# Patient Record
Sex: MALE | Race: WHITE | NOT HISPANIC OR LATINO | Employment: OTHER | URBAN - METROPOLITAN AREA
[De-identification: names, ages, dates, MRNs, and addresses within clinical notes are randomized per-mention and may not be internally consistent; named-entity substitution may affect disease eponyms.]

---

## 2017-01-06 ENCOUNTER — HOSPITAL ENCOUNTER (OUTPATIENT)
Dept: CT IMAGING | Facility: HOSPITAL | Age: 62
Discharge: HOME | End: 2017-01-06
Attending: INTERNAL MEDICINE
Payer: COMMERCIAL

## 2017-01-06 DIAGNOSIS — I48.91 ATRIAL FIBRILLATION, UNSPECIFIED TYPE (HCC): ICD-10-CM

## 2017-01-06 PROCEDURE — 75572 CT HRT W/3D IMAGE: CPT

## 2017-01-06 RX ADMIN — IODIXANOL 100 ML: 320 INJECTION, SOLUTION INTRAVASCULAR at 08:21

## 2017-01-29 ENCOUNTER — HOSPITAL ENCOUNTER (OUTPATIENT)
Dept: SLEEP CENTER | Facility: CLINIC | Age: 62
Discharge: HOME | End: 2017-01-29
Payer: COMMERCIAL

## 2017-01-29 DIAGNOSIS — R53.83 OTHER MALAISE AND FATIGUE: ICD-10-CM

## 2017-01-29 DIAGNOSIS — R53.81 OTHER MALAISE AND FATIGUE: ICD-10-CM

## 2017-01-29 DIAGNOSIS — R40.0 SLEEPINESS: ICD-10-CM

## 2017-01-29 DIAGNOSIS — G47.10 PERSISTENT DISORDER OF INITIATING OR MAINTAINING WAKEFULNESS: ICD-10-CM

## 2017-01-29 DIAGNOSIS — G47.33 OSA (OBSTRUCTIVE SLEEP APNEA): ICD-10-CM

## 2017-01-29 PROCEDURE — 95810 POLYSOM 6/> YRS 4/> PARAM: CPT

## 2017-02-01 ENCOUNTER — TRANSCRIBE ORDERS (OUTPATIENT)
Dept: SLEEP CENTER | Facility: CLINIC | Age: 62
End: 2017-02-01

## 2017-02-01 DIAGNOSIS — G47.33 OSA (OBSTRUCTIVE SLEEP APNEA): Primary | ICD-10-CM

## 2017-02-02 ENCOUNTER — TRANSCRIBE ORDERS (OUTPATIENT)
Dept: ADMINISTRATIVE | Facility: HOSPITAL | Age: 62
End: 2017-02-02

## 2017-02-02 ENCOUNTER — APPOINTMENT (OUTPATIENT)
Dept: LAB | Facility: HOSPITAL | Age: 62
End: 2017-02-02
Attending: INTERNAL MEDICINE
Payer: COMMERCIAL

## 2017-02-02 ENCOUNTER — TELEPHONE (OUTPATIENT)
Dept: INPATIENT UNIT | Facility: HOSPITAL | Age: 62
End: 2017-02-02

## 2017-02-02 DIAGNOSIS — I48.91 ATRIAL FIBRILLATION (HCC): ICD-10-CM

## 2017-02-02 LAB
ALBUMIN SERPL BCP-MCNC: 4.2 G/DL (ref 3.5–5)
ALP SERPL-CCNC: 69 U/L (ref 46–116)
ALT SERPL W P-5'-P-CCNC: 40 U/L (ref 12–78)
ANION GAP SERPL CALCULATED.3IONS-SCNC: 8 MMOL/L (ref 4–13)
AST SERPL W P-5'-P-CCNC: 29 U/L (ref 5–45)
BILIRUB SERPL-MCNC: 0.6 MG/DL (ref 0.2–1)
BUN SERPL-MCNC: 28 MG/DL (ref 5–25)
CALCIUM SERPL-MCNC: 9.1 MG/DL (ref 8.3–10.1)
CHLORIDE SERPL-SCNC: 104 MMOL/L (ref 100–108)
CO2 SERPL-SCNC: 28 MMOL/L (ref 21–32)
CREAT SERPL-MCNC: 1.03 MG/DL (ref 0.6–1.3)
ERYTHROCYTE [DISTWIDTH] IN BLOOD BY AUTOMATED COUNT: 13 % (ref 11.6–15.1)
GFR SERPL CREATININE-BSD FRML MDRD: >60 ML/MIN/1.73SQ M
GLUCOSE SERPL-MCNC: 117 MG/DL (ref 65–140)
HCT VFR BLD AUTO: 41.6 % (ref 36.5–49.3)
HGB BLD-MCNC: 14.6 G/DL (ref 12–17)
INR PPP: 1.11 (ref 0.86–1.16)
MCH RBC QN AUTO: 31.3 PG (ref 26.8–34.3)
MCHC RBC AUTO-ENTMCNC: 35.1 G/DL (ref 31.4–37.4)
MCV RBC AUTO: 89 FL (ref 82–98)
PLATELET # BLD AUTO: 150 THOUSANDS/UL (ref 149–390)
PMV BLD AUTO: 9.8 FL (ref 8.9–12.7)
POTASSIUM SERPL-SCNC: 3.8 MMOL/L (ref 3.5–5.3)
PROT SERPL-MCNC: 7.3 G/DL (ref 6.4–8.2)
PROTHROMBIN TIME: 14.2 SECONDS (ref 12–14.3)
RBC # BLD AUTO: 4.66 MILLION/UL (ref 3.88–5.62)
SODIUM SERPL-SCNC: 140 MMOL/L (ref 136–145)
WBC # BLD AUTO: 6.24 THOUSAND/UL (ref 4.31–10.16)

## 2017-02-02 PROCEDURE — 80053 COMPREHEN METABOLIC PANEL: CPT

## 2017-02-02 PROCEDURE — 85027 COMPLETE CBC AUTOMATED: CPT

## 2017-02-02 PROCEDURE — 36415 COLL VENOUS BLD VENIPUNCTURE: CPT

## 2017-02-02 PROCEDURE — 85610 PROTHROMBIN TIME: CPT

## 2017-02-02 RX ORDER — PANTOPRAZOLE SODIUM 40 MG/1
40 INJECTION, POWDER, FOR SOLUTION INTRAVENOUS ONCE
Status: CANCELLED | OUTPATIENT
Start: 2017-02-02 | End: 2017-02-02

## 2017-02-02 RX ORDER — SODIUM CHLORIDE 9 MG/ML
50 INJECTION, SOLUTION INTRAVENOUS CONTINUOUS
Status: CANCELLED | OUTPATIENT
Start: 2017-02-02

## 2017-02-03 ENCOUNTER — APPOINTMENT (OUTPATIENT)
Dept: RADIOLOGY | Facility: HOSPITAL | Age: 62
End: 2017-02-03
Payer: COMMERCIAL

## 2017-02-03 ENCOUNTER — HOSPITAL ENCOUNTER (OUTPATIENT)
Dept: NON INVASIVE DIAGNOSTICS | Facility: HOSPITAL | Age: 62
Discharge: HOME | End: 2017-02-03
Attending: INTERNAL MEDICINE
Payer: COMMERCIAL

## 2017-02-03 ENCOUNTER — ANESTHESIA (OUTPATIENT)
Dept: NON INVASIVE DIAGNOSTICS | Facility: HOSPITAL | Age: 62
End: 2017-02-03
Payer: COMMERCIAL

## 2017-02-03 ENCOUNTER — ANESTHESIA EVENT (OUTPATIENT)
Dept: NON INVASIVE DIAGNOSTICS | Facility: HOSPITAL | Age: 62
End: 2017-02-03
Payer: COMMERCIAL

## 2017-02-03 ENCOUNTER — GENERIC CONVERSION - ENCOUNTER (OUTPATIENT)
Dept: OTHER | Facility: OTHER | Age: 62
End: 2017-02-03

## 2017-02-03 ENCOUNTER — HOSPITAL ENCOUNTER (OUTPATIENT)
Dept: NON INVASIVE DIAGNOSTICS | Facility: HOSPITAL | Age: 62
Discharge: HOME | End: 2017-02-04
Attending: INTERNAL MEDICINE | Admitting: INTERNAL MEDICINE
Payer: COMMERCIAL

## 2017-02-03 DIAGNOSIS — I48.91 ATRIAL FIBRILLATION (HCC): ICD-10-CM

## 2017-02-03 DIAGNOSIS — M79.89 LIMB SWELLING: Primary | ICD-10-CM

## 2017-02-03 LAB
ANION GAP SERPL CALCULATED.3IONS-SCNC: 9 MMOL/L (ref 4–13)
ATRIAL RATE: 108 BPM
BASOPHILS # BLD AUTO: 0.03 THOUSANDS/ΜL (ref 0–0.1)
BASOPHILS NFR BLD AUTO: 1 % (ref 0–1)
BUN SERPL-MCNC: 20 MG/DL (ref 5–25)
CALCIUM SERPL-MCNC: 8.5 MG/DL (ref 8.3–10.1)
CHLORIDE SERPL-SCNC: 109 MMOL/L (ref 100–108)
CO2 SERPL-SCNC: 24 MMOL/L (ref 21–32)
CREAT SERPL-MCNC: 0.83 MG/DL (ref 0.6–1.3)
EOSINOPHIL # BLD AUTO: 0.43 THOUSAND/ΜL (ref 0–0.61)
EOSINOPHIL NFR BLD AUTO: 8 % (ref 0–6)
ERYTHROCYTE [DISTWIDTH] IN BLOOD BY AUTOMATED COUNT: 13.2 % (ref 11.6–15.1)
GFR SERPL CREATININE-BSD FRML MDRD: >60 ML/MIN/1.73SQ M
GLUCOSE SERPL-MCNC: 99 MG/DL (ref 65–140)
HCT VFR BLD AUTO: 45.9 % (ref 36.5–49.3)
HGB BLD-MCNC: 16.3 G/DL (ref 12–17)
KCT BLD-ACNC: 122 SEC (ref 89–137)
KCT BLD-ACNC: 229 SEC (ref 89–137)
KCT BLD-ACNC: 240 SEC (ref 89–137)
KCT BLD-ACNC: 257 SEC (ref 89–137)
KCT BLD-ACNC: 269 SEC (ref 89–137)
KCT BLD-ACNC: 275 SEC (ref 89–137)
KCT BLD-ACNC: 286 SEC (ref 89–137)
KCT BLD-ACNC: 292 SEC (ref 89–137)
KCT BLD-ACNC: 298 SEC (ref 89–137)
KCT BLD-ACNC: 304 SEC (ref 89–137)
KCT BLD-ACNC: 322 SEC (ref 89–137)
KCT BLD-ACNC: 328 SEC (ref 89–137)
KCT BLD-ACNC: 334 SEC (ref 89–137)
KCT BLD-ACNC: 352 SEC (ref 89–137)
KCT BLD-ACNC: 358 SEC (ref 89–137)
KCT BLD-ACNC: 371 SEC (ref 89–137)
LYMPHOCYTES # BLD AUTO: 1.84 THOUSANDS/ΜL (ref 0.6–4.47)
LYMPHOCYTES NFR BLD AUTO: 33 % (ref 14–44)
MAGNESIUM SERPL-MCNC: 1.9 MG/DL (ref 1.6–2.6)
MCH RBC QN AUTO: 31.8 PG (ref 26.8–34.3)
MCHC RBC AUTO-ENTMCNC: 35.5 G/DL (ref 31.4–37.4)
MCV RBC AUTO: 90 FL (ref 82–98)
MONOCYTES # BLD AUTO: 0.47 THOUSAND/ΜL (ref 0.17–1.22)
MONOCYTES NFR BLD AUTO: 9 % (ref 4–12)
NEUTROPHILS # BLD AUTO: 2.76 THOUSANDS/ΜL (ref 1.85–7.62)
NEUTS SEG NFR BLD AUTO: 49 % (ref 43–75)
NRBC BLD AUTO-RTO: 0 /100 WBCS
PLATELET # BLD AUTO: 162 THOUSANDS/UL (ref 149–390)
PMV BLD AUTO: 10.4 FL (ref 8.9–12.7)
POTASSIUM SERPL-SCNC: 4 MMOL/L (ref 3.5–5.3)
QRS AXIS: -23 DEGREES
QRSD INTERVAL: 94 MS
QT INTERVAL: 358 MS
QTC INTERVAL: 430 MS
RBC # BLD AUTO: 5.12 MILLION/UL (ref 3.88–5.62)
SODIUM SERPL-SCNC: 142 MMOL/L (ref 136–145)
SPECIMEN SOURCE: ABNORMAL
SPECIMEN SOURCE: NORMAL
T WAVE AXIS: 51 DEGREES
VENTRICULAR RATE: 87 BPM
WBC # BLD AUTO: 5.54 THOUSAND/UL (ref 4.31–10.16)

## 2017-02-03 PROCEDURE — C1730 CATH, EP, 19 OR FEW ELECT: HCPCS | Performed by: INTERNAL MEDICINE

## 2017-02-03 PROCEDURE — C1894 INTRO/SHEATH, NON-LASER: HCPCS | Performed by: INTERNAL MEDICINE

## 2017-02-03 PROCEDURE — 93655 ICAR CATH ABLTJ DSCRT ARRHYT: CPT | Performed by: INTERNAL MEDICINE

## 2017-02-03 PROCEDURE — C1893 INTRO/SHEATH, FIXED,NON-PEEL: HCPCS | Performed by: INTERNAL MEDICINE

## 2017-02-03 PROCEDURE — 83735 ASSAY OF MAGNESIUM: CPT | Performed by: PHYSICIAN ASSISTANT

## 2017-02-03 PROCEDURE — 92960 CARDIOVERSION ELECTRIC EXT: CPT | Performed by: INTERNAL MEDICINE

## 2017-02-03 PROCEDURE — 85025 COMPLETE CBC W/AUTO DIFF WBC: CPT | Performed by: PHYSICIAN ASSISTANT

## 2017-02-03 PROCEDURE — 93662 INTRACARDIAC ECG (ICE): CPT | Performed by: INTERNAL MEDICINE

## 2017-02-03 PROCEDURE — C1759 CATH, INTRA ECHOCARDIOGRAPHY: HCPCS | Performed by: INTERNAL MEDICINE

## 2017-02-03 PROCEDURE — C1769 GUIDE WIRE: HCPCS | Performed by: INTERNAL MEDICINE

## 2017-02-03 PROCEDURE — 93656 COMPRE EP EVAL ABLTJ ATR FIB: CPT | Performed by: INTERNAL MEDICINE

## 2017-02-03 PROCEDURE — 93971 EXTREMITY STUDY: CPT

## 2017-02-03 PROCEDURE — 93312 ECHO TRANSESOPHAGEAL: CPT

## 2017-02-03 PROCEDURE — C2630 CATH, EP, COOL-TIP: HCPCS | Performed by: INTERNAL MEDICINE

## 2017-02-03 PROCEDURE — C1733 CATH, EP, OTHR THAN COOL-TIP: HCPCS | Performed by: INTERNAL MEDICINE

## 2017-02-03 PROCEDURE — 93613 INTRACARDIAC EPHYS 3D MAPG: CPT | Performed by: INTERNAL MEDICINE

## 2017-02-03 PROCEDURE — C1726 CATH, BAL DIL, NON-VASCULAR: HCPCS | Performed by: INTERNAL MEDICINE

## 2017-02-03 PROCEDURE — 80048 BASIC METABOLIC PNL TOTAL CA: CPT | Performed by: PHYSICIAN ASSISTANT

## 2017-02-03 PROCEDURE — 85347 COAGULATION TIME ACTIVATED: CPT

## 2017-02-03 PROCEDURE — 93623 PRGRMD STIMJ&PACG IV RX NFS: CPT | Performed by: INTERNAL MEDICINE

## 2017-02-03 PROCEDURE — 93005 ELECTROCARDIOGRAM TRACING: CPT | Performed by: PHYSICIAN ASSISTANT

## 2017-02-03 RX ORDER — HEPARIN SODIUM 1000 [USP'U]/ML
INJECTION, SOLUTION INTRAVENOUS; SUBCUTANEOUS CODE/TRAUMA/SEDATION MEDICATION
Status: COMPLETED | OUTPATIENT
Start: 2017-02-03 | End: 2017-02-03

## 2017-02-03 RX ORDER — PANTOPRAZOLE SODIUM 40 MG/1
40 INJECTION, POWDER, FOR SOLUTION INTRAVENOUS ONCE
Status: DISCONTINUED | OUTPATIENT
Start: 2017-02-03 | End: 2017-02-04 | Stop reason: HOSPADM

## 2017-02-03 RX ORDER — PROPOFOL 10 MG/ML
INJECTION, EMULSION INTRAVENOUS AS NEEDED
Status: DISCONTINUED | OUTPATIENT
Start: 2017-02-03 | End: 2017-02-03 | Stop reason: SURG

## 2017-02-03 RX ORDER — MEPERIDINE HYDROCHLORIDE 25 MG/ML
12.5 INJECTION INTRAMUSCULAR; INTRAVENOUS; SUBCUTANEOUS AS NEEDED
Status: CANCELLED | OUTPATIENT
Start: 2017-02-03

## 2017-02-03 RX ORDER — LIDOCAINE HYDROCHLORIDE 10 MG/ML
INJECTION, SOLUTION INFILTRATION; PERINEURAL AS NEEDED
Status: DISCONTINUED | OUTPATIENT
Start: 2017-02-03 | End: 2017-02-03 | Stop reason: SURG

## 2017-02-03 RX ORDER — HEPARIN SODIUM 10000 [USP'U]/100ML
INJECTION, SOLUTION INTRAVENOUS
Status: COMPLETED | OUTPATIENT
Start: 2017-02-03 | End: 2017-02-03

## 2017-02-03 RX ORDER — ACETAMINOPHEN 325 MG/1
650 TABLET ORAL EVERY 4 HOURS PRN
Status: DISCONTINUED | OUTPATIENT
Start: 2017-02-03 | End: 2017-02-04 | Stop reason: HOSPADM

## 2017-02-03 RX ORDER — SODIUM CHLORIDE 9 MG/ML
INJECTION, SOLUTION INTRAVENOUS CONTINUOUS PRN
Status: DISCONTINUED | OUTPATIENT
Start: 2017-02-03 | End: 2017-02-03 | Stop reason: SURG

## 2017-02-03 RX ORDER — ALBUTEROL SULFATE 2.5 MG/3ML
2.5 SOLUTION RESPIRATORY (INHALATION) ONCE AS NEEDED
Status: CANCELLED | OUTPATIENT
Start: 2017-02-03

## 2017-02-03 RX ORDER — MEPERIDINE HYDROCHLORIDE 25 MG/ML
12.5 INJECTION INTRAMUSCULAR; INTRAVENOUS; SUBCUTANEOUS AS NEEDED
Status: DISCONTINUED | OUTPATIENT
Start: 2017-02-03 | End: 2017-02-03 | Stop reason: HOSPADM

## 2017-02-03 RX ORDER — ADENOSINE 3 MG/ML
INJECTION INTRAVENOUS CODE/TRAUMA/SEDATION MEDICATION
Status: COMPLETED | OUTPATIENT
Start: 2017-02-03 | End: 2017-02-03

## 2017-02-03 RX ORDER — SODIUM CHLORIDE, SODIUM LACTATE, POTASSIUM CHLORIDE, CALCIUM CHLORIDE 600; 310; 30; 20 MG/100ML; MG/100ML; MG/100ML; MG/100ML
125 INJECTION, SOLUTION INTRAVENOUS CONTINUOUS
Status: DISCONTINUED | OUTPATIENT
Start: 2017-02-03 | End: 2017-02-04 | Stop reason: HOSPADM

## 2017-02-03 RX ORDER — PROTAMINE SULFATE 10 MG/ML
INJECTION, SOLUTION INTRAVENOUS AS NEEDED
Status: DISCONTINUED | OUTPATIENT
Start: 2017-02-03 | End: 2017-02-03 | Stop reason: SURG

## 2017-02-03 RX ORDER — SODIUM CHLORIDE, SODIUM LACTATE, POTASSIUM CHLORIDE, CALCIUM CHLORIDE 600; 310; 30; 20 MG/100ML; MG/100ML; MG/100ML; MG/100ML
125 INJECTION, SOLUTION INTRAVENOUS CONTINUOUS
Status: CANCELLED | OUTPATIENT
Start: 2017-02-03

## 2017-02-03 RX ORDER — OXYCODONE HYDROCHLORIDE AND ACETAMINOPHEN 5; 325 MG/1; MG/1
1 TABLET ORAL EVERY 4 HOURS PRN
Status: DISCONTINUED | OUTPATIENT
Start: 2017-02-03 | End: 2017-02-04 | Stop reason: HOSPADM

## 2017-02-03 RX ORDER — DOFETILIDE 0.25 MG/1
250 CAPSULE ORAL EVERY 12 HOURS SCHEDULED
Status: DISCONTINUED | OUTPATIENT
Start: 2017-02-03 | End: 2017-02-04 | Stop reason: HOSPADM

## 2017-02-03 RX ORDER — EPHEDRINE SULFATE 50 MG/ML
INJECTION, SOLUTION INTRAVENOUS AS NEEDED
Status: DISCONTINUED | OUTPATIENT
Start: 2017-02-03 | End: 2017-02-03 | Stop reason: SURG

## 2017-02-03 RX ORDER — PANTOPRAZOLE SODIUM 40 MG/1
40 TABLET, DELAYED RELEASE ORAL
Status: DISCONTINUED | OUTPATIENT
Start: 2017-02-04 | End: 2017-02-04 | Stop reason: HOSPADM

## 2017-02-03 RX ORDER — LIDOCAINE HYDROCHLORIDE 10 MG/ML
INJECTION, SOLUTION INFILTRATION; PERINEURAL CODE/TRAUMA/SEDATION MEDICATION
Status: COMPLETED | OUTPATIENT
Start: 2017-02-03 | End: 2017-02-03

## 2017-02-03 RX ORDER — FENTANYL CITRATE 50 UG/ML
INJECTION, SOLUTION INTRAMUSCULAR; INTRAVENOUS AS NEEDED
Status: DISCONTINUED | OUTPATIENT
Start: 2017-02-03 | End: 2017-02-03 | Stop reason: SURG

## 2017-02-03 RX ORDER — ALBUTEROL SULFATE 2.5 MG/3ML
2.5 SOLUTION RESPIRATORY (INHALATION) ONCE AS NEEDED
Status: DISCONTINUED | OUTPATIENT
Start: 2017-02-03 | End: 2017-02-03 | Stop reason: HOSPADM

## 2017-02-03 RX ORDER — PROPOFOL 10 MG/ML
INJECTION, EMULSION INTRAVENOUS CONTINUOUS PRN
Status: DISCONTINUED | OUTPATIENT
Start: 2017-02-03 | End: 2017-02-03 | Stop reason: SURG

## 2017-02-03 RX ORDER — SODIUM CHLORIDE 9 MG/ML
50 INJECTION, SOLUTION INTRAVENOUS CONTINUOUS
Status: DISCONTINUED | OUTPATIENT
Start: 2017-02-03 | End: 2017-02-04 | Stop reason: HOSPADM

## 2017-02-03 RX ORDER — MIDAZOLAM HYDROCHLORIDE 1 MG/ML
INJECTION INTRAMUSCULAR; INTRAVENOUS AS NEEDED
Status: DISCONTINUED | OUTPATIENT
Start: 2017-02-03 | End: 2017-02-03 | Stop reason: SURG

## 2017-02-03 RX ORDER — SUCCINYLCHOLINE CHLORIDE 20 MG/ML
INJECTION INTRAMUSCULAR; INTRAVENOUS AS NEEDED
Status: DISCONTINUED | OUTPATIENT
Start: 2017-02-03 | End: 2017-02-03 | Stop reason: SURG

## 2017-02-03 RX ADMIN — ADENOSINE 12 MG: 3 INJECTION, SOLUTION INTRAVENOUS at 11:25

## 2017-02-03 RX ADMIN — ADENOSINE 18 MG: 3 INJECTION, SOLUTION INTRAVENOUS at 13:33

## 2017-02-03 RX ADMIN — FENTANYL CITRATE 50 MCG: 50 INJECTION, SOLUTION INTRAMUSCULAR; INTRAVENOUS at 09:02

## 2017-02-03 RX ADMIN — ADENOSINE 12 MG: 3 INJECTION, SOLUTION INTRAVENOUS at 11:42

## 2017-02-03 RX ADMIN — FENTANYL CITRATE 100 MCG: 50 INJECTION, SOLUTION INTRAMUSCULAR; INTRAVENOUS at 07:47

## 2017-02-03 RX ADMIN — MIDAZOLAM HYDROCHLORIDE 2 MG: 1 INJECTION, SOLUTION INTRAMUSCULAR; INTRAVENOUS at 07:00

## 2017-02-03 RX ADMIN — HEPARIN SODIUM 2000 UNITS: 1000 INJECTION INTRAVENOUS; SUBCUTANEOUS at 11:03

## 2017-02-03 RX ADMIN — PROTAMINE SULFATE 40 MG: 10 INJECTION, SOLUTION INTRAVENOUS at 13:52

## 2017-02-03 RX ADMIN — PROTAMINE SULFATE 40 MG: 10 INJECTION, SOLUTION INTRAVENOUS at 14:25

## 2017-02-03 RX ADMIN — ADENOSINE 12 MG: 3 INJECTION, SOLUTION INTRAVENOUS at 11:40

## 2017-02-03 RX ADMIN — HEPARIN SODIUM 2000 UNITS: 1000 INJECTION INTRAVENOUS; SUBCUTANEOUS at 09:47

## 2017-02-03 RX ADMIN — PROPOFOL 75 MCG/KG/MIN: 10 INJECTION, EMULSION INTRAVENOUS at 07:35

## 2017-02-03 RX ADMIN — PROTAMINE SULFATE 30 MG: 10 INJECTION, SOLUTION INTRAVENOUS at 14:06

## 2017-02-03 RX ADMIN — ADENOSINE 12 MG: 3 INJECTION, SOLUTION INTRAVENOUS at 11:31

## 2017-02-03 RX ADMIN — DOFETILIDE 250 MCG: 0.25 CAPSULE ORAL at 21:22

## 2017-02-03 RX ADMIN — ADENOSINE 12 MG: 3 INJECTION, SOLUTION INTRAVENOUS at 11:45

## 2017-02-03 RX ADMIN — SODIUM CHLORIDE: 0.9 INJECTION, SOLUTION INTRAVENOUS at 07:45

## 2017-02-03 RX ADMIN — ADENOSINE 12 MG: 3 INJECTION, SOLUTION INTRAVENOUS at 12:19

## 2017-02-03 RX ADMIN — ADENOSINE 12 MG: 3 INJECTION, SOLUTION INTRAVENOUS at 11:50

## 2017-02-03 RX ADMIN — IOHEXOL 60 ML: 350 INJECTION, SOLUTION INTRAVENOUS at 14:17

## 2017-02-03 RX ADMIN — PROPOFOL 150 MG: 10 INJECTION, EMULSION INTRAVENOUS at 07:24

## 2017-02-03 RX ADMIN — FENTANYL CITRATE 50 MCG: 50 INJECTION, SOLUTION INTRAMUSCULAR; INTRAVENOUS at 11:04

## 2017-02-03 RX ADMIN — SUCCINYLCHOLINE CHLORIDE 120 MG: 20 INJECTION, SOLUTION INTRAMUSCULAR; INTRAVENOUS at 07:24

## 2017-02-03 RX ADMIN — LIDOCAINE HYDROCHLORIDE 30 MG: 10 INJECTION, SOLUTION INFILTRATION; PERINEURAL at 07:24

## 2017-02-03 RX ADMIN — HEPARIN SODIUM 1400 UNITS/HR: 10000 INJECTION, SOLUTION INTRAVENOUS at 08:50

## 2017-02-03 RX ADMIN — LIDOCAINE HYDROCHLORIDE 20 ML: 10 INJECTION, SOLUTION INFILTRATION; PERINEURAL at 08:17

## 2017-02-03 RX ADMIN — HEPARIN SODIUM 3000 UNITS: 1000 INJECTION INTRAVENOUS; SUBCUTANEOUS at 09:08

## 2017-02-03 RX ADMIN — SODIUM CHLORIDE 50 ML/HR: 0.9 INJECTION, SOLUTION INTRAVENOUS at 06:27

## 2017-02-03 RX ADMIN — EPHEDRINE SULFATE 5 MG: 50 INJECTION, SOLUTION INTRAMUSCULAR; INTRAVENOUS; SUBCUTANEOUS at 09:45

## 2017-02-03 RX ADMIN — ADENOSINE 12 MG: 3 INJECTION, SOLUTION INTRAVENOUS at 09:41

## 2017-02-03 RX ADMIN — ADENOSINE 12 MG: 3 INJECTION, SOLUTION INTRAVENOUS at 13:30

## 2017-02-03 RX ADMIN — FENTANYL CITRATE 50 MCG: 50 INJECTION, SOLUTION INTRAMUSCULAR; INTRAVENOUS at 09:59

## 2017-02-03 RX ADMIN — FENTANYL CITRATE 50 MCG: 50 INJECTION, SOLUTION INTRAMUSCULAR; INTRAVENOUS at 11:09

## 2017-02-03 RX ADMIN — PROTAMINE SULFATE 50 MG: 10 INJECTION, SOLUTION INTRAVENOUS at 14:44

## 2017-02-03 RX ADMIN — ADENOSINE 12 MG: 3 INJECTION, SOLUTION INTRAVENOUS at 11:33

## 2017-02-03 RX ADMIN — SODIUM CHLORIDE: 0.9 INJECTION, SOLUTION INTRAVENOUS at 09:00

## 2017-02-03 RX ADMIN — VANCOMYCIN HYDROCHLORIDE 1 G: 1 INJECTION, POWDER, LYOPHILIZED, FOR SOLUTION INTRAVENOUS at 07:10

## 2017-02-03 RX ADMIN — HEPARIN SODIUM 15000 UNITS: 1000 INJECTION INTRAVENOUS; SUBCUTANEOUS at 08:19

## 2017-02-03 RX ADMIN — HEPARIN SODIUM 1000 UNITS/HR: 10000 INJECTION, SOLUTION INTRAVENOUS at 08:19

## 2017-02-03 RX ADMIN — ADENOSINE 12 MG: 3 INJECTION, SOLUTION INTRAVENOUS at 11:22

## 2017-02-03 RX ADMIN — SODIUM CHLORIDE 50 ML/HR: 0.9 INJECTION, SOLUTION INTRAVENOUS at 16:39

## 2017-02-03 RX ADMIN — HEPARIN SODIUM 2000 UNITS: 1000 INJECTION INTRAVENOUS; SUBCUTANEOUS at 09:28

## 2017-02-04 VITALS
DIASTOLIC BLOOD PRESSURE: 61 MMHG | OXYGEN SATURATION: 96 % | TEMPERATURE: 98.1 F | BODY MASS INDEX: 29.01 KG/M2 | HEIGHT: 71 IN | WEIGHT: 207.23 LBS | HEART RATE: 80 BPM | SYSTOLIC BLOOD PRESSURE: 113 MMHG | RESPIRATION RATE: 20 BRPM

## 2017-02-04 LAB
ANION GAP SERPL CALCULATED.3IONS-SCNC: 9 MMOL/L (ref 4–13)
BUN SERPL-MCNC: 20 MG/DL (ref 5–25)
CALCIUM SERPL-MCNC: 7.6 MG/DL (ref 8.3–10.1)
CHLORIDE SERPL-SCNC: 109 MMOL/L (ref 100–108)
CO2 SERPL-SCNC: 25 MMOL/L (ref 21–32)
CREAT SERPL-MCNC: 0.89 MG/DL (ref 0.6–1.3)
ERYTHROCYTE [DISTWIDTH] IN BLOOD BY AUTOMATED COUNT: 13.7 % (ref 11.6–15.1)
GFR SERPL CREATININE-BSD FRML MDRD: >60 ML/MIN/1.73SQ M
GLUCOSE SERPL-MCNC: 143 MG/DL (ref 65–140)
HCT VFR BLD AUTO: 38.4 % (ref 36.5–49.3)
HGB BLD-MCNC: 13.1 G/DL (ref 12–17)
MCH RBC QN AUTO: 31.5 PG (ref 26.8–34.3)
MCHC RBC AUTO-ENTMCNC: 34.1 G/DL (ref 31.4–37.4)
MCV RBC AUTO: 92 FL (ref 82–98)
PLATELET # BLD AUTO: 139 THOUSANDS/UL (ref 149–390)
PMV BLD AUTO: 10.7 FL (ref 8.9–12.7)
POTASSIUM SERPL-SCNC: 3.7 MMOL/L (ref 3.5–5.3)
RBC # BLD AUTO: 4.16 MILLION/UL (ref 3.88–5.62)
SODIUM SERPL-SCNC: 143 MMOL/L (ref 136–145)
WBC # BLD AUTO: 7.15 THOUSAND/UL (ref 4.31–10.16)

## 2017-02-04 PROCEDURE — 80048 BASIC METABOLIC PNL TOTAL CA: CPT | Performed by: PHYSICIAN ASSISTANT

## 2017-02-04 PROCEDURE — 85027 COMPLETE CBC AUTOMATED: CPT | Performed by: PHYSICIAN ASSISTANT

## 2017-02-04 RX ORDER — PANTOPRAZOLE SODIUM 40 MG/1
40 TABLET, DELAYED RELEASE ORAL
Qty: 30 TABLET | Refills: 0 | Status: SHIPPED | OUTPATIENT
Start: 2017-02-04 | End: 2018-07-11

## 2017-02-04 RX ORDER — ACETAMINOPHEN 325 MG/1
650 TABLET ORAL EVERY 4 HOURS PRN
Qty: 30 TABLET | Refills: 0
Start: 2017-02-04 | End: 2017-03-06

## 2017-02-04 RX ADMIN — DOFETILIDE 250 MCG: 0.25 CAPSULE ORAL at 09:06

## 2017-02-04 RX ADMIN — PANTOPRAZOLE SODIUM 40 MG: 40 TABLET, DELAYED RELEASE ORAL at 06:19

## 2017-02-04 RX ADMIN — APIXABAN 5 MG: 5 TABLET, FILM COATED ORAL at 06:19

## 2017-03-01 ENCOUNTER — HOSPITAL ENCOUNTER (OUTPATIENT)
Dept: SLEEP CENTER | Facility: CLINIC | Age: 62
Discharge: HOME | End: 2017-03-01
Payer: COMMERCIAL

## 2017-03-01 ENCOUNTER — TRANSCRIBE ORDERS (OUTPATIENT)
Dept: SLEEP CENTER | Facility: CLINIC | Age: 62
End: 2017-03-01

## 2017-03-01 DIAGNOSIS — G47.33 OSA (OBSTRUCTIVE SLEEP APNEA): Primary | ICD-10-CM

## 2017-03-01 DIAGNOSIS — G47.33 OSA (OBSTRUCTIVE SLEEP APNEA): ICD-10-CM

## 2017-03-15 ENCOUNTER — ALLSCRIPTS OFFICE VISIT (OUTPATIENT)
Dept: OTHER | Facility: OTHER | Age: 62
End: 2017-03-15

## 2017-07-26 ENCOUNTER — TRANSCRIBE ORDERS (OUTPATIENT)
Dept: SLEEP CENTER | Facility: CLINIC | Age: 62
End: 2017-07-26

## 2017-08-09 ENCOUNTER — ALLSCRIPTS OFFICE VISIT (OUTPATIENT)
Dept: OTHER | Facility: OTHER | Age: 62
End: 2017-08-09

## 2017-08-21 ENCOUNTER — HOSPITAL ENCOUNTER (OUTPATIENT)
Dept: SLEEP CENTER | Facility: CLINIC | Age: 62
Discharge: HOME | End: 2017-08-21
Payer: COMMERCIAL

## 2017-09-22 ENCOUNTER — APPOINTMENT (OUTPATIENT)
Dept: LAB | Facility: HOSPITAL | Age: 62
End: 2017-09-22
Payer: COMMERCIAL

## 2017-09-22 ENCOUNTER — TRANSCRIBE ORDERS (OUTPATIENT)
Dept: ADMINISTRATIVE | Facility: HOSPITAL | Age: 62
End: 2017-09-22

## 2017-09-22 DIAGNOSIS — Z12.5 SPECIAL SCREENING FOR MALIGNANT NEOPLASM OF PROSTATE: Primary | ICD-10-CM

## 2017-09-22 DIAGNOSIS — Z12.5 SPECIAL SCREENING FOR MALIGNANT NEOPLASM OF PROSTATE: ICD-10-CM

## 2017-09-22 LAB — PSA SERPL-MCNC: 1.6 NG/ML (ref 0–4)

## 2017-09-22 PROCEDURE — G0103 PSA SCREENING: HCPCS

## 2017-09-22 PROCEDURE — 36415 COLL VENOUS BLD VENIPUNCTURE: CPT

## 2017-10-13 ENCOUNTER — TRANSCRIBE ORDERS (OUTPATIENT)
Dept: LAB | Facility: HOSPITAL | Age: 62
End: 2017-10-13

## 2017-10-13 ENCOUNTER — APPOINTMENT (OUTPATIENT)
Dept: LAB | Facility: HOSPITAL | Age: 62
End: 2017-10-13
Attending: INTERNAL MEDICINE
Payer: COMMERCIAL

## 2017-10-13 DIAGNOSIS — L40.50 PSORIATIC ARTHROPATHY (HCC): ICD-10-CM

## 2017-10-13 DIAGNOSIS — Z79.899 NEED FOR PROPHYLACTIC CHEMOTHERAPY: ICD-10-CM

## 2017-10-13 DIAGNOSIS — L40.50 PSORIATIC ARTHROPATHY (HCC): Primary | ICD-10-CM

## 2017-10-13 LAB
ALBUMIN SERPL BCP-MCNC: 3.9 G/DL (ref 3.5–5)
ALP SERPL-CCNC: 78 U/L (ref 46–116)
ALT SERPL W P-5'-P-CCNC: 92 U/L (ref 12–78)
AST SERPL W P-5'-P-CCNC: 50 U/L (ref 5–45)
BILIRUB DIRECT SERPL-MCNC: 0.14 MG/DL (ref 0–0.2)
BILIRUB SERPL-MCNC: 0.7 MG/DL (ref 0.2–1)
PROT SERPL-MCNC: 7.4 G/DL (ref 6.4–8.2)

## 2017-10-13 PROCEDURE — 80076 HEPATIC FUNCTION PANEL: CPT

## 2017-10-13 PROCEDURE — 36415 COLL VENOUS BLD VENIPUNCTURE: CPT

## 2017-11-10 ENCOUNTER — TRANSCRIBE ORDERS (OUTPATIENT)
Dept: ADMINISTRATIVE | Facility: HOSPITAL | Age: 62
End: 2017-11-10

## 2017-11-10 ENCOUNTER — LAB (OUTPATIENT)
Dept: LAB | Facility: HOSPITAL | Age: 62
End: 2017-11-10
Payer: COMMERCIAL

## 2017-11-10 DIAGNOSIS — Z79.899 NEED FOR PROPHYLACTIC CHEMOTHERAPY: Primary | ICD-10-CM

## 2017-11-10 DIAGNOSIS — Z79.899 NEED FOR PROPHYLACTIC CHEMOTHERAPY: ICD-10-CM

## 2017-11-10 LAB
ALBUMIN SERPL BCP-MCNC: 4.2 G/DL (ref 3.5–5)
ALP SERPL-CCNC: 68 U/L (ref 46–116)
ALT SERPL W P-5'-P-CCNC: 75 U/L (ref 12–78)
AST SERPL W P-5'-P-CCNC: 39 U/L (ref 5–45)
BILIRUB DIRECT SERPL-MCNC: 0.11 MG/DL (ref 0–0.2)
BILIRUB SERPL-MCNC: 0.7 MG/DL (ref 0.2–1)
PROT SERPL-MCNC: 7.2 G/DL (ref 6.4–8.2)

## 2017-11-10 PROCEDURE — 80076 HEPATIC FUNCTION PANEL: CPT

## 2017-11-10 PROCEDURE — 36415 COLL VENOUS BLD VENIPUNCTURE: CPT

## 2018-01-13 VITALS
SYSTOLIC BLOOD PRESSURE: 136 MMHG | BODY MASS INDEX: 27.31 KG/M2 | DIASTOLIC BLOOD PRESSURE: 82 MMHG | HEART RATE: 61 BPM | HEIGHT: 71 IN | WEIGHT: 195.06 LBS

## 2018-01-15 VITALS
DIASTOLIC BLOOD PRESSURE: 88 MMHG | HEART RATE: 63 BPM | BODY MASS INDEX: 27.74 KG/M2 | WEIGHT: 198.13 LBS | HEIGHT: 71 IN | SYSTOLIC BLOOD PRESSURE: 146 MMHG

## 2018-01-15 NOTE — PROCEDURES
Procedures by Deb Rodriges MD at 2/3/2017  2:22  PM      Author:  Deb Rodriges MD Service:  Electrophysiology Author Type:  Physician     Filed:  2/3/2017  5:00 PM Date of Service:  2/3/2017  2:22 PM Status:  Signed     :  Deb Rodriges MD (Physician)            Atrial Fibrillation and atrial flutter          History and physical were reviewed  Patient was examined and history was reviewed  No change in patient's condition Since history and physical has been completed           The pre- operative diagnosis:  Atrial fibrillation  Atrial flutter          Postoperative diagnosis:  Atrial fibrillation  Atrial flutter            Procedure:  1  Cryoablation of atrial fibrillation  2 comprehensive electrophysiologic evaluation with left atrial pacing and recording  3  3-D electro-anatomic mapping using NavX system  4  Intracardiac echocardiography  5  Second ablation- radiofrequency ablation of atrial flutter  6  Pharmacologic testing and adenosine  7   Electrical cardioversion, DCCV, 200 J single shock             Surgeon: Deb Rodriges      Assistants -None      Specimens - none      Estimated blood loss- 50 ml      Findings-none      Complications- none      Anesthesia- general anesthesia, local lidocaine by myself      CAROL - Dr Mary Chong - No AMEYA clot                Details of the procedure   The patient had been seen before as an outpatient  He has felt sotalol or dofetilide and an ablation had been planned  He was seen in the morning, examined, and evaluated  Examination is same as last outpatient visit on 30th of December    Consent was signed    Patient was taken to the electrophysiologic laboratory  Proper time out was done  General anesthesia was done  Transesophageal echocardiogram was done and atrial appendage clot was ruled out      Sterile dressing and draping was done  Access obtained under ultrasound guidance as described below  Sheaths placed as described below  Catheters placed as described below    The patient was in a pulmonary vein tachycardia going on at cycle length of 170 ms   There was also bursts of atrial flutter  The patient was cardioverted into normal sinus rhythm and we proceeded with ablation          Sheaths used  All access was obtained under ultrasound guidance    A  Right femoral vein-   8F upgraded to 11F - SRO and SLO used through it, finally upgraded to 15 F for cryocath,   9 F for ICE    5F- Right ventricular     B  Left femoral vein- 7F for high right atrial , 7F CS, 5F for HRA            Catheters used  1  HRA catheters - Octapolar    2  His catheter - CRD2    3  RV catheter - Mick  4  Coronary sinus catheter - [employer] arm[employer] FJ curve  5  Ablation catheter :  Cryocath for Cryo ablation  Irrigated thermocool DF curve /EPT blazer 8mm - for Atrial flutter ablation, testing of Atrial flutter line  6  Lasso - Achieve with cryocath,   7  ICE - Standard Acuson          Imaging systems used  1  Fluoroscopy    Right anterior oblique views were used whenever we needed to determine between anterior and posterior  Left anterior oblique views were used whenever we needed to determine between right and left  2  Electrotomy mapping - 3D electro-anatomic mapping was done by the Doctor Janey 91  This was also used whenever catheter was moved  The His bundle was marked  3  ICE -for transeptal and thereafter to look at veins and appendage   Intermittently to look at pericardial space              Anticoagulation:  Heparin was used for anticoagulation to keep ACT in the therapeutic range 350-400ms  Correction at end of ablation :protamine 40/  30 /  40 /50               Step 1 : Cryoablation  Details of the ablation    The patient has already been cardioverted to sinus rhythm  with proceeded with ablation for atrial fibrillation          Transeptal   A BRK1 needle with an SLO sheath was used through 6 F access in right groin  Septal puncture was obtained under both fluoroscopy and intracardiac ice guidance  Position was confirmed and then cryosheath was taken to the left side              Cryoablation -sequence  Once we were on the left side, the sequence of ablation were as follows  Left superior pulmonary vein  Left inferior pulmonary vein  Right superior pulmonary vein  Right inferior pulmonary vein              Cryoablation - process   For each of the veins the following sequence was followed  -We went into the vein with Achieve catheter and an anatomy of the same was made on Navx  -Thereafter the CryoCath catheter was positioned, such that when the cryo-balloon was inflated it occluded ostium of the vein  -The occlusion was confirmed by- ventricularization of the venous waveform, intracardiac echo showing absence of venous leak, and at times using contrast venography to confirm the completeness of occlusion  - Thereafter we would come on cryoablation - following the freeze thaw freeze protocol  - Each time we made sure that therapeutic temperature was reached in an appropriate time ( usually -45 to -50 degrees in 60 sec)  - Electrograms were followed so that the pulmonary vein potentials were terminated  - The esophageal temperature was followed closely and never allowed to fall below 30°C  - While ablating on the right side, the phrenic nerve was stimulated continuously by a catheter placed in the subclavian vein, and adequate capture of the diaphragm was confirmed throughout the process, to prevent phrenic nerve injury              Cryoablation- details for each vein              Cryoablation - confirmation of block - adenosine use with pacing  Entrance Block  -Achieve catheter was placed at the ostium  - left-sided pulmonary veins pacing done from distal coronary sinus  -right-sided pulmonary veins being done from right atrium  -Each time 12 mg of adenosine was given followed by flush  There was adequate adenosine effect as confirmed by AV block and hypotension  -There was no recurrence of pulmonary vein potential, suggesting complete block across the ostium      Exit block  The achieve catheter was placed at the ostium    Sequential pacing was done across the bipolar   At no point was able to capture the atrium and pacing through the achieve            Thereafter sheath and catheters were pulled out from the left atrium and heparin infusion was stopped               Step 2: Atrial flutter  After the catheters had been placed patient had bouts of pulmonary vein tachycardia and also atrial flutter  We went ahead and performed an Tricuspid Isthmus Line      Ablation done using thermocool irrigated catheter , DF curve  Power variation between 30 and 35 W  Temperature between 30 and 35°  Impedance followed carefully not allowing it to jump more than 20 ohms     An ablation line was done all the way from tricuspid annulus to the inferior vena cava  The trans-isthmic time remained at 130 ms  We were unable to get rid of sharp atrial signals from the cavoatrial junction      Changed over to an EP T blazer catheter 8mm tip  Temperature kept at 55°  Power varied anywhere from 25 W to 55  Carefully ablated all sharp atrial signals      Checking of the flutter line  Using the ablation catheter the line was checked  Block was confirmed by:  - Trans-isthmic conduction time 134 ms  - Bidirectional block present  - When stimulating lateral to the line the sequene of atrial activation is as follows - HRA- His - CS    - Checking with adenosine   Pacing from proximal coronary sinus, measuring time to ablation  Adenosine 12 and 18 mg given and av block obtained  No change in trans-isthmic time  This was tested on 3 different occasions      Checking for block across the line at multiple times:  - after 1 st ablation of flutter  - after re-inforcing ablation with EPT catheter  - at end of study after electrophysiologic study        This was a very complex flutter ablation because of a prominent eustachian ridge  Multiple times the line was checked for complete block          Step 3:  comprehensive electrophysiologic study          There is no VA conduction  there is AH jump but no echo beat  Induction of flutter and fibrillation attempted with burst pacing at 400 ms, 350 ms, 300 ms, 250 ms and 200 ms  We were unable to induce any tachycardia           Completion of procedure  The patient's ACT was 286  he was given 150 mg of protamine  ACT was  when all sheaths were withdrawn-there is adequate hemostasis  sutures were placed in each  groin - figure of 8 around each access site  Patient was woken up from general anesthesia  he is neurologically intact            3D EAM of ablation:  Atrial flutter ablation:        Atrial fibrillation ablation        Summary of procedure :      1  Successful cryoablation for paroxysmal atrial fibrillation  Block across the pulmonary vein ostium confirmed -both entrance and exit block     2  Caval tricuspid isthmus line done and block across the line demonstrated     3  Comprehensive electrophysiologic study done        Recommendation:  - close follow up of both groins and need to remove sutures  - continue dofetilide for 3 months  - continue anticoagulation  - Follow up with sleep study               Received for:Mynor BALDERRAMA    Feb  3 2017  5:00PM [facility] Standard Time

## 2018-07-11 ENCOUNTER — OFFICE VISIT (OUTPATIENT)
Dept: FAMILY MEDICINE CLINIC | Facility: CLINIC | Age: 63
End: 2018-07-11
Payer: COMMERCIAL

## 2018-07-11 ENCOUNTER — LAB (OUTPATIENT)
Dept: LAB | Facility: MEDICAL CENTER | Age: 63
End: 2018-07-11
Payer: COMMERCIAL

## 2018-07-11 ENCOUNTER — APPOINTMENT (OUTPATIENT)
Dept: RADIOLOGY | Facility: CLINIC | Age: 63
End: 2018-07-11
Payer: COMMERCIAL

## 2018-07-11 VITALS
WEIGHT: 193 LBS | HEIGHT: 71 IN | RESPIRATION RATE: 18 BRPM | BODY MASS INDEX: 27.02 KG/M2 | SYSTOLIC BLOOD PRESSURE: 150 MMHG | OXYGEN SATURATION: 97 % | TEMPERATURE: 97.4 F | DIASTOLIC BLOOD PRESSURE: 88 MMHG | HEART RATE: 59 BPM

## 2018-07-11 DIAGNOSIS — R63.4 WEIGHT LOSS: ICD-10-CM

## 2018-07-11 DIAGNOSIS — R06.02 SHORTNESS OF BREATH: ICD-10-CM

## 2018-07-11 DIAGNOSIS — I27.20 PULMONARY HYPERTENSION (HCC): ICD-10-CM

## 2018-07-11 DIAGNOSIS — R63.0 LOSS OF APPETITE: ICD-10-CM

## 2018-07-11 DIAGNOSIS — R53.83 FATIGUE, UNSPECIFIED TYPE: Primary | ICD-10-CM

## 2018-07-11 DIAGNOSIS — R53.83 FATIGUE, UNSPECIFIED TYPE: ICD-10-CM

## 2018-07-11 LAB
ALBUMIN SERPL BCP-MCNC: 3.9 G/DL (ref 3.5–5)
ALP SERPL-CCNC: 77 U/L (ref 46–116)
ALT SERPL W P-5'-P-CCNC: 77 U/L (ref 12–78)
ANION GAP SERPL CALCULATED.3IONS-SCNC: 4 MMOL/L (ref 4–13)
AST SERPL W P-5'-P-CCNC: 45 U/L (ref 5–45)
BASOPHILS # BLD AUTO: 0.02 THOUSANDS/ΜL (ref 0–0.1)
BASOPHILS NFR BLD AUTO: 0 % (ref 0–1)
BILIRUB SERPL-MCNC: 0.54 MG/DL (ref 0.2–1)
BUN SERPL-MCNC: 23 MG/DL (ref 5–25)
CALCIUM SERPL-MCNC: 8.8 MG/DL (ref 8.3–10.1)
CHLORIDE SERPL-SCNC: 104 MMOL/L (ref 100–108)
CO2 SERPL-SCNC: 31 MMOL/L (ref 21–32)
CREAT SERPL-MCNC: 0.92 MG/DL (ref 0.6–1.3)
EOSINOPHIL # BLD AUTO: 0.16 THOUSAND/ΜL (ref 0–0.61)
EOSINOPHIL NFR BLD AUTO: 3 % (ref 0–6)
ERYTHROCYTE [DISTWIDTH] IN BLOOD BY AUTOMATED COUNT: 12.6 % (ref 11.6–15.1)
GFR SERPL CREATININE-BSD FRML MDRD: 90 ML/MIN/1.73SQ M
GLUCOSE SERPL-MCNC: 75 MG/DL (ref 65–140)
HCT VFR BLD AUTO: 43.1 % (ref 36.5–49.3)
HGB BLD-MCNC: 14.6 G/DL (ref 12–17)
IMM GRANULOCYTES # BLD AUTO: 0.01 THOUSAND/UL (ref 0–0.2)
IMM GRANULOCYTES NFR BLD AUTO: 0 % (ref 0–2)
LYMPHOCYTES # BLD AUTO: 1.82 THOUSANDS/ΜL (ref 0.6–4.47)
LYMPHOCYTES NFR BLD AUTO: 32 % (ref 14–44)
MCH RBC QN AUTO: 31.6 PG (ref 26.8–34.3)
MCHC RBC AUTO-ENTMCNC: 33.9 G/DL (ref 31.4–37.4)
MCV RBC AUTO: 93 FL (ref 82–98)
MONOCYTES # BLD AUTO: 0.58 THOUSAND/ΜL (ref 0.17–1.22)
MONOCYTES NFR BLD AUTO: 10 % (ref 4–12)
NEUTROPHILS # BLD AUTO: 3.08 THOUSANDS/ΜL (ref 1.85–7.62)
NEUTS SEG NFR BLD AUTO: 55 % (ref 43–75)
NRBC BLD AUTO-RTO: 0 /100 WBCS
NT-PROBNP SERPL-MCNC: 59 PG/ML
PLATELET # BLD AUTO: 149 THOUSANDS/UL (ref 149–390)
PMV BLD AUTO: 10.6 FL (ref 8.9–12.7)
POTASSIUM SERPL-SCNC: 4 MMOL/L (ref 3.5–5.3)
PROT SERPL-MCNC: 8 G/DL (ref 6.4–8.2)
RBC # BLD AUTO: 4.62 MILLION/UL (ref 3.88–5.62)
SODIUM SERPL-SCNC: 139 MMOL/L (ref 136–145)
WBC # BLD AUTO: 5.67 THOUSAND/UL (ref 4.31–10.16)

## 2018-07-11 PROCEDURE — 3008F BODY MASS INDEX DOCD: CPT | Performed by: NURSE PRACTITIONER

## 2018-07-11 PROCEDURE — 71046 X-RAY EXAM CHEST 2 VIEWS: CPT

## 2018-07-11 PROCEDURE — 99214 OFFICE O/P EST MOD 30 MIN: CPT | Performed by: NURSE PRACTITIONER

## 2018-07-11 PROCEDURE — 36415 COLL VENOUS BLD VENIPUNCTURE: CPT

## 2018-07-11 PROCEDURE — 86617 LYME DISEASE ANTIBODY: CPT

## 2018-07-11 PROCEDURE — 83880 ASSAY OF NATRIURETIC PEPTIDE: CPT

## 2018-07-11 PROCEDURE — 86618 LYME DISEASE ANTIBODY: CPT

## 2018-07-11 PROCEDURE — 80053 COMPREHEN METABOLIC PANEL: CPT

## 2018-07-11 PROCEDURE — 85025 COMPLETE CBC W/AUTO DIFF WBC: CPT

## 2018-07-11 RX ORDER — ASPIRIN 81 MG/1
1 TABLET, CHEWABLE ORAL DAILY
COMMUNITY
Start: 2016-03-28 | End: 2022-05-18 | Stop reason: ALTCHOICE

## 2018-07-11 NOTE — PROGRESS NOTES
Assessment/Plan:    No problem-specific Assessment & Plan notes found for this encounter  Diagnoses and all orders for this visit:    Fatigue, unspecified type  -     CBC and differential; Future  -     Comprehensive metabolic panel; Future  -     Lyme Antibody Profile with reflex to WB; Future  -     NT-BNP PRO; Future  -     XR chest pa & lateral; Future    Shortness of breath  -     CBC and differential; Future  -     Comprehensive metabolic panel; Future  -     Lyme Antibody Profile with reflex to WB; Future  -     NT-BNP PRO; Future  -     XR chest pa & lateral; Future    Weight loss  -     CBC and differential; Future  -     Comprehensive metabolic panel; Future  -     Lyme Antibody Profile with reflex to WB; Future  -     NT-BNP PRO; Future  -     XR chest pa & lateral; Future    Loss of appetite  -     CBC and differential; Future  -     Comprehensive metabolic panel; Future  -     Lyme Antibody Profile with reflex to WB; Future  -     NT-BNP PRO; Future  -     XR chest pa & lateral; Future    Pulmonary hypertension (HCC)  -     CBC and differential; Future  -     Comprehensive metabolic panel; Future  -     Lyme Antibody Profile with reflex to WB; Future  -     NT-BNP PRO; Future  -     XR chest pa & lateral; Future    Other orders  -     aspirin 81 mg chewable tablet; Chew 1 tablet daily          Subjective:      Patient ID: Marichuy Amor is a 63 y o  male  Pt is here with symptoms which started last week  He went "caving" for a week and was in the dampness  His wife and granddaughter fell well  He feels like he has headache, fatigue and cannot sleep  He reports that he did have a fever at home, but did not check with a thermometer  NO nasal congestion or cough  Some raspy voice in the am  He tried Advil, Tylenol, and Benadryl  Recently did have a tick crawling on him, not imbedded          The following portions of the patient's history were reviewed and updated as appropriate:   He  has a past medical history of A-fib ([facility] ); Arthritis; BPH (benign prostatic hyperplasia); Fatty liver; High cholesterol; Hyperlipidemia; Irregular heart beat; Non-ischemic cardiomyopathy ([facility] ); Psoriasis; Psoriatic arthritis ([place] [address] ); and Pulmonary HTN ([facility] )  He   Patient Active Problem List    Diagnosis Date Noted    Fatigue 07/11/2018    Shortness of breath 07/11/2018    Atrial fibrillation ([place] [address] ) 04/06/2016     He  has a past surgical history that includes Transurethral resection of prostate  His family history is not on file  He  reports that he has quit smoking  He has never used smokeless tobacco  He reports that he drinks alcohol  He reports that he does not use drugs  Current Outpatient Prescriptions   Medication Sig Dispense Refill    aspirin 81 mg chewable tablet Chew 1 tablet daily      adalimumab (HUMIRA) 40 mg/0 8 mL PSKT Inject 40 mg under the skin once   tadalafil (CIALIS) 5 MG tablet Take 5 mg by mouth daily as needed for erectile dysfunction  No current facility-administered medications for this visit  Current Outpatient Prescriptions on File Prior to Visit   Medication Sig    adalimumab (HUMIRA) 40 mg/0 8 mL PSKT Inject 40 mg under the skin once   tadalafil (CIALIS) 5 MG tablet Take 5 mg by mouth daily as needed for erectile dysfunction   [DISCONTINUED] apixaban (ELIQUIS) 5 mg Take 5 mg by mouth 2 (two) times a day   [DISCONTINUED] dofetilide (TIKOSYN) 250 mcg capsule Take 1 capsule by mouth every 12 (twelve) hours for 30 days    [DISCONTINUED] pantoprazole (PROTONIX) 40 mg tablet Take 1 tablet by mouth daily in the early morning for 30 days     No current facility-administered medications on file prior to visit  He is allergic to penicillins       Review of Systems   Constitutional: Positive for fatigue and fever  HENT: Positive for voice change  Negative for congestion, postnasal drip, rhinorrhea, sinus pain and sinus pressure      Eyes: Negative for discharge and redness  Respiratory: Positive for cough  Negative for shortness of breath and wheezing  Cardiovascular: Negative for chest pain  Gastrointestinal: Negative for abdominal pain  Musculoskeletal: Positive for myalgias  Skin: Negative for color change and rash  Allergic/Immunologic: Negative for immunocompromised state  Hematological: Negative for adenopathy  All other systems reviewed and are negative  Objective:      /88 (BP Location: Left arm, Patient Position: Sitting)   Pulse 59   Temp (!) 97 4 °F (36 3 °C)   Resp 18   Ht 5' 11" (1 803 m)   Wt 87 5 kg (193 lb)   SpO2 97%   BMI 26 92 kg/m²          Physical Exam   Constitutional: He is oriented to person, place, and time  He appears well-developed and well-nourished  No distress  HENT:   Head: Normocephalic and atraumatic  Right Ear: External ear normal    Left Ear: External ear normal    Nose: Nose normal    Mouth/Throat: Oropharynx is clear and moist  No oropharyngeal exudate  Eyes: Conjunctivae and EOM are normal  Pupils are equal, round, and reactive to light  Right eye exhibits no discharge  Left eye exhibits no discharge  Neck: Normal range of motion  Neck supple  Cardiovascular: Normal rate, regular rhythm and normal heart sounds  Exam reveals no gallop and no friction rub  No murmur heard  Pulmonary/Chest: Effort normal and breath sounds normal  No respiratory distress  He has no wheezes  Abdominal: Soft  Musculoskeletal: Normal range of motion  He exhibits no edema  Lymphadenopathy:     He has no cervical adenopathy  Neurological: He is alert and oriented to person, place, and time  Skin: Skin is warm and dry  No rash noted  He is not diaphoretic  No erythema  Psychiatric: He has a normal mood and affect  His behavior is normal  Judgment and thought content normal    Nursing note and vitals reviewed

## 2018-07-11 NOTE — PATIENT INSTRUCTIONS
Multiple symptoms with normal PE- check labs  Viral illness vs cardiac etiology  Drink plenty of fluids  Treat fever and achiness with Ibuprofen or Advil  Check chest xray     We will call with results

## 2018-07-13 LAB
B BURGDOR IGG SER IA-ACNC: 0.21
B BURGDOR IGM SER IA-ACNC: 0.82

## 2018-07-15 LAB
B BURGDOR IGG PATRN SER IB-IMP: NEGATIVE
B BURGDOR IGM PATRN SER IB-IMP: NEGATIVE
B BURGDOR18KD IGG SER QL IB: ABNORMAL
B BURGDOR23KD IGG SER QL IB: ABNORMAL
B BURGDOR23KD IGM SER QL IB: PRESENT
B BURGDOR28KD IGG SER QL IB: ABNORMAL
B BURGDOR30KD IGG SER QL IB: ABNORMAL
B BURGDOR39KD IGG SER QL IB: ABNORMAL
B BURGDOR39KD IGM SER QL IB: ABNORMAL
B BURGDOR41KD IGG SER QL IB: ABNORMAL
B BURGDOR41KD IGM SER QL IB: ABNORMAL
B BURGDOR45KD IGG SER QL IB: ABNORMAL
B BURGDOR58KD IGG SER QL IB: ABNORMAL
B BURGDOR66KD IGG SER QL IB: ABNORMAL
B BURGDOR93KD IGG SER QL IB: ABNORMAL

## 2018-07-18 ENCOUNTER — TELEPHONE (OUTPATIENT)
Dept: FAMILY MEDICINE CLINIC | Facility: CLINIC | Age: 63
End: 2018-07-18

## 2018-07-18 NOTE — TELEPHONE ENCOUNTER
Patient recently in  For HA, katrina had blood work, chest xray and all was good, but patient still has HA every day and not feeling well  What to do?  Another appt here or somewhere else

## 2018-07-18 NOTE — TELEPHONE ENCOUNTER
Start allergy medication such as Zyrtec or Claritin or Allegra  Do this for 7-10 days and call with update

## 2018-08-07 ENCOUNTER — OFFICE VISIT (OUTPATIENT)
Dept: CARDIOLOGY CLINIC | Facility: CLINIC | Age: 63
End: 2018-08-07
Payer: COMMERCIAL

## 2018-08-07 VITALS
WEIGHT: 194.2 LBS | DIASTOLIC BLOOD PRESSURE: 88 MMHG | SYSTOLIC BLOOD PRESSURE: 132 MMHG | BODY MASS INDEX: 27.19 KG/M2 | RESPIRATION RATE: 12 BRPM | HEART RATE: 59 BPM | HEIGHT: 71 IN

## 2018-08-07 DIAGNOSIS — I48.91 ATRIAL FIBRILLATION, UNSPECIFIED TYPE (HCC): Primary | ICD-10-CM

## 2018-08-07 PROCEDURE — 93000 ELECTROCARDIOGRAM COMPLETE: CPT | Performed by: INTERNAL MEDICINE

## 2018-08-07 PROCEDURE — 99213 OFFICE O/P EST LOW 20 MIN: CPT | Performed by: INTERNAL MEDICINE

## 2018-08-07 NOTE — LETTER
August 7, 2018     Kain Dalton, [address]  [address] [facility]  [address]    Patient: Abena Redmond   YOB: 1955   Date of Visit: 8/7/2018       Dear Dr Yael Smith:    Thank you for referring Alber Francisco to me for evaluation  Below are my notes for this consultation  If you have questions, please do not hesitate to call me  I look forward to following your patient along with you  Sincerely,        Akira Zeng MD        CC: MD Akira Shoemaker MD  8/7/2018 10:39 AM  Sign at close encounter                                             Cardiology Follow Up    Abena Redmond  1955  [id number]  [address]  [address]    1  Atrial fibrillation, unspecified type ([address] )  POCT ECG       Interval History:     The patient has done well in the last 20 months   This is a 63-year-old gentleman who has undergone combined atrial flutter and atrial fibrillation ablation    He does not complain of any angina like chest pain or chest pressure  There is no further episodes of lightheadedness, presyncope or syncope  No orthopnea, paroxysmal nocturnal dyspnea, leg swelling  Increased exertional tolerance with exercise      Prior to his ablation he did have significant symptoms:  Patient states that he has been getting episodes of lightheadedness and palpitations  Episodes occur every other day  states that these are usually related to alcohol intake  He use to drink up to 2 six-packs on weekends and 2 -4 beers daily on week days  Has recently cut down to 2 beers every other weekday and 4 beers on Friday and Saturday  Reduced further and still having episodes while on dofetilide   Sotalol had also failed to control his symptoms  Denies tobacco or drug use         Had Sleep study done, had mild sleep apnea and was advised to lose weight and come back for a sleep test in 6 months time      Works as a chimney   States he gets symptomatic from afib with lightheadedness and dizziness  Episodes occur every other day stacy when he has a drink       Patient Active Problem List   Diagnosis    Atrial fibrillation (HCC)    Fatigue    Shortness of breath     Past Medical History:   Diagnosis Date    A-fib ([place] [address] )     Arthritis     BPH (benign prostatic hyperplasia)     Fatty liver     High cholesterol     Hyperlipidemia     Irregular heart beat     Non-ischemic cardiomyopathy (HCC)     Psoriasis     Psoriatic arthritis (HCC)     Pulmonary HTN ([place] [address] )      Social History     Social History    Marital status: /Civil Union     Spouse name: N/A    Number of children: N/A    Years of education: N/A     Occupational History    Not on file  Social History Main Topics    Smoking status: Former Smoker    Smokeless tobacco: Never Used    Alcohol use Yes      Comment: socially-  "couple beers a day"  (<6)    Drug use: No    Sexual activity: Not on file     Other Topics Concern    Not on file     Social History Narrative    No narrative on file      No family history on file  Past Surgical History:   Procedure Laterality Date    TRANSURETHRAL RESECTION OF PROSTATE         Current Outpatient Prescriptions:     adalimumab (HUMIRA) 40 mg/0 8 mL PSKT, Inject 40 mg under the skin once , Disp: , Rfl:     aspirin 81 mg chewable tablet, Chew 1 tablet daily, Disp: , Rfl:     tadalafil (CIALIS) 5 MG tablet, Take 5 mg by mouth daily as needed for erectile dysfunction  , Disp: , Rfl:   Allergies   Allergen Reactions    Penicillins Rash       Labs:  Lab Results   Component Value Date     07/11/2018     03/15/2016    K 4 0 07/11/2018    K 4 2 03/15/2016     07/11/2018     03/15/2016    CO2 31 07/11/2018    CO2 26 03/15/2016    BUN 23 07/11/2018    BUN 18 03/15/2016    CREATININE 0 92 07/11/2018    CREATININE 0 99 03/15/2016    GLUCOSE 75 07/11/2018    GLUCOSE 106 (H) 11/15/2016    CALCIUM 8 8 07/11/2018    CALCIUM 9 5 03/15/2016     No results found for: CKTOTAL, CKMB, CKMBINDEX, TROPONINI  Lab Results   Component Value Date    WBC 5 67 07/11/2018    HGB 14 6 07/11/2018    HCT 43 1 07/11/2018    MCV 93 07/11/2018     07/11/2018     Lab Results   Component Value Date    CHOL 190 11/15/2016    TRIG 113 11/15/2016    HDL 54 11/15/2016     Imaging:     Xr Chest Pa & Lateral  Result Date: 7/12/2018  Narrative: CHEST INDICATION:   R53 83: Other fatigue R06 02: Shortness of breath R63 4: Abnormal weight loss R63 0: Anorexia I27 20: Pulmonary hypertension, unspecified  Impression: No acute cardiopulmonary disease  Workstation performed: [id number]       Review of Systems:  Review of Systems   All other systems reviewed and are negative  as described in my history of present illness        Physical Exam:  Physical Exam   Constitutional: He is oriented to person, place, and time  He appears well-developed and well-nourished  No distress  Not in any distress at the current time   HENT:   Head: Normocephalic and atraumatic  Right Ear: External ear normal    Left Ear: External ear normal    Nose: Nose normal    Mouth/Throat: Uvula is midline and mucous membranes are normal    Posterior pharynx is crowded   Eyes: Conjunctivae, EOM and lids are normal  Pupils are equal, round, and reactive to light  No scleral icterus  No pallor  No cyanosis  No icterus   Neck: Trachea normal and normal range of motion  Neck supple  No JVD present  Carotid bruit is not present  No thyromegaly present  No jugular lymphadenopathy   Cardiovascular: Normal rate, regular rhythm, S1 normal, S2 normal, normal heart sounds, intact distal pulses and normal pulses  PMI is not displaced  Exam reveals no gallop, no S3, no S4 and no friction rub  No murmur heard  Pulmonary/Chest: Effort normal and breath sounds normal  No accessory muscle usage  No respiratory distress  He has no decreased breath sounds  He has no wheezes  He has no rhonchi  He has no rales  He exhibits no tenderness  Abdominal: Soft  Normal appearance and bowel sounds are normal  He exhibits no distension and no mass  There is no splenomegaly or hepatomegaly  There is no tenderness  Musculoskeletal: Normal range of motion  He exhibits no edema, tenderness or deformity  Lymphadenopathy:     He has no cervical adenopathy  Neurological: He is alert and oriented to person, place, and time  Facial symmetry is retained  Extraocular movements are retained  Head neck tongue and palate movement are retained and symmetric   Skin: Skin is intact  No abrasion, no lesion and no rash noted  No erythema  Nails show no clubbing  Psychiatric: He has a normal mood and affect  His speech is normal and behavior is normal  Thought content normal        Discussion/Summary:    1  PAF and atrial flutter    the patient is asymptomatic  High level of physical activity     The patient has undergone prior cryo-ablation for atrial fibrillation and RFA  for atrial flutter  Comprehensive electrophysiologic study did not show any other inducible tachycardias  Procedure was done after patient had failed sotalol and dofetilide    He is 20 months out of the ablation  He does not have any episodes of palpitation  He is not on any blood thinner or antiarrhythmic    Alcohol intake reduced  Sleep study - Advised to lose weight and repeat a sleep study in 6 months    Echo shows recovered LVEF       [id number] of 1  patient is advised to continue with aspirin

## 2018-08-07 NOTE — PROGRESS NOTES
Cardiology Follow Up    Gerardo Query  1955  [id number]  [address] [facility]  [address] [place] [address] [address]    1  Atrial fibrillation, unspecified type ([facility] )  POCT ECG       Interval History:     The patient has done well in the last 20 months   This is a 63-year-old gentleman who has undergone combined atrial flutter and atrial fibrillation ablation    He does not complain of any angina like chest pain or chest pressure  There is no further episodes of lightheadedness, presyncope or syncope  No orthopnea, paroxysmal nocturnal dyspnea, leg swelling  Increased exertional tolerance with exercise      Prior to his ablation he did have significant symptoms:  Patient states that he has been getting episodes of lightheadedness and palpitations  Episodes occur every other day  states that these are usually related to alcohol intake  He use to drink up to 2 six-packs on weekends and 2 -4 beers daily on week days  Has recently cut down to 2 beers every other weekday and 4 beers on Friday and Saturday  Reduced further and still having episodes while on dofetilide   Sotalol had also failed to control his symptoms  Denies tobacco or drug use  Had Sleep study done, had mild sleep apnea and was advised to lose weight and come back for a sleep test in 6 months time      Works as a chimney   States he gets symptomatic from afib with lightheadedness and dizziness   Episodes occur every other day stacy when he has a drink       Patient Active Problem List   Diagnosis    Atrial fibrillation (HCC)    Fatigue    Shortness of breath     Past Medical History:   Diagnosis Date    A-fib ([facility] )     Arthritis     BPH (benign prostatic hyperplasia)     Fatty liver     High cholesterol     Hyperlipidemia     Irregular heart beat     Non-ischemic cardiomyopathy (HCC)     Psoriasis     Psoriatic arthritis ([facility] )     Pulmonary HTN ([facility] )      Social History     Social History    Marital status: /Civil Union     Spouse name: N/A    Number of children: N/A    Years of education: N/A     Occupational History    Not on file  Social History Main Topics    Smoking status: Former Smoker    Smokeless tobacco: Never Used    Alcohol use Yes      Comment: socially-  "couple beers a day"  (<6)    Drug use: No    Sexual activity: Not on file     Other Topics Concern    Not on file     Social History Narrative    No narrative on file      No family history on file  Past Surgical History:   Procedure Laterality Date    TRANSURETHRAL RESECTION OF PROSTATE         Current Outpatient Prescriptions:     adalimumab (HUMIRA) 40 mg/0 8 mL PSKT, Inject 40 mg under the skin once , Disp: , Rfl:     aspirin 81 mg chewable tablet, Chew 1 tablet daily, Disp: , Rfl:     tadalafil (CIALIS) 5 MG tablet, Take 5 mg by mouth daily as needed for erectile dysfunction  , Disp: , Rfl:   Allergies   Allergen Reactions    Penicillins Rash       Labs:  Lab Results   Component Value Date     07/11/2018     03/15/2016    K 4 0 07/11/2018    K 4 2 03/15/2016     07/11/2018     03/15/2016    CO2 31 07/11/2018    CO2 26 03/15/2016    BUN 23 07/11/2018    BUN 18 03/15/2016    CREATININE 0 92 07/11/2018    CREATININE 0 99 03/15/2016    GLUCOSE 75 07/11/2018    GLUCOSE 106 (H) 11/15/2016    CALCIUM 8 8 07/11/2018    CALCIUM 9 5 03/15/2016     No results found for: CKTOTAL, CKMB, CKMBINDEX, TROPONINI  Lab Results   Component Value Date    WBC 5 67 07/11/2018    HGB 14 6 07/11/2018    HCT 43 1 07/11/2018    MCV 93 07/11/2018     07/11/2018     Lab Results   Component Value Date    CHOL 190 11/15/2016    TRIG 113 11/15/2016    HDL 54 11/15/2016     Imaging:     Xr Chest Pa & Lateral  Result Date: 7/12/2018  Narrative: CHEST INDICATION:   R53 83: Other fatigue R06 02: Shortness of breath R63 4: Abnormal weight loss R63 0: Anorexia I27 20: Pulmonary hypertension, unspecified  Impression: No acute cardiopulmonary disease  Workstation performed: [id number]       Review of Systems:  Review of Systems   All other systems reviewed and are negative  as described in my history of present illness        Physical Exam:  Physical Exam   Constitutional: He is oriented to person, place, and time  He appears well-developed and well-nourished  No distress  Not in any distress at the current time   HENT:   Head: Normocephalic and atraumatic  Right Ear: External ear normal    Left Ear: External ear normal    Nose: Nose normal    Mouth/Throat: Uvula is midline and mucous membranes are normal    Posterior pharynx is crowded   Eyes: Conjunctivae, EOM and lids are normal  Pupils are equal, round, and reactive to light  No scleral icterus  No pallor  No cyanosis  No icterus   Neck: Trachea normal and normal range of motion  Neck supple  No JVD present  Carotid bruit is not present  No thyromegaly present  No jugular lymphadenopathy   Cardiovascular: Normal rate, regular rhythm, S1 normal, S2 normal, normal heart sounds, intact distal pulses and normal pulses  PMI is not displaced  Exam reveals no gallop, no S3, no S4 and no friction rub  No murmur heard  Pulmonary/Chest: Effort normal and breath sounds normal  No accessory muscle usage  No respiratory distress  He has no decreased breath sounds  He has no wheezes  He has no rhonchi  He has no rales  He exhibits no tenderness  Abdominal: Soft  Normal appearance and bowel sounds are normal  He exhibits no distension and no mass  There is no splenomegaly or hepatomegaly  There is no tenderness  Musculoskeletal: Normal range of motion  He exhibits no edema, tenderness or deformity  Lymphadenopathy:     He has no cervical adenopathy  Neurological: He is alert and oriented to person, place, and time     Facial symmetry is retained  Extraocular movements are retained  Head neck tongue and palate movement are retained and symmetric   Skin: Skin is intact  No abrasion, no lesion and no rash noted  No erythema  Nails show no clubbing  Psychiatric: He has a normal mood and affect  His speech is normal and behavior is normal  Thought content normal        Discussion/Summary:    1  PAF and atrial flutter    the patient is asymptomatic  High level of physical activity     The patient has undergone prior cryo-ablation for atrial fibrillation and RFA  for atrial flutter  Comprehensive electrophysiologic study did not show any other inducible tachycardias  Procedure was done after patient had failed sotalol and dofetilide    He is 20 months out of the ablation  He does not have any episodes of palpitation  He is not on any blood thinner or antiarrhythmic    Alcohol intake reduced  Sleep study - Advised to lose weight and repeat a sleep study in 6 months    Echo shows recovered LVEF       [id number] of 1  patient is advised to continue with aspirin

## 2018-08-21 ENCOUNTER — TELEPHONE (OUTPATIENT)
Dept: FAMILY MEDICINE CLINIC | Facility: CLINIC | Age: 63
End: 2018-08-21

## 2018-08-21 NOTE — TELEPHONE ENCOUNTER
Pt left a voicemail he had labs and xrays done - was told to take allergy meds  Well hes been doing that and hes no better at all  He said something more needs to be done since labs/xray were ok  He still has headaches and sore throat   Please erx  Different meds - call luann with instructions

## 2018-08-22 NOTE — TELEPHONE ENCOUNTER
Pt was seen 6 weeks ago with and had normal PE  Labs and CXR were normal  He saw Cardiology two weeks ago and I have reviewed their note and they report a normal physical exam as well  If his symptoms resolved and have now returned, then he will need to be seen  If his symptoms have never resolved, then I can send him to neurology for evaluation of his headaches  I believe his symptoms started after he was on vacation and going into caves  Any nasal symptoms?  If so, start flonase nasal spray

## 2018-08-24 ENCOUNTER — TELEPHONE (OUTPATIENT)
Dept: FAMILY MEDICINE CLINIC | Facility: CLINIC | Age: 63
End: 2018-08-24

## 2018-08-24 ENCOUNTER — TRANSCRIBE ORDERS (OUTPATIENT)
Dept: FAMILY MEDICINE CLINIC | Facility: CLINIC | Age: 63
End: 2018-08-24

## 2018-08-24 DIAGNOSIS — G44.59 OTHER COMPLICATED HEADACHE SYNDROME: Primary | ICD-10-CM

## 2018-08-24 DIAGNOSIS — R05.9 COUGH: ICD-10-CM

## 2018-08-24 DIAGNOSIS — J31.0 RHINITIS, UNSPECIFIED TYPE: Primary | ICD-10-CM

## 2018-08-24 DIAGNOSIS — R49.0 HOARSENESS: Primary | ICD-10-CM

## 2018-08-24 DIAGNOSIS — G89.29 CHRONIC NONINTRACTABLE HEADACHE, UNSPECIFIED HEADACHE TYPE: ICD-10-CM

## 2018-08-24 DIAGNOSIS — R51.9 CHRONIC NONINTRACTABLE HEADACHE, UNSPECIFIED HEADACHE TYPE: ICD-10-CM

## 2018-08-24 NOTE — TELEPHONE ENCOUNTER
Pt called and said that he made an appt with the neurologist but the appt is not till October  He asked if he should see another neurologist because he is having a lot of headaches  They also told him he should see an ENT  Let pt know what he should do

## 2018-08-24 NOTE — TELEPHONE ENCOUNTER
Referred patient to neurology because he is having headaches still  But he stated he also loses his voice every now and then, he gets jaw pains and his ears bother him  Is there anyone else he should see?

## 2018-08-25 NOTE — TELEPHONE ENCOUNTER
I do not think any other neurologist will get him in any sooner  I will print referral to ENT as well

## 2018-08-29 ENCOUNTER — TRANSCRIBE ORDERS (OUTPATIENT)
Dept: ADMINISTRATIVE | Facility: HOSPITAL | Age: 63
End: 2018-08-29

## 2018-08-29 DIAGNOSIS — R51.9 FACIAL PAIN: Primary | ICD-10-CM

## 2018-08-30 ENCOUNTER — TELEPHONE (OUTPATIENT)
Dept: NEUROLOGY | Facility: CLINIC | Age: 63
End: 2018-08-30

## 2018-09-04 ENCOUNTER — HOSPITAL ENCOUNTER (OUTPATIENT)
Dept: CT IMAGING | Facility: HOSPITAL | Age: 63
Discharge: HOME | End: 2018-09-04
Payer: COMMERCIAL

## 2018-09-04 DIAGNOSIS — R51.9 FACIAL PAIN: ICD-10-CM

## 2018-09-04 PROCEDURE — 70486 CT MAXILLOFACIAL W/O DYE: CPT

## 2018-09-17 ENCOUNTER — TELEPHONE (OUTPATIENT)
Dept: FAMILY MEDICINE CLINIC | Facility: CLINIC | Age: 63
End: 2018-09-17

## 2018-09-17 DIAGNOSIS — G44.59 OTHER COMPLICATED HEADACHE SYNDROME: ICD-10-CM

## 2018-09-17 DIAGNOSIS — M25.50 ARTHRALGIA, UNSPECIFIED JOINT: Primary | ICD-10-CM

## 2018-09-17 NOTE — TELEPHONE ENCOUNTER
Patient called and stated that he is still feeling the same - seen ENT ( everything was fine ) was wondering if you could ordered another Lyme workup?  Joint pain, sore throat, headaches - offered an appointment but patient would like a message sent

## 2018-09-18 ENCOUNTER — APPOINTMENT (OUTPATIENT)
Dept: LAB | Facility: HOSPITAL | Age: 63
End: 2018-09-18
Payer: COMMERCIAL

## 2018-09-18 DIAGNOSIS — G44.59 OTHER COMPLICATED HEADACHE SYNDROME: ICD-10-CM

## 2018-09-18 DIAGNOSIS — M25.50 ARTHRALGIA, UNSPECIFIED JOINT: ICD-10-CM

## 2018-09-18 PROCEDURE — 86618 LYME DISEASE ANTIBODY: CPT

## 2018-09-18 PROCEDURE — 36415 COLL VENOUS BLD VENIPUNCTURE: CPT

## 2018-09-18 PROCEDURE — 86617 LYME DISEASE ANTIBODY: CPT

## 2018-09-18 NOTE — TELEPHONE ENCOUNTER
Pt notified  He said he went to his Rheumatologist about 2 weeks ago and he said it was infection  He will get Lymes today and will go from there

## 2018-09-19 LAB
B BURGDOR IGG SER IA-ACNC: 4.89
B BURGDOR IGM SER IA-ACNC: 4.97

## 2018-09-20 DIAGNOSIS — A69.20 LYME DISEASE: Primary | ICD-10-CM

## 2018-09-20 LAB
B BURGDOR IGG PATRN SER IB-IMP: POSITIVE
B BURGDOR IGM PATRN SER IB-IMP: POSITIVE
B BURGDOR18KD IGG SER QL IB: PRESENT
B BURGDOR23KD IGG SER QL IB: ABNORMAL
B BURGDOR23KD IGM SER QL IB: PRESENT
B BURGDOR28KD IGG SER QL IB: PRESENT
B BURGDOR30KD IGG SER QL IB: ABNORMAL
B BURGDOR39KD IGG SER QL IB: PRESENT
B BURGDOR39KD IGM SER QL IB: ABNORMAL
B BURGDOR41KD IGG SER QL IB: PRESENT
B BURGDOR41KD IGM SER QL IB: PRESENT
B BURGDOR45KD IGG SER QL IB: ABNORMAL
B BURGDOR58KD IGG SER QL IB: PRESENT
B BURGDOR66KD IGG SER QL IB: ABNORMAL
B BURGDOR93KD IGG SER QL IB: ABNORMAL

## 2018-09-20 RX ORDER — DOXYCYCLINE 100 MG/1
100 CAPSULE ORAL 2 TIMES DAILY
Qty: 60 CAPSULE | Refills: 0 | Status: SHIPPED | OUTPATIENT
Start: 2018-09-20 | End: 2018-10-22 | Stop reason: SDUPTHER

## 2018-09-21 ENCOUNTER — TELEPHONE (OUTPATIENT)
Dept: FAMILY MEDICINE CLINIC | Facility: CLINIC | Age: 63
End: 2018-09-21

## 2018-09-21 NOTE — TELEPHONE ENCOUNTER
Patient called and stated that he is scheduled for a MRI of the Brain due to headaches and since he was just DX with Lyme's Disease, he was wondering if he should still get this study done?

## 2018-10-17 DIAGNOSIS — A69.20 LYME DISEASE: ICD-10-CM

## 2018-10-17 RX ORDER — DOXYCYCLINE 100 MG/1
100 CAPSULE ORAL 2 TIMES DAILY
Qty: 60 CAPSULE | Refills: 0 | OUTPATIENT
Start: 2018-10-17 | End: 2018-11-16

## 2018-10-22 ENCOUNTER — TELEPHONE (OUTPATIENT)
Dept: FAMILY MEDICINE CLINIC | Facility: CLINIC | Age: 63
End: 2018-10-22

## 2018-10-22 DIAGNOSIS — A69.20 LYME DISEASE: ICD-10-CM

## 2018-10-23 RX ORDER — DOXYCYCLINE 100 MG/1
100 CAPSULE ORAL 2 TIMES DAILY
Qty: 60 CAPSULE | Refills: 0 | Status: SHIPPED | OUTPATIENT
Start: 2018-10-23 | End: 2018-11-22

## 2018-10-23 NOTE — TELEPHONE ENCOUNTER
NO  Nothing to do  If he still has symptoms, may refer to infectious diseases  Please cancel the refill order sent to the pharmacy today   I think he has had the 30 days completed

## 2018-12-07 ENCOUNTER — TRANSCRIBE ORDERS (OUTPATIENT)
Dept: ADMINISTRATIVE | Facility: HOSPITAL | Age: 63
End: 2018-12-07

## 2018-12-07 ENCOUNTER — APPOINTMENT (OUTPATIENT)
Dept: LAB | Facility: HOSPITAL | Age: 63
End: 2018-12-07
Payer: COMMERCIAL

## 2018-12-07 DIAGNOSIS — Z12.5 SPECIAL SCREENING FOR MALIGNANT NEOPLASM OF PROSTATE: ICD-10-CM

## 2018-12-07 DIAGNOSIS — R31.21 ASYMPTOMATIC MICROSCOPIC HEMATURIA: ICD-10-CM

## 2018-12-07 DIAGNOSIS — Z12.5 SPECIAL SCREENING FOR MALIGNANT NEOPLASM OF PROSTATE: Primary | ICD-10-CM

## 2018-12-07 LAB — PSA SERPL-MCNC: 2 NG/ML (ref 0–4)

## 2018-12-07 PROCEDURE — G0103 PSA SCREENING: HCPCS

## 2018-12-07 PROCEDURE — 36415 COLL VENOUS BLD VENIPUNCTURE: CPT

## 2018-12-31 ENCOUNTER — APPOINTMENT (OUTPATIENT)
Dept: LAB | Facility: HOSPITAL | Age: 63
End: 2018-12-31
Payer: COMMERCIAL

## 2018-12-31 LAB
BILIRUB UR QL STRIP: NEGATIVE
CLARITY UR: CLEAR
COLOR UR: YELLOW
GLUCOSE UR STRIP-MCNC: NEGATIVE MG/DL
HGB UR QL STRIP.AUTO: NEGATIVE
KETONES UR STRIP-MCNC: NEGATIVE MG/DL
LEUKOCYTE ESTERASE UR QL STRIP: NEGATIVE
NITRITE UR QL STRIP: NEGATIVE
PH UR STRIP.AUTO: 6 [PH] (ref 4.5–8)
PROT UR STRIP-MCNC: NEGATIVE MG/DL
SP GR UR STRIP.AUTO: 1.01 (ref 1–1.03)
UROBILINOGEN UR QL STRIP.AUTO: 0.2 E.U./DL

## 2018-12-31 PROCEDURE — 81003 URINALYSIS AUTO W/O SCOPE: CPT | Performed by: UROLOGY

## 2019-04-10 ENCOUNTER — TELEPHONE (OUTPATIENT)
Dept: FAMILY MEDICINE CLINIC | Facility: CLINIC | Age: 64
End: 2019-04-10

## 2019-04-10 ENCOUNTER — APPOINTMENT (OUTPATIENT)
Dept: LAB | Facility: CLINIC | Age: 64
End: 2019-04-10
Payer: COMMERCIAL

## 2019-04-10 DIAGNOSIS — R53.83 FATIGUE, UNSPECIFIED TYPE: ICD-10-CM

## 2019-04-10 DIAGNOSIS — M79.10 MYALGIA: Primary | ICD-10-CM

## 2019-04-10 DIAGNOSIS — M79.10 MYALGIA: ICD-10-CM

## 2019-04-10 PROCEDURE — 36415 COLL VENOUS BLD VENIPUNCTURE: CPT

## 2019-04-10 PROCEDURE — 86617 LYME DISEASE ANTIBODY: CPT

## 2019-04-10 PROCEDURE — 86618 LYME DISEASE ANTIBODY: CPT

## 2019-04-11 DIAGNOSIS — A69.20 LYME DISEASE, UNSPECIFIED: Primary | ICD-10-CM

## 2019-04-11 LAB
B BURGDOR IGG SER IA-ACNC: 0.71
B BURGDOR IGM SER IA-ACNC: 3.34

## 2019-04-11 RX ORDER — DOXYCYCLINE HYCLATE 100 MG
100 TABLET ORAL 2 TIMES DAILY
Qty: 28 TABLET | Refills: 0 | Status: SHIPPED | OUTPATIENT
Start: 2019-04-11 | End: 2019-04-25

## 2019-04-13 LAB
B BURGDOR IGG PATRN SER IB-IMP: NEGATIVE
B BURGDOR IGM PATRN SER IB-IMP: NEGATIVE
B BURGDOR18KD IGG SER QL IB: PRESENT
B BURGDOR23KD IGG SER QL IB: ABNORMAL
B BURGDOR23KD IGM SER QL IB: ABNORMAL
B BURGDOR28KD IGG SER QL IB: ABNORMAL
B BURGDOR30KD IGG SER QL IB: ABNORMAL
B BURGDOR39KD IGG SER QL IB: ABNORMAL
B BURGDOR39KD IGM SER QL IB: ABNORMAL
B BURGDOR41KD IGG SER QL IB: ABNORMAL
B BURGDOR41KD IGM SER QL IB: ABNORMAL
B BURGDOR45KD IGG SER QL IB: ABNORMAL
B BURGDOR58KD IGG SER QL IB: ABNORMAL
B BURGDOR66KD IGG SER QL IB: ABNORMAL
B BURGDOR93KD IGG SER QL IB: ABNORMAL

## 2019-12-23 ENCOUNTER — TRANSCRIBE ORDERS (OUTPATIENT)
Dept: ADMINISTRATIVE | Facility: HOSPITAL | Age: 64
End: 2019-12-23

## 2019-12-23 ENCOUNTER — APPOINTMENT (OUTPATIENT)
Dept: LAB | Facility: HOSPITAL | Age: 64
End: 2019-12-23
Payer: COMMERCIAL

## 2019-12-23 DIAGNOSIS — Z12.5 SPECIAL SCREENING FOR MALIGNANT NEOPLASM OF PROSTATE: Primary | ICD-10-CM

## 2019-12-23 DIAGNOSIS — Z12.5 SPECIAL SCREENING FOR MALIGNANT NEOPLASM OF PROSTATE: ICD-10-CM

## 2019-12-23 LAB — PSA SERPL-MCNC: 1.8 NG/ML (ref 0–4)

## 2019-12-23 PROCEDURE — 36415 COLL VENOUS BLD VENIPUNCTURE: CPT

## 2019-12-23 PROCEDURE — G0103 PSA SCREENING: HCPCS

## 2020-01-02 ENCOUNTER — TELEPHONE (OUTPATIENT)
Dept: FAMILY MEDICINE CLINIC | Facility: CLINIC | Age: 65
End: 2020-01-02

## 2020-01-02 DIAGNOSIS — R53.83 FATIGUE, UNSPECIFIED TYPE: ICD-10-CM

## 2020-01-02 DIAGNOSIS — A69.20 LYME DISEASE: ICD-10-CM

## 2020-01-02 DIAGNOSIS — M79.10 MYALGIA: Primary | ICD-10-CM

## 2020-01-02 DIAGNOSIS — I27.20 PULMONARY HYPERTENSION (HCC): ICD-10-CM

## 2020-01-02 NOTE — TELEPHONE ENCOUNTER
Patient called office c/o having a possible flare up of his Lyme's Disease - scheduled him for Monday with you but do you want any labs prior to his visit? Was treated in April 2019 w/Doxy due to + labs

## 2020-01-03 ENCOUNTER — APPOINTMENT (OUTPATIENT)
Dept: LAB | Facility: HOSPITAL | Age: 65
End: 2020-01-03
Payer: COMMERCIAL

## 2020-01-03 DIAGNOSIS — M79.10 MYALGIA: ICD-10-CM

## 2020-01-03 DIAGNOSIS — I27.20 PULMONARY HYPERTENSION (HCC): ICD-10-CM

## 2020-01-03 DIAGNOSIS — A69.20 LYME DISEASE: ICD-10-CM

## 2020-01-03 DIAGNOSIS — R53.83 FATIGUE, UNSPECIFIED TYPE: ICD-10-CM

## 2020-01-03 LAB
ALBUMIN SERPL BCP-MCNC: 3.8 G/DL (ref 3.5–5)
ALP SERPL-CCNC: 81 U/L (ref 46–116)
ALT SERPL W P-5'-P-CCNC: 81 U/L (ref 12–78)
ANION GAP SERPL CALCULATED.3IONS-SCNC: 12 MMOL/L (ref 4–13)
AST SERPL W P-5'-P-CCNC: 43 U/L (ref 5–45)
BILIRUB SERPL-MCNC: 0.6 MG/DL (ref 0.2–1)
BUN SERPL-MCNC: 19 MG/DL (ref 5–25)
CALCIUM SERPL-MCNC: 8.8 MG/DL (ref 8.3–10.1)
CHLORIDE SERPL-SCNC: 103 MMOL/L (ref 100–108)
CO2 SERPL-SCNC: 27 MMOL/L (ref 21–32)
CREAT SERPL-MCNC: 0.86 MG/DL (ref 0.6–1.3)
GFR SERPL CREATININE-BSD FRML MDRD: 93 ML/MIN/1.73SQ M
GLUCOSE P FAST SERPL-MCNC: 110 MG/DL (ref 65–99)
POTASSIUM SERPL-SCNC: 3.5 MMOL/L (ref 3.5–5.3)
PROT SERPL-MCNC: 7.8 G/DL (ref 6.4–8.2)
SODIUM SERPL-SCNC: 142 MMOL/L (ref 136–145)

## 2020-01-03 PROCEDURE — 86617 LYME DISEASE ANTIBODY: CPT

## 2020-01-03 PROCEDURE — 36415 COLL VENOUS BLD VENIPUNCTURE: CPT

## 2020-01-03 PROCEDURE — 80053 COMPREHEN METABOLIC PANEL: CPT

## 2020-01-03 PROCEDURE — 86618 LYME DISEASE ANTIBODY: CPT

## 2020-01-06 ENCOUNTER — OFFICE VISIT (OUTPATIENT)
Dept: FAMILY MEDICINE CLINIC | Facility: CLINIC | Age: 65
End: 2020-01-06
Payer: COMMERCIAL

## 2020-01-06 VITALS
RESPIRATION RATE: 18 BRPM | BODY MASS INDEX: 29.68 KG/M2 | TEMPERATURE: 97.8 F | WEIGHT: 212 LBS | HEART RATE: 55 BPM | HEIGHT: 71 IN | OXYGEN SATURATION: 97 % | DIASTOLIC BLOOD PRESSURE: 90 MMHG | SYSTOLIC BLOOD PRESSURE: 132 MMHG

## 2020-01-06 DIAGNOSIS — G44.52 NDPH (NEW DAILY PERSISTENT HEADACHE): ICD-10-CM

## 2020-01-06 DIAGNOSIS — Z86.19 HISTORY OF LYME DISEASE: ICD-10-CM

## 2020-01-06 DIAGNOSIS — L40.50 PSORIATIC ARTHRITIS (HCC): ICD-10-CM

## 2020-01-06 DIAGNOSIS — Z11.4 SCREENING FOR HIV (HUMAN IMMUNODEFICIENCY VIRUS): ICD-10-CM

## 2020-01-06 DIAGNOSIS — Z23 NEED FOR PNEUMOCOCCAL VACCINATION: ICD-10-CM

## 2020-01-06 DIAGNOSIS — M79.10 MYALGIA: ICD-10-CM

## 2020-01-06 DIAGNOSIS — I48.19 OTHER PERSISTENT ATRIAL FIBRILLATION (HCC): Primary | ICD-10-CM

## 2020-01-06 DIAGNOSIS — Z12.11 SCREEN FOR COLON CANCER: ICD-10-CM

## 2020-01-06 PROCEDURE — 90670 PCV13 VACCINE IM: CPT

## 2020-01-06 PROCEDURE — 99214 OFFICE O/P EST MOD 30 MIN: CPT | Performed by: NURSE PRACTITIONER

## 2020-01-06 PROCEDURE — 90471 IMMUNIZATION ADMIN: CPT

## 2020-01-06 PROCEDURE — 1036F TOBACCO NON-USER: CPT | Performed by: NURSE PRACTITIONER

## 2020-01-06 PROCEDURE — 3008F BODY MASS INDEX DOCD: CPT | Performed by: NURSE PRACTITIONER

## 2020-01-06 RX ORDER — NAPROXEN 500 MG/1
TABLET ORAL
COMMUNITY
Start: 2019-12-23

## 2020-01-06 NOTE — PATIENT INSTRUCTIONS
New daily persistent headache- no sign of nasal infection on exam  Check labs  H/O lyme- current myalgia  Check labs as ordered including lyme and [facility]  Overweight- advised exercise and decrease portions and carbohydrates for weight loss  Obtain labs and our office will call with results  Results for Louis Moise (MRN [id number]) as of 1/6/2020 16:46   Ref  Range 1/3/2020 07:39   Sodium Latest Ref Range: 136 - 145 mmol/L 142   Potassium Latest Ref Range: 3 5 - 5 3 mmol/L 3 5   Chloride Latest Ref Range: 100 - 108 mmol/L 103   CO2 Latest Ref Range: 21 - 32 mmol/L 27   Anion Gap Latest Ref Range: 4 - 13 mmol/L 12   BUN Latest Ref Range: 5 - 25 mg/dL 19   Creatinine Latest Ref Range: 0 60 - 1 30 mg/dL 0 86   GLUCOSE FASTING Latest Ref Range: 65 - 99 mg/dL 110 (H)   Calcium Latest Ref Range: 8 3 - 10 1 mg/dL 8 8   AST Latest Ref Range: 5 - 45 U/L 43   ALT Latest Ref Range: 12 - 78 U/L 81 (H)   Alkaline Phosphatase Latest Ref Range: 46 - 116 U/L 81   Total Protein Latest Ref Range: 6 4 - 8 2 g/dL 7 8   Albumin Latest Ref Range: 3 5 - 5 0 g/dL 3 8   TOTAL BILIRUBIN Latest Ref Range: 0 20 - 1 00 mg/dL 0 60   eGFR Latest Units: ml/min/1 73sq m 93       Weight Management   AMBULATORY CARE:   Why it is important to manage your weight:  Being overweight increases your risk of health conditions such as heart disease, high blood pressure, type 2 diabetes, and certain types of cancer  It can also increase your risk for osteoarthritis, sleep apnea, and other respiratory problems  Aim for a slow, steady weight loss  Even a small amount of weight loss can lower your risk of health problems  How to lose weight safely:  A safe and healthy way to lose weight is to eat fewer calories and get regular exercise  You can lose up about 1 pound a week by decreasing the number of calories you eat by 500 calories each day  You can decrease calories by eating smaller portion sizes or by cutting out high-calorie foods  Read labels to find out how many calories are in the foods you eat  You can also burn calories with exercise such as walking, swimming, or biking  You will be more likely to keep weight off if you make these changes part of your lifestyle  Healthy meal plan for weight management:  A healthy meal plan includes a variety of foods, contains fewer calories, and helps you stay healthy  A healthy meal plan includes the following:  · Eat whole-grain foods more often  A healthy meal plan should contain fiber  Fiber is the part of grains, fruits, and vegetables that is not broken down by your body  Whole-grain foods are healthy and provide extra fiber in your diet  Some examples of whole-grain foods are whole-wheat breads and pastas, oatmeal, brown rice, and bulgur  · Eat a variety of vegetables every day  Include dark, leafy greens such as spinach, kale, jl greens, and mustard greens  Eat yellow and orange vegetables such as carrots, sweet potatoes, and winter squash  · Eat a variety of fruits every day  Choose fresh or canned fruit (canned in its own juice or light syrup) instead of juice  Fruit juice has very little or no fiber  · Eat low-fat dairy foods  Drink fat-free (skim) milk or 1% milk  Eat fat-free yogurt and low-fat cottage cheese  Try low-fat cheeses such as mozzarella and other reduced-fat cheeses  · Choose meat and other protein foods that are low in fat  Choose beans or other legumes such as split peas or lentils  Choose fish, skinless poultry (chicken or turkey), or lean cuts of red meat (beef or pork)  Before you cook meat or poultry, cut off any visible fat  · Use less fat and oil  Try baking foods instead of frying them  Add less fat, such as margarine, sour cream, regular salad dressing and mayonnaise to foods  Eat fewer high-fat foods  Some examples of high-fat foods include french fries, doughnuts, ice cream, and cakes  · Eat fewer sweets    Limit foods and drinks that are high in sugar  This includes candy, cookies, regular soda, and sweetened drinks  Ways to decrease calories:   · Eat smaller portions  ¨ Use a small plate with smaller servings  ¨ Do not eat second helpings  ¨ When you eat at a restaurant, ask for a box and place half of your meal in the box before you eat  ¨ Share an entrée with someone else  · Replace high-calorie snacks with healthy, low-calorie snacks  ¨ Choose fresh fruit, vegetables, fat-free rice cakes, or air-popped popcorn instead of potato chips, nuts, or chocolate  ¨ Choose water or calorie-free drinks instead of soda or sweetened drinks  · Eat regular meals  Skipping meals can lead to overeating later in the day  Eat a healthy snack in place of a meal if you do not have time to eat a regular meal      · Do not shop for groceries when you are hungry  You may be more likely to make unhealthy food choices  Take a grocery list of healthy foods and shop after you have eaten  Exercise:  Exercise at least 30 minutes per day on most days of the week  Some examples of exercise include walking, biking, dancing, and swimming  You can also fit in more physical activity by taking the stairs instead of the elevator or parking farther away from stores  Ask your healthcare provider about the best exercise plan for you  Other things to consider as you try to lose weight:   · Be aware of situations that may give you the urge to overeat, such as eating while watching television  Find ways to avoid these situations  For example, read a book, go for a walk, or do crafts  · Meet with a weight loss support group or friends who are also trying to lose weight  This may help you stay motivated to continue working on your weight loss goals  © 2017 [address] [employer] Information is for End User's use only and may not be sold, redistributed or otherwise used for commercial purposes   All illustrations and images included in [employer] are the copyrighted property of [employer]  or [employer]  The above information is an  only  It is not intended as medical advice for individual conditions or treatments  Talk to your doctor, nurse or pharmacist before following any medical regimen to see if it is safe and effective for you  Low Fat Diet   AMBULATORY CARE:   A low-fat diet  is an eating plan that is low in total fat, unhealthy fat, and cholesterol  You may need to follow a low-fat diet if you have trouble digesting or absorbing fat  You may also need to follow this diet if you have high cholesterol  You can also lower your cholesterol by increasing the amount of fiber in your diet  Soluble fiber is a type of fiber that helps to decrease cholesterol levels  Different types of fat in food:   · Limit unhealthy fats  A diet that is high in cholesterol, saturated fat, and trans fat may cause unhealthy cholesterol levels  Unhealthy cholesterol levels increase your risk of heart disease  ¨ Cholesterol:  Limit intake of cholesterol to less than 200 mg per day  Cholesterol is found in meat, eggs, and dairy  ¨ Saturated fat:  Limit saturated fat to less than 7% of your total daily calories  Ask your dietitian how many calories you need each day  Saturated fat is found in butter, cheese, ice cream, whole milk, and palm oil  Saturated fat is also found in meat, such as beef, pork, chicken skin, and processed meats  Processed meats include sausage, hot dogs, and bologna  ¨ Trans fat:  Avoid trans fat as much as possible  Trans fat is used in fried and baked foods  Foods that say trans fat free on the label may still have up to 0 5 grams of trans fat per serving  · Include healthy fats  Replace foods that are high in saturated and trans fat with foods high in healthy fats  This may help to decrease high cholesterol levels  ¨ Monounsaturated fats:   These are found in avocados, nuts, and vegetable oils, such as olive, canola, and sunflower oil  ¨ Polyunsaturated fats: These can be found in vegetable oils, such as soybean or corn oil  Omega-3 fats can help to decrease the risk of heart disease  Omega-3 fats are found in fish, such as salmon, herring, trout, and tuna  Omega-3 fats can also be found in plant foods, such as walnuts, flaxseed, soybeans, and canola oil    Foods to limit or avoid:   · Grains:      ¨ Snacks that are made with partially hydrogenated oils, such as chips, regular crackers, and butter-flavored popcorn    ¨ High-fat baked goods, such as biscuits, croissants, doughnuts, pies, cookies, and pastries    · Dairy:      ¨ Whole milk, 2% milk, and yogurt and ice cream made with whole milk    ¨ Half and half creamer, heavy cream, and whipping cream    ¨ Cheese, cream cheese, and sour cream    · Meats and proteins:      ¨ High-fat cuts of meat (T-bone steak, regular hamburger, and ribs)    ¨ Fried meat, poultry (turkey and chicken), and fish    ¨ Poultry (chicken and turkey) with skin    ¨ Cold cuts (salami or bologna), hot dogs, mukherjee, and sausage    ¨ Whole eggs and egg yolks    · Vegetables and fruits with added fat:      ¨ Fried vegetables or vegetables in butter or high-fat sauces, such as cream or cheese sauces    ¨ Fried fruit or fruit served with butter or cream    · Fats:      ¨ Butter, stick margarine, and shortening    ¨ Coconut, palm oil, and palm kernel oil  Foods to include:   · Grains:      ¨ Whole-grain breads, cereals, pasta, and brown rice    ¨ Low-fat crackers and pretzels    · Vegetables and fruits:      ¨ Fresh, frozen, or canned vegetables (no salt or low-sodium)    ¨ Fresh, frozen, dried, or canned fruit (canned in light syrup or fruit juice)    ¨ Avocado    · Low-fat dairy products:      ¨ Nonfat (skim) or 1% milk    ¨ Nonfat or low-fat cheese, yogurt, and cottage cheese    · Meats and proteins:      ¨ Chicken or turkey with no skin    ¨ Baked or broiled fish    ¨ Lean beef and pork (loin, round, extra lean hamburger)    ¨ Beans and peas, unsalted nuts, soy products    ¨ Egg whites and substitutes    ¨ Seeds and nuts    · Fats:      ¨ Unsaturated oil, such as canola, olive, peanut, soybean, or sunflower oil    ¨ Soft or liquid margarine and vegetable oil spread    ¨ Low-fat salad dressing  Other ways to decrease fat:   · Read food labels before you buy foods  Choose foods that have less than 30% of calories from fat  Choose low-fat or fat-free dairy products  Remember that fat free does not mean calorie free  These foods still contain calories, and too many calories can lead to weight gain  · Trim fat from meat and avoid fried food  Trim all visible fat from meat before you cook it  Remove the skin from poultry  Do not marion meat, fish, or poultry  Bake, roast, boil, or broil these foods instead  Avoid fried foods  Eat a baked potato instead of Western Megan fries  Steam vegetables instead of sautéing them in butter  · Add less fat to foods  Use imitation mukherjee bits on salads and baked potatoes instead of regular mukherjee bits  Use fat-free or low-fat salad dressings instead of regular dressings  Use low-fat or nonfat butter-flavored topping instead of regular butter or margarine on popcorn and other foods  Ways to decrease fat in recipes:  Replace high-fat ingredients with low-fat or nonfat ones  This may cause baked goods to be drier than usual  You may need to use nonfat cooking spray on pans to prevent food from sticking  You also may need to change the amount of other ingredients, such as water, in the recipe  Try the following:  · Use low-fat or light margarine instead of regular margarine or shortening  · Use lean ground turkey breast or chicken, or lean ground beef (less than 5% fat) instead of hamburger  · Add 1 teaspoon of canola oil to 8 ounces of skim milk instead of using cream or half and half       · Use grated zucchini, carrots, or apples in breads instead of coconut  · Use blenderized, low-fat cottage cheese, plain tofu, or low-fat ricotta cheese instead of cream cheese  · Use 1 egg white and 1 teaspoon of canola oil, or use ¼ cup (2 ounces) of fat-free egg substitute instead of a whole egg  · Replace half of the oil that is called for in a recipe with applesauce when you bake  Use 3 tablespoons of cocoa powder and 1 tablespoon of canola oil instead of a square of baking chocolate  How to increase fiber:  Eat enough high-fiber foods to get 20 to 30 grams of fiber every day  Slowly increase your fiber intake to avoid stomach cramps, gas, and other problems  · Eat 3 ounces of whole-grain foods each day  An ounce is about 1 slice of bread  Eat whole-grain breads, such as whole-wheat bread  Whole wheat, whole-wheat flour, or other whole grains should be listed as the first ingredient on the food label  Replace white flour with whole-grain flour or use half of each in recipes  Whole-grain flour is heavier than white flour, so you may have to add more yeast or baking powder  · Eat a high-fiber cereal for breakfast   Oatmeal is a good source of soluble fiber  Look for cereals that have bran or fiber in the name  Choose whole-grain products, such as brown rice, barley, and whole-wheat pasta  · Eat more beans, peas, and lentils  For example, add beans to soups or salads  Eat at least 5 cups of fruits and vegetables each day  Eat fruits and vegetables with the peel because the peel is high in fiber  © 2017 [address] [employer]  Information is for End User's use only and may not be sold, redistributed or otherwise used for commercial purposes  All illustrations and images included in CareNotes® are the copyrighted property of [employer]  or [employer]ino[employer] Lopez  The above information is an  only  It is not intended as medical advice for individual conditions or treatments   Talk to your doctor, nurse or pharmacist before following any medical regimen to see if it is safe and effective for you  Heart Healthy Diet   AMBULATORY CARE:   A heart healthy diet  is an eating plan low in total fat, unhealthy fats, and sodium (salt)  A heart healthy diet helps decrease your risk for heart disease and stroke  Limit the amount of fat you eat to 25% to 35% of your total daily calories  Limit sodium to less than 2,300 mg each day  Healthy fats:  Healthy fats can help improve cholesterol levels  The risk for heart disease is decreased when cholesterol levels are normal  Choose healthy fats, such as the following:  · Unsaturated fat  is found in foods such as soybean, canola, olive, corn, and safflower oils  It is also found in soft tub margarine that is made with liquid vegetable oil  · Omega-3 fat  is found in certain fish, such as salmon, tuna, and trout, and in walnuts and flaxseed  Unhealthy fats:  Unhealthy fats can cause unhealthy cholesterol levels in your blood and increase your risk of heart disease  Limit unhealthy fats, such as the following:  · Cholesterol  is found in animal foods, such as eggs and lobster, and in dairy products made from whole milk  Limit cholesterol to less than 300 milligrams (mg) each day  You may need to limit cholesterol to 200 mg each day if you have heart disease  · Saturated fat  is found in meats, such as mukherjee and hamburger  It is also found in chicken or turkey skin, whole milk, and butter  Limit saturated fat to less than 7% of your total daily calories  Limit saturated fat to less than 6% if you have heart disease or are at increased risk for it  · Trans fat  is found in packaged foods, such as potato chips and cookies  It is also in hard margarine, some fried foods, and shortening  Avoid trans fats as much as possible    Heart healthy foods and drinks to include:  Ask your dietitian or healthcare provider how many servings to have from each of the following food groups:  · Grains: ¨ Whole-wheat breads, cereals, and pastas, and brown rice    ¨ Low-fat, low-sodium crackers and chips    · Vegetables:      ¨ Broccoli, green beans, green peas, and spinach    ¨ Collards, kale, and lima beans    ¨ Carrots, sweet potatoes, tomatoes, and peppers    ¨ Canned vegetables with no salt added    · Fruits:      ¨ Bananas, peaches, pears, and pineapple    ¨ Grapes, raisins, and dates    ¨ Oranges, tangerines, grapefruit, orange juice, and grapefruit juice    ¨ Apricots, mangoes, melons, and papaya    ¨ Raspberries and strawberries    ¨ Canned fruit with no added sugar    · Low-fat dairy products:      ¨ Nonfat (skim) milk, 1% milk, and low-fat almond, cashew, or soy milks fortified with calcium    ¨ Low-fat cheese, regular or frozen yogurt, and cottage cheese    · Meats and proteins , such as lean cuts of beef and pork (loin, leg, round), skinless chicken and turkey, legumes, soy products, egg whites, and nuts  Foods and drinks to limit or avoid:  Ask your dietitian or healthcare provider about these and other foods that are high in unhealthy fat, sodium, and sugar:  · Snack or packaged foods , such as frozen dinners, cookies, macaroni and cheese, and cereals with more than 300 mg of sodium per serving    · Canned or dry mixes  for cakes, soups, sauces, or gravies    · Vegetables with added sodium , such as instant potatoes, vegetables with added sauces, or regular canned vegetables    · Other foods high in sodium , such as ketchup, barbecue sauce, salad dressing, pickles, olives, soy sauce, and miso    · High-fat dairy foods  such as whole or 2% milk, cream cheese, or sour cream, and cheeses     · High-fat protein foods  such as high-fat cuts of beef (T-bone steaks, ribs), chicken or turkey with skin, and organ meats, such as liver    · Cured or smoked meats , such as hot dogs, mukherjee, and sausage    · Unhealthy fats and oils , such as butter, stick margarine, shortening, and cooking oils such as coconut or palm oil    · Food and drinks high in sugar , such as soft drinks (soda), sports drinks, sweetened tea, candy, cake, cookies, pies, and doughnuts  Other diet guidelines to follow:   · Eat more foods containing omega-3 fats  Eat fish high in omega-3 fats at least 2 times a week  · Limit alcohol  Too much alcohol can damage your heart and raise your blood pressure  Women should limit alcohol to 1 drink a day  Men should limit alcohol to 2 drinks a day  A drink of alcohol is 12 ounces of beer, 5 ounces of wine, or 1½ ounces of liquor  · Choose low-sodium foods  High-sodium foods can lead to high blood pressure  Add little or no salt to food you prepare  Use herbs and spices in place of salt  · Eat more fiber  to help lower cholesterol levels  Eat at least 5 servings of fruits and vegetables each day  Eat 3 ounces of whole-grain foods each day  Legumes (beans) are also a good source of fiber  Lifestyle guidelines:   · Do not smoke  Nicotine and other chemicals in cigarettes and cigars can cause lung and heart damage  Ask your healthcare provider for information if you currently smoke and need help to quit  E-cigarettes or smokeless tobacco still contain nicotine  Talk to your healthcare provider before you use these products  · Exercise regularly  to help you maintain a healthy weight and improve your blood pressure and cholesterol levels  Ask your healthcare provider about the best exercise plan for you  Do not start an exercise program without asking your healthcare provider  Follow up with your healthcare provider as directed:  Write down your questions so you remember to ask them during your visits  © 2017 [address] [employer]  Information is for End User's use only and may not be sold, redistributed or otherwise used for commercial purposes  All illustrations and images included in CareNotes® are the copyrighted property of [employer]  or [employer] Lopez    The above information is an  only  It is not intended as medical advice for individual conditions or treatments  Talk to your doctor, nurse or pharmacist before following any medical regimen to see if it is safe and effective for you

## 2020-01-06 NOTE — PROGRESS NOTES
Assessment/Plan:       Diagnoses and all orders for this visit:    Other persistent atrial fibrillation  -     TSH, 3rd generation; Future  -     Sedimentation rate, automated; Future    Myalgia  -     Lyme Antibody Profile with reflex to WB; Future  -     Vitamin D 25 hydroxy; Future  -     TSH, 3rd generation; Future  -     Sedimentation rate, automated; Future  -     EBV acute panel; Future    History of Lyme disease  -     Lyme Antibody Profile with reflex to WB; Future    Psoriatic arthritis ([facility] [address] )    Screen for colon cancer  -     Ambulatory referral to Gastroenterology; Future    Screening for HIV (human immunodeficiency virus)  -     Human Immunodeficiency Virus 1/2 Antigen / Antibody ( Fourth Generation) with Reflex Testing; Future    BMI 29 0-29 9,adult  -     Lipid panel; Future    Need for pneumococcal vaccination  -     PNEUMOCOCCAL CONJUGATE VACCINE 13-VALENT GREATER THAN 6 MONTHS    NDPH (new daily persistent headache)  -     Lyme Antibody Profile with reflex to WB; Future  -     Vitamin D 25 hydroxy; Future  -     EBV acute panel; Future    Other orders  -     naproxen (NAPROSYN) 500 mg tablet        No problem-specific Assessment & Plan notes found for this encounter  Subjective:      Patient ID: Hope Sandy is a 60 y o  male  Patient is reporting fatigued and headache for a few weeks  He does not have nasal congestion or cough  He does have intermittent runny nose  In the past, he was positive for Lyme in 2018  He has a daily headache  He has tried OTC cough and cold medicince  He takes Naproxen 5 days a week for arthritis pains       Results for Jose Zamudio (MRN [id number]) as of 2020 16:32    1/3/2020 07:39  Sodium: 142  Potassium: 3 5  Chloride: 103  CO2: 27  Anion Gap: 12  BUN: 19  Creatinine: 0 86  GLUCOSE FASTIN (H)  Calcium: 8 8  AST: 43  ALT: 81 (H)  Alkaline Phosphatase: 81  Total Protein: 7 8  Albumin: 3 8  TOTAL BILIRUBIN: 0 60  eGFR: 93        The following portions of the patient's history were reviewed and updated as appropriate:   He has a past medical history of A-fib ([facility] ), Arthritis, BPH (benign prostatic hyperplasia), Fatty liver, Fracture, foot, High cholesterol, Hyperlipidemia, Irregular heart beat, Non-ischemic cardiomyopathy ([facility] ), Osteoarthritis, Psoriasis, Psoriatic arthritis ([facility] ), and Pulmonary HTN ([facility] )  ,  does not have any pertinent problems on file  ,   has a past surgical history that includes Transurethral resection of prostate  ,  family history includes Diabetes in his family and mother; Hypertension in his father  ,   reports that he has quit smoking  He has never used smokeless tobacco  He reports that he drinks alcohol  He reports that he does not use drugs  ,  is allergic to penicillins     Current Outpatient Medications   Medication Sig Dispense Refill    adalimumab (HUMIRA) 40 mg/0 8 mL PSKT Inject 40 mg under the skin once   aspirin 81 mg chewable tablet Chew 1 tablet daily      naproxen (NAPROSYN) 500 mg tablet       tadalafil (CIALIS) 5 MG tablet Take 5 mg by mouth daily as needed for erectile dysfunction  No current facility-administered medications for this visit  Review of Systems   Constitutional: Positive for fatigue  Negative for fever  HENT: Negative for congestion  Eyes: Negative for visual disturbance  Respiratory: Negative for cough and shortness of breath  Cardiovascular: Negative for chest pain, palpitations and leg swelling  Gastrointestinal: Negative for abdominal distention and abdominal pain  Endocrine: Negative for cold intolerance, polydipsia and polyuria  Genitourinary: Negative for difficulty urinating  Musculoskeletal: Positive for arthralgias and myalgias  Negative for back pain and joint swelling  Knee pain and some mild swelling   Skin: Negative for color change and rash  Allergic/Immunologic: Negative for immunocompromised state     Neurological: Positive for headaches  Negative for dizziness  Hematological: Negative for adenopathy  Psychiatric/Behavioral: Negative for behavioral problems and sleep disturbance  All other systems reviewed and are negative  Objective:  Vitals:    01/06/20 1625   BP: 132/90   BP Location: Left arm   Patient Position: Sitting   Pulse: 55   Resp: 18   Temp: 97 8 °F (36 6 °C)   SpO2: 97%   Weight: 96 2 kg (212 lb)   Height: 5' 11" (1 803 m)     Body mass index is 29 57 kg/m²  Physical Exam   Constitutional: He is oriented to person, place, and time  He appears well-developed and well-nourished  No distress  HENT:   Head: Normocephalic and atraumatic  Right Ear: External ear normal    Left Ear: External ear normal    Nose: Nose normal    Mouth/Throat: Oropharynx is clear and moist  No oropharyngeal exudate  Eyes: Pupils are equal, round, and reactive to light  Conjunctivae and EOM are normal  Right eye exhibits no discharge  Left eye exhibits no discharge  No scleral icterus  Neck: Normal range of motion  Neck supple  No JVD present  No thyromegaly present  Cardiovascular: Normal rate, regular rhythm, normal heart sounds and intact distal pulses  Exam reveals no gallop and no friction rub  No murmur heard  Pulmonary/Chest: Effort normal and breath sounds normal  No respiratory distress  He exhibits no tenderness  Abdominal: Soft  Bowel sounds are normal  He exhibits no distension  There is no tenderness  Musculoskeletal: Normal range of motion  He exhibits no edema or tenderness  Lymphadenopathy:     He has no cervical adenopathy  Neurological: He is alert and oriented to person, place, and time  He has normal reflexes  No cranial nerve deficit  Coordination normal    Skin: Skin is warm and dry  Capillary refill takes less than 2 seconds  He is not diaphoretic  Psychiatric: He has a normal mood and affect  His behavior is normal  Judgment and thought content normal    Nursing note and vitals reviewed  BMI Counseling: Body mass index is 29 57 kg/m²  The BMI is above normal  Nutrition recommendations include reducing portion sizes, decreasing overall calorie intake, 3-5 servings of fruits/vegetables daily, reducing fast food intake, consuming healthier snacks, decreasing soda and/or juice intake, moderation in carbohydrate intake, increasing intake of lean protein, reducing intake of saturated fat and trans fat and reducing intake of cholesterol  Exercise recommendations include exercising 3-5 times per week and strength training exercises

## 2020-01-07 ENCOUNTER — APPOINTMENT (OUTPATIENT)
Dept: LAB | Facility: CLINIC | Age: 65
End: 2020-01-07
Payer: COMMERCIAL

## 2020-01-07 DIAGNOSIS — G44.52 NDPH (NEW DAILY PERSISTENT HEADACHE): ICD-10-CM

## 2020-01-07 DIAGNOSIS — I48.19 OTHER PERSISTENT ATRIAL FIBRILLATION (HCC): ICD-10-CM

## 2020-01-07 DIAGNOSIS — M79.10 MYALGIA: ICD-10-CM

## 2020-01-07 DIAGNOSIS — E55.9 VITAMIN D DEFICIENCY: ICD-10-CM

## 2020-01-07 DIAGNOSIS — Z86.19 HISTORY OF LYME DISEASE: ICD-10-CM

## 2020-01-07 DIAGNOSIS — Z11.4 SCREENING FOR HIV (HUMAN IMMUNODEFICIENCY VIRUS): ICD-10-CM

## 2020-01-07 DIAGNOSIS — R79.89 ELEVATED TSH: Primary | ICD-10-CM

## 2020-01-07 LAB
25(OH)D3 SERPL-MCNC: 16.4 NG/ML (ref 30–100)
B BURGDOR IGG PATRN SER IB-IMP: NEGATIVE
B BURGDOR IGG+IGM SER-ACNC: 1.59 ISR (ref 0–0.9)
B BURGDOR IGM PATRN SER IB-IMP: NEGATIVE
B BURGDOR18KD IGG SER QL IB: ABNORMAL
B BURGDOR23KD IGG SER QL IB: ABNORMAL
B BURGDOR23KD IGM SER QL IB: PRESENT
B BURGDOR28KD IGG SER QL IB: ABNORMAL
B BURGDOR30KD IGG SER QL IB: ABNORMAL
B BURGDOR39KD IGG SER QL IB: ABNORMAL
B BURGDOR39KD IGM SER QL IB: ABNORMAL
B BURGDOR41KD IGG SER QL IB: ABNORMAL
B BURGDOR41KD IGM SER QL IB: ABNORMAL
B BURGDOR45KD IGG SER QL IB: ABNORMAL
B BURGDOR58KD IGG SER QL IB: ABNORMAL
B BURGDOR66KD IGG SER QL IB: ABNORMAL
B BURGDOR93KD IGG SER QL IB: ABNORMAL
CHOLEST SERPL-MCNC: 231 MG/DL (ref 50–200)
ERYTHROCYTE [SEDIMENTATION RATE] IN BLOOD: 13 MM/HOUR (ref 0–10)
HDLC SERPL-MCNC: 44 MG/DL
LDLC SERPL CALC-MCNC: 137 MG/DL (ref 0–100)
NONHDLC SERPL-MCNC: 187 MG/DL
TRIGL SERPL-MCNC: 250 MG/DL
TSH SERPL DL<=0.05 MIU/L-ACNC: 3.74 UIU/ML (ref 0.36–3.74)

## 2020-01-07 PROCEDURE — 87389 HIV-1 AG W/HIV-1&-2 AB AG IA: CPT

## 2020-01-07 PROCEDURE — 86665 EPSTEIN-BARR CAPSID VCA: CPT

## 2020-01-07 PROCEDURE — 86617 LYME DISEASE ANTIBODY: CPT

## 2020-01-07 PROCEDURE — 36415 COLL VENOUS BLD VENIPUNCTURE: CPT

## 2020-01-07 PROCEDURE — 86664 EPSTEIN-BARR NUCLEAR ANTIGEN: CPT

## 2020-01-07 PROCEDURE — 85652 RBC SED RATE AUTOMATED: CPT

## 2020-01-07 PROCEDURE — 86663 EPSTEIN-BARR ANTIBODY: CPT

## 2020-01-07 PROCEDURE — 82306 VITAMIN D 25 HYDROXY: CPT

## 2020-01-07 PROCEDURE — 86618 LYME DISEASE ANTIBODY: CPT

## 2020-01-07 PROCEDURE — 80061 LIPID PANEL: CPT

## 2020-01-07 PROCEDURE — 84443 ASSAY THYROID STIM HORMONE: CPT

## 2020-01-07 RX ORDER — ERGOCALCIFEROL 1.25 MG/1
50000 CAPSULE ORAL 2 TIMES WEEKLY
Qty: 34 CAPSULE | Refills: 0 | Status: SHIPPED | OUTPATIENT
Start: 2020-01-09 | End: 2022-05-18 | Stop reason: ALTCHOICE

## 2020-01-08 LAB
EBV EA IGG SER-ACNC: 13.1 U/ML (ref 0–8.9)
EBV NA IGG SER IA-ACNC: 591 U/ML (ref 0–17.9)
EBV PATRN SPEC IB-IMP: ABNORMAL
EBV VCA IGG SER IA-ACNC: >600 U/ML (ref 0–17.9)
EBV VCA IGM SER IA-ACNC: <36 U/ML (ref 0–35.9)
HIV 1+2 AB+HIV1 P24 AG SERPL QL IA: NORMAL

## 2020-01-09 LAB
B BURGDOR IGG PATRN SER IB-IMP: NEGATIVE
B BURGDOR IGG+IGM SER-ACNC: 1.06 ISR (ref 0–0.9)
B BURGDOR IGM PATRN SER IB-IMP: NEGATIVE
B BURGDOR18KD IGG SER QL IB: PRESENT
B BURGDOR23KD IGG SER QL IB: ABNORMAL
B BURGDOR23KD IGM SER QL IB: PRESENT
B BURGDOR28KD IGG SER QL IB: ABNORMAL
B BURGDOR30KD IGG SER QL IB: ABNORMAL
B BURGDOR39KD IGG SER QL IB: ABNORMAL
B BURGDOR39KD IGM SER QL IB: ABNORMAL
B BURGDOR41KD IGG SER QL IB: ABNORMAL
B BURGDOR41KD IGM SER QL IB: ABNORMAL
B BURGDOR45KD IGG SER QL IB: ABNORMAL
B BURGDOR58KD IGG SER QL IB: ABNORMAL
B BURGDOR66KD IGG SER QL IB: ABNORMAL
B BURGDOR93KD IGG SER QL IB: ABNORMAL

## 2020-01-16 ENCOUNTER — LAB (OUTPATIENT)
Dept: LAB | Facility: CLINIC | Age: 65
End: 2020-01-16
Payer: COMMERCIAL

## 2020-01-16 ENCOUNTER — TELEPHONE (OUTPATIENT)
Dept: FAMILY MEDICINE CLINIC | Facility: CLINIC | Age: 65
End: 2020-01-16

## 2020-01-16 DIAGNOSIS — G44.52 NDPH (NEW DAILY PERSISTENT HEADACHE): ICD-10-CM

## 2020-01-16 DIAGNOSIS — E53.8 B12 DEFICIENCY: ICD-10-CM

## 2020-01-16 DIAGNOSIS — R53.83 FATIGUE, UNSPECIFIED TYPE: ICD-10-CM

## 2020-01-16 DIAGNOSIS — M79.10 MYALGIA: Primary | ICD-10-CM

## 2020-01-16 DIAGNOSIS — R79.89 ELEVATED TSH: ICD-10-CM

## 2020-01-16 DIAGNOSIS — M79.10 MYALGIA: ICD-10-CM

## 2020-01-16 LAB
BASOPHILS # BLD AUTO: 0.06 THOUSANDS/ΜL (ref 0–0.1)
BASOPHILS NFR BLD AUTO: 1 % (ref 0–1)
EOSINOPHIL # BLD AUTO: 0.27 THOUSAND/ΜL (ref 0–0.61)
EOSINOPHIL NFR BLD AUTO: 4 % (ref 0–6)
ERYTHROCYTE [DISTWIDTH] IN BLOOD BY AUTOMATED COUNT: 12.5 % (ref 11.6–15.1)
HCT VFR BLD AUTO: 44.5 % (ref 36.5–49.3)
HGB BLD-MCNC: 15.5 G/DL (ref 12–17)
IMM GRANULOCYTES # BLD AUTO: 0.02 THOUSAND/UL (ref 0–0.2)
IMM GRANULOCYTES NFR BLD AUTO: 0 % (ref 0–2)
LYMPHOCYTES # BLD AUTO: 2.57 THOUSANDS/ΜL (ref 0.6–4.47)
LYMPHOCYTES NFR BLD AUTO: 35 % (ref 14–44)
MCH RBC QN AUTO: 32 PG (ref 26.8–34.3)
MCHC RBC AUTO-ENTMCNC: 34.8 G/DL (ref 31.4–37.4)
MCV RBC AUTO: 92 FL (ref 82–98)
MONOCYTES # BLD AUTO: 0.79 THOUSAND/ΜL (ref 0.17–1.22)
MONOCYTES NFR BLD AUTO: 11 % (ref 4–12)
NEUTROPHILS # BLD AUTO: 3.64 THOUSANDS/ΜL (ref 1.85–7.62)
NEUTS SEG NFR BLD AUTO: 49 % (ref 43–75)
NRBC BLD AUTO-RTO: 0 /100 WBCS
PLATELET # BLD AUTO: 163 THOUSANDS/UL (ref 149–390)
PMV BLD AUTO: 10.1 FL (ref 8.9–12.7)
RBC # BLD AUTO: 4.84 MILLION/UL (ref 3.88–5.62)
T3FREE SERPL-MCNC: 2.64 PG/ML (ref 2.3–4.2)
T4 FREE SERPL-MCNC: 0.79 NG/DL (ref 0.76–1.46)
VIT B12 SERPL-MCNC: 465 PG/ML (ref 100–900)
WBC # BLD AUTO: 7.35 THOUSAND/UL (ref 4.31–10.16)

## 2020-01-16 PROCEDURE — 85025 COMPLETE CBC W/AUTO DIFF WBC: CPT

## 2020-01-16 PROCEDURE — 84439 ASSAY OF FREE THYROXINE: CPT

## 2020-01-16 PROCEDURE — 86800 THYROGLOBULIN ANTIBODY: CPT

## 2020-01-16 PROCEDURE — 82607 VITAMIN B-12: CPT

## 2020-01-16 PROCEDURE — 86376 MICROSOMAL ANTIBODY EACH: CPT

## 2020-01-16 PROCEDURE — 36415 COLL VENOUS BLD VENIPUNCTURE: CPT

## 2020-01-16 PROCEDURE — 84481 FREE ASSAY (FT-3): CPT

## 2020-01-16 NOTE — TELEPHONE ENCOUNTER
Patient called for an appointment - saw you a month ago - still not doing well at all  What to do ? Put him off til next week? ?

## 2020-01-18 LAB
THYROGLOB AB SERPL-ACNC: <1 IU/ML (ref 0–0.9)
THYROPEROXIDASE AB SERPL-ACNC: 24 IU/ML (ref 0–34)

## 2020-02-03 ENCOUNTER — HOSPITAL ENCOUNTER (OUTPATIENT)
Dept: CT IMAGING | Facility: CLINIC | Age: 65
Discharge: HOME | End: 2020-02-03
Payer: COMMERCIAL

## 2020-02-03 ENCOUNTER — TELEPHONE (OUTPATIENT)
Dept: OTHER | Facility: OTHER | Age: 65
End: 2020-02-03

## 2020-02-03 DIAGNOSIS — G44.52 NDPH (NEW DAILY PERSISTENT HEADACHE): ICD-10-CM

## 2020-02-03 DIAGNOSIS — M79.10 MYALGIA: ICD-10-CM

## 2020-02-03 DIAGNOSIS — R53.83 FATIGUE, UNSPECIFIED TYPE: ICD-10-CM

## 2020-02-03 PROCEDURE — 70450 CT HEAD/BRAIN W/O DYE: CPT

## 2020-02-28 ENCOUNTER — TRANSCRIBE ORDERS (OUTPATIENT)
Dept: ADMINISTRATIVE | Facility: HOSPITAL | Age: 65
End: 2020-02-28

## 2020-02-28 ENCOUNTER — APPOINTMENT (OUTPATIENT)
Dept: LAB | Facility: HOSPITAL | Age: 65
End: 2020-02-28
Attending: INTERNAL MEDICINE
Payer: COMMERCIAL

## 2020-02-28 DIAGNOSIS — L40.59 POLYARTICULAR PSORIATIC ARTHRITIS (HCC): ICD-10-CM

## 2020-02-28 DIAGNOSIS — L40.59 POLYARTICULAR PSORIATIC ARTHRITIS (HCC): Primary | ICD-10-CM

## 2020-02-28 LAB
ALBUMIN SERPL BCP-MCNC: 3.9 G/DL (ref 3.5–5)
ALP SERPL-CCNC: 70 U/L (ref 46–116)
ALT SERPL W P-5'-P-CCNC: 120 U/L (ref 12–78)
AST SERPL W P-5'-P-CCNC: 54 U/L (ref 5–45)
BASOPHILS # BLD AUTO: 0.06 THOUSANDS/ΜL (ref 0–0.1)
BASOPHILS NFR BLD AUTO: 1 % (ref 0–1)
BILIRUB DIRECT SERPL-MCNC: 0.19 MG/DL (ref 0–0.2)
BILIRUB SERPL-MCNC: 0.8 MG/DL (ref 0.2–1)
CREAT SERPL-MCNC: 0.88 MG/DL (ref 0.6–1.3)
CREAT UR-MCNC: 57.6 MG/DL
CRP SERPL QL: <3 MG/L
EOSINOPHIL # BLD AUTO: 0.49 THOUSAND/ΜL (ref 0–0.61)
EOSINOPHIL NFR BLD AUTO: 9 % (ref 0–6)
ERYTHROCYTE [DISTWIDTH] IN BLOOD BY AUTOMATED COUNT: 12.7 % (ref 11.6–15.1)
ERYTHROCYTE [SEDIMENTATION RATE] IN BLOOD: 4 MM/HOUR (ref 0–10)
GFR SERPL CREATININE-BSD FRML MDRD: 92 ML/MIN/1.73SQ M
HCT VFR BLD AUTO: 44.9 % (ref 36.5–49.3)
HGB BLD-MCNC: 15.3 G/DL (ref 12–17)
IMM GRANULOCYTES # BLD AUTO: 0.01 THOUSAND/UL (ref 0–0.2)
IMM GRANULOCYTES NFR BLD AUTO: 0 % (ref 0–2)
LYMPHOCYTES # BLD AUTO: 1.81 THOUSANDS/ΜL (ref 0.6–4.47)
LYMPHOCYTES NFR BLD AUTO: 34 % (ref 14–44)
MCH RBC QN AUTO: 32.6 PG (ref 26.8–34.3)
MCHC RBC AUTO-ENTMCNC: 34.1 G/DL (ref 31.4–37.4)
MCV RBC AUTO: 96 FL (ref 82–98)
MICROALBUMIN UR-MCNC: 16.8 MG/L (ref 0–20)
MICROALBUMIN/CREAT 24H UR: 29 MG/G CREATININE (ref 0–30)
MONOCYTES # BLD AUTO: 0.63 THOUSAND/ΜL (ref 0.17–1.22)
MONOCYTES NFR BLD AUTO: 12 % (ref 4–12)
NEUTROPHILS # BLD AUTO: 2.28 THOUSANDS/ΜL (ref 1.85–7.62)
NEUTS SEG NFR BLD AUTO: 44 % (ref 43–75)
NRBC BLD AUTO-RTO: 0 /100 WBCS
PLATELET # BLD AUTO: 155 THOUSANDS/UL (ref 149–390)
PMV BLD AUTO: 9.9 FL (ref 8.9–12.7)
PROT SERPL-MCNC: 7.7 G/DL (ref 6.4–8.2)
RBC # BLD AUTO: 4.7 MILLION/UL (ref 3.88–5.62)
WBC # BLD AUTO: 5.28 THOUSAND/UL (ref 4.31–10.16)

## 2020-02-28 PROCEDURE — 82570 ASSAY OF URINE CREATININE: CPT | Performed by: INTERNAL MEDICINE

## 2020-02-28 PROCEDURE — 86140 C-REACTIVE PROTEIN: CPT

## 2020-02-28 PROCEDURE — 85652 RBC SED RATE AUTOMATED: CPT

## 2020-02-28 PROCEDURE — 82565 ASSAY OF CREATININE: CPT

## 2020-02-28 PROCEDURE — 36415 COLL VENOUS BLD VENIPUNCTURE: CPT

## 2020-02-28 PROCEDURE — 85025 COMPLETE CBC W/AUTO DIFF WBC: CPT

## 2020-02-28 PROCEDURE — 80076 HEPATIC FUNCTION PANEL: CPT

## 2020-02-28 PROCEDURE — 82043 UR ALBUMIN QUANTITATIVE: CPT | Performed by: INTERNAL MEDICINE

## 2021-02-22 ENCOUNTER — TELEPHONE (OUTPATIENT)
Dept: CARDIOLOGY CLINIC | Facility: CLINIC | Age: 66
End: 2021-02-22

## 2021-02-22 ENCOUNTER — OFFICE VISIT (OUTPATIENT)
Dept: CARDIOLOGY CLINIC | Facility: CLINIC | Age: 66
End: 2021-02-22
Payer: COMMERCIAL

## 2021-02-22 ENCOUNTER — DOCUMENTATION (OUTPATIENT)
Dept: CARDIOLOGY CLINIC | Facility: CLINIC | Age: 66
End: 2021-02-22

## 2021-02-22 VITALS
HEIGHT: 71 IN | SYSTOLIC BLOOD PRESSURE: 154 MMHG | WEIGHT: 209 LBS | DIASTOLIC BLOOD PRESSURE: 92 MMHG | BODY MASS INDEX: 29.26 KG/M2 | HEART RATE: 47 BPM

## 2021-02-22 DIAGNOSIS — I48.3 TYPICAL ATRIAL FLUTTER (HCC): ICD-10-CM

## 2021-02-22 DIAGNOSIS — R00.2 PALPITATION: ICD-10-CM

## 2021-02-22 DIAGNOSIS — Z72.89 ALCOHOL USE: ICD-10-CM

## 2021-02-22 DIAGNOSIS — I48.91 ATRIAL FIBRILLATION, UNSPECIFIED TYPE (HCC): Primary | ICD-10-CM

## 2021-02-22 DIAGNOSIS — I10 ESSENTIAL HYPERTENSION: ICD-10-CM

## 2021-02-22 DIAGNOSIS — L40.50 PSORIATIC ARTHRITIS (HCC): ICD-10-CM

## 2021-02-22 DIAGNOSIS — Z12.11 ENCOUNTER FOR SCREENING COLONOSCOPY: ICD-10-CM

## 2021-02-22 DIAGNOSIS — R00.1 BRADYCARDIA: ICD-10-CM

## 2021-02-22 PROBLEM — I48.92 ATRIAL FLUTTER (HCC): Status: ACTIVE | Noted: 2021-02-22

## 2021-02-22 PROBLEM — F10.90 ALCOHOL USE: Status: ACTIVE | Noted: 2021-02-22

## 2021-02-22 PROBLEM — Z78.9 ALCOHOL USE: Status: ACTIVE | Noted: 2021-02-22

## 2021-02-22 PROCEDURE — 93246 EXT ECG>7D<15D RECORDING: CPT | Performed by: INTERNAL MEDICINE

## 2021-02-22 PROCEDURE — 3008F BODY MASS INDEX DOCD: CPT | Performed by: INTERNAL MEDICINE

## 2021-02-22 PROCEDURE — 1036F TOBACCO NON-USER: CPT | Performed by: INTERNAL MEDICINE

## 2021-02-22 PROCEDURE — 99215 OFFICE O/P EST HI 40 MIN: CPT | Performed by: INTERNAL MEDICINE

## 2021-02-22 PROCEDURE — 93000 ELECTROCARDIOGRAM COMPLETE: CPT | Performed by: INTERNAL MEDICINE

## 2021-02-22 RX ORDER — LISINOPRIL 20 MG/1
20 TABLET ORAL 2 TIMES DAILY
Qty: 60 TABLET | Refills: 3 | Status: SHIPPED | OUTPATIENT
Start: 2021-02-22 | End: 2021-06-25

## 2021-02-22 NOTE — LETTER
February 22, 2021     Camila Valeror, [address]  [address] [facility] [place]  [address]    Patient: Enzo Hatfield   YOB: 1955   Date of Visit: 2/22/2021       Dear Dr Sangeeta Morse:    Thank you for referring [place] Nice to me for evaluation  Below are my notes for this consultation  If you have questions, please do not hesitate to call me  I look forward to following your patient along with you  Sincerely,        Yesenia Berg MD        CC: MD Yesenia Love MD  2/22/2021 10:40 PM  Sign when Signing Visit                                             Cardiology Follow Up    Enzo Hatfield  1955  [id number]  [address] [facility]  [address] [id number]    1  Atrial fibrillation, unspecified type [facility])  POCT ECG    Diagnostic Sleep Study    Basic metabolic panel    AMB extended holter monitor   2  Encounter for screening colonoscopy  Ambulatory referral for colon cancer education    Diagnostic Sleep Study    Basic metabolic panel    AMB extended holter monitor   3  BMI 29 0-29 9,adult  Diagnostic Sleep Study    Basic metabolic panel    AMB extended holter monitor   4  Bradycardia  Diagnostic Sleep Study    Basic metabolic panel    AMB extended holter monitor   5  Essential hypertension  lisinopril (ZESTRIL) 20 mg tablet   6  Typical atrial flutter ([address] )     7  Psoriatic arthritis ([address] )     8  Palpitation     9   Alcohol use         Interval History:     The patient has a known history of atrial flutter and fibrillation   He underwent comprehensive ablation in February 2017  He had some brief episodes of palpitation in January 2021   In the 1st week of February he did have palpitations for 2 days    He has gained some weight   He has become hypertensive   He drinks 2 beers every day and occasionally 4-6 beers a day in the week end    He is still active at work   Not complaining of anginal like chest pain or chest pressure   Not complaining of worsening orthopnea or PND  No leg swelling     He did have palpitation for 2 days in February  He did drink during those days  He was also shoveling snow    Is not complaining of presyncope or syncope  His not complaining of orthostatic lightheadedness    Is not complaining of exertional intolerance     He is not smoking tobacco  He is not using energy drinks   He is taking a significant amount of alcohol at least 14 units a week    The patient does have a history of psoriatic arthritis and is on Humira    Patient was recently found to be hypertensive  He does have some bradycardia  He has not yet been started on therapy for his hypertension      /92 (BP Location: Left arm, Patient Position: Sitting, Cuff Size: Large)   Pulse (!) 47   Ht 5' 11" (1 803 m)   Wt 94 8 kg (209 lb)   BMI 29 15 kg/m²       Patient Active Problem List   Diagnosis    Atrial fibrillation (HCC)    Fatigue    Shortness of breath    Psoriatic arthritis ([facility] )    Screening for HIV (human immunodeficiency virus)    BMI 29 0-29 9,adult    Need for pneumococcal vaccination    NDPH (new daily persistent headache)    Atrial flutter (HCC)    Bradycardia    Palpitation    Alcohol use     Past Medical History:   Diagnosis Date    A-fib ([place] [address] )     Arthritis     BPH (benign prostatic hyperplasia)     Fatty liver     Fracture, foot     High cholesterol     Hyperlipidemia     Irregular heart beat     Non-ischemic cardiomyopathy ([place] [address] )     Osteoarthritis     Psoriasis     Psoriatic arthritis ([place] [address] )     Pulmonary HTN ([place] [address] )      Social History     Socioeconomic History    Marital status: /Civil Union     Spouse name: Not on file    Number of children: Not on file    Years of education: Not on file    Highest education level: Not on file   Occupational History    Not on file   Social Needs    Financial resource strain: Not on file    Food insecurity Worry: Not on file     Inability: Not on file    Transportation needs     Medical: Not on file     Non-medical: Not on file   Tobacco Use    Smoking status: Former Smoker    Smokeless tobacco: Never Used   Substance and Sexual Activity    Alcohol use: Yes     Comment: socially-  "couple beers a day"  (<6)    Drug use: No    Sexual activity: Not on file   Lifestyle    Physical activity     Days per week: Not on file     Minutes per session: Not on file    Stress: Not on file   Relationships    Social connections     Talks on phone: Not on file     Gets together: Not on file     Attends [employer] service: Not on file     Active member of club or organization: Not on file     Attends meetings of clubs or organizations: Not on file     Relationship status: Not on file    Intimate partner violence     Fear of current or ex partner: Not on file     Emotionally abused: Not on file     Physically abused: Not on file     Forced sexual activity: Not on file   Other Topics Concern    Not on file   Social History Narrative    Daily coffee consumption (__cups/day)     Daily cola consumption (__cans/day)       Family History   Problem Relation Age of Onset    Diabetes Mother     Hypertension Father     Diabetes Family      Past Surgical History:   Procedure Laterality Date    TRANSURETHRAL RESECTION OF PROSTATE      last assessed 7/21/15       Current Outpatient Medications:     adalimumab (HUMIRA) 40 mg/0 8 mL PSKT, Inject 40 mg under the skin every 14 (fourteen) days , Disp: , Rfl:     Ascorbic Acid (Vitamin C) 500 MG CHEW, Chew 500 mg daily, Disp: , Rfl:     ergocalciferol (VITAMIN D2) 50,000 units, Take 1 capsule (50,000 Units total) by mouth 2 (two) times a week (Patient taking differently: Take 10,000 Units by mouth daily Pt is taking 10,000 IU daily), Disp: 34 capsule, Rfl: 0    magnesium oxide (MAG-OX) 400 mg, Take 400 mg by mouth daily, Disp: , Rfl:     naproxen (NAPROSYN) 500 mg tablet, , Disp: , Rfl:     tadalafil (CIALIS) 5 MG tablet, Take 5 mg by mouth daily as needed for erectile dysfunction  , Disp: , Rfl:     aspirin 81 mg chewable tablet, Chew 1 tablet daily, Disp: , Rfl:     lisinopril (ZESTRIL) 20 mg tablet, Take 1 tablet (20 mg total) by mouth 2 (two) times a day, Disp: 60 tablet, Rfl: 3  Allergies   Allergen Reactions    Penicillins Rash       Labs:  Lab Results   Component Value Date     03/15/2016    K 3 5 01/03/2020    K 4 2 03/15/2016     01/03/2020     03/15/2016    CO2 27 01/03/2020    CO2 26 03/15/2016    BUN 19 01/03/2020    BUN 18 03/15/2016    CREATININE 0 88 02/28/2020    CREATININE 0 99 03/15/2016    GLUCOSE 106 (H) 11/15/2016    CALCIUM 8 8 01/03/2020    CALCIUM 9 5 03/15/2016     No results found for: CKTOTAL, CKMB, CKMBINDEX, TROPONINI  Lab Results   Component Value Date    WBC 5 28 02/28/2020    HGB 15 3 02/28/2020    HCT 44 9 02/28/2020    MCV 96 02/28/2020     02/28/2020     Lab Results   Component Value Date    CHOL 190 11/15/2016    TRIG 250 (H) 01/07/2020    TRIG 113 11/15/2016    HDL 44 01/07/2020    HDL 54 11/15/2016     Imaging: No results found  Review of Systems:  Review of Systems   All other systems reviewed and are negative  as described in my history of present illness    Physical Exam:  Physical Exam  Vitals signs reviewed  Constitutional:       General: He is not in acute distress  Appearance: Normal appearance  He is well-developed  He is not ill-appearing  Comments: Not in any distress at the current time   HENT:      Head: Normocephalic and atraumatic  Right Ear: External ear normal       Left Ear: External ear normal       Nose: Nose normal       Mouth/Throat:      Pharynx: Uvula midline  Eyes:      General: Lids are normal       Extraocular Movements: Extraocular movements intact  Conjunctiva/sclera: Conjunctivae normal       Pupils: Pupils are equal, round, and reactive to light        Comments: No pallor  No cyanosis  No icterus   Neck:      Musculoskeletal: Normal range of motion and neck supple  No neck rigidity or muscular tenderness  Thyroid: No thyromegaly  Vascular: No carotid bruit or JVD  Trachea: Trachea normal       Comments: No jugular lymphadenopathy  Cardiovascular:      Rate and Rhythm: Normal rate and regular rhythm  Chest Wall: PMI is not displaced  Pulses: Normal pulses  Heart sounds: Normal heart sounds, S1 normal and S2 normal  No murmur  No friction rub  No gallop  No S3 or S4 sounds  Pulmonary:      Effort: Pulmonary effort is normal  No accessory muscle usage or respiratory distress  Breath sounds: Normal breath sounds  No decreased breath sounds, wheezing, rhonchi or rales  Chest:      Chest wall: No tenderness  Abdominal:      General: Abdomen is flat  Bowel sounds are normal  There is no distension  Palpations: Abdomen is soft  There is no hepatomegaly, splenomegaly or mass  Tenderness: There is no abdominal tenderness  Musculoskeletal: Normal range of motion  General: No swelling, tenderness or deformity  Lymphadenopathy:      Cervical: No cervical adenopathy  Skin:     Coloration: Skin is not jaundiced or pale  Findings: No abrasion, bruising, erythema, lesion or rash  Nails: There is no clubbing  Neurological:      Mental Status: He is alert and oriented to person, place, and time  Mental status is at baseline  Comments: Facial symmetry is retained  Extraocular movements are retained  Head neck tongue and palate movement are retained and symmetric   Psychiatric:         Mood and Affect: Mood normal          Speech: Speech normal          Behavior: Behavior normal          Thought Content: Thought content normal          Discussion/Summary:    1  palpitation  2  atrial flutter   3   Atrial fibrillation    Ablated in 2017   Patient thinks he had a relapse in February, when he was shoveling snow for 2 days    He has some new risk factors   -increasing weight   -suspicion of sleep apnea  -hypertension  -Increase alcohol drinking, at least 14 units a week and at times binge drinking    At the current time my recommendation for the patient   -event monitor to make symptom rhythm correlation  -evaluate for sleep apnea  - alcohol, not to eat drink more than 3 units in a week  -aggressive management of hypertension    If we do identify atrial fibrillation, will need to review therapy-medication versus repeat ablation         4   Sick sinus syndrome    sinus bradycardia  This will limit use of medications like propafenone, sotalol, amiodarone   If it continues, and patient is symptomatic, patient will need a pacemaker      5   hypertension  Blood pressure is 154/92   Starting patient on lisinopril 20 mg 2 times daily  Follow-up for dry cough   Follow-up kidney function         6  alcohol abuse   Drinking 2 beers every day  Sometimes he can go up to 4-6 beer a day  This is significant drinking for a patient with atrial fibrillation   This can cause worsening of atrial fibrillation  Recommend to significantly decrease, not more than 3 units in a week      7   psoriatic arthritis   On Humira  Patient advised to maintain proper preventive measures to avoid COVID infection      8   sleep apnea  Increasing weight, alcohol use   Occasional history suggestive of snoring  Recommend repeat sleep study           summary of my recommendation for the patient   Start patient on lisinopril 20 bid  Check bmp    Event monitor   Sleep study  Alcohol - not to exceed 3 /wk

## 2021-02-22 NOTE — PROGRESS NOTES
Cardiology Follow Up    Pedro Shoemaker  1955  [id number]  [facility] [place]  [facility]  [address]  [phone]  [phone]    1  Atrial fibrillation, unspecified type [facility])  POCT ECG    Diagnostic Sleep Study    Basic metabolic panel    AMB extended holter monitor   2  Encounter for screening colonoscopy  Ambulatory referral for colon cancer education    Diagnostic Sleep Study    Basic metabolic panel    AMB extended holter monitor   3  BMI 29 0-29 9,adult  Diagnostic Sleep Study    Basic metabolic panel    AMB extended holter monitor   4  Bradycardia  Diagnostic Sleep Study    Basic metabolic panel    AMB extended holter monitor   5  Essential hypertension  lisinopril (ZESTRIL) 20 mg tablet   6  Typical atrial flutter ([address] )     7  Psoriatic arthritis ([address] )     8  Palpitation     9   Alcohol use         Interval History:     The patient has a known history of atrial flutter and fibrillation   He underwent comprehensive ablation in February 2017  He had some brief episodes of palpitation in January 2021   In the 1st week of February he did have palpitations for 2 days    He has gained some weight   He has become hypertensive   He drinks 2 beers every day and occasionally 4-6 beers a day in the week end    He is still active at work   Not complaining of anginal like chest pain or chest pressure   Not complaining of worsening orthopnea or PND  No leg swelling     He did have palpitation for 2 days in February  He did drink during those days  He was also shoveling snow    Is not complaining of presyncope or syncope  His not complaining of orthostatic lightheadedness    Is not complaining of exertional intolerance     He is not smoking tobacco  He is not using energy drinks   He is taking a significant amount of alcohol at least 14 units a week    The patient does have a history of psoriatic arthritis and is on Humira    Patient was recently found to be hypertensive  He does have some bradycardia  He has not yet been started on therapy for his hypertension      /92 (BP Location: Left arm, Patient Position: Sitting, Cuff Size: Large)   Pulse (!) 47   Ht 5' 11" (1 803 m)   Wt 94 8 kg (209 lb)   BMI 29 15 kg/m²       Patient Active Problem List   Diagnosis    Atrial fibrillation (HCC)    Fatigue    Shortness of breath    Psoriatic arthritis (HCC)    Screening for HIV (human immunodeficiency virus)    BMI 29 0-29 9,adult    Need for pneumococcal vaccination    NDPH (new daily persistent headache)    Atrial flutter (HCC)    Bradycardia    Palpitation    Alcohol use     Past Medical History:   Diagnosis Date    A-fib ([facility] )     Arthritis     BPH (benign prostatic hyperplasia)     Fatty liver     Fracture, foot     High cholesterol     Hyperlipidemia     Irregular heart beat     Non-ischemic cardiomyopathy (HCC)     Osteoarthritis     Psoriasis     Psoriatic arthritis (HCC)     Pulmonary HTN (HCC)      Social History     Socioeconomic History    Marital status: /Civil Union     Spouse name: Not on file    Number of children: Not on file    Years of education: Not on file    Highest education level: Not on file   Occupational History    Not on file   Social Needs    Financial resource strain: Not on file    Food insecurity     Worry: Not on file     Inability: Not on file   Metaps needs     Medical: Not on file     Non-medical: Not on file   Tobacco Use    Smoking status: Former Smoker    Smokeless tobacco: Never Used   Substance and Sexual Activity    Alcohol use: Yes     Comment: socially-  "couple beers a day"  (<6)    Drug use: No    Sexual activity: Not on file   Lifestyle    Physical activity     Days per week: Not on file     Minutes per session: Not on file    Stress: Not on file   Relationships    Social connections     Talks on phone: Not on file     Gets together: Not on file     Attends [employer] service: Not on file     Active member of club or organization: Not on file     Attends meetings of clubs or organizations: Not on file     Relationship status: Not on file    Intimate partner violence     Fear of current or ex partner: Not on file     Emotionally abused: Not on file     Physically abused: Not on file     Forced sexual activity: Not on file   Other Topics Concern    Not on file   Social History Narrative    Daily coffee consumption (__cups/day)     Daily cola consumption (__cans/day)       Family History   Problem Relation Age of Onset    Diabetes Mother     Hypertension Father     Diabetes Family      Past Surgical History:   Procedure Laterality Date    TRANSURETHRAL RESECTION OF PROSTATE      last assessed 7/21/15       Current Outpatient Medications:     adalimumab (HUMIRA) 40 mg/0 8 mL PSKT, Inject 40 mg under the skin every 14 (fourteen) days , Disp: , Rfl:     Ascorbic Acid (Vitamin C) 500 MG CHEW, Chew 500 mg daily, Disp: , Rfl:     ergocalciferol (VITAMIN D2) 50,000 units, Take 1 capsule (50,000 Units total) by mouth 2 (two) times a week (Patient taking differently: Take 10,000 Units by mouth daily Pt is taking 10,000 IU daily), Disp: 34 capsule, Rfl: 0    magnesium oxide (MAG-OX) 400 mg, Take 400 mg by mouth daily, Disp: , Rfl:     naproxen (NAPROSYN) 500 mg tablet, , Disp: , Rfl:     tadalafil (CIALIS) 5 MG tablet, Take 5 mg by mouth daily as needed for erectile dysfunction  , Disp: , Rfl:     aspirin 81 mg chewable tablet, Chew 1 tablet daily, Disp: , Rfl:     lisinopril (ZESTRIL) 20 mg tablet, Take 1 tablet (20 mg total) by mouth 2 (two) times a day, Disp: 60 tablet, Rfl: 3  Allergies   Allergen Reactions    Penicillins Rash       Labs:  Lab Results   Component Value Date     03/15/2016    K 3 5 01/03/2020    K 4 2 03/15/2016     01/03/2020     03/15/2016    CO2 27 01/03/2020    CO2 26 03/15/2016    BUN 19 01/03/2020    BUN 18 03/15/2016    CREATININE 0 88 02/28/2020    CREATININE 0 99 03/15/2016    GLUCOSE 106 (H) 11/15/2016    CALCIUM 8 8 01/03/2020    CALCIUM 9 5 03/15/2016     No results found for: CKTOTAL, CKMB, CKMBINDEX, TROPONINI  Lab Results   Component Value Date    WBC 5 28 02/28/2020    HGB 15 3 02/28/2020    HCT 44 9 02/28/2020    MCV 96 02/28/2020     02/28/2020     Lab Results   Component Value Date    CHOL 190 11/15/2016    TRIG 250 (H) 01/07/2020    TRIG 113 11/15/2016    HDL 44 01/07/2020    HDL 54 11/15/2016     Imaging: No results found  Review of Systems:  Review of Systems   All other systems reviewed and are negative  as described in my history of present illness    Physical Exam:  Physical Exam  Vitals signs reviewed  Constitutional:       General: He is not in acute distress  Appearance: Normal appearance  He is well-developed  He is not ill-appearing  Comments: Not in any distress at the current time   HENT:      Head: Normocephalic and atraumatic  Right Ear: External ear normal       Left Ear: External ear normal       Nose: Nose normal       Mouth/Throat:      Pharynx: Uvula midline  Eyes:      General: Lids are normal       Extraocular Movements: Extraocular movements intact  Conjunctiva/sclera: Conjunctivae normal       Pupils: Pupils are equal, round, and reactive to light  Comments: No pallor  No cyanosis  No icterus   Neck:      Musculoskeletal: Normal range of motion and neck supple  No neck rigidity or muscular tenderness  Thyroid: No thyromegaly  Vascular: No carotid bruit or JVD  Trachea: Trachea normal       Comments: No jugular lymphadenopathy  Cardiovascular:      Rate and Rhythm: Normal rate and regular rhythm  Chest Wall: PMI is not displaced  Pulses: Normal pulses  Heart sounds: Normal heart sounds, S1 normal and S2 normal  No murmur  No friction rub  No gallop  No S3 or S4 sounds      Pulmonary: Effort: Pulmonary effort is normal  No accessory muscle usage or respiratory distress  Breath sounds: Normal breath sounds  No decreased breath sounds, wheezing, rhonchi or rales  Chest:      Chest wall: No tenderness  Abdominal:      General: Abdomen is flat  Bowel sounds are normal  There is no distension  Palpations: Abdomen is soft  There is no hepatomegaly, splenomegaly or mass  Tenderness: There is no abdominal tenderness  Musculoskeletal: Normal range of motion  General: No swelling, tenderness or deformity  Lymphadenopathy:      Cervical: No cervical adenopathy  Skin:     Coloration: Skin is not jaundiced or pale  Findings: No abrasion, bruising, erythema, lesion or rash  Nails: There is no clubbing  Neurological:      Mental Status: He is alert and oriented to person, place, and time  Mental status is at baseline  Comments: Facial symmetry is retained  Extraocular movements are retained  Head neck tongue and palate movement are retained and symmetric   Psychiatric:         Mood and Affect: Mood normal          Speech: Speech normal          Behavior: Behavior normal          Thought Content: Thought content normal          Discussion/Summary:    1  palpitation  2  atrial flutter   3  Atrial fibrillation    Ablated in 2017   Patient thinks he had a relapse in February, when he was shoveling snow for 2 days    He has some new risk factors   -increasing weight   -suspicion of sleep apnea  -hypertension  -Increase alcohol drinking, at least 14 units a week and at times binge drinking    At the current time my recommendation for the patient   -event monitor to make symptom rhythm correlation  -evaluate for sleep apnea  - alcohol, not to eat drink more than 3 units in a week  -aggressive management of hypertension    If we do identify atrial fibrillation, will need to review therapy-medication versus repeat ablation         4   Sick sinus syndrome    sinus bradycardia  This will limit use of medications like propafenone, sotalol, amiodarone   If it continues, and patient is symptomatic, patient will need a pacemaker      5   hypertension  Blood pressure is 154/92   Starting patient on lisinopril 20 mg 2 times daily  Follow-up for dry cough   Follow-up kidney function         6  alcohol abuse   Drinking 2 beers every day  Sometimes he can go up to 4-6 beer a day  This is significant drinking for a patient with atrial fibrillation   This can cause worsening of atrial fibrillation  Recommend to significantly decrease, not more than 3 units in a week      7   psoriatic arthritis   On Humira  Patient advised to maintain proper preventive measures to avoid COVID infection      8   sleep apnea  Increasing weight, alcohol use   Occasional history suggestive of snoring  Recommend repeat sleep study           summary of my recommendation for the patient   Start patient on lisinopril 20 bid  Check bmp    Event monitor   Sleep study  Alcohol - not to exceed 3 /wk

## 2021-02-22 NOTE — PROGRESS NOTES
Pt here for OV with Dr Delfino Villarreal XT ordered for 28 days for Atrial fibrillation  Second monitor will be mailed to the pt, he will apply the patch at home  First monitor hook up charged and will charge the interp upon receipt of the results

## 2021-02-22 NOTE — TELEPHONE ENCOUNTER
[facility], this pt needs 2 back to back 14 days Zio XT ambulatory extended holter monitors  Does he need auth? Thanks!

## 2021-03-01 ENCOUNTER — TELEPHONE (OUTPATIENT)
Dept: GASTROENTEROLOGY | Facility: CLINIC | Age: 66
End: 2021-03-01

## 2021-03-03 ENCOUNTER — LAB (OUTPATIENT)
Dept: LAB | Facility: HOSPITAL | Age: 66
End: 2021-03-03
Attending: INTERNAL MEDICINE
Payer: COMMERCIAL

## 2021-03-03 DIAGNOSIS — Z12.11 ENCOUNTER FOR SCREENING COLONOSCOPY: ICD-10-CM

## 2021-03-03 DIAGNOSIS — I48.91 ATRIAL FIBRILLATION, UNSPECIFIED TYPE (HCC): ICD-10-CM

## 2021-03-03 DIAGNOSIS — R00.1 BRADYCARDIA: ICD-10-CM

## 2021-03-03 LAB
ANION GAP SERPL CALCULATED.3IONS-SCNC: 9 MMOL/L (ref 4–13)
BUN SERPL-MCNC: 22 MG/DL (ref 5–25)
CALCIUM SERPL-MCNC: 9.8 MG/DL (ref 8.3–10.1)
CHLORIDE SERPL-SCNC: 104 MMOL/L (ref 100–108)
CO2 SERPL-SCNC: 30 MMOL/L (ref 21–32)
CREAT SERPL-MCNC: 0.93 MG/DL (ref 0.6–1.3)
GFR SERPL CREATININE-BSD FRML MDRD: 87 ML/MIN/1.73SQ M
GLUCOSE P FAST SERPL-MCNC: 94 MG/DL (ref 65–99)
POTASSIUM SERPL-SCNC: 4.2 MMOL/L (ref 3.5–5.3)
SODIUM SERPL-SCNC: 143 MMOL/L (ref 136–145)

## 2021-03-03 PROCEDURE — 36415 COLL VENOUS BLD VENIPUNCTURE: CPT

## 2021-03-03 PROCEDURE — 80048 BASIC METABOLIC PNL TOTAL CA: CPT

## 2021-03-08 ENCOUNTER — TELEPHONE (OUTPATIENT)
Dept: CARDIOLOGY CLINIC | Facility: CLINIC | Age: 66
End: 2021-03-08

## 2021-03-08 NOTE — TELEPHONE ENCOUNTER
Pt called-he is on the last day of his 1st 14 day Zio patch  I registered pt to have the 2nd one sent to the pt's home

## 2021-03-10 DIAGNOSIS — Z23 ENCOUNTER FOR IMMUNIZATION: ICD-10-CM

## 2021-03-18 ENCOUNTER — CLINICAL SUPPORT (OUTPATIENT)
Dept: CARDIOLOGY CLINIC | Facility: CLINIC | Age: 66
End: 2021-03-18
Payer: COMMERCIAL

## 2021-03-18 DIAGNOSIS — R00.1 BRADYCARDIA: ICD-10-CM

## 2021-03-18 DIAGNOSIS — Z12.11 ENCOUNTER FOR SCREENING COLONOSCOPY: ICD-10-CM

## 2021-03-18 DIAGNOSIS — I48.91 ATRIAL FIBRILLATION, UNSPECIFIED TYPE (HCC): ICD-10-CM

## 2021-03-18 PROCEDURE — 93248 EXT ECG>7D<15D REV&INTERPJ: CPT | Performed by: INTERNAL MEDICINE

## 2021-03-22 NOTE — RESULT ENCOUNTER NOTE
Normal Device Function  Brief SVT,  May be PAT due to cycle length variation  Brief NSVT   episodes are brief and  Longest is less than 45 seconds

## 2021-04-08 ENCOUNTER — CLINICAL SUPPORT (OUTPATIENT)
Dept: CARDIOLOGY CLINIC | Facility: CLINIC | Age: 66
End: 2021-04-08
Payer: COMMERCIAL

## 2021-04-08 DIAGNOSIS — I48.91 ATRIAL FIBRILLATION, UNSPECIFIED TYPE (HCC): Primary | ICD-10-CM

## 2021-04-08 PROCEDURE — 93248 EXT ECG>7D<15D REV&INTERPJ: CPT | Performed by: INTERNAL MEDICINE

## 2021-04-13 ENCOUNTER — HOSPITAL ENCOUNTER (OUTPATIENT)
Dept: SLEEP CENTER | Facility: CLINIC | Age: 66
Discharge: HOME | End: 2021-04-13
Payer: COMMERCIAL

## 2021-04-13 DIAGNOSIS — Z12.11 ENCOUNTER FOR SCREENING COLONOSCOPY: ICD-10-CM

## 2021-04-13 DIAGNOSIS — I48.91 ATRIAL FIBRILLATION, UNSPECIFIED TYPE (HCC): ICD-10-CM

## 2021-04-13 DIAGNOSIS — R00.1 BRADYCARDIA: ICD-10-CM

## 2021-04-13 PROCEDURE — 95810 POLYSOM 6/> YRS 4/> PARAM: CPT | Performed by: INTERNAL MEDICINE

## 2021-04-13 PROCEDURE — 95810 POLYSOM 6/> YRS 4/> PARAM: CPT

## 2021-04-14 NOTE — PROGRESS NOTES
Sleep Study Documentation    Pre-Sleep Study       Sleep testing procedure explained to patient:YES    Patient napped prior to study:NO    Caffeine:Dayshift worker after 12PM   Caffeine use:NO    Alcohol:Dayshift workers after 5PM: Alcohol use:NO    Typical day for patient:YES       Study Documentation    Sleep Study Indications: snoring, unrefreshing sleep, excessive daytime sleepiness    Sleep Study: Diagnostic   Snore:Severe  Supplemental O2: no    Minimum SaO2 89  Baseline SaO2 94  EKG abnormalities: no     EEG abnormalities: no    Sleep Study Recorded < 2 hours: N/A    Sleep Study Recorded > 2 hours but incomplete study: N/A    Sleep Study Recorded 6 hours but no sleep obtained: NO    Patient classification: employed       Post-Sleep Study    Medication used at bedtime or during sleep study:NO    Patient reports time it took to fall asleep:less than 20 minutes    Patient reports waking up during study:3 or more times  Patient reports returning to sleep without difficulty  Patient reports sleeping 6 to 8 hours without dreaming  Patient reports sleep during study:typical    Patient rated sleepiness: Not sleepy or tired    PAP treatment:no

## 2021-04-22 ENCOUNTER — TRANSCRIBE ORDERS (OUTPATIENT)
Dept: ADMINISTRATIVE | Facility: HOSPITAL | Age: 66
End: 2021-04-22

## 2021-04-22 ENCOUNTER — HOSPITAL ENCOUNTER (OUTPATIENT)
Dept: RADIOLOGY | Facility: HOSPITAL | Age: 66
Discharge: HOME | End: 2021-04-22
Attending: INTERNAL MEDICINE
Payer: COMMERCIAL

## 2021-04-22 ENCOUNTER — APPOINTMENT (OUTPATIENT)
Dept: LAB | Facility: HOSPITAL | Age: 66
End: 2021-04-22
Attending: INTERNAL MEDICINE
Payer: COMMERCIAL

## 2021-04-22 DIAGNOSIS — L40.50 PSORIATIC ARTHROPATHY (HCC): ICD-10-CM

## 2021-04-22 DIAGNOSIS — L40.50 PSORIATIC ARTHROPATHY (HCC): Primary | ICD-10-CM

## 2021-04-22 LAB
ALBUMIN SERPL BCP-MCNC: 4.1 G/DL (ref 3.5–5)
ALP SERPL-CCNC: 72 U/L (ref 46–116)
ALT SERPL W P-5'-P-CCNC: 54 U/L (ref 12–78)
AST SERPL W P-5'-P-CCNC: 29 U/L (ref 5–45)
BASOPHILS # BLD AUTO: 0.04 THOUSANDS/ΜL (ref 0–0.1)
BASOPHILS NFR BLD AUTO: 1 % (ref 0–1)
BILIRUB DIRECT SERPL-MCNC: 0.17 MG/DL (ref 0–0.2)
BILIRUB SERPL-MCNC: 0.63 MG/DL (ref 0.2–1)
CREAT SERPL-MCNC: 0.95 MG/DL (ref 0.6–1.3)
CRP SERPL QL: <3 MG/L
EOSINOPHIL # BLD AUTO: 0.25 THOUSAND/ΜL (ref 0–0.61)
EOSINOPHIL NFR BLD AUTO: 6 % (ref 0–6)
ERYTHROCYTE [DISTWIDTH] IN BLOOD BY AUTOMATED COUNT: 13.1 % (ref 11.6–15.1)
ERYTHROCYTE [SEDIMENTATION RATE] IN BLOOD: 6 MM/HOUR (ref 0–19)
GFR SERPL CREATININE-BSD FRML MDRD: 85 ML/MIN/1.73SQ M
HCT VFR BLD AUTO: 45.2 % (ref 36.5–49.3)
HGB BLD-MCNC: 15.2 G/DL (ref 12–17)
IMM GRANULOCYTES # BLD AUTO: 0.01 THOUSAND/UL (ref 0–0.2)
IMM GRANULOCYTES NFR BLD AUTO: 0 % (ref 0–2)
LYMPHOCYTES # BLD AUTO: 1.66 THOUSANDS/ΜL (ref 0.6–4.47)
LYMPHOCYTES NFR BLD AUTO: 37 % (ref 14–44)
MCH RBC QN AUTO: 31.9 PG (ref 26.8–34.3)
MCHC RBC AUTO-ENTMCNC: 33.6 G/DL (ref 31.4–37.4)
MCV RBC AUTO: 95 FL (ref 82–98)
MONOCYTES # BLD AUTO: 0.5 THOUSAND/ΜL (ref 0.17–1.22)
MONOCYTES NFR BLD AUTO: 11 % (ref 4–12)
NEUTROPHILS # BLD AUTO: 2.06 THOUSANDS/ΜL (ref 1.85–7.62)
NEUTS SEG NFR BLD AUTO: 45 % (ref 43–75)
NRBC BLD AUTO-RTO: 0 /100 WBCS
PLATELET # BLD AUTO: 165 THOUSANDS/UL (ref 149–390)
PMV BLD AUTO: 9.7 FL (ref 8.9–12.7)
PROT SERPL-MCNC: 7.9 G/DL (ref 6.4–8.2)
RBC # BLD AUTO: 4.77 MILLION/UL (ref 3.88–5.62)
WBC # BLD AUTO: 4.52 THOUSAND/UL (ref 4.31–10.16)

## 2021-04-22 PROCEDURE — 85652 RBC SED RATE AUTOMATED: CPT

## 2021-04-22 PROCEDURE — 72110 X-RAY EXAM L-2 SPINE 4/>VWS: CPT

## 2021-04-22 PROCEDURE — 86480 TB TEST CELL IMMUN MEASURE: CPT

## 2021-04-22 PROCEDURE — 80076 HEPATIC FUNCTION PANEL: CPT

## 2021-04-22 PROCEDURE — 85025 COMPLETE CBC W/AUTO DIFF WBC: CPT

## 2021-04-22 PROCEDURE — 82565 ASSAY OF CREATININE: CPT

## 2021-04-22 PROCEDURE — 36415 COLL VENOUS BLD VENIPUNCTURE: CPT

## 2021-04-22 PROCEDURE — 86140 C-REACTIVE PROTEIN: CPT

## 2021-04-22 PROCEDURE — 71046 X-RAY EXAM CHEST 2 VIEWS: CPT

## 2021-04-26 LAB
GAMMA INTERFERON BACKGROUND BLD IA-ACNC: 0.03 IU/ML
M TB IFN-G BLD-IMP: NEGATIVE
M TB IFN-G CD4+ BCKGRND COR BLD-ACNC: 0 IU/ML
M TB IFN-G CD4+ BCKGRND COR BLD-ACNC: 0 IU/ML
MITOGEN IGNF BCKGRD COR BLD-ACNC: >10 IU/ML

## 2021-04-28 ENCOUNTER — TELEPHONE (OUTPATIENT)
Dept: SLEEP CENTER | Facility: CLINIC | Age: 66
End: 2021-04-28

## 2021-04-28 NOTE — TELEPHONE ENCOUNTER
Left message for patient to call office for sleep study results  Mild CATY        Needs to schedule consult with sleep specialist

## 2021-04-29 NOTE — TELEPHONE ENCOUNTER
Spoke with the patient advised sleep study shows mild CATY  Patient wants to have consult with Dr Shashi Harry in [place] office   Phone number provided to the patient he will call for appointment

## 2021-04-30 ENCOUNTER — TELEPHONE (OUTPATIENT)
Dept: PULMONOLOGY | Facility: CLINIC | Age: 66
End: 2021-04-30

## 2021-05-07 ENCOUNTER — EVALUATION (OUTPATIENT)
Dept: PHYSICAL THERAPY | Facility: CLINIC | Age: 66
End: 2021-05-07
Payer: COMMERCIAL

## 2021-05-07 DIAGNOSIS — G89.29 CHRONIC BILATERAL LOW BACK PAIN, UNSPECIFIED WHETHER SCIATICA PRESENT: Primary | ICD-10-CM

## 2021-05-07 DIAGNOSIS — M54.50 CHRONIC BILATERAL LOW BACK PAIN, UNSPECIFIED WHETHER SCIATICA PRESENT: Primary | ICD-10-CM

## 2021-05-07 PROCEDURE — 97110 THERAPEUTIC EXERCISES: CPT | Performed by: PHYSICAL THERAPIST

## 2021-05-07 PROCEDURE — 97161 PT EVAL LOW COMPLEX 20 MIN: CPT | Performed by: PHYSICAL THERAPIST

## 2021-05-07 NOTE — PROGRESS NOTES
PT Evaluation     Today's date: 2021  Patient name: Beto Valderrama  : 1955  MRN: [id number]  Referring provider: Ana Rinaldi MD  Dx:   Encounter Diagnosis     ICD-10-CM    1  Chronic bilateral low back pain, unspecified whether sciatica present  M54 5     G89 29                   Assessment  Assessment details: Beto Valderrama is a 63 y o  male who presents with pain, decreased trunk strength, and decreased ROM  Due to these impairments, patient has difficulty performing a/iadls, recreational activities and work-related activities  Patient demonstrates chronic low back pain without a directional preference with a stabilization preference of the lumbar spine  Patient would benefit from skilled physical therapy to address their aforementioned impairments, improve their level of function and to improve their overall quality of life  Patient states he will only be able to attend PT one time per week secondary to high insurance co-payment  Impairments: abnormal or restricted ROM, impaired physical strength and pain with function  Understanding of Dx/Px/POC: good   Prognosis: good    Goals  Short term goals - to be achieved in 4 weeks:    Decrease pain 20-50%  Increase trunk strength by 1/2 grade  Improve L/S ROM by 25%  Long term goals - to be achieved by discharge:    Lifting is improved to maximal level of function  Performance in related work activities is improved to maximal level of function  IADL performance in related activities is improved to maximal level of function  Sitting tolerance is improved to maximal level of function  Standing tolerance is improved to maximal level of function      Plan  Planned therapy interventions: manual therapy, neuromuscular re-education, patient education, postural training, strengthening, stretching, therapeutic activities and therapeutic exercise  Frequency: 1x week  Duration in visits: 6  Duration in weeks: 6  Plan of Care beginning date: 2021  Plan of Care expiration date: 2021        Subjective Evaluation    History of Present Illness  Mechanism of injury: Patient refers to PT with c/o pain in his L/S and infrequent intermittent radicular symptoms in his LE to the level of his posterior thigh  Patient states chronic pain for over twenty years which has increased in the past six months of insidious onset  Patient received X-rays of his L/S on 21 which revealed degenerative disc disease seen at L4-5, L3-4 Spondylotic changes at multiple levels  Patient states he has not experienced radicular symptoms recently  Patient states no recent changes in his bowel or bladder habits  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; 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 and intermittent lifting activities  Patient has not received recent PT for his L/S  Pain  Current pain ratin  At best pain ratin  At worst pain ratin  Quality: dull ache  Aggravating factors: sitting and standing    Patient Goals  Patient goals for therapy: decreased pain          Objective     Neurological Testing     Sensation     Lumbar   Left   Intact: light touch    Right   Intact: light touch    Active Range of Motion     Lumbar   Flexion:  Restriction level: minimal  Extension:  Restriction level: moderate  Left lateral flexion:  Restriction level: minimal  Right lateral flexion:  Restriction level: minimal    Strength/Myotome Testing     Left Hip   Planes of Motion   Flexion: 5    Right Hip   Planes of Motion   Flexion: 5    Left Knee   Flexion: 5  Extension: 5    Right Knee   Flexion: 5  Extension: 5    Left Ankle/Foot   Dorsiflexion: 5  Plantar flexion: 5    Right Ankle/Foot   Dorsiflexion: 5  Plantar flexion: 5    Additional Strength Details  Trunk muscles:  Flexors: 4/5  Extensors: 4-/5    Tests     Lumbar   Positive prone instability        General Comments:      Lumbar Comments  Repeated flexion in lying: No effect Repeated extension in lying: No effect             Precautions: Psoriatic arthritis, A-fib, HTN      Manuals 5/7                                                                Neuro Re-Ed             Supine TA activation HEP            Supine TA with alt UE/LE             Bridges HEP            TB Multifidus presses             Prone hip ext with TA activ  HEP                                      Ther Ex             Bike or TM             TB rows             TB lat pull downs             Mini squats with TA activ                                                                   Ther Activity             Lifting mechanics instruction                          Gait Training                                       Modalities                                           HEP access code: [email or web address]

## 2021-05-07 NOTE — LETTER
May 7, 2021    Cornelia Ortiz, [phone]    Patient: Aldo Tubbs   YOB: 1955   Date of Visit: 2021     Encounter Diagnosis     ICD-10-CM    1  Chronic bilateral low back pain, unspecified whether sciatica present  M54 5     G89 29        Dear Dr Cleary :    Thank you for your recent referral of Aldo Tubbs  Please review the attached evaluation summary from Abhishek's recent visit  Please verify that you agree with the plan of care by signing the attached order  If you have any questions or concerns, please do not hesitate to call  I sincerely appreciate the opportunity to share in the care of one of your patients and hope to have another opportunity to work with you in the near future  Sincerely,    Zana Rich, PT      Referring Provider:      I certify that I have read the below Plan of Care and certify the need for these services furnished under this plan of treatment while under my care  Cornelia Ortiz MD  [address] [place] [address]  Via Fax: [phone]          PT Evaluation     Today's date: 2021  Patient name: Aldo Tubbs  : 1955  MRN: [id number]  Referring provider: Maribel Kauffman MD  Dx:   Encounter Diagnosis     ICD-10-CM    1  Chronic bilateral low back pain, unspecified whether sciatica present  M54 5     G89 29                   Assessment  Assessment details: Aldo Tubbs is a 63 y o  male who presents with pain, decreased trunk strength, and decreased ROM  Due to these impairments, patient has difficulty performing a/iadls, recreational activities and work-related activities  Patient demonstrates chronic low back pain without a directional preference with a stabilization preference of the lumbar spine    Patient would benefit from skilled physical therapy to address their aforementioned impairments, improve their level of function and to improve their overall quality of life  Patient states he will only be able to attend PT one time per week secondary to high insurance co-payment  Impairments: abnormal or restricted ROM, impaired physical strength and pain with function  Understanding of Dx/Px/POC: good   Prognosis: good    Goals  Short term goals - to be achieved in 4 weeks:    Decrease pain 20-50%  Increase trunk strength by 1/2 grade  Improve L/S ROM by 25%  Long term goals - to be achieved by discharge:    Lifting is improved to maximal level of function  Performance in related work activities is improved to maximal level of function  IADL performance in related activities is improved to maximal level of function  Sitting tolerance is improved to maximal level of function  Standing tolerance is improved to maximal level of function  Plan  Planned therapy interventions: manual therapy, neuromuscular re-education, patient education, postural training, strengthening, stretching, therapeutic activities and therapeutic exercise  Frequency: 1x week  Duration in visits: 6  Duration in weeks: 6  Plan of Care beginning date: 5/7/2021  Plan of Care expiration date: 6/18/2021        Subjective Evaluation    History of Present Illness  Mechanism of injury: Patient refers to PT with c/o pain in his L/S and infrequent intermittent radicular symptoms in his LE to the level of his posterior thigh  Patient states chronic pain for over twenty years which has increased in the past six months of insidious onset  Patient received X-rays of his L/S on 4/22/21 which revealed degenerative disc disease seen at L4-5, L3-4 Spondylotic changes at multiple levels  Patient states he has not experienced radicular symptoms recently  Patient states no recent changes in his bowel or bladder habits  Patient works full time as a chimney sweep; 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 and intermittent lifting activities    Patient has not received recent PT for his L/S  Pain  Current pain ratin  At best pain ratin  At worst pain ratin  Quality: dull ache  Aggravating factors: sitting and standing    Patient Goals  Patient goals for therapy: decreased pain          Objective     Neurological Testing     Sensation     Lumbar   Left   Intact: light touch    Right   Intact: light touch    Active Range of Motion     Lumbar   Flexion:  Restriction level: minimal  Extension:  Restriction level: moderate  Left lateral flexion:  Restriction level: minimal  Right lateral flexion:  Restriction level: minimal    Strength/Myotome Testing     Left Hip   Planes of Motion   Flexion: 5    Right Hip   Planes of Motion   Flexion: 5    Left Knee   Flexion: 5  Extension: 5    Right Knee   Flexion: 5  Extension: 5    Left Ankle/Foot   Dorsiflexion: 5  Plantar flexion: 5    Right Ankle/Foot   Dorsiflexion: 5  Plantar flexion: 5    Additional Strength Details  Trunk muscles:  Flexors: 4/5  Extensors: 4-/5    Tests     Lumbar   Positive prone instability   General Comments:      Lumbar Comments  Repeated flexion in lying: No effect   Repeated extension in lying: No effect             Precautions: Psoriatic arthritis, A-fib, HTN      Manuals                                                                 Neuro Re-Ed             Supine TA activation HEP            Supine TA with alt UE/LE             Bridges HEP            TB Multifidus presses             Prone hip ext with TA activ  HEP                                      Ther Ex             Bike or TM             TB rows             TB lat pull downs             Mini squats with TA activ                                                                   Ther Activity             Lifting mechanics instruction                          Gait Training                                       Modalities                                           HEP access code: [email or web address]

## 2021-05-14 ENCOUNTER — OFFICE VISIT (OUTPATIENT)
Dept: PHYSICAL THERAPY | Facility: CLINIC | Age: 66
End: 2021-05-14
Payer: COMMERCIAL

## 2021-05-14 DIAGNOSIS — G89.29 CHRONIC BILATERAL LOW BACK PAIN, UNSPECIFIED WHETHER SCIATICA PRESENT: Primary | ICD-10-CM

## 2021-05-14 DIAGNOSIS — M54.50 CHRONIC BILATERAL LOW BACK PAIN, UNSPECIFIED WHETHER SCIATICA PRESENT: Primary | ICD-10-CM

## 2021-05-14 PROCEDURE — 97112 NEUROMUSCULAR REEDUCATION: CPT

## 2021-05-14 PROCEDURE — 97110 THERAPEUTIC EXERCISES: CPT

## 2021-05-14 NOTE — PROGRESS NOTES
Daily Note     Today's date: 2021  Patient name: Luigi Cruz  : 1955  MRN: [id number]  Referring provider: Hillary Jain MD  Dx:   Encounter Diagnosis     ICD-10-CM    1  Chronic bilateral low back pain, unspecified whether sciatica present  M54 5     G89 29        Start Time: 0815  Stop Time: 09  Total time in clinic (min): 48 minutes    Subjective: no p! Reported this morning  Pt noted he did his HEP this morning prior to arriving to PT  Pt reported  That he would like to come to therapy for a time or 2 and then perform exercises at home while going to a chiropractor  Objective: See treatment diary below      Assessment: Initiated  treatment plan today, w/ progressions  VC for while performing TA to continue to breathe and not hold his breathe  Overall patient noted minimal to no challenge with progressions  Tolerated treatment fair  Patient exhibited good technique with therapeutic exercises and would benefit from continued PT  S/p treatment session, no significant changes noted  Educated in DOMS and importance for HEP  Received a BTB for TB Hep  Plan: Continue per plan of care  Precautions: Psoriatic arthritis, A-fib, HTN      Manuals                                                                Neuro Re-Ed             Supine TA activation HEP HEP reviewed  Supine TA wit March   3x 10            Supine TA with alt UE/LE  2x 10 UE only            Bridges HEP 2x 10 3"            TB Multifidus presses  NV           Prone hip ext with TA activ  HEP reviewed 2x 10            Prone UE raises with TA activation  2x 10                         Ther Ex             Bike or TM  Rec  Bike 10 min            TB rows  2x 10 GTB  BTB this visit  TB lat pull downs  2x 10 GTB  BTB  This visit           Mini squats with TA activ                                                                   Ther Activity             Lifting mechanics instruction  NV 30# trial                         Gait Training                                       Modalities

## 2021-05-17 ENCOUNTER — TRANSCRIBE ORDERS (OUTPATIENT)
Dept: PHYSICAL THERAPY | Facility: CLINIC | Age: 66
End: 2021-05-17

## 2021-05-17 DIAGNOSIS — G89.29 CHRONIC BILATERAL LOW BACK PAIN, UNSPECIFIED WHETHER SCIATICA PRESENT: Primary | ICD-10-CM

## 2021-05-17 DIAGNOSIS — M54.50 CHRONIC BILATERAL LOW BACK PAIN, UNSPECIFIED WHETHER SCIATICA PRESENT: Primary | ICD-10-CM

## 2021-05-19 ENCOUNTER — CONSULT (OUTPATIENT)
Dept: PULMONOLOGY | Facility: CLINIC | Age: 66
End: 2021-05-19
Payer: COMMERCIAL

## 2021-05-19 VITALS
DIASTOLIC BLOOD PRESSURE: 84 MMHG | SYSTOLIC BLOOD PRESSURE: 124 MMHG | OXYGEN SATURATION: 91 % | WEIGHT: 194 LBS | TEMPERATURE: 97.2 F | HEART RATE: 61 BPM | HEIGHT: 71 IN | BODY MASS INDEX: 27.16 KG/M2

## 2021-05-19 DIAGNOSIS — I48.91 ATRIAL FIBRILLATION, UNSPECIFIED TYPE (HCC): ICD-10-CM

## 2021-05-19 DIAGNOSIS — E66.3 OVERWEIGHT (BMI 25.0-29.9): ICD-10-CM

## 2021-05-19 DIAGNOSIS — G47.33 OSA (OBSTRUCTIVE SLEEP APNEA): Primary | ICD-10-CM

## 2021-05-19 PROCEDURE — 99204 OFFICE O/P NEW MOD 45 MIN: CPT | Performed by: INTERNAL MEDICINE

## 2021-05-19 NOTE — PROGRESS NOTES
Assessment/Plan:     Diagnoses and all orders for this visit:    CATY (obstructive sleep apnea)  -     Mandible Advancing Appliance    Atrial fibrillation, unspecified type ([address] )    Overweight (BMI 25 0-29  9)       Reviewed his recent diagnostic sleep study in detail, with good sleep efficiency at 87 8 patient was seen in the REM stage during the study time, with an AHI of 10 7  Etiology pathogenesis of obstructive sleep apnea discussed in detail  Consequences of untreated sleep apnea discussed   Given his underlying history of atrial fibrillation discussed the need for treatment for the mild obstructive sleep apnea and various treatment options discussed with mandible advancing appliance and PAP titration    he does not want to go in for a PAP titration discussed with him regarding mandible advancing appliance and getting a repeat diagnostic study with the device understands and verbalizes he states he does have an orthodontist with when he wants to follow-up I have given him the prescription for the mandible advancing appliance and he will follow-up with his orthodontist if he cannot get the device there I have given him the on the name of the other orthodontist in the starts work area where he can get the mandible advancing appliance  And also discussed after the titration to the max with the device he will give our office a call so that we can order the diagnostic study with the device  To make sure that his apneas are all resolved  Recommend weight loss  Will see him back in 3 months or p r n  earlier as needed  Return in about 3 months (around 8/19/2021)  All questions are answered to the patient's satisfaction and understanding  He verbalizes understanding  He is encouraged to call with any further questions or concerns  Portions of the record may have been created with voice recognition software    Occasional wrong word or "sound a like" substitutions may have occurred due to the inherent limitations of voice recognition software  Read the chart carefully and recognize, using context, where substitutions have occurred  a    Electronically Signed by Mine Galvan MD    ______________________________________________________________________    Chief Complaint:   Chief Complaint   Patient presents with    Sleep Apnea        Patient ID: Magali Kate is a 63 y o  y o  male has a past medical history of A-fib ([facility] [address] ), Arthritis, BPH (benign prostatic hyperplasia), Fatty liver, Fracture, foot, High cholesterol, Hyperlipidemia, Irregular heart beat, Non-ischemic cardiomyopathy ([facility] [address] ), Osteoarthritis, Psoriasis, Psoriatic arthritis ([facility] [address] ), and Pulmonary HTN ([facility] [address] )  5/19/2021  Patient presents today for initial visit  Magali Kate is a very pleasant 68-year-old gentleman with history of hypertension atrial fibrillation following up with Cardiology, was referred in for a sleep study and he is here for evaluation  He states he is normally very physically active, and he does a lot of home projects, he does not have any shortness of breath, his daily sleep schedule is he goes to bed at around 9:30 p m , falls asleep right away and is out of bed at 5:30 a m  has 1-2 nocturnal awakenings for nocturia, he states he does have mild snoring as per his wife, only on a few days no witnessed apneic spells or choking or gasping for air  When he is out of the bed in the morning he states his feels well refreshed does not take a nap on a daily basis, states occasionally during the winter time when he has nothing much to do outside then he does take a nap in the afternoon  No history of any early morning headaches no symptoms related to restless legs  Occupational/Exposure history:  Currently does home projects  Pets/Enviroment: none  Travel history: none  Review of Systems   Constitutional: Negative  HENT: Negative  Eyes: Negative  Respiratory: Negative           History of mild snoring he states, no witnessed apneic spells no history of choking or gasping for air  Cardiovascular: Negative  Gastrointestinal: Negative  Endocrine: Negative  Genitourinary: Negative  Musculoskeletal: Negative  Allergic/Immunologic: Negative  Neurological: Negative  Hematological: Negative  Psychiatric/Behavioral: Negative  Social history: He reports that he has quit smoking  He has never used smokeless tobacco  He reports current alcohol use  He reports that he does not use drugs  Past surgical history:   Past Surgical History:   Procedure Laterality Date    TRANSURETHRAL RESECTION OF PROSTATE      last assessed 7/21/15     Family history:   Family History   Problem Relation Age of Onset    Diabetes Mother     Hypertension Father     Diabetes Family        Immunization History   Administered Date(s) Administered    Influenza Injectable, MDCK, Preservative Free, Quadrivalent, 0 5 mL 09/25/2019    Pneumococcal Conjugate 13-Valent 01/06/2020    SARS-CoV-2 / COVID-19 mRNA IM (Moderna) 03/31/2021, 04/28/2021    Tdap 07/21/2015     Current Outpatient Medications   Medication Sig Dispense Refill    adalimumab (HUMIRA) 40 mg/0 8 mL PSKT Inject 40 mg under the skin every 14 (fourteen) days       Ascorbic Acid (Vitamin C) 500 MG CHEW Chew 500 mg daily      aspirin 81 mg chewable tablet Chew 1 tablet daily      lisinopril (ZESTRIL) 20 mg tablet Take 1 tablet (20 mg total) by mouth 2 (two) times a day 60 tablet 3    magnesium oxide (MAG-OX) 400 mg Take 400 mg by mouth daily      naproxen (NAPROSYN) 500 mg tablet       tadalafil (CIALIS) 5 MG tablet Take 5 mg by mouth daily as needed for erectile dysfunction   ergocalciferol (VITAMIN D2) 50,000 units Take 1 capsule (50,000 Units total) by mouth 2 (two) times a week (Patient taking differently: Take 10,000 Units by mouth daily Pt is taking 10,000 IU daily) 34 capsule 0     No current facility-administered medications for this visit        Allergies: Penicillins    Objective:  Vitals:    05/19/21 1538   BP: 124/84   Pulse: 61   Temp: (!) 97 2 °F (36 2 °C)   SpO2: 91%   Weight: 88 kg (194 lb)   Height: 5' 11" (1 803 m)   Oxygen Therapy  SpO2: 91 %    Wt Readings from Last 3 Encounters:   05/19/21 88 kg (194 lb)   02/22/21 94 8 kg (209 lb)   01/06/20 96 2 kg (212 lb)     Body mass index is 27 06 kg/m²  Physical Exam  Vitals signs and nursing note reviewed  Constitutional:       Appearance: He is well-developed  HENT:      Head: Normocephalic and atraumatic  Eyes:      Conjunctiva/sclera: Conjunctivae normal       Pupils: Pupils are equal, round, and reactive to light  Neck:      Musculoskeletal: Normal range of motion and neck supple  Thyroid: No thyromegaly  Vascular: No JVD  Cardiovascular:      Rate and Rhythm: Normal rate and regular rhythm  Heart sounds: Normal heart sounds  No murmur  No friction rub  No gallop  Pulmonary:      Effort: Pulmonary effort is normal  No respiratory distress  Breath sounds: Normal breath sounds  No wheezing or rales  Chest:      Chest wall: No tenderness  Musculoskeletal: Normal range of motion  General: No tenderness or deformity  Lymphadenopathy:      Cervical: No cervical adenopathy  Skin:     General: Skin is warm and dry  Neurological:      Mental Status: He is alert and oriented to person, place, and time  Diagnostics:  I have personally reviewed pertinent reports  ESS: Total score: 7  Xr Chest Pa & Lateral    Result Date: 4/25/2021  Narrative: CHEST INDICATION:   L40 50: Arthropathic psoriasis, unspecified  Abnormal lung sounds  COMPARISON:  Chest radiograph from 7/11/2018 and chest CT from 1/6/2017  EXAM PERFORMED/VIEWS:  XR CHEST PA & LATERAL  DUAL ENERGY SUBTRACTION  FINDINGS: Cardiomediastinal silhouette normal  Lungs clear  No effusion or pneumothorax  Mild degenerative disease in the spine        Impression: No acute cardiopulmonary disease  Workstation performed: [id number]     Xr Spine Lumbar Minimum 4 Views Non Injury    Result Date: 4/28/2021  Narrative: LUMBAR SPINE INDICATION:   L40 50: Arthropathic psoriasis, unspecified  COMPARISON:  None VIEWS:  XR SPINE LUMBAR MINIMUM 4 VIEWS NON INJURY Images: 5 FINDINGS: There are 5 non rib bearing lumbar vertebral bodies  There is no evidence of acute fracture or destructive osseous lesion  Alignment is unremarkable  Traction spurring seen at L4-5, L3-4 Disc space narrowing seen at L3-4, L4-5 Facet degenerative changes seen at L4-5 and L5/S1 No acute compression collapse of the vertebra seen Claw osteophytes are noted at at L1-2 and T12/L1 The pedicles appear intact  Soft tissues are unremarkable       Impression: Degenerative disc disease seen at L4-5, L3-4 Spondylotic changes at multiple levels No acute compression collapse of the vertebra Workstation performed: [id number]

## 2021-05-21 ENCOUNTER — OFFICE VISIT (OUTPATIENT)
Dept: PHYSICAL THERAPY | Facility: CLINIC | Age: 66
End: 2021-05-21
Payer: COMMERCIAL

## 2021-05-21 DIAGNOSIS — G89.29 CHRONIC BILATERAL LOW BACK PAIN, UNSPECIFIED WHETHER SCIATICA PRESENT: Primary | ICD-10-CM

## 2021-05-21 DIAGNOSIS — M54.50 CHRONIC BILATERAL LOW BACK PAIN, UNSPECIFIED WHETHER SCIATICA PRESENT: Primary | ICD-10-CM

## 2021-05-21 PROCEDURE — 97110 THERAPEUTIC EXERCISES: CPT | Performed by: PHYSICAL THERAPIST

## 2021-05-21 PROCEDURE — 97112 NEUROMUSCULAR REEDUCATION: CPT | Performed by: PHYSICAL THERAPIST

## 2021-05-21 NOTE — PROGRESS NOTES
Daily Note     Today's date: 2021  Patient name: Beto Valderrama  : 1955  MRN: [id number]  Referring provider: Ana Rinaldi MD  Dx:   Encounter Diagnosis     ICD-10-CM    1  Chronic bilateral low back pain, unspecified whether sciatica present  M54 5     G89 29                   Subjective: ***      Objective: See treatment diary below      Assessment: Tolerated treatment well  Patient demonstrated fatigue post treatment and would benefit from continued PT  Plan: Continue per plan of care  Progress treatment as tolerated  Precautions: Psoriatic arthritis, A-fib, HTN      Manuals                                                               Neuro Re-Ed             Supine TA activation HEP HEP reviewed  Supine TA wit March   3x 10            Supine TA with alt UE/LE  2x 10 UE only            Bridges HEP 2x 10 3"            TB Multifidus presses  NV           Prone hip ext with TA activ  HEP reviewed 2x 10            Prone UE raises with TA activation  2x 10                         Ther Ex             Bike or TM  Rec  Bike 10 min            TB rows  2x 10 GTB  BTB this visit  TB lat pull downs  2x 10 GTB  BTB  This visit           Mini squats with TA activ                                                                   Ther Activity             Lifting mechanics instruction  NV  30# trial                         Gait Training                                       Modalities

## 2021-05-21 NOTE — PROGRESS NOTES
PT Discharge    Today's date: 2021  Patient name: Hanane Williamson  : 1955  MRN: [id number]  Referring provider: Janina Garcia MD  Dx:   Encounter Diagnosis     ICD-10-CM    1  Chronic bilateral low back pain, unspecified whether sciatica present  M54 5     G89 29         Start Time: 815  Stop Time: 915  Total time in clinic (min): 60 minutes    Assessment  Assessment details: Since beginning physical therapy, Nunu Pinto has attended a total number of 3 visits and has maintained excellent compliance with established POC  Patient is independent with comprehensive HEP and has demonstrated and verbalized independence and understanding of current treatment program  Patient is requesting D/C at this time secondary to high copay  Patient has been instructed to contact PT if he begins to notice a decline in function or has any questions or concerns  Patient will be discharged at this time  Patient is in agreement with plan  Understanding of Dx/Px/POC: excellent   Prognosis: good    Goals  Short term goals - to be achieved in 4 weeks: GOALS NOT MET   Decrease pain 20-50%  Increase trunk strength by 1/2 grade  Improve L/S ROM by 25%  Long term goals - to be achieved by discharge: GOALS NOT MET   Lifting is improved to maximal level of function  Performance in related work activities is improved to maximal level of function  IADL performance in related activities is improved to maximal level of function  Sitting tolerance is improved to maximal level of function  Standing tolerance is improved to maximal level of function        Plan  Planned therapy interventions: home exercise program  Duration in visits: 1  Plan of Care beginning date: 2021  Plan of Care expiration date: 2021  Treatment plan discussed with: patient        Subjective Evaluation    History of Present Illness  Mechanism of injury: Patient reports that his low back pain has been slightly more manageable, but it remains constant/nagging  Patient states that he did notice that his pain was more sore after not performing his exercises for several consecutive days  Patient has a high copay and would like to discharge to [facility] at this time  Patient will consider future chiropractor care  Pain  Current pain ratin  At best pain ratin  At worst pain ratin  Quality: dull ache  Aggravating factors: sitting and standing    Treatments  Current treatment: physical therapy  Patient Goals  Patient goals for therapy: decreased pain          Objective  No significant changes to objective status at this time, please refer to IE on 21 for measurements  Flowsheet Rows      Most Recent Value   PT/OT G-Codes   Current Score  59   Projected Score  67             Precautions: Psoriatic arthritis, A-fib, HTN      Manuals                                                               Neuro Re-Ed             Supine TA activation HEP HEP reviewed  Reviewed          Supine TA wit March   3x 10  20x          Supine TA with alt UE/LE  2x 10 UE only  2x10 UE/LE          Bridges HEP 2x 10 3"  2x10 3" RTB          TB Multifidus presses  NV           Prone hip ext with TA activ  HEP reviewed 2x 10  Reviewed          Prone UE raises with TA activation  2x 10  Reviewed          Prone alt UE/LE   10x5"          Ther Ex             Bike or TM  Rec  Bike 10 min  Upright bike 10 min          TB rows  2x 10 GTB  2x10 BTB          TB lat pull downs  2x 10 GTB  2x10 BTB          Mini squats with TA activ  Ther Activity             Lifting mechanics instruction  NV  30# trial                         Gait Training                                       Modalities                                       Access Code: [email or web address]  URL: [email or web address]  Date: 2021  Prepared by: Jeremy Aguayo    Exercises  Prone Alternating Arm and Leg Lifts - 1 x daily - 4 x weekly - 2 sets - 10 reps - 3 seconds hold  Prone Hip Extension - 1 x daily - 4 x weekly - 2 sets - 10 reps - 3 seconds hold  Bird Dog - 1 x daily - 4 x weekly - 2 sets - 10 reps - 3 seconds hold  Supine Dead Bug with Leg Extension - 1 x daily - 4 x weekly - 2 sets - 10 reps - 3 seconds hold  Supine 90/90 Alternating Toe Touch - 1 x daily - 4 x weekly - 2 sets - 10 reps - 3 seconds hold  Supine Bridge - 1 x daily - 4 x weekly - 3 sets - 10 reps - 5 seconds hold  Supine Bridge with Resistance Band - 1 x daily - 4 x weekly - 3 sets - 10 reps - 5 seconds hold  Standing Shoulder Row with Anchored Resistance - 1 x daily - 4 x weekly - 3 sets - 10 reps - 3 seconds hold  Shoulder extension with resistance - Neutral - 1 x daily - 4 x weekly - 3 sets - 10 reps - 3 seconds hold  Squatting Anti-Rotation Press - 1 x daily - 4 x weekly - 2 sets - 10 reps - 10 seconds hold

## 2021-05-28 ENCOUNTER — APPOINTMENT (OUTPATIENT)
Dept: PHYSICAL THERAPY | Facility: CLINIC | Age: 66
End: 2021-05-28
Payer: COMMERCIAL

## 2021-06-07 ENCOUNTER — TELEPHONE (OUTPATIENT)
Dept: CARDIOLOGY CLINIC | Facility: CLINIC | Age: 66
End: 2021-06-07

## 2021-06-07 NOTE — TELEPHONE ENCOUNTER
Patient of Camelia Peres had visit on 2/22/21  Patient called today stating he has been having brain fog, memory loss, and does feel since being on the lisinopril  Patient would like to get off of this medication if possible   Please advise, thank you

## 2021-06-10 ENCOUNTER — APPOINTMENT (OUTPATIENT)
Dept: LAB | Facility: CLINIC | Age: 66
End: 2021-06-10
Payer: COMMERCIAL

## 2021-06-10 ENCOUNTER — OFFICE VISIT (OUTPATIENT)
Dept: FAMILY MEDICINE CLINIC | Facility: CLINIC | Age: 66
End: 2021-06-10
Payer: COMMERCIAL

## 2021-06-10 VITALS
DIASTOLIC BLOOD PRESSURE: 84 MMHG | TEMPERATURE: 96.7 F | BODY MASS INDEX: 27.66 KG/M2 | HEIGHT: 71 IN | HEART RATE: 53 BPM | SYSTOLIC BLOOD PRESSURE: 140 MMHG | OXYGEN SATURATION: 98 % | WEIGHT: 197.6 LBS

## 2021-06-10 DIAGNOSIS — R41.3 MEMORY DIFFICULTY: Primary | ICD-10-CM

## 2021-06-10 DIAGNOSIS — F41.9 ANXIETY: ICD-10-CM

## 2021-06-10 DIAGNOSIS — R41.89 BRAIN FOG: ICD-10-CM

## 2021-06-10 DIAGNOSIS — R41.3 MEMORY DIFFICULTY: ICD-10-CM

## 2021-06-10 DIAGNOSIS — Z72.89 ALCOHOL USE: ICD-10-CM

## 2021-06-10 DIAGNOSIS — R00.1 BRADYCARDIA: ICD-10-CM

## 2021-06-10 LAB
25(OH)D3 SERPL-MCNC: 42.3 NG/ML (ref 30–100)
ALBUMIN SERPL BCP-MCNC: 4.2 G/DL (ref 3.5–5)
ALP SERPL-CCNC: 64 U/L (ref 46–116)
ALT SERPL W P-5'-P-CCNC: 55 U/L (ref 12–78)
ANION GAP SERPL CALCULATED.3IONS-SCNC: 4 MMOL/L (ref 4–13)
AST SERPL W P-5'-P-CCNC: 30 U/L (ref 5–45)
BASOPHILS # BLD AUTO: 0.04 THOUSANDS/ΜL (ref 0–0.1)
BASOPHILS NFR BLD AUTO: 1 % (ref 0–1)
BILIRUB SERPL-MCNC: 0.61 MG/DL (ref 0.2–1)
BUN SERPL-MCNC: 23 MG/DL (ref 5–25)
CALCIUM SERPL-MCNC: 9.7 MG/DL (ref 8.3–10.1)
CHLORIDE SERPL-SCNC: 106 MMOL/L (ref 100–108)
CO2 SERPL-SCNC: 29 MMOL/L (ref 21–32)
CREAT SERPL-MCNC: 0.88 MG/DL (ref 0.6–1.3)
EOSINOPHIL # BLD AUTO: 0.39 THOUSAND/ΜL (ref 0–0.61)
EOSINOPHIL NFR BLD AUTO: 7 % (ref 0–6)
ERYTHROCYTE [DISTWIDTH] IN BLOOD BY AUTOMATED COUNT: 13 % (ref 11.6–15.1)
FOLATE SERPL-MCNC: >20 NG/ML (ref 3.1–17.5)
GFR SERPL CREATININE-BSD FRML MDRD: 91 ML/MIN/1.73SQ M
GLUCOSE P FAST SERPL-MCNC: 94 MG/DL (ref 65–99)
HCT VFR BLD AUTO: 44.4 % (ref 36.5–49.3)
HGB BLD-MCNC: 15.3 G/DL (ref 12–17)
IMM GRANULOCYTES # BLD AUTO: 0.02 THOUSAND/UL (ref 0–0.2)
IMM GRANULOCYTES NFR BLD AUTO: 0 % (ref 0–2)
LYMPHOCYTES # BLD AUTO: 1.78 THOUSANDS/ΜL (ref 0.6–4.47)
LYMPHOCYTES NFR BLD AUTO: 30 % (ref 14–44)
MCH RBC QN AUTO: 33 PG (ref 26.8–34.3)
MCHC RBC AUTO-ENTMCNC: 34.5 G/DL (ref 31.4–37.4)
MCV RBC AUTO: 96 FL (ref 82–98)
MONOCYTES # BLD AUTO: 0.6 THOUSAND/ΜL (ref 0.17–1.22)
MONOCYTES NFR BLD AUTO: 10 % (ref 4–12)
NEUTROPHILS # BLD AUTO: 3.06 THOUSANDS/ΜL (ref 1.85–7.62)
NEUTS SEG NFR BLD AUTO: 52 % (ref 43–75)
NRBC BLD AUTO-RTO: 0 /100 WBCS
PLATELET # BLD AUTO: 157 THOUSANDS/UL (ref 149–390)
PMV BLD AUTO: 10.8 FL (ref 8.9–12.7)
POTASSIUM SERPL-SCNC: 4.2 MMOL/L (ref 3.5–5.3)
PROT SERPL-MCNC: 7.7 G/DL (ref 6.4–8.2)
RBC # BLD AUTO: 4.64 MILLION/UL (ref 3.88–5.62)
SODIUM SERPL-SCNC: 139 MMOL/L (ref 136–145)
TSH SERPL DL<=0.05 MIU/L-ACNC: 2.44 UIU/ML (ref 0.36–3.74)
VIT B12 SERPL-MCNC: 466 PG/ML (ref 100–900)
WBC # BLD AUTO: 5.89 THOUSAND/UL (ref 4.31–10.16)

## 2021-06-10 PROCEDURE — 82746 ASSAY OF FOLIC ACID SERUM: CPT

## 2021-06-10 PROCEDURE — 99214 OFFICE O/P EST MOD 30 MIN: CPT | Performed by: PHYSICIAN ASSISTANT

## 2021-06-10 PROCEDURE — 80053 COMPREHEN METABOLIC PANEL: CPT

## 2021-06-10 PROCEDURE — 86617 LYME DISEASE ANTIBODY: CPT

## 2021-06-10 PROCEDURE — 84425 ASSAY OF VITAMIN B-1: CPT

## 2021-06-10 PROCEDURE — 82306 VITAMIN D 25 HYDROXY: CPT

## 2021-06-10 PROCEDURE — 84443 ASSAY THYROID STIM HORMONE: CPT

## 2021-06-10 PROCEDURE — 85025 COMPLETE CBC W/AUTO DIFF WBC: CPT

## 2021-06-10 PROCEDURE — 36415 COLL VENOUS BLD VENIPUNCTURE: CPT

## 2021-06-10 PROCEDURE — 3725F SCREEN DEPRESSION PERFORMED: CPT | Performed by: PHYSICIAN ASSISTANT

## 2021-06-10 PROCEDURE — 3008F BODY MASS INDEX DOCD: CPT | Performed by: PHYSICIAN ASSISTANT

## 2021-06-10 PROCEDURE — 1036F TOBACCO NON-USER: CPT | Performed by: PHYSICIAN ASSISTANT

## 2021-06-10 PROCEDURE — 86618 LYME DISEASE ANTIBODY: CPT

## 2021-06-10 PROCEDURE — 82607 VITAMIN B-12: CPT

## 2021-06-10 NOTE — PROGRESS NOTES
Assessment/Plan:       Problem List Items Addressed This Visit        Other    Bradycardia    Alcohol use      Other Visit Diagnoses     Memory difficulty    -  Primary    Relevant Orders    CT head wo contrast    Vitamin B12    Folate    Vitamin B1, whole blood    TSH, 3rd generation with Free T4 reflex    Lyme Antibody Profile with reflex to WB    Vitamin D 25 hydroxy    Comprehensive metabolic panel    CBC and differential    Brain fog        Relevant Orders    CT head wo contrast    Vitamin B12    Folate    Vitamin B1, whole blood    TSH, 3rd generation with Free T4 reflex    Lyme Antibody Profile with reflex to WB    Vitamin D 25 hydroxy    Comprehensive metabolic panel    CBC and differential    Anxiety            will workup with above labs and CT head, pt has metallic implants in elbow, will defer MRI  Discussed brain stimulating exercises, staying active, eating healthy diet and avoidance of alcohol  Can consider tx of anxiety and depression given +PHQ and anxious tendencies if workup is benign  If worsens consider neuro referral  Pt agreeable with plan  Exam today is unremarkable, namely his neuro exam did not display focal deficits  Depression Screening Follow-up Plan: Patient's depression screening was positive with a PHQ-2 score of 4  Their PHQ-9 score was 11  Patient assessed for underlying major depression  They have no active suicidal ideations  Brief counseling provided and recommend additional follow-up/re-evaluation next office visit  Patient's depressive symptoms likely due to other medical condition  Would recommend treatment of underlying condition  Will continue to monitor at next office visit  Subjective:      Patient ID: Matt Spaulding is a 63 y o  male  Pt presents with concerns of months of "brain fog" described as "feeling stupid"  His wife has noticed the change in him as well  He shares he is forgetful, feels overwhelmed, easily irritable, feels like he spaces out at times  He shares he noticed this around the time he began lisinopril a few months ago, and he stopped this 5 days ago and does feels some improvement  He shares he feels his speech is abnormal and "his mouth is ahead of his brain"  He feels he is getting his thoughts and words jumbled  He denies overt slurred speech, facial droop, numbness/weakness, vision change  He is sleeping well at night  Pt has hx of afib s/p ablation, has been in NSR  He is not on thinners  Has no FH of dementia  Shares in the past he was told he had "white matter" changes in his brain  Pt does admit to drinking until intoxicated once weekly and also consuming alcohol on 2 other nights of the week as well  The following portions of the patient's history were reviewed and updated as appropriate:   He  has a past medical history of A-fib ([facility] ), Arthritis, BPH (benign prostatic hyperplasia), Fatty liver, Fracture, foot, High cholesterol, Hyperlipidemia, Irregular heart beat, Non-ischemic cardiomyopathy ([facility] ), Osteoarthritis, Psoriasis, Psoriatic arthritis ([facility] ), and Pulmonary HTN ([facility] )  He   Patient Active Problem List    Diagnosis Date Noted    CATY (obstructive sleep apnea)     Atrial flutter ([facility] ) 02/22/2021    Bradycardia 02/22/2021    Palpitation 02/22/2021    Alcohol use 02/22/2021    Psoriatic arthritis ([facility] ) 01/06/2020    Screening for HIV (human immunodeficiency virus) 01/06/2020    BMI 29 0-29 9,adult 01/06/2020    Need for pneumococcal vaccination 01/06/2020    NDPH (new daily persistent headache) 01/06/2020    Fatigue 07/11/2018    Shortness of breath 07/11/2018    Atrial fibrillation ([facility] ) 04/06/2016     He  has a past surgical history that includes Transurethral resection of prostate  His family history includes Diabetes in his family and mother; Hypertension in his father  He  reports that he has quit smoking  He has never used smokeless tobacco  He reports current alcohol use   He reports that he does not use drugs   Current Outpatient Medications   Medication Sig Dispense Refill    adalimumab (HUMIRA) 40 mg/0 8 mL PSKT Inject 40 mg under the skin every 14 (fourteen) days       Ascorbic Acid (Vitamin C) 500 MG CHEW Chew 500 mg daily      aspirin 81 mg chewable tablet Chew 1 tablet daily      naproxen (NAPROSYN) 500 mg tablet       ergocalciferol (VITAMIN D2) 50,000 units Take 1 capsule (50,000 Units total) by mouth 2 (two) times a week (Patient taking differently: Take 10,000 Units by mouth daily Pt is taking 10,000 IU daily) 34 capsule 0    lisinopril (ZESTRIL) 20 mg tablet Take 1 tablet (20 mg total) by mouth 2 (two) times a day 60 tablet 3    magnesium oxide (MAG-OX) 400 mg Take 400 mg by mouth daily      tadalafil (CIALIS) 5 MG tablet Take 5 mg by mouth daily as needed for erectile dysfunction  No current facility-administered medications for this visit  Current Outpatient Medications on File Prior to Visit   Medication Sig    adalimumab (HUMIRA) 40 mg/0 8 mL PSKT Inject 40 mg under the skin every 14 (fourteen) days     Ascorbic Acid (Vitamin C) 500 MG CHEW Chew 500 mg daily    aspirin 81 mg chewable tablet Chew 1 tablet daily    naproxen (NAPROSYN) 500 mg tablet     ergocalciferol (VITAMIN D2) 50,000 units Take 1 capsule (50,000 Units total) by mouth 2 (two) times a week (Patient taking differently: Take 10,000 Units by mouth daily Pt is taking 10,000 IU daily)    lisinopril (ZESTRIL) 20 mg tablet Take 1 tablet (20 mg total) by mouth 2 (two) times a day    magnesium oxide (MAG-OX) 400 mg Take 400 mg by mouth daily    tadalafil (CIALIS) 5 MG tablet Take 5 mg by mouth daily as needed for erectile dysfunction  No current facility-administered medications on file prior to visit  He is allergic to penicillins       Review of Systems   Constitutional: Negative for chills, fatigue and fever     HENT: Negative for congestion, ear pain, hearing loss, nosebleeds, postnasal drip, rhinorrhea, sinus pressure, sinus pain, sneezing and sore throat  Eyes: Negative for pain, discharge, itching and visual disturbance  Respiratory: Negative for cough, chest tightness, shortness of breath and wheezing  Cardiovascular: Negative for chest pain, palpitations and leg swelling  Gastrointestinal: Negative for abdominal pain, blood in stool, constipation, diarrhea, nausea and vomiting  Genitourinary: Negative for frequency and urgency  Musculoskeletal: Negative  Skin: Negative  Neurological: Positive for speech difficulty and headaches  Negative for dizziness, syncope, facial asymmetry, weakness, light-headedness and numbness  Psychiatric/Behavioral: Positive for confusion, decreased concentration and dysphoric mood  Negative for behavioral problems, hallucinations, self-injury, sleep disturbance and suicidal ideas  The patient is nervous/anxious  Objective:      /84   Pulse (!) 53   Temp (!) 96 7 °F (35 9 °C)   Ht 5' 11" (1 803 m)   Wt 89 6 kg (197 lb 9 6 oz)   SpO2 98%   BMI 27 56 kg/m²          Physical Exam  Vitals signs and nursing note reviewed  Constitutional:       General: He is not in acute distress  Appearance: Normal appearance  HENT:      Head: Normocephalic and atraumatic  Nose: Nose normal       Mouth/Throat:      Mouth: Mucous membranes are moist       Pharynx: Oropharynx is clear  No oropharyngeal exudate or posterior oropharyngeal erythema  Eyes:      Pupils: Pupils are equal, round, and reactive to light  Neck:      Musculoskeletal: Normal range of motion and neck supple  Cardiovascular:      Rate and Rhythm: Regular rhythm  Bradycardia present  Pulses: Normal pulses  Heart sounds: Normal heart sounds  No murmur  Pulmonary:      Effort: Pulmonary effort is normal  No respiratory distress  Breath sounds: Normal breath sounds  No wheezing, rhonchi or rales     Abdominal:      General: Bowel sounds are normal  There is no distension  Palpations: Abdomen is soft  Tenderness: There is no abdominal tenderness  There is no guarding  Hernia: No hernia is present  Musculoskeletal: Normal range of motion  Skin:     General: Skin is warm and dry  Neurological:      General: No focal deficit present  Mental Status: He is alert and oriented to person, place, and time  Cranial Nerves: No cranial nerve deficit  Sensory: No sensory deficit  Motor: No weakness  Gait: Gait normal    Psychiatric:         Attention and Perception: Attention normal          Mood and Affect: Mood and affect normal          Speech: Speech normal          Behavior: Behavior normal          Thought Content:  Thought content normal

## 2021-06-11 LAB — B BURGDOR IGG+IGM SER-ACNC: 181

## 2021-06-15 LAB — VIT B1 BLD-SCNC: 148.8 NMOL/L (ref 66.5–200)

## 2021-06-25 DIAGNOSIS — I10 ESSENTIAL HYPERTENSION: ICD-10-CM

## 2021-06-25 RX ORDER — LISINOPRIL 20 MG/1
TABLET ORAL
Qty: 60 TABLET | Refills: 3 | Status: SHIPPED | OUTPATIENT
Start: 2021-06-25 | End: 2022-05-18 | Stop reason: ALTCHOICE

## 2021-06-28 ENCOUNTER — HOSPITAL ENCOUNTER (OUTPATIENT)
Dept: CT IMAGING | Facility: HOSPITAL | Age: 66
Discharge: HOME | End: 2021-06-28
Payer: COMMERCIAL

## 2021-06-28 DIAGNOSIS — R41.3 MEMORY DIFFICULTY: ICD-10-CM

## 2021-06-28 DIAGNOSIS — R41.89 BRAIN FOG: ICD-10-CM

## 2021-06-28 PROCEDURE — 70450 CT HEAD/BRAIN W/O DYE: CPT

## 2021-10-05 ENCOUNTER — APPOINTMENT (OUTPATIENT)
Dept: LAB | Facility: HOSPITAL | Age: 66
End: 2021-10-05
Attending: INTERNAL MEDICINE
Payer: COMMERCIAL

## 2021-10-05 DIAGNOSIS — L40.50 PSORIATIC ARTHROPATHY (HCC): ICD-10-CM

## 2021-10-05 LAB
ALBUMIN SERPL BCP-MCNC: 4.1 G/DL (ref 3.5–5)
ALP SERPL-CCNC: 67 U/L (ref 46–116)
ALT SERPL W P-5'-P-CCNC: 82 U/L (ref 12–78)
AST SERPL W P-5'-P-CCNC: 47 U/L (ref 5–45)
BASOPHILS # BLD AUTO: 0.04 THOUSANDS/ΜL (ref 0–0.1)
BASOPHILS NFR BLD AUTO: 1 % (ref 0–1)
BILIRUB DIRECT SERPL-MCNC: 0.26 MG/DL (ref 0–0.2)
BILIRUB SERPL-MCNC: 1.29 MG/DL (ref 0.2–1)
CREAT SERPL-MCNC: 0.93 MG/DL (ref 0.6–1.3)
CRP SERPL QL: <3 MG/L
EOSINOPHIL # BLD AUTO: 0.25 THOUSAND/ΜL (ref 0–0.61)
EOSINOPHIL NFR BLD AUTO: 4 % (ref 0–6)
ERYTHROCYTE [DISTWIDTH] IN BLOOD BY AUTOMATED COUNT: 12.2 % (ref 11.6–15.1)
ERYTHROCYTE [SEDIMENTATION RATE] IN BLOOD: 7 MM/HOUR (ref 0–19)
GFR SERPL CREATININE-BSD FRML MDRD: 87 ML/MIN/1.73SQ M
HCT VFR BLD AUTO: 46.5 % (ref 36.5–49.3)
HGB BLD-MCNC: 16.3 G/DL (ref 12–17)
IMM GRANULOCYTES # BLD AUTO: 0.01 THOUSAND/UL (ref 0–0.2)
IMM GRANULOCYTES NFR BLD AUTO: 0 % (ref 0–2)
LYMPHOCYTES # BLD AUTO: 1.68 THOUSANDS/ΜL (ref 0.6–4.47)
LYMPHOCYTES NFR BLD AUTO: 29 % (ref 14–44)
MCH RBC QN AUTO: 32.4 PG (ref 26.8–34.3)
MCHC RBC AUTO-ENTMCNC: 35.1 G/DL (ref 31.4–37.4)
MCV RBC AUTO: 92 FL (ref 82–98)
MONOCYTES # BLD AUTO: 0.58 THOUSAND/ΜL (ref 0.17–1.22)
MONOCYTES NFR BLD AUTO: 10 % (ref 4–12)
NEUTROPHILS # BLD AUTO: 3.28 THOUSANDS/ΜL (ref 1.85–7.62)
NEUTS SEG NFR BLD AUTO: 56 % (ref 43–75)
NRBC BLD AUTO-RTO: 0 /100 WBCS
PLATELET # BLD AUTO: 168 THOUSANDS/UL (ref 149–390)
PMV BLD AUTO: 10.2 FL (ref 8.9–12.7)
PROT SERPL-MCNC: 8 G/DL (ref 6.4–8.2)
RBC # BLD AUTO: 5.03 MILLION/UL (ref 3.88–5.62)
WBC # BLD AUTO: 5.84 THOUSAND/UL (ref 4.31–10.16)

## 2021-10-05 PROCEDURE — 85652 RBC SED RATE AUTOMATED: CPT

## 2021-10-05 PROCEDURE — 86140 C-REACTIVE PROTEIN: CPT

## 2021-10-05 PROCEDURE — 85025 COMPLETE CBC W/AUTO DIFF WBC: CPT

## 2021-10-05 PROCEDURE — 80076 HEPATIC FUNCTION PANEL: CPT

## 2021-10-05 PROCEDURE — 36415 COLL VENOUS BLD VENIPUNCTURE: CPT

## 2021-10-05 PROCEDURE — 82565 ASSAY OF CREATININE: CPT

## 2021-11-26 ENCOUNTER — NURSE TRIAGE (OUTPATIENT)
Dept: OTHER | Facility: OTHER | Age: 66
End: 2021-11-26

## 2021-11-26 DIAGNOSIS — Z20.822 SUSPECTED SEVERE ACUTE RESPIRATORY SYNDROME CORONAVIRUS 2 (SARS-COV-2) INFECTION: Primary | ICD-10-CM

## 2021-11-26 PROCEDURE — U0005 INFEC AGEN DETEC AMPLI PROBE: HCPCS | Performed by: FAMILY MEDICINE

## 2021-11-26 PROCEDURE — U0003 INFECTIOUS AGENT DETECTION BY NUCLEIC ACID (DNA OR RNA); SEVERE ACUTE RESPIRATORY SYNDROME CORONAVIRUS 2 (SARS-COV-2) (CORONAVIRUS DISEASE [COVID-19]), AMPLIFIED PROBE TECHNIQUE, MAKING USE OF HIGH THROUGHPUT TECHNOLOGIES AS DESCRIBED BY CMS-2020-01-R: HCPCS | Performed by: FAMILY MEDICINE

## 2021-11-28 LAB — SARS-COV-2 RNA RESP QL NAA+PROBE: POSITIVE

## 2021-11-29 ENCOUNTER — TELEMEDICINE (OUTPATIENT)
Dept: FAMILY MEDICINE CLINIC | Facility: CLINIC | Age: 66
End: 2021-11-29
Payer: COMMERCIAL

## 2021-11-29 DIAGNOSIS — U07.1 COVID-19: Primary | ICD-10-CM

## 2021-11-29 PROCEDURE — 1036F TOBACCO NON-USER: CPT | Performed by: PHYSICIAN ASSISTANT

## 2021-11-29 PROCEDURE — 99213 OFFICE O/P EST LOW 20 MIN: CPT | Performed by: PHYSICIAN ASSISTANT

## 2021-12-01 ENCOUNTER — TELEMEDICINE (OUTPATIENT)
Dept: FAMILY MEDICINE CLINIC | Facility: CLINIC | Age: 66
End: 2021-12-01
Payer: COMMERCIAL

## 2021-12-01 DIAGNOSIS — U07.1 COVID-19: Primary | ICD-10-CM

## 2021-12-01 PROCEDURE — 99213 OFFICE O/P EST LOW 20 MIN: CPT | Performed by: PHYSICIAN ASSISTANT

## 2021-12-02 ENCOUNTER — TELEMEDICINE (OUTPATIENT)
Dept: FAMILY MEDICINE CLINIC | Facility: CLINIC | Age: 66
End: 2021-12-02
Payer: COMMERCIAL

## 2021-12-02 VITALS — WEIGHT: 197 LBS | HEIGHT: 71 IN | BODY MASS INDEX: 27.58 KG/M2

## 2021-12-02 DIAGNOSIS — Z11.9 ENCOUNTER FOR SCREENING FOR INFECTIOUS AND PARASITIC DISEASES, UNSPECIFIED: ICD-10-CM

## 2021-12-02 DIAGNOSIS — U07.1 COVID-19 VIRUS INFECTION: Primary | ICD-10-CM

## 2021-12-02 PROCEDURE — 99213 OFFICE O/P EST LOW 20 MIN: CPT | Performed by: FAMILY MEDICINE

## 2021-12-06 ENCOUNTER — TELEPHONE (OUTPATIENT)
Dept: FAMILY MEDICINE CLINIC | Facility: CLINIC | Age: 66
End: 2021-12-06

## 2021-12-07 ENCOUNTER — TELEMEDICINE (OUTPATIENT)
Dept: FAMILY MEDICINE CLINIC | Facility: CLINIC | Age: 66
End: 2021-12-07
Payer: COMMERCIAL

## 2021-12-07 VITALS — TEMPERATURE: 98 F | WEIGHT: 185 LBS | HEIGHT: 71 IN | BODY MASS INDEX: 25.9 KG/M2

## 2021-12-07 DIAGNOSIS — U07.1 COVID-19 VIRUS INFECTION: Primary | ICD-10-CM

## 2021-12-07 PROCEDURE — 3008F BODY MASS INDEX DOCD: CPT | Performed by: FAMILY MEDICINE

## 2021-12-07 PROCEDURE — 1036F TOBACCO NON-USER: CPT | Performed by: FAMILY MEDICINE

## 2021-12-07 PROCEDURE — 99213 OFFICE O/P EST LOW 20 MIN: CPT | Performed by: FAMILY MEDICINE

## 2022-01-18 ENCOUNTER — APPOINTMENT (OUTPATIENT)
Dept: LAB | Facility: CLINIC | Age: 67
End: 2022-01-18
Payer: COMMERCIAL

## 2022-01-18 DIAGNOSIS — Z12.5 SPECIAL SCREENING FOR MALIGNANT NEOPLASM OF PROSTATE: ICD-10-CM

## 2022-01-18 PROCEDURE — G0103 PSA SCREENING: HCPCS

## 2022-01-18 PROCEDURE — 36415 COLL VENOUS BLD VENIPUNCTURE: CPT

## 2022-01-19 LAB — PSA SERPL-MCNC: 3.6 NG/ML (ref 0–4)

## 2022-05-18 ENCOUNTER — OFFICE VISIT (OUTPATIENT)
Dept: GASTROENTEROLOGY | Facility: CLINIC | Age: 67
End: 2022-05-18
Payer: COMMERCIAL

## 2022-05-18 VITALS
HEIGHT: 71 IN | HEART RATE: 50 BPM | BODY MASS INDEX: 28.56 KG/M2 | SYSTOLIC BLOOD PRESSURE: 150 MMHG | OXYGEN SATURATION: 97 % | WEIGHT: 204 LBS | DIASTOLIC BLOOD PRESSURE: 80 MMHG

## 2022-05-18 DIAGNOSIS — Z86.010 HISTORY OF COLON POLYPS: Primary | ICD-10-CM

## 2022-05-18 PROCEDURE — 99203 OFFICE O/P NEW LOW 30 MIN: CPT | Performed by: PHYSICIAN ASSISTANT

## 2022-05-18 PROCEDURE — 1036F TOBACCO NON-USER: CPT | Performed by: PHYSICIAN ASSISTANT

## 2022-05-18 NOTE — PROGRESS NOTES
[facility] - Outpatient Consultation  Amauri Mayo 61 y o  male MRN: [id number]  Encounter: [id number]          ASSESSMENT AND PLAN:      1  History of colon polyps    Patient presents to schedule a colonoscopy  He reports his last colonoscopy was 7-8 years ago and polyps were removed  Will plan for colonoscopy to investigate   ______________________________________________________________________    HPI:  Patient is a pleasant 61year old male with a PMH atrial fibrillation s/p ablation and psoriatic arthritis on Humira who presents to the office to schedule a colonoscopy  Patient reports his last colonoscopy was 7-8 years ago and polyps were removed  No family history of colon cancer  No mention of any gastrointestinal symptoms  He reports he tolerated the procedure and bowel prep well last time  REVIEW OF SYSTEMS:    CONSTITUTIONAL: Denies any fever, chills, rigors, and weight loss  HEENT: No earache or tinnitus  Denies hearing loss or visual disturbances  CARDIOVASCULAR: No chest pain or palpitations  RESPIRATORY: Denies any cough, hemoptysis, shortness of breath or dyspnea on exertion  GASTROINTESTINAL: As noted in the History of Present Illness  GENITOURINARY: No problems with urination  Denies any hematuria or dysuria  NEUROLOGIC: No dizziness or vertigo, denies headaches  MUSCULOSKELETAL: Denies any muscle or joint pain  SKIN: Denies skin rashes or itching  ENDOCRINE: Denies excessive thirst  Denies intolerance to heat or cold  PSYCHOSOCIAL: Denies depression or anxiety  Denies any recent memory loss         Historical Information   Past Medical History:   Diagnosis Date    A-fib ([facility] )     Arthritis     BPH (benign prostatic hyperplasia)     Fatty liver     Fracture, foot     High cholesterol     Hyperlipidemia     Irregular heart beat     Non-ischemic cardiomyopathy (HCC)     Osteoarthritis     Psoriasis     Psoriatic arthritis ([facility] )  [facility] )      Past Surgical History:   Procedure Laterality Date    TRANSURETHRAL RESECTION OF PROSTATE      last assessed 7/21/15     Social History   Social History     Substance and Sexual Activity   Alcohol Use Yes    Comment: socially-  "couple beers a day"  (<6)     Social History     Substance and Sexual Activity   Drug Use No     Social History     Tobacco Use   Smoking Status Former Smoker    Packs/day: 0 50    Years: 10 00    Pack years: 5 00    Types: Cigarettes   Smokeless Tobacco Never Used     Family History   Problem Relation Age of Onset    Diabetes Mother     Hypertension Father     Diabetes Family        Meds/Allergies       Current Outpatient Medications:     adalimumab (HUMIRA) 40 mg/0 8 mL PSKT    naproxen (NAPROSYN) 500 mg tablet    Allergies   Allergen Reactions    Penicillins Rash           Objective     Blood pressure 150/80, pulse (!) 50, height 5' 11" (1 803 m), weight 92 5 kg (204 lb), SpO2 97 %  Body mass index is 28 45 kg/m²  PHYSICAL EXAM:      General Appearance:   Alert, cooperative, no distress   HEENT:   Normocephalic, atraumatic, anicteric    Neck:  Supple, symmetrical, trachea midline   Lungs:   Clear to auscultation bilaterally; no rales, rhonchi or wheezing; respirations unlabored    Heart[de-identified]   Regular rate and rhythm; no murmur, rub, or gallop  Abdomen:   Soft, non-tender, non-distended; normal bowel sounds; no masses, no organomegaly    Genitalia:   Deferred    Rectal:   Deferred    Extremities:  No cyanosis, clubbing or edema    Pulses:  2+ and symmetric    Skin:  No jaundice, rashes, or lesions    Lymph nodes:  No palpable cervical lymphadenopathy        Lab Results:   No visits with results within 1 Day(s) from this visit  Latest known visit with results is:   Appointment on 01/18/2022   Component Date Value    PSA 01/18/2022 3 6          Radiology Results:   No results found

## 2022-07-05 RX ORDER — LIDOCAINE HYDROCHLORIDE 10 MG/ML
0.5 INJECTION, SOLUTION EPIDURAL; INFILTRATION; INTRACAUDAL; PERINEURAL ONCE AS NEEDED
Status: CANCELLED | OUTPATIENT
Start: 2022-07-05

## 2022-07-05 RX ORDER — SODIUM CHLORIDE, SODIUM LACTATE, POTASSIUM CHLORIDE, CALCIUM CHLORIDE 600; 310; 30; 20 MG/100ML; MG/100ML; MG/100ML; MG/100ML
125 INJECTION, SOLUTION INTRAVENOUS CONTINUOUS
Status: CANCELLED | OUTPATIENT
Start: 2022-07-05

## 2022-07-06 ENCOUNTER — ANESTHESIA EVENT (OUTPATIENT)
Dept: GASTROENTEROLOGY | Facility: HOSPITAL | Age: 67
End: 2022-07-06

## 2022-07-06 ENCOUNTER — HOSPITAL ENCOUNTER (OUTPATIENT)
Dept: GASTROENTEROLOGY | Facility: HOSPITAL | Age: 67
Setting detail: OUTPATIENT SURGERY
Discharge: HOME | End: 2022-07-06
Payer: COMMERCIAL

## 2022-07-06 ENCOUNTER — ANESTHESIA (OUTPATIENT)
Dept: GASTROENTEROLOGY | Facility: HOSPITAL | Age: 67
End: 2022-07-06

## 2022-07-06 VITALS
SYSTOLIC BLOOD PRESSURE: 130 MMHG | OXYGEN SATURATION: 97 % | RESPIRATION RATE: 16 BRPM | WEIGHT: 196.65 LBS | HEART RATE: 41 BPM | HEIGHT: 71 IN | TEMPERATURE: 97.3 F | BODY MASS INDEX: 27.53 KG/M2 | DIASTOLIC BLOOD PRESSURE: 71 MMHG

## 2022-07-06 DIAGNOSIS — Z86.010 HISTORY OF COLON POLYPS: ICD-10-CM

## 2022-07-06 PROCEDURE — 45385 COLONOSCOPY W/LESION REMOVAL: CPT | Performed by: INTERNAL MEDICINE

## 2022-07-06 PROCEDURE — 45380 COLONOSCOPY AND BIOPSY: CPT | Performed by: INTERNAL MEDICINE

## 2022-07-06 PROCEDURE — 88305 TISSUE EXAM BY PATHOLOGIST: CPT | Performed by: PATHOLOGY

## 2022-07-06 RX ORDER — SODIUM CHLORIDE, SODIUM LACTATE, POTASSIUM CHLORIDE, CALCIUM CHLORIDE 600; 310; 30; 20 MG/100ML; MG/100ML; MG/100ML; MG/100ML
125 INJECTION, SOLUTION INTRAVENOUS CONTINUOUS
Status: DISCONTINUED | OUTPATIENT
Start: 2022-07-06 | End: 2022-07-10 | Stop reason: HOSPADM

## 2022-07-06 RX ORDER — LIDOCAINE HYDROCHLORIDE 10 MG/ML
0.5 INJECTION, SOLUTION EPIDURAL; INFILTRATION; INTRACAUDAL; PERINEURAL ONCE AS NEEDED
Status: DISCONTINUED | OUTPATIENT
Start: 2022-07-06 | End: 2022-07-10 | Stop reason: HOSPADM

## 2022-07-06 RX ORDER — PROPOFOL 10 MG/ML
INJECTION, EMULSION INTRAVENOUS AS NEEDED
Status: DISCONTINUED | OUTPATIENT
Start: 2022-07-06 | End: 2022-07-06

## 2022-07-06 RX ADMIN — SODIUM CHLORIDE, SODIUM LACTATE, POTASSIUM CHLORIDE, AND CALCIUM CHLORIDE: .6; .31; .03; .02 INJECTION, SOLUTION INTRAVENOUS at 09:52

## 2022-07-06 RX ADMIN — PROPOFOL 100 MG: 10 INJECTION, EMULSION INTRAVENOUS at 10:12

## 2022-07-06 RX ADMIN — PROPOFOL 20 MG: 10 INJECTION, EMULSION INTRAVENOUS at 10:15

## 2022-07-06 RX ADMIN — PROPOFOL 20 MG: 10 INJECTION, EMULSION INTRAVENOUS at 10:20

## 2022-07-06 RX ADMIN — PROPOFOL 20 MG: 10 INJECTION, EMULSION INTRAVENOUS at 10:17

## 2022-07-06 RX ADMIN — PROPOFOL 20 MG: 10 INJECTION, EMULSION INTRAVENOUS at 10:19

## 2022-07-06 NOTE — H&P
History and Physical -  [facility]  Aaron Raphael 61 y o  male MRN: [id number]                  HPI: Aaron Raphael is a 61y o  year old male who presents for colonoscopy for history of colon polyps  Last colonoscopy 8 years ago      REVIEW OF SYSTEMS: Per the HPI, and otherwise unremarkable  Historical Information   Past Medical History:   Diagnosis Date    [facility] )     Arthritis     BPH (benign prostatic hyperplasia)     Fatty liver     Fracture, foot     High cholesterol     Hyperlipidemia     Irregular heart beat     Non-ischemic cardiomyopathy (HCC)     Osteoarthritis     Psoriasis     Psoriatic arthritis (HCC)     Pulmonary HTN (HCC)      Past Surgical History:   Procedure Laterality Date    TRANSURETHRAL RESECTION OF PROSTATE      last assessed 7/21/15     Social History   Social History     Substance and Sexual Activity   Alcohol Use Yes    Comment: socially-  "couple beers a day"  (<6)     Social History     Substance and Sexual Activity   Drug Use No     Social History     Tobacco Use   Smoking Status Former Smoker    Packs/day: 0 50    Years: 10 00    Pack years: 5 00    Types: Cigarettes   Smokeless Tobacco Never Used     Family History   Problem Relation Age of Onset    Diabetes Mother     Hypertension Father     Diabetes Family        Meds/Allergies     (Not in a hospital admission)      Allergies   Allergen Reactions    Penicillins Rash       Objective     There were no vitals taken for this visit        PHYSICAL EXAM    Gen: NAD  CV: RRR  CHEST: Clear  ABD: soft, NT/ND  EXT: no edema  Neuro: AAO      ASSESSMENT/PLAN:  This is a 61y o  year old male here for history of colon polyps    PLAN:   Procedure:  Colonoscopy

## 2022-07-06 NOTE — ANESTHESIA PREPROCEDURE EVALUATION
Procedure:  COLONOSCOPY    Afib/aflutter s/p ablation  Today: Sinus Bradycardia on telemetry  HTN -  (pt not on HTN meds)    Echo 2018 - "recovered EF"    Echo 2017  EF 45%, diffuse hypokinesis    Echo 2016  EF 60%, DD2, PASP 41    Relevant Problems   CARDIO   (+) Atrial fibrillation (HCC)   (+) Atrial flutter (HCC)      MUSCULOSKELETAL   (+) Psoriatic arthritis (HCC)      NEURO/PSYCH   (+) NDPH (new daily persistent headache)      PULMONARY   (+) CATY (obstructive sleep apnea)   (+) Shortness of breath      Other   (+) Alcohol use        Physical Exam    Airway    Mallampati score: II  TM Distance: >3 FB  Neck ROM: full     Dental   No notable dental hx     Cardiovascular  Rhythm: regular, Rate: abnormal, Cardiovascular exam normal    Pulmonary  Pulmonary exam normal Breath sounds clear to auscultation,     Other Findings        Anesthesia Plan  ASA Score- 3     Anesthesia Type- IV sedation with anesthesia with ASA Monitors  Additional Monitors:   Airway Plan:     Comment: Discussed risks/benefits, including medication reactions, awareness, aspiration, and serious/life threatening complications  Plan to maintain native airway with IVGA, monitored with EtCO2  Plan Factors-Exercise tolerance (METS): >4 METS  Patient summary reviewed  Patient instructed to abstain from smoking on day of procedure  Patient did not smoke on day of surgery  Induction- intravenous  Postoperative Plan-     Informed Consent- Anesthetic plan and risks discussed with patient  I personally reviewed this patient with the CRNA  Discussed and agreed on the Anesthesia Plan with the CRNA  Lillie Meredith

## 2022-07-06 NOTE — ANESTHESIA POSTPROCEDURE EVALUATION
Post-Op Assessment Note    CV Status:  Stable    Pain management: adequate     Mental Status:  Sleepy   Hydration Status:  Euvolemic   PONV Controlled:  Controlled   Airway Patency:  Patent      Post Op Vitals Reviewed: Yes      Staff: CRNA         No complications documented      BP  129/75   Temp   98   Pulse 48   Resp   20   SpO2   100

## 2022-07-06 NOTE — INTERVAL H&P NOTE
H&P reviewed  After examining the patient I find no changes in the patients condition since the H&P had been written      Vitals:    07/06/22 0940   BP: (!) 180/96   Pulse: (!) 46   Resp: 16   Temp: 97 8 °F (36 6 °C)   SpO2: 100%

## 2022-07-28 ENCOUNTER — TELEPHONE (OUTPATIENT)
Dept: GASTROENTEROLOGY | Facility: CLINIC | Age: 67
End: 2022-07-28

## 2022-09-08 ENCOUNTER — APPOINTMENT (OUTPATIENT)
Dept: LAB | Facility: CLINIC | Age: 67
End: 2022-09-08
Payer: COMMERCIAL

## 2022-09-08 DIAGNOSIS — L40.59 POLYARTICULAR PSORIATIC ARTHRITIS (HCC): ICD-10-CM

## 2022-09-08 LAB
ALBUMIN SERPL BCP-MCNC: 3.4 G/DL (ref 3.5–5)
ALP SERPL-CCNC: 65 U/L (ref 46–116)
ALT SERPL W P-5'-P-CCNC: 64 U/L (ref 12–78)
AST SERPL W P-5'-P-CCNC: 29 U/L (ref 5–45)
BASOPHILS # BLD AUTO: 0.04 THOUSANDS/ΜL (ref 0–0.1)
BASOPHILS NFR BLD AUTO: 1 % (ref 0–1)
BILIRUB DIRECT SERPL-MCNC: 0.11 MG/DL (ref 0–0.2)
BILIRUB SERPL-MCNC: 0.62 MG/DL (ref 0.2–1)
CREAT SERPL-MCNC: 0.97 MG/DL (ref 0.6–1.3)
CRP SERPL HS-MCNC: 1.94 MG/L
EOSINOPHIL # BLD AUTO: 0.26 THOUSAND/ΜL (ref 0–0.61)
EOSINOPHIL NFR BLD AUTO: 4 % (ref 0–6)
ERYTHROCYTE [DISTWIDTH] IN BLOOD BY AUTOMATED COUNT: 12.7 % (ref 11.6–15.1)
GFR SERPL CREATININE-BSD FRML MDRD: 82 ML/MIN/1.73SQ M
HCT VFR BLD AUTO: 43.4 % (ref 36.5–49.3)
HGB BLD-MCNC: 15.1 G/DL (ref 12–17)
IMM GRANULOCYTES # BLD AUTO: 0.02 THOUSAND/UL (ref 0–0.2)
IMM GRANULOCYTES NFR BLD AUTO: 0 % (ref 0–2)
LYMPHOCYTES # BLD AUTO: 1.97 THOUSANDS/ΜL (ref 0.6–4.47)
LYMPHOCYTES NFR BLD AUTO: 33 % (ref 14–44)
MCH RBC QN AUTO: 32.1 PG (ref 26.8–34.3)
MCHC RBC AUTO-ENTMCNC: 34.8 G/DL (ref 31.4–37.4)
MCV RBC AUTO: 92 FL (ref 82–98)
MONOCYTES # BLD AUTO: 0.55 THOUSAND/ΜL (ref 0.17–1.22)
MONOCYTES NFR BLD AUTO: 9 % (ref 4–12)
NEUTROPHILS # BLD AUTO: 3.12 THOUSANDS/ΜL (ref 1.85–7.62)
NEUTS SEG NFR BLD AUTO: 53 % (ref 43–75)
NRBC BLD AUTO-RTO: 0 /100 WBCS
PLATELET # BLD AUTO: 193 THOUSANDS/UL (ref 149–390)
PMV BLD AUTO: 10.1 FL (ref 8.9–12.7)
PROT SERPL-MCNC: 7.6 G/DL (ref 6.4–8.4)
RBC # BLD AUTO: 4.7 MILLION/UL (ref 3.88–5.62)
WBC # BLD AUTO: 5.96 THOUSAND/UL (ref 4.31–10.16)

## 2022-09-08 PROCEDURE — 86480 TB TEST CELL IMMUN MEASURE: CPT

## 2022-09-08 PROCEDURE — 86141 C-REACTIVE PROTEIN HS: CPT

## 2022-09-08 PROCEDURE — 85025 COMPLETE CBC W/AUTO DIFF WBC: CPT

## 2022-09-08 PROCEDURE — 36415 COLL VENOUS BLD VENIPUNCTURE: CPT

## 2022-09-08 PROCEDURE — 80076 HEPATIC FUNCTION PANEL: CPT

## 2022-09-08 PROCEDURE — 82565 ASSAY OF CREATININE: CPT

## 2022-09-10 LAB
GAMMA INTERFERON BACKGROUND BLD IA-ACNC: 0.04 IU/ML
M TB IFN-G BLD-IMP: NEGATIVE
M TB IFN-G CD4+ BCKGRND COR BLD-ACNC: 0.01 IU/ML
M TB IFN-G CD4+ BCKGRND COR BLD-ACNC: 0.01 IU/ML
MITOGEN IGNF BCKGRD COR BLD-ACNC: >10 IU/ML

## 2022-10-12 ENCOUNTER — OFFICE VISIT (OUTPATIENT)
Dept: FAMILY MEDICINE CLINIC | Facility: CLINIC | Age: 67
End: 2022-10-12
Payer: COMMERCIAL

## 2022-10-12 VITALS
SYSTOLIC BLOOD PRESSURE: 138 MMHG | WEIGHT: 197 LBS | HEART RATE: 49 BPM | BODY MASS INDEX: 27.58 KG/M2 | DIASTOLIC BLOOD PRESSURE: 84 MMHG | TEMPERATURE: 98.2 F | HEIGHT: 71 IN

## 2022-10-12 DIAGNOSIS — Z23 ENCOUNTER FOR IMMUNIZATION: ICD-10-CM

## 2022-10-12 DIAGNOSIS — Z13.220 NEED FOR LIPID SCREENING: ICD-10-CM

## 2022-10-12 DIAGNOSIS — Z13.1 SCREENING FOR DIABETES MELLITUS: ICD-10-CM

## 2022-10-12 DIAGNOSIS — I48.91 ATRIAL FIBRILLATION, UNSPECIFIED TYPE (HCC): Primary | ICD-10-CM

## 2022-10-12 DIAGNOSIS — R06.02 SHORTNESS OF BREATH ON EXERTION: ICD-10-CM

## 2022-10-12 DIAGNOSIS — Z12.5 SCREENING FOR PROSTATE CANCER: ICD-10-CM

## 2022-10-12 PROCEDURE — 99213 OFFICE O/P EST LOW 20 MIN: CPT | Performed by: FAMILY MEDICINE

## 2022-10-12 PROCEDURE — 90682 RIV4 VACC RECOMBINANT DNA IM: CPT | Performed by: FAMILY MEDICINE

## 2022-10-12 PROCEDURE — 90471 IMMUNIZATION ADMIN: CPT | Performed by: FAMILY MEDICINE

## 2022-10-12 NOTE — PROGRESS NOTES
Name: Fidencio Otero      : 1955      MRN: [id number]  Encounter Provider: Merced Hugo MD  Encounter Date: 10/12/2022   Encounter department: [address] [place] [address]     1  Atrial fibrillation, unspecified type ([place] [address] )  F/U with Dr Shirin Olmos  2  Shortness of breath on exertion  -     Echo stress test, exercise; Future; Expected date: 10/12/2022  -     Echo complete w/ contrast if indicated; Future; Expected date: 10/12/2022    3  Screening for diabetes mellitus  -     Comprehensive metabolic panel; Future    4  Screening for prostate cancer  -     PSA, Total Screen; Future    5  Encounter for immunization  -     influenza vaccine, quadrivalent, recombinant, PF, 0 5 mL    6  Need for lipid screening  -     Lipid panel; Future    Follow up as needed       Subjective     Patient is here for a yearly exam   Has a Hx of atrial fibrillation and sick sinus syndrome  Has bradycardia today which is "normal for him"  He has been having SOB on exertion and going up hills  He deneis any chest pain or palpitations when this happens however  Review of Systems   Constitutional: Negative for activity change, appetite change, fatigue and fever  HENT: Negative for congestion and ear discharge  Respiratory: Positive for shortness of breath  Negative for cough  Cardiovascular: Negative for chest pain and palpitations  Gastrointestinal: Negative for diarrhea and nausea  Musculoskeletal: Negative for arthralgias and back pain  Skin: Negative for color change and rash  Neurological: Negative for dizziness and headaches  Psychiatric/Behavioral: Negative for agitation and behavioral problems         Past Medical History:   Diagnosis Date   • A-fib [facility])    • Arthritis    • BPH (benign prostatic hyperplasia)    • Colon polyp    • Fatty liver    • Fracture, foot    • High cholesterol    • Hyperlipidemia    • Irregular heart beat    • Non-ischemic cardiomyopathy ([place] [address] )    • Osteoarthritis    • Psoriasis    • Psoriatic arthritis ([facility] )    • Pulmonary HTN (HCC)      Past Surgical History:   Procedure Laterality Date   • COLONOSCOPY     • ELBOW SURGERY Right    • TRANSURETHRAL RESECTION OF PROSTATE      last assessed 7/21/15     Family History   Problem Relation Age of Onset   • Diabetes Mother    • Hypertension Father    • Diabetes Family      Social History     Socioeconomic History   • Marital status: /Civil Union     Spouse name: None   • Number of children: None   • Years of education: None   • Highest education level: None   Occupational History   • None   Tobacco Use   • Smoking status: Former Smoker     Packs/day: 0 50     Years: 10 00     Pack years: 5 00     Types: Cigarettes   • Smokeless tobacco: Never Used   Vaping Use   • Vaping Use: Never used   Substance and Sexual Activity   • Alcohol use: Yes     Comment: socially-  "couple beers a day"  (<6)   • Drug use: No   • Sexual activity: None   Other Topics Concern   • None   Social History Narrative    Daily coffee consumption (__cups/day)     Daily cola consumption (__cans/day)      Social Determinants of Health     Financial Resource Strain: Not on file   Food Insecurity: Not on file   Transportation Needs: Not on file   Physical Activity: Not on file   Stress: Not on file   Social Connections: Not on file   Intimate Partner Violence: Not on file   Housing Stability: Not on file     Current Outpatient Medications on File Prior to Visit   Medication Sig   • adalimumab (HUMIRA) 40 mg/0 8 mL PSKT Inject 40 mg under the skin every 14 (fourteen) days    • naproxen (NAPROSYN) 500 mg tablet      Allergies   Allergen Reactions   • Penicillins Rash     Immunization History   Administered Date(s) Administered   • COVID-19 MODERNA VACC 0 5 ML IM 03/31/2021, 04/28/2021, 12/16/2021   • Influenza Injectable, MDCK, Preservative Free, Quadrivalent, 0 5 mL 09/25/2019   • Influenza, recombinant, quadrivalent,injectable, preservative free 10/12/2022   • Pneumococcal Conjugate 13-Valent 01/06/2020   • Tdap 07/21/2015       Objective     /84   Pulse (!) 49   Temp 98 2 °F (36 8 °C)   Ht 5' 11" (1 803 m)   Wt 89 4 kg (197 lb)   BMI 27 48 kg/m²     Physical Exam  Constitutional:       General: He is not in acute distress  Appearance: He is well-developed  HENT:      Head: Normocephalic and atraumatic  Eyes:      Conjunctiva/sclera: Conjunctivae normal    Pulmonary:      Effort: Pulmonary effort is normal  No respiratory distress  Musculoskeletal:         General: Normal range of motion  Cervical back: Normal range of motion  Neurological:      Mental Status: He is alert and oriented to person, place, and time     Psychiatric:         Behavior: Behavior normal        Navjot Crisostomo MD

## 2022-10-14 ENCOUNTER — APPOINTMENT (OUTPATIENT)
Dept: LAB | Facility: CLINIC | Age: 67
End: 2022-10-14
Payer: COMMERCIAL

## 2022-10-14 DIAGNOSIS — Z12.5 SCREENING FOR PROSTATE CANCER: ICD-10-CM

## 2022-10-14 DIAGNOSIS — Z13.220 NEED FOR LIPID SCREENING: ICD-10-CM

## 2022-10-14 DIAGNOSIS — Z13.1 SCREENING FOR DIABETES MELLITUS: ICD-10-CM

## 2022-10-14 LAB
ALBUMIN SERPL BCP-MCNC: 3.6 G/DL (ref 3.5–5)
ALP SERPL-CCNC: 63 U/L (ref 46–116)
ALT SERPL W P-5'-P-CCNC: 57 U/L (ref 12–78)
ANION GAP SERPL CALCULATED.3IONS-SCNC: 5 MMOL/L (ref 4–13)
AST SERPL W P-5'-P-CCNC: 28 U/L (ref 5–45)
BILIRUB SERPL-MCNC: 0.69 MG/DL (ref 0.2–1)
BUN SERPL-MCNC: 20 MG/DL (ref 5–25)
CALCIUM SERPL-MCNC: 9.3 MG/DL (ref 8.3–10.1)
CHLORIDE SERPL-SCNC: 108 MMOL/L (ref 96–108)
CHOLEST SERPL-MCNC: 200 MG/DL
CO2 SERPL-SCNC: 27 MMOL/L (ref 21–32)
CREAT SERPL-MCNC: 0.99 MG/DL (ref 0.6–1.3)
GFR SERPL CREATININE-BSD FRML MDRD: 80 ML/MIN/1.73SQ M
GLUCOSE P FAST SERPL-MCNC: 128 MG/DL (ref 65–99)
HDLC SERPL-MCNC: 44 MG/DL
LDLC SERPL CALC-MCNC: 137 MG/DL (ref 0–100)
NONHDLC SERPL-MCNC: 156 MG/DL
POTASSIUM SERPL-SCNC: 3.8 MMOL/L (ref 3.5–5.3)
PROT SERPL-MCNC: 7.6 G/DL (ref 6.4–8.4)
SODIUM SERPL-SCNC: 140 MMOL/L (ref 135–147)
TRIGL SERPL-MCNC: 97 MG/DL

## 2022-10-14 PROCEDURE — 80053 COMPREHEN METABOLIC PANEL: CPT

## 2022-10-14 PROCEDURE — 80061 LIPID PANEL: CPT

## 2022-10-14 PROCEDURE — 36415 COLL VENOUS BLD VENIPUNCTURE: CPT

## 2022-10-18 DIAGNOSIS — R73.09 SUGAR BLOOD LEVEL INCREASED: Primary | ICD-10-CM

## 2022-10-19 NOTE — PROGRESS NOTES
Cardiology Follow Up    Mckay Bridges  1955  [id number]  [address] [facility]  [facility]  [address]  [phone]    1  Paroxysmal atrial fibrillation (HCC)  POCT ECG   2  Atrial flutter, unspecified type ([address] )  POCT ECG   3  Psoriatic arthritis ([address] )     4  Alcohol use     5  Bradycardia     6   Palpitation         Interval History:   Occasional episodes of palpitation  Sometimes good for months and sometimes has intermittent palpitation    The patient has a known history of atrial flutter and fibrillation   He underwent comprehensive ablation in February 2017  He had some brief episodes of palpitation in January 2021   In the 1st week of February he did have palpitations for 2 days    He has gained some weight   He has become hypertensive   He drinks 2 beers every day and occasionally 4-6 beers a day in the week end    He is still active at work   Not complaining of anginal like chest pain or chest pressure   Not complaining of worsening orthopnea or PND  No leg swelling     He did have palpitation for 2 days in February  He did drink during those days  He was also shoveling snow    Is not complaining of presyncope or syncope  His not complaining of orthostatic lightheadedness    Is not complaining of exertional intolerance     He is not smoking tobacco  He is not using energy drinks   He is taking a significant amount of alcohol at least 14 units a week    The patient does have a history of psoriatic arthritis and is on Humira    Patient was recently found to be hypertensive  He does have some bradycardia  He has not yet been started on therapy for his hypertension      /74 (BP Location: Left arm, Patient Position: Sitting, Cuff Size: Adult)   Pulse (!) 45   Ht 5' 11" (1 803 m)   Wt 90 7 kg (200 lb)   BMI 27 89 kg/m²       Patient Active Problem List   Diagnosis   • Atrial fibrillation ([address] ) • Fatigue   • Shortness of breath   • Psoriatic arthritis (HCC)   • Screening for HIV (human immunodeficiency virus)   • BMI 29 0-29 9,adult   • Need for pneumococcal vaccination   • NDPH (new daily persistent headache)   • Atrial flutter (HCC)   • Bradycardia   • Palpitation   • Alcohol use   • CATY (obstructive sleep apnea)   • COVID-19 virus infection   • Shortness of breath on exertion     Past Medical History:   Diagnosis Date   • A-fib (HCC)    • Arthritis    • BPH (benign prostatic hyperplasia)    • Colon polyp    • Fatty liver    • Fracture, foot    • High cholesterol    • Hyperlipidemia    • Irregular heart beat    • Non-ischemic cardiomyopathy (HCC)    • Osteoarthritis    • Psoriasis    • Psoriatic arthritis (HCC)    • Pulmonary HTN (HCC)      Social History     Socioeconomic History   • Marital status: /Civil Union     Spouse name: Not on file   • Number of children: Not on file   • Years of education: Not on file   • Highest education level: Not on file   Occupational History   • Not on file   Tobacco Use   • Smoking status: Former Smoker     Packs/day: 0 50     Years: 10 00     Pack years: 5 00     Types: Cigarettes   • Smokeless tobacco: Never Used   Vaping Use   • Vaping Use: Never used   Substance and Sexual Activity   • Alcohol use: Yes     Comment: socially-  "couple beers a day"  (<6)   • Drug use: No   • Sexual activity: Not on file   Other Topics Concern   • Not on file   Social History Narrative    Daily coffee consumption (__cups/day)     Daily cola consumption (__cans/day)      Social Determinants of Health     Financial Resource Strain: Not on file   Food Insecurity: Not on file   Transportation Needs: Not on file   Physical Activity: Not on file   Stress: Not on file   Social Connections: Not on file   Intimate Partner Violence: Not on file   Housing Stability: Not on file      Family History   Problem Relation Age of Onset   • Diabetes Mother    • Hypertension Father    • Diabetes Family      Past Surgical History:   Procedure Laterality Date   • COLONOSCOPY     • ELBOW SURGERY Right    • TRANSURETHRAL RESECTION OF PROSTATE      last assessed 7/21/15       Current Outpatient Medications:   •  adalimumab (HUMIRA) 40 mg/0 8 mL PSKT, Inject 40 mg under the skin every 14 (fourteen) days , Disp: , Rfl:   •  naproxen (NAPROSYN) 500 mg tablet, , Disp: , Rfl:   Allergies   Allergen Reactions   • Penicillins Rash       Labs:  Lab Results   Component Value Date     03/15/2016    K 3 8 10/14/2022    K 4 2 03/15/2016     10/14/2022     03/15/2016    CO2 27 10/14/2022    CO2 26 03/15/2016    BUN 20 10/14/2022    BUN 18 03/15/2016    CREATININE 0 99 10/14/2022    CREATININE 0 99 03/15/2016    GLUCOSE 106 (H) 11/15/2016    CALCIUM 9 3 10/14/2022    CALCIUM 9 5 03/15/2016     No results found for: CKTOTAL, CKMB, CKMBINDEX, TROPONINI  Lab Results   Component Value Date    WBC 5 96 09/08/2022    HGB 15 1 09/08/2022    HCT 43 4 09/08/2022    MCV 92 09/08/2022     09/08/2022     Lab Results   Component Value Date    CHOL 190 11/15/2016    TRIG 97 10/14/2022    TRIG 113 11/15/2016    HDL 44 10/14/2022    HDL 54 11/15/2016     Imaging:     Ambulatory Extended Holter Monitor  2/22/2021-3/8/2021  1 of 2      3/12/2021-3/26/2021  2 of 2            Review of Systems:  Review of Systems   All other systems reviewed and are negative  as described in my history of present illness    Physical Exam:  Physical Exam  Vitals reviewed  Constitutional:       General: He is not in acute distress  Appearance: Normal appearance  He is well-developed  He is not ill-appearing  Comments: Not in any distress at the current time   HENT:      Head: Normocephalic and atraumatic  Right Ear: External ear normal       Left Ear: External ear normal       Nose: Nose normal       Mouth/Throat:      Pharynx: Uvula midline     Eyes:      General: Lids are normal       Extraocular Movements: Extraocular movements intact  Conjunctiva/sclera: Conjunctivae normal       Pupils: Pupils are equal, round, and reactive to light  Comments: No pallor  No cyanosis  No icterus   Neck:      Thyroid: No thyromegaly  Vascular: No carotid bruit or JVD  Trachea: Trachea normal       Comments: No jugular lymphadenopathy  Cardiovascular:      Rate and Rhythm: Normal rate and regular rhythm  Chest Wall: PMI is not displaced  Pulses: Normal pulses  Heart sounds: Normal heart sounds, S1 normal and S2 normal  No murmur heard  No friction rub  No gallop  No S3 or S4 sounds  Pulmonary:      Effort: Pulmonary effort is normal  No accessory muscle usage or respiratory distress  Breath sounds: Normal breath sounds  No decreased breath sounds, wheezing, rhonchi or rales  Chest:      Chest wall: No tenderness  Abdominal:      General: Abdomen is flat  Bowel sounds are normal  There is no distension  Palpations: Abdomen is soft  There is no hepatomegaly, splenomegaly or mass  Tenderness: There is no abdominal tenderness  Musculoskeletal:         General: No swelling, tenderness or deformity  Normal range of motion  Cervical back: Normal range of motion and neck supple  No rigidity  No muscular tenderness  Lymphadenopathy:      Cervical: No cervical adenopathy  Skin:     Coloration: Skin is not jaundiced or pale  Findings: No abrasion, bruising, erythema, lesion or rash  Nails: There is no clubbing  Neurological:      Mental Status: He is alert and oriented to person, place, and time  Mental status is at baseline  Comments: Facial symmetry is retained  Extraocular movements are retained  Head neck tongue and palate movement are retained and symmetric   Psychiatric:         Mood and Affect: Mood normal          Speech: Speech normal          Behavior: Behavior normal          Thought Content: Thought content normal          Discussion/Summary:    1  palpitation  2  atrial flutter   3  Atrial fibrillation    Ablated in 2017   Patient thinks he had a relapse in February, when he was shoveling snow for 2 days    He has some new risk factors   -increasing weight   -suspicion of sleep apnea  -hypertension  -Increase alcohol drinking, at least 14 units a week and at times binge drinking    At the current time my recommendation for the patient   -event monitor to make symptom rhythm correlation  -evaluate for sleep apnea  - alcohol, not to eat drink more than 3 -5 units in a week  -aggressive management of hypertension    If we do identify atrial fibrillation, will need to review therapy-medication+ pacemaker versus repeat ablation         4   Sick sinus syndrome    sinus bradycardia  This will limit use of medications like propafenone, sotalol, amiodarone   If it continues, and patient is symptomatic, patient will need a pacemaker      5   hypertension  Blood pressure is 154/92   Starting patient on lisinopril 20 mg 2 times daily  Follow-up for dry cough   Follow-up kidney function         6  alcohol abuse   Drinking 2 beers every day  Sometimes he can go up to 4-6 beer a day  This is significant drinking for a patient with atrial fibrillation   This can cause worsening of atrial fibrillation  Recommend to significantly decrease, not more than 3 units in a week      7   psoriatic arthritis   On Humira  Patient advised to maintain proper preventive measures to avoid COVID infection      8   sleep apnea  Increasing weight, alcohol use   Occasional history suggestive of snoring  Recommend repeat sleep study           summary of my recommendation for the patient   Event monitor for 7 days  Gray for home monitoring after that    Alcohol - not to exceed 3-5 /wk    Depending upon recurrence of AFib and heart rate will decide on medication +pacemaker versus ablation

## 2022-10-24 ENCOUNTER — OFFICE VISIT (OUTPATIENT)
Dept: CARDIOLOGY CLINIC | Facility: CLINIC | Age: 67
End: 2022-10-24
Payer: COMMERCIAL

## 2022-10-24 ENCOUNTER — TELEPHONE (OUTPATIENT)
Dept: CARDIOLOGY CLINIC | Facility: CLINIC | Age: 67
End: 2022-10-24

## 2022-10-24 VITALS
SYSTOLIC BLOOD PRESSURE: 110 MMHG | HEART RATE: 45 BPM | BODY MASS INDEX: 28 KG/M2 | HEIGHT: 71 IN | DIASTOLIC BLOOD PRESSURE: 74 MMHG | WEIGHT: 200 LBS

## 2022-10-24 DIAGNOSIS — R00.2 PALPITATION: ICD-10-CM

## 2022-10-24 DIAGNOSIS — I48.92 ATRIAL FLUTTER, UNSPECIFIED TYPE (HCC): ICD-10-CM

## 2022-10-24 DIAGNOSIS — R00.1 BRADYCARDIA: ICD-10-CM

## 2022-10-24 DIAGNOSIS — I48.0 PAROXYSMAL ATRIAL FIBRILLATION (HCC): Primary | ICD-10-CM

## 2022-10-24 DIAGNOSIS — Z78.9 ALCOHOL USE: ICD-10-CM

## 2022-10-24 DIAGNOSIS — L40.50 PSORIATIC ARTHRITIS (HCC): ICD-10-CM

## 2022-10-24 PROCEDURE — 99214 OFFICE O/P EST MOD 30 MIN: CPT | Performed by: INTERNAL MEDICINE

## 2022-10-24 PROCEDURE — 93000 ELECTROCARDIOGRAM COMPLETE: CPT | Performed by: INTERNAL MEDICINE

## 2022-10-24 NOTE — PATIENT INSTRUCTIONS
7 day Zio event monitor  Phone follow up  Return to the office in 6 months, but call sooner if any problems

## 2022-10-24 NOTE — TELEPHONE ENCOUNTER
Adelaide Vences  Pt in the office  Has [facility] to apply 7 day ZIo? Thank you

## 2022-10-24 NOTE — LETTER
October 24, 2022     Fauzia Marie, [address]  [facility]  [address]    Patient: Mitch Joe   YOB: 1955   Date of Visit: 10/24/2022       Dear Dr Dain Martinez:    Thank you for referring Chris Cottrell to me for evaluation  Below are my notes for this consultation  If you have questions, please do not hesitate to call me  I look forward to following your patient along with you  Sincerely,        Jenni Nolan MD        CC: Goldie Linder MD  10/24/2022  9:00 AM  Sign when Signing Visit                                             Cardiology Follow Up    [facility]  1955  [id number]  [address]  One [facility] [address]  [phone]  [phone]    1  Paroxysmal atrial fibrillation (HCC)  POCT ECG   2  Atrial flutter, unspecified type ([address] )  POCT ECG   3  Psoriatic arthritis ([facility] )     4  Alcohol use     5  Bradycardia     6   Palpitation         Interval History:   Occasional episodes of palpitation  Sometimes good for months and sometimes has intermittent palpitation    The patient has a known history of atrial flutter and fibrillation   He underwent comprehensive ablation in February 2017  He had some brief episodes of palpitation in January 2021   In the 1st week of February he did have palpitations for 2 days    He has gained some weight   He has become hypertensive   He drinks 2 beers every day and occasionally 4-6 beers a day in the week end    He is still active at work   Not complaining of anginal like chest pain or chest pressure   Not complaining of worsening orthopnea or PND  No leg swelling     He did have palpitation for 2 days in February  He did drink during those days  He was also shoveling snow    Is not complaining of presyncope or syncope  His not complaining of orthostatic lightheadedness    Is not complaining of exertional intolerance     He is not smoking tobacco  He is not using energy drinks   He is taking a significant amount of alcohol at least 14 units a week    The patient does have a history of psoriatic arthritis and is on Humira    Patient was recently found to be hypertensive  He does have some bradycardia  He has not yet been started on therapy for his hypertension      /74 (BP Location: Left arm, Patient Position: Sitting, Cuff Size: Adult)   Pulse (!) 45   Ht 5' 11" (1 803 m)   Wt 90 7 kg (200 lb)   BMI 27 89 kg/m²       Patient Active Problem List   Diagnosis   • Atrial fibrillation (HCC)   • Fatigue   • Shortness of breath   • Psoriatic arthritis (HCC)   • Screening for HIV (human immunodeficiency virus)   • BMI 29 0-29 9,adult   • Need for pneumococcal vaccination   • NDPH (new daily persistent headache)   • Atrial flutter (HCC)   • Bradycardia   • Palpitation   • Alcohol use   • CATY (obstructive sleep apnea)   • COVID-19 virus infection   • Shortness of breath on exertion     Past Medical History:   Diagnosis Date   • A-fib (HCC)    • Arthritis    • BPH (benign prostatic hyperplasia)    • Colon polyp    • Fatty liver    • Fracture, foot    • High cholesterol    • Hyperlipidemia    • Irregular heart beat    • Non-ischemic cardiomyopathy (HCC)    • Osteoarthritis    • Psoriasis    • Psoriatic arthritis (HCC)    • Pulmonary HTN (HCC)      Social History     Socioeconomic History   • Marital status: /Civil Union     Spouse name: Not on file   • Number of children: Not on file   • Years of education: Not on file   • Highest education level: Not on file   Occupational History   • Not on file   Tobacco Use   • Smoking status: Former Smoker     Packs/day: 0 50     Years: 10 00     Pack years: 5 00     Types: Cigarettes   • Smokeless tobacco: Never Used   Vaping Use   • Vaping Use: Never used   Substance and Sexual Activity   • Alcohol use: Yes     Comment: socially-  "couple beers a day"  (<6)   • Drug use: No   • Sexual activity: Not on file   Other Topics Concern   • Not on file   Social History Narrative    Daily coffee consumption (__cups/day)     Daily cola consumption (__cans/day)      Social Determinants of Health     Financial Resource Strain: Not on file   Food Insecurity: Not on file   Transportation Needs: Not on file   Physical Activity: Not on file   Stress: Not on file   Social Connections: Not on file   Intimate Partner Violence: Not on file   Housing Stability: Not on file      Family History   Problem Relation Age of Onset   • Diabetes Mother    • Hypertension Father    • Diabetes Family      Past Surgical History:   Procedure Laterality Date   • COLONOSCOPY     • ELBOW SURGERY Right    • TRANSURETHRAL RESECTION OF PROSTATE      last assessed 7/21/15       Current Outpatient Medications:   •  adalimumab (HUMIRA) 40 mg/0 8 mL PSKT, Inject 40 mg under the skin every 14 (fourteen) days , Disp: , Rfl:   •  naproxen (NAPROSYN) 500 mg tablet, , Disp: , Rfl:   Allergies   Allergen Reactions   • Penicillins Rash       Labs:  Lab Results   Component Value Date     03/15/2016    K 3 8 10/14/2022    K 4 2 03/15/2016     10/14/2022     03/15/2016    CO2 27 10/14/2022    CO2 26 03/15/2016    BUN 20 10/14/2022    BUN 18 03/15/2016    CREATININE 0 99 10/14/2022    CREATININE 0 99 03/15/2016    GLUCOSE 106 (H) 11/15/2016    CALCIUM 9 3 10/14/2022    CALCIUM 9 5 03/15/2016     No results found for: CKTOTAL, CKMB, CKMBINDEX, TROPONINI  Lab Results   Component Value Date    WBC 5 96 09/08/2022    HGB 15 1 09/08/2022    HCT 43 4 09/08/2022    MCV 92 09/08/2022     09/08/2022     Lab Results   Component Value Date    CHOL 190 11/15/2016    TRIG 97 10/14/2022    TRIG 113 11/15/2016    HDL 44 10/14/2022    HDL 54 11/15/2016     Imaging:     Ambulatory Extended Holter Monitor  2/22/2021-3/8/2021  1 of 2      3/12/2021-3/26/2021  2 of 2            Review of Systems:  Review of Systems   All other systems reviewed and are negative     as described in my history of present illness    Physical Exam:  Physical Exam  Vitals reviewed  Constitutional:       General: He is not in acute distress  Appearance: Normal appearance  He is well-developed  He is not ill-appearing  Comments: Not in any distress at the current time   HENT:      Head: Normocephalic and atraumatic  Right Ear: External ear normal       Left Ear: External ear normal       Nose: Nose normal       Mouth/Throat:      Pharynx: Uvula midline  Eyes:      General: Lids are normal       Extraocular Movements: Extraocular movements intact  Conjunctiva/sclera: Conjunctivae normal       Pupils: Pupils are equal, round, and reactive to light  Comments: No pallor  No cyanosis  No icterus   Neck:      Thyroid: No thyromegaly  Vascular: No carotid bruit or JVD  Trachea: Trachea normal       Comments: No jugular lymphadenopathy  Cardiovascular:      Rate and Rhythm: Normal rate and regular rhythm  Chest Wall: PMI is not displaced  Pulses: Normal pulses  Heart sounds: Normal heart sounds, S1 normal and S2 normal  No murmur heard  No friction rub  No gallop  No S3 or S4 sounds  Pulmonary:      Effort: Pulmonary effort is normal  No accessory muscle usage or respiratory distress  Breath sounds: Normal breath sounds  No decreased breath sounds, wheezing, rhonchi or rales  Chest:      Chest wall: No tenderness  Abdominal:      General: Abdomen is flat  Bowel sounds are normal  There is no distension  Palpations: Abdomen is soft  There is no hepatomegaly, splenomegaly or mass  Tenderness: There is no abdominal tenderness  Musculoskeletal:         General: No swelling, tenderness or deformity  Normal range of motion  Cervical back: Normal range of motion and neck supple  No rigidity  No muscular tenderness  Lymphadenopathy:      Cervical: No cervical adenopathy  Skin:     Coloration: Skin is not jaundiced or pale        Findings: No abrasion, bruising, erythema, lesion or rash  Nails: There is no clubbing  Neurological:      Mental Status: He is alert and oriented to person, place, and time  Mental status is at baseline  Comments: Facial symmetry is retained  Extraocular movements are retained  Head neck tongue and palate movement are retained and symmetric   Psychiatric:         Mood and Affect: Mood normal          Speech: Speech normal          Behavior: Behavior normal          Thought Content: Thought content normal          Discussion/Summary:    1  palpitation  2  atrial flutter   3  Atrial fibrillation    Ablated in 2017   Patient thinks he had a relapse in February, when he was shoveling snow for 2 days    He has some new risk factors   -increasing weight   -suspicion of sleep apnea  -hypertension  -Increase alcohol drinking, at least 14 units a week and at times binge drinking    At the current time my recommendation for the patient   -event monitor to make symptom rhythm correlation  -evaluate for sleep apnea  - alcohol, not to eat drink more than 3 -5 units in a week  -aggressive management of hypertension    If we do identify atrial fibrillation, will need to review therapy-medication+ pacemaker versus repeat ablation         4   Sick sinus syndrome    sinus bradycardia  This will limit use of medications like propafenone, sotalol, amiodarone   If it continues, and patient is symptomatic, patient will need a pacemaker      5   hypertension  Blood pressure is 154/92   Starting patient on lisinopril 20 mg 2 times daily  Follow-up for dry cough   Follow-up kidney function         6  alcohol abuse   Drinking 2 beers every day  Sometimes he can go up to 4-6 beer a day  This is significant drinking for a patient with atrial fibrillation   This can cause worsening of atrial fibrillation  Recommend to significantly decrease, not more than 3 units in a week      7   psoriatic arthritis   On Humira  Patient advised to maintain proper preventive measures to avoid COVID infection      8   sleep apnea  Increasing weight, alcohol use   Occasional history suggestive of snoring  Recommend repeat sleep study           summary of my recommendation for the patient   Event monitor for 7 days  Gray for home monitoring after that    Alcohol - not to exceed 3-5 /wk    Depending upon recurrence of AFib and heart rate will decide on medication +pacemaker versus ablation

## 2022-11-04 ENCOUNTER — CLINICAL SUPPORT (OUTPATIENT)
Dept: CARDIOLOGY CLINIC | Facility: CLINIC | Age: 67
End: 2022-11-04

## 2022-11-04 DIAGNOSIS — R00.2 PALPITATION: ICD-10-CM

## 2022-11-04 DIAGNOSIS — R00.1 BRADYCARDIA: ICD-10-CM

## 2022-11-04 DIAGNOSIS — I48.0 PAROXYSMAL ATRIAL FIBRILLATION (HCC): ICD-10-CM

## 2022-11-04 DIAGNOSIS — L40.50 PSORIATIC ARTHRITIS (HCC): ICD-10-CM

## 2022-11-04 DIAGNOSIS — I48.92 ATRIAL FLUTTER, UNSPECIFIED TYPE (HCC): ICD-10-CM

## 2022-11-04 DIAGNOSIS — I48.0 PAROXYSMAL ATRIAL FIBRILLATION (HCC): Primary | ICD-10-CM

## 2022-11-04 DIAGNOSIS — Z78.9 ALCOHOL USE: ICD-10-CM

## 2022-11-04 NOTE — PROGRESS NOTES
"  SUBJECTIVE:                                                    Madie Silva is a 89 year old female who presents to clinic today for the following health issues:      Medication Followup of Gabapentin     Taking Medication as prescribed: yes    Side Effects:  No     Medication Helping Symptoms:  Yes    Overall, her discomfort in her neck and shoulder significantly better.   Zoster area pain controlled.  Sleeping much better.  No significant significant side effects.    Remains dizzy, worse when first gets up.   Will last a few minutes.  Unclear how much is vertigo, not much different since on gabapentin.         Reviewed and updated as needed this visit by clinical staff:  Medications  Allergies  Soc Hx   Additional history: as documented    Additional issues to address:  1.  Patient saw Dr. Mchugh 6/1 for L knee pain.   Xray done, and knee injected.  Minimal improvements.    - has only taken a bit of hydrocodone so far.       ROS:    Constitutional, cardiovascular, pulmonary, gi and gu systems are negative, except as otherwise noted.    OBJECTIVE:                                                      /60  Pulse 66  Temp 98.2  F (36.8  C) (Oral)  Ht 5' 5\" (1.651 m)  Wt 161 lb 3.2 oz (73.1 kg)  SpO2 97%  BMI 26.83 kg/m2  Body mass index is 26.83 kg/(m^2).   No apparent distress  Moving better, affect very bright        ASSESSMENT:                                                      1.  Post herpetic neuralgia, significant improvement with gabapentin  2.  Neck, shoulder pain improved  3.  Probable RLS, symptoms gone on gabapentin  4.  Ongoing L knee pain, defer to ortho    PLAN:                                                      1.  MEDICATIONS:  Continue current medications without change  2.  Follow-up with Dr. El for knee  3.  Recheck labs and follow-up in November    De Obregon MD  [facility]     " Called wife to let her know patient has had 25% atrial fibrillation on zio  Will start eliquis 5mg BID starting tonight as he was on this in the past  Wife understands  Will let office staff know our conversation and arrange f/u with Dr Ayesha George

## 2022-11-05 NOTE — RESULT ENCOUNTER NOTE
Normal Device Function  Approximately 6 days of recording     Predominant rhythm is sinus rhythm  25 percent atrial fibrillation    Minimum heart rate 41 per minute  Average heart rate 78 per minute  Maximum heart rate 203 per minute      SVT-31 episodes  Fastest 5 beats       Atrial fibrillation  25 percent   Average rate 117 per minute   Longest 1 day 13 hours      PACs less than 1 percent   PVC less than 1 percent        Patient called and informed to start anticoagulation

## 2022-11-07 ENCOUNTER — HOSPITAL ENCOUNTER (OUTPATIENT)
Dept: NON INVASIVE DIAGNOSTICS | Facility: CLINIC | Age: 67
Discharge: HOME | End: 2022-11-07

## 2022-11-07 VITALS
HEIGHT: 71 IN | BODY MASS INDEX: 28 KG/M2 | DIASTOLIC BLOOD PRESSURE: 88 MMHG | SYSTOLIC BLOOD PRESSURE: 152 MMHG | WEIGHT: 200 LBS | HEART RATE: 56 BPM | OXYGEN SATURATION: 99 %

## 2022-11-07 VITALS
HEART RATE: 47 BPM | SYSTOLIC BLOOD PRESSURE: 110 MMHG | BODY MASS INDEX: 28 KG/M2 | HEIGHT: 71 IN | WEIGHT: 200 LBS | DIASTOLIC BLOOD PRESSURE: 74 MMHG

## 2022-11-07 DIAGNOSIS — R06.02 SHORTNESS OF BREATH ON EXERTION: ICD-10-CM

## 2022-11-07 LAB
AORTIC ROOT: 3.7 CM
AORTIC VALVE MEAN VELOCITY: 8.1 M/S
APICAL FOUR CHAMBER EJECTION FRACTION: 55 %
AV LVOT MEAN GRADIENT: 1 MMHG
AV LVOT PEAK GRADIENT: 2 MMHG
AV MEAN GRADIENT: 3 MMHG
AV PEAK GRADIENT: 6 MMHG
AV VELOCITY RATIO: 0.57
BASELINE ST DEPRESSION: 0 MM
CHEST PAIN STATEMENT: NORMAL
CHEST PAIN STATEMENT: NORMAL
DOP CALC AO PEAK VEL: 1.2 M/S
DOP CALC AO VTI: 26.32 CM
DOP CALC LVOT PEAK VEL VTI: 15.64 CM
DOP CALC LVOT PEAK VEL: 0.68 M/S
E WAVE DECELERATION TIME: 397 MS
FRACTIONAL SHORTENING: 27 (ref 28–44)
INTERVENTRICULAR SEPTUM IN DIASTOLE (PARASTERNAL SHORT AXIS VIEW): 1.2 CM
INTERVENTRICULAR SEPTUM: 1.2 CM (ref 0.6–1.1)
LAAS-AP2: 26.8 CM2
LAAS-AP4: 22.6 CM2
LEFT ATRIUM AREA SYSTOLE SINGLE PLANE A4C: 21.9 CM2
LEFT ATRIUM SIZE: 4.5 CM
LEFT INTERNAL DIMENSION IN SYSTOLE: 3.7 CM (ref 2.1–4)
LEFT VENTRICULAR INTERNAL DIMENSION IN DIASTOLE: 5.1 CM (ref 3.5–6)
LEFT VENTRICULAR POSTERIOR WALL IN END DIASTOLE: 1.1 CM
LEFT VENTRICULAR STROKE VOLUME: 69 ML
LVSV (TEICH): 69 ML
MAX DIASTOLIC BP: 96 MMHG
MAX DIASTOLIC BP: 96 MMHG
MAX HEART RATE: 142 BPM
MAX HEART RATE: 142 BPM
MAX HR PERCENT: 91 %
MAX HR: 142 BPM
MAX PREDICTED HEART RATE: 156 BPM
MAX PREDICTED HEART RATE: 156 BPM
MAX. SYSTOLIC BP: 200 MMHG
MAX. SYSTOLIC BP: 200 MMHG
MV E'TISSUE VEL-SEP: 10 CM/S
MV PEAK A VEL: 0.22 M/S
MV PEAK E VEL: 63 CM/S
MV STENOSIS PRESSURE HALF TIME: 115 MS
MV VALVE AREA P 1/2 METHOD: 1.91
PROTOCOL NAME: NORMAL
PROTOCOL NAME: NORMAL
PULMONARY REGURGITATION LATE DIASTOLIC VELOCITY: 0.01 M/S
RATE PRESSURE PRODUCT: NORMAL
RIGHT ATRIUM AREA SYSTOLE A4C: 19.5 CM2
RIGHT VENTRICLE ID DIMENSION: 3.4 CM
SL CV LEFT ATRIUM LENGTH A2C: 5.8 CM
SL CV LV EF: 50
SL CV LV EF: 50
SL CV PED ECHO LEFT VENTRICLE DIASTOLIC VOLUME (MOD BIPLANE) 2D: 126 ML
SL CV PED ECHO LEFT VENTRICLE SYSTOLIC VOLUME (MOD BIPLANE) 2D: 57 ML
SL CV STRESS RECOVERY BP: NORMAL MMHG
SL CV STRESS RECOVERY HR: 64 BPM
SL CV STRESS RECOVERY O2 SAT: 98 %
SL CV STRESS STAGE REACHED: 3
STRESS ANGINA INDEX: 0
STRESS BASELINE BP: NORMAL MMHG
STRESS BASELINE HR: 56 BPM
STRESS DUKE TREADMILL SCORE: 9
STRESS O2 SAT REST: 99 %
STRESS PEAK HR: 142 BPM
STRESS POST ESTIMATED WORKLOAD: 10.1 METS
STRESS POST EXERCISE DUR MIN: 9 MIN
STRESS POST O2 SAT PEAK: 98 %
STRESS POST PEAK BP: 200 MMHG
STRESS ST DEPRESSION: 0 MM
TARGET HR FORMULA: NORMAL
TARGET HR FORMULA: NORMAL
TEST INDICATION: NORMAL
TEST INDICATION: NORMAL
TIME IN EXERCISE PHASE: NORMAL
TIME IN EXERCISE PHASE: NORMAL
TR MAX PG: 21 MMHG
TR PEAK VELOCITY: 2.3 M/S
TRICUSPID VALVE PEAK REGURGITATION VELOCITY: 2.3 M/S

## 2022-12-01 ENCOUNTER — TELEPHONE (OUTPATIENT)
Dept: CARDIOLOGY CLINIC | Facility: CLINIC | Age: 67
End: 2022-12-01

## 2022-12-01 NOTE — TELEPHONE ENCOUNTER
Malka Magana,    Patient's wife called stating they received statements from Medicare (he just started with [facility] on 11/1/22) that his stress echo from 11/7/22 is not covered  I really can't help them with this  Could you possibly call her Pato Anabela) or direct her to who can        Thanks

## 2022-12-19 NOTE — PROGRESS NOTES
Cardiology Follow Up    Katrin Perez  1955  [id number]  [facility]  [address] [address] [phone]    No diagnosis found  Interval History:   Occasional episodes of palpitation  Sometimes good for months and sometimes has intermittent palpitation    The patient has a known history of atrial flutter and fibrillation   He underwent comprehensive ablation in February 2017  He had some brief episodes of palpitation in January 2021   In the 1st week of February he did have palpitations for 2 days    He has gained some weight   He has become hypertensive   He drinks 2 beers every day and occasionally 4-6 beers a day in the week end    He is still active at work   Not complaining of anginal like chest pain or chest pressure   Not complaining of worsening orthopnea or PND  No leg swelling     He did have palpitation for 2 days in February  He did drink during those days  He was also shoveling snow    Is not complaining of presyncope or syncope  His not complaining of orthostatic lightheadedness    Is not complaining of exertional intolerance     He is not smoking tobacco  He is not using energy drinks   He is taking a significant amount of alcohol at least 14 units a week    The patient does have a history of psoriatic arthritis and is on Humira    Patient was recently found to be hypertensive  He does have some bradycardia  He has not yet been started on therapy for his hypertension      There were no vitals taken for this visit        Patient Active Problem List   Diagnosis   • Atrial fibrillation (HCC)   • Fatigue   • Shortness of breath   • Psoriatic arthritis ([facility] [address] )   • Screening for HIV (human immunodeficiency virus)   • BMI 29 0-29 9,adult   • Need for pneumococcal vaccination   • NDPH (new daily persistent headache)   • Atrial flutter (HCC)   • Bradycardia   • Palpitation   • Alcohol use • CATY (obstructive sleep apnea)   • COVID-19 virus infection   • Shortness of breath on exertion     Past Medical History:   Diagnosis Date   • A-fib (HCC)    • Arthritis    • BPH (benign prostatic hyperplasia)    • Colon polyp    • Fatty liver    • Fracture, foot    • High cholesterol    • Hyperlipidemia    • Irregular heart beat    • Non-ischemic cardiomyopathy (HCC)    • Osteoarthritis    • Psoriasis    • Psoriatic arthritis (HCC)    • Pulmonary HTN (HCC)      Social History     Socioeconomic History   • Marital status: /Civil Union     Spouse name: Not on file   • Number of children: Not on file   • Years of education: Not on file   • Highest education level: Not on file   Occupational History   • Not on file   Tobacco Use   • Smoking status: Former     Packs/day: 0 50     Years: 10 00     Pack years: 5 00     Types: Cigarettes   • Smokeless tobacco: Never   Vaping Use   • Vaping Use: Never used   Substance and Sexual Activity   • Alcohol use: Yes     Comment: socially-  "couple beers a day"  (<6)   • Drug use: No   • Sexual activity: Not on file   Other Topics Concern   • Not on file   Social History Narrative    Daily coffee consumption (__cups/day)     Daily cola consumption (__cans/day)      Social Determinants of Health     Financial Resource Strain: Not on file   Food Insecurity: Not on file   Transportation Needs: Not on file   Physical Activity: Not on file   Stress: Not on file   Social Connections: Not on file   Intimate Partner Violence: Not on file   Housing Stability: Not on file      Family History   Problem Relation Age of Onset   • Diabetes Mother    • Hypertension Father    • Diabetes Family      Past Surgical History:   Procedure Laterality Date   • COLONOSCOPY     • ELBOW SURGERY Right    • TRANSURETHRAL RESECTION OF PROSTATE      last assessed 7/21/15       Current Outpatient Medications:   •  adalimumab (HUMIRA) 40 mg/0 8 mL PSKT, Inject 40 mg under the skin every 14 (fourteen) days , Disp: , Rfl:   •  apixaban (Eliquis) 5 mg, Take 1 tablet (5 mg total) by mouth 2 (two) times a day, Disp: 60 tablet, Rfl: 11  Allergies   Allergen Reactions   • Penicillins Rash       Labs:  Lab Results   Component Value Date     03/15/2016    K 3 8 10/14/2022    K 4 2 03/15/2016     10/14/2022     03/15/2016    CO2 27 10/14/2022    CO2 26 03/15/2016    BUN 20 10/14/2022    BUN 18 03/15/2016    CREATININE 0 99 10/14/2022    CREATININE 0 99 03/15/2016    GLUCOSE 106 (H) 11/15/2016    CALCIUM 9 3 10/14/2022    CALCIUM 9 5 03/15/2016     No results found for: CKTOTAL, CKMB, CKMBINDEX, TROPONINI  Lab Results   Component Value Date    WBC 5 96 09/08/2022    HGB 15 1 09/08/2022    HCT 43 4 09/08/2022    MCV 92 09/08/2022     09/08/2022     Lab Results   Component Value Date    CHOL 190 11/15/2016    TRIG 97 10/14/2022    TRIG 113 11/15/2016    HDL 44 10/14/2022    HDL 54 11/15/2016     Imaging:     Ambulatory Extended Holter Monitor  2/22/2021-3/8/2021  1 of 2      3/12/2021-3/26/2021  2 of 2            Review of Systems:  Review of Systems   All other systems reviewed and are negative  as described in my history of present illness    Physical Exam:  Physical Exam  Vitals reviewed  Constitutional:       General: He is not in acute distress  Appearance: Normal appearance  He is well-developed  He is not ill-appearing  Comments: Not in any distress at the current time   HENT:      Head: Normocephalic and atraumatic  Right Ear: External ear normal       Left Ear: External ear normal       Nose: Nose normal       Mouth/Throat:      Pharynx: Uvula midline  Eyes:      General: Lids are normal       Extraocular Movements: Extraocular movements intact  Conjunctiva/sclera: Conjunctivae normal       Pupils: Pupils are equal, round, and reactive to light  Comments: No pallor  No cyanosis  No icterus   Neck:      Thyroid: No thyromegaly  Vascular: No carotid bruit or JVD  Trachea: Trachea normal       Comments: No jugular lymphadenopathy  Cardiovascular:      Rate and Rhythm: Normal rate and regular rhythm  Chest Wall: PMI is not displaced  Pulses: Normal pulses  Heart sounds: Normal heart sounds, S1 normal and S2 normal  No murmur heard  No friction rub  No gallop  No S3 or S4 sounds  Pulmonary:      Effort: Pulmonary effort is normal  No accessory muscle usage or respiratory distress  Breath sounds: Normal breath sounds  No decreased breath sounds, wheezing, rhonchi or rales  Chest:      Chest wall: No tenderness  Abdominal:      General: Abdomen is flat  Bowel sounds are normal  There is no distension  Palpations: Abdomen is soft  There is no hepatomegaly, splenomegaly or mass  Tenderness: There is no abdominal tenderness  Musculoskeletal:         General: No swelling, tenderness or deformity  Normal range of motion  Cervical back: Normal range of motion and neck supple  No rigidity  No muscular tenderness  Lymphadenopathy:      Cervical: No cervical adenopathy  Skin:     Coloration: Skin is not jaundiced or pale  Findings: No abrasion, bruising, erythema, lesion or rash  Nails: There is no clubbing  Neurological:      Mental Status: He is alert and oriented to person, place, and time  Mental status is at baseline  Comments: Facial symmetry is retained  Extraocular movements are retained  Head neck tongue and palate movement are retained and symmetric   Psychiatric:         Mood and Affect: Mood normal          Speech: Speech normal          Behavior: Behavior normal          Thought Content: Thought content normal          Discussion/Summary:    1  palpitation  2  atrial flutter   3   Atrial fibrillation    Ablated in 2017   Patient thinks he had a relapse in February, when he was shoveling snow for 2 days    He has some new risk factors   -increasing weight   -suspicion of sleep apnea  -hypertension  -Increase alcohol drinking, at least 14 units a week and at times binge drinking    At the current time my recommendation for the patient   -event monitor to make symptom rhythm correlation  -evaluate for sleep apnea  - alcohol, not to eat drink more than 3 -5 units in a week  -aggressive management of hypertension    If we do identify atrial fibrillation, will need to review therapy-medication+ pacemaker versus repeat ablation         4   Sick sinus syndrome    sinus bradycardia  This will limit use of medications like propafenone, sotalol, amiodarone   If it continues, and patient is symptomatic, patient will need a pacemaker      5   hypertension  Blood pressure is 154/92   Starting patient on lisinopril 20 mg 2 times daily  Follow-up for dry cough   Follow-up kidney function         6  alcohol abuse   Drinking 2 beers every day  Sometimes he can go up to 4-6 beer a day  This is significant drinking for a patient with atrial fibrillation   This can cause worsening of atrial fibrillation  Recommend to significantly decrease, not more than 3 units in a week      7   psoriatic arthritis   On Humira  Patient advised to maintain proper preventive measures to avoid COVID infection      8   sleep apnea  Increasing weight, alcohol use   Occasional history suggestive of snoring  Recommend repeat sleep study           summary of my recommendation for the patient   Event monitor for 7 days  Gray for home monitoring after that    Alcohol - not to exceed 3-5 /wk    Depending upon recurrence of AFib and heart rate will decide on medication +pacemaker versus ablation

## 2022-12-19 NOTE — PROGRESS NOTES
Cardiology Follow Up    Aristides Lizama  1955  [id number]  [facility]  [facility]  [address]  [phone]    1  Paroxysmal atrial fibrillation (HCC)  POCT ECG      2  CATY (obstructive sleep apnea)        3  Psoriatic arthritis (HCC)        4  Palpitation        5  Alcohol use        6  Shortness of breath on exertion        7  Fatigue, unspecified type        8   Bradycardia              Summary of my recommendation for the patient  Set up for comprehensive A  fib ablation-posterior wall, check PVI  CAROL before the procedure  Cryo and NavX  Hold Eliquis the night before and morning of the procedure    Start lisinopril 20 mg daily for hypertension  Check a BMP in a week    Check TSH, hemoglobin A1c    Complete cessation of alcohol recommended      Interval History:   Patient is having more palpitation at this time  Previously dofetilide did not work well and he reverted back to A  fib quickly  He has underlying bradycardia and does not tolerate rate control agents   He is agreeable to proceed with repeat ablation    He understands that alcohol is a significant precipitator of A  fib  He still drinks quite a bit on a weekly basis    The patient has a known history of atrial flutter and fibrillation   He underwent comprehensive ablation in February 2017  He had some brief episodes of palpitation in January 2021   In the 1st week of February he did have palpitations for 2 days    He has gained some weight   He has become hypertensive   He drinks 2 beers every day and occasionally 4-6 beers a day in the week end    He is still active at work   Not complaining of anginal like chest pain or chest pressure   Not complaining of worsening orthopnea or PND  No leg swelling     He did have palpitation for 2 days in February  He did drink during those days  He was also shoveling snow    Is not complaining of presyncope or syncope  His not complaining of orthostatic lightheadedness    Is not complaining of exertional intolerance     He is not smoking tobacco  He is not using energy drinks   He is taking a significant amount of alcohol at least 14 units a week    The patient does have a history of psoriatic arthritis and is on Humira    Patient was recently found to be hypertensive  He does have some bradycardia  He has not yet been started on therapy for his hypertension      /90 (BP Location: Right arm, Patient Position: Sitting, Cuff Size: Standard)   Pulse (!) 109   Ht 5' 11" (1 803 m)   Wt 91 9 kg (202 lb 8 oz)   BMI 28 24 kg/m²       Patient Active Problem List   Diagnosis   • Atrial fibrillation (HCC)   • Fatigue   • Psoriatic arthritis ([facility] )   • BMI 29 0-29 9,adult   • Need for pneumococcal vaccination   • NDPH (new daily persistent headache)   • Atrial flutter (HCC)   • Bradycardia   • Palpitation   • Alcohol use   • CATY (obstructive sleep apnea)   • COVID-19 virus infection   • Shortness of breath on exertion     Past Medical History:   Diagnosis Date   • A-fib (HCC)    • Arthritis    • BPH (benign prostatic hyperplasia)    • Colon polyp    • Fatty liver    • Fracture, foot    • High cholesterol    • Hyperlipidemia    • Irregular heart beat    • Non-ischemic cardiomyopathy (HCC)    • Osteoarthritis    • Psoriasis    • Psoriatic arthritis (HCC)    • Pulmonary HTN (HCC)      Social History     Socioeconomic History   • Marital status: /Civil Union     Spouse name: Not on file   • Number of children: Not on file   • Years of education: Not on file   • Highest education level: Not on file   Occupational History   • Not on file   Tobacco Use   • Smoking status: Former     Packs/day: 0 50     Years: 10 00     Pack years: 5 00     Types: Cigarettes   • Smokeless tobacco: Never   Vaping Use   • Vaping Use: Never used   Substance and Sexual Activity   • Alcohol use: Yes     Comment: 1 drink daily   • Drug use: No   • Sexual activity: Not on file   Other Topics Concern   • Not on file   Social History Narrative    Daily coffee consumption (__cups/day)     Daily cola consumption (__cans/day)      Social Determinants of Health     Financial Resource Strain: Not on file   Food Insecurity: Not on file   Transportation Needs: Not on file   Physical Activity: Not on file   Stress: Not on file   Social Connections: Not on file   Intimate Partner Violence: Not on file   Housing Stability: Not on file      Family History   Problem Relation Age of Onset   • Diabetes Mother    • Hypertension Father    • Diabetes Family      Past Surgical History:   Procedure Laterality Date   • COLONOSCOPY     • ELBOW SURGERY Right    • TRANSURETHRAL RESECTION OF PROSTATE      last assessed 7/21/15       Current Outpatient Medications:   •  adalimumab (HUMIRA) 40 mg/0 8 mL PSKT, Inject 40 mg under the skin every 14 (fourteen) days , Disp: , Rfl:   •  apixaban (Eliquis) 5 mg, Take 1 tablet (5 mg total) by mouth 2 (two) times a day, Disp: 60 tablet, Rfl: 11  Allergies   Allergen Reactions   • Penicillins Rash       Labs:  Lab Results   Component Value Date     03/15/2016    K 3 8 10/14/2022    K 4 2 03/15/2016     10/14/2022     03/15/2016    CO2 27 10/14/2022    CO2 26 03/15/2016    BUN 20 10/14/2022    BUN 18 03/15/2016    CREATININE 0 99 10/14/2022    CREATININE 0 99 03/15/2016    GLUCOSE 106 (H) 11/15/2016    CALCIUM 9 3 10/14/2022    CALCIUM 9 5 03/15/2016     No results found for: CKTOTAL, CKMB, CKMBINDEX, TROPONINI  Lab Results   Component Value Date    WBC 5 96 09/08/2022    HGB 15 1 09/08/2022    HCT 43 4 09/08/2022    MCV 92 09/08/2022     09/08/2022     Lab Results   Component Value Date    CHOL 190 11/15/2016    TRIG 97 10/14/2022    TRIG 113 11/15/2016    HDL 44 10/14/2022    HDL 54 11/15/2016     Imaging:     Stress Echocardiogram  11/7/2022        •  Left Ventricle: Left ventricular cavity size is normal  Wall thickness is normal  Wall motion is low normal   •  Left Atrium: The atrium is mildly dilated  •  Mitral Valve: There is mild regurgitation      Low normal left ventricular function  Mild left atrial enlargement  Mild mitral regurgitation  Compared to the previous echocardiogram September 21, 2016 the ejection fraction at that time was 79% with diastolic dysfunction and mild pulmonary hypertension         Findings    Left Ventricle Left ventricular cavity size is normal  Wall thickness is normal  The left ventricular ejection fraction is 50%  Wall motion is low normal    Right Ventricle Systolic function is normal    Left Atrium The atrium is mildly dilated  Aortic Valve The leaflets are mildly thickened  The leaflets exhibit normal mobility  There is no evidence of regurgitation  Mitral Valve There is mild regurgitation  Tricuspid Valve There is trace regurgitation       Left Ventricle Measurements    Function/Volumes   A4C EF 55 %         Dimensions   LVIDd 5 1 cm         LVIDS 3 7 cm         IVSd 1 2 cm         LVPWd 1 1 cm         FS 27          Diastolic Filling   MV E' Tissue Velocity Septal 10 cm/s         E wave deceleration time 397 ms         MV Peak E Tc 63 cm/s         MV Peak A Tc 0 22 m/s          Report Measurements   AV LVOT peak gradient 2 mmHg              Interventricular Septum Measurements    Shunt Ratio   LVOT peak VTI 15 64 cm         LVOT peak tc 0 68 m/s              Right Ventricle Measurements    Dimensions   RVID d 3 4 cm               Left Atrium Measurements    Dimensions   LA size 4 5 cm         LA length (A2C) 5 8 cm         AMEYA A4C 21 9 cm2               Right Atrium Measurements    Dimensions   RAA A4C 19 5 cm2               Atrial Septum Measurements    Shunt Ratio   LVOT peak VTI 15 64 cm         LVOT peak tc 0 68 m/s               Aortic Valve Measurements    Stenosis   Aortic valve peak velocity 1 2 m/s         LVOT peak tc 0 68 m/s         Ao VTI 26 32 cm         LVOT peak VTI 15 64 cm         AV mean gradient 3 mmHg         LVOT mn grad 1 mmHg         AV peak gradient 6 mmHg         AV LVOT peak gradient 2 mmHg         Dimensionless velociy index 0 57                Mitral Valve Measurements    Stenosis   MV stenosis pressure 1/2 time 115 ms         MV valve area p 1/2 method 1 91                Tricuspid Valve Measurements    RVSP Parameters   TR Peak Tc 2 3 m/s         Triscuspid Valve Regurgitation Peak Gradient 21 mmHg               Aorta Measurements    Aortic Dimensions   Ao root 3 7 cm                  •  Left Ventricle: Left ventricular cavity size is normal  The left ventricular ejection fraction is 50%  Systolic function is normal  Wall motion is normal   •  Stress ECG: No ST deviation is noted  The ECG was negative for ischemia  The stress ECG is negative for ischemia after maximal exercise, without reproduction of symptoms  •  Peak Stress Echo: Left ventricle cavity has normal reduction in size at peak stress  The peak stress echo showed normal wall motion which was hyperdynamic compared to baseline  •  Echo Post Impression: The study is normal      Normal exercise stress echocardiogram at 10 1 Mets and 91% max predicted heart rate      Stress Findings    Stress Findings A Tim protocol stress test was performed  Overall, the patient's exercise capacity was normal for their age  The patient reached stage 3 0 of the protocol after exercising for 9 min and had a maximal HR of 142 bpm (91 % of MPHR) and 10 1 METS  The patient experienced no angina during the test  The test was stopped because the patient experienced dyspnea  The patient achieved the target heart rate  The patient reported dyspnea during the stress test  Blood pressure demonstrated a normal response and heart rate demonstrated a normal response to stress  Findings    Left Ventricle Left ventricular cavity size is normal  The left ventricular ejection fraction is 50%  Systolic function is normal   Wall motion is normal    Wall Scoring Baseline     The left ventricular wall motion is normal          Peak Stress     The left ventricular wall motion is globally hyperkinetic  Aortic Valve The leaflets are mildly thickened  The leaflets exhibit normal mobility  Stress Echo Findings    Peak Stress Left ventricle cavity has normal reduction in size at peak stress  The left ventricle systolic function is hyperdynamic at peak stress  EF 70%  The peak stress echo showed normal wall motion which was hyperdynamic compared to baseline  Study Impression The study is normal      Stress Measurements    Resting ECG   Base ST Depresion (mm) 0 mm         Baseline Vitals   Baseline HR 56 bpm         Baseline /88 mmHg         O2 sat rest 99 %         Peak Stress Vitals   Stress peak  bpm         Post peak  mmHg         O2 sat peak 98 %         Max HR Percent 91 %         Max  bpm         Recovery Vitals   Recovery HR 64 bpm         Recovery /90 mmHg         O2 sat recovery 98 %          Exercise Data   Max  bpm         Exercise duration (min) 9 min         Estimated workload 10 1 METS         Stress Stage Reached 3          Angina Index 0          Rate Pressure Product 28,400          Stress ECG   ST Depression (mm) 0 mm           Ambulatory Extended Holter  10/24/2022-10/30/2022                  Ambulatory Extended Holter Monitor  2/22/2021-3/8/2021  1 of 2      3/12/2021-3/26/2021  2 of 2            Review of Systems:  Review of Systems   All other systems reviewed and are negative  as described in my history of present illness    Physical Exam:  Physical Exam  Vitals reviewed  Constitutional:       General: He is not in acute distress  Appearance: Normal appearance  He is well-developed  He is not ill-appearing  Comments: Not in any distress at the current time   HENT:      Head: Normocephalic and atraumatic        Right Ear: External ear normal       Left Ear: External ear normal       Nose: Nose normal       Mouth/Throat:      Pharynx: Uvula midline  Eyes:      General: Lids are normal       Extraocular Movements: Extraocular movements intact  Conjunctiva/sclera: Conjunctivae normal       Pupils: Pupils are equal, round, and reactive to light  Comments: No pallor  No cyanosis  No icterus   Neck:      Thyroid: No thyromegaly  Vascular: No carotid bruit or JVD  Trachea: Trachea normal       Comments: No jugular lymphadenopathy  Cardiovascular:      Rate and Rhythm: Tachycardia present  Rhythm irregular  Chest Wall: PMI is not displaced  Pulses: Normal pulses  Heart sounds: Normal heart sounds, S1 normal and S2 normal  No murmur heard  No friction rub  No gallop  No S3 or S4 sounds  Pulmonary:      Effort: Pulmonary effort is normal  No accessory muscle usage or respiratory distress  Breath sounds: Normal breath sounds  No decreased breath sounds, wheezing, rhonchi or rales  Chest:      Chest wall: No tenderness  Abdominal:      General: Abdomen is flat  Bowel sounds are normal  There is no distension  Palpations: Abdomen is soft  There is no hepatomegaly, splenomegaly or mass  Tenderness: There is no abdominal tenderness  Musculoskeletal:         General: No swelling, tenderness or deformity  Normal range of motion  Cervical back: Normal range of motion and neck supple  No rigidity  No muscular tenderness  Lymphadenopathy:      Cervical: No cervical adenopathy  Skin:     Coloration: Skin is not jaundiced or pale  Findings: No abrasion, bruising, erythema, lesion or rash  Nails: There is no clubbing  Neurological:      Mental Status: He is alert and oriented to person, place, and time  Mental status is at baseline        Comments: Facial symmetry is retained  Extraocular movements are retained  Head neck tongue and palate movement are retained and symmetric Psychiatric:         Mood and Affect: Mood normal          Speech: Speech normal          Behavior: Behavior normal          Thought Content: Thought content normal          Discussion/Summary:    1  palpitation  2  atrial flutter   3  Atrial fibrillation    Patient is back in atrial fibrillation with RVR  He does feel exertional fatigue  He continues to drink alcohol which is a known precipitator    Ablated in 2017   Patient thinks he had a relapse in February, when he was shoveling snow for 2 days    He has some new risk factors   -increasing weight   -suspicion of sleep apnea  -hypertension  -Increase alcohol drinking, at least 14 units a week and at times binge drinking    At the current time my recommendation for the patient   Proceed with comprehensive ablation  If  need to use antiarrhythmics, will need a pacemaker for a higher baseline heart rate         4   Sick sinus syndrome    sinus bradycardia  This will limit use of medications like propafenone, sotalol, amiodarone   If it continues, and patient is symptomatic, patient will need a pacemaker      5   hypertension  Blood pressure is 154/90   He needs to start lisinopril 20 mg daily  Follow-up for dry cough   Follow-up kidney function         6  alcohol abuse   Drinking 2 beers every day  Sometimes he can go up to 4-6 beer a day  This is significant drinking for a patient with atrial fibrillation   This can cause worsening of atrial fibrillation  Recommend to significantly decrease, not more than 3 units in a week      7   psoriatic arthritis   On Humira  Patient advised to maintain proper preventive measures to avoid COVID infection      8   sleep apnea  Increasing weight, alcohol use   Occasional history suggestive of snoring  Recommend repeat sleep study

## 2022-12-21 ENCOUNTER — OFFICE VISIT (OUTPATIENT)
Dept: CARDIOLOGY CLINIC | Facility: CLINIC | Age: 67
End: 2022-12-21

## 2022-12-21 VITALS
DIASTOLIC BLOOD PRESSURE: 90 MMHG | BODY MASS INDEX: 28.35 KG/M2 | SYSTOLIC BLOOD PRESSURE: 152 MMHG | HEIGHT: 71 IN | WEIGHT: 202.5 LBS | HEART RATE: 109 BPM

## 2022-12-21 DIAGNOSIS — R00.1 BRADYCARDIA: ICD-10-CM

## 2022-12-21 DIAGNOSIS — R00.2 PALPITATION: ICD-10-CM

## 2022-12-21 DIAGNOSIS — R06.02 SHORTNESS OF BREATH ON EXERTION: ICD-10-CM

## 2022-12-21 DIAGNOSIS — Z78.9 ALCOHOL USE: ICD-10-CM

## 2022-12-21 DIAGNOSIS — G47.33 OSA (OBSTRUCTIVE SLEEP APNEA): ICD-10-CM

## 2022-12-21 DIAGNOSIS — R53.83 FATIGUE, UNSPECIFIED TYPE: ICD-10-CM

## 2022-12-21 DIAGNOSIS — I48.0 PAROXYSMAL ATRIAL FIBRILLATION (HCC): Primary | ICD-10-CM

## 2022-12-21 DIAGNOSIS — L40.50 PSORIATIC ARTHRITIS (HCC): ICD-10-CM

## 2022-12-21 NOTE — LETTER
December 22, 2022     Alverto Finley, [address]  [address] [facility]  [address]    Patient: Hu Serrato   YOB: 1955   Date of Visit: 12/21/2022       Dear Dr Char Rodriguez:    Thank you for referring Sofya Herman to me for evaluation  Below are my notes for this consultation  If you have questions, please do not hesitate to call me  I look forward to following your patient along with you  Sincerely,        Xochitl Santoyo MD        CC: Gerardo Parry [place]  Josef Stokes, [place]  Xochitl Santoyo MD  12/22/2022  6:43 AM  Sign when Signing Visit                                             Cardiology Follow Up    Hu Serrato  1955  [id number]  [address]  [address] [facility] [address]  [phone]  [phone]    1  Paroxysmal atrial fibrillation (HCC)  POCT ECG      2  CATY (obstructive sleep apnea)        3  Psoriatic arthritis (HCC)        4  Palpitation        5  Alcohol use        6  Shortness of breath on exertion        7  Fatigue, unspecified type        8   Bradycardia              Summary of my recommendation for the patient  Set up for comprehensive A  fib ablation-posterior wall, check PVI  CAROL before the procedure  Cryo and NavX  Hold Eliquis the night before and morning of the procedure    Start lisinopril 20 mg daily for hypertension  Check a BMP in a week    Check TSH, hemoglobin A1c    Complete cessation of alcohol recommended      Interval History:   Patient is having more palpitation at this time  Previously dofetilide did not work well and he reverted back to A  fib quickly  He has underlying bradycardia and does not tolerate rate control agents   He is agreeable to proceed with repeat ablation    He understands that alcohol is a significant precipitator of A  fib  He still drinks quite a bit on a weekly basis    The patient has a known history of atrial flutter and fibrillation   He underwent comprehensive ablation in February 2017  He had some brief episodes of palpitation in January 2021   In the 1st week of February he did have palpitations for 2 days    He has gained some weight   He has become hypertensive   He drinks 2 beers every day and occasionally 4-6 beers a day in the week end    He is still active at work   Not complaining of anginal like chest pain or chest pressure   Not complaining of worsening orthopnea or PND  No leg swelling     He did have palpitation for 2 days in February  He did drink during those days  He was also shoveling snow    Is not complaining of presyncope or syncope  His not complaining of orthostatic lightheadedness    Is not complaining of exertional intolerance     He is not smoking tobacco  He is not using energy drinks   He is taking a significant amount of alcohol at least 14 units a week    The patient does have a history of psoriatic arthritis and is on Humira    Patient was recently found to be hypertensive  He does have some bradycardia  He has not yet been started on therapy for his hypertension      /90 (BP Location: Right arm, Patient Position: Sitting, Cuff Size: Standard)   Pulse (!) 109   Ht 5' 11" (1 803 m)   Wt 91 9 kg (202 lb 8 oz)   BMI 28 24 kg/m²       Patient Active Problem List   Diagnosis   • Atrial fibrillation ([facility] )   • Fatigue   • Psoriatic arthritis ([facility] )   • BMI 29 0-29 9,adult   • Need for pneumococcal vaccination   • NDPH (new daily persistent headache)   • Atrial flutter (HCC)   • Bradycardia   • Palpitation   • Alcohol use   • CATY (obstructive sleep apnea)   • COVID-19 virus infection   • Shortness of breath on exertion     Past Medical History:   Diagnosis Date   • A-fib ([facility] )    • Arthritis    • BPH (benign prostatic hyperplasia)    • Colon polyp    • Fatty liver    • Fracture, foot    • High cholesterol    • Hyperlipidemia    • Irregular heart beat    • Non-ischemic cardiomyopathy (HCC)    • Osteoarthritis    • Psoriasis    • Psoriatic arthritis ([facility] )    • Pulmonary HTN ([facility])      Social History     Socioeconomic History   • Marital status: /Civil Union     Spouse name: Not on file   • Number of children: Not on file   • Years of education: Not on file   • Highest education level: Not on file   Occupational History   • Not on file   Tobacco Use   • Smoking status: Former     Packs/day: 0 50     Years: 10 00     Pack years: 5 00     Types: Cigarettes   • Smokeless tobacco: Never   Vaping Use   • Vaping Use: Never used   Substance and Sexual Activity   • Alcohol use: Yes     Comment: 1 drink daily   • Drug use: No   • Sexual activity: Not on file   Other Topics Concern   • Not on file   Social History Narrative    Daily coffee consumption (__cups/day)     Daily cola consumption (__cans/day)      Social Determinants of Health     Financial Resource Strain: Not on file   Food Insecurity: Not on file   Transportation Needs: Not on file   Physical Activity: Not on file   Stress: Not on file   Social Connections: Not on file   Intimate Partner Violence: Not on file   Housing Stability: Not on file      Family History   Problem Relation Age of Onset   • Diabetes Mother    • Hypertension Father    • Diabetes Family      Past Surgical History:   Procedure Laterality Date   • COLONOSCOPY     • ELBOW SURGERY Right    • TRANSURETHRAL RESECTION OF PROSTATE      last assessed 7/21/15       Current Outpatient Medications:   •  adalimumab (HUMIRA) 40 mg/0 8 mL PSKT, Inject 40 mg under the skin every 14 (fourteen) days , Disp: , Rfl:   •  apixaban (Eliquis) 5 mg, Take 1 tablet (5 mg total) by mouth 2 (two) times a day, Disp: 60 tablet, Rfl: 11  Allergies   Allergen Reactions   • Penicillins Rash       Labs:  Lab Results   Component Value Date     03/15/2016    K 3 8 10/14/2022    K 4 2 03/15/2016     10/14/2022     03/15/2016    CO2 27 10/14/2022    CO2 26 03/15/2016    BUN 20 10/14/2022    BUN 18 03/15/2016    CREATININE 0 99 10/14/2022    CREATININE 0 99 03/15/2016    GLUCOSE 106 (H) 11/15/2016    CALCIUM 9 3 10/14/2022    CALCIUM 9 5 03/15/2016     No results found for: CKTOTAL, CKMB, CKMBINDEX, TROPONINI  Lab Results   Component Value Date    WBC 5 96 09/08/2022    HGB 15 1 09/08/2022    HCT 43 4 09/08/2022    MCV 92 09/08/2022     09/08/2022     Lab Results   Component Value Date    CHOL 190 11/15/2016    TRIG 97 10/14/2022    TRIG 113 11/15/2016    HDL 44 10/14/2022    HDL 54 11/15/2016     Imaging:     Stress Echocardiogram  11/7/2022        •  Left Ventricle: Left ventricular cavity size is normal  Wall thickness is normal  Wall motion is low normal   •  Left Atrium: The atrium is mildly dilated  •  Mitral Valve: There is mild regurgitation      Low normal left ventricular function  Mild left atrial enlargement  Mild mitral regurgitation  Compared to the previous echocardiogram September 21, 2016 the ejection fraction at that time was 74% with diastolic dysfunction and mild pulmonary hypertension         Findings    Left Ventricle Left ventricular cavity size is normal  Wall thickness is normal  The left ventricular ejection fraction is 50%  Wall motion is low normal    Right Ventricle Systolic function is normal    Left Atrium The atrium is mildly dilated  Aortic Valve The leaflets are mildly thickened  The leaflets exhibit normal mobility  There is no evidence of regurgitation  Mitral Valve There is mild regurgitation  Tricuspid Valve There is trace regurgitation       Left Ventricle Measurements    Function/Volumes   A4C EF 55 %         Dimensions   LVIDd 5 1 cm         LVIDS 3 7 cm         IVSd 1 2 cm         LVPWd 1 1 cm         FS 27          Diastolic Filling   MV E' Tissue Velocity Septal 10 cm/s         E wave deceleration time 397 ms         MV Peak E Tc 63 cm/s         MV Peak A Tc 0 22 m/s          Report Measurements   AV LVOT peak gradient 2 mmHg              Interventricular Septum Measurements    Shunt Ratio   LVOT peak VTI 15 64 cm         LVOT peak tc 0 68 m/s              Right Ventricle Measurements    Dimensions   RVID d 3 4 cm               Left Atrium Measurements    Dimensions   LA size 4 5 cm         LA length (A2C) 5 8 cm         AMEYA A4C 21 9 cm2               Right Atrium Measurements    Dimensions   RAA A4C 19 5 cm2               Atrial Septum Measurements    Shunt Ratio   LVOT peak VTI 15 64 cm         LVOT peak tc 0 68 m/s               Aortic Valve Measurements    Stenosis   Aortic valve peak velocity 1 2 m/s         LVOT peak tc 0 68 m/s         Ao VTI 26 32 cm         LVOT peak VTI 15 64 cm         AV mean gradient 3 mmHg         LVOT mn grad 1 mmHg         AV peak gradient 6 mmHg         AV LVOT peak gradient 2 mmHg         Dimensionless velociy index 0 57                Mitral Valve Measurements    Stenosis   MV stenosis pressure 1/2 time 115 ms         MV valve area p 1/2 method 1 91                Tricuspid Valve Measurements    RVSP Parameters   TR Peak Tc 2 3 m/s         Triscuspid Valve Regurgitation Peak Gradient 21 mmHg               Aorta Measurements    Aortic Dimensions   Ao root 3 7 cm                  •  Left Ventricle: Left ventricular cavity size is normal  The left ventricular ejection fraction is 50%  Systolic function is normal  Wall motion is normal   •  Stress ECG: No ST deviation is noted  The ECG was negative for ischemia  The stress ECG is negative for ischemia after maximal exercise, without reproduction of symptoms  •  Peak Stress Echo: Left ventricle cavity has normal reduction in size at peak stress  The peak stress echo showed normal wall motion which was hyperdynamic compared to baseline  •  Echo Post Impression: The study is normal      Normal exercise stress echocardiogram at 10 1 Mets and 91% max predicted heart rate      Stress Findings    Stress Findings A Tim protocol stress test was performed   Overall, the patient's exercise capacity was normal for their age  The patient reached stage 3 0 of the protocol after exercising for 9 min and had a maximal HR of 142 bpm (91 % of MPHR) and 10 1 METS  The patient experienced no angina during the test  The test was stopped because the patient experienced dyspnea  The patient achieved the target heart rate  The patient reported dyspnea during the stress test  Blood pressure demonstrated a normal response and heart rate demonstrated a normal response to stress  Findings    Left Ventricle Left ventricular cavity size is normal  The left ventricular ejection fraction is 50%  Systolic function is normal   Wall motion is normal    Wall Scoring Baseline     The left ventricular wall motion is normal          Peak Stress     The left ventricular wall motion is globally hyperkinetic  Aortic Valve The leaflets are mildly thickened  The leaflets exhibit normal mobility  Stress Echo Findings    Peak Stress Left ventricle cavity has normal reduction in size at peak stress  The left ventricle systolic function is hyperdynamic at peak stress  EF 70%  The peak stress echo showed normal wall motion which was hyperdynamic compared to baseline     Study Impression The study is normal      Stress Measurements    Resting ECG   Base ST Depresion (mm) 0 mm         Baseline Vitals   Baseline HR 56 bpm         Baseline /88 mmHg         O2 sat rest 99 %         Peak Stress Vitals   Stress peak  bpm         Post peak  mmHg         O2 sat peak 98 %         Max HR Percent 91 %         Max  bpm         Recovery Vitals   Recovery HR 64 bpm         Recovery /90 mmHg         O2 sat recovery 98 %          Exercise Data   Max  bpm         Exercise duration (min) 9 min         Estimated workload 10 1 METS         Stress Stage Reached 3          Angina Index 0          Rate Pressure Product 28,400          Stress ECG   ST Depression (mm) 0 mm           Ambulatory Extended Holter  10/24/2022-10/30/2022                  Ambulatory Extended Holter Monitor  2/22/2021-3/8/2021  1 of 2      3/12/2021-3/26/2021  2 of 2            Review of Systems:  Review of Systems   All other systems reviewed and are negative  as described in my history of present illness    Physical Exam:  Physical Exam  Vitals reviewed  Constitutional:       General: He is not in acute distress  Appearance: Normal appearance  He is well-developed  He is not ill-appearing  Comments: Not in any distress at the current time   HENT:      Head: Normocephalic and atraumatic  Right Ear: External ear normal       Left Ear: External ear normal       Nose: Nose normal       Mouth/Throat:      Pharynx: Uvula midline  Eyes:      General: Lids are normal       Extraocular Movements: Extraocular movements intact  Conjunctiva/sclera: Conjunctivae normal       Pupils: Pupils are equal, round, and reactive to light  Comments: No pallor  No cyanosis  No icterus   Neck:      Thyroid: No thyromegaly  Vascular: No carotid bruit or JVD  Trachea: Trachea normal       Comments: No jugular lymphadenopathy  Cardiovascular:      Rate and Rhythm: Tachycardia present  Rhythm irregular  Chest Wall: PMI is not displaced  Pulses: Normal pulses  Heart sounds: Normal heart sounds, S1 normal and S2 normal  No murmur heard  No friction rub  No gallop  No S3 or S4 sounds  Pulmonary:      Effort: Pulmonary effort is normal  No accessory muscle usage or respiratory distress  Breath sounds: Normal breath sounds  No decreased breath sounds, wheezing, rhonchi or rales  Chest:      Chest wall: No tenderness  Abdominal:      General: Abdomen is flat  Bowel sounds are normal  There is no distension  Palpations: Abdomen is soft  There is no hepatomegaly, splenomegaly or mass  Tenderness: There is no abdominal tenderness     Musculoskeletal:         General: No swelling, tenderness or deformity  Normal range of motion  Cervical back: Normal range of motion and neck supple  No rigidity  No muscular tenderness  Lymphadenopathy:      Cervical: No cervical adenopathy  Skin:     Coloration: Skin is not jaundiced or pale  Findings: No abrasion, bruising, erythema, lesion or rash  Nails: There is no clubbing  Neurological:      Mental Status: He is alert and oriented to person, place, and time  Mental status is at baseline  Comments: Facial symmetry is retained  Extraocular movements are retained  Head neck tongue and palate movement are retained and symmetric   Psychiatric:         Mood and Affect: Mood normal          Speech: Speech normal          Behavior: Behavior normal          Thought Content: Thought content normal          Discussion/Summary:    1  palpitation  2  atrial flutter   3  Atrial fibrillation    Patient is back in atrial fibrillation with RVR  He does feel exertional fatigue  He continues to drink alcohol which is a known precipitator    Ablated in 2017   Patient thinks he had a relapse in February, when he was shoveling snow for 2 days    He has some new risk factors   -increasing weight   -suspicion of sleep apnea  -hypertension  -Increase alcohol drinking, at least 14 units a week and at times binge drinking    At the current time my recommendation for the patient   Proceed with comprehensive ablation  If  need to use antiarrhythmics, will need a pacemaker for a higher baseline heart rate         4   Sick sinus syndrome    sinus bradycardia  This will limit use of medications like propafenone, sotalol, amiodarone   If it continues, and patient is symptomatic, patient will need a pacemaker      5   hypertension  Blood pressure is 154/90   He needs to start lisinopril 20 mg daily  Follow-up for dry cough   Follow-up kidney function         6  alcohol abuse   Drinking 2 beers every day  Sometimes he can go up to 4-6 beer a day  This is significant drinking for a patient with atrial fibrillation   This can cause worsening of atrial fibrillation  Recommend to significantly decrease, not more than 3 units in a week      7   psoriatic arthritis   On Humira  Patient advised to maintain proper preventive measures to avoid COVID infection      8   sleep apnea  Increasing weight, alcohol use   Occasional history suggestive of snoring  Recommend repeat sleep study

## 2022-12-22 ENCOUNTER — TELEPHONE (OUTPATIENT)
Dept: CARDIOLOGY CLINIC | Facility: CLINIC | Age: 67
End: 2022-12-22

## 2022-12-22 DIAGNOSIS — R00.2 PALPITATION: Primary | ICD-10-CM

## 2022-12-22 DIAGNOSIS — I48.0 PAROXYSMAL ATRIAL FIBRILLATION (HCC): Primary | ICD-10-CM

## 2022-12-22 PROBLEM — R06.02 SHORTNESS OF BREATH: Status: RESOLVED | Noted: 2018-07-11 | Resolved: 2022-12-22

## 2022-12-22 PROBLEM — Z11.4 SCREENING FOR HIV (HUMAN IMMUNODEFICIENCY VIRUS): Status: RESOLVED | Noted: 2020-01-06 | Resolved: 2022-12-22

## 2022-12-22 RX ORDER — LISINOPRIL 20 MG/1
20 TABLET ORAL DAILY
Qty: 90 TABLET | Refills: 3 | Status: SHIPPED | OUTPATIENT
Start: 2022-12-22

## 2022-12-22 NOTE — TELEPHONE ENCOUNTER
Patient scheduled for CAROL / AFIB ablation on 2/22/23 at [facility] with Dr Rhiannon Canales  Instructions sent to patient through [address]  Patient aware of all general instructions  Medication holds:   Eliquis - hold night prior and morning of procedure  Labs to be done by 2/8/23:  ALDO / [address]     Sent staff message to Kelly Brooks asking if patient needs CTPV  Awaiting response  I already entered order for CAROL  Insurance: [facility]    Please obtain auth       Trisch: please enter case request      Thank you,  Yanira Joseph

## 2022-12-27 ENCOUNTER — PREP FOR PROCEDURE (OUTPATIENT)
Dept: CARDIOLOGY CLINIC | Facility: CLINIC | Age: 67
End: 2022-12-27

## 2022-12-27 DIAGNOSIS — I48.92 ATRIAL FLUTTER, UNSPECIFIED TYPE (HCC): ICD-10-CM

## 2022-12-27 DIAGNOSIS — I48.91 ATRIAL FIBRILLATION, UNSPECIFIED TYPE (HCC): ICD-10-CM

## 2022-12-27 DIAGNOSIS — R00.2 PALPITATION: Primary | ICD-10-CM

## 2022-12-27 DIAGNOSIS — I48.91 ATRIAL FIBRILLATION, UNSPECIFIED TYPE (HCC): Primary | ICD-10-CM

## 2022-12-28 NOTE — TELEPHONE ENCOUNTER
I called [facility] Medical Records dept at  and was given the instructions on how to obtain the CD copy  I emailed them at [email or web address] on a letter head requesting a CD copy of the CT Cardiac w Pulmonary Vein Mapping done on 1/6/2017 to be mailed to our office attn to me  This request can take up to 30 days by law

## 2022-12-28 NOTE — TELEPHONE ENCOUNTER
----- Message -----  From: Keagan Aiken MD  Sent: 12/27/2022  10:06 PM EST  To: Myra Acuna  Subject: RE: CT CARDIAC PULMONARY VEIN MAPPING            We need a CD to copy that from the radiology department  That is thereafter used in the NavX system during the ablation  They cannot use the record as it exist  Please reach out to the radiology department if they can copy a CD for us       ----- Message -----  From: Samir Disla 173: 12/27/2022   8:45 AM EST  To: Keagan Aiken MD, Arelis Cruz  Subject: RE: [address] [place] [address] Dr Andresw,    The CT scan is available in the Imaging tab on patient's chart  I tried it and was able to open up the images of the scan  ThanksEamon          ----- Message -----  From: Keagan Aiken MD  Sent: 12/22/2022   7:47 PM EST  To: Myra Acuna  Subject: RE: [address] [place] [address]            He had 1 done about 5 years ago  Please make sure that CT scan is available for this procedure      ----- Message -----  From: Myra Acuna  Sent: 12/22/2022   2:18 PM EST  To: Keagan Aiken MD  Subject: CT CARDIAC PULMONARY VEIN MAPPING                Hi Val Quinteros,    For this patient having a CAROL / Afib ablation, do you want a CT Cardiac Pulmonary Vein Mapping done? Please advise       Thanks,  [employer]

## 2023-01-11 ENCOUNTER — APPOINTMENT (OUTPATIENT)
Dept: LAB | Facility: CLINIC | Age: 68
End: 2023-01-11

## 2023-01-11 DIAGNOSIS — Z12.5 ENCOUNTER FOR SCREENING FOR MALIGNANT NEOPLASM OF PROSTATE: ICD-10-CM

## 2023-01-11 DIAGNOSIS — R73.09 SUGAR BLOOD LEVEL INCREASED: ICD-10-CM

## 2023-01-11 LAB
ALBUMIN SERPL BCP-MCNC: 3.8 G/DL (ref 3.5–5)
ALP SERPL-CCNC: 69 U/L (ref 46–116)
ALT SERPL W P-5'-P-CCNC: 46 U/L (ref 12–78)
ANION GAP SERPL CALCULATED.3IONS-SCNC: 3 MMOL/L (ref 4–13)
AST SERPL W P-5'-P-CCNC: 20 U/L (ref 5–45)
BASOPHILS # BLD AUTO: 0.06 THOUSANDS/ÂΜL (ref 0–0.1)
BASOPHILS NFR BLD AUTO: 1 % (ref 0–1)
BILIRUB SERPL-MCNC: 0.69 MG/DL (ref 0.2–1)
BUN SERPL-MCNC: 20 MG/DL (ref 5–25)
CALCIUM SERPL-MCNC: 9.3 MG/DL (ref 8.3–10.1)
CHLORIDE SERPL-SCNC: 106 MMOL/L (ref 96–108)
CO2 SERPL-SCNC: 30 MMOL/L (ref 21–32)
CREAT SERPL-MCNC: 1.09 MG/DL (ref 0.6–1.3)
EOSINOPHIL # BLD AUTO: 0.35 THOUSAND/ÂΜL (ref 0–0.61)
EOSINOPHIL NFR BLD AUTO: 6 % (ref 0–6)
ERYTHROCYTE [DISTWIDTH] IN BLOOD BY AUTOMATED COUNT: 12.4 % (ref 11.6–15.1)
GFR SERPL CREATININE-BSD FRML MDRD: 70 ML/MIN/1.73SQ M
GLUCOSE P FAST SERPL-MCNC: 157 MG/DL (ref 65–99)
HCT VFR BLD AUTO: 47.8 % (ref 36.5–49.3)
HGB BLD-MCNC: 16.1 G/DL (ref 12–17)
IMM GRANULOCYTES # BLD AUTO: 0.01 THOUSAND/UL (ref 0–0.2)
IMM GRANULOCYTES NFR BLD AUTO: 0 % (ref 0–2)
LYMPHOCYTES # BLD AUTO: 1.99 THOUSANDS/ÂΜL (ref 0.6–4.47)
LYMPHOCYTES NFR BLD AUTO: 36 % (ref 14–44)
MCH RBC QN AUTO: 31.2 PG (ref 26.8–34.3)
MCHC RBC AUTO-ENTMCNC: 33.7 G/DL (ref 31.4–37.4)
MCV RBC AUTO: 93 FL (ref 82–98)
MONOCYTES # BLD AUTO: 0.5 THOUSAND/ÂΜL (ref 0.17–1.22)
MONOCYTES NFR BLD AUTO: 9 % (ref 4–12)
NEUTROPHILS # BLD AUTO: 2.57 THOUSANDS/ÂΜL (ref 1.85–7.62)
NEUTS SEG NFR BLD AUTO: 48 % (ref 43–75)
NRBC BLD AUTO-RTO: 0 /100 WBCS
PLATELET # BLD AUTO: 174 THOUSANDS/UL (ref 149–390)
PMV BLD AUTO: 10.4 FL (ref 8.9–12.7)
POTASSIUM SERPL-SCNC: 3.9 MMOL/L (ref 3.5–5.3)
PROT SERPL-MCNC: 7.8 G/DL (ref 6.4–8.4)
PSA SERPL-MCNC: 2.9 NG/ML (ref 0–4)
RBC # BLD AUTO: 5.16 MILLION/UL (ref 3.88–5.62)
SODIUM SERPL-SCNC: 139 MMOL/L (ref 135–147)
TSH SERPL DL<=0.05 MIU/L-ACNC: 2.14 UIU/ML (ref 0.45–4.5)
WBC # BLD AUTO: 5.48 THOUSAND/UL (ref 4.31–10.16)

## 2023-01-12 LAB
EST. AVERAGE GLUCOSE BLD GHB EST-MCNC: 143 MG/DL
HBA1C MFR BLD: 6.6 %

## 2023-01-23 ENCOUNTER — APPOINTMENT (OUTPATIENT)
Dept: LAB | Facility: CLINIC | Age: 68
End: 2023-01-23

## 2023-02-11 LAB
ALBUMIN SERPL-MCNC: 4.5 G/DL (ref 3.8–4.8)
ALBUMIN/GLOB SERPL: 1.7 {RATIO} (ref 1.2–2.2)
ALP SERPL-CCNC: 73 IU/L (ref 44–121)
ALT SERPL-CCNC: 27 IU/L (ref 0–44)
AST SERPL-CCNC: 22 IU/L (ref 0–40)
BASOPHILS # BLD AUTO: 0 X10E3/UL (ref 0–0.2)
BASOPHILS NFR BLD AUTO: 1 %
BILIRUB SERPL-MCNC: 0.7 MG/DL (ref 0–1.2)
BUN SERPL-MCNC: 21 MG/DL (ref 8–27)
BUN/CREAT SERPL: 21 (ref 10–24)
CALCIUM SERPL-MCNC: 9.4 MG/DL (ref 8.6–10.2)
CHLORIDE SERPL-SCNC: 102 MMOL/L (ref 96–106)
CO2 SERPL-SCNC: 25 MMOL/L (ref 20–29)
CREAT SERPL-MCNC: 0.98 MG/DL (ref 0.76–1.27)
EGFR: 86 ML/MIN/1.73
EOSINOPHIL # BLD AUTO: 0.3 X10E3/UL (ref 0–0.4)
EOSINOPHIL NFR BLD AUTO: 5 %
ERYTHROCYTE [DISTWIDTH] IN BLOOD BY AUTOMATED COUNT: 12.7 % (ref 11.6–15.4)
GLOBULIN SER-MCNC: 2.7 G/DL (ref 1.5–4.5)
GLUCOSE SERPL-MCNC: 96 MG/DL (ref 70–99)
HCT VFR BLD AUTO: 46.3 % (ref 37.5–51)
HGB BLD-MCNC: 16.5 G/DL (ref 13–17.7)
IMM GRANULOCYTES # BLD: 0 X10E3/UL (ref 0–0.1)
IMM GRANULOCYTES NFR BLD: 0 %
LYMPHOCYTES # BLD AUTO: 1.8 X10E3/UL (ref 0.7–3.1)
LYMPHOCYTES NFR BLD AUTO: 35 %
MCH RBC QN AUTO: 32.2 PG (ref 26.6–33)
MCHC RBC AUTO-ENTMCNC: 35.6 G/DL (ref 31.5–35.7)
MCV RBC AUTO: 90 FL (ref 79–97)
MONOCYTES # BLD AUTO: 0.6 X10E3/UL (ref 0.1–0.9)
MONOCYTES NFR BLD AUTO: 11 %
NEUTROPHILS # BLD AUTO: 2.5 X10E3/UL (ref 1.4–7)
NEUTROPHILS NFR BLD AUTO: 48 %
PLATELET # BLD AUTO: 161 X10E3/UL (ref 150–450)
POTASSIUM SERPL-SCNC: 4.3 MMOL/L (ref 3.5–5.2)
PROT SERPL-MCNC: 7.2 G/DL (ref 6–8.5)
RBC # BLD AUTO: 5.12 X10E6/UL (ref 4.14–5.8)
SODIUM SERPL-SCNC: 143 MMOL/L (ref 134–144)
WBC # BLD AUTO: 5.2 X10E3/UL (ref 3.4–10.8)

## 2023-02-21 ENCOUNTER — ANESTHESIA EVENT (OUTPATIENT)
Dept: NON INVASIVE DIAGNOSTICS | Facility: HOSPITAL | Age: 68
End: 2023-02-21

## 2023-02-22 ENCOUNTER — APPOINTMENT (OUTPATIENT)
Dept: NON INVASIVE DIAGNOSTICS | Facility: HOSPITAL | Age: 68
End: 2023-02-22
Attending: INTERNAL MEDICINE

## 2023-02-22 ENCOUNTER — HOSPITAL ENCOUNTER (OUTPATIENT)
Facility: HOSPITAL | Age: 68
Setting detail: OUTPATIENT SURGERY
Discharge: HOME | End: 2023-02-23
Attending: INTERNAL MEDICINE | Admitting: INTERNAL MEDICINE

## 2023-02-22 ENCOUNTER — ANESTHESIA (OUTPATIENT)
Dept: NON INVASIVE DIAGNOSTICS | Facility: HOSPITAL | Age: 68
End: 2023-02-22

## 2023-02-22 DIAGNOSIS — I48.91 ATRIAL FIBRILLATION, UNSPECIFIED TYPE (HCC): ICD-10-CM

## 2023-02-22 DIAGNOSIS — I48.0 PAROXYSMAL ATRIAL FIBRILLATION (HCC): ICD-10-CM

## 2023-02-22 LAB
ANION GAP SERPL CALCULATED.3IONS-SCNC: 2 MMOL/L (ref 4–13)
ATRIAL RATE: 70 BPM
ATRIAL RATE: 97 BPM
BUN SERPL-MCNC: 25 MG/DL (ref 5–25)
CALCIUM SERPL-MCNC: 9.5 MG/DL (ref 8.3–10.1)
CHLORIDE SERPL-SCNC: 107 MMOL/L (ref 96–108)
CO2 SERPL-SCNC: 30 MMOL/L (ref 21–32)
CREAT SERPL-MCNC: 1.08 MG/DL (ref 0.6–1.3)
ERYTHROCYTE [DISTWIDTH] IN BLOOD BY AUTOMATED COUNT: 12.7 % (ref 11.6–15.1)
GFR SERPL CREATININE-BSD FRML MDRD: 71 ML/MIN/1.73SQ M
GLUCOSE P FAST SERPL-MCNC: 128 MG/DL (ref 65–99)
GLUCOSE SERPL-MCNC: 128 MG/DL (ref 65–140)
HCT VFR BLD AUTO: 49.2 % (ref 36.5–49.3)
HGB BLD-MCNC: 16.8 G/DL (ref 12–17)
INR PPP: 1.03 (ref 0.84–1.19)
KCT BLD-ACNC: 341 SEC (ref 89–137)
KCT BLD-ACNC: 362 SEC (ref 89–137)
KCT BLD-ACNC: 362 SEC (ref 89–137)
KCT BLD-ACNC: 370 SEC (ref 89–137)
KCT BLD-ACNC: 427 SEC (ref 89–137)
MCH RBC QN AUTO: 31.8 PG (ref 26.8–34.3)
MCHC RBC AUTO-ENTMCNC: 34.1 G/DL (ref 31.4–37.4)
MCV RBC AUTO: 93 FL (ref 82–98)
P AXIS: 79 DEGREES
PLATELET # BLD AUTO: 198 THOUSANDS/UL (ref 149–390)
PMV BLD AUTO: 9.7 FL (ref 8.9–12.7)
POTASSIUM SERPL-SCNC: 3.9 MMOL/L (ref 3.5–5.3)
PR INTERVAL: 171 MS
PROTHROMBIN TIME: 13.7 SECONDS (ref 11.6–14.5)
QRS AXIS: -4 DEGREES
QRS AXIS: 265 DEGREES
QRSD INTERVAL: 84 MS
QRSD INTERVAL: 92 MS
QT INTERVAL: 360 MS
QT INTERVAL: 429 MS
QTC INTERVAL: 454 MS
QTC INTERVAL: 463 MS
RBC # BLD AUTO: 5.29 MILLION/UL (ref 3.88–5.62)
SL CV LV EF: 30
SODIUM SERPL-SCNC: 139 MMOL/L (ref 135–147)
SPECIMEN SOURCE: ABNORMAL
T WAVE AXIS: 70 DEGREES
T WAVE AXIS: 81 DEGREES
VENTRICULAR RATE: 70 BPM
VENTRICULAR RATE: 96 BPM
WBC # BLD AUTO: 6.34 THOUSAND/UL (ref 4.31–10.16)

## 2023-02-22 RX ORDER — CEFAZOLIN SODIUM 1 G/3ML
INJECTION, POWDER, FOR SOLUTION INTRAMUSCULAR; INTRAVENOUS AS NEEDED
Status: DISCONTINUED | OUTPATIENT
Start: 2023-02-22 | End: 2023-02-22

## 2023-02-22 RX ORDER — PANTOPRAZOLE SODIUM 40 MG/1
40 TABLET, DELAYED RELEASE ORAL
Status: DISCONTINUED | OUTPATIENT
Start: 2023-02-23 | End: 2023-02-23 | Stop reason: HOSPADM

## 2023-02-22 RX ORDER — DEXAMETHASONE SODIUM PHOSPHATE 4 MG/ML
4 INJECTION, SOLUTION INTRA-ARTICULAR; INTRALESIONAL; INTRAMUSCULAR; INTRAVENOUS; SOFT TISSUE ONCE AS NEEDED
Status: DISCONTINUED | OUTPATIENT
Start: 2023-02-22 | End: 2023-02-22 | Stop reason: HOSPADM

## 2023-02-22 RX ORDER — HYDROMORPHONE HCL/PF 1 MG/ML
0.5 SYRINGE (ML) INJECTION
Status: DISCONTINUED | OUTPATIENT
Start: 2023-02-22 | End: 2023-02-22 | Stop reason: HOSPADM

## 2023-02-22 RX ORDER — GLYCOPYRROLATE 0.2 MG/ML
INJECTION INTRAMUSCULAR; INTRAVENOUS AS NEEDED
Status: DISCONTINUED | OUTPATIENT
Start: 2023-02-22 | End: 2023-02-22

## 2023-02-22 RX ORDER — FENTANYL CITRATE/PF 50 MCG/ML
25 SYRINGE (ML) INJECTION
Status: DISCONTINUED | OUTPATIENT
Start: 2023-02-22 | End: 2023-02-22 | Stop reason: HOSPADM

## 2023-02-22 RX ORDER — PANTOPRAZOLE SODIUM 40 MG/10ML
40 INJECTION, POWDER, LYOPHILIZED, FOR SOLUTION INTRAVENOUS ONCE
Status: COMPLETED | OUTPATIENT
Start: 2023-02-22 | End: 2023-02-22

## 2023-02-22 RX ORDER — LIDOCAINE HYDROCHLORIDE 10 MG/ML
INJECTION, SOLUTION EPIDURAL; INFILTRATION; INTRACAUDAL; PERINEURAL AS NEEDED
Status: DISCONTINUED | OUTPATIENT
Start: 2023-02-22 | End: 2023-02-22

## 2023-02-22 RX ORDER — HEPARIN SODIUM 10000 [USP'U]/100ML
INJECTION, SOLUTION INTRAVENOUS
Status: DISCONTINUED | OUTPATIENT
Start: 2023-02-22 | End: 2023-02-22 | Stop reason: HOSPADM

## 2023-02-22 RX ORDER — NEOSTIGMINE METHYLSULFATE 1 MG/ML
INJECTION INTRAVENOUS AS NEEDED
Status: DISCONTINUED | OUTPATIENT
Start: 2023-02-22 | End: 2023-02-22

## 2023-02-22 RX ORDER — PROPOFOL 10 MG/ML
INJECTION, EMULSION INTRAVENOUS AS NEEDED
Status: DISCONTINUED | OUTPATIENT
Start: 2023-02-22 | End: 2023-02-22

## 2023-02-22 RX ORDER — FENTANYL CITRATE 50 UG/ML
INJECTION, SOLUTION INTRAMUSCULAR; INTRAVENOUS AS NEEDED
Status: DISCONTINUED | OUTPATIENT
Start: 2023-02-22 | End: 2023-02-22

## 2023-02-22 RX ORDER — ONDANSETRON 2 MG/ML
INJECTION INTRAMUSCULAR; INTRAVENOUS AS NEEDED
Status: DISCONTINUED | OUTPATIENT
Start: 2023-02-22 | End: 2023-02-22

## 2023-02-22 RX ORDER — LIDOCAINE HYDROCHLORIDE 10 MG/ML
INJECTION, SOLUTION EPIDURAL; INFILTRATION; INTRACAUDAL; PERINEURAL CODE/TRAUMA/SEDATION MEDICATION
Status: DISCONTINUED | OUTPATIENT
Start: 2023-02-22 | End: 2023-02-22 | Stop reason: HOSPADM

## 2023-02-22 RX ORDER — ONDANSETRON 2 MG/ML
4 INJECTION INTRAMUSCULAR; INTRAVENOUS ONCE
Status: DISCONTINUED | OUTPATIENT
Start: 2023-02-22 | End: 2023-02-23 | Stop reason: HOSPADM

## 2023-02-22 RX ORDER — EPHEDRINE SULFATE 50 MG/ML
INJECTION INTRAVENOUS AS NEEDED
Status: DISCONTINUED | OUTPATIENT
Start: 2023-02-22 | End: 2023-02-22

## 2023-02-22 RX ORDER — SODIUM CHLORIDE, SODIUM LACTATE, POTASSIUM CHLORIDE, CALCIUM CHLORIDE 600; 310; 30; 20 MG/100ML; MG/100ML; MG/100ML; MG/100ML
INJECTION, SOLUTION INTRAVENOUS CONTINUOUS PRN
Status: DISCONTINUED | OUTPATIENT
Start: 2023-02-22 | End: 2023-02-22

## 2023-02-22 RX ORDER — ONDANSETRON 2 MG/ML
4 INJECTION INTRAMUSCULAR; INTRAVENOUS ONCE AS NEEDED
Status: DISCONTINUED | OUTPATIENT
Start: 2023-02-22 | End: 2023-02-22 | Stop reason: HOSPADM

## 2023-02-22 RX ORDER — ROCURONIUM BROMIDE 10 MG/ML
INJECTION, SOLUTION INTRAVENOUS AS NEEDED
Status: DISCONTINUED | OUTPATIENT
Start: 2023-02-22 | End: 2023-02-22

## 2023-02-22 RX ORDER — HEPARIN SODIUM 1000 [USP'U]/ML
INJECTION, SOLUTION INTRAVENOUS; SUBCUTANEOUS CODE/TRAUMA/SEDATION MEDICATION
Status: DISCONTINUED | OUTPATIENT
Start: 2023-02-22 | End: 2023-02-22 | Stop reason: HOSPADM

## 2023-02-22 RX ORDER — ACETAMINOPHEN 325 MG/1
650 TABLET ORAL EVERY 4 HOURS PRN
Status: DISCONTINUED | OUTPATIENT
Start: 2023-02-22 | End: 2023-02-23 | Stop reason: HOSPADM

## 2023-02-22 RX ORDER — HYDROXYZINE HYDROCHLORIDE 25 MG/1
50 TABLET, FILM COATED ORAL EVERY 6 HOURS PRN
Status: DISCONTINUED | OUTPATIENT
Start: 2023-02-22 | End: 2023-02-23 | Stop reason: HOSPADM

## 2023-02-22 RX ORDER — PROTAMINE SULFATE 10 MG/ML
INJECTION, SOLUTION INTRAVENOUS AS NEEDED
Status: DISCONTINUED | OUTPATIENT
Start: 2023-02-22 | End: 2023-02-22

## 2023-02-22 RX ORDER — MIDAZOLAM HYDROCHLORIDE 2 MG/2ML
INJECTION, SOLUTION INTRAMUSCULAR; INTRAVENOUS AS NEEDED
Status: DISCONTINUED | OUTPATIENT
Start: 2023-02-22 | End: 2023-02-22

## 2023-02-22 RX ORDER — SODIUM CHLORIDE, SODIUM LACTATE, POTASSIUM CHLORIDE, CALCIUM CHLORIDE 600; 310; 30; 20 MG/100ML; MG/100ML; MG/100ML; MG/100ML
75 INJECTION, SOLUTION INTRAVENOUS CONTINUOUS
Status: DISPENSED | OUTPATIENT
Start: 2023-02-22 | End: 2023-02-22

## 2023-02-22 RX ORDER — SUCCINYLCHOLINE/SOD CL,ISO/PF 100 MG/5ML
SYRINGE (ML) INTRAVENOUS AS NEEDED
Status: DISCONTINUED | OUTPATIENT
Start: 2023-02-22 | End: 2023-02-22

## 2023-02-22 RX ORDER — LISINOPRIL 20 MG/1
20 TABLET ORAL DAILY
Status: DISCONTINUED | OUTPATIENT
Start: 2023-02-23 | End: 2023-02-23 | Stop reason: HOSPADM

## 2023-02-22 RX ADMIN — LIDOCAINE HYDROCHLORIDE 50 MG: 10 INJECTION, SOLUTION EPIDURAL; INFILTRATION; INTRACAUDAL; PERINEURAL at 08:35

## 2023-02-22 RX ADMIN — ROCURONIUM BROMIDE 5 MG: 50 INJECTION INTRAVENOUS at 08:35

## 2023-02-22 RX ADMIN — GLYCOPYRROLATE 0.2 MG: 0.2 INJECTION INTRAMUSCULAR; INTRAVENOUS at 11:53

## 2023-02-22 RX ADMIN — ONDANSETRON 4 MG: 2 INJECTION INTRAMUSCULAR; INTRAVENOUS at 11:38

## 2023-02-22 RX ADMIN — GLYCOPYRROLATE 0.2 MG: 0.2 INJECTION INTRAMUSCULAR; INTRAVENOUS at 11:04

## 2023-02-22 RX ADMIN — GLYCOPYRROLATE 0.2 MG: 0.2 INJECTION INTRAMUSCULAR; INTRAVENOUS at 11:02

## 2023-02-22 RX ADMIN — EPHEDRINE SULFATE 10 MG: 50 INJECTION INTRAVENOUS at 11:06

## 2023-02-22 RX ADMIN — ACETAMINOPHEN 650 MG: 325 TABLET ORAL at 15:28

## 2023-02-22 RX ADMIN — MIDAZOLAM 2 MG: 1 INJECTION INTRAMUSCULAR; INTRAVENOUS at 08:23

## 2023-02-22 RX ADMIN — PANTOPRAZOLE SODIUM 40 MG: 40 INJECTION, POWDER, FOR SOLUTION INTRAVENOUS at 08:52

## 2023-02-22 RX ADMIN — APIXABAN 5 MG: 5 TABLET, FILM COATED ORAL at 18:19

## 2023-02-22 RX ADMIN — PHENYLEPHRINE HYDROCHLORIDE 40 MCG/MIN: 10 INJECTION INTRAVENOUS at 09:41

## 2023-02-22 RX ADMIN — CEFAZOLIN 2000 MG: 330 INJECTION, POWDER, FOR SOLUTION INTRAMUSCULAR; INTRAVENOUS at 08:55

## 2023-02-22 RX ADMIN — PROTAMINE SULFATE 60 MG: 10 INJECTION, SOLUTION INTRAVENOUS at 11:48

## 2023-02-22 RX ADMIN — NEOSTIGMINE METHYLSULFATE 1 MG: 1 INJECTION INTRAVENOUS at 11:53

## 2023-02-22 RX ADMIN — FENTANYL CITRATE 50 MCG: 50 INJECTION, SOLUTION INTRAMUSCULAR; INTRAVENOUS at 08:35

## 2023-02-22 RX ADMIN — Medication 100 MG: at 08:35

## 2023-02-22 RX ADMIN — FENTANYL CITRATE 50 MCG: 50 INJECTION, SOLUTION INTRAMUSCULAR; INTRAVENOUS at 11:16

## 2023-02-22 RX ADMIN — PROPOFOL 180 MG: 10 INJECTION, EMULSION INTRAVENOUS at 08:35

## 2023-02-22 RX ADMIN — SODIUM CHLORIDE, SODIUM LACTATE, POTASSIUM CHLORIDE, AND CALCIUM CHLORIDE: .6; .31; .03; .02 INJECTION, SOLUTION INTRAVENOUS at 08:25

## 2023-02-22 NOTE — ANESTHESIA POSTPROCEDURE EVALUATION
Post-Op Assessment Note    CV Status:  Stable  Pain Score: 0    Pain management: adequate     Mental Status:  Alert and sleepy   Hydration Status:  Euvolemic   PONV Controlled:  Controlled   Airway Patency:  Patent      Post Op Vitals Reviewed: Yes      Staff: CRNA         No notable events documented      BP      Temp     Pulse     Resp      SpO2

## 2023-02-22 NOTE — ANESTHESIA PREPROCEDURE EVALUATION
Procedure:  Cardiac eps/afib ablation   comprehensive posterior wall (Chest)    Patient is having more palpitation at this time  Previously dofetilide did not work well and he reverted back to A  fib quickly  He has underlying bradycardia and does not tolerate rate control agents   He is agreeable to proceed with repeat ablation     He understands that alcohol is a significant precipitator of A  fib  He still drinks quite a bit on a weekly basis     The patient has a known history of atrial flutter and fibrillation   He underwent comprehensive ablation in February 2017  He had some brief episodes of palpitation in January 2021   In the 1st week of February he did have palpitations for 2 days     He has gained some weight   He has become hypertensive   He drinks 2 beers every day and occasionally 4-6 beers a day in the week end     He is still active at work   Not complaining of anginal like chest pain or chest pressure   Not complaining of worsening orthopnea or PND  No leg swelling      He did have palpitation for 2 days in February  He did drink during those days  He was also shoveling snow     Is not complaining of presyncope or syncope  His not complaining of orthostatic lightheadedness     Is not complaining of exertional intolerance      He is not smoking tobacco  He is not using energy drinks   He is taking a significant amount of alcohol at least 14 units a week      Relevant Problems   CARDIO   (+) Atrial fibrillation (HCC)   (+) Atrial flutter (HCC)   (+) Shortness of breath on exertion      MUSCULOSKELETAL   (+) Psoriatic arthritis ([facility] )      NEURO/PSYCH   (+) NDPH (new daily persistent headache)      PULMONARY   (+) CATY (obstructive sleep apnea)   (+) Shortness of breath on exertion      History Comments History Comments   Arthritis  Psoriasis    High cholesterol  Fatty liver    A-fib (HCC)  Irregular heart beat    Non-ischemic cardiomyopathy ([facility] )  Pulmonary HTN ([facility] )    Hyperlipidemia  BPH (benign prostatic hyperplasia)    Psoriatic arthritis ([facility] )  Fracture, foot    Osteoarthritis  Colon polyp      Surgical History     Current as of 02/21/23 1903  TRANSURETHRAL RESECTION OF PROSTATE COLONOSCOPY   ELBOW SURGERY      Substance History     Current as of 02/21/23 1903  Smoking Status: Former - 5 pack years   Smokeless Tobacco Status: Never   Alcohol use: Yes, unspecified volume   Drug use: No       Physical Exam    Airway      TM Distance: >3 FB  Neck ROM: full     Dental       Cardiovascular  Rhythm: irregular, Rate: abnormal,     Pulmonary  Pulmonary exam normal     Other Findings     Stress test Interpretation Summary 11/2022       •  Left Ventricle: Left ventricular cavity size is normal  The left ventricular ejection fraction is 50%  Systolic function is normal  Wall motion is normal   •  Stress ECG: No ST deviation is noted  The ECG was negative for ischemia  The stress ECG is negative for ischemia after maximal exercise, without reproduction of symptoms  •  Peak Stress Echo: Left ventricle cavity has normal reduction in size at peak stress  The peak stress echo showed normal wall motion which was hyperdynamic compared to baseline  •  Echo Post Impression: The study is normal      Normal exercise stress echocardiogram at 10 1 Mets and 91% max predicted heart rate          Anesthesia Plan  ASA Score- 3     Anesthesia Type- general with ASA Monitors  Additional Monitors: arterial line  Airway Plan: ETT  Plan Factors-Exercise tolerance (METS): <4 METS  Chart reviewed  EKG reviewed  Imaging results reviewed  Existing labs reviewed  Patient summary reviewed  Patient is not a current smoker  Induction- intravenous  Postoperative Plan- Plan for postoperative opioid use  Planned trial extubation    Informed Consent- Anesthetic plan and risks discussed with patient  I personally reviewed this patient with the CRNA   Discussed and agreed on the Anesthesia Plan with the JAMES Goldsmith ECHO Interpretation Summary 2022       •  Left Ventricle: Left ventricular cavity size is normal  Wall thickness is normal  Wall motion is low normal   •  Left Atrium: The atrium is mildly dilated  •  Mitral Valve: There is mild regurgitation      Low normal left ventricular function  Mild left atrial enlargement  Mild mitral regurgitation  Compared to the previous echocardiogram September 21, 2016 the ejection fraction at that time was 39% with diastolic dysfunction and mild pulmonary hypertension

## 2023-02-22 NOTE — H&P
H&P Exam - Cardiology   Jasvir Reyes 74 y o  male MRN: [id number]  Unit/Bed#: BE CATH LAB ROOM Encounter: [id number]    Assessment/Plan     Assessment:  1  Paroxysmal atrial fibrillation   A )  Prior cryoablation with pulmonary vein isolation and typical flutter line 2/2017   B )  Previously failed sotalol per notes, also previously maintained on dofetilide (initiated 11/2016 with breakthrough afib, discontinued 5/2017 in the post ablation setting)   C )  Currently maintained on Eliquis anticoagulation, not on AV belkis agents due to baseline sinus bradycardia   D )  Event monitor 10/2022 showed 25% afib burden  2  Low normal LV systolic function with EF 50% per echo 11/2022  3  Nonischemic stress echo 11/2022  4  Hypertension  5  Obstructive sleep apnea  6  Psoriatic arthritis      Plan:  CAROL/repeat atrial fibrillation ablation      History of Present Illness   HPI:  Jasvir Reyes is a 74y o  year old male with paroxysmal atrial fibrillation with prior ablation 2/2017, low normal LV systolic function but with nonischemic recent stress echo 11/2022, hypertension, obstructive sleep apnea, and psoriatic arthritis  He typically follows with Dr Deepa Lipscomb, and has a long history of atrial fibrillation  He was initially placed on sotalol, however continued to have breakthrough episodes and he was instead transitioned to [facility] 11/2016  Unfortunately he continued to have breakthrough episodes and subsequently underwent cryoablation with pulmonary vein isolation and typical flutter line 2/2017  He did well in the post ablation setting, and eventually his dofetilide was discontinued 5/2017  At some point his anticoagulation was also discontinued in favor of aspirin  More recently, he was seen by Dr Deepa Lipscomb 10/2022, at which time he reported occasional palpitations  An event monitor was worn, which showed 25% atrial fibrillation burden, longest episode lasting over 1 day    He was thus restarted on Eliquis anticoagulation, and ultimately repeat atrial fibrillation ablation was recommended  He presents today to undergo that procedure  He admits to occasional palpitations at times, but feels like he is more tolerant and not as symptomatic in afib anymore  Review of Systems  ROS as noted above, otherwise 12 point review of systems was performed and is negative  Historical Information   Past Medical History:   Diagnosis Date   • A-fib ([facility] )    • Arthritis    • BPH (benign prostatic hyperplasia)    • Colon polyp    • Fatty liver    • Fracture, foot    • High cholesterol    • Hyperlipidemia    • Irregular heart beat    • Non-ischemic cardiomyopathy (HCC)    • Osteoarthritis    • Psoriasis    • Psoriatic arthritis ([facility] )    • Pulmonary HTN (HCC)      Past Surgical History:   Procedure Laterality Date   • COLONOSCOPY     • ELBOW SURGERY Right    • TRANSURETHRAL RESECTION OF PROSTATE      last assessed 7/21/15     Family History:   Family History   Problem Relation Age of Onset   • Diabetes Mother    • Hypertension Father    • Diabetes Family        Social History   Social History     Substance and Sexual Activity   Alcohol Use Yes    Comment: 1 drink daily     Social History     Substance and Sexual Activity   Drug Use No     Social History     Tobacco Use   Smoking Status Former   • Packs/day: 0 50   • Years: 10 00   • Pack years: 5 00   • Types: Cigarettes   Smokeless Tobacco Never         Meds/Allergies   all medications and allergies reviewed  Home Medications:   Medications Prior to Admission   Medication   • adalimumab (HUMIRA) 40 mg/0 8 mL PSKT   • apixaban (Eliquis) 5 mg   • lisinopril (ZESTRIL) 20 mg tablet       Allergies   Allergen Reactions   • Penicillins Rash       Objective   Vitals: Blood pressure 128/88, pulse 79, temperature (!) 96 8 °F (36 °C), temperature source Temporal, resp  rate 17, height 5' 11" (1 803 m), weight 82 6 kg (182 lb), SpO2 99 %    Orthostatic Blood Pressures    Flowsheet Row Most Recent Value   Blood Pressure 128/88 filed at 2023 8306          No intake or output data in the 24 hours ending 23 0829    Invasive Devices     Peripheral Intravenous Line  Duration           Peripheral IV 23 Left Forearm <1 day    Peripheral IV 23 Right Antecubital <1 day                Physical Exam  GEN: NAD, alert and oriented x 3, well appearing  SKIN: dry without significant lesions or rashes  HEENT: NCAT, PERRL, EOMs intact  NECK: No JVD appreciated  CARDIOVASCULAR: irregularly irregular, normal S1, S2 without murmurs, rubs, or gallops appreciated  LUNGS: Clear to auscultation bilaterally without wheezes, rhonchi, or rales  ABDOMEN: Soft, nontender, nondistended  EXTREMITIES/VASCULAR: perfused without clubbing, cyanosis, or LE edema b/l  PSYCH: Normal mood and affect  NEURO: CN ll-Xll grossly intact      Lab Results: I have personally reviewed pertinent lab results  Results from last 7 days   Lab Units 23  0725   WBC Thousand/uL 6 34   HEMOGLOBIN g/dL 16 8   HEMATOCRIT % 49 2   PLATELETS Thousands/uL 198           Invalid input(s): LABGLOM  Results from last 7 days   Lab Units 23  0725   INR  1 03             Imaging: I have personally reviewed pertinent reports  Results for orders placed during the hospital encounter of 16    Echo complete with contrast if indicated    Narrative  [place] [address], [address] [facility]  [phone]    Transthoracic Echocardiogram  2D, M-mode, Doppler, and Color Doppler    Study date:  29-Mar-2016    Patient: Marcia Sullivan  MR number: [id number]  Account number: [de-identified]  : [id number]  Age: 64 years  Gender: Male  Status: Outpatient  Location: [facility]  Height: 71 in  Weight: 203 lb  BP: 124/ 70 mmHg    Indications: Shortness of breath      Diagnoses: R06 02 - Shortness of breath    Sonographer:  Retana RCS  Primary Physician:  Corine Fraser MD  Referring Physician:  Emani Cruz MD  Group:  [facility]  Interpreting Physician:  Emani Cruz MD    SUMMARY    LEFT VENTRICLE:  Size was at the upper limits of normal   Systolic function was moderately reduced  Ejection fraction was estimated to be  40 % - 45%  There were no regional wall motion abnormalities  There was moderate diffuse hypokinesis  RIGHT VENTRICLE:  The size was normal   Systolic function was normal     LEFT ATRIUM:  The atrium was dilated  RIGHT ATRIUM:  The atrium was dilated  MITRAL VALVE:  There was mild regurgitation  AORTIC VALVE:  There was mild regurgitation  TRICUSPID VALVE:  There was trace regurgitation  Pulmonary artery systolic pressure was within the normal range  HISTORY: PRIOR HISTORY: A Fib  Former smoker  Risk factors: medication-treated  hypercholesterolemia  PROCEDURE: The study was performed in the [address] [place] [address]  This  was a routine study  The transthoracic approach was used  The study included  complete 2D imaging, M-mode, complete spectral Doppler, and color Doppler  The  heart rate was 93 bpm, at the start of the study  Images were obtained from the  parasternal, apical, subcostal, and suprasternal notch acoustic windows  Image  quality was adequate  LEFT VENTRICLE: Size was at the upper limits of normal  Systolic function was  moderately reduced  Ejection fraction was estimated to be 40 % - 45% There were  no regional wall motion abnormalities  There was moderate diffuse hypokinesis  Wall thickness was normal     RIGHT VENTRICLE: The size was normal  Systolic function was normal  Wall  thickness was normal     LEFT ATRIUM: The atrium was dilated  RIGHT ATRIUM: The atrium was dilated  MITRAL VALVE: Valve structure was normal  There was normal leaflet separation  DOPPLER: The transmitral velocity was within the normal range  There was no  evidence for stenosis   There was mild regurgitation  AORTIC VALVE: The valve was trileaflet  Leaflets exhibited mildly increased  thickness, mild calcification, and normal cuspal separation  DOPPLER:  Transaortic velocity was within the normal range  There was no evidence for  stenosis  There was mild regurgitation  TRICUSPID VALVE: The valve structure was normal  There was normal leaflet  separation  DOPPLER: The transtricuspid velocity was within the normal range  There was no evidence for stenosis  There was trace regurgitation  Pulmonary  artery systolic pressure was within the normal range  PULMONIC VALVE: Not well visualized  DOPPLER: The transpulmonic velocity was  within the normal range  There was no significant regurgitation  PERICARDIUM: There was no pericardial effusion  The pericardium was normal in  appearance  AORTA: The root exhibited normal size  SYSTEMIC VEINS: IVC: The inferior vena cava was normal in size  The  respirophasic change in diameter was less than 50%  SYSTEM MEASUREMENT TABLES    Apical four chamber  4 chamber Left Atrium Volume Index; Planimetry; End Systole; Apical four  chamber;: 20 54 cm2 Left Ventricular End Diastolic Volume; Method of Disks,  Single Plane; Apical four chamber;: 114 2 ml Left Ventricular End Systolic  Volume; Method of Disks, Single Plane; Apical four chamber;: 67 3 ml Right  Atrium Systolic Area; Planimetry; End Systole; Apical four chamber;: 15 84 cm2  Right Ventricular Internal Diastolic Dimension; End Diastole; Apical four  chamber;: 30 3 mm  TAPSE: 18 7 mm    Unspecified Scan Mode  Aortic Root Diameter; End Systole;: 32 6 mm  Left atrial diameter; End Diastole;: 47 2 mm  Interventricular Septum Diastolic Thickness; Teichholz; End Diastole;: 10 2 mm  Interventricular Septum Systolic Thickness; Teichholz; End Systole;: 12 7 mm  Left Ventricle Internal End Diastolic Dimension; Teichholz;: 51 6 mm  Left Ventricle Internal Systolic Dimension;  Teichholz; End Systole;: 37 2 mm  Left Ventricle Posterior Wall Diastolic Thickness; Teichholz; End Diastole;:  11 5 mm Left Ventricle Posterior Wall Systolic Thickness; Teichholz; End  Systole;: 15 5 mm  Left Ventricular Ejection Fraction;  Teichholz;: 53 7 %  Mitral Valve Area; Area by Pressure Half-Time; Systole;: 4 31 cm2  Maximum Tricuspid valve regurgitation pressure gradient; Regurgitant Flow;  Systole;: 24 3 mm[Hg]    [facility] Accredited Echocardiography Laboratory    Prepared and electronically signed by    Donna Escamilla MD  Signed 29-Mar-2016 19:46:15      EKG:         Code Status: Level 1 - Full Code

## 2023-02-23 VITALS
HEART RATE: 58 BPM | DIASTOLIC BLOOD PRESSURE: 81 MMHG | WEIGHT: 182 LBS | HEIGHT: 71 IN | SYSTOLIC BLOOD PRESSURE: 128 MMHG | TEMPERATURE: 97.8 F | RESPIRATION RATE: 18 BRPM | BODY MASS INDEX: 25.48 KG/M2 | OXYGEN SATURATION: 97 %

## 2023-02-23 LAB
ANION GAP SERPL CALCULATED.3IONS-SCNC: 4 MMOL/L (ref 4–13)
BUN SERPL-MCNC: 20 MG/DL (ref 5–25)
CALCIUM SERPL-MCNC: 8.8 MG/DL (ref 8.3–10.1)
CHLORIDE SERPL-SCNC: 108 MMOL/L (ref 96–108)
CO2 SERPL-SCNC: 26 MMOL/L (ref 21–32)
CREAT SERPL-MCNC: 0.9 MG/DL (ref 0.6–1.3)
ERYTHROCYTE [DISTWIDTH] IN BLOOD BY AUTOMATED COUNT: 12.8 % (ref 11.6–15.1)
GFR SERPL CREATININE-BSD FRML MDRD: 89 ML/MIN/1.73SQ M
GLUCOSE SERPL-MCNC: 120 MG/DL (ref 65–140)
HCT VFR BLD AUTO: 39.9 % (ref 36.5–49.3)
HGB BLD-MCNC: 13.6 G/DL (ref 12–17)
MAGNESIUM SERPL-MCNC: 1.9 MG/DL (ref 1.6–2.6)
MCH RBC QN AUTO: 32 PG (ref 26.8–34.3)
MCHC RBC AUTO-ENTMCNC: 34.1 G/DL (ref 31.4–37.4)
MCV RBC AUTO: 94 FL (ref 82–98)
PLATELET # BLD AUTO: 146 THOUSANDS/UL (ref 149–390)
PMV BLD AUTO: 10 FL (ref 8.9–12.7)
POTASSIUM SERPL-SCNC: 3.4 MMOL/L (ref 3.5–5.3)
RBC # BLD AUTO: 4.25 MILLION/UL (ref 3.88–5.62)
SODIUM SERPL-SCNC: 138 MMOL/L (ref 135–147)
WBC # BLD AUTO: 8.63 THOUSAND/UL (ref 4.31–10.16)

## 2023-02-23 RX ORDER — POTASSIUM CHLORIDE 20 MEQ/1
40 TABLET, EXTENDED RELEASE ORAL ONCE
Status: COMPLETED | OUTPATIENT
Start: 2023-02-23 | End: 2023-02-23

## 2023-02-23 RX ORDER — PANTOPRAZOLE SODIUM 40 MG/1
40 TABLET, DELAYED RELEASE ORAL
Qty: 30 TABLET | Refills: 0 | Status: SHIPPED | OUTPATIENT
Start: 2023-02-24 | End: 2023-03-26

## 2023-02-23 RX ADMIN — PANTOPRAZOLE SODIUM 40 MG: 40 TABLET, DELAYED RELEASE ORAL at 10:18

## 2023-02-23 RX ADMIN — LISINOPRIL 20 MG: 20 TABLET ORAL at 10:17

## 2023-02-23 RX ADMIN — APIXABAN 5 MG: 5 TABLET, FILM COATED ORAL at 10:17

## 2023-02-23 RX ADMIN — POTASSIUM CHLORIDE 40 MEQ: 1500 TABLET, EXTENDED RELEASE ORAL at 11:17

## 2023-02-23 RX ADMIN — POTASSIUM CHLORIDE 40 MEQ: 1500 TABLET, EXTENDED RELEASE ORAL at 10:17

## 2023-02-23 NOTE — DISCHARGE SUMMARY
Discharge Summary - Diana Yang 74 y o  male MRN: [id number]    Unit/Bed#: -01 Encounter: [id number]      Admission Date: 2/22/2023   Discharge Date: 2/23/2023    Discharge Diagnosis:   1  Paroxysmal atrial fibrillation, status post repeat cryoablation with reisolation of LIPV and posterior wall isolation 2/22/2023              A )  Prior cryoablation with pulmonary vein isolation and typical flutter line 2/2017              B )  Previously failed sotalol per notes, also previously maintained on dofetilide (initiated 11/2016 with breakthrough afib, discontinued 5/2017 in the post ablation setting)              C )  Currently maintained on Eliquis anticoagulation, not on AV belkis agents due to baseline sinus bradycardia              D )  Event monitor 10/2022 showed 25% afib burden  2  Low normal LV systolic function with EF 50% per echo 11/2022  3  Nonischemic stress echo 11/2022  4  Hypertension  5  Obstructive sleep apnea  6  Psoriatic arthritis      Procedures Performed:   Orders Placed This Encounter   Procedures   • Cardiac ep lab eps/ablations   1  Cryoablation of atrial fibrillation     Re - isolation of LIPV     Complete Posterior wall isolation     2  Limited  electrophysiologic evaluation   3  3-D electro-anatomic mapping using NavX system  4  Intracardiac echocardiography  5  Transeptal  6  Access obtained under ultrasound guidance using Seldinger technique  7  DCCV - 1 shock - 300J, synchronised, biphasic       Consultants: none      HPI: Please refer to the initial history and physical as well as procedure notes for full details  Briefly, Diana Yang is a 74y o  year old male with paroxysmal atrial fibrillation with prior ablation 2/2017, low normal LV systolic function but with nonischemic recent stress echo 11/2022, hypertension, obstructive sleep apnea, and psoriatic arthritis  He typically follows with Dr Brad Thomas, and has a long history of atrial fibrillation    He was initially placed on sotalol, however continued to have breakthrough episodes and he was instead transitioned to [facility] 11/2016  Unfortunately he continued to have breakthrough episodes and subsequently underwent cryoablation with pulmonary vein isolation and typical flutter line 2/2017  He did well in the post ablation setting, and eventually his dofetilide was discontinued 5/2017  At some point his anticoagulation was also discontinued in favor of aspirin  More recently, he was seen by Dr Deepa Lipscomb 10/2022, at which time he reported occasional palpitations  An event monitor was worn, which showed 25% atrial fibrillation burden, longest episode lasting over 1 day  He was thus restarted on Eliquis anticoagulation, and ultimately repeat atrial fibrillation ablation was recommended  He presented this hospital admission to undergo this procedure  Hospital Course: Jasvir Reyes presented at his baseline state of health  After the procedure was explained in detail and consent was obtained, he underwent the above procedures without complications  Please see operative notes by Dr Deepa Lipscomb for full details  He tolerated the procedure well  He was then monitored overnight for further observation  There were no acute issues or events overnight  The following morning he denied all cardiac complaints, including chest pain/pressure, dyspnea, palpitations, dizziness, lightheadedness, or syncope  His vital signs were reviewed and labs are stable  Telemetry showed normal sinus rhythm without significant bradycardia, no significant arrhythmias noted  His groins were soft without significant hematoma or recurrent bleeding, and groin sutures were removed without incident  Review of Systems   Constitutional: Negative for fever and malaise/fatigue     Cardiovascular: Negative for chest pain, dyspnea on exertion, irregular heartbeat, leg swelling, near-syncope, orthopnea, palpitations, paroxysmal nocturnal dyspnea and syncope  All other systems reviewed and are negative  Physical exam:  GEN: NAD, alert and oriented x 3, well appearing  SKIN: dry without significant lesions or rashes  HEENT: NCAT, PERRL, EOMs intact  NECK: No JVD appreciated  CARDIOVASCULAR: RRR, normal S1, S2 without murmurs, rubs, or gallops appreciated  LUNGS: Clear to auscultation bilaterally without wheezes, rhonchi, or rales  ABDOMEN: Soft, nontender, nondistended  EXTREMITIES/VASCULAR: perfused without clubbing, cyanosis, or LE edema b/l  PSYCH: Normal mood and affect  NEURO: CN ll-Xll grossly intact    He was given routine post ablation discharge instructions and restrictions, and these were explained in detail  He was given a follow up appointment with Dr Arturo Pickard, and he was instructed to follow up with his primary cardiologist as previously instructed  In terms of his medications, he will continue Eliquis and lisinopril as previously instructed  He was asked to take pantoprazole 40 mg daily for 30 days, at which time it can be discontinued    He is stable for discharge at this time with all questions answered  He was discussed in detail with Dr Arturo Pickard who is in agreement with this discharge summary  Discharge Medications:  See after visit summary for reconciled discharge medications provided to patient and family      Medications Prior to Admission   Medication   • adalimumab (HUMIRA) 40 mg/0 8 mL PSKT   • apixaban (Eliquis) 5 mg   • lisinopril (ZESTRIL) 20 mg tablet         [facility]inet Labs/diagnostics:  CBC with diff:   Results from last 7 days   Lab Units 02/23/23  0358 02/22/23  0725   WBC Thousand/uL 8 63 6 34   HEMOGLOBIN g/dL 13 6 16 8   HEMATOCRIT % 39 9 49 2   MCV fL 94 93   PLATELETS Thousands/uL 146* 198   MCH pg 32 0 31 8   MCHC g/dL 34 1 34 1   RDW % 12 8 12 7   MPV fL 10 0 9 7       BMP:  Results from last 7 days   Lab Units 02/23/23  0358 02/22/23  0725   POTASSIUM mmol/L 3 4* 3 9   CHLORIDE mmol/L 108 107   CO2 mmol/L 26 30   BUN mg/dL 20 25   CREATININE mg/dL 0 90 1 08   CALCIUM mg/dL 8 8 9 5       Magnesium:   Results from last 7 days   Lab Units 02/23/23  0358   MAGNESIUM mg/dL 1 9       Coags:   Results from last 7 days   Lab Units 02/22/23  0725   INR  9 22         Complications: none    Condition at Discharge: good     Discharge instructions/Information to patient and family:   See after visit summary for information provided to patient and family  Provisions for Follow-Up Care:  See after visit summary for information related to follow-up care and any pertinent home health orders  Disposition: Home    Planned Readmission: No    Discharge Statement   I spent 45 minutes minutes discharging the patient  This time was spent on the day of discharge  I had direct contact with the patient on the day of discharge  Additional documentation is required if more than 30 minutes were spent on discharge   Evaluating the incision, discussing discharge instructions and restrictions, arranging follow up appointments, discussing medications

## 2023-02-23 NOTE — DISCHARGE INSTR - AVS FIRST PAGE
PLEASE NOTE THE FOLLOWING MEDICATION RECOMMENDATIONS:  - take pantoprazole 40 mg daily for 30 days, at which time it can be discontinued  - continue Eliquis and lisinopril as previously instructed        RESTRICTIONS:  No heavy lifting or strenuous activity for one week  No soaking in a bath tub/hot tub/swimming pool for one week or until groin heals  You may shower - please let soap and water run over the groins, no scrubbing, and pat the area dry  Please place band-aid on groins daily for up to five days, but you may remove sooner if no issues are noted  If you notice ongoing bleeding, swelling, or firm lumps in groin near ablation incision, please contact Dr Autumn Sanchez office - [phone]

## 2023-04-21 NOTE — PROGRESS NOTES
Cardiology Follow Up    David Cancel  1955  [id number]  [facility] [place]  [facility] [place] [place]  [address]  [phone]  [phone]    1  Paroxysmal atrial fibrillation (HCC)  POCT ECG      2  CATY (obstructive sleep apnea)        3  Psoriatic arthritis (HCC)        4  Palpitation        5  Bradycardia        6   Alcohol use              Summary of my recommendation for the patient  Patient is post comprehensive A-fib ablation-posterior wall isolation along with PVI  He remains in sinus rhythm    Complete cessation of alcohol recommended            Interval History:   Patient is post comprehensive A-fib ablation-posterior wall isolation along with PVI  He remains in sinus rhythm    Prior history  Patient is having more palpitation at this time  Previously dofetilide did not work well and he reverted back to A  fib quickly  He has underlying bradycardia and does not tolerate rate control agents   He is agreeable to proceed with repeat ablation    He understands that alcohol is a significant precipitator of A  fib  He still drinks quite a bit on a weekly basis    The patient has a known history of atrial flutter and fibrillation   He underwent comprehensive ablation in February 2017  He had some brief episodes of palpitation in January 2021   In the 1st week of February he did have palpitations for 2 days    He has gained some weight   He has become hypertensive   He drinks 2 beers every day and occasionally 4-6 beers a day in the week end    He is still active at work   Not complaining of anginal like chest pain or chest pressure   Not complaining of worsening orthopnea or PND  No leg swelling     He did have palpitation for 2 days in February  He did drink during those days  He was also shoveling snow    Is not complaining of presyncope or syncope  His not complaining of orthostatic lightheadedness    Is not complaining of "exertional intolerance     He is not smoking tobacco  He is not using energy drinks   He is taking a significant amount of alcohol at least 14 units a week    The patient does have a history of psoriatic arthritis and is on Humira    Patient was recently found to be hypertensive  He does have some bradycardia  He has not yet been started on therapy for his hypertension      Ht 5' 11\" (1 803 m)   Wt 86 6 kg (191 lb)   BMI 26 64 kg/m²       Patient Active Problem List   Diagnosis   • Atrial fibrillation (HCC)   • Fatigue   • [facility] ([facility] )   • BMI 29 0-29 9,adult   • Need for pneumococcal vaccination   • NDPH (new daily persistent headache)   • Atrial flutter (HCC)   • Bradycardia   • Palpitation   • Alcohol use   • CATY (obstructive sleep apnea)   • COVID-19 virus infection   • Shortness of breath on exertion     Past Medical History:   Diagnosis Date   • A-fib (HCC)    • Arthritis    • BPH (benign prostatic hyperplasia)    • Colon polyp    • Fatty liver    • Fracture, foot    • High cholesterol    • Hyperlipidemia    • Irregular heart beat    • Non-ischemic cardiomyopathy (HCC)    • Osteoarthritis    • Psoriasis    • Psoriatic arthritis (HCC)    • Pulmonary HTN (HCC)      Social History     Socioeconomic History   • Marital status: /Civil Union     Spouse name: Not on file   • Number of children: Not on file   • Years of education: Not on file   • Highest education level: Not on file   Occupational History   • Not on file   Tobacco Use   • Smoking status: Former     Packs/day: 0 50     Years: 10 00     Pack years: 5 00     Types: Cigarettes   • Smokeless tobacco: Never   Vaping Use   • Vaping Use: Never used   Substance and Sexual Activity   • Alcohol use: Yes     Comment: 1 drink daily   • Drug use: No   • Sexual activity: Not on file   Other Topics Concern   • Not on file   Social History Narrative    Daily coffee consumption (__cups/day)     Daily cola consumption (__cans/day)      Social " Determinants of Health     Financial Resource Strain: Not on file   Food Insecurity: Not on file   Transportation Needs: Not on file   Physical Activity: Not on file   Stress: Not on file   Social Connections: Not on file   Intimate Partner Violence: Not on file   Housing Stability: Not on file      Family History   Problem Relation Age of Onset   • Diabetes Mother    • Hypertension Father    • Diabetes Family      Past Surgical History:   Procedure Laterality Date   • CARDIAC ELECTROPHYSIOLOGY PROCEDURE N/A 2/22/2023    Procedure: Cardiac eps/afib ablation   comprehensive posterior wall;  Surgeon: Linus Tang MD;  Location: [facility];   Service: Cardiology   • COLONOSCOPY     • ELBOW SURGERY Right    • TRANSURETHRAL RESECTION OF PROSTATE      last assessed 7/21/15       Current Outpatient Medications:   •  adalimumab (HUMIRA) 40 mg/0 8 mL PSKT, Inject 40 mg under the skin every 14 (fourteen) days , Disp: , Rfl:   •  apixaban (Eliquis) 5 mg, Take 1 tablet (5 mg total) by mouth 2 (two) times a day, Disp: 60 tablet, Rfl: 11  •  lisinopril (ZESTRIL) 20 mg tablet, Take 1 tablet (20 mg total) by mouth daily, Disp: 90 tablet, Rfl: 3  •  sildenafil (VIAGRA) 100 mg tablet, TAKE ONE (1) TABLET BY MOUTH DAILY AS NEEDED, Disp: , Rfl:   •  pantoprazole (PROTONIX) 40 mg tablet, Take 1 tablet (40 mg total) by mouth daily in the early morning Do not start before February 24, 2023 , Disp: 30 tablet, Rfl: 0  Allergies   Allergen Reactions   • Penicillins Rash       Labs:  Lab Results   Component Value Date     03/15/2016    K 3 4 (L) 02/23/2023    K 4 3 02/10/2023     02/23/2023     02/10/2023    CO2 26 02/23/2023    CO2 25 02/10/2023    BUN 20 02/23/2023    BUN 21 02/10/2023    CREATININE 0 90 02/23/2023    CREATININE 0 99 03/15/2016    GLUCOSE 106 (H) 11/15/2016    CALCIUM 8 8 02/23/2023    CALCIUM 9 5 03/15/2016     No results found for: CKTOTAL, CKMB, CKMBINDEX, TROPONINI  Lab Results   Component Value Date    WBC 8 63 02/23/2023    HGB 13 6 02/23/2023    HCT 39 9 02/23/2023    MCV 94 02/23/2023     (L) 02/23/2023     Lab Results   Component Value Date    CHOL 190 11/15/2016    TRIG 97 10/14/2022    TRIG 113 11/15/2016    HDL 44 10/14/2022    HDL 54 11/15/2016     MAGNESIUM  2/23/2023    Component Ref Range & Units 2/23/23  3:58 AM 2/3/17  6:27 AM 11/4/16 12:14 PM 4/6/16  8:51 AM   Magnesium 1 6 - 2 6 mg/dL 1 9  1 9 CM  1 9  1 7      CMP  2/10/2023    Component Ref Range & Units 2/10/23 10:42 AM 1/11/23 10:16 AM 10/14/22  8:05 AM 9/8/22 11:05 AM 9/8/22 11:05 AM 10/5/21  9:51 AM 10/5/21  9:51 AM   Glucose, Random 70 - 99 mg/dL 96          BUN 8 - 27 mg/dL 21  20 R  20 R        Creatinine 0 76 - 1 27 mg/dL 0 98  1 09 R, CM  0 99 R, CM  0 97 R, CM    0 93 R, CM    eGFR >59 mL/min/1 73 86  70 R  80 R  82 R    87 R    SL AMB BUN/CREATININE RATIO 10 - 24 21          Sodium 134 - 144 mmol/L 143  139 R  140 R        Potassium 3 5 - 5 2 mmol/L 4 3  3 9 R  3 8 R        Chloride 96 - 106 mmol/L 102  106 R  108 R        CO2 20 - 29 mmol/L 25  30 R  27 R        CALCIUM 8 6 - 10 2 mg/dL 9 4          Protein, Total 6 0 - 8 5 g/dL 7 2  7 8 R  7 6 R   7 6 R  8 0 R     Albumin 3 8 - 4 8 g/dL 4 5  3 8 R  3 6 R   3 4 Low  R  4 1 R     Globulin, Total 1 5 - 4 5 g/dL 2 7          Albumin/Globulin Ratio 1 2 - 2 2 1 7          TOTAL BILIRUBIN 0 0 - 1 2 mg/dL 0 7  0 69 R, CM  0 69 R, CM   0 62 R, CM  1 29 High  R, CM     Alk Phos Isoenzymes 44 - 121 IU/L 73          AST 0 - 40 IU/L 22  20 R, CM  28 R, CM   29 R, CM  47 High  R, CM     ALT 0 - 44 IU/L 27  46 R, CM  57 R, CM   64 R, CM  82 High  R, CM       HEMOGLOBIN A1C  1/11/2023    Component Ref Range & Units 1/11/23 10:16 AM    Hemoglobin A1C Normal 3 8-5 6%; PreDiabetic 5 7-6 4%;  Diabetic >=6 5%; Glycemic control for adults with diabetes <7 0% % 6 6 High     EAG mg/dl 143            Imaging:     Stress Echocardiogram  11/7/2022        •  Left Ventricle: Left ventricular cavity size is normal  Wall thickness is normal  Wall motion is low normal   •  Left Atrium: The atrium is mildly dilated  •  Mitral Valve: There is mild regurgitation      Low normal left ventricular function  Mild left atrial enlargement  Mild mitral regurgitation  Compared to the previous echocardiogram September 21, 2016 the ejection fraction at that time was 46% with diastolic dysfunction and mild pulmonary hypertension         Findings    Left Ventricle Left ventricular cavity size is normal  Wall thickness is normal  The left ventricular ejection fraction is 50%  Wall motion is low normal    Right Ventricle Systolic function is normal    Left Atrium The atrium is mildly dilated  Aortic Valve The leaflets are mildly thickened  The leaflets exhibit normal mobility  There is no evidence of regurgitation  itral Valve There is mild regurgitation  Tricuspid Valve There is trace regurgitation       Left Ventricle Measurements    Function/Volumes   A4C EF 55 %         Dimensions   LVIDd 5 1 cm         LVIDS 3 7 cm         IVSd 1 2 cm         LVPWd 1 1 cm         FS 27          Diastolic Filling   MV E' Tissue Velocity Septal 10 cm/s         E wave deceleration time 397 ms         MV Peak E Tc 63 cm/s         MV Peak A Tc 0 22 m/s          Report Measurements   AV LVOT peak gradient 2 mmHg              Interventricular Septum Measurements    Shunt Ratio   LVOT peak VTI 15 64 cm         LVOT peak tc 0 68 m/s              Right Ventricle Measurements    Dimensions   RVID d 3 4 cm               Left Atrium Measurements    Dimensions   LA size 4 5 cm         LA length (A2C) 5 8 cm         AMEYA A4C 21 9 cm2               Right Atrium Measurements    Dimensions   RAA A4C 19 5 cm2               Atrial Septum Measurements    Shunt Ratio   LVOT peak VTI 15 64 cm         LVOT peak tc 0 68 m/s               Aortic Valve Measurements    Stenosis   Aortic valve peak velocity 1 2 m/s         LVOT peak tc 0 68 m/s Ao VTI 26 32 cm         LVOT peak VTI 15 64 cm         AV mean gradient 3 mmHg         LVOT mn grad 1 mmHg         AV peak gradient 6 mmHg         AV LVOT peak gradient 2 mmHg         Dimensionless velociy index 0 57                Mitral Valve Measurements    Stenosis   MV stenosis pressure 1/2 time 115 ms         MV valve area p 1/2 method 1 91                Tricuspid Valve Measurements    RVSP Parameters   TR Peak Tc 2 3 m/s         Triscuspid Valve Regurgitation Peak Gradient 21 mmHg               Aorta Measurements    Aortic Dimensions   Ao root 3 7 cm                  •  Left Ventricle: Left ventricular cavity size is normal  The left ventricular ejection fraction is 50%  Systolic function is normal  Wall motion is normal   •  Stress ECG: No ST deviation is noted  The ECG was negative for ischemia  The stress ECG is negative for ischemia after maximal exercise, without reproduction of symptoms  •  Peak Stress Echo: Left ventricle cavity has normal reduction in size at peak stress  The peak stress echo showed normal wall motion which was hyperdynamic compared to baseline  •  Echo Post Impression: The study is normal      Normal exercise stress echocardiogram at 10 1 Mets and 91% max predicted heart rate      Stress Findings    Stress Findings A Tim protocol stress test was performed  Overall, the patient's exercise capacity was normal for their age  The patient reached stage 3 0 of the protocol after exercising for 9 min and had a maximal HR of 142 bpm (91 % of MPHR) and 10 1 METS  The patient experienced no angina during the test  The test was stopped because the patient experienced dyspnea  The patient achieved the target heart rate  The patient reported dyspnea during the stress test  Blood pressure demonstrated a normal response and heart rate demonstrated a normal response to stress       Findings    Left Ventricle Left ventricular cavity size is normal  The left ventricular ejection fraction is 00%  Systolic function is normal   Wall motion is normal    Wall Scoring Baseline     The left ventricular wall motion is normal          Peak Stress     The left ventricular wall motion is globally hyperkinetic  Aortic Valve The leaflets are mildly thickened  The leaflets exhibit normal mobility  Stress Echo Findings    Peak Stress Left ventricle cavity has normal reduction in size at peak stress  The left ventricle systolic function is hyperdynamic at peak stress  EF 70%  The peak stress echo showed normal wall motion which was hyperdynamic compared to baseline  Study Impression The study is normal      Stress Measurements    Resting ECG   Base ST Depresion (mm) 0 mm         Baseline Vitals   Baseline HR 56 bpm         Baseline /88 mmHg         O2 sat rest 99 %         Peak Stress Vitals   Stress peak  bpm         Post peak  mmHg         O2 sat peak 98 %         Max HR Percent 91 %         Max  bpm         Recovery Vitals   Recovery HR 64 bpm         Recovery /90 mmHg         O2 sat recovery 98 %          Exercise Data   Max  bpm         Exercise duration (min) 9 min         Estimated workload 10 1 METS         Stress Stage Reached 3          Angina Index 0          Rate Pressure Product 28,400          Stress ECG   ST Depression (mm) 0 mm                   Ambulatory Extended Holter  10/24/2022-10/30/2022                        Ambulatory Extended Holter Monitor  2/22/2021-3/8/2021  1 of 2                    3/12/2021-3/26/2021  2 of 2                    Review of Systems:  Review of Systems   All other systems reviewed and are negative  as described in my history of present illness    Physical Exam:  Physical Exam  Vitals reviewed  Constitutional:       General: He is not in acute distress  Appearance: Normal appearance  He is well-developed  He is not ill-appearing        Comments: Not in any distress at the current time   HENT:      Head: Normocephalic and atraumatic  Right Ear: External ear normal       Left Ear: External ear normal       Nose: Nose normal       Mouth/Throat:      Pharynx: Uvula midline  Eyes:      General: Lids are normal       Extraocular Movements: Extraocular movements intact  Conjunctiva/sclera: Conjunctivae normal       Pupils: Pupils are equal, round, and reactive to light  Comments: No pallor  No cyanosis  No icterus   Neck:      Thyroid: No thyromegaly  Vascular: No carotid bruit or JVD  Trachea: Trachea normal       Comments: No jugular lymphadenopathy  Cardiovascular:      Rate and Rhythm: Regular rhythm  Bradycardia present  Chest Wall: PMI is not displaced  Pulses: Normal pulses  Heart sounds: Normal heart sounds, S1 normal and S2 normal  No murmur heard  No friction rub  No gallop  No S3 or S4 sounds  Pulmonary:      Effort: Pulmonary effort is normal  No accessory muscle usage or respiratory distress  Breath sounds: Normal breath sounds  No decreased breath sounds, wheezing, rhonchi or rales  Chest:      Chest wall: No tenderness  Abdominal:      General: Abdomen is flat  Bowel sounds are normal  There is no distension  Palpations: Abdomen is soft  There is no hepatomegaly, splenomegaly or mass  Tenderness: There is no abdominal tenderness  Musculoskeletal:         General: No swelling, tenderness or deformity  Normal range of motion  Cervical back: Normal range of motion and neck supple  No rigidity  No muscular tenderness  Lymphadenopathy:      Cervical: No cervical adenopathy  Skin:     Coloration: Skin is not jaundiced or pale  Findings: No abrasion, bruising, erythema, lesion or rash  Nails: There is no clubbing  Neurological:      Mental Status: He is alert and oriented to person, place, and time  Mental status is at baseline        Comments: Facial symmetry is retained  Extraocular movements are retained  Head neck tongue and palate movement are retained and symmetric   Psychiatric:         Mood and Affect: Mood normal          Speech: Speech normal          Behavior: Behavior normal          Thought Content: Thought content normal          Discussion/Summary:    1  palpitation  2  atrial flutter   3  Atrial fibrillation    Patient is post comprehensive ablation-PVI and posterior wall isolation -2023 - He is back in sinus rhythm    Prior to this - Patient is back in atrial fibrillation with RVR - He does feel exertional fatigue  He continues to drink alcohol which is a known precipitator    Ablated in 2017   He has some new risk factors   -increasing weight   -suspicion of sleep apnea  -hypertension  -Increase alcohol drinking, at least 14 units a week and at times binge drinking    If  need to use antiarrhythmics, will need a pacemaker for a higher baseline heart rate         4   Sick sinus syndrome    sinus bradycardia  This will limit use of medications like propafenone, sotalol, amiodarone   If it continues, and patient is symptomatic, patient will need a pacemaker      5   hypertension  Blood pressure is 122/72  He needs to start lisinopril 20 mg daily  Follow-up for dry cough   Follow-up kidney function         6  alcohol abuse -has reduced alcohol intake to 1-2 drinks every 2 to 3 weeks  Previously he was drinking  2 beers every day +Sometimes he can go up to 4-6 beer a day  This is significant drinking for a patient with atrial fibrillation   This can cause worsening of atrial fibrillation  Recommend to significantly decrease, not more than 3 units in a week      7   psoriatic arthritis   On Humira  Patient advised to maintain proper preventive measures to avoid COVID infection      8   sleep apnea  Increasing weight, alcohol use   Occasional history suggestive of snoring  Recommend repeat sleep study

## 2023-04-25 ENCOUNTER — OFFICE VISIT (OUTPATIENT)
Dept: CARDIOLOGY CLINIC | Facility: CLINIC | Age: 68
End: 2023-04-25

## 2023-04-25 VITALS
BODY MASS INDEX: 26.74 KG/M2 | HEART RATE: 49 BPM | WEIGHT: 191 LBS | SYSTOLIC BLOOD PRESSURE: 122 MMHG | HEIGHT: 71 IN | DIASTOLIC BLOOD PRESSURE: 72 MMHG | OXYGEN SATURATION: 99 %

## 2023-04-25 DIAGNOSIS — R00.1 BRADYCARDIA: ICD-10-CM

## 2023-04-25 DIAGNOSIS — L40.50 PSORIATIC ARTHRITIS (HCC): ICD-10-CM

## 2023-04-25 DIAGNOSIS — I48.0 PAROXYSMAL ATRIAL FIBRILLATION (HCC): Primary | ICD-10-CM

## 2023-04-25 DIAGNOSIS — Z78.9 ALCOHOL USE: ICD-10-CM

## 2023-04-25 DIAGNOSIS — R00.2 PALPITATION: ICD-10-CM

## 2023-04-25 DIAGNOSIS — G47.33 OSA (OBSTRUCTIVE SLEEP APNEA): ICD-10-CM

## 2023-04-25 RX ORDER — SILDENAFIL 100 MG/1
TABLET, FILM COATED ORAL
COMMUNITY
Start: 2023-03-30

## 2023-04-25 NOTE — LETTER
April 25, 2023     [facility], [address]  [address] [facility]  [address]    Patient: Celso Longoria   YOB: 1955   Date of Visit: 4/25/2023       Dear Dr Danilo Banuelos:    Thank you for referring Jonas Greene to me for evaluation  Below are my notes for this consultation  If you have questions, please do not hesitate to call me  I look forward to following your patient along with you  Sincerely,        Jeff Mcmullen MD        CC: Junito Johnson MD  4/25/2023  3:53 PM  Sign when Signing Visit                                             Cardiology Follow Up    Celso Longoria  1955  [id number]  [facility] [address] [place] [address]    1  Paroxysmal atrial fibrillation (HCC)  POCT ECG      2  CATY (obstructive sleep apnea)        3  Psoriatic arthritis (HCC)        4  Palpitation        5  Bradycardia        6   Alcohol use              Summary of my recommendation for the patient  Patient is post comprehensive A-fib ablation-posterior wall isolation along with PVI  He remains in sinus rhythm    Complete cessation of alcohol recommended            Interval History:   Patient is post comprehensive A-fib ablation-posterior wall isolation along with PVI  He remains in sinus rhythm    Prior history  Patient is having more palpitation at this time  Previously dofetilide did not work well and he reverted back to A  fib quickly  He has underlying bradycardia and does not tolerate rate control agents   He is agreeable to proceed with repeat ablation    He understands that alcohol is a significant precipitator of A  fib  He still drinks quite a bit on a weekly basis    The patient has a known history of atrial flutter and fibrillation   He underwent comprehensive ablation in February 2017  He had some brief episodes of palpitation in January 2021   In the 1st week of February he did have "palpitations for 2 days    He has gained some weight   He has become hypertensive   He drinks 2 beers every day and occasionally 4-6 beers a day in the week end    He is still active at work   Not complaining of anginal like chest pain or chest pressure   Not complaining of worsening orthopnea or PND  No leg swelling     He did have palpitation for 2 days in February  He did drink during those days  He was also shoveling snow    Is not complaining of presyncope or syncope  His not complaining of orthostatic lightheadedness    Is not complaining of exertional intolerance     He is not smoking tobacco  He is not using energy drinks   He is taking a significant amount of alcohol at least 14 units a week    The patient does have a history of psoriatic arthritis and is on Humira    Patient was recently found to be hypertensive  He does have some bradycardia  He has not yet been started on therapy for his hypertension      Ht 5' 11\" (1 803 m)   Wt 86 6 kg (191 lb)   BMI 26 64 kg/m²       Patient Active Problem List   Diagnosis   • Atrial fibrillation ([place] [address] )   • Fatigue   • Psoriatic arthritis ([place] [address] )   • BMI 29 0-29 9,adult   • Need for pneumococcal vaccination   • NDPH (new daily persistent headache)   • Atrial flutter (HCC)   • Bradycardia   • Palpitation   • Alcohol use   • CATY (obstructive sleep apnea)   • COVID-19 virus infection   • Shortness of breath on exertion     Past Medical History:   Diagnosis Date   • A-fib ([place] [address] )    • Arthritis    • BPH (benign prostatic hyperplasia)    • Colon polyp    • Fatty liver    • Fracture, foot    • High cholesterol    • Hyperlipidemia    • Irregular heart beat    • Non-ischemic cardiomyopathy (HCC)    • Osteoarthritis    • Psoriasis    • Psoriatic arthritis (HCC)    • Pulmonary HTN ([place] [address] )      Social History     Socioeconomic History   • Marital status: /Civil Union     Spouse name: Not on file   • Number of children: Not on file   • Years of education: Not on file   • " Highest education level: Not on file   Occupational History   • Not on file   Tobacco Use   • Smoking status: Former     Packs/day: 0 50     Years: 10 00     Pack years: 5 00     Types: Cigarettes   • Smokeless tobacco: Never   Vaping Use   • Vaping Use: Never used   Substance and Sexual Activity   • Alcohol use: Yes     Comment: 1 drink daily   • Drug use: No   • Sexual activity: Not on file   Other Topics Concern   • Not on file   Social History Narrative    Daily coffee consumption (__cups/day)     Daily cola consumption (__cans/day)      Social Determinants of Health     Financial Resource Strain: Not on file   Food Insecurity: Not on file   Transportation Needs: Not on file   Physical Activity: Not on file   Stress: Not on file   Social Connections: Not on file   Intimate Partner Violence: Not on file   Housing Stability: Not on file      Family History   Problem Relation Age of Onset   • Diabetes Mother    • Hypertension Father    • Diabetes Family      Past Surgical History:   Procedure Laterality Date   • CARDIAC ELECTROPHYSIOLOGY PROCEDURE N/A 2/22/2023    Procedure: Cardiac eps/afib ablation   comprehensive posterior wall;  Surgeon: Louise Burrows MD;  Location: [facility];   Service: Cardiology   • COLONOSCOPY     • ELBOW SURGERY Right    • TRANSURETHRAL RESECTION OF PROSTATE      last assessed 7/21/15       Current Outpatient Medications:   •  adalimumab (HUMIRA) 40 mg/0 8 mL PSKT, Inject 40 mg under the skin every 14 (fourteen) days , Disp: , Rfl:   •  apixaban (Eliquis) 5 mg, Take 1 tablet (5 mg total) by mouth 2 (two) times a day, Disp: 60 tablet, Rfl: 11  •  lisinopril (ZESTRIL) 20 mg tablet, Take 1 tablet (20 mg total) by mouth daily, Disp: 90 tablet, Rfl: 3  •  sildenafil (VIAGRA) 100 mg tablet, TAKE ONE (1) TABLET BY MOUTH DAILY AS NEEDED, Disp: , Rfl:   •  pantoprazole (PROTONIX) 40 mg tablet, Take 1 tablet (40 mg total) by mouth daily in the early morning Do not start before February 24, 2023 , Disp: 30 tablet, Rfl: 0  Allergies   Allergen Reactions   • Penicillins Rash       Labs:  Lab Results   Component Value Date     03/15/2016    K 3 4 (L) 02/23/2023    K 4 3 02/10/2023     02/23/2023     02/10/2023    CO2 26 02/23/2023    CO2 25 02/10/2023    BUN 20 02/23/2023    BUN 21 02/10/2023    CREATININE 0 90 02/23/2023    CREATININE 0 99 03/15/2016    GLUCOSE 106 (H) 11/15/2016    CALCIUM 8 8 02/23/2023    CALCIUM 9 5 03/15/2016     No results found for: CKTOTAL, CKMB, CKMBINDEX, TROPONINI  Lab Results   Component Value Date    WBC 8 63 02/23/2023    HGB 13 6 02/23/2023    HCT 39 9 02/23/2023    MCV 94 02/23/2023     (L) 02/23/2023     Lab Results   Component Value Date    CHOL 190 11/15/2016    TRIG 97 10/14/2022    TRIG 113 11/15/2016    HDL 44 10/14/2022    HDL 54 11/15/2016     MAGNESIUM  2/23/2023    Component Ref Range & Units 2/23/23  3:58 AM 2/3/17  6:27 AM 11/4/16 12:14 PM 4/6/16  8:51 AM   Magnesium 1 6 - 2 6 mg/dL 1 9  1 9 CM  1 9  1 7      CMP  2/10/2023    Component Ref Range & Units 2/10/23 10:42 AM 1/11/23 10:16 AM 10/14/22  8:05 AM 9/8/22 11:05 AM 9/8/22 11:05 AM 10/5/21  9:51 AM 10/5/21  9:51 AM   Glucose, Random 70 - 99 mg/dL 96          BUN 8 - 27 mg/dL 21  20 R  20 R        Creatinine 0 76 - 1 27 mg/dL 0 98  1 09 R, CM  0 99 R, CM  0 97 R, CM    0 93 R, CM    eGFR >59 mL/min/1 73 86  70 R  80 R  82 R    87 R    SL AMB BUN/CREATININE RATIO 10 - 24 21          Sodium 134 - 144 mmol/L 143  139 R  140 R        Potassium 3 5 - 5 2 mmol/L 4 3  3 9 R  3 8 R        Chloride 96 - 106 mmol/L 102  106 R  108 R        CO2 20 - 29 mmol/L 25  30 R  27 R        CALCIUM 8 6 - 10 2 mg/dL 9 4          Protein, Total 6 0 - 8 5 g/dL 7 2  7 8 R  7 6 R   7 6 R  8 0 R     Albumin 3 8 - 4 8 g/dL 4 5  3 8 R  3 6 R   3 4 Low  R  4 1 R     Globulin, Total 1 5 - 4 5 g/dL 2 7          Albumin/Globulin Ratio 1 2 - 2 2 1 7          TOTAL BILIRUBIN 0 0 - 1 2 mg/dL 0 7  0 69 R, CM  0 69 R, CM   0 62 R, CM  1 29 High  R, CM     Alk Phos Isoenzymes 44 - 121 IU/L 73          AST 0 - 40 IU/L 22  20 R, CM  28 R, CM   29 R, CM  47 High  R, CM     ALT 0 - 44 IU/L 27  46 R, CM  57 R, CM   64 R, CM  82 High  R, CM       HEMOGLOBIN A1C  1/11/2023    Component Ref Range & Units 1/11/23 10:16 AM    Hemoglobin A1C Normal 3 8-5 6%; PreDiabetic 5 7-6 4%; Diabetic >=6 5%; Glycemic control for adults with diabetes <7 0% % 6 6 High     EAG mg/dl 143            Imaging:     Stress Echocardiogram  11/7/2022        •  Left Ventricle: Left ventricular cavity size is normal  Wall thickness is normal  Wall motion is low normal   •  Left Atrium: The atrium is mildly dilated  •  Mitral Valve: There is mild regurgitation      Low normal left ventricular function  Mild left atrial enlargement  Mild mitral regurgitation  Compared to the previous echocardiogram September 21, 2016 the ejection fraction at that time was 78% with diastolic dysfunction and mild pulmonary hypertension         Findings    Left Ventricle Left ventricular cavity size is normal  Wall thickness is normal  The left ventricular ejection fraction is 50%  Wall motion is low normal    Right Ventricle Systolic function is normal    Left Atrium The atrium is mildly dilated  Aortic Valve The leaflets are mildly thickened  The leaflets exhibit normal mobility  There is no evidence of regurgitation  itral Valve There is mild regurgitation  Tricuspid Valve There is trace regurgitation       Left Ventricle Measurements    Function/Volumes   A4C EF 55 %         Dimensions   LVIDd 5 1 cm         LVIDS 3 7 cm         IVSd 1 2 cm         LVPWd 1 1 cm         FS 27          Diastolic Filling   MV E' Tissue Velocity Septal 10 cm/s         E wave deceleration time 397 ms         MV Peak E Tc 63 cm/s         MV Peak A Tc 0 22 m/s          Report Measurements   AV LVOT peak gradient 2 mmHg              Interventricular Septum Measurements    Shunt Ratio   LVOT peak VTI 15 64 cm         LVOT peak tc 0 68 m/s              Right Ventricle Measurements    Dimensions   RVID d 3 4 cm               Left Atrium Measurements    Dimensions   LA size 4 5 cm         LA length (A2C) 5 8 cm         AMEYA A4C 21 9 cm2               Right Atrium Measurements    Dimensions   RAA A4C 19 5 cm2               Atrial Septum Measurements    Shunt Ratio   LVOT peak VTI 15 64 cm         LVOT peak tc 0 68 m/s               Aortic Valve Measurements    Stenosis   Aortic valve peak velocity 1 2 m/s         LVOT peak tc 0 68 m/s         Ao VTI 26 32 cm         LVOT peak VTI 15 64 cm         AV mean gradient 3 mmHg         LVOT mn grad 1 mmHg         AV peak gradient 6 mmHg         AV LVOT peak gradient 2 mmHg         Dimensionless velociy index 0 57                Mitral Valve Measurements    Stenosis   MV stenosis pressure 1/2 time 115 ms         MV valve area p 1/2 method 1 91                Tricuspid Valve Measurements    RVSP Parameters   TR Peak Tc 2 3 m/s         Triscuspid Valve Regurgitation Peak Gradient 21 mmHg               Aorta Measurements    Aortic Dimensions   Ao root 3 7 cm                  •  Left Ventricle: Left ventricular cavity size is normal  The left ventricular ejection fraction is 50%  Systolic function is normal  Wall motion is normal   •  Stress ECG: No ST deviation is noted  The ECG was negative for ischemia  The stress ECG is negative for ischemia after maximal exercise, without reproduction of symptoms  •  Peak Stress Echo: Left ventricle cavity has normal reduction in size at peak stress  The peak stress echo showed normal wall motion which was hyperdynamic compared to baseline  •  Echo Post Impression: The study is normal      Normal exercise stress echocardiogram at 10 1 Mets and 91% max predicted heart rate      Stress Findings    Stress Findings A Tim protocol stress test was performed  Overall, the patient's exercise capacity was normal for their age   The patient reached stage 3 0 of the protocol after exercising for 9 min and had a maximal HR of 142 bpm (91 % of MPHR) and 10 1 METS  The patient experienced no angina during the test  The test was stopped because the patient experienced dyspnea  The patient achieved the target heart rate  The patient reported dyspnea during the stress test  Blood pressure demonstrated a normal response and heart rate demonstrated a normal response to stress  Findings    Left Ventricle Left ventricular cavity size is normal  The left ventricular ejection fraction is 50%  Systolic function is normal   Wall motion is normal    Wall Scoring Baseline     The left ventricular wall motion is normal          Peak Stress     The left ventricular wall motion is globally hyperkinetic  Aortic Valve The leaflets are mildly thickened  The leaflets exhibit normal mobility  Stress Echo Findings    Peak Stress Left ventricle cavity has normal reduction in size at peak stress  The left ventricle systolic function is hyperdynamic at peak stress  EF 70%  The peak stress echo showed normal wall motion which was hyperdynamic compared to baseline     Study Impression The study is normal      Stress Measurements    Resting ECG   Base ST Depresion (mm) 0 mm         Baseline Vitals   Baseline HR 56 bpm         Baseline /88 mmHg         O2 sat rest 99 %         Peak Stress Vitals   Stress peak  bpm         Post peak  mmHg         O2 sat peak 98 %         Max HR Percent 91 %         Max  bpm         Recovery Vitals   Recovery HR 64 bpm         Recovery /90 mmHg         O2 sat recovery 98 %          Exercise Data   Max  bpm         Exercise duration (min) 9 min         Estimated workload 10 1 METS         Stress Stage Reached 3          Angina Index 0          Rate Pressure Product 28,400          Stress ECG   ST Depression (mm) 0 mm                   Ambulatory Extended Holter  10/24/2022-10/30/2022                        Ambulatory Extended Holter Monitor  2/22/2021-3/8/2021  1 of 2                    3/12/2021-3/26/2021  2 of 2                    Review of Systems:  Review of Systems   All other systems reviewed and are negative  as described in my history of present illness    Physical Exam:  Physical Exam  Vitals reviewed  Constitutional:       General: He is not in acute distress  Appearance: Normal appearance  He is well-developed  He is not ill-appearing  Comments: Not in any distress at the current time   HENT:      Head: Normocephalic and atraumatic  Right Ear: External ear normal       Left Ear: External ear normal       Nose: Nose normal       Mouth/Throat:      Pharynx: Uvula midline  Eyes:      General: Lids are normal       Extraocular Movements: Extraocular movements intact  Conjunctiva/sclera: Conjunctivae normal       Pupils: Pupils are equal, round, and reactive to light  Comments: No pallor  No cyanosis  No icterus   Neck:      Thyroid: No thyromegaly  Vascular: No carotid bruit or JVD  Trachea: Trachea normal       Comments: No jugular lymphadenopathy  Cardiovascular:      Rate and Rhythm: Regular rhythm  Bradycardia present  Chest Wall: PMI is not displaced  Pulses: Normal pulses  Heart sounds: Normal heart sounds, S1 normal and S2 normal  No murmur heard  No friction rub  No gallop  No S3 or S4 sounds  Pulmonary:      Effort: Pulmonary effort is normal  No accessory muscle usage or respiratory distress  Breath sounds: Normal breath sounds  No decreased breath sounds, wheezing, rhonchi or rales  Chest:      Chest wall: No tenderness  Abdominal:      General: Abdomen is flat  Bowel sounds are normal  There is no distension  Palpations: Abdomen is soft  There is no hepatomegaly, splenomegaly or mass  Tenderness: There is no abdominal tenderness     Musculoskeletal:         General: No swelling, tenderness or deformity  Normal range of motion  Cervical back: Normal range of motion and neck supple  No rigidity  No muscular tenderness  Lymphadenopathy:      Cervical: No cervical adenopathy  Skin:     Coloration: Skin is not jaundiced or pale  Findings: No abrasion, bruising, erythema, lesion or rash  Nails: There is no clubbing  Neurological:      Mental Status: He is alert and oriented to person, place, and time  Mental status is at baseline  Comments: Facial symmetry is retained  Extraocular movements are retained  Head neck tongue and palate movement are retained and symmetric   Psychiatric:         Mood and Affect: Mood normal          Speech: Speech normal          Behavior: Behavior normal          Thought Content: Thought content normal          Discussion/Summary:    1  palpitation  2  atrial flutter   3  Atrial fibrillation    Patient is post comprehensive ablation-PVI and posterior wall isolation -2023 - He is back in sinus rhythm    Prior to this - Patient is back in atrial fibrillation with RVR - He does feel exertional fatigue  He continues to drink alcohol which is a known precipitator    Ablated in 2017   He has some new risk factors   -increasing weight   -suspicion of sleep apnea  -hypertension  -Increase alcohol drinking, at least 14 units a week and at times binge drinking    If  need to use antiarrhythmics, will need a pacemaker for a higher baseline heart rate         4   Sick sinus syndrome    sinus bradycardia  This will limit use of medications like propafenone, sotalol, amiodarone   If it continues, and patient is symptomatic, patient will need a pacemaker      5   hypertension  Blood pressure is 122/72  He needs to start lisinopril 20 mg daily  Follow-up for dry cough   Follow-up kidney function         6  alcohol abuse -has reduced alcohol intake to 1-2 drinks every 2 to 3 weeks  Previously he was drinking  2 beers every day +Sometimes he can go up to 4-6 beer a day  This is significant drinking for a patient with atrial fibrillation   This can cause worsening of atrial fibrillation  Recommend to significantly decrease, not more than 3 units in a week      7   psoriatic arthritis   On Humira  Patient advised to maintain proper preventive measures to avoid COVID infection      8   sleep apnea  Increasing weight, alcohol use   Occasional history suggestive of snoring  Recommend repeat sleep study

## 2023-06-07 ENCOUNTER — HOSPITAL ENCOUNTER (EMERGENCY)
Facility: HOSPITAL | Age: 68
Discharge: HOME | End: 2023-06-07
Attending: EMERGENCY MEDICINE
Payer: COMMERCIAL

## 2023-06-07 ENCOUNTER — APPOINTMENT (EMERGENCY)
Dept: RADIOLOGY | Facility: HOSPITAL | Age: 68
End: 2023-06-07
Payer: COMMERCIAL

## 2023-06-07 VITALS
SYSTOLIC BLOOD PRESSURE: 164 MMHG | RESPIRATION RATE: 16 BRPM | TEMPERATURE: 98.6 F | OXYGEN SATURATION: 98 % | HEART RATE: 57 BPM | DIASTOLIC BLOOD PRESSURE: 78 MMHG

## 2023-06-07 DIAGNOSIS — S91.012A LACERATION OF LEFT ANKLE, INITIAL ENCOUNTER: ICD-10-CM

## 2023-06-07 DIAGNOSIS — M25.572 ACUTE LEFT ANKLE PAIN: Primary | ICD-10-CM

## 2023-06-07 PROCEDURE — 90715 TDAP VACCINE 7 YRS/> IM: CPT | Performed by: PHARMACIST

## 2023-06-07 PROCEDURE — 73610 X-RAY EXAM OF ANKLE: CPT

## 2023-06-07 RX ORDER — KETOROLAC TROMETHAMINE 30 MG/ML
15 INJECTION, SOLUTION INTRAMUSCULAR; INTRAVENOUS ONCE
Status: COMPLETED | OUTPATIENT
Start: 2023-06-07 | End: 2023-06-07

## 2023-06-07 RX ORDER — LIDOCAINE HYDROCHLORIDE AND EPINEPHRINE 10; 10 MG/ML; UG/ML
10 INJECTION, SOLUTION INFILTRATION; PERINEURAL ONCE
Status: COMPLETED | OUTPATIENT
Start: 2023-06-07 | End: 2023-06-07

## 2023-06-07 RX ORDER — GINSENG 100 MG
1 CAPSULE ORAL ONCE
Status: COMPLETED | OUTPATIENT
Start: 2023-06-07 | End: 2023-06-07

## 2023-06-07 RX ADMIN — TETANUS TOXOID, REDUCED DIPHTHERIA TOXOID AND ACELLULAR PERTUSSIS VACCINE, ADSORBED 0.5 ML: 5; 2.5; 8; 8; 2.5 SUSPENSION INTRAMUSCULAR at 02:38

## 2023-06-07 RX ADMIN — BACITRACIN ZINC 1 SMALL APPLICATION: 500 OINTMENT TOPICAL at 04:03

## 2023-06-07 RX ADMIN — KETOROLAC TROMETHAMINE 15 MG: 30 INJECTION, SOLUTION INTRAMUSCULAR; INTRAVENOUS at 02:37

## 2023-06-07 RX ADMIN — LIDOCAINE HYDROCHLORIDE,EPINEPHRINE BITARTRATE 10 ML: 10; .01 INJECTION, SOLUTION INFILTRATION; PERINEURAL at 03:53

## 2023-06-07 NOTE — ED PROVIDER NOTES
History  Chief Complaint   Patient presents with   • Ankle Pain     Pt dropped rock on left ankle yesterday causing a laceration to inner ankle  On thinners  C/o left ankle pain      Patient is a 29-year-old male who presents with left ankle pain and laceration  Patient states around 4 to 5 PM yesterday he dropped a rock/[place] that was sharp onto his left ankle  Laceration bled a lot but he had no issues walking  He placed a bandage over the area but does not clean the area  This evening he started to have pain in his ankle and foot  He has no history of injury to his ankle  He is able to walk on it but is having a throbbing pain  Prior to Admission Medications   Prescriptions Last Dose Informant Patient Reported? Taking? adalimumab (HUMIRA) 40 mg/0 8 mL PSKT  Self Yes No   Sig: Inject 40 mg under the skin every 14 (fourteen) days    apixaban (Eliquis) 5 mg  Self No No   Sig: Take 1 tablet (5 mg total) by mouth 2 (two) times a day   lisinopril (ZESTRIL) 20 mg tablet   No No   Sig: Take 1 tablet (20 mg total) by mouth daily   pantoprazole (PROTONIX) 40 mg tablet   No No   Sig: Take 1 tablet (40 mg total) by mouth daily in the early morning Do not start before February 24, 2023     sildenafil (VIAGRA) 100 mg tablet   Yes No   Sig: TAKE ONE (1) TABLET BY MOUTH DAILY AS NEEDED      Facility-Administered Medications: None       Past Medical History:   Diagnosis Date   • A-fib (HCC)    • Arthritis    • BPH (benign prostatic hyperplasia)    • Colon polyp    • Fatty liver    • Fracture, foot    • High cholesterol    • Hyperlipidemia    • Irregular heart beat    • Non-ischemic cardiomyopathy (HCC)    • Osteoarthritis    • Psoriasis    • Psoriatic arthritis ([facility] [address] )    • Pulmonary HTN ([facility] [address] )        Past Surgical History:   Procedure Laterality Date   • CARDIAC ELECTROPHYSIOLOGY PROCEDURE N/A 2/22/2023    Procedure: Cardiac eps/afib ablation   comprehensive posterior wall;  Surgeon: Francisca Martinez MD;  Location: [facility]; Service: Cardiology   • COLONOSCOPY     • ELBOW SURGERY Right    • TRANSURETHRAL RESECTION OF PROSTATE      last assessed 7/21/15       Family History   Problem Relation Age of Onset   • Diabetes Mother    • Hypertension Father    • Diabetes Family      I have reviewed and agree with the history as documented  E-Cigarette/Vaping   • E-Cigarette Use Never User      E-Cigarette/Vaping Substances   • Nicotine No    • THC No    • CBD No    • Flavoring No    • Other No    • Unknown No      Social History     Tobacco Use   • Smoking status: Former     Packs/day: 0 50     Years: 10 00     Total pack years: 5 00     Types: Cigarettes   • Smokeless tobacco: Never   Vaping Use   • Vaping Use: Never used   Substance Use Topics   • Alcohol use: Yes     Comment: 1 drink daily   • Drug use: No       Review of Systems   Musculoskeletal: Positive for arthralgias and joint swelling  Left ankle and foot pain   Skin: Positive for wound  Left ankle laceration       Physical Exam  Physical Exam  Constitutional:       Appearance: Normal appearance  Musculoskeletal:      Right ankle: Normal       Left ankle: Swelling and laceration present  Normal range of motion  Right foot: Normal       Left foot: Normal  Normal range of motion  No swelling or deformity  Comments: ~4cm laceration over left medial malleolus    Skin:     Capillary Refill: Capillary refill takes less than 2 seconds  Neurological:      Mental Status: He is alert           Vital Signs  ED Triage Vitals [06/07/23 0214]   Temperature Pulse Respirations Blood Pressure SpO2   98 6 °F (37 °C) 57 16 164/78 98 %      Temp Source Heart Rate Source Patient Position - Orthostatic VS BP Location FiO2 (%)   Tympanic Monitor Sitting Right arm --      Pain Score       8           Vitals:    06/07/23 0214   BP: 164/78   Pulse: 57   Patient Position - Orthostatic VS: Sitting         Visual Acuity      ED Medications  Medications "  tetanus-diphtheria-acellular pertussis (BOOSTRIX) IM injection 0 5 mL (0 5 mL Intramuscular Given 6/7/23 6398)   ketorolac (TORADOL) injection 15 mg (15 mg Intramuscular Given 6/1957)   lidocaine-epinephrine (XYLOCAINE/EPINEPHRINE) 1 %-1:100,000 injection 10 mL (10 mL Infiltration Given 6/7/23 2391)   bacitracin topical ointment 1 small application (1 small application Topical Given 6/7/23 0875)       Diagnostic Studies  Results Reviewed     None                 XR ankle 3+ views LEFT    (Results Pending)              Procedures  Laceration repair    Date/Time: 6/7/2023 3:10 AM    Performed by: Catracho Brown PA-C  Authorized by: Catracho Brown PA-C  Consent: Verbal consent obtained  Risks and benefits: risks, benefits and alternatives were discussed  Consent given by: patient  Patient understanding: patient states understanding of the procedure being performed  Site marked: the operative site was marked  Radiology Images displayed and confirmed  If images not available, report reviewed: imaging studies available  Patient identity confirmed: verbally with patient  Time out: Immediately prior to procedure a \"time out\" was called to verify the correct patient, procedure, equipment, support staff and site/side marked as required  Body area: lower extremity  Location details: left ankle  Laceration length: 4 cm  Contamination: The wound is contaminated  Foreign bodies: unknown  Tendon involvement: none  Nerve involvement: none  Vascular damage: no  Anesthesia: local infiltration    Anesthesia:  Local Anesthetic: lidocaine 1% with epinephrine  Anesthetic total: 5 mL    Sedation:  Patient sedated: no      Wound Dehiscence:  Superficial Wound Dehiscence: simple closure      Procedure Details:  Preparation: Patient was prepped and draped in the usual sterile fashion    Irrigation solution: saline  Amount of cleaning: standard  Debridement: none  Degree of undermining: none  Skin closure: Ethilon  Number of " sutures: 6  Technique: simple  Approximation: close  Approximation difficulty: simple  Dressing: 4x4 sterile gauze and antibiotic ointment  Patient tolerance: patient tolerated the procedure well with no immediate complications  Comments: Ankle aircast  Cleaning details: other organic matter             ED Course                                             Medical Decision Making  Patient is a 82-year-old male who presents with laceration on left medial ankle  X-ray negative for fracture, foreign body over medial malleolus  Foreign body was removed during cleaning  Placed 6 sutures  Placed in ankle Aircast   Patient to follow-up with podiatry and have sutures removed in about 10 days  Amount and/or Complexity of Data Reviewed  Radiology: ordered  Risk  OTC drugs  Prescription drug management  Disposition  Final diagnoses:   Acute left ankle pain   Laceration of left ankle, initial encounter     Time reflects when diagnosis was documented in both MDM as applicable and the Disposition within this note     Time User Action Codes Description Comment    6/7/2023  3:34 AM Claudia Johnson Add [M25 572] Acute left ankle pain     6/7/2023  3:34 AM Claudia Johnson Add [S91 012A] Laceration of left ankle, initial encounter       ED Disposition     ED Disposition   Discharge    Condition   Stable    Date/Time   Wed Jun 7, 2023  3:34 AM    [facility] discharge to home/self care  Follow-up Information     Follow up With Specialties Details Why Contact Info [facility]  Schedule an appointment as soon as possible for a visit   [address] [place] [address]  [phone]          Patient's Medications   Discharge Prescriptions    No medications on file       No discharge procedures on file      PDMP Review     None          ED Provider  Electronically Signed by           Shannon Billings PA-C  06/07/23 0512

## 2023-06-07 NOTE — ED ATTENDING ATTESTATION
6/7/2023  Freda SALCEDO MD, saw and evaluated the patient  I have discussed the patient with the resident/non-physician practitioner and agree with the resident's/non-physician practitioner's findings, Plan of Care, and MDM as documented in the resident's/non-physician practitioner's note, except where noted  All available labs and Radiology studies were reviewed  I was present for key portions of any procedure(s) performed by the resident/non-physician practitioner and I was immediately available to provide assistance  At this point I agree with the current assessment done in the Emergency Department  I have conducted an independent evaluation of this patient a history and physical is as follows:    51-year-old male presenting status post injury to his left ankle yesterday afternoon when a sharp rock fell and struck the medial aspect of his left ankle  Patient notes bleeding at the time as he is on anticoagulation but states this improved following pressure  Patient notes pain diffusely over the ankle into the foot but has been ambulating on it without difficulty  Patient denies any pain with range of motion  There is no pain with range of motion either active or passive on my evaluation, no pain with micromotion that would be concerning for septic joint  There is a laceration without active bleeding in a crescent just superior to the medial malleolus  This appears to be superior to the joint space and does not appear to violate the fascia  Patient has appropriate motor and sensation in all dermatomes of the lower leg and foot  There is a 2+ DP pulse with appropriate capillary refill  Compartments of the foot and lower leg are soft with no signs of compartment syndrome  We will provide local wound care, surgical closure of the laceration and obtain plain film imaging to evaluate for acute osseous injury    We will update patient's tetanus vaccine as he is unaware of his last tetanus vaccination  Discussed wound care with the patient  Discussed signs and symptoms of septic arthritis with the patient in detail, fortunately there are no signs of these at present  Discussed close follow-up and return precautions in detail with the patient          ED Course         Critical Care Time  Procedures

## 2023-12-18 ENCOUNTER — TELEMEDICINE (OUTPATIENT)
Dept: FAMILY MEDICINE CLINIC | Facility: CLINIC | Age: 68
End: 2023-12-18
Payer: COMMERCIAL

## 2023-12-18 ENCOUNTER — TELEPHONE (OUTPATIENT)
Dept: FAMILY MEDICINE CLINIC | Facility: CLINIC | Age: 68
End: 2023-12-18

## 2023-12-18 DIAGNOSIS — U07.1 COVID-19: Primary | ICD-10-CM

## 2023-12-18 DIAGNOSIS — I48.0 PAROXYSMAL ATRIAL FIBRILLATION (HCC): ICD-10-CM

## 2023-12-18 DIAGNOSIS — L40.50 PSORIATIC ARTHRITIS (HCC): ICD-10-CM

## 2023-12-18 PROCEDURE — 99214 OFFICE O/P EST MOD 30 MIN: CPT | Performed by: PHYSICIAN ASSISTANT

## 2023-12-18 RX ORDER — MOLNUPIRAVIR 200 MG/1
800 CAPSULE ORAL EVERY 12 HOURS
Qty: 40 CAPSULE | Refills: 0 | Status: SHIPPED | OUTPATIENT
Start: 2023-12-18 | End: 2023-12-23

## 2023-12-18 NOTE — PROGRESS NOTES
COVID-19 Outpatient Progress Note    Assessment/Plan:    Problem List Items Addressed This Visit          Cardiovascular and Mediastinum    Atrial fibrillation (HCC)       Musculoskeletal and Integument    Psoriatic arthritis (HCC)     Other Visit Diagnoses       COVID-19    -  Primary    Relevant Medications    molnupiravir (Lagevrio) 200 mg capsule             Disposition:     Discussed symptom directed medication options with patient.     Pt at increased risk given cardiac hx and immunosuppression. Begin molnupiravir, return/ER precautions provided. Will avoid paxlovid due to pts anticoagulation     Patient meets criteria for Molnupiravir and they have been counseled according to EUA documentation provided by the FDA. After discussion, patient agrees to treatment.    Molnupiravir is an investigational medicine used to treat mild-to-moderate COVID-19 in adults with positive results of direct SARS-CoV-2 viral testing, and who are at high risk for progressing to severe COVID-19 including hospitalization or death, and for whom other COVID-19 treatment options authorized by the FDA are not accessible or clinically appropriate.    The FDA has authorized the emergency use of molnupiravir for the treatment of mild-tomoderate COVID-19 in adults under an EUA.    Molnupiravir is not authorized:  - for use in people less than 18 years of age.  - for prevention of COVID-19.  - for people needing hospitalization for COVID-19.  - for use for longer than 5 consecutive days    What is the most important information I should know about molnupiravir?    Molnupiravir may cause serious side effects, including:    Molnupiravir may cause harm to your unborn baby. It is not known if molnupiravir will harm your baby if you take molnupiravir during pregnancy.  - Molnupiravir is not recommended for use in pregnancy.  - Molnupiravir has not been studied in pregnancy. Molnupiravir was studied in pregnant animals only. When molnupiravir was  given to pregnant animals, molnupiravir caused harm to their unborn babies.  - You and your healthcare provider may decide that you should take molnupiravir duringpregnancy if there are no other COVID-19 treatment options authorized by the FDA that are accessible or clinically appropriate for you.  - If you and your healthcare provider decide that you should take molnupiravir during pregnancy, you and your healthcare provider should discuss the known and potential benefits and the potential risks of taking molnupiravir during pregnancy.    For individuals who are able to become pregnant:  - You should use a reliable method of birth control (contraception) consistently and correctly during treatment with molnupiravir and for 4 days after the last dose of molnupiravir. Talk to your healthcare provider about reliable birth control methods.  - Before starting treatment with molnupiravir your healthcare provider may do a pregnancy test to see if you are pregnant before starting treatment with molnupiravir.  - Tell your healthcare provider right away if you become pregnant or think you may be pregnant during treatment with molnupiravir.    Pregnancy Surveillance Program:  - There is a pregnancy surveillance program for individuals who take molnupiravir during pregnancy. The purpose of this program is to collect information about the health of you and your baby. Talk to your healthcare provider about how to take part in this program.  - If you take molnupiravir during pregnancy and you agree to participate in the pregnancy surveillance program and allow your healthcare provider to share your information with [email or web address], then your healthcare provider will report your use of molnupiravir during pregnancy to [employer]. by calling [phone] or [email or web address].    For individuals who are sexually active with partners who are able to become pregnant:  - It is not known if molnupiravir can  affect sperm. While the risk is regarded as low, animal studies to fully assess the potential for molnupiravir to affect the babies of males treated with molnupiravir have not been completed. A reliable method of birth control (contraception) should be used consistently and correctly during treatment with molnupiravir and for at least 3 months after the last dose. The risk to sperm beyond 3 months is not known. Studies to understand the risk to sperm beyond 3 months are ongoing. Talk to your healthcare provider about reliable birth control methods. Talk to your healthcare provider if you have questions or concerns about how molnupiravir may affect sperm.    How do I take molnupiravir?    - Take molnupiravir exactly as your healthcare provider tells you to take it.  - Take 4 capsules of molnupiravir every 12 hours (for example, at 8 am and at 8 pm)  - Take molnupiravir for 5 days. It is important that you complete the full 5 days of treatment with molnupiravir. Do not stop taking molnupiravir before you complete the full 5 days of treatment, even if you feel better.  - Take molnupiravir with or without food.  - You should stay in isolation for as long as your healthcare provider tells you to. Talk to your healthcare provider if you are not sure about how to properly isolate while you have COVID-19.  - Swallow molnupiravir capsules whole. Do not open, break, or crush the capsules. If you cannot swallow capsules whole, tell your healthcare provider.    What to do if you miss a dose:  - If it has been less than 10 hours since the missed dose, take it as soon as you remember  - If it has been more than 10 hours since the missed dose, skip the missed dose and take your dose at the next scheduled time.  - Do not double the dose of molnupiravir to make up for a missed dose.    What are the important possible side effects of molnupiravir?    Possible side effects of molnupiravir are:  - See, “What is the most important  information I should know about molnupiravir?”  - Diarrhea  - Nausea  - Dizziness    These are not all the possible side effects of molnupiravir. Not many people have taken molnupiravir. Serious and unexpected side effects may happen. This medicine is still being studied, so it is possible that all of the risks are not known at this time.    What other treatment choices are there?    Like molnupiravir, FDA may allow for the emergency use of other medicines to treat people with COVID-19. Go to https://www.fda.gov/emergency-preparedness-and-response/mcm-legalregulatory-and-policy-framework/emergency-use-authorization for more information.  It is your choice to be treated or not to be treated with molnupiravir. Should you decide not to take it, it will not change your standard medical care.    What if I am breastfeeding?  Breastfeeding is not recommended during treatment with molnupiravir and for 4 days after the last dose of molnupiravir. If you are breastfeeding or plan to breastfeed, talk to your healthcare provider about your options and specific situation before taking molnupiravir.    How do I report side effects with molnupiravir?    Contact your healthcare provider if you have any side effects that bother you or do not go away. Report side effects to FDA MedWatch at www.fda.gov/medwatch or call 8-062-XIE-0495 (1-421.969.4021).    How should I store molnupiravir?  - Store molnupiravir capsules at room temperature between 68°F to 77°F (20°C to 25°C).  - Keep molnupiravir and all medicines out of the reach of children and pets.    Full fact sheet for patients, parents, and caregivers can be found at: https://www.fda.gov/media/135611/download    I have spent a total time of 15 minutes on the day of the encounter for this patient including       Encounter provider: Kamar Canales PA-C     Provider located at: [facility]  [address]  [facility] [address]     Recent  Visits  No visits were found meeting these conditions.  Showing recent visits within past 7 days and meeting all other requirements  Today's Visits  Date Type Provider Dept   12/18/23 Telemedicine [facility]   12/18/23 Telephone Megan Fernandez [facility]   Showing today's visits and meeting all other requirements  Future Appointments  No visits were found meeting these conditions.  Showing future appointments within next 150 days and meeting all other requirements     This virtual check-in was done via [employer] and patient was informed that this is a secure, HIPAA-compliant platform. He agrees to proceed.    Patient agrees to participate in a virtual check in via telephone or video visit instead of presenting to the office to address urgent/immediate medical needs. Patient is aware this is a billable service. He acknowledged consent and understanding of privacy and security of the video platform. The patient has agreed to participate and understands they can discontinue the visit at any time.    After connecting through [employer], the patient was identified by name and date of birth. Ahbishek Gimenez was informed that this was a telemedicine visit and that the exam was being conducted confidentially over secure lines. My office door was closed. No one else was in the room. Abhishek Gimenez acknowledged consent and understanding of privacy and security of the telemedicine visit. I informed the patient that I have reviewed his record in Epic and presented the opportunity for him to ask any questions regarding the visit today. The patient agreed to participate.     Verification of patient location:  Patient is located in the following state in which I hold an active license: [place]    Subjective:   Abhishek Gimenez is a 68 y.o. male who is concerned about COVID-19. Patient's symptoms include chills, fatigue, nasal congestion, myalgias and headache. Patient denies fever, malaise,  rhinorrhea, sore throat, anosmia, loss of taste, cough, shortness of breath, chest tightness, abdominal pain, nausea, vomiting and diarrhea.     - Date of symptom onset: 12/16/2023  - Date of exposure: 12/17/2023      COVID-19 vaccination status: Fully vaccinated with booster    Hx of afib, AC on eliquis. Psoriatic arthritis on humira. No SOB/chest pain, fevers    Lab Results   Component Value Date    SARSCOV2 Positive (A) 11/26/2021       Review of Systems   Constitutional:  Positive for chills and fatigue. Negative for fever.   HENT:  Positive for congestion. Negative for rhinorrhea and sore throat.    Respiratory:  Negative for cough, chest tightness and shortness of breath.    Gastrointestinal:  Negative for abdominal pain, diarrhea, nausea and vomiting.   Musculoskeletal:  Positive for myalgias.   Neurological:  Positive for headaches.     Current Outpatient Medications on File Prior to Visit   Medication Sig    adalimumab (HUMIRA) 40 mg/0.8 mL PSKT Inject 40 mg under the skin every 14 (fourteen) days     apixaban (Eliquis) 5 mg Take 1 tablet (5 mg total) by mouth 2 (two) times a day    lisinopril (ZESTRIL) 20 mg tablet Take 1 tablet (20 mg total) by mouth daily    pantoprazole (PROTONIX) 40 mg tablet Take 1 tablet (40 mg total) by mouth daily in the early morning Do not start before February 24, 2023.    sildenafil (VIAGRA) 100 mg tablet TAKE ONE (1) TABLET BY MOUTH DAILY AS NEEDED       Objective:    There were no vitals taken for this visit.       Physical Exam  Vitals and nursing note reviewed.   Constitutional:       Appearance: Normal appearance.   HENT:      Head: Normocephalic and atraumatic.      Nose: Nose normal.   Pulmonary:      Effort: Pulmonary effort is normal. No respiratory distress.   Musculoskeletal:      Cervical back: Normal range of motion.   Neurological:      Mental Status: He is alert and oriented to person, place, and time.       Kamar Canales PA-C

## 2023-12-18 NOTE — TELEPHONE ENCOUNTER
Patient called, started feeling sick on Saturday, she tested positive for covid. He was asking if there is anything you can give him

## 2024-01-16 ENCOUNTER — APPOINTMENT (OUTPATIENT)
Dept: LAB | Facility: CLINIC | Age: 69
End: 2024-01-16
Payer: COMMERCIAL

## 2024-01-16 DIAGNOSIS — Z12.5 SPECIAL SCREENING FOR MALIGNANT NEOPLASM OF PROSTATE: ICD-10-CM

## 2024-01-16 LAB — PSA SERPL-MCNC: 3.83 NG/ML (ref 0–4)

## 2024-01-16 PROCEDURE — G0103 PSA SCREENING: HCPCS

## 2024-01-16 PROCEDURE — 36415 COLL VENOUS BLD VENIPUNCTURE: CPT

## 2024-02-20 ENCOUNTER — APPOINTMENT (OUTPATIENT)
Dept: LAB | Facility: CLINIC | Age: 69
End: 2024-02-20
Payer: COMMERCIAL

## 2024-02-20 ENCOUNTER — OFFICE VISIT (OUTPATIENT)
Dept: FAMILY MEDICINE CLINIC | Facility: CLINIC | Age: 69
End: 2024-02-20
Payer: COMMERCIAL

## 2024-02-20 ENCOUNTER — HOSPITAL ENCOUNTER (OUTPATIENT)
Facility: HOSPITAL | Age: 69
Setting detail: OBSERVATION
Discharge: HOME | End: 2024-02-21
Attending: STUDENT IN AN ORGANIZED HEALTH CARE EDUCATION/TRAINING PROGRAM | Admitting: FAMILY MEDICINE
Payer: COMMERCIAL

## 2024-02-20 ENCOUNTER — TELEPHONE (OUTPATIENT)
Dept: FAMILY MEDICINE CLINIC | Facility: CLINIC | Age: 69
End: 2024-02-20

## 2024-02-20 ENCOUNTER — LAB (OUTPATIENT)
Dept: LAB | Facility: CLINIC | Age: 69
End: 2024-02-20
Payer: COMMERCIAL

## 2024-02-20 VITALS
OXYGEN SATURATION: 96 % | SYSTOLIC BLOOD PRESSURE: 142 MMHG | HEIGHT: 71 IN | WEIGHT: 188 LBS | HEART RATE: 71 BPM | TEMPERATURE: 95.4 F | BODY MASS INDEX: 26.32 KG/M2 | DIASTOLIC BLOOD PRESSURE: 90 MMHG

## 2024-02-20 DIAGNOSIS — R73.9 HYPERGLYCEMIA: Primary | ICD-10-CM

## 2024-02-20 DIAGNOSIS — E11.65 TYPE 2 DIABETES MELLITUS WITH HYPERGLYCEMIA, WITHOUT LONG-TERM CURRENT USE OF INSULIN (HCC): Primary | ICD-10-CM

## 2024-02-20 DIAGNOSIS — E11.9 DIABETES (HCC): ICD-10-CM

## 2024-02-20 DIAGNOSIS — Z13.1 SCREENING FOR DIABETES MELLITUS (DM): ICD-10-CM

## 2024-02-20 DIAGNOSIS — E11.65 TYPE 2 DIABETES MELLITUS WITH HYPERGLYCEMIA, WITHOUT LONG-TERM CURRENT USE OF INSULIN (HCC): ICD-10-CM

## 2024-02-20 DIAGNOSIS — Z00.00 MEDICARE ANNUAL WELLNESS VISIT, SUBSEQUENT: Primary | ICD-10-CM

## 2024-02-20 DIAGNOSIS — R63.1 POLYDIPSIA: ICD-10-CM

## 2024-02-20 PROBLEM — Z78.9 ALCOHOL USE: Status: RESOLVED | Noted: 2021-02-22 | Resolved: 2024-02-20

## 2024-02-20 PROBLEM — F10.90 ALCOHOL USE: Status: RESOLVED | Noted: 2021-02-22 | Resolved: 2024-02-20

## 2024-02-20 LAB
ALBUMIN SERPL BCP-MCNC: 4.4 G/DL (ref 3.5–5)
ALP SERPL-CCNC: 92 U/L (ref 34–104)
ALT SERPL W P-5'-P-CCNC: 48 U/L (ref 7–52)
ANION GAP SERPL CALCULATED.3IONS-SCNC: 9 MMOL/L
AST SERPL W P-5'-P-CCNC: 28 U/L (ref 13–39)
BACTERIA UR QL AUTO: NORMAL /HPF
BASOPHILS # BLD AUTO: 0.04 THOUSANDS/ÂΜL (ref 0–0.1)
BASOPHILS NFR BLD AUTO: 1 % (ref 0–1)
BILIRUB SERPL-MCNC: 0.68 MG/DL (ref 0.2–1)
BILIRUB UR QL STRIP: NEGATIVE
BUN SERPL-MCNC: 22 MG/DL (ref 5–25)
CALCIUM SERPL-MCNC: 9.7 MG/DL (ref 8.4–10.2)
CHLORIDE SERPL-SCNC: 92 MMOL/L (ref 96–108)
CHOLEST SERPL-MCNC: 212 MG/DL
CLARITY UR: CLEAR
CO2 SERPL-SCNC: 28 MMOL/L (ref 21–32)
COLOR UR: COLORLESS
CREAT SERPL-MCNC: 0.99 MG/DL (ref 0.6–1.3)
CREAT UR-MCNC: 17.8 MG/DL
EOSINOPHIL # BLD AUTO: 0.24 THOUSAND/ÂΜL (ref 0–0.61)
EOSINOPHIL NFR BLD AUTO: 4 % (ref 0–6)
ERYTHROCYTE [DISTWIDTH] IN BLOOD BY AUTOMATED COUNT: 12.2 % (ref 11.6–15.1)
GFR SERPL CREATININE-BSD FRML MDRD: 79 ML/MIN/1.73SQ M
GLUCOSE SERPL-MCNC: 459 MG/DL (ref 65–140)
GLUCOSE SERPL-MCNC: 749 MG/DL (ref 65–140)
GLUCOSE UR STRIP-MCNC: ABNORMAL MG/DL
HCT VFR BLD AUTO: 44.2 % (ref 36.5–49.3)
HDLC SERPL-MCNC: 40 MG/DL
HGB BLD-MCNC: 16.1 G/DL (ref 12–17)
HGB UR QL STRIP.AUTO: NEGATIVE
IMM GRANULOCYTES # BLD AUTO: 0.04 THOUSAND/UL (ref 0–0.2)
IMM GRANULOCYTES NFR BLD AUTO: 1 % (ref 0–2)
KETONES UR STRIP-MCNC: ABNORMAL MG/DL
LDLC SERPL CALC-MCNC: 98 MG/DL (ref 0–100)
LEFT EYE DIABETIC RETINOPATHY: NORMAL
LEFT EYE IMAGE QUALITY: NORMAL
LEFT EYE MACULAR EDEMA: NORMAL
LEFT EYE OTHER RETINOPATHY: NORMAL
LEUKOCYTE ESTERASE UR QL STRIP: ABNORMAL
LIPASE SERPL-CCNC: 28 U/L (ref 11–82)
LYMPHOCYTES # BLD AUTO: 1.67 THOUSANDS/ÂΜL (ref 0.6–4.47)
LYMPHOCYTES NFR BLD AUTO: 29 % (ref 14–44)
MCH RBC QN AUTO: 31.8 PG (ref 26.8–34.3)
MCHC RBC AUTO-ENTMCNC: 36.4 G/DL (ref 31.4–37.4)
MCV RBC AUTO: 87 FL (ref 82–98)
MICROALBUMIN UR-MCNC: 10 MG/L
MICROALBUMIN/CREAT 24H UR: 56 MG/G CREATININE (ref 0–30)
MONOCYTES # BLD AUTO: 0.5 THOUSAND/ÂΜL (ref 0.17–1.22)
MONOCYTES NFR BLD AUTO: 9 % (ref 4–12)
NEUTROPHILS # BLD AUTO: 3.37 THOUSANDS/ÂΜL (ref 1.85–7.62)
NEUTS SEG NFR BLD AUTO: 56 % (ref 43–75)
NITRITE UR QL STRIP: NEGATIVE
NON-SQ EPI CELLS URNS QL MICRO: NORMAL /HPF
NRBC BLD AUTO-RTO: 0 /100 WBCS
PH UR STRIP.AUTO: 6 [PH]
PLATELET # BLD AUTO: 181 THOUSANDS/UL (ref 149–390)
PMV BLD AUTO: 11.1 FL (ref 8.9–12.7)
POTASSIUM SERPL-SCNC: 4.3 MMOL/L (ref 3.5–5.3)
PROT SERPL-MCNC: 7.9 G/DL (ref 6.4–8.4)
PROT UR STRIP-MCNC: NEGATIVE MG/DL
RBC # BLD AUTO: 5.06 MILLION/UL (ref 3.88–5.62)
RBC #/AREA URNS AUTO: NORMAL /HPF
RIGHT EYE DIABETIC RETINOPATHY: NORMAL
RIGHT EYE IMAGE QUALITY: NORMAL
RIGHT EYE MACULAR EDEMA: NORMAL
RIGHT EYE OTHER RETINOPATHY: NORMAL
SEVERITY (EYE EXAM): NORMAL
SL AMB POCT HEMOGLOBIN AIC: 10.8 (ref ?–6.5)
SODIUM SERPL-SCNC: 129 MMOL/L (ref 135–147)
SP GR UR STRIP.AUTO: 1.03 (ref 1–1.03)
TRIGL SERPL-MCNC: 369 MG/DL
UROBILINOGEN UR STRIP-ACNC: <2 MG/DL
WBC # BLD AUTO: 5.86 THOUSAND/UL (ref 4.31–10.16)
WBC #/AREA URNS AUTO: NORMAL /HPF

## 2024-02-20 PROCEDURE — 99285 EMERGENCY DEPT VISIT HI MDM: CPT | Performed by: STUDENT IN AN ORGANIZED HEALTH CARE EDUCATION/TRAINING PROGRAM

## 2024-02-20 PROCEDURE — 83036 HEMOGLOBIN GLYCOSYLATED A1C: CPT | Performed by: NURSE PRACTITIONER

## 2024-02-20 PROCEDURE — 82570 ASSAY OF URINE CREATININE: CPT

## 2024-02-20 PROCEDURE — 99214 OFFICE O/P EST MOD 30 MIN: CPT | Performed by: NURSE PRACTITIONER

## 2024-02-20 PROCEDURE — 85025 COMPLETE CBC W/AUTO DIFF WBC: CPT | Performed by: STUDENT IN AN ORGANIZED HEALTH CARE EDUCATION/TRAINING PROGRAM

## 2024-02-20 PROCEDURE — 96361 HYDRATE IV INFUSION ADD-ON: CPT

## 2024-02-20 PROCEDURE — 92250 FUNDUS PHOTOGRAPHY W/I&R: CPT | Performed by: NURSE PRACTITIONER

## 2024-02-20 PROCEDURE — 80048 BASIC METABOLIC PNL TOTAL CA: CPT | Performed by: STUDENT IN AN ORGANIZED HEALTH CARE EDUCATION/TRAINING PROGRAM

## 2024-02-20 PROCEDURE — 36415 COLL VENOUS BLD VENIPUNCTURE: CPT

## 2024-02-20 PROCEDURE — 82010 KETONE BODYS QUAN: CPT | Performed by: STUDENT IN AN ORGANIZED HEALTH CARE EDUCATION/TRAINING PROGRAM

## 2024-02-20 PROCEDURE — 83690 ASSAY OF LIPASE: CPT

## 2024-02-20 PROCEDURE — 99285 EMERGENCY DEPT VISIT HI MDM: CPT

## 2024-02-20 PROCEDURE — 82948 REAGENT STRIP/BLOOD GLUCOSE: CPT

## 2024-02-20 PROCEDURE — 82043 UR ALBUMIN QUANTITATIVE: CPT

## 2024-02-20 PROCEDURE — 85025 COMPLETE CBC W/AUTO DIFF WBC: CPT

## 2024-02-20 PROCEDURE — 82805 BLOOD GASES W/O2 SATURATION: CPT | Performed by: STUDENT IN AN ORGANIZED HEALTH CARE EDUCATION/TRAINING PROGRAM

## 2024-02-20 PROCEDURE — G0439 PPPS, SUBSEQ VISIT: HCPCS | Performed by: NURSE PRACTITIONER

## 2024-02-20 PROCEDURE — 81001 URINALYSIS AUTO W/SCOPE: CPT

## 2024-02-20 PROCEDURE — 80053 COMPREHEN METABOLIC PANEL: CPT

## 2024-02-20 PROCEDURE — 80061 LIPID PANEL: CPT

## 2024-02-20 RX ORDER — BLOOD-GLUCOSE METER
KIT MISCELLANEOUS
Qty: 1 KIT | Refills: 0 | Status: SHIPPED | OUTPATIENT
Start: 2024-02-20

## 2024-02-20 RX ORDER — PEN NEEDLE, DIABETIC 32GX 5/32"
NEEDLE, DISPOSABLE MISCELLANEOUS
Qty: 100 EACH | Refills: 3 | Status: SHIPPED | OUTPATIENT
Start: 2024-02-20

## 2024-02-20 RX ORDER — LANCETS 33 GAUGE
EACH MISCELLANEOUS
Qty: 100 EACH | Refills: 3 | Status: SHIPPED | OUTPATIENT
Start: 2024-02-20

## 2024-02-20 RX ORDER — METFORMIN HYDROCHLORIDE 500 MG/1
1000 TABLET, EXTENDED RELEASE ORAL
Qty: 180 TABLET | Refills: 1 | Status: SHIPPED | OUTPATIENT
Start: 2024-02-20 | End: 2024-02-21

## 2024-02-20 RX ORDER — ROSUVASTATIN CALCIUM 5 MG/1
5 TABLET, COATED ORAL EVERY EVENING
Qty: 90 TABLET | Refills: 1 | Status: SHIPPED | OUTPATIENT
Start: 2024-02-20 | End: 2024-08-18

## 2024-02-20 RX ORDER — BLOOD SUGAR DIAGNOSTIC
STRIP MISCELLANEOUS
Qty: 100 EACH | Refills: 3 | Status: SHIPPED | OUTPATIENT
Start: 2024-02-20

## 2024-02-20 RX ADMIN — SODIUM CHLORIDE 1000 ML: 0.9 INJECTION, SOLUTION INTRAVENOUS at 23:58

## 2024-02-20 NOTE — ASSESSMENT & PLAN NOTE
Lab Results   Component Value Date    HGBA1C 10.8 (A) 02/20/2024   DW patient will start Insulin 10 units and Metformin 1000 mg daily and will follow up in two weeks

## 2024-02-20 NOTE — PATIENT INSTRUCTIONS
Medicare Preventive Visit Patient Instructions  Thank you for completing your Welcome to Medicare Visit or Medicare Annual Wellness Visit today. Your next wellness visit will be due in one year (2/20/2025).  The screening/preventive services that you may require over the next 5-10 years are detailed below. Some tests may not apply to you based off risk factors and/or age. Screening tests ordered at today's visit but not completed yet may show as past due. Also, please note that scanned in results may not display below.  Preventive Screenings:  Service Recommendations Previous Testing/Comments   Colorectal Cancer Screening  Colonoscopy    Fecal Occult Blood Test (FOBT)/Fecal Immunochemical Test (FIT)  Fecal DNA/Cologuard Test  Flexible Sigmoidoscopy Age: 45-75 years old   Colonoscopy: every 10 years (May be performed more frequently if at higher risk)  OR  FOBT/FIT: every 1 year  OR  Cologuard: every 3 years  OR  Sigmoidoscopy: every 5 years  Screening may be recommended earlier than age 45 if at higher risk for colorectal cancer. Also, an individualized decision between you and your healthcare provider will decide whether screening between the ages of 76-85 would be appropriate. Colonoscopy: 07/06/2022  FOBT/FIT: 07/06/2022  Cologuard: 07/06/2022  Sigmoidoscopy: 07/06/2022    Screening Current     Prostate Cancer Screening Individualized decision between patient and health care provider in men between ages of 55-69   Medicare will cover every 12 months beginning on the day after your 50th birthday PSA: 3.83 ng/mL     Screening Current     Hepatitis C Screening Once for adults born between 1945 and 1965  More frequently in patients at high risk for Hepatitis C Hep C Antibody: 03/22/2016    Screening Current   Diabetes Screening 1-2 times per year if you're at risk for diabetes or have pre-diabetes Fasting glucose: 128 mg/dL (2/22/2023)  A1C: 10.8 (2/20/2024)  Screening Current   Cholesterol Screening Once every 5  years if you don't have a lipid disorder. May order more often based on risk factors. Lipid panel: 10/14/2022  Screening Current      Other Preventive Screenings Covered by Medicare:  Abdominal Aortic Aneurysm (AAA) Screening: covered once if your at risk. You're considered to be at risk if you have a family history of AAA or a male between the age of 65-75 who smoking at least 100 cigarettes in your lifetime.  Lung Cancer Screening: covers low dose CT scan once per year if you meet all of the following conditions: (1) Age 55-77; (2) No signs or symptoms of lung cancer; (3) Current smoker or have quit smoking within the last 15 years; (4) You have a tobacco smoking history of at least 20 pack years (packs per day x number of years you smoked); (5) You get a written order from a healthcare provider.  Glaucoma Screening: covered annually if you're considered high risk: (1) You have diabetes OR (2) Family history of glaucoma OR (3)  aged 50 and older OR (4)  American aged 65 and older  Osteoporosis Screening: covered every 2 years if you meet one of the following conditions: (1) Have a vertebral abnormality; (2) On glucocorticoid therapy for more than 3 months; (3) Have primary hyperparathyroidism; (4) On osteoporosis medications and need to assess response to drug therapy.  HIV Screening: covered annually if you're between the age of 15-65. Also covered annually if you are younger than 15 and older than 65 with risk factors for HIV infection. For pregnant patients, it is covered up to 3 times per pregnancy.    Immunizations:  Immunization Recommendations   Influenza Vaccine Annual influenza vaccination during flu season is recommended for all persons aged >= 6 months who do not have contraindications   Pneumococcal Vaccine   * Pneumococcal conjugate vaccine = PCV13 (Prevnar 13), PCV15 (Vaxneuvance), PCV20 (Prevnar 20)  * Pneumococcal polysaccharide vaccine = PPSV23 (Pneumovax) Adults 19-65 yo  with certain risk factors or if 65+ yo  If never received any pneumonia vaccine: recommend Prevnar 20 (PCV20)  Give PCV20 if previously received 1 dose of PCV13 or PPSV23   Hepatitis B Vaccine 3 dose series if at intermediate or high risk (ex: diabetes, end stage renal disease, liver disease)   Respiratory syncytial virus (RSV) Vaccine - COVERED BY MEDICARE PART D  * RSVPreF3 (Arexvy) CDC recommends that adults 60 years of age and older may receive a single dose of RSV vaccine using shared clinical decision-making (SCDM)   Tetanus (Td) Vaccine - COST NOT COVERED BY MEDICARE PART B Following completion of primary series, a booster dose should be given every 10 years to maintain immunity against tetanus. Td may also be given as tetanus wound prophylaxis.   Tdap Vaccine - COST NOT COVERED BY MEDICARE PART B Recommended at least once for all adults. For pregnant patients, recommended with each pregnancy.   Shingles Vaccine (Shingrix) - COST NOT COVERED BY MEDICARE PART B  2 shot series recommended in those 19 years and older who have or will have weakened immune systems or those 50 years and older     Health Maintenance Due:      Topic Date Due   • Colorectal Cancer Screening  07/05/2027   • Hepatitis C Screening  Completed     Immunizations Due:      Topic Date Due   • Pneumococcal Vaccine: 65+ Years (2 of 2 - PPSV23 or PCV20) 03/02/2020   • COVID-19 Vaccine (5 - 2023-24 season) 01/08/2024     Advance Directives   What are advance directives?  Advance directives are legal documents that state your wishes and plans for medical care. These plans are made ahead of time in case you lose your ability to make decisions for yourself. Advance directives can apply to any medical decision, such as the treatments you want, and if you want to donate organs.   What are the types of advance directives?  There are many types of advance directives, and each state has rules about how to use them. You may choose a combination of any of  the following:  Living will:  This is a written record of the treatment you want. You can also choose which treatments you do not want, which to limit, and which to stop at a certain time. This includes surgery, medicine, IV fluid, and tube feedings.   Durable power of  for healthcare (DPAHC):  This is a written record that states who you want to make healthcare choices for you when you are unable to make them for yourself. This person, called a proxy, is usually a family member or a friend. You may choose more than 1 proxy.  Do not resuscitate (DNR) order:  A DNR order is used in case your heart stops beating or you stop breathing. It is a request not to have certain forms of treatment, such as CPR. A DNR order may be included in other types of advance directives.  Medical directive:  This covers the care that you want if you are in a coma, near death, or unable to make decisions for yourself. You can list the treatments you want for each condition. Treatment may include pain medicine, surgery, blood transfusions, dialysis, IV or tube feedings, and a ventilator (breathing machine).  Values history:  This document has questions about your views, beliefs, and how you feel and think about life. This information can help others choose the care that you would choose.  Why are advance directives important?  An advance directive helps you control your care. Although spoken wishes may be used, it is better to have your wishes written down. Spoken wishes can be misunderstood, or not followed. Treatments may be given even if you do not want them. An advance directive may make it easier for your family to make difficult choices about your care.   Weight Management   Why it is important to manage your weight:  Being overweight increases your risk of health conditions such as heart disease, high blood pressure, type 2 diabetes, and certain types of cancer. It can also increase your risk for osteoarthritis, sleep apnea,  and other respiratory problems. Aim for a slow, steady weight loss. Even a small amount of weight loss can lower your risk of health problems.  How to lose weight safely:  A safe and healthy way to lose weight is to eat fewer calories and get regular exercise. You can lose up about 1 pound a week by decreasing the number of calories you eat by 500 calories each day.   Healthy meal plan for weight management:  A healthy meal plan includes a variety of foods, contains fewer calories, and helps you stay healthy. A healthy meal plan includes the following:  Eat whole-grain foods more often.  A healthy meal plan should contain fiber. Fiber is the part of grains, fruits, and vegetables that is not broken down by your body. Whole-grain foods are healthy and provide extra fiber in your diet. Some examples of whole-grain foods are whole-wheat breads and pastas, oatmeal, brown rice, and bulgur.  Eat a variety of vegetables every day.  Include dark, leafy greens such as spinach, kale, jl greens, and mustard greens. Eat yellow and orange vegetables such as carrots, sweet potatoes, and winter squash.   Eat a variety of fruits every day.  Choose fresh or canned fruit (canned in its own juice or light syrup) instead of juice. Fruit juice has very little or no fiber.  Eat low-fat dairy foods.  Drink fat-free (skim) milk or 1% milk. Eat fat-free yogurt and low-fat cottage cheese. Try low-fat cheeses such as mozzarella and other reduced-fat cheeses.  Choose meat and other protein foods that are low in fat.  Choose beans or other legumes such as split peas or lentils. Choose fish, skinless poultry (chicken or turkey), or lean cuts of red meat (beef or pork). Before you cook meat or poultry, cut off any visible fat.   Use less fat and oil.  Try baking foods instead of frying them. Add less fat, such as margarine, sour cream, regular salad dressing and mayonnaise to foods. Eat fewer high-fat foods. Some examples of high-fat foods  include french fries, doughnuts, ice cream, and cakes.  Eat fewer sweets.  Limit foods and drinks that are high in sugar. This includes candy, cookies, regular soda, and sweetened drinks.  Exercise:  Exercise at least 30 minutes per day on most days of the week. Some examples of exercise include walking, biking, dancing, and swimming. You can also fit in more physical activity by taking the stairs instead of the elevator or parking farther away from stores. Ask your healthcare provider about the best exercise plan for you.      © Copyright [employer] 2018 Information is for End User's use only and may not be sold, redistributed or otherwise used for commercial purposes. All illustrations and images included in CareNotes® are the copyrighted property of [employer] or [employer]    Type 2 Diabetes Management for Adults   AMBULATORY CARE:   Type 2 diabetes  is a disease that affects how your body uses glucose (sugar). Either your body cannot make enough insulin, or it cannot use the insulin correctly. It is important to keep diabetes controlled to prevent damage to your heart, blood vessels, and other organs. Management will help you feel well and enjoy your daily activities. Your diabetes care team providers can help you make a plan to fit diabetes care into your schedule. Your plan can change over time to fit your needs and your family's needs.       Have someone call your local emergency number (911 in the [place]) if:   You cannot be woken.    You have signs of diabetic ketoacidosis:     confusion, fatigue    vomiting    rapid heartbeat    fruity smelling breath    extreme thirst    dry mouth and skin    You have any of the following signs of a heart attack:      Squeezing, pressure, or pain in your chest    You may  also have any of the following:     Discomfort or pain in your back, neck, jaw, stomach, or arm    Shortness of breath    Nausea or vomiting    Lightheadedness or a sudden cold sweat    You  have any of the following signs of a stroke:      Numbness or drooping on one side of your face     Weakness in an arm or leg    Confusion or difficulty speaking    Dizziness, a severe headache, or vision loss    Call your doctor or diabetes care team provider if:   You have a sore or wound that will not heal.    You have a change in the amount you urinate.    Your blood sugar levels are higher than your target goals.    You often have lower blood sugar levels than your target goals.    Your skin is red, dry, warm, or swollen.    You have trouble coping with diabetes, or you feel anxious or depressed.    You have trouble following any part of your care plan, such as your meal plan.    You have questions or concerns about your condition or care.    What you need to know about high blood sugar levels:  High blood sugar levels may not cause any symptoms. You may feel more thirsty or urinate more often than usual. Over time, high blood sugar levels can damage your nerves, blood vessels, tissues, and organs. The following can increase your blood sugar levels:  Large meals or large amounts of carbohydrates at one time    Less physical activity    Stress    Illness    A lower dose of diabetes medicine or insulin, or a late dose    What you need to know about low blood sugar levels:  Symptoms include feeling shaky, dizzy, irritable, or confused. You can prevent symptoms by keeping your blood sugar levels from going too low.  Treat a low blood sugar level right away:      Drink 4 ounces of juice or have 1 tube of glucose gel.    Check your blood sugar level again 10 to 15 minutes later.    When the level goes back to normal, eat a meal or snack to prevent another decrease.       Keep glucose gel, raisins, or hard candy with you at all times to treat a low blood sugar level.     Your blood sugar level can get too low if you take diabetes medicine or insulin and do not eat enough food.     If you use insulin, check your blood  sugar level before you exercise.      If your blood sugar level is below 100 mg/dL, eat 4 crackers or 2 ounces of raisins, or drink 4 ounces of juice.    Check your level every 30 minutes if you exercise longer than 1 hour.    You may need a snack during or after exercise.    What you can do to manage your blood sugar levels:   Check your blood sugar levels as directed and as needed.  Several items are available to use to check your levels. You may need to check by testing a drop of blood in a glucose monitor. You may instead be given a continuous glucose monitoring (CGM) device. The device is worn at all times. The CGM checks your blood sugar level every 5 minutes. It sends results to an electronic device such as a smart phone. A CGM can be used with or without an insulin pump. You and your diabetes care team providers will decide on the best method for you. The goal for blood sugar levels before meals  is between 80 and 130 mg/dL and 2 hours after eating  is lower than 180 mg/dL.            Make healthy food choices.  Work with a dietitian to create a meal plan that works for you and your schedule. A dietitian can help you learn how to eat the right amount of carbohydrates (sugar and starchy foods) during your meals and snacks. Examples of carbohydrates are breads, cereals, rice, pasta, fruit, low-fat dairy, and sweets. Carbohydrates can raise your blood sugar level if you eat too many at one time.         Eat high-fiber foods as directed.  Fiber helps improve blood sugar levels. Fiber also lowers your risk for heart disease and other problems diabetes can cause. Examples of high-fiber foods include vegetables, whole-grain bread, and beans such as simons beans. Your dietitian can tell you how much fiber to have each day.         Get regular physical activity.  Physical activity can help you get to your target blood sugar level goal and manage your weight. Get at least 150 minutes of moderate to vigorous aerobic  physical activity each week. Resistance training, such as lifting weights, should be done 3 times each week. Do not miss more than 2 days of physical activity in a row. Do not sit longer than 30 minutes at a time. Your healthcare provider can help you create an activity plan. The plan can include the best activities for you and can help you build your strength and endurance.            Maintain a healthy weight.  Ask your team what a healthy weight is for you. A healthy weight can help you control diabetes and prevent heart disease. Ask your team to help you create a weight-loss plan, if needed. Even a loss of 3% to 7% of your excess body weight can help make a difference in managing diabetes. Your team will help you set a weight-loss goal, such as 10 to 15 pounds, or 5% of your extra weight. Together you and your team can set manageable weight-loss goals.    Take your diabetes medicine or insulin as directed.  You may need diabetes medicine, insulin, or both to help control your blood sugar levels. Your healthcare provider will teach you how and when to take your diabetes medicine or insulin. You will also be taught about side effects oral diabetes medicine can cause. Insulin may be injected or given through a pump or pen. You and your providers will decide on the best method for you:    An insulin pump  is an implanted device that gives your insulin 24 hours a day. An insulin pump prevents the need for multiple insulin injections in a day.         An insulin pen  is a device prefilled with the right amount of insulin.         You and your family members will be taught how to draw up and give insulin  if this is the best method for you. Your providers will also teach you how to dispose of needles and syringes.    You will learn how much insulin you need  and when to give it. You will be taught when not to give insulin. You will also be taught what to do if your blood sugar level drops too low. This may happen if  you take insulin and do not eat the right amount of carbohydrates.    More ways to manage type 2 diabetes:   Wear medical alert identification.  Wear medical alert jewelry or carry a card that says you have diabetes. Ask your provider where to get these items.         Do not smoke.  Nicotine and other chemicals in cigarettes and cigars can cause lung and blood vessel damage. It also makes it more difficult to manage your diabetes. Ask your provider for information if you currently smoke and need help to quit. Do not use e-cigarettes or smokeless tobacco in place of cigarettes or to help you quit. They still contain nicotine.    Check your feet each day for cuts, scratches, calluses, or other wounds.  Look for redness and swelling, and feel for warmth. Wear shoes that fit well. Check your shoes for rocks or other objects that can hurt your feet. Do not walk barefoot or wear shoes without socks. Wear cotton socks to help keep your feet dry.         Ask about vaccines you may need.  You have a higher risk for serious illness if you get the flu, pneumonia, COVID-19, or hepatitis. Ask your provider if you should get vaccines to prevent these or other diseases, and when to get the vaccines.    Talk to your provider if you become stressed about diabetes care.  Sometimes being able to fit diabetes care into your life can cause increased stress. The stress can cause you not to take care of yourself properly. Your provider can help by offering tips about self-care. A mental health provider can listen and offer help with self-care issues. Other types of counseling can help you make nutrition or physical activity changes.    Have your A1c checked as directed.  Your provider may check your A1c every 3 months, or 2 times each year if your diabetes is controlled. An A1c test shows the average amount of sugar in your blood over the past 2 to 3 months. Your provider will tell you what your A1c level should be.    Have screening  tests as directed.  Your provider may recommend screening for complications of diabetes and other conditions that may develop. Some screenings may begin right away and some may happen within the first 5 years of diagnosis:    Examples of diabetes complications  include kidney problems, high cholesterol, high blood pressure, blood vessel problems, eye problems, and sleep apnea.    You may be screened for a low vitamin B level  if you take oral diabetes medicine for a long time.    You may be screened for polycystic ovarian syndrome (PCOS)  if you are of childbearing age.    Follow up with your doctor or diabetes care team providers as directed:  You may need to have blood tests done before your follow-up visit. The test results will show if changes need to be made in your treatment or self-care. Talk to your provider if you cannot afford your medicine. Write down your questions so you remember to ask them during your visits.  © Copyright [employer] 2023 Information is for End User's use only and may not be sold, redistributed or otherwise used for commercial purposes.  The above information is an  only. It is not intended as medical advice for individual conditions or treatments. Talk to your doctor, nurse or pharmacist before following any medical regimen to see if it is safe and effective for you.    Basic Carbohydrate Counting   AMBULATORY CARE:   Carbohydrate counting  is a way to plan your meals by counting the amount of carbohydrate in foods. Carbohydrates are the sugars, starches, and fiber found in fruit, grains, vegetables, and milk products. Carbohydrates increase your blood sugar levels. Carbohydrate counting can help you eat the right amount of carbohydrate to keep your blood sugar levels under control.   What you need to know about planning meals using carbohydrate counting:  A dietitian or healthcare provider will help you develop a healthy meal plan that works best for you. You will be  taught how much carbohydrate to eat or drink for each meal and snack. Your meal plan will be based on your age, weight, usual food intake, and physical activity level. If you have diabetes, it will also include your blood sugar levels and diabetes medicine. Once you know how much carbohydrate you should eat, you can decide what type of food you want to eat.    You will need to know what foods contain carbohydrate and how much they contain. Keep track of the amount of carbohydrate in meals and snacks in order to follow your meal plan. Do not avoid carbohydrates or skip meals. Your blood sugar may fall too low if you do not eat enough carbohydrate or you skip meals.    Foods that contain carbohydrate:   Breads:  Each serving of food listed below contains about 15 g of carbohydrate .    1 slice of bread (1 ounce) or 1 flour or corn tortilla (6 inch)    ½ of a hamburger bun or ¼ of a large bagel (about 1 ounce)    1 pancake (about 4 inches across and ¼ inch thick)    Cereals and grains:  Serving sizes of ready-to-eat cereals vary. Look at the serving size and the total carbohydrate amount listed on the food label. Each serving of food listed below contains about 15 g of carbohydrate .    ¾ cup of dry, unsweetened, ready-to-eat cereal or ¼ cup of low-fat granola     ½ cup of oatmeal or other cooked cereal     ? cup of cooked rice or pasta    Starchy vegetables and beans:  Each serving of food listed below contains about 15 g of carbohydrate .    ½ cup of corn, green peas, sweet potatoes, or mashed potatoes    ¼ of a large baked potato    ½ cup of beans, lentils, and peas (garbanzo, simons, kidney, white, split, black-eyed)    Crackers and snacks:  Each serving of food listed below contains about 15 g of carbohydrate .    3 smitha cracker squares or 8 animal crackers     6 saltine-type crackers    3 cups of popcorn or ¾ ounce of pretzels, potato chips, or tortilla chips    Fruit:  Each serving of food listed below contains  about 15 g of carbohydrate .    1 small (4 ounce) piece of fresh fruit or ¾ to 1 cup of fresh fruit    ½ cup of canned or frozen fruit, packed in natural juice    ½ cup (4 ounces) of unsweetened fruit juice    2 tablespoons of dried fruit    Desserts or sugary foods:  Each serving of food listed below contains about 15 g of carbohydrate .    2-inch square unfrosted cake or brownie     2 small cookies    ½ cup of ice cream, frozen yogurt, or nondairy frozen yogurt    ¼ cup of sherbet or sorbet    1 tablespoon of regular syrup, jam, or jelly    2 tablespoons of light syrup    Milk and yogurt:  Foods from the milk group contain about 12 g of carbohydrate per serving.    1 cup of fat-free or low-fat milk    1 cup of soy milk    ? cup of fat-free, yogurt sweetened with artificial sweetener    Non-starchy vegetables:  Each serving contains about 5 g of carbohydrate . Three servings of non-starch vegetables count as 1 carbohydrate serving.     ½ cup of cooked vegetables or 1 cup of raw vegetables. This includes beets, broccoli, cabbage, cauliflower, cucumber, mushrooms, tomatoes, and zucchini    ½ cup of vegetable juice    How to use carbohydrate counting to plan meals:   Count carbohydrate amounts using serving sizes:      Pasta dinner example:  You plan to have pasta, tossed salad, and an 8-ounce glass of milk. Your healthcare provider tells you that you may have 4 carbohydrate servings for dinner. One carbohydrate serving of pasta is ? cup. One cup of pasta will equal 3 carbohydrate servings. An 8-ounce glass of milk will count as 1 carbohydrate serving. These amounts of food would equal 4 carbohydrate servings. One cup of tossed salad does not count toward your carbohydrate servings.       Count carbohydrate amounts using food labels:  Find the total amount of carbohydrate in a packaged food by reading the food label. Food labels tell you the serving size of the food and the total carbohydrate amount in each serving.  Find the serving size on the food label and then decide how many servings you will eat. Multiply the number of servings you plan to eat by the carbohydrate amount per serving.     Granola bar snack example:  Your meal plan allows you to have 2 carbohydrate servings (30 grams) of carbohydrate for a snack. You plan to eat 1 package of granola bars, which contains 2 bars. According to the food label, the serving size of food in this package is 1 bar. Each serving (1 bar) contains 25 grams of carbohydrate. The total amount of carbohydrate in this package of granola bars would be 50 g. Based on your meal plan, you should eat only 1 bar.      Follow up with your doctor as directed:  Write down your questions so you remember to ask them during your visits.  © Copyright [employer] 2023 Information is for End User's use only and may not be sold, redistributed or otherwise used for commercial purposes.  The above information is an  only. It is not intended as medical advice for individual conditions or treatments. Talk to your doctor, nurse or pharmacist before following any medical regimen to see if it is safe and effective for you.    Foot Care for People with Diabetes   AMBULATORY CARE:   What you need to know about foot care:  Long-term high blood sugar levels can damage the blood vessels and nerves in your legs and feet. This damage makes it hard to feel pressure, pain, temperature, and touch. You may not be able to feel a cut or sore, or shoes that are too tight. Foot care is needed to prevent serious problems, such as an infection or amputation. Diabetes may cause your toes to become crooked or curved under. These changes may affect the way you walk and can lead to increased pressure on your foot. The pressure can decrease blood flow to your feet. Lack of blood flow increases your risk for a foot ulcer.  Call your care team provider if:   Your feet become numb, weak, or hard to move.    You have pus  draining from a sore on your foot.    You have a wound on your foot that gets bigger, deeper, or does not heal.    You see blisters, cuts, scratches, calluses, or sores on your foot.    You have a fever, and your feet become red, warm, and swollen.    Your toenails become thick, curled, or yellow.    You find it hard to check your feet because your vision is poor.    You have questions or concerns about your condition or care.    How to care for your feet:   Check your feet each day.  Look at your whole foot, including the bottom, and between and under your toes. Check for wounds, corns, and calluses. Use a mirror to see the bottom of your feet. The skin on your feet may be shiny, tight, or darker than normal. Your feet may also be cold and pale. Feel your feet by running your hands along the tops, bottoms, sides, and between your toes. Redness, swelling, and warmth are signs of blood flow problems that can lead to a foot ulcer. Do not try to remove corns or calluses yourself. Do not ignore small problems, such as dry skin or small wounds. These can become life-threatening over time without proper care.         Wash your feet each day with soap and warm water.  Do not use hot water, because this can injure your foot. Dry your feet gently with a towel after you wash them. Dry between and under your toes.    Apply lotion or a moisturizer on your dry feet.  Ask your care team provider what lotions are best to use. Do not put lotion or moisturizer between your toes. Moisture between your toes could lead to skin breakdown.    Cut your toenails correctly.  File or cut your toenails straight across. Use a soft brush to clean around your toenails. If your toenails are very thick, you may need to have a care team provider or specialist cut them.     Protect your feet.  Do not walk barefoot or wear your shoes without socks. Check your shoes for rocks or other objects that can hurt your feet. Wear cotton socks to help keep  your feet dry. Wear socks without toe seams, or wear them with the seams inside out. Change your socks each day. Do not wear socks that are dirty or damp.    Wear shoes that fit well.  Wear shoes that do not rub against any area of your feet. Your shoes should be ½ to ¾ inch (1 to 2 centimeters) longer than your feet. Your shoes should also have extra space around the widest part of your feet. Walking or athletic shoes with laces or straps that adjust are best. Ask your care team provider for help to choose shoes that fit you best. Ask your provider if you need to wear an insert, orthotic, or bandage on your feet.         Go to your follow-up visits.  Your care team provider will do a foot exam at least 1 time each year. You may need a foot exam more often if you have nerve damage, foot deformities, or ulcers. Your provider will check for nerve damage and how well you can feel your feet. Your provider will check your shoes to see if they fit well.    Do not smoke.  Smoking can damage your blood vessels and put you at increased risk for foot ulcers. Ask your care team provider for information if you currently smoke and need help to quit. E-cigarettes or smokeless tobacco still contain nicotine. Talk to your care team provider before you use these products.    Follow up with your diabetes care team provider or foot specialist as directed:  You will need to have your feet checked at least 1 time each year. You may need a foot exam more often if you have nerve damage, foot deformities, or ulcers. Write down your questions so you remember to ask them during your visits.  © Copyright [employer] 2023 Information is for End User's use only and may not be sold, redistributed or otherwise used for commercial purposes.  The above information is an  only. It is not intended as medical advice for individual conditions or treatments. Talk to your doctor, nurse or pharmacist before following any medical regimen to see  if it is safe and effective for you.

## 2024-02-20 NOTE — PROGRESS NOTES
Assessment and Plan:     Problem List Items Addressed This Visit        Endocrine    Type 2 diabetes mellitus with hyperglycemia, without long-term current use of insulin ([facility])       Lab Results   Component Value Date    HGBA1C 10.8 (A) 02/20/2024   DW patient will start Insulin 10 units and Metformin 1000 mg daily and will follow up in two weeks          Relevant Medications    metFORMIN (GLUCOPHAGE-XR) 500 mg 24 hr tablet    insulin glargine (LANTUS SOLOSTAR) 100 units/mL injection pen    Insulin Pen Needle (BD Pen Needle Mallory U/F) 32G X 4 MM MISC    rosuvastatin (CRESTOR) 5 mg tablet    Blood Glucose Monitoring Suppl (OneTouch Verio Reflect) w/Device KIT    glucose blood (OneTouch Verio) test strip    OneTouch Delica Lancets 33G MISC    Other Relevant Orders    POCT hemoglobin A1c (Completed)    Comprehensive metabolic panel    CBC and differential    Albumin / creatinine urine ratio    Lipid Panel with Direct LDL reflex    Lipase    UA (URINE) with reflex to Scope       Other    Medicare annual wellness visit, subsequent - Primary    Polydipsia    Relevant Orders    POCT hemoglobin A1c (Completed)    Screening for diabetes mellitus (DM)    Relevant Orders    POCT hemoglobin A1c (Completed)         Depression Screening and Follow-up Plan: Patient was screened for depression during today's encounter. They screened negative with a PHQ-2 score of 0.    Tobacco Cessation Counseling: Tobacco cessation counseling was provided. The patient is sincerely urged to quit consumption of tobacco. He is not ready to quit tobacco.       Preventive health issues were discussed with patient, and age appropriate screening tests were ordered as noted in patient's After Visit Summary.  Personalized health advice and appropriate referrals for health education or preventive services given if needed, as noted in patient's After Visit Summary.     History of Present Illness:     Patient presents for a Medicare Wellness Visit    Patient  here and rpeorts that about two weeks ago started with feeling tired and parched and drinking a few gallons of water a day and anything he can get his hands on he is drinking and drinking and is urinating more than usual. About two to three weeks ago had been taking some advil and naproxen and tylenol and stopped taking about three weeks ago and would just take a high doses of NSAIDS r/t working in the basement and has a history of arthritis and they made a difference and started with an abdominal pain in his stomach a dull pain pain in the left lower quadrant and has not happened before and normal bowel movements and has lost about 5 pounds in the past 5 days. Patient had a colonoscopy about 4 years ago and had some polyps. No new medications no history of diabetes     Fatigue  Associated symptoms include abdominal pain, arthralgias, fatigue and headaches. Pertinent negatives include no chest pain, chills, congestion, coughing, diaphoresis, fever, nausea, numbness, sore throat, vomiting or weakness.   Headache  Abdominal Pain  Associated symptoms include arthralgias and headaches. Pertinent negatives include no constipation, diarrhea, fever, nausea or vomiting.      Patient Care Team:  Winston Smith MD as PCP - General (Family Medicine)  Winston Smith MD as PCP - PCP-[facility] ([facility])  Mynor Andrews MD     Review of Systems:     Review of Systems   Constitutional:  Positive for activity change, appetite change and fatigue. Negative for chills, diaphoresis, fever and unexpected weight change.   HENT:  Positive for voice change. Negative for congestion, ear pain, hearing loss, postnasal drip, sinus pressure, sinus pain, sneezing and sore throat.    Eyes:  Negative for pain, redness and visual disturbance.   Respiratory:  Negative for cough and shortness of breath.    Cardiovascular:  Negative for chest pain, palpitations and leg swelling.   Gastrointestinal:  Positive for abdominal pain. Negative for abdominal  distention, anal bleeding, blood in stool, constipation, diarrhea, nausea, rectal pain and vomiting.   Endocrine: Positive for polydipsia and polyuria. Negative for cold intolerance, heat intolerance and polyphagia.   Genitourinary: Negative.    Musculoskeletal:  Positive for arthralgias.   Skin: Negative.         On humera for his arthritis    Allergic/Immunologic: Negative.    Neurological:  Positive for headaches. Negative for dizziness, tremors, seizures, syncope, facial asymmetry, speech difficulty, weakness, light-headedness and numbness.   Hematological: Negative.    Psychiatric/Behavioral:  Positive for sleep disturbance. Negative for behavioral problems and dysphoric mood.         Problem List:     Patient Active Problem List   Diagnosis   • Atrial fibrillation (HCC)   • Fatigue   • Psoriatic arthritis (HCC)   • BMI 29.0-29.9,adult   • Need for pneumococcal vaccination   • NDPH (new daily persistent headache)   • Atrial flutter (HCC)   • Bradycardia   • Palpitation   • CATY (obstructive sleep apnea)   • COVID-19 virus infection   • Shortness of breath on exertion   • Medicare annual wellness visit, subsequent   • Polydipsia   • Screening for diabetes mellitus (DM)   • Type 2 diabetes mellitus with hyperglycemia, without long-term current use of insulin (HCC)      Past Medical and Surgical History:     Past Medical History:   Diagnosis Date   • A-fib (HCC)    • Arthritis    • BPH (benign prostatic hyperplasia)    • Colon polyp    • Fatty liver    • Fracture, foot    • High cholesterol    • Hyperlipidemia    • Irregular heart beat    • Non-ischemic cardiomyopathy (HCC)    • Osteoarthritis    • Psoriasis    • Psoriatic arthritis (HCC)    • Pulmonary HTN (HCC)      Past Surgical History:   Procedure Laterality Date   • CARDIAC ELECTROPHYSIOLOGY PROCEDURE N/A 2/22/2023    Procedure: Cardiac eps/afib ablation   comprehensive posterior wall;  Surgeon: Mynor Andrews MD;  Location: [facility];  Service:  Cardiology   • COLONOSCOPY     • ELBOW SURGERY Right    • TRANSURETHRAL RESECTION OF PROSTATE      last assessed 7/21/15      Family History:     Family History   Problem Relation Age of Onset   • Diabetes Mother    • Hypertension Father    • Diabetes Family       Social History:     Social History     Socioeconomic History   • Marital status: /Civil Union     Spouse name: None   • Number of children: None   • Years of education: None   • Highest education level: None   Occupational History   • None   Tobacco Use   • Smoking status: Former     Current packs/day: 0.50     Average packs/day: 0.5 packs/day for 10.0 years (5.0 ttl pk-yrs)     Types: Cigarettes   • Smokeless tobacco: Never   Vaping Use   • Vaping status: Never Used   Substance and Sexual Activity   • Alcohol use: Yes     Comment: 1 drink daily   • Drug use: No   • Sexual activity: None   Other Topics Concern   • None   Social History Narrative    Daily coffee consumption (__cups/day)     Daily cola consumption (__cans/day)      Social Determinants of Health     Financial Resource Strain: Low Risk  (2/20/2024)    Overall Financial Resource Strain (CARDIA)    • Difficulty of Paying Living Expenses: Not very hard   Food Insecurity: Not on file   Transportation Needs: No Transportation Needs (2/20/2024)    PRAPARE - Transportation    • Lack of Transportation (Medical): No    • Lack of Transportation (Non-Medical): No   Physical Activity: Not on file   Stress: Not on file   Social Connections: Not on file   Intimate Partner Violence: Not on file   Housing Stability: Not on file      Medications and Allergies:     Current Outpatient Medications   Medication Sig Dispense Refill   • Blood Glucose Monitoring Suppl (OneTouch Verio Reflect) w/Device KIT Check blood sugars once daily. Please substitute with appropriate alternative as covered by patient's insurance. Dx: E11.65 1 kit 0   • glucose blood (OneTouch Verio) test strip Check blood sugars once daily.  Please substitute with appropriate alternative as covered by patient's insurance. Dx: E11.65 100 each 3   • insulin glargine (LANTUS SOLOSTAR) 100 units/mL injection pen Inject 10 Units under the skin daily 15 mL 1   • Insulin Pen Needle (BD Pen Needle Mallory U/F) 32G X 4 MM MISC Use daily as directed with insulin pen 100 each 3   • metFORMIN (GLUCOPHAGE-XR) 500 mg 24 hr tablet Take 2 tablets (1,000 mg total) by mouth daily with breakfast 180 tablet 1   • OneTouch Delica Lancets 33G MISC Check blood sugars once daily. Please substitute with appropriate alternative as covered by patient's insurance. Dx: E11.65 100 each 3   • rosuvastatin (CRESTOR) 5 mg tablet Take 1 tablet (5 mg total) by mouth every evening 90 tablet 1   • adalimumab (HUMIRA) 40 mg/0.8 mL PSKT Inject 40 mg under the skin every 14 (fourteen) days      • sildenafil (VIAGRA) 100 mg tablet TAKE ONE (1) TABLET BY MOUTH DAILY AS NEEDED       No current facility-administered medications for this visit.     Allergies   Allergen Reactions   • Penicillins Rash      Immunizations:     Immunization History   Administered Date(s) Administered   • COVID-19 MODERNA VACC 0.5 ML IM 03/31/2021, 04/28/2021, 12/16/2021, 12/01/2022   • COVID-19 Moderna mRNA Vaccine 12 Yr+ 50 mcg/0.5 mL (Spikevax) 11/13/2023   • Influenza Injectable, MDCK, Preservative Free, Quadrivalent, 0.5 mL 09/25/2019   • Influenza, recombinant, quadrivalent,injectable, preservative free 10/12/2022   • Pneumococcal Conjugate 13-Valent 01/06/2020   • Tdap 07/21/2015, 06/07/2023      Health Maintenance:         Topic Date Due   • Colorectal Cancer Screening  07/05/2027   • Hepatitis C Screening  Completed         Topic Date Due   • Pneumococcal Vaccine: 65+ Years (2 of 2 - PPSV23 or PCV20) 03/02/2020   • COVID-19 Vaccine (5 - 2023-24 season) 01/08/2024      Medicare Screening Tests and Risk Assessments:     Abhishek is here for his Initial Wellness visit. Last Medicare Wellness visit information reviewed,  patient interviewed and updates made to the record today.      Health Risk Assessment:   Patient rates overall health as good. Patient feels that their physical health rating is slightly worse. Patient is satisfied with their life. Eyesight was rated as slightly worse. Hearing was rated as slightly worse. Patient feels that their emotional and mental health rating is slightly better. Patients states they are never, rarely angry. Patient states they are often unusually tired/fatigued. Pain experienced in the last 7 days has been some. Patient's pain rating has been 6/10. Patient states that he has experienced no weight loss or gain in last 6 months.     Depression Screening:   PHQ-2 Score: 0      Fall Risk Screening:   In the past year, patient has experienced: no history of falling in past year      Home Safety:  Patient does not have trouble with stairs inside or outside of their home. Patient has working smoke alarms and has working carbon monoxide detector.     Nutrition:   Current diet is Regular.     Medications:   Patient is not currently taking any over-the-counter supplements. Patient is able to manage medications.     Activities of Daily Living (ADLs)/Instrumental Activities of Daily Living (IADLs):   Walk and transfer into and out of bed and chair?: Yes  Dress and groom yourself?: Yes    Bathe or shower yourself?: Yes    Feed yourself? Yes  Do your laundry/housekeeping?: Yes  Manage your money, pay your bills and track your expenses?: Yes  Make your own meals?: Yes    Do your own shopping?: Yes    Previous Hospitalizations:   Any hospitalizations or ED visits within the last 12 months?: Yes    How many hospitalizations have you had in the last year?: 1-2    Advance Care Planning:   Living will: Yes    Advanced directive: Yes    Advanced directive counseling given: Yes    ACP document given: Yes    End of Life Decisions reviewed with patient: Yes    Provider agrees with end of life decisions: Yes   "    Cognitive Screening:   Provider or family/friend/caregiver concerned regarding cognition?: No    PREVENTIVE SCREENINGS      Cardiovascular Screening:    General: Screening Current and Risks and Benefits Discussed      Diabetes Screening:     General: Screening Current and Risks and Benefits Discussed      Colorectal Cancer Screening:     General: Screening Current      Prostate Cancer Screening:    General: Screening Current and Risks and Benefits Discussed      Osteoporosis Screening:    General: Risks and Benefits Discussed and Screening Not Indicated      Abdominal Aortic Aneurysm (AAA) Screening:    Risk factors include: age between 65-76 yo and tobacco use        Lung Cancer Screening:     General: Screening Not Indicated      Hepatitis C Screening:    General: Screening Current    Screening, Brief Intervention, and Referral to Treatment (SBIRT)    Screening  Typical number of drinks in a day: 0  Typical number of drinks in a week: 0  Interpretation: Low risk drinking behavior.    No results found.     Physical Exam:     /90 (BP Location: Left arm, Patient Position: Sitting, Cuff Size: Large)   Pulse 71   Temp (!) 95.4 °F (35.2 °C)   Ht 5' 11\" (1.803 m)   Wt 85.3 kg (188 lb)   SpO2 96%   BMI 26.22 kg/m²     Physical Exam  Vitals and nursing note reviewed.   Constitutional:       General: He is not in acute distress.     Appearance: He is well-developed.   HENT:      Head: Normocephalic and atraumatic.   Eyes:      Extraocular Movements: Extraocular movements intact.      Conjunctiva/sclera: Conjunctivae normal.      Pupils: Pupils are equal, round, and reactive to light.   Cardiovascular:      Rate and Rhythm: Normal rate and regular rhythm.      Pulses: no weak pulses.           Dorsalis pedis pulses are 2+ on the right side and 2+ on the left side.        Posterior tibial pulses are 2+ on the right side and 2+ on the left side.      Heart sounds: No murmur heard.  Pulmonary:      Effort: " Pulmonary effort is normal. No respiratory distress.      Breath sounds: Normal breath sounds.   Abdominal:      Palpations: Abdomen is soft.      Tenderness: There is no abdominal tenderness.   Musculoskeletal:         General: No swelling.      Cervical back: Neck supple.   Feet:      Right foot:      Skin integrity: No ulcer, skin breakdown, erythema, warmth, callus or dry skin.      Left foot:      Skin integrity: No ulcer, skin breakdown, erythema, warmth, callus or dry skin.   Skin:     General: Skin is warm and dry.      Capillary Refill: Capillary refill takes less than 2 seconds.   Neurological:      Mental Status: He is alert.   Psychiatric:         Mood and Affect: Mood normal.     Patient's shoes and socks removed.    Right Foot/Ankle   Right Foot Inspection  Skin Exam: skin normal and skin intact. No dry skin, no warmth, no callus, no erythema, no maceration, no abnormal color, no pre-ulcer, no ulcer and no callus.     Toe Exam: ROM and strength within normal limits.     Sensory   Vibration: intact  Proprioception: intact  Monofilament testing: intact    Vascular  Capillary refills: < 3 seconds  The right DP pulse is 2+. The right PT pulse is 2+.     Left Foot/Ankle  Left Foot Inspection  Skin Exam: skin normal and skin intact. No dry skin, no warmth, no erythema, no maceration, normal color, no pre-ulcer, no ulcer and no callus.     Toe Exam: ROM and strength within normal limits.     Sensory   Vibration: intact  Proprioception: intact  Monofilament testing: intact    Vascular  Capillary refills: < 3 seconds  The left DP pulse is 2+. The left PT pulse is 2+.     Assign Risk Category  No deformity present  No loss of protective sensation  No weak pulses  Risk: 0        CECI Joel

## 2024-02-21 VITALS
DIASTOLIC BLOOD PRESSURE: 83 MMHG | TEMPERATURE: 97.5 F | OXYGEN SATURATION: 97 % | HEART RATE: 60 BPM | RESPIRATION RATE: 17 BRPM | SYSTOLIC BLOOD PRESSURE: 149 MMHG

## 2024-02-21 PROBLEM — Z13.1 SCREENING FOR DIABETES MELLITUS (DM): Status: RESOLVED | Noted: 2024-02-20 | Resolved: 2024-02-21

## 2024-02-21 PROBLEM — Z00.00 MEDICARE ANNUAL WELLNESS VISIT, SUBSEQUENT: Status: RESOLVED | Noted: 2024-02-20 | Resolved: 2024-02-21

## 2024-02-21 LAB
ANION GAP SERPL CALCULATED.3IONS-SCNC: 10 MMOL/L
BASE EX.OXY STD BLDV CALC-SCNC: 73 % (ref 60–80)
BASE EXCESS BLDV CALC-SCNC: -0.3 MMOL/L
BASOPHILS # BLD AUTO: 0.04 THOUSANDS/ÂΜL (ref 0–0.1)
BASOPHILS NFR BLD AUTO: 1 % (ref 0–1)
BETA-HYDROXYBUTYRATE: 0.6 MMOL/L
BUN SERPL-MCNC: 21 MG/DL (ref 5–25)
CALCIUM SERPL-MCNC: 9.5 MG/DL (ref 8.4–10.2)
CHLORIDE SERPL-SCNC: 94 MMOL/L (ref 96–108)
CO2 SERPL-SCNC: 26 MMOL/L (ref 21–32)
CREAT SERPL-MCNC: 0.98 MG/DL (ref 0.6–1.3)
EOSINOPHIL # BLD AUTO: 0.34 THOUSAND/ÂΜL (ref 0–0.61)
EOSINOPHIL NFR BLD AUTO: 4 % (ref 0–6)
ERYTHROCYTE [DISTWIDTH] IN BLOOD BY AUTOMATED COUNT: 12 % (ref 11.6–15.1)
GFR SERPL CREATININE-BSD FRML MDRD: 80 ML/MIN/1.73SQ M
GLUCOSE SERPL-MCNC: 196 MG/DL (ref 65–140)
GLUCOSE SERPL-MCNC: 281 MG/DL (ref 65–140)
GLUCOSE SERPL-MCNC: 297 MG/DL (ref 65–140)
GLUCOSE SERPL-MCNC: 379 MG/DL (ref 65–140)
HCO3 BLDV-SCNC: 27 MMOL/L (ref 24–30)
HCT VFR BLD AUTO: 43.6 % (ref 36.5–49.3)
HGB BLD-MCNC: 16 G/DL (ref 12–17)
IMM GRANULOCYTES # BLD AUTO: 0.02 THOUSAND/UL (ref 0–0.2)
IMM GRANULOCYTES NFR BLD AUTO: 0 % (ref 0–2)
LYMPHOCYTES # BLD AUTO: 3.37 THOUSANDS/ÂΜL (ref 0.6–4.47)
LYMPHOCYTES NFR BLD AUTO: 39 % (ref 14–44)
MCH RBC QN AUTO: 32.2 PG (ref 26.8–34.3)
MCHC RBC AUTO-ENTMCNC: 36.7 G/DL (ref 31.4–37.4)
MCV RBC AUTO: 88 FL (ref 82–98)
MONOCYTES # BLD AUTO: 0.72 THOUSAND/ÂΜL (ref 0.17–1.22)
MONOCYTES NFR BLD AUTO: 8 % (ref 4–12)
NEUTROPHILS # BLD AUTO: 4.08 THOUSANDS/ÂΜL (ref 1.85–7.62)
NEUTS SEG NFR BLD AUTO: 48 % (ref 43–75)
NRBC BLD AUTO-RTO: 0 /100 WBCS
O2 CT BLDV-SCNC: 17.3 ML/DL
PCO2 BLDV: 53.4 MM HG (ref 42–50)
PH BLDV: 7.32 [PH] (ref 7.3–7.4)
PLATELET # BLD AUTO: 180 THOUSANDS/UL (ref 149–390)
PMV BLD AUTO: 10 FL (ref 8.9–12.7)
PO2 BLDV: 42.4 MM HG (ref 35–45)
POTASSIUM SERPL-SCNC: 3.7 MMOL/L (ref 3.5–5.3)
RBC # BLD AUTO: 4.97 MILLION/UL (ref 3.88–5.62)
SODIUM SERPL-SCNC: 130 MMOL/L (ref 135–147)
WBC # BLD AUTO: 8.57 THOUSAND/UL (ref 4.31–10.16)

## 2024-02-21 PROCEDURE — 82948 REAGENT STRIP/BLOOD GLUCOSE: CPT

## 2024-02-21 PROCEDURE — 99222 1ST HOSP IP/OBS MODERATE 55: CPT | Performed by: INTERNAL MEDICINE

## 2024-02-21 PROCEDURE — NC001 PR NO CHARGE: Performed by: STUDENT IN AN ORGANIZED HEALTH CARE EDUCATION/TRAINING PROGRAM

## 2024-02-21 PROCEDURE — 99236 HOSP IP/OBS SAME DATE HI 85: CPT | Performed by: FAMILY MEDICINE

## 2024-02-21 PROCEDURE — 96365 THER/PROPH/DIAG IV INF INIT: CPT

## 2024-02-21 RX ORDER — METFORMIN HYDROCHLORIDE 500 MG/1
TABLET, EXTENDED RELEASE ORAL
Qty: 180 TABLET | Refills: 0 | Status: SHIPPED | OUTPATIENT
Start: 2024-02-21 | End: 2024-03-29

## 2024-02-21 RX ORDER — ATORVASTATIN CALCIUM 10 MG/1
10 TABLET, FILM COATED ORAL
Status: DISCONTINUED | OUTPATIENT
Start: 2024-02-21 | End: 2024-02-21 | Stop reason: HOSPADM

## 2024-02-21 RX ORDER — INSULIN ASPART 100 [IU]/ML
6 INJECTION, SOLUTION INTRAVENOUS; SUBCUTANEOUS
Qty: 5.4 ML | Refills: 0 | Status: SHIPPED | OUTPATIENT
Start: 2024-02-21 | End: 2024-02-23

## 2024-02-21 RX ORDER — ENOXAPARIN SODIUM 100 MG/ML
40 INJECTION SUBCUTANEOUS DAILY
Status: DISCONTINUED | OUTPATIENT
Start: 2024-02-21 | End: 2024-02-21 | Stop reason: HOSPADM

## 2024-02-21 RX ORDER — METFORMIN HYDROCHLORIDE 500 MG/1
1000 TABLET, EXTENDED RELEASE ORAL
Status: DISCONTINUED | OUTPATIENT
Start: 2024-02-21 | End: 2024-02-21 | Stop reason: HOSPADM

## 2024-02-21 RX ORDER — GLUCOSAMINE HCL/CHONDROITIN SU 500-400 MG
CAPSULE ORAL
Qty: 100 EACH | Refills: 0 | Status: SHIPPED | OUTPATIENT
Start: 2024-02-21

## 2024-02-21 RX ORDER — PEN NEEDLE, DIABETIC 32GX 5/32"
NEEDLE, DISPOSABLE MISCELLANEOUS
Qty: 100 EACH | Refills: 0 | Status: SHIPPED | OUTPATIENT
Start: 2024-02-21

## 2024-02-21 RX ORDER — INSULIN LISPRO 100 [IU]/ML
1-6 INJECTION, SOLUTION INTRAVENOUS; SUBCUTANEOUS
Status: DISCONTINUED | OUTPATIENT
Start: 2024-02-21 | End: 2024-02-21 | Stop reason: HOSPADM

## 2024-02-21 RX ORDER — INSULIN GLARGINE 100 [IU]/ML
14 INJECTION, SOLUTION SUBCUTANEOUS
Status: DISCONTINUED | OUTPATIENT
Start: 2024-02-21 | End: 2024-02-21 | Stop reason: HOSPADM

## 2024-02-21 RX ADMIN — INSULIN LISPRO 2 UNITS: 100 INJECTION, SOLUTION INTRAVENOUS; SUBCUTANEOUS at 07:20

## 2024-02-21 RX ADMIN — METFORMIN HYDROCHLORIDE 1000 MG: 500 TABLET, EXTENDED RELEASE ORAL at 07:21

## 2024-02-21 RX ADMIN — INSULIN LISPRO 4 UNITS: 100 INJECTION, SOLUTION INTRAVENOUS; SUBCUTANEOUS at 11:40

## 2024-02-21 RX ADMIN — SODIUM CHLORIDE, SODIUM LACTATE, POTASSIUM CHLORIDE, AND CALCIUM CHLORIDE 1000 ML: .6; .31; .03; .02 INJECTION, SOLUTION INTRAVENOUS at 00:57

## 2024-02-21 RX ADMIN — ENOXAPARIN SODIUM 40 MG: 40 INJECTION SUBCUTANEOUS at 08:55

## 2024-02-21 NOTE — TELEPHONE ENCOUNTER
Ordering provider received critical results also, was able to reach patient and was notified to present to ED.

## 2024-02-21 NOTE — TELEMEDICINE
REQUIRED DOCUMENTATION:     1. This service was provided via Telemedicine.  2. Provider located at [facility] for diabetes and endocrinology [place]  3. TeleMed provider: Harriet Ren MD.  4. Identify all parties in room with patient during tele consult:  Wife  5.Patient was then informed that this was a Telemedicine visit and that the exam was being conducted confidentially over secure lines. My office door was closed. No one else was in the room.  Patient acknowledged consent and understanding of privacy and security of the Telemedicine visit, and gave us permission to have the assistant stay in the room in order to assist with the history and to conduct the exam.  I informed the patient that I have reviewed their record in Epic and presented the opportunity for them to ask any questions regarding the visit today.  The patient agreed to participate.          Consultation - Abhishek Gimenez 68 y.o. male MRN: [id number]    Unit/Bed#: ED 20 Encounter: [id number]      Assessment/Plan   68-year-old male with type 2 diabetes (hemoglobin A1c 6.6 in January 2023) who presented to the hospital with severe symptomatic hyperglycemia.  Labs discussed with the patient as well as his wife-given the degree of hyperglycemia insulin therapy is the optimal choice-suggest starting Lantus 24 units at bedtime and Humalog 6 units before meals.    Continue metformin however would start with 500 mg twice daily with meals for 1 week and if tolerated increase the dose to 1000 mg twice daily with meals  He will need insulin teaching as well as glucometer teaching  Diabetes education/nutrition evaluation  Outpatient follow-up with PCP is set up for next week  Suggest follow-up with endocrinology 2 to 3 weeks after discharge    Symptoms as well as management of hypoglycemia discussed with the patient     For discharge, if Lantus solostar is not the preferred basal insulin for the patient's insurance company, please substitute  with Tresiba flexpen, Basaglar Kwikpen , Levemir flexpen, Toujeo solostar pen  or equivalent insulin vials at the same dose instead.      For discharge, if Humalog  Kwik pen is not the preferred mealtime insulin for the patient's insurance, please substitute with NovoLog flexpen, Fiasp  flextouch, Admelog solostar  or Apidra solostar pen or equivalent insulin vials at the same dose instead.         Please prescribe insulin pen needles, glucometer, test strips, lancets and insulin syringes (only when using insulin vials) on discharge.          CC: Diabetes Consult    History of Present Illness     HPI: Abhishek Gimenez is a 68 y.o. year old male who presented to the hospital with complaints of polyuria, polydipsia, fatigue and was found to have severe hyperglycemia on admission.  He received IV fluids overnight and glucose has improved.  He saw his PCP yesterday and was started on metformin and basal insulin    Has been having symptoms off fatigue , dry moth ,polyuria , nocturia ,weight loss for the past few weeks        Inpatient consult to Endocrinology  Consult performed by: Harriet Ren MD  Consult ordered by: Jovon Negro DO          Review of Systems    Historical Information   Past Medical History:   Diagnosis Date    A-fib (HCC)     Arthritis     BPH (benign prostatic hyperplasia)     Colon polyp     Diabetes mellitus (HCC)     Fatty liver     Fracture, foot     High cholesterol     Hyperlipidemia     Irregular heart beat     Non-ischemic cardiomyopathy (HCC)     Osteoarthritis     Psoriasis     Psoriatic arthritis (HCC)     Pulmonary HTN (HCC)      Past Surgical History:   Procedure Laterality Date    CARDIAC ELECTROPHYSIOLOGY PROCEDURE N/A 02/22/2023    Procedure: Cardiac eps/afib ablation   comprehensive posterior wall;  Surgeon: Mynor Andrews MD;  [facility];  Service: Cardiology    COLONOSCOPY      ELBOW SURGERY Right     TRANSURETHRAL RESECTION OF PROSTATE      last assessed 7/21/15      Social History   Social History     Substance and Sexual Activity   Alcohol Use Yes    Comment: 1 drink daily     Social History     Substance and Sexual Activity   Drug Use No     Social History     Tobacco Use   Smoking Status Former    Current packs/day: 0.50    Average packs/day: 0.5 packs/day for 10.0 years (5.0 ttl pk-yrs)    Types: Cigarettes   Smokeless Tobacco Never     Family History:   Family History   Problem Relation Age of Onset    Diabetes Mother     Hypertension Father     Diabetes Family        Meds/Allergies   Current Facility-Administered Medications   Medication Dose Route Frequency Provider Last Rate Last Admin    atorvastatin (LIPITOR) tablet 10 mg  10 mg Oral Daily With Dinner Jovon Rueter, DO        enoxaparin (LOVENOX) subcutaneous injection 40 mg  40 mg Subcutaneous Daily Jovon Rueter, DO   40 mg at 02/21/24 0855    insulin glargine (LANTUS) subcutaneous injection 14 Units 0.14 mL  14 Units Subcutaneous HS Jovon Rueter, DO        insulin lispro (HumaLOG) 100 units/mL subcutaneous injection 1-6 Units  1-6 Units Subcutaneous TID AC Jovon Rueter, DO   2 Units at 02/21/24 0720    metFORMIN (GLUCOPHAGE-XR) 24 hr tablet 1,000 mg  1,000 mg Oral Daily With Breakfast Jovon Rueter, DO   1,000 mg at 02/21/24 0721     Current Outpatient Medications   Medication Sig Dispense Refill    adalimumab (HUMIRA) 40 mg/0.8 mL PSKT Inject 40 mg under the skin every 14 (fourteen) days       Blood Glucose Monitoring Suppl (OneTouch Verio Reflect) w/Device KIT Check blood sugars once daily. Please substitute with appropriate alternative as covered by patient's insurance. Dx: E11.65 1 kit 0    glucose blood (OneTouch Verio) test strip Check blood sugars once daily. Please substitute with appropriate alternative as covered by patient's insurance. Dx: E11.65 100 each 3    insulin glargine (LANTUS SOLOSTAR) 100 units/mL injection pen Inject 10 Units under the skin daily 15 mL 1    Insulin Pen Needle (BD Pen Needle  Mallory U/F) 32G X 4 MM MISC Use daily as directed with insulin pen 100 each 3    metFORMIN (GLUCOPHAGE-XR) 500 mg 24 hr tablet Take 2 tablets (1,000 mg total) by mouth daily with breakfast 180 tablet 1    OneTouch Delica Lancets 33G MISC Check blood sugars once daily. Please substitute with appropriate alternative as covered by patient's insurance. Dx: E11.65 100 each 3    rosuvastatin (CRESTOR) 5 mg tablet Take 1 tablet (5 mg total) by mouth every evening 90 tablet 1    sildenafil (VIAGRA) 100 mg tablet TAKE ONE (1) TABLET BY MOUTH DAILY AS NEEDED       Allergies   Allergen Reactions    Penicillins Rash       Objective   Vitals: Blood pressure 144/76, pulse 61, temperature 97.5 °F (36.4 °C), temperature source Oral, resp. rate 16, SpO2 97%.    Intake/Output Summary (Last 24 hours) at 2/21/2024 1026  Last data filed at 2/21/2024 0223  Gross per 24 hour   Intake 2000 ml   Output --   Net 2000 ml     Invasive Devices       Peripheral Intravenous Line  Duration             Peripheral IV 02/20/24 Right Antecubital <1 day                    Physical Exam  Vitals reviewed.   Constitutional:       General: He is not in acute distress.     Appearance: Normal appearance. He is not ill-appearing, toxic-appearing or diaphoretic.   HENT:      Head: Normocephalic and atraumatic.   Eyes:      General: No scleral icterus.  Pulmonary:      Effort: Pulmonary effort is normal. No respiratory distress.   Neurological:      General: No focal deficit present.      Mental Status: He is alert and oriented to person, place, and time.   Psychiatric:         Mood and Affect: Mood normal.         Behavior: Behavior normal.         Thought Content: Thought content normal.         Judgment: Judgment normal.         The history was obtained from the review of the chart, patient and family.    Lab Results:   Results from last 7 days   Lab Units 02/20/24  0751   HEMOGLOBIN A1C  10.8*     Lab Results   Component Value Date    WBC 8.57 02/20/2024     "HGB 16.0 02/20/2024    HCT 43.6 02/20/2024    MCV 88 02/20/2024     02/20/2024     Lab Results   Component Value Date/Time    BUN 21 02/20/2024 11:55 PM    BUN 21 02/10/2023 10:42 AM     03/15/2016 07:26 AM    K 3.7 02/20/2024 11:55 PM    K 4.3 02/10/2023 10:42 AM    CL 94 (L) 02/20/2024 11:55 PM     02/10/2023 10:42 AM    CO2 26 02/20/2024 11:55 PM    CO2 25 02/10/2023 10:42 AM    CREATININE 0.98 02/20/2024 11:55 PM    CREATININE 0.83 11/09/2023 08:56 AM    AST 28 02/20/2024 08:32 AM    AST 22 02/10/2023 10:42 AM    ALT 48 02/20/2024 08:32 AM    ALT 27 02/10/2023 10:42 AM    TP 7.9 02/20/2024 08:32 AM    TP 7.2 02/10/2023 10:42 AM    ALB 4.4 02/20/2024 08:32 AM    ALB 4.2 03/15/2016 07:26 AM    GLOB 2.7 02/10/2023 10:42 AM     Recent Labs     02/20/24  0832   HDL 40   TRIG 369*          Component  Ref Range & Units 2/20/24 0751 1/11/23 1016   Hemoglobin A1C  6.5 10.8 Abnormal  6.6 High  R        No results found for: \"MICROALBUR\", \"SPPW38QBV\"  POC Glucose (mg/dl)   Date Value   02/21/2024 196 (H)   02/21/2024 281 (H)   02/20/2024 459 (H)       Imaging Studies: I have personally reviewed pertinent reports.      Portions of the record may have been created with voice recognition software.   "

## 2024-02-21 NOTE — TELEPHONE ENCOUNTER
[place] [facility] received at 9:17pm for critical lab, glucose 749. Message acknowledged. Pt contact information obtain, called [phone], no answer, detailed message left.

## 2024-02-21 NOTE — ASSESSMENT & PLAN NOTE
Lab Results   Component Value Date    HGBA1C 10.8 (A) 02/20/2024       Recent Labs     02/20/24  2215 02/21/24  0221   POCGLU 459* 281*       Blood Sugar Average: Last 72 hrs:  (P) 370    -Patient presents this evening with complaints of generalized weakness and lightheadedness  - Noted very recent diagnosis of diabetes mellitus type 2 with hemoglobin A1c greater than 10  - Patient followed with diabetes navigator yesterday (2/20) at which time insulin and metformin were initiated  - Patient reports he took Lantus 1 time however secondary to above-mentioned complaints, presented  - Upon presentation, evidence of hyperglycemia without high anion gap  - In ED, administered IV fluids with resolution of severe hyperglycemia    Plan:  - Patient newly diagnosed insulin-dependent diabetic with uncontrolled hyperglycemia  - Although better controlled with IV fluids, may need titration of insulin regimen  - Patient at risk for DKA  - Observe  - Endocrinology consult  - Continue maintenance fluids  - Anticipate discharge within 24 hours pending diabetic discharge plan

## 2024-02-21 NOTE — DISCHARGE INSTR - AVS FIRST PAGE
Dear Abhishek Gimenez,     It was our pleasure to care for you here at [facility].  It is our hope that we were always able to exceed the expected standards for your care during your stay.  You were hospitalized due to New onset diabetes.  You were cared for on the 1st floor by Kayden Cardenas MD under the service of Jovon Negro DO with the [facility] who covers for your primary care physician (PCP), Winston Smith MD, while you were hospitalized.  If you have any questions or concerns related to this hospitalization, you may contact us at .  For follow up as well as any medication refills, we recommend that you follow up with your primary care physician.  A registered nurse will reach out to you by phone within a few days after your discharge to answer any additional questions that you may have after going home.  However, at this time we provide for you here, the most important instructions / recommendations at discharge:     Notable Medication Adjustments -   Increased lantus from 10 units to 24 units at bedtime (long acting insulin)  Added aspart 6 units with meals (short acting insulin)  Changed metformin to 500 mg twice daily, if you tolerate that for a week, then will increase to 1000 mg twice daily  Testing Required after Discharge -   Follow up with your primary care provider  Endocrine appointment   Important follow up information -   Make a log of your glucose levels before and after insulin during meal times, and also in the morning when you wake up. The morning glucose levels should be checked while fasting.   Other Instructions -   na  Please review this entire after visit summary as additional general instructions including medication list, appointments, activity, diet, any pertinent wound care, and other additional recommendations from your care team that may be provided for you.      Sincerely,     Kayden Cardenas MD

## 2024-02-21 NOTE — H&P
[facility]  H&P  Name: Abhishek Gimenez 68 y.o. male I MRN: [id number]  Unit/Bed#: ED 20 I Date of Admission: 2/20/2024   Date of Service: 2/21/2024 I Hospital Day: 0      Assessment/Plan   Poorly controlled type 2 diabetes mellitus (HCC)  Assessment & Plan  Lab Results   Component Value Date    HGBA1C 10.8 (A) 02/20/2024       Recent Labs     02/20/24  2215 02/21/24  0221   POCGLU 459* 281*       Blood Sugar Average: Last 72 hrs:  (P) 370    -Patient presents this evening with complaints of generalized weakness and lightheadedness  - Noted very recent diagnosis of diabetes mellitus type 2 with hemoglobin A1c greater than 10  - Patient followed with diabetes navigator yesterday (2/20) at which time insulin and metformin were initiated  - Patient reports he took Lantus 1 time however secondary to above-mentioned complaints, presented  - Upon presentation, evidence of hyperglycemia without high anion gap  - In ED, administered IV fluids with resolution of severe hyperglycemia    Plan:  - Patient newly diagnosed insulin-dependent diabetic with uncontrolled hyperglycemia  - Although better controlled with IV fluids, may need titration of insulin regimen  - Patient at risk for DKA  - Observe  - Endocrinology consult  - Continue maintenance fluids  - Anticipate discharge within 24 hours pending diabetic discharge plan    CATY (obstructive sleep apnea)  Assessment & Plan  CPAP at nighttime as needed           VTE Prophylaxis: Enoxaparin (Lovenox)  / sequential compression device   Code Status: Full      Anticipated Length of Stay:  Patient will be admitted on an Observation basis with an anticipated length of stay of  < 2 midnights.     Total Time for Visit, including Counseling / Coordination of Care: 45 minutes.  Greater than 50% of this total time spent on direct patient counseling and coordination of care.    Chief Complaint:   Hyperglycemia    History of Present Illness:    Abhishek Gimenez is  a 68 y.o. male who presents with lightheadedness and dizziness as well as high glucose reading.    The patient was recently diagnosed with diabetes mellitus type 2 with hemoglobin A1c greater than 10.  Yesterday, patient establish care with diabetes navigator/endocrinology at which time metformin and long-acting insulin was prescribed.  Patient reports using Lantus 10 units nightly x 1.  However, awoke with lightheadedness, dizziness, feeling unwell.  For that reason, presented.    Upon presentation, hyperglycemia with glucose greater than 600.  However, no evidence of anion gap metabolic acidosis.  Patient was initiated on fluid resuscitation with resolution/improvement of hyperglycemia.    Initial plan was to discharge the patient to home.  However, patient expressed poor understanding of insulin administration.  Furthermore, given risk of uncontrolled hyperglycemia and DKA in the outpatient setting, will observe for endocrinology consultation and to ensure normoglycemia.    For that reason, patient brought in for observation to the hospitalist service for further evaluation and management of hyperglycemia.    Review of Systems:    Review of Systems   Constitutional:  Positive for appetite change, fatigue and unexpected weight change.   HENT: Negative.     Eyes: Negative.    Respiratory: Negative.     Cardiovascular: Negative.    Gastrointestinal:  Positive for nausea.   Endocrine: Negative.    Genitourinary:  Positive for dysuria.   Musculoskeletal: Negative.    Allergic/Immunologic: Negative.    Neurological:  Positive for dizziness and light-headedness.   Hematological: Negative.    Psychiatric/Behavioral: Negative.         Past Medical and Surgical History:     Past Medical History:   Diagnosis Date    A-fib (HCC)     Arthritis     BPH (benign prostatic hyperplasia)     Colon polyp     Diabetes mellitus (HCC)     Fatty liver     Fracture, foot     High cholesterol     Hyperlipidemia     Irregular heart beat      Non-ischemic cardiomyopathy (HCC)     Osteoarthritis     Psoriasis     Psoriatic arthritis (HCC)     Pulmonary HTN (HCC)        Past Surgical History:   Procedure Laterality Date    CARDIAC ELECTROPHYSIOLOGY PROCEDURE N/A 02/22/2023    Procedure: Cardiac eps/afib ablation   comprehensive posterior wall;  Surgeon: Mynor Andrews MD;  [facility]: [facility];  Service: Cardiology    COLONOSCOPY      ELBOW SURGERY Right     TRANSURETHRAL RESECTION OF PROSTATE      last assessed 7/21/15       Meds/Allergies:    Prior to Admission medications    Medication Sig Start Date End Date Taking? Authorizing Provider   adalimumab (HUMIRA) 40 mg/0.8 mL PSKT Inject 40 mg under the skin every 14 (fourteen) days     Historical Provider, MD   Blood Glucose Monitoring Suppl (OneTouch Verio Reflect) w/Device KIT Check blood sugars once daily. Please substitute with appropriate alternative as covered by patient's insurance. Dx: E11.65 2/20/24   CECI Joel   glucose blood (OneTouch Verio) test strip Check blood sugars once daily. Please substitute with appropriate alternative as covered by patient's insurance. Dx: E11.65 2/20/24   CECI Joel   insulin glargine (LANTUS SOLOSTAR) 100 units/mL injection pen Inject 10 Units under the skin daily 2/20/24   CECI Joel   Insulin Pen Needle (BD Pen Needle Mallory U/F) 32G X 4 MM MISC Use daily as directed with insulin pen 2/20/24   CECI Joel   metFORMIN (GLUCOPHAGE-XR) 500 mg 24 hr tablet Take 2 tablets (1,000 mg total) by mouth daily with breakfast 2/20/24   CECI Joel   OneTouch Delica Lancets 33G MISC Check blood sugars once daily. Please substitute with appropriate alternative as covered by patient's insurance. Dx: E11.65 2/20/24   CECI Joel   rosuvastatin (CRESTOR) 5 mg tablet Take 1 tablet (5 mg total) by mouth every evening 2/20/24 8/18/24  CECI Joel   sildenafil (VIAGRA)  100 mg tablet TAKE ONE (1) TABLET BY MOUTH DAILY AS NEEDED 3/30/23   Historical Provider, MD SALCEDO have reviewed home medications with patient personally.    Allergies:   Allergies   Allergen Reactions    Penicillins Rash       Social History:       Substance Use History:   Social History     Substance and Sexual Activity   Alcohol Use Yes    Comment: 1 drink daily     Social History     Tobacco Use   Smoking Status Former    Current packs/day: 0.50    Average packs/day: 0.5 packs/day for 10.0 years (5.0 ttl pk-yrs)    Types: Cigarettes   Smokeless Tobacco Never     Social History     Substance and Sexual Activity   Drug Use No       Family History:    non-contributory    Physical Exam:     Vitals:   Blood Pressure: 117/69 (02/21/24 0700)  Pulse: (!) 47 (02/21/24 0700)  Temperature: 97.5 °F (36.4 °C) (02/21/24 0700)  Temp Source: Oral (02/21/24 0700)  Respirations: 17 (02/21/24 0700)  SpO2: 95 % (02/21/24 0700)    Physical Exam  Constitutional:       Appearance: Normal appearance. He is not ill-appearing or diaphoretic.   HENT:      Head: Normocephalic and atraumatic.      Right Ear: External ear normal.      Left Ear: External ear normal.      Nose: Nose normal.      Mouth/Throat:      Mouth: Mucous membranes are dry.   Eyes:      Conjunctiva/sclera: Conjunctivae normal.   Cardiovascular:      Rate and Rhythm: Normal rate and regular rhythm.      Pulses: Normal pulses.   Pulmonary:      Effort: Pulmonary effort is normal.   Abdominal:      General: Abdomen is flat.   Musculoskeletal:         General: Normal range of motion.      Cervical back: Normal range of motion.   Skin:     General: Skin is warm.      Capillary Refill: Capillary refill takes less than 2 seconds.   Neurological:      General: No focal deficit present.      Mental Status: He is alert. Mental status is at baseline.   Psychiatric:         Mood and Affect: Mood normal.         Thought Content: Thought content normal.         Judgment: Judgment  normal.         (  Be Sure to Include Physical Exam: Delete this entire line when you have entered your exam)    Additional Data:     Lab Results: I have personally reviewed pertinent reports.      Results from last 7 days   Lab Units 02/20/24  2355   WBC Thousand/uL 8.57   HEMOGLOBIN g/dL 16.0   HEMATOCRIT % 43.6   PLATELETS Thousands/uL 180   NEUTROS PCT % 48   LYMPHS PCT % 39   MONOS PCT % 8   EOS PCT % 4     Results from last 7 days   Lab Units 02/20/24  2355 02/20/24  0832   POTASSIUM mmol/L 3.7 4.3   CHLORIDE mmol/L 94* 92*   CO2 mmol/L 26 28   BUN mg/dL 21 22   CREATININE mg/dL 0.98 0.99   CALCIUM mg/dL 9.5 9.7   ALK PHOS U/L  --  92   ALT U/L  --  48   AST U/L  --  28           Imaging: I have personally reviewed pertinent reports.      No results found.    EKG, Pathology, and Other Studies Reviewed on Admission:   EKG:     Epic / Care Everywhere Records Reviewed: Yes     ** Please Note: This note has been constructed using a voice recognition system. **

## 2024-02-22 ENCOUNTER — TRANSITIONAL CARE MANAGEMENT (OUTPATIENT)
Dept: FAMILY MEDICINE CLINIC | Facility: CLINIC | Age: 69
End: 2024-02-22

## 2024-02-22 ENCOUNTER — TELEPHONE (OUTPATIENT)
Dept: FAMILY MEDICINE CLINIC | Facility: CLINIC | Age: 69
End: 2024-02-22

## 2024-02-22 NOTE — ASSESSMENT & PLAN NOTE
Lab Results   Component Value Date    HGBA1C 10.8 (A) 02/20/2024       Recent Labs     02/20/24  2215 02/21/24  0221 02/21/24  0710 02/21/24  1103   POCGLU 459* 281* 196* 297*         Blood Sugar Average: Last 72 hrs:  (P) 308.25    Previously pre diabetic with A1c 6.6%  Was complaining to his PCP about polyuria and polydipsia   Recheck of A1c and random glucose revealed new uncontrolled diabetes with severe hyperglycemia  Was referred to ED and Endocrine was consulted  Started on insulin regimen- prandial and basal insulin doses adjusted from initial primary care orders  Now stable for discharge

## 2024-02-22 NOTE — TELEPHONE ENCOUNTER
Patient asked for a quick call about taking a shot with lunch. They were just going to have lunch and his sugar is 116. Needs advise

## 2024-02-22 NOTE — DISCHARGE SUMMARY
[facility]  Discharge- Abhishek Gimenez 1955, 68 y.o. male MRN: [id number]  Unit/Bed#: ED 20 Encounter: [id number]  Primary Care Provider: Winston Smith MD   Date and time admitted to hospital: 2/20/2024 11:35 PM    * Poorly controlled type 2 diabetes mellitus (HCC)  Assessment & Plan  Lab Results   Component Value Date    HGBA1C 10.8 (A) 02/20/2024       Recent Labs     02/20/24  2215 02/21/24  0221 02/21/24  0710 02/21/24  1103   POCGLU 459* 281* 196* 297*         Blood Sugar Average: Last 72 hrs:  (P) 308.25    Previously pre diabetic with A1c 6.6%  Was complaining to his PCP about polyuria and polydipsia   Recheck of A1c and random glucose revealed new uncontrolled diabetes with severe hyperglycemia  Was referred to ED and Endocrine was consulted  Started on insulin regimen- prandial and basal insulin doses adjusted from initial primary care orders  Now stable for discharge        Medical Problems       Resolved Problems  Date Reviewed: 12/18/2023   None       Discharging Physician / Practitioner: Kayden Cardenas MD  PCP: Winston Smith MD  Admission Date:   Admission Orders (From admission, onward)       Ordered        02/21/24 0302  Place in Observation  Once                          Discharge Date: 2/21/24    Consultations During Hospital Stay:  IP CONSULT TO ENDOCRINOLOGY      Procedures Performed:   imaging    Significant Findings / Test Results:   Principal Problem:    Poorly controlled type 2 diabetes mellitus (HCC)  Active Problems:    CATY (obstructive sleep apnea)  Resolved Problems:    * No resolved hospital problems. *        Incidental Findings:   See above    Test Results Pending at Discharge (will require follow up):   na     Outpatient Tests Requested:  Follow up with endocrine and PCP     Complications:  hyperglycemia    Reason for Admission: hyperglycemia     Hospital Course:   Abhishek Gimenez is a 68 y.o. male patient who originally presented to the hospital on  2/20/2024 due to new onset diabetes with severe hyperglycemia. Endocrine was consulted in ED and was recommended to start lantus 24 units qhs and lispro 6 units with meals. Also recommended metformin 500 mg BID for 1 week followed by 1 gram BID and to follow up with endocrine 2-3 weeks after discharge. His sugars were more controlled later in the day and he felt comfortable managing his insulin with nursing education, so he was discharged home.       Condition at Discharge: good    Discharge Day Visit / Exam:   * Please refer to separate progress note for these details *    Discussion with Family: Patient declined call to .     Discharge instructions/Information to patient and family:   See after visit summary for information provided to patient and family.      Provisions for Follow-Up Care:  See after visit summary for information related to follow-up care and any pertinent home health orders.      Mobility at time of Discharge:   Basic Mobility Inpatient Raw Score: 24  JH-HLM Goal: 8: Walk 250 feet or more  JH-HLM Achieved: 8: Walk 250 feet ot more  HLM Goal achieved. Continue to encourage appropriate mobility.     Disposition:   Home    Planned Readmission: none     Discharge Statement:  I spent 30+ minutes discharging the patient. This time was spent on the day of discharge. I had direct contact with the patient on the day of discharge. Greater than 50% of the total time was spent examining patient, answering all patient questions, arranging and discussing plan of care with patient as well as directly providing post-discharge instructions.  Additional time then spent on discharge activities.    Discharge Medications:  See after visit summary for reconciled discharge medications provided to patient and/or family.      **Please Note: This note may have been constructed using a voice recognition system**

## 2024-02-23 ENCOUNTER — OFFICE VISIT (OUTPATIENT)
Dept: FAMILY MEDICINE CLINIC | Facility: CLINIC | Age: 69
End: 2024-02-23
Payer: COMMERCIAL

## 2024-02-23 VITALS
HEIGHT: 71 IN | WEIGHT: 193 LBS | SYSTOLIC BLOOD PRESSURE: 152 MMHG | DIASTOLIC BLOOD PRESSURE: 86 MMHG | OXYGEN SATURATION: 97 % | BODY MASS INDEX: 27.02 KG/M2 | HEART RATE: 52 BPM | TEMPERATURE: 96.1 F

## 2024-02-23 DIAGNOSIS — L40.50 PSORIATIC ARTHRITIS (HCC): ICD-10-CM

## 2024-02-23 DIAGNOSIS — R03.0 ELEVATED BLOOD PRESSURE READING WITHOUT DIAGNOSIS OF HYPERTENSION: ICD-10-CM

## 2024-02-23 DIAGNOSIS — R97.20 ELEVATED PSA: ICD-10-CM

## 2024-02-23 DIAGNOSIS — Z12.5 ENCOUNTER FOR SCREENING FOR MALIGNANT NEOPLASM OF PROSTATE: ICD-10-CM

## 2024-02-23 DIAGNOSIS — E11.65 TYPE 2 DIABETES MELLITUS WITH HYPERGLYCEMIA, WITHOUT LONG-TERM CURRENT USE OF INSULIN (HCC): Primary | ICD-10-CM

## 2024-02-23 DIAGNOSIS — I48.0 PAROXYSMAL ATRIAL FIBRILLATION (HCC): ICD-10-CM

## 2024-02-23 PROBLEM — R53.83 FATIGUE: Status: RESOLVED | Noted: 2018-07-11 | Resolved: 2024-02-23

## 2024-02-23 PROBLEM — R00.2 PALPITATION: Status: RESOLVED | Noted: 2021-02-22 | Resolved: 2024-02-23

## 2024-02-23 PROBLEM — R00.1 BRADYCARDIA: Status: RESOLVED | Noted: 2021-02-22 | Resolved: 2024-02-23

## 2024-02-23 PROBLEM — R06.02 SHORTNESS OF BREATH ON EXERTION: Status: RESOLVED | Noted: 2022-10-12 | Resolved: 2024-02-23

## 2024-02-23 PROBLEM — Z23 NEED FOR PNEUMOCOCCAL VACCINATION: Status: RESOLVED | Noted: 2020-01-06 | Resolved: 2024-02-23

## 2024-02-23 PROBLEM — U07.1 COVID-19 VIRUS INFECTION: Status: RESOLVED | Noted: 2021-12-02 | Resolved: 2024-02-23

## 2024-02-23 PROCEDURE — 99214 OFFICE O/P EST MOD 30 MIN: CPT | Performed by: FAMILY MEDICINE

## 2024-02-23 RX ORDER — FLASH GLUCOSE SCANNING READER
1 EACH MISCELLANEOUS
Qty: 1 EACH | Refills: 1 | Status: SHIPPED | OUTPATIENT
Start: 2024-02-23 | End: 2024-02-28

## 2024-02-23 RX ORDER — INSULIN LISPRO 100 [IU]/ML
INJECTION, SOLUTION INTRAVENOUS; SUBCUTANEOUS
COMMUNITY
Start: 2024-02-21

## 2024-02-23 RX ORDER — FLASH GLUCOSE SENSOR
1 KIT MISCELLANEOUS
Qty: 1 EACH | Refills: 1 | Status: SHIPPED | OUTPATIENT
Start: 2024-02-23

## 2024-02-23 NOTE — PROGRESS NOTES
Name: Abhishek Gimenez      : 1955      MRN: [id number]  Encounter Provider: Winston Smith MD  Encounter Date: 2024   Encounter department: [facility]    Assessment & Plan     1. Type 2 diabetes mellitus with hyperglycemia, without long-term current use of insulin (HCC)  HgbA1C- 10.8  After discussing with him and wife recommend to increase lantus 2 units every 2-3 days until morning glucose is close to 120 mg/dl  -     Ambulatory Referral to Diabetic Education; Future  -     Continuous Blood Gluc  (FreeStyle Livia 14 Day [place]) LEANA; Use 1 Device 3 (three) times a day before meals  -     Continuous Blood Gluc Sensor (FreeStyle Livia 14 Day Sensor) MISC; Use 1 Device 3 (three) times a day before meals  -     insulin glargine (LANTUS SOLOSTAR) 100 units/mL injection pen; Inject 26 Units under the skin daily at bedtime    2. Psoriatic arthritis (HCC)  Not taking any medications    3. Paroxysmal atrial fibrillation (HCC)  Denies any symptoms related to this    4. Elevated PSA  -     PSA, Total Screen; Future; Expected date: 2024    5. Encounter for screening for malignant neoplasm of prostate  -     PSA, Total Screen; Future; Expected date: 2024    6. Elevated blood pressure reading without diagnosis of hypertension  Measure BP, consider ACE or ARB given diabetes    F/U in 3 months            Subjective     Patient is here to follow up for Type 2 DM. He went to the ER recently due to elevated glucose hyperglycemia, over 400 mg/dl. He was started on insulin currently taking 24 units daily along with Humalog 6 units daily and metformin. His glucose numbers in the morning have been in the 200 mg/dl and above range. His wife has been trying to cook diabetes friendly meals.  Has a Hx of Psoriatic arthritis not taking any medications for this  Borderline PSA just below normal 3.98 does have increased urination symptoms.  Has a Hx of Atrial fibrillation denies any chest  pain or palpitations.  Also has elevated BP today denies any symptoms.      Review of Systems   Constitutional:  Negative for activity change, appetite change, fatigue and fever.   HENT:  Negative for congestion and ear discharge.    Respiratory:  Negative for cough and shortness of breath.    Cardiovascular:  Negative for chest pain and palpitations.   Gastrointestinal:  Negative for diarrhea and nausea.   Musculoskeletal:  Negative for arthralgias and back pain.   Skin:  Negative for color change and rash.   Neurological:  Negative for dizziness and headaches.   Psychiatric/Behavioral:  Negative for agitation and behavioral problems.        Past Medical History:   Diagnosis Date    A-fib (HCC)     Arthritis     BPH (benign prostatic hyperplasia)     Colon polyp     Diabetes mellitus (HCC)     Fatty liver     Fracture, foot     High cholesterol     Hyperlipidemia     Irregular heart beat     Non-ischemic cardiomyopathy (HCC)     Osteoarthritis     Psoriasis     Psoriatic arthritis (HCC)     Pulmonary HTN (HCC)      Past Surgical History:   Procedure Laterality Date    CARDIAC ELECTROPHYSIOLOGY PROCEDURE N/A 02/22/2023    Procedure: Cardiac eps/afib ablation   comprehensive posterior wall;  Surgeon: Mynor Andrews MD;  Location: [facility];  Service: Cardiology    COLONOSCOPY      ELBOW SURGERY Right     TRANSURETHRAL RESECTION OF PROSTATE      last assessed 7/21/15     Family History   Problem Relation Age of Onset    Diabetes Mother     Hypertension Father     Diabetes Family      Social History     Socioeconomic History    Marital status: /Civil Union     Spouse name: None    Number of children: None    Years of education: None    Highest education level: None   Occupational History    None   Tobacco Use    Smoking status: Former     Current packs/day: 0.50     Average packs/day: 0.5 packs/day for 10.0 years (5.0 ttl pk-yrs)     Types: Cigarettes    Smokeless tobacco: Never   Vaping Use    Vaping  status: Never Used   Substance and Sexual Activity    Alcohol use: Yes     Comment: 1 drink daily    Drug use: No    Sexual activity: None   Other Topics Concern    None   Social History Narrative    Daily coffee consumption (__cups/day)     Daily cola consumption (__cans/day)      Social Determinants of Health     Financial Resource Strain: Low Risk  (2/20/2024)    Overall Financial Resource Strain (CARDIA)     Difficulty of Paying Living Expenses: Not very hard   Food Insecurity: Not on file   Transportation Needs: No Transportation Needs (2/20/2024)    PRAPARE - Transportation     Lack of Transportation (Medical): No     Lack of Transportation (Non-Medical): No   Physical Activity: Not on file   Stress: Not on file   Social Connections: Not on file   Intimate Partner Violence: Not on file   Housing Stability: Not on file     Current Outpatient Medications on File Prior to Visit   Medication Sig    HumaLOG KwikPen 100 units/mL injection pen INJECT 6 UNITS 3X A DAY WITH MEALS    adalimumab (HUMIRA) 40 mg/0.8 mL PSKT Inject 40 mg under the skin every 14 (fourteen) days     Alcohol Swabs 70 % PADS May substitute brand based on insurance coverage. Check glucose TID.    Blood Glucose Monitoring Suppl (OneTouch Verio Reflect) w/Device KIT Check blood sugars once daily. Please substitute with appropriate alternative as covered by patient's insurance. Dx: E11.65    glucose blood (OneTouch Verio) test strip Check blood sugars once daily. Please substitute with appropriate alternative as covered by patient's insurance. Dx: E11.65    Insulin Pen Needle (BD Pen Needle Mallory 2nd Gen) 32G X 4 MM MISC For use with insulin pen. Pharmacy may dispense brand covered by insurance.    Insulin Pen Needle (BD Pen Needle Mallory U/F) 32G X 4 MM MISC Use daily as directed with insulin pen    metFORMIN (GLUCOPHAGE-XR) 500 mg 24 hr tablet Take 1 tablet (500 mg total) by mouth daily with breakfast for 7 days, THEN 2 tablets (1,000 mg total)  "daily with breakfast.    OneTouch Delica Lancets 33G MISC Check blood sugars once daily. Please substitute with appropriate alternative as covered by patient's insurance. Dx: E11.65    rosuvastatin (CRESTOR) 5 mg tablet Take 1 tablet (5 mg total) by mouth every evening    sildenafil (VIAGRA) 100 mg tablet TAKE ONE (1) TABLET BY MOUTH DAILY AS NEEDED    [DISCONTINUED] insulin aspart (NovoLOG FlexPen) 100 UNIT/ML injection pen Inject 6 Units under the skin 3 (three) times a day with meals    [DISCONTINUED] insulin glargine (LANTUS SOLOSTAR) 100 units/mL injection pen Inject 24 Units under the skin daily at bedtime     Allergies   Allergen Reactions    Penicillins Rash     Immunization History   Administered Date(s) Administered    COVID-19 MODERNA VACC 0.5 ML IM 03/31/2021, 04/28/2021, 12/16/2021, 12/01/2022    COVID-19 Moderna mRNA Vaccine 12 Yr+ 50 mcg/0.5 mL (Spikevax) 11/13/2023    Influenza Injectable, MDCK, Preservative Free, Quadrivalent, 0.5 mL 09/25/2019    Influenza, recombinant, quadrivalent,injectable, preservative free 10/12/2022    Pneumococcal Conjugate 13-Valent 01/06/2020    Tdap 07/21/2015, 06/07/2023       Objective     /86   Pulse (!) 52   Temp (!) 96.1 °F (35.6 °C)   Ht 5' 11\" (1.803 m)   Wt 87.5 kg (193 lb)   SpO2 97%   BMI 26.92 kg/m²     Physical Exam  Constitutional:       General: He is not in acute distress.     Appearance: He is well-developed. He is not diaphoretic.   Eyes:      General: No scleral icterus.     Pupils: Pupils are equal, round, and reactive to light.   Cardiovascular:      Rate and Rhythm: Normal rate and regular rhythm.      Heart sounds: Normal heart sounds. No murmur heard.  Pulmonary:      Effort: Pulmonary effort is normal. No respiratory distress.      Breath sounds: Normal breath sounds. No wheezing.   Abdominal:      General: Bowel sounds are normal. There is no distension.      Palpations: Abdomen is soft.      Tenderness: There is no abdominal " tenderness.   Skin:     General: Skin is warm and dry.      Findings: No rash.   Neurological:      Mental Status: He is alert and oriented to person, place, and time.       Winston Smith MD

## 2024-02-26 DIAGNOSIS — E11.65 TYPE 2 DIABETES MELLITUS WITH HYPERGLYCEMIA, WITHOUT LONG-TERM CURRENT USE OF INSULIN (HCC): ICD-10-CM

## 2024-02-27 ENCOUNTER — TELEPHONE (OUTPATIENT)
Dept: ENDOCRINOLOGY | Facility: CLINIC | Age: 69
End: 2024-02-27

## 2024-02-27 NOTE — TELEPHONE ENCOUNTER
Received referral for Abhishek Gimenez.     Left voicemail for patient to contact office to schedule Consult/New Patient Appointment.

## 2024-03-05 NOTE — ED PROVIDER NOTES
History  Chief Complaint   Patient presents with    Hyperglycemia - Symptomatic     Pt reports seen at PCP today, newly diagnosed with DM. Started on Metformin and Lantus 10 units (taken around 8:15pm). + dry mouth, polydipsia, polyuria. POC glucose in triage 459.      HPI    Patient is a 68-year-old male present emerged department for elevated glucose.  Patient was just diagnosed with diabetes today and started on a insulin and metformin regimen.  Patient has increased thirst, dry mouth and increased urination.  Patient did take his metformin and Lantus prior to arrival.  Denies any recent fevers chills nausea or vomiting.  Review of systems otherwise negative.  History includes A-fib, diabetes, hyperlipidemia and cardiomyopathy.    Prior to Admission Medications   Prescriptions Last Dose Informant Patient Reported? Taking?   Blood Glucose Monitoring Suppl (OneTouch Verio Reflect) w/Device KIT   No No   Sig: Check blood sugars once daily. Please substitute with appropriate alternative as covered by patient's insurance. Dx: E11.65   Insulin Pen Needle (BD Pen Needle Mallory U/F) 32G X 4 MM MISC   No No   Sig: Use daily as directed with insulin pen   OneTouch Delica Lancets 33G MISC   No No   Sig: Check blood sugars once daily. Please substitute with appropriate alternative as covered by patient's insurance. Dx: E11.65   adalimumab (HUMIRA) 40 mg/0.8 mL PSKT  Self Yes No   Sig: Inject 40 mg under the skin every 14 (fourteen) days    glucose blood (OneTouch Verio) test strip   No No   Sig: Check blood sugars once daily. Please substitute with appropriate alternative as covered by patient's insurance. Dx: E11.65   insulin glargine (LANTUS SOLOSTAR) 100 units/mL injection pen   No No   Sig: Inject 10 Units under the skin daily   metFORMIN (GLUCOPHAGE-XR) 500 mg 24 hr tablet   No No   Sig: Take 2 tablets (1,000 mg total) by mouth daily with breakfast   metFORMIN (GLUCOPHAGE-XR) 500 mg 24 hr tablet   No Yes   Sig: Take 1  tablet (500 mg total) by mouth daily with breakfast for 7 days, THEN 2 tablets (1,000 mg total) daily with breakfast.   rosuvastatin (CRESTOR) 5 mg tablet   No No   Sig: Take 1 tablet (5 mg total) by mouth every evening   sildenafil (VIAGRA) 100 mg tablet   Yes No   Sig: TAKE ONE (1) TABLET BY MOUTH DAILY AS NEEDED      Facility-Administered Medications: None       Past Medical History:   Diagnosis Date    A-fib (HCC)     Arthritis     BPH (benign prostatic hyperplasia)     Colon polyp     Diabetes mellitus (HCC)     Fatty liver     Fracture, foot     High cholesterol     Hyperlipidemia     Irregular heart beat     Non-ischemic cardiomyopathy (HCC)     Osteoarthritis     Psoriasis     Psoriatic arthritis (HCC)     Pulmonary HTN (HCC)        Past Surgical History:   Procedure Laterality Date    CARDIAC ELECTROPHYSIOLOGY PROCEDURE N/A 02/22/2023    Procedure: Cardiac eps/afib ablation   comprehensive posterior wall;  Surgeon: Mynor Andrews MD;  Location: [facility];  Service: Cardiology    COLONOSCOPY      ELBOW SURGERY Right     TRANSURETHRAL RESECTION OF PROSTATE      last assessed 7/21/15       Family History   Problem Relation Age of Onset    Diabetes Mother     Hypertension Father     Diabetes Family      I have reviewed and agree with the history as documented.    E-Cigarette/Vaping    E-Cigarette Use Never User      E-Cigarette/Vaping Substances    Nicotine No     THC No     CBD No     Flavoring No     Other No     Unknown No      Social History     Tobacco Use    Smoking status: Former     Current packs/day: 0.50     Average packs/day: 0.5 packs/day for 10.0 years (5.0 ttl pk-yrs)     Types: Cigarettes    Smokeless tobacco: Never   Vaping Use    Vaping status: Never Used   Substance Use Topics    Alcohol use: Yes     Comment: 1 drink daily    Drug use: No       Review of Systems   Constitutional:  Negative for chills and fever.   HENT:  Negative for ear pain and sore throat.    Eyes:  Negative for pain  and visual disturbance.   Respiratory:  Negative for cough and shortness of breath.    Cardiovascular:  Negative for chest pain and palpitations.   Gastrointestinal:  Negative for abdominal pain and vomiting.   Endocrine: Positive for polydipsia and polyuria.   Genitourinary:  Negative for dysuria and hematuria.   Musculoskeletal:  Negative for arthralgias and back pain.   Skin:  Negative for color change and rash.   Neurological:  Negative for seizures and syncope.   All other systems reviewed and are negative.      Physical Exam  Physical Exam  Vitals and nursing note reviewed.   Constitutional:       General: He is not in acute distress.     Appearance: He is well-developed.   HENT:      Head: Normocephalic and atraumatic.   Eyes:      Conjunctiva/sclera: Conjunctivae normal.   Cardiovascular:      Rate and Rhythm: Normal rate and regular rhythm.      Heart sounds: No murmur heard.  Pulmonary:      Effort: Pulmonary effort is normal. No respiratory distress.      Breath sounds: Normal breath sounds.   Abdominal:      Palpations: Abdomen is soft.      Tenderness: There is no abdominal tenderness.   Musculoskeletal:         General: No swelling.      Cervical back: Neck supple.   Skin:     General: Skin is warm and dry.      Capillary Refill: Capillary refill takes less than 2 seconds.   Neurological:      Mental Status: He is alert.   Psychiatric:         Mood and Affect: Mood normal.         Vital Signs  ED Triage Vitals [02/20/24 2219]   Temperature Pulse Respirations Blood Pressure SpO2   (!) 97 °F (36.1 °C) 56 18 164/79 97 %      Temp Source Heart Rate Source Patient Position - Orthostatic VS BP Location FiO2 (%)   Temporal Monitor Sitting Left arm --      Pain Score       No Pain           Vitals:    02/21/24 1100 02/21/24 1200 02/21/24 1230 02/21/24 1300   BP: 136/84 131/80 142/88 149/83   Pulse: (!) 51 (!) 52 (!) 54 60   Patient Position - Orthostatic VS:             Visual Acuity      ED  Medications  Medications   sodium chloride 0.9 % bolus 1,000 mL (0 mL Intravenous Stopped 2/21/24 0223)   lactated ringers bolus 1,000 mL (0 mL Intravenous Stopped 2/21/24 0223)       Diagnostic Studies  Results Reviewed       Procedure Component Value Units Date/Time    Fingerstick Glucose (POCT) [[id number]]  (Abnormal) Collected: 02/21/24 1103    Lab Status: Final result Updated: 02/21/24 1104     POC Glucose 297 mg/dl     Fingerstick Glucose (POCT) [[id number]]  (Abnormal) Collected: 02/21/24 0710    Lab Status: Final result Updated: 02/21/24 0711     POC Glucose 196 mg/dl     Fingerstick Glucose (POCT) [[id number]]  (Abnormal) Collected: 02/21/24 0221    Lab Status: Final result Updated: 02/21/24 0222     POC Glucose 281 mg/dl     Basic metabolic panel [[id number]]  (Abnormal) Collected: 02/20/24 2355    Lab Status: Final result Specimen: Blood from Arm, Right Updated: 02/21/24 0023     Sodium 130 mmol/L      Potassium 3.7 mmol/L      Chloride 94 mmol/L      CO2 26 mmol/L      ANION GAP 10 mmol/L      BUN 21 mg/dL      Creatinine 0.98 mg/dL      Glucose 379 mg/dL      Calcium 9.5 mg/dL      eGFR 80 ml/min/1.73sq m     Narrative:      National Kidney Disease Foundation guidelines for Chronic Kidney Disease (CKD):     Stage 1 with normal or high GFR (GFR > 90 mL/min/1.73 square meters)    Stage 2 Mild CKD (GFR = 60-89 mL/min/1.73 square meters)    Stage 3A Moderate CKD (GFR = 45-59 mL/min/1.73 square meters)    Stage 3B Moderate CKD (GFR = 30-44 mL/min/1.73 square meters)    Stage 4 Severe CKD (GFR = 15-29 mL/min/1.73 square meters)    Stage 5 End Stage CKD (GFR <15 mL/min/1.73 square meters)  Note: GFR calculation is accurate only with a steady state creatinine    Blood gas, venous [[id number]]  (Abnormal) Collected: 02/20/24 2355    Lab Status: Final result Specimen: Blood from Arm, Right Updated: 02/21/24 0019     pH, Erasmo 7.321     pCO2, Erasmo 53.4 mm Hg      pO2, Erasmo 42.4 mm Hg      HCO3, Erasmo 27.0 mmol/L      Base  Excess, Erasmo -0.3 mmol/L      O2 Content, Erasmo 17.3 ml/dL      O2 HGB, VENOUS 73.0 %     Beta Hydroxybutyrate [[id number]]  (Abnormal) Collected: 02/20/24 2355    Lab Status: Final result Specimen: Blood from Arm, Right Updated: 02/21/24 0011     BETA-HYDROXYBUTYRATE 0.6 mmol/L     CBC and differential [[id number]] Collected: 02/20/24 2355    Lab Status: Final result Specimen: Blood from Arm, Right Updated: 02/21/24 0008     WBC 8.57 Thousand/uL      RBC 4.97 Million/uL      Hemoglobin 16.0 g/dL      Hematocrit 43.6 %      MCV 88 fL      MCH 32.2 pg      MCHC 36.7 g/dL      RDW 12.0 %      MPV 10.0 fL      Platelets 180 Thousands/uL      nRBC 0 /100 WBCs      Neutrophils Relative 48 %      Immat GRANS % 0 %      Lymphocytes Relative 39 %      Monocytes Relative 8 %      Eosinophils Relative 4 %      Basophils Relative 1 %      Neutrophils Absolute 4.08 Thousands/µL      Immature Grans Absolute 0.02 Thousand/uL      Lymphocytes Absolute 3.37 Thousands/µL      Monocytes Absolute 0.72 Thousand/µL      Eosinophils Absolute 0.34 Thousand/µL      Basophils Absolute 0.04 Thousands/µL     Fingerstick Glucose (POCT) [[id number]]  (Abnormal) Collected: 02/20/24 2215    Lab Status: Final result Updated: 02/20/24 2220     POC Glucose 459 mg/dl                    No orders to display              Procedures  Procedures         ED Course                               SBIRT 20yo+      Flowsheet Row Most Recent Value   Initial Alcohol Screen: US AUDIT-C     1. How often do you have a drink containing alcohol? 0 Filed at: 02/21/2024 0103   2. How many drinks containing alcohol do you have on a typical day you are drinking?  0 Filed at: 02/21/2024 0103   3b. FEMALE Any Age, or MALE 65+: How often do you have 4 or more drinks on one occassion? 0 Filed at: 02/21/2024 0103   Audit-C Score 0 Filed at: 02/21/2024 0103   SALAZAR: How many times in the past year have you...    Used an illegal drug or used a prescription medication for non-medical  reasons? Never Filed at: 02/21/2024 0103                      Medical Decision Making  Differential HHS, diabetes, DKA.    Patient is a well-appearing 68-year-old male present emergency department no acute respiratory distress and vital signs unremarkable.  Lab work overall shows elevated glucose.  Patient was given 2 L of fluid.  Patient is not in DKA.  Most recent A1c significantly elevated.    Had a conversation with the hospitalist to discuss appropriate diabetes regimen.  Hospitalist suggested admission for further workup and endocrine evaluation.    Discussed this with patient at bedside.  Given the option to be evaluated in the inpatient setting.  Patient preferred admission.  Hospitalist admitting.    Amount and/or Complexity of Data Reviewed  Labs: ordered.    Risk  Decision regarding hospitalization.             Disposition  Final diagnoses:   Hyperglycemia   Diabetes (HCC)     Time reflects when diagnosis was documented in both MDM as applicable and the Disposition within this note       Time User Action Codes Description Comment    2/21/2024  2:50 AM Debra Aguirre Add [R73.9] Hyperglycemia     2/21/2024  2:50 AM Debra Aguirre Add [E11.9] Diabetes (HCC)     2/21/2024  1:14 PM Kayden Huynh Add [E11.65] Type 2 diabetes mellitus with hyperglycemia, without long-term current use of insulin (HCC)           ED Disposition       ED Disposition   Admit    Condition   Stable    Date/Time   Wed Feb 21, 2024 0250    Comment   Case was discussed with hospitalist and the patient's admission status was agreed to be Admission Status: observation status to the service of Dr. Negro .               Follow-up Information    None         Discharge Medication List as of 2/21/2024  1:34 PM        START taking these medications    Details   Alcohol Swabs 70 % PADS May substitute brand based on insurance coverage. Check glucose TID., Normal      !! Insulin Pen Needle (BD Pen Needle Mallory 2nd Gen) 32G X 4 MM MISC For use with  insulin pen. Pharmacy may dispense brand covered by insurance., Normal      insulin aspart (NovoLOG FlexPen) 100 UNIT/ML injection pen Inject 6 Units under the skin 3 (three) times a day with meals, Starting Wed 2/21/2024, Until Fri 3/22/2024, Normal       !! - Potential duplicate medications found. Please discuss with provider.        CONTINUE these medications which have CHANGED    Details   metFORMIN (GLUCOPHAGE-XR) 500 mg 24 hr tablet Multiple Dosages:Starting Wed 2/21/2024, Until Tue 2/27/2024, THEN Starting Wed 2/28/2024, Until Thu 3/28/2024Take 1 tablet (500 mg total) by mouth daily with breakfast for 7 days, THEN 2 tablets (1,000 mg total) daily with breakfast., Normal      insulin glargine (LANTUS SOLOSTAR) 100 units/mL injection pen Inject 24 Units under the skin daily at bedtime, Starting Wed 2/21/2024, Normal           CONTINUE these medications which have NOT CHANGED    Details   adalimumab (HUMIRA) 40 mg/0.8 mL PSKT Inject 40 mg under the skin every 14 (fourteen) days , Historical Med      Blood Glucose Monitoring Suppl (OneTouch Verio Reflect) w/Device KIT Check blood sugars once daily. Please substitute with appropriate alternative as covered by patient's insurance. Dx: E11.65, Normal      glucose blood (OneTouch Verio) test strip Check blood sugars once daily. Please substitute with appropriate alternative as covered by patient's insurance. Dx: E11.65, Normal      !! Insulin Pen Needle (BD Pen Needle Mallory U/F) 32G X 4 MM MISC Use daily as directed with insulin pen, Normal      OneTouch Delica Lancets 33G MISC Check blood sugars once daily. Please substitute with appropriate alternative as covered by patient's insurance. Dx: E11.65, Normal      rosuvastatin (CRESTOR) 5 mg tablet Take 1 tablet (5 mg total) by mouth every evening, Starting Tue 2/20/2024, Until Sun 8/18/2024, Normal      sildenafil (VIAGRA) 100 mg tablet TAKE ONE (1) TABLET BY MOUTH DAILY AS NEEDED, Historical Med       !! - Potential  duplicate medications found. Please discuss with provider.              PDMP Review       None            ED Provider  Electronically Signed by             Debra Aguirre DO  03/04/24 2311

## 2024-03-14 DIAGNOSIS — E11.65 TYPE 2 DIABETES MELLITUS WITH HYPERGLYCEMIA, WITHOUT LONG-TERM CURRENT USE OF INSULIN (HCC): ICD-10-CM

## 2024-03-14 RX ORDER — FLASH GLUCOSE SENSOR
1 KIT MISCELLANEOUS
Qty: 2 EACH | Refills: 0 | Status: SHIPPED | OUTPATIENT
Start: 2024-03-14

## 2024-04-09 ENCOUNTER — OFFICE VISIT (OUTPATIENT)
Dept: DIABETES SERVICES | Facility: CLINIC | Age: 69
End: 2024-04-09
Payer: COMMERCIAL

## 2024-04-09 VITALS — BODY MASS INDEX: 26.64 KG/M2 | WEIGHT: 191 LBS

## 2024-04-09 DIAGNOSIS — E11.65 TYPE 2 DIABETES MELLITUS WITH HYPERGLYCEMIA, WITHOUT LONG-TERM CURRENT USE OF INSULIN (HCC): Primary | ICD-10-CM

## 2024-04-09 PROCEDURE — 97802 MEDICAL NUTRITION INDIV IN: CPT

## 2024-04-09 NOTE — PROGRESS NOTES
"Medical Nutrition Therapy    Assessment    Visit Type: Initial visit  Chief complaint/Medical Diagnosis/reason for visit: E11.65 (ICD-10-CM) - Type 2 diabetes mellitus with hyperglycemia, without long-term current use of insulin (HCC)    HPI Abhishek Gimenez was seen today accompanied by his wife regarding recent diagnosis of type 2 diabetes. Patient is currently taking 18 units of lantus and 1,000 mg of metformin-XR with breakfast. Last HbA1c was 10.8% in February 2024. Uses [employer] 14 day CGM. Conducted dietary recall. See below for details. Abhishek's wife bought a diabetes cookbook and has been preparing diabetes-friendly meals. Abhishek has been following a more regular meal pattern and has not skipped meals since his diabetes diagnosis. He is trying to limit his carbohydrate portions. Explained basic pathophysiology of diabetes and impact of diet on blood glucose levels. Together we discussed what foods contain carbohydrates, reading a food label, timing of meals and snacks, serving sizes, the role of fiber, the importance of consistent carbohydrate intake in the appropriate amounts. Used the portion booklet to teach Abhishek and his wife more about food groups and basic carbohydrate counting. Encouraged 3 regular meals ~4-5 hours apart with 1-2 snacks per day as needed. Discussed keeping carbohydrate intake consistent. Goal is 30-45 grams of carbohydrate per meal and 0-15 grams of carbohydrate per snack. Abhishek and his wife demonstrated good understanding and will call with any questions or if they would like to schedule a follow-up visit.    Ht Readings from Last 1 Encounters:   02/23/24 5' 11\" (1.803 m)     Wt Readings from Last 3 Encounters:   04/09/24 86.6 kg (191 lb)   02/23/24 87.5 kg (193 lb)   02/20/24 85.3 kg (188 lb)     Weight Change: No - weight is stable per patient    Barriers to Learning: no barriers    Do you follow any special diet presently?: Yes - following a lower-carbohydrate, heart-healthy diet, " cut out juices  Who shops: his spouse mainly  Who cooks: his spouse mainly    Food Log: Completed via the method of usual daily food recall    Breakfast: bowl of cereal - cheerios or shen grahams with lactose-free milk or unsweetened vanilla almond milk, 1 cup of decaf with unsweetened [place]ian vanilla creamer or oatmeal or egg muffins or zucchini oatmeal muffins  Morning Snack: blueberries/strawberries or a cheese stick or nuts or cut up veggies such as carrots or cucumbers  Lunch: 2 peanut butter and sugar-free jelly sandwich on low-carb bread (4 slices of bread), water, once in a while crystal lite lemonade or iced tea  Afternoon Snack: blueberries/strawberries or a cheese stick or nuts or cut up veggies such as carrots or cucumbers or cheese nips or salt-free potato chips  Dinner: usually meat and vegetables (not a log of starches) - beef or a burger 2 nights per week, chicken, fish and vegetables - aspargus, broccoli, onions, spinach, cabbage, cauliflower  Evening Snack: cheese nips, blueberries/strawberries, cut up veggies, guacamole dip  Beverages: water, crystal lite lemonade or iced tea, 1 cup of decaf coffee per day with unsweetened [place]ian vanilla creamer, light beer, no juices  Exercise: states he keeps busy and has been doing a lot of yard work lately    Estimated calorie needs: 1,700 kcals/day   Carbohydrate: 30-45 g/meal, 0-15 g/snack       Fat: 5 servings/day      Protein: 8 ounces/day    Nutrition Diagnosis:  Inconsistent carbohydrate intake related to food and nutrition related knowledge deficit concerning appropriate amount and timing of carbohydrate intake as evidenced by estimated carbohydrate intake that is different from recommended types or ingested on an irregular basis.    Intervention: increased fiber intake, label reading, behavior modification strategies, carbohydrate counting, meal timing, meal planning, monitoring portion control, and exercise guidelines     Treatment Goals: Patient  "understands education and recommendations, Patient will consume 3 meals a day, Patient will monitor portion control, Patient will consume snacks, Patient will count carbohydrates, Patient will exercise, and Patient will monitor blood glucose    Monitoring and evaluation:    Term code indicator  FH 4.4 Mealtime Behavior Criteria: Eat 3 regular meals ~4-5 hours apart with 1-2 snacks per day as needed.  Term code indicator  FH 1.6.3 Carbohydrate Intake Criteria: Keep carbohydrate intake consistent. Aim for 30-45 grams of carbohydrate per meal and 0-15 grams of carbohydrate per snack.    Materials Provided: Portion booklet and \"Know Your Numbers\" handout    Patient’s Response to Instruction:  Comprehension: good  Motivation: good  Expected Compliance: good    Start- Stop: 9:25-10:25  Total Minutes: 60 Minutes  Group or Individual Instruction: MNT-I  Other: Winston Smith MD    Thank you for coming to the [facility] for education today. Please feel free to call with any questions or concerns.    Laura Monique, MS, RD, LDN  [id number]  [facility] [address]  [place] [id number]    "

## 2024-04-09 NOTE — PATIENT INSTRUCTIONS
Eat 3 regular meals ~4-5 hours apart with 1-2 snacks per day as needed.  Keep carbohydrate intake consistent. Aim for 30-45 grams of carbohydrate per meal and 0-15 grams of carbohydrate per snack.

## 2024-04-12 DIAGNOSIS — E11.65 TYPE 2 DIABETES MELLITUS WITH HYPERGLYCEMIA, WITHOUT LONG-TERM CURRENT USE OF INSULIN (HCC): ICD-10-CM

## 2024-04-12 RX ORDER — FLASH GLUCOSE SENSOR
1 KIT MISCELLANEOUS
Qty: 2 EACH | Refills: 0 | Status: SHIPPED | OUTPATIENT
Start: 2024-04-12

## 2024-05-02 DIAGNOSIS — E11.65 TYPE 2 DIABETES MELLITUS WITH HYPERGLYCEMIA, WITHOUT LONG-TERM CURRENT USE OF INSULIN (HCC): ICD-10-CM

## 2024-05-02 RX ORDER — FLASH GLUCOSE SENSOR
1 KIT MISCELLANEOUS
Qty: 2 EACH | Refills: 5 | Status: SHIPPED | OUTPATIENT
Start: 2024-05-02

## 2024-05-02 NOTE — TELEPHONE ENCOUNTER
Reason for call:   [x] Refill   [] Prior Auth  [] Other:     Office:   [x] PCP/Provider -   [] Specialty/Provider -     Medication: Continuous Blood Gluc Sensor (FreeStyle Livia 14 Day Sensor) MISC     Dose/Frequency:  Use 1 Device 3 (three) times a day before meals     Quantity:  2 each     Pharmacy: [facility]/pharm[facility] #[id number] - [place] - [address], [address] [phone]     Does the patient have enough for 3 days?   [] Yes   [x] No - Send as HP to POD

## 2024-05-07 ENCOUNTER — APPOINTMENT (OUTPATIENT)
Dept: LAB | Facility: CLINIC | Age: 69
End: 2024-05-07
Payer: COMMERCIAL

## 2024-05-07 DIAGNOSIS — L40.59 POLYARTICULAR PSORIATIC ARTHRITIS (HCC): ICD-10-CM

## 2024-05-07 LAB
ALBUMIN SERPL BCP-MCNC: 4.1 G/DL (ref 3.5–5)
ALP SERPL-CCNC: 61 U/L (ref 34–104)
ALT SERPL W P-5'-P-CCNC: 31 U/L (ref 7–52)
AST SERPL W P-5'-P-CCNC: 24 U/L (ref 13–39)
BASOPHILS # BLD AUTO: 0.04 THOUSANDS/ÂΜL (ref 0–0.1)
BASOPHILS NFR BLD AUTO: 1 % (ref 0–1)
BILIRUB DIRECT SERPL-MCNC: 0.15 MG/DL (ref 0–0.2)
BILIRUB SERPL-MCNC: 0.6 MG/DL (ref 0.2–1)
CREAT SERPL-MCNC: 0.84 MG/DL (ref 0.6–1.3)
EOSINOPHIL # BLD AUTO: 0.29 THOUSAND/ÂΜL (ref 0–0.61)
EOSINOPHIL NFR BLD AUTO: 5 % (ref 0–6)
ERYTHROCYTE [DISTWIDTH] IN BLOOD BY AUTOMATED COUNT: 12.7 % (ref 11.6–15.1)
ERYTHROCYTE [SEDIMENTATION RATE] IN BLOOD: 3 MM/HOUR (ref 0–19)
GFR SERPL CREATININE-BSD FRML MDRD: 91 ML/MIN/1.73SQ M
HCT VFR BLD AUTO: 46.4 % (ref 36.5–49.3)
HGB BLD-MCNC: 15.6 G/DL (ref 12–17)
IMM GRANULOCYTES # BLD AUTO: 0.01 THOUSAND/UL (ref 0–0.2)
IMM GRANULOCYTES NFR BLD AUTO: 0 % (ref 0–2)
LYMPHOCYTES # BLD AUTO: 1.75 THOUSANDS/ÂΜL (ref 0.6–4.47)
LYMPHOCYTES NFR BLD AUTO: 32 % (ref 14–44)
MCH RBC QN AUTO: 32.5 PG (ref 26.8–34.3)
MCHC RBC AUTO-ENTMCNC: 33.6 G/DL (ref 31.4–37.4)
MCV RBC AUTO: 97 FL (ref 82–98)
MONOCYTES # BLD AUTO: 0.47 THOUSAND/ÂΜL (ref 0.17–1.22)
MONOCYTES NFR BLD AUTO: 9 % (ref 4–12)
NEUTROPHILS # BLD AUTO: 2.95 THOUSANDS/ÂΜL (ref 1.85–7.62)
NEUTS SEG NFR BLD AUTO: 53 % (ref 43–75)
NRBC BLD AUTO-RTO: 0 /100 WBCS
PLATELET # BLD AUTO: 141 THOUSANDS/UL (ref 149–390)
PMV BLD AUTO: 10.8 FL (ref 8.9–12.7)
PROT SERPL-MCNC: 7.2 G/DL (ref 6.4–8.4)
RBC # BLD AUTO: 4.8 MILLION/UL (ref 3.88–5.62)
WBC # BLD AUTO: 5.51 THOUSAND/UL (ref 4.31–10.16)

## 2024-05-07 PROCEDURE — 80076 HEPATIC FUNCTION PANEL: CPT

## 2024-05-07 PROCEDURE — 85025 COMPLETE CBC W/AUTO DIFF WBC: CPT

## 2024-05-07 PROCEDURE — 85652 RBC SED RATE AUTOMATED: CPT

## 2024-05-07 PROCEDURE — 82565 ASSAY OF CREATININE: CPT

## 2024-05-07 PROCEDURE — 36415 COLL VENOUS BLD VENIPUNCTURE: CPT

## 2024-05-24 ENCOUNTER — OFFICE VISIT (OUTPATIENT)
Dept: FAMILY MEDICINE CLINIC | Facility: CLINIC | Age: 69
End: 2024-05-24
Payer: COMMERCIAL

## 2024-05-24 VITALS
OXYGEN SATURATION: 97 % | SYSTOLIC BLOOD PRESSURE: 140 MMHG | BODY MASS INDEX: 25.9 KG/M2 | DIASTOLIC BLOOD PRESSURE: 78 MMHG | HEIGHT: 71 IN | WEIGHT: 185 LBS | TEMPERATURE: 97.9 F | HEART RATE: 48 BPM

## 2024-05-24 DIAGNOSIS — R03.0 ELEVATED BLOOD PRESSURE READING WITHOUT DIAGNOSIS OF HYPERTENSION: ICD-10-CM

## 2024-05-24 DIAGNOSIS — R10.13 EPIGASTRIC PAIN: ICD-10-CM

## 2024-05-24 DIAGNOSIS — R22.2 LUMP OF SKIN OF BACK: ICD-10-CM

## 2024-05-24 DIAGNOSIS — E11.9 TYPE 2 DIABETES MELLITUS WITHOUT COMPLICATION, WITHOUT LONG-TERM CURRENT USE OF INSULIN (HCC): Primary | ICD-10-CM

## 2024-05-24 DIAGNOSIS — D69.6 PLATELETS DECREASED (HCC): ICD-10-CM

## 2024-05-24 PROBLEM — E11.65 POORLY CONTROLLED TYPE 2 DIABETES MELLITUS (HCC): Status: RESOLVED | Noted: 2024-02-20 | Resolved: 2024-05-24

## 2024-05-24 PROBLEM — R63.1 POLYDIPSIA: Status: RESOLVED | Noted: 2024-02-20 | Resolved: 2024-05-24

## 2024-05-24 LAB — SL AMB POCT HEMOGLOBIN AIC: 5.9 (ref ?–6.5)

## 2024-05-24 PROCEDURE — 99214 OFFICE O/P EST MOD 30 MIN: CPT | Performed by: FAMILY MEDICINE

## 2024-05-24 PROCEDURE — 83036 HEMOGLOBIN GLYCOSYLATED A1C: CPT | Performed by: FAMILY MEDICINE

## 2024-05-24 NOTE — PROGRESS NOTES
Ambulatory Visit  Name: Abhishek Gimenez      : 1955      MRN: [id number]  Encounter Provider: Winston Smith MD  Encounter Date: 2024   Encounter department: [facility]    Assessment & Plan   1. Type 2 diabetes mellitus without complication, without long-term current use of insulin (HCC)  -     POCT hemoglobin A1c- 5.9  Recommend to decrease lantus 2 units every 2-3 days  Will start ozempic once per week  -     insulin glargine (LANTUS SOLOSTAR) 100 units/mL injection pen; Inject 12 Units under the skin daily at bedtime  -     semaglutide, 0.25 or 0.5 mg/dose, (Ozempic, 0.25 or 0.5 MG/DOSE,) 2 mg/3 mL injection pen; 0.25 mg under the skin every 7 days for 4 doses (28 days), THEN 0.5 mg under the skin every 7 days    2. Platelets decreased (HCC)  Repeat cbc  Denies any bleeding episodes    3. Epigastric pain  -     US abdomen complete; Future; Expected date: 2024  -     Comprehensive metabolic panel; Future  -     Lipase; Future  -     H. pylori antigen, stool; Future    4. Lump of skin of back  -     US superficial lump (non extremity); Future; Expected date: 2024    5. Elevated blood pressure reading without diagnosis of hypertension  Advise to measure BP daily if elevated above 140/90 mmhg recommend to start medications    F/U in 3 months       History of Present Illness     Patient is here to follow up for Type 2 DM. Currently on insulin 12 units daily and also on metformin. Has been following a low carb diet.  Also has epigastric pain no relationship to food or exertion.  Has lumps on his skin, back.  Also has borderline elevated BP denies any symptoms related to this.      Review of Systems   Constitutional:  Negative for activity change, appetite change, fatigue and fever.   HENT:  Negative for congestion and ear discharge.    Respiratory:  Negative for cough and shortness of breath.    Cardiovascular:  Negative for chest pain and palpitations.    Gastrointestinal:  Negative for diarrhea and nausea.   Musculoskeletal:  Negative for arthralgias and back pain.   Skin:  Negative for color change and rash.   Neurological:  Negative for dizziness and headaches.   Hematological:  Positive for adenopathy.   Psychiatric/Behavioral:  Negative for agitation and behavioral problems.      Past Medical History:   Diagnosis Date   • A-fib (HCC)    • Arthritis    • BPH (benign prostatic hyperplasia)    • Colon polyp    • Diabetes mellitus (HCC)    • Fatty liver    • Fracture, foot    • High cholesterol    • Hyperlipidemia    • Irregular heart beat    • Non-ischemic cardiomyopathy (HCC)    • Osteoarthritis    • Psoriasis    • Psoriatic arthritis (HCC)    • Pulmonary HTN (HCC)      Past Surgical History:   Procedure Laterality Date   • CARDIAC ELECTROPHYSIOLOGY PROCEDURE N/A 02/22/2023    Procedure: Cardiac eps/afib ablation   comprehensive posterior wall;  Surgeon: yMnor Andrews MD;  [facility]: [facility];  Service: Cardiology   • COLONOSCOPY     • ELBOW SURGERY Right    • TRANSURETHRAL RESECTION OF PROSTATE      last assessed 7/21/15     Family History   Problem Relation Age of Onset   • Diabetes Mother    • Hypertension Father    • Diabetes Family      Social History     Tobacco Use   • Smoking status: Former     Current packs/day: 0.50     Average packs/day: 0.5 packs/day for 10.0 years (5.0 ttl pk-yrs)     Types: Cigarettes   • Smokeless tobacco: Never   Vaping Use   • Vaping status: Never Used   Substance and Sexual Activity   • Alcohol use: Yes     Comment: 1 drink daily   • Drug use: No   • Sexual activity: Not on file     Current Outpatient Medications on File Prior to Visit   Medication Sig   • [DISCONTINUED] insulin glargine (LANTUS SOLOSTAR) 100 units/mL injection pen Inject 26 Units under the skin daily at bedtime (Patient taking differently: Inject 12 Units under the skin daily at bedtime)   • adalimumab (HUMIRA) 40 mg/0.8 mL PSKT Inject 40 mg under the  skin every 14 (fourteen) days    • Alcohol Swabs 70 % PADS May substitute brand based on insurance coverage. Check glucose TID.   • Blood Glucose Monitoring Suppl (OneTouch Verio Reflect) w/Device KIT Check blood sugars once daily. Please substitute with appropriate alternative as covered by patient's insurance. Dx: E11.65   • Continuous Blood Gluc  (FreeStyle Livia 2 [place]) LEANA 14 DAY READER NOT MADE   • Continuous Glucose Sensor (FreeStyle Livia 14 Day Sensor) MISC Use 1 Device 3 (three) times a day before meals   • glucose blood (OneTouch Verio) test strip Check blood sugars once daily. Please substitute with appropriate alternative as covered by patient's insurance. Dx: E11.65   • Insulin Pen Needle (BD Pen Needle Mallory 2nd Gen) 32G X 4 MM MISC For use with insulin pen. Pharmacy may dispense brand covered by insurance.   • Insulin Pen Needle (BD Pen Needle Mallory U/F) 32G X 4 MM MISC Use daily as directed with insulin pen   • metFORMIN (GLUCOPHAGE-XR) 500 mg 24 hr tablet Take 1 tablet (500 mg total) by mouth daily with breakfast for 7 days, THEN 2 tablets (1,000 mg total) daily with breakfast.   • OneTouch Delica Lancets 33G MISC Check blood sugars once daily. Please substitute with appropriate alternative as covered by patient's insurance. Dx: E11.65   • rosuvastatin (CRESTOR) 5 mg tablet Take 1 tablet (5 mg total) by mouth every evening   • sildenafil (VIAGRA) 100 mg tablet TAKE ONE (1) TABLET BY MOUTH DAILY AS NEEDED   • [DISCONTINUED] HumaLOG KwikPen 100 units/mL injection pen INJECT 6 UNITS 3X A DAY WITH MEALS     Allergies   Allergen Reactions   • Penicillins Rash     Immunization History   Administered Date(s) Administered   • COVID-19 MODERNA VACC 0.5 ML IM 03/31/2021, 04/28/2021, 12/16/2021, 12/01/2022   • COVID-19 Moderna mRNA Vaccine 12 Yr+ 50 mcg/0.5 mL (Spikevax) 11/13/2023   • Influenza Injectable, MDCK, Preservative Free, Quadrivalent, 0.5 mL 09/25/2019   • Influenza, recombinant,  "quadrivalent,injectable, preservative free 10/12/2022   • Pneumococcal Conjugate 13-Valent 01/06/2020   • Tdap 07/21/2015, 06/07/2023     Objective     /78   Pulse (!) 48   Temp 97.9 °F (36.6 °C)   Ht 5' 11\" (1.803 m)   Wt 83.9 kg (185 lb)   SpO2 97%   BMI 25.80 kg/m²     Physical Exam  Constitutional:       General: He is not in acute distress.     Appearance: He is well-developed. He is not diaphoretic.   Eyes:      General: No scleral icterus.     Pupils: Pupils are equal, round, and reactive to light.   Cardiovascular:      Rate and Rhythm: Normal rate and regular rhythm.      Heart sounds: Normal heart sounds. No murmur heard.  Pulmonary:      Effort: Pulmonary effort is normal. No respiratory distress.      Breath sounds: Normal breath sounds. No wheezing.   Abdominal:      General: Bowel sounds are normal. There is no distension.      Palpations: Abdomen is soft.      Tenderness: There is no abdominal tenderness.   Musculoskeletal:      Comments: Small lumps noted on back, mobile. Non tender   Skin:     General: Skin is warm and dry.      Findings: No rash.   Neurological:      Mental Status: He is alert and oriented to person, place, and time.       Administrative Statements         "

## 2024-05-28 ENCOUNTER — TELEPHONE (OUTPATIENT)
Age: 69
End: 2024-05-28

## 2024-05-28 NOTE — TELEPHONE ENCOUNTER
PA for Ozempic, 0.25 or 0.5 MG/DOSE, 2 mg/3 mL     Submitted via    []Subtext-KEY   [x][employer]-Case ID # [id number]   []Faxed to plan   []Other website   []Phone call Case ID #     Office notes sent, clinical questions answered. Awaiting determination    Turnaround time for your insurance to make a decision on your Prior Authorization can take 7-21 business days.            Statement Selected

## 2024-05-28 NOTE — TELEPHONE ENCOUNTER
PA for Ozempic, 0.25 or 0.5 MG/DOSE, 2 mg/3 mL  Approved   Date(s) approved through 12/31/24  Case #    Patient advised by [x] [employer] Message                      [x] Phone call       Pharmacy advised by [x]Fax                                     []Phone call    Approval letter scanned into [employer] Yes

## 2024-05-30 ENCOUNTER — APPOINTMENT (OUTPATIENT)
Dept: LAB | Facility: HOSPITAL | Age: 69
End: 2024-05-30
Payer: COMMERCIAL

## 2024-05-30 ENCOUNTER — TELEPHONE (OUTPATIENT)
Age: 69
End: 2024-05-30

## 2024-05-30 DIAGNOSIS — R10.13 EPIGASTRIC PAIN: ICD-10-CM

## 2024-05-30 LAB
ALBUMIN SERPL BCP-MCNC: 4.1 G/DL (ref 3.5–5)
ALP SERPL-CCNC: 63 U/L (ref 34–104)
ALT SERPL W P-5'-P-CCNC: 27 U/L (ref 7–52)
ANION GAP SERPL CALCULATED.3IONS-SCNC: 6 MMOL/L (ref 4–13)
AST SERPL W P-5'-P-CCNC: 23 U/L (ref 13–39)
BILIRUB SERPL-MCNC: 0.67 MG/DL (ref 0.2–1)
BUN SERPL-MCNC: 22 MG/DL (ref 5–25)
CALCIUM SERPL-MCNC: 9.4 MG/DL (ref 8.4–10.2)
CHLORIDE SERPL-SCNC: 103 MMOL/L (ref 96–108)
CO2 SERPL-SCNC: 31 MMOL/L (ref 21–32)
CREAT SERPL-MCNC: 0.81 MG/DL (ref 0.6–1.3)
GFR SERPL CREATININE-BSD FRML MDRD: 92 ML/MIN/1.73SQ M
GLUCOSE P FAST SERPL-MCNC: 106 MG/DL (ref 65–99)
LIPASE SERPL-CCNC: 36 U/L (ref 11–82)
POTASSIUM SERPL-SCNC: 3.9 MMOL/L (ref 3.5–5.3)
PROT SERPL-MCNC: 7.1 G/DL (ref 6.4–8.4)
SODIUM SERPL-SCNC: 140 MMOL/L (ref 135–147)

## 2024-05-30 PROCEDURE — 36415 COLL VENOUS BLD VENIPUNCTURE: CPT

## 2024-05-30 PROCEDURE — 83690 ASSAY OF LIPASE: CPT

## 2024-05-30 PROCEDURE — 87338 HPYLORI STOOL AG IA: CPT

## 2024-05-30 PROCEDURE — 80053 COMPREHEN METABOLIC PANEL: CPT

## 2024-05-30 NOTE — TELEPHONE ENCOUNTER
Pt asking if he should be taking both the lantus and the Ozempic or just the Ozempic.      Please advise

## 2024-05-31 LAB — H PYLORI AG STL QL IA: NEGATIVE

## 2024-06-03 ENCOUNTER — TELEPHONE (OUTPATIENT)
Dept: FAMILY MEDICINE CLINIC | Facility: CLINIC | Age: 69
End: 2024-06-03

## 2024-06-03 NOTE — TELEPHONE ENCOUNTER
----- Message from Winston Smith MD sent at 6/2/2024  1:53 PM EDT -----  Stool study for H Pylori which is bacteria that causes stomach ulcers was negative advise pt.    Liver enzyme and pancreatic enzymes both normal advise pt.

## 2024-06-10 ENCOUNTER — HOSPITAL ENCOUNTER (OUTPATIENT)
Dept: ULTRASOUND IMAGING | Facility: HOSPITAL | Age: 69
Discharge: HOME | End: 2024-06-10
Attending: FAMILY MEDICINE
Payer: COMMERCIAL

## 2024-06-10 DIAGNOSIS — R22.2 LUMP OF SKIN OF BACK: ICD-10-CM

## 2024-06-10 PROCEDURE — 76705 ECHO EXAM OF ABDOMEN: CPT

## 2024-06-20 DIAGNOSIS — D17.9 LIPOMA, UNSPECIFIED SITE: Primary | ICD-10-CM

## 2024-06-24 ENCOUNTER — VBI (OUTPATIENT)
Dept: ADMINISTRATIVE | Facility: OTHER | Age: 69
End: 2024-06-24

## 2024-06-24 ENCOUNTER — OFFICE VISIT (OUTPATIENT)
Dept: SURGERY | Facility: CLINIC | Age: 69
End: 2024-06-24
Payer: COMMERCIAL

## 2024-06-24 VITALS
TEMPERATURE: 97.5 F | DIASTOLIC BLOOD PRESSURE: 72 MMHG | OXYGEN SATURATION: 98 % | HEART RATE: 94 BPM | RESPIRATION RATE: 18 BRPM | SYSTOLIC BLOOD PRESSURE: 118 MMHG | HEIGHT: 71 IN | WEIGHT: 180.4 LBS | BODY MASS INDEX: 25.26 KG/M2

## 2024-06-24 DIAGNOSIS — D17.9 LIPOMA, UNSPECIFIED SITE: ICD-10-CM

## 2024-06-24 PROCEDURE — 99203 OFFICE O/P NEW LOW 30 MIN: CPT | Performed by: SURGERY

## 2024-06-24 NOTE — PROGRESS NOTES
Consult- General Surgery   Abhishek Gimenez 68 y.o. male MRN: [id number]  Unit/Bed#:  Encounter: [id number]    Assessment & Plan     Assessment:  Multiple subcutaneous lipomas  History of A-fib  History of arthritis  History of BPH  History of diabetes  History of hypercholesterolemia  History of psoriasis  History of pulmonary hypertension  Plan:  Patient had several around 1 cm subcutaneous lipomas, he is completely asymptomatic from them.  Ultrasound from the lumps on the back confirmed lipomas, no further workup intervention is needed at this time.  Patient was advised to contact our office should he become symptomatic or the lumps become larger.  He is in complete agreement.    History of Present Illness     HPI:  Abhishek Gimenez is a 68 y.o. male who presents to my office for evaluation of subcutaneous lumps.  The patient is known to have multiple subcutaneous lump for many years, he was recently diagnosed with diabetes and he lost over 20 pounds, he noted that the lumps have decreased in size since then.  He stated that the lumps do not cause any discomfort or pain.  He went to see his primary care physician and ultrasound of the lumps from the back revealed findings consistent with lipomas.  The patient was referred to us for surgical evaluation.    Review of Systems  The rest of the review of system total of 10 were negative except for the HPI.    Historical Information   Past Medical History:   Diagnosis Date    A-fib (HCC)     Arthritis     BPH (benign prostatic hyperplasia)     Colon polyp     Diabetes mellitus (HCC)     Fatty liver     Fracture, foot     High cholesterol     Hyperlipidemia     Irregular heart beat     Non-ischemic cardiomyopathy (HCC)     Osteoarthritis     Psoriasis     Psoriatic arthritis (HCC)     Pulmonary HTN (HCC)      Past Surgical History:   Procedure Laterality Date    CARDIAC ELECTROPHYSIOLOGY PROCEDURE N/A 02/22/2023    Procedure: Cardiac eps/afib ablation   comprehensive  posterior wall;  Surgeon: Mynor Andrews MD;  Location: [facility];  Service: Cardiology    COLONOSCOPY      ELBOW SURGERY Right     KIDNEY STONE SURGERY      TRANSURETHRAL RESECTION OF PROSTATE      last assessed 7/21/15     Social History   Social History     Substance and Sexual Activity   Alcohol Use Yes    Comment: 1 drink daily     Social History     Substance and Sexual Activity   Drug Use No     Social History     Tobacco Use   Smoking Status Former    Current packs/day: 0.50    Average packs/day: 0.5 packs/day for 10.0 years (5.0 ttl pk-yrs)    Types: Cigarettes    Passive exposure: Past   Smokeless Tobacco Never     Family History: non-contributory    Meds/Allergies   all medications and allergies reviewed     Current Outpatient Medications:     adalimumab (HUMIRA) 40 mg/0.8 mL PSKT, Inject 40 mg under the skin every 14 (fourteen) days , Disp: , Rfl:     Alcohol Swabs 70 % PADS, May substitute brand based on insurance coverage. Check glucose TID., Disp: 100 each, Rfl: 0    Blood Glucose Monitoring Suppl (OneTouch Verio Reflect) w/Device KIT, Check blood sugars once daily. Please substitute with appropriate alternative as covered by patient's insurance. Dx: E11.65, Disp: 1 kit, Rfl: 0    Continuous Blood Gluc  (FreeStyle Livia 2 [place]) LEANA, 14 DAY READER NOT MADE, Disp: 1 each, Rfl: 1    Continuous Glucose Sensor (FreeStyle Livia 14 Day Sensor) MISC, Use 1 Device 3 (three) times a day before meals, Disp: 2 each, Rfl: 5    glucose blood (OneTouch Verio) test strip, Check blood sugars once daily. Please substitute with appropriate alternative as covered by patient's insurance. Dx: E11.65, Disp: 100 each, Rfl: 3    Insulin Pen Needle (BD Pen Needle Mallory 2nd Gen) 32G X 4 MM MISC, For use with insulin pen. Pharmacy may dispense brand covered by insurance., Disp: 100 each, Rfl: 0    Insulin Pen Needle (BD Pen Needle Mallory U/F) 32G X 4 MM MISC, Use daily as directed with insulin pen, Disp: 100  "each, Rfl: 3    metFORMIN (GLUCOPHAGE-XR) 500 mg 24 hr tablet, Take 1 tablet (500 mg total) by mouth daily with breakfast for 7 days, THEN 2 tablets (1,000 mg total) daily with breakfast., Disp: 180 tablet, Rfl: 0    OneTouch Delica Lancets 33G MISC, Check blood sugars once daily. Please substitute with appropriate alternative as covered by patient's insurance. Dx: E11.65, Disp: 100 each, Rfl: 3    rosuvastatin (CRESTOR) 5 mg tablet, Take 1 tablet (5 mg total) by mouth every evening, Disp: 90 tablet, Rfl: 1    semaglutide, 0.25 or 0.5 mg/dose, (Ozempic, 0.25 or 0.5 MG/DOSE,) 2 mg/3 mL injection pen, 0.25 mg under the skin every 7 days for 4 doses (28 days), THEN 0.5 mg under the skin every 7 days, Disp: 9 mL, Rfl: 0    sildenafil (VIAGRA) 100 mg tablet, TAKE ONE (1) TABLET BY MOUTH DAILY AS NEEDED, Disp: , Rfl:   Allergies   Allergen Reactions    Penicillins Rash       Objective     Current Vitals:   Blood Pressure: 118/72 (06/24/24 0902)  Pulse: 94 (06/24/24 0902)  Temperature: 97.5 °F (36.4 °C) (06/24/24 0902)  Respirations: 18 (06/24/24 0902)  Height: 5' 11\" (180.3 cm) (06/24/24 0902)  Weight - Scale: 81.8 kg (180 lb 6.4 oz) (06/24/24 0902)  SpO2: 98 % (06/24/24 0902)    Physical Exam  Vitals and nursing note reviewed.   Constitutional:       General: He is not in acute distress.  Cardiovascular:      Rate and Rhythm: Normal rate and regular rhythm.   Pulmonary:      Effort: No respiratory distress.      Breath sounds: Normal breath sounds.   Abdominal:      Palpations: Abdomen is soft. There is no mass.      Tenderness: There is no abdominal tenderness.   Skin:     General: Skin is warm.      Coloration: Skin is not jaundiced.      Findings: No erythema or rash.      Comments: Patient had multiple subcutaneous soft tissue masses measuring approximately 1 cm each, he had at least 3 on the back, right arm, right thigh, they all freely mobile, nontender to palpation, no skin color changes, no evidence of infection " or drainage.   Neurological:      Mental Status: He is alert and oriented to person, place, and time.      Cranial Nerves: No cranial nerve deficit.   Psychiatric:         Mood and Affect: Mood normal.         Behavior: Behavior normal.       Imaging: I have personally reviewed pertinent reports.   and I have personally reviewed pertinent films in PACS  Procedure: US superficial lump (non extremity)    Result Date: 6/18/2024  Narrative: CHEST WALL ULTRASOUND INDICATION: R22.2: Localized swelling, mass and lump, trunk. COMPARISON: None TECHNIQUE: Real-time ultrasound of the left upper posterior chest wall was performed with a linear transducer with both volumetric sweeps and still imaging techniques. FINDINGS: Numerous subcutaneous well-circumscribed lesions with echogenicity similar to the surrounding fat are likely lipomas. These are located in the left upper and right upper posterior chest wall. The patient was unsure of the exact location of the palpable abnormality.     Impression: Multiple suspected left upper and right upper posterior chest wall lipomas. Workstation performed: [id number]

## 2024-06-24 NOTE — TELEPHONE ENCOUNTER
06/24/24 1:09 PM     Chart reviewed for CRC: Colonoscopy ; nothing is submitted to the patient's insurance at this time.     Tessy Martin MA   [facility]

## 2024-06-27 ENCOUNTER — TELEPHONE (OUTPATIENT)
Age: 69
End: 2024-06-27

## 2024-06-27 NOTE — TELEPHONE ENCOUNTER
"Patient called, states he has been taking the 0.25mg dosing of ozempic for the last 4 weeks. States his \"numbers are good.\" Wondering if he should increase his dose.     RN advised patient of the instructions written on the prescription:    0.25 mg under the skin every 7 days for 4 doses (28 days), THEN 0.5 mg under the skin every 7 days     Patient verbalized understanding, just wanted to check with provider and make sure. No further questions/concerns at this time.    "

## 2024-07-07 ENCOUNTER — TELEPHONE (OUTPATIENT)
Dept: FAMILY MEDICINE CLINIC | Facility: CLINIC | Age: 69
End: 2024-07-07

## 2024-07-07 ENCOUNTER — HOSPITAL ENCOUNTER (OUTPATIENT)
Dept: ULTRASOUND IMAGING | Facility: HOSPITAL | Age: 69
Discharge: HOME | End: 2024-07-07
Attending: FAMILY MEDICINE
Payer: COMMERCIAL

## 2024-07-07 DIAGNOSIS — R10.13 EPIGASTRIC PAIN: ICD-10-CM

## 2024-07-07 PROCEDURE — 76700 US EXAM ABDOM COMPLETE: CPT

## 2024-07-07 NOTE — TELEPHONE ENCOUNTER
Received message regarding immediate findings on patient's abdominal US as the provider on call; ordered by PCP 5/24/24 for epigastric pain.     US showed a pancreatic head lesion that is concerning for malignancy with several heterogenous liver masses concerning for potential metastasis; they are recommending an MRI of the abdomen for further evaluation.   There is no emergent action needed at this time; therefore will defer to PCP for next steps once back in office tomorrow.    Also shows gallbladder sludge however no findings to suggest cholecystitis at this time.     Did try to call patient twice to discuss results; got no answer however left voicemail advising that if he had any worsening abdominal pain (low suspicion as this was ordered over a month ago), that I would recommend the ER for further evaluation, however if unchanged that the office will call him tomorrow regarding results and next steps. If patient has any emergent questions/concerns, advised to call on-call contact.     Will send to PCP (ordering provider) for further management.

## 2024-07-08 DIAGNOSIS — K76.9 LIVER LESION: ICD-10-CM

## 2024-07-08 DIAGNOSIS — K86.9 PANCREATIC LESION: Primary | ICD-10-CM

## 2024-07-09 DIAGNOSIS — D37.8 NEOPLASM OF UNCERTAIN BEHAVIOR OF OTHER SPECIFIED DIGESTIVE ORGANS: ICD-10-CM

## 2024-07-09 DIAGNOSIS — K86.89 PANCREATIC MASS: Primary | ICD-10-CM

## 2024-07-09 DIAGNOSIS — K86.9 PANCREATIC LESION: Primary | ICD-10-CM

## 2024-07-10 ENCOUNTER — HOSPITAL ENCOUNTER (OUTPATIENT)
Dept: MRI IMAGING | Facility: HOSPITAL | Age: 69
Discharge: HOME | End: 2024-07-10
Payer: COMMERCIAL

## 2024-07-10 DIAGNOSIS — K86.9 PANCREATIC LESION: ICD-10-CM

## 2024-07-10 PROCEDURE — A9585 GADOBUTROL INJECTION: HCPCS | Performed by: FAMILY MEDICINE

## 2024-07-10 PROCEDURE — 74183 MRI ABD W/O CNTR FLWD CNTR: CPT

## 2024-07-10 RX ORDER — GADOBUTROL 604.72 MG/ML
8 INJECTION INTRAVENOUS
Status: COMPLETED | OUTPATIENT
Start: 2024-07-10 | End: 2024-07-10

## 2024-07-10 RX ADMIN — GADOBUTROL 8 ML: 604.72 INJECTION INTRAVENOUS at 12:08

## 2024-07-11 ENCOUNTER — APPOINTMENT (OUTPATIENT)
Dept: LAB | Facility: CLINIC | Age: 69
End: 2024-07-11
Payer: COMMERCIAL

## 2024-07-11 DIAGNOSIS — D37.8 NEOPLASM OF UNCERTAIN BEHAVIOR OF OTHER SPECIFIED DIGESTIVE ORGANS: ICD-10-CM

## 2024-07-11 DIAGNOSIS — K76.9 LIVER LESION: ICD-10-CM

## 2024-07-11 DIAGNOSIS — K86.9 PANCREATIC LESION: ICD-10-CM

## 2024-07-11 DIAGNOSIS — R10.13 EPIGASTRIC PAIN: Primary | ICD-10-CM

## 2024-07-11 DIAGNOSIS — K86.89 PANCREATIC MASS: ICD-10-CM

## 2024-07-11 LAB
ALBUMIN SERPL BCG-MCNC: 4.3 G/DL (ref 3.5–5)
ALP SERPL-CCNC: 81 U/L (ref 34–104)
ALT SERPL W P-5'-P-CCNC: 28 U/L (ref 7–52)
ANION GAP SERPL CALCULATED.3IONS-SCNC: 7 MMOL/L (ref 4–13)
AST SERPL W P-5'-P-CCNC: 20 U/L (ref 13–39)
BASOPHILS # BLD AUTO: 0.04 THOUSANDS/ÂΜL (ref 0–0.1)
BASOPHILS NFR BLD AUTO: 1 % (ref 0–1)
BILIRUB SERPL-MCNC: 0.31 MG/DL (ref 0.2–1)
BUN SERPL-MCNC: 16 MG/DL (ref 5–25)
CALCIUM SERPL-MCNC: 9.7 MG/DL (ref 8.4–10.2)
CHLORIDE SERPL-SCNC: 103 MMOL/L (ref 96–108)
CO2 SERPL-SCNC: 28 MMOL/L (ref 21–32)
CREAT SERPL-MCNC: 0.86 MG/DL (ref 0.6–1.3)
EOSINOPHIL # BLD AUTO: 0.2 THOUSAND/ÂΜL (ref 0–0.61)
EOSINOPHIL NFR BLD AUTO: 3 % (ref 0–6)
ERYTHROCYTE [DISTWIDTH] IN BLOOD BY AUTOMATED COUNT: 12.6 % (ref 11.6–15.1)
GFR SERPL CREATININE-BSD FRML MDRD: 90 ML/MIN/1.73SQ M
GLUCOSE SERPL-MCNC: 104 MG/DL (ref 65–140)
HCT VFR BLD AUTO: 44.6 % (ref 36.5–49.3)
HGB BLD-MCNC: 15.5 G/DL (ref 12–17)
IMM GRANULOCYTES # BLD AUTO: 0.02 THOUSAND/UL (ref 0–0.2)
IMM GRANULOCYTES NFR BLD AUTO: 0 % (ref 0–2)
LIPASE SERPL-CCNC: 46 U/L (ref 11–82)
LYMPHOCYTES # BLD AUTO: 1.74 THOUSANDS/ÂΜL (ref 0.6–4.47)
LYMPHOCYTES NFR BLD AUTO: 28 % (ref 14–44)
MCH RBC QN AUTO: 31.6 PG (ref 26.8–34.3)
MCHC RBC AUTO-ENTMCNC: 34.8 G/DL (ref 31.4–37.4)
MCV RBC AUTO: 91 FL (ref 82–98)
MONOCYTES # BLD AUTO: 0.6 THOUSAND/ÂΜL (ref 0.17–1.22)
MONOCYTES NFR BLD AUTO: 10 % (ref 4–12)
NEUTROPHILS # BLD AUTO: 3.69 THOUSANDS/ÂΜL (ref 1.85–7.62)
NEUTS SEG NFR BLD AUTO: 58 % (ref 43–75)
NRBC BLD AUTO-RTO: 0 /100 WBCS
PLATELET # BLD AUTO: 153 THOUSANDS/UL (ref 149–390)
PMV BLD AUTO: 10.8 FL (ref 8.9–12.7)
POTASSIUM SERPL-SCNC: 3.8 MMOL/L (ref 3.5–5.3)
PROT SERPL-MCNC: 7.4 G/DL (ref 6.4–8.4)
RBC # BLD AUTO: 4.91 MILLION/UL (ref 3.88–5.62)
SODIUM SERPL-SCNC: 138 MMOL/L (ref 135–147)
WBC # BLD AUTO: 6.29 THOUSAND/UL (ref 4.31–10.16)

## 2024-07-11 PROCEDURE — 86301 IMMUNOASSAY TUMOR CA 19-9: CPT

## 2024-07-11 PROCEDURE — 80053 COMPREHEN METABOLIC PANEL: CPT

## 2024-07-11 PROCEDURE — 85025 COMPLETE CBC W/AUTO DIFF WBC: CPT

## 2024-07-11 PROCEDURE — 83690 ASSAY OF LIPASE: CPT

## 2024-07-11 PROCEDURE — 36415 COLL VENOUS BLD VENIPUNCTURE: CPT

## 2024-07-11 RX ORDER — PANTOPRAZOLE SODIUM 40 MG/1
40 TABLET, DELAYED RELEASE ORAL
Qty: 30 TABLET | Refills: 5 | Status: SHIPPED | OUTPATIENT
Start: 2024-07-11 | End: 2025-01-07

## 2024-07-12 ENCOUNTER — TELEPHONE (OUTPATIENT)
Age: 69
End: 2024-07-12

## 2024-07-13 LAB — CANCER AG19-9 SERPL-ACNC: 382 U/ML (ref 0–35)

## 2024-07-15 ENCOUNTER — OFFICE VISIT (OUTPATIENT)
Dept: GASTROENTEROLOGY | Facility: CLINIC | Age: 69
End: 2024-07-15
Payer: COMMERCIAL

## 2024-07-15 VITALS
WEIGHT: 177.2 LBS | SYSTOLIC BLOOD PRESSURE: 138 MMHG | BODY MASS INDEX: 24.81 KG/M2 | TEMPERATURE: 97.9 F | HEIGHT: 71 IN | DIASTOLIC BLOOD PRESSURE: 78 MMHG | HEART RATE: 51 BPM | OXYGEN SATURATION: 98 %

## 2024-07-15 DIAGNOSIS — K86.1 CHRONIC PANCREATITIS, UNSPECIFIED PANCREATITIS TYPE (HCC): Primary | ICD-10-CM

## 2024-07-15 DIAGNOSIS — R10.13 EPIGASTRIC PAIN: ICD-10-CM

## 2024-07-15 PROCEDURE — 99204 OFFICE O/P NEW MOD 45 MIN: CPT | Performed by: INTERNAL MEDICINE

## 2024-07-15 NOTE — PROGRESS NOTES
[facility] - Outpatient Note  Abhishek Gimenez 68 y.o. male MRN: [id number]  Encounter: [id number]      ASSESSMENT AND PLAN:    Abhishek Gimenez is a 68 y.o. old pleasant male with PMH of recently diagnosed DM (on ozempic), alcohol use who presents for followup    Concern for pancreatic cancer   Acute pancreatitis, possible chronic pancreatitis  Abdominal pain, weight loss, decreased appetite  Alcohol Use -MRI shows multiple findings including area of hypoenhancement in the pancreatic body which may be from chronic pancreatitis and fibrosis however cannot exclude neoplasm and also shows acute pancreatitis, chronic splenic vein thrombosis. Overall I am concerned for pancreatic cancer given new onset diabetes, abdominal pain, weight loss and elevated CA 19-9 of 382 with recent imaging. He has been on Ozempic for the past month however he states his symptoms started in February.  His MRI shows changes concerning for chronic pancreatitis so this may be causing some of his symptoms as well.  Plan for urgent EGD/EUS in 4 weeks to evaluate for pancreatic cancer, chronic pancreatitis, abdominal pain and to evaluate for gastric varices given chronic SVT.  I let him know not to get this done before 4 weeks as he did have acute pancreatitis on his recent MRI and I would like this to heal to help with diagnostic yield of EUS. Will need to hold ozempic for 7 days.  Alcohol cessation strictly.  He is agreeable to starting this.  Check fecal calprotectin and IgG4  Continue Protonix 40 mg daily which she recently started 3 days ago.          1. Epigastric pain    - Ambulatory referral to Gastroenterology    ______________________________________________________________________    SUBJECTIVE:      3-4 drinks every other day    Epigasric/LUQ pain since February, slighletly worsened dull pain, all day every other day, better with tyelnol, worsened intermittenly with food, 25 lbs weight loss (somewhat  intentional but this exceeds how much weight loss he was asked), decreased appeitie.  He started Ozempic over the past month related symptoms preceded this.  He is currently drinking 3-4 beers every other day.  He does not use tobacco and he last used tobacco 40 years ago but was not a regular smoker.  No fevers chills.\      Last colonoscopy in 2022 showed multiple subcentimeter colon polyps with repeat recommended in 5 years.  No previous EGD.    I reviewed prior external notes    I reviewed previous lab results and images      REVIEW OF SYSTEMS:     REVIEW OF ALL OTHER SYSTEMS IS OTHERWISE NEGATIVE.      Historical Information   Past Medical History:   Diagnosis Date    A-fib (HCC)     Arthritis     BPH (benign prostatic hyperplasia)     Colon polyp     Diabetes mellitus (HCC)     Fatty liver     Fracture, foot     High cholesterol     Hyperlipidemia     Irregular heart beat     Non-ischemic cardiomyopathy (HCC)     Osteoarthritis     Psoriasis     Psoriatic arthritis (HCC)     Pulmonary HTN (HCC)      Past Surgical History:   Procedure Laterality Date    CARDIAC ELECTROPHYSIOLOGY PROCEDURE N/A 02/22/2023    Procedure: Cardiac eps/afib ablation   comprehensive posterior wall;  Surgeon: Mynor Andrews MD;  Location: [facility];  Service: Cardiology    COLONOSCOPY      ELBOW SURGERY Right     KIDNEY STONE SURGERY      TRANSURETHRAL RESECTION OF PROSTATE      last assessed 7/21/15     Social History   Social History     Substance and Sexual Activity   Alcohol Use Not Currently    Comment: 1 drink daily ( pt stopped drinking in July)     Social History     Substance and Sexual Activity   Drug Use No     Social History     Tobacco Use   Smoking Status Former    Current packs/day: 0.50    Average packs/day: 0.5 packs/day for 10.0 years (5.0 ttl pk-yrs)    Types: Cigarettes    Passive exposure: Past   Smokeless Tobacco Never     Family History   Problem Relation Age of Onset    Diabetes Mother     Hypertension  "Father     Diabetes Family        Meds/Allergies       Current Outpatient Medications:     adalimumab (HUMIRA) 40 mg/0.8 mL PSKT    Alcohol Swabs 70 % PADS    Blood Glucose Monitoring Suppl (OneTouch Verio Reflect) w/Device KIT    Continuous Blood Gluc  (FreeStyle Livia 2 [place]) LEANA    Continuous Glucose Sensor (FreeStyle Livia 14 Day Sensor) MISC    glucose blood (OneTouch Verio) test strip    Insulin Pen Needle (BD Pen Needle Mallory 2nd Gen) 32G X 4 MM MISC    Insulin Pen Needle (BD Pen Needle Mallory U/F) 32G X 4 MM MISC    metFORMIN (GLUCOPHAGE-XR) 500 mg 24 hr tablet    OneTouch Delica Lancets 33G MISC    pantoprazole (PROTONIX) 40 mg tablet    rosuvastatin (CRESTOR) 5 mg tablet    semaglutide, 0.25 or 0.5 mg/dose, (Ozempic, 0.25 or 0.5 MG/DOSE,) 2 mg/3 mL injection pen    sildenafil (VIAGRA) 100 mg tablet    Allergies   Allergen Reactions    Penicillins Rash           Objective     Blood pressure 138/78, pulse (!) 51, temperature 97.9 °F (36.6 °C), temperature source Temporal, height 5' 11\" (1.803 m), weight 80.4 kg (177 lb 3.2 oz), SpO2 98%. Body mass index is 24.71 kg/m².      PHYSICAL EXAM:      General Appearance:   Alert, cooperative, no distress   HEENT:   Normocephalic, atraumatic, anicteric.     Neck:  Supple, symmetrical, trachea midline   Lungs:   Clear to auscultation bilaterally; no rales, rhonchi or wheezing; respirations unlabored    Heart::   Regular rate and rhythm; no murmur, rub, or gallop.   Abdomen:   Soft, non-tender, non-distended; normal bowel sounds; no masses, no organomegaly    Genitalia:   Deferred    Rectal:   Deferred    Extremities:  No cyanosis, clubbing or edema    Pulses:  2+ and symmetric    Skin:  No jaundice, rashes, or lesions    Lymph nodes:  No palpable cervical lymphadenopathy        Lab Results:   No visits with results within 1 Day(s) from this visit.   Latest known visit with results is:   Appointment on 07/11/2024   Component Date Value    WBC 07/11/2024 6.29  "    RBC 07/11/2024 4.91     Hemoglobin 07/11/2024 15.5     Hematocrit 07/11/2024 44.6     MCV 07/11/2024 91     MCH 07/11/2024 31.6     MCHC 07/11/2024 34.8     RDW 07/11/2024 12.6     MPV 07/11/2024 10.8     Platelets 07/11/2024 153     nRBC 07/11/2024 0     Segmented % 07/11/2024 58     Immature Grans % 07/11/2024 0     Lymphocytes % 07/11/2024 28     Monocytes % 07/11/2024 10     Eosinophils Relative 07/11/2024 3     Basophils Relative 07/11/2024 1     Absolute Neutrophils 07/11/2024 3.69     Absolute Immature Grans 07/11/2024 0.02     Absolute Lymphocytes 07/11/2024 1.74     Absolute Monocytes 07/11/2024 0.60     Eosinophils Absolute 07/11/2024 0.20     Basophils Absolute 07/11/2024 0.04     Sodium 07/11/2024 138     Potassium 07/11/2024 3.8     Chloride 07/11/2024 103     CO2 07/11/2024 28     ANION GAP 07/11/2024 7     BUN 07/11/2024 16     Creatinine 07/11/2024 0.86     Glucose 07/11/2024 104     Calcium 07/11/2024 9.7     AST 07/11/2024 20     ALT 07/11/2024 28     Alkaline Phosphatase 07/11/2024 81     Total Protein 07/11/2024 7.4     Albumin 07/11/2024 4.3     Total Bilirubin 07/11/2024 0.31     eGFR 07/11/2024 90     Lipase 07/11/2024 46     CA 19-9 07/11/2024 382 (H)          Radiology Results:   MRI abdomen w wo contrast and mrcp    Result Date: 7/10/2024  Narrative: MRI OF THE ABDOMEN WITH AND WITHOUT CONTRAST WITH MRCP INDICATION: 68 years / Male. K86.9: Disease of pancreas, unspecified. Hypoechoic pancreatic head lesion on ultrasound concerning for malignancy. Presented with epigastric pain. COMPARISON: 7/7/2024 abdominal ultrasound TECHNIQUE: Multiplanar/multisequence MRI of the abdomen with 3D MRCP was performed before and after administration of contrast. IV Contrast: 8 mL of Gadobutrol injection (SINGLE-DOSE) Image quality:  Motion degrades images. FINDINGS: LOWER CHEST:   Within normal limits. LIVER: Normal signal intensity and morphology. 3.4 cm benign hemangioma adjacent to the IVC, segment  2/8. 1.0 and 0.4 cm right lobe nodules, segment 7/8, also most compatible with hemangiomas. Probable subcentimeter high medial dome hemangioma (7/8). Few, scattered, subcentimeter simple cysts. Non-mass arterial phase enhancement at the junction of the medial and lateral left lobe segments in the periphery of the right lobe typical of transient hepatic attenuation difference. May be due to vascular shunting. (11/41 and 64). Tiny focus of arterial phase enhancement at the right hepatic dome (11/20) not seen on any other image. 1.0 cm mildly T2 hyperintense right segment 5/6 nodule with complete arterial peripheral enhancement (7/22 and 11/59) without fill-in on delayed phases. Patent hepatic and portal veins. BILE DUCTS:  Normal caliber and morphology. GALLBLADDER:  Within normal limits. Patent cystic duct. SPLEEN:  Within normal limits. PANCREAS: Decreased pancreatic body and tail attenuation on T1-weighted images without IV contrast. Distal body and tail atrophy. Ill-defined 3.8 x 1.1 cm length by depth area of early hypoenhancement in the body (11/187). No pancreatic head mass identified. Evidence of surrounding edema. Several tiny cysts contiguous with the main duct in the body and tail. Measure up to 0.3 cm. No suspicious features. Only the proximal pancreatic duct is well seen, normal caliber and morphology. In the area of T1 hypointensity the duct is poorly visualized. No dilation. ADRENAL GLANDS:  Within normal limits. KIDNEYS/PROXIMAL URETERS:  Within normal limits. BOWEL:   Within normal limits. PERITONEUM/RETROPERITONEUM:  No ascites or fluid collections. LYMPH NODES: Trace perisplenic free fluid. No fluid collections. 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 Patent superior mesenteric vessels. Normal aortic caliber and enhancement. Developmental retroaortic left renal vein. BONES:  No suspicious osseous lesion. ABDOMINAL WALL:  Within normal limits.      Impression: Abnormal appearance of the pancreas described in detail above. Suspected changes of chronic pancreatitis. Area of hypoenhancement in the pancreatic body may be chronic pancreatitis and fibrosis. Cannot exclude neoplasm. No pancreatic head mass seen. Peripancreatic edema suggest superimposed acute interstitial edematous pancreatitis. Correlate clinically with serum amylase/lipase. 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 Several benign liver lesions described above. 2 additional lesions are indeterminant: Focus of enhancement at the right dome and peripherally enhancing right lobe nodule described above. No evidence of underlying liver disease to suggest hepatocellular carcinoma. Recommend correlation for any outside prior studies and short interval follow-up. Workstation performed: [id number]     US abdomen complete    Result Date: 7/7/2024  Narrative: ABDOMEN ULTRASOUND, COMPLETE INDICATION: R10.13: Epigastric pain. COMPARISON: Right upper quadrant ultrasound 3/30/2016. TECHNIQUE: Real-time ultrasound of the abdomen was performed with a curvilinear transducer with both volumetric sweeps and still imaging techniques. FINDINGS: PANCREAS: There is a 3.6 cm pancreatic head hypoechoic lesion AORTA AND IVC: Visualized portions are normal for patient age. LIVER: Size: Within normal range. The liver measures 15.3 cm in the midclavicular line. Contour: Surface contour is smooth. Parenchyma: Echogenicity and echotexture are within normal limits. Multiple heterogeneous hypoechoic and hyperechoic solid liver lesions measuring up to 3 cm in the right hepatic lobe. Few scattered cysts. Limited imaging of the main portal vein shows it to be patent and hepatopetal. BILIARY: The gallbladder is normal in caliber. Mild wall thickening without pericholecystic fluid. There is probable layering sludge without evidence for shadowing calculi. No sonographic Martinez sign. No intrahepatic biliary dilatation. CBD  measures 3.0 mm. No choledocholithiasis. KIDNEY: Right kidney measures 11.7 x 4.9 x 5.3 cm. Volume 156.9 mL Nonobstructing 6 mm lower pole calculus. Left kidney measures 11.3 x 5.5 x 5.4 cm. Volume 175.0 mL Scattered simple cysts measuring up to 1.4 cm. SPLEEN: Measures 12.8 x 11.3 x 5.6 cm. Volume 425.2 mL Top normal in size. Otherwise, within normal limits. ASCITES: None. Top normal gemma hepatic lymph node measuring up to 1 cm in short axis.     Impression: Hypoechoic pancreatic head lesion measuring approximately 3.6 cm, concerning for malignancy. Several heterogeneous solid liver lesions, concerning for metastases. MRI abdomen with and without contrast is recommended for further evaluation. Probable gallbladder sludge with mild gallbladder wall thickening, but no pericholecystic fluid or sonographic Martinez sign to suggest acute cholecystitis. The study was marked in EPIC for immediate notification. Workstation performed: [id number]     Answers submitted by the patient for this visit:  Abdominal Pain Questionnaire (Submitted on 7/11/2024)  Chief Complaint: Abdominal pain  Chronicity: chronic  Onset: more than 1 month ago  Onset quality: gradual  Frequency: constantly  Episode duration: 5 Months  Progression since onset: waxing and waning  Pain location: LUQ  Pain - numeric: 3/10  Pain quality: dull  Radiates to: does not radiate  anorexia: Yes  arthralgias: No  belching: No  constipation: No  diarrhea: No  dysuria: No  fever: No  flatus: No  frequency: No  headaches: Yes  hematochezia: No  hematuria: No  melena: No  myalgias: No  nausea: No  weight loss: Yes  vomiting: No  Aggravated by: nothing  Relieved by: nothing  Diagnostic workup: ultrasound     N/A

## 2024-07-17 ENCOUNTER — PREP FOR PROCEDURE (OUTPATIENT)
Dept: GASTROENTEROLOGY | Facility: CLINIC | Age: 69
End: 2024-07-17

## 2024-07-17 DIAGNOSIS — K86.1 CHRONIC PANCREATITIS, UNSPECIFIED PANCREATITIS TYPE (HCC): ICD-10-CM

## 2024-07-17 DIAGNOSIS — R10.13 EPIGASTRIC PAIN: Primary | ICD-10-CM

## 2024-07-18 ENCOUNTER — TELEPHONE (OUTPATIENT)
Dept: GASTROENTEROLOGY | Facility: CLINIC | Age: 69
End: 2024-07-18

## 2024-07-18 NOTE — TELEPHONE ENCOUNTER
Procedure:  EUS  Scheduled date of procedure (as of today): 8/13/24  Physician performing procedure: Dr. He  Location of procedure: [facility]   Instructions reviewed with patient by:  Serina ledbetter   Clearances: Diabetic, Ozempic - aware to hold 7 days prior

## 2024-07-18 NOTE — TELEPHONE ENCOUNTER
----- Message from Serina BREEN sent at 7/16/2024  2:16 PM EDT -----    ----- Message -----  From: Emerita Ma PA-C  Sent: 7/16/2024   1:43 PM EDT  To: [facility]    Ready to schedule! TY  ----- Message -----  From: Lambert He MD  Sent: 7/15/2024   3:09 PM EDT  To: Emerita Ma PA-C; #    Agree with 2-4 weeks.   Thanks   Lambert  ----- Message -----  From: Emerita Ma PA-C  Sent: 7/15/2024   1:59 PM EDT  To: Lambert He MD; [facility]    Referring physician : Maranda    Diagnosis : Pancreatic mass/pancreatitis    Discussed with patient : YES/NO: yes    Symptoms : LUQ pain    Labs : Ca 19-9- 382    Imaging : 7/10/24 MRI MRI shows multiple findings including area of hypoenhancement in the pancreatic body which may be from chronic pancreatitis and fibrosis however cannot exclude neoplasm and also shows acute pancreatitis, chronic splenic vein thrombosis.    Prior endoscopy :     Plan :   1. EUS in 4 weeks.     Order placed : YES/NO: yes  ----- Message -----  From: Harish Low MD  Sent: 7/15/2024   1:20 PM EDT  To: [facility]    Hi can we schedule patient for EUS urgently within 4 to 6 weeks to evaluate for possible pancreatic cancer and chronic pancreatitis.  He had recent pancreatitis so I do not want this done sooner than 2 weeks at the earliest but hopefully 4 weeks should be good.  Thank you.

## 2024-07-23 ENCOUNTER — APPOINTMENT (OUTPATIENT)
Dept: LAB | Facility: HOSPITAL | Age: 69
End: 2024-07-23
Payer: COMMERCIAL

## 2024-07-23 DIAGNOSIS — K86.1 CHRONIC PANCREATITIS, UNSPECIFIED PANCREATITIS TYPE (HCC): ICD-10-CM

## 2024-07-23 PROCEDURE — 82787 IGG 1 2 3 OR 4 EACH: CPT

## 2024-07-23 PROCEDURE — 82784 ASSAY IGA/IGD/IGG/IGM EACH: CPT

## 2024-07-23 PROCEDURE — 36415 COLL VENOUS BLD VENIPUNCTURE: CPT

## 2024-07-24 ENCOUNTER — APPOINTMENT (OUTPATIENT)
Dept: LAB | Facility: HOSPITAL | Age: 69
End: 2024-07-24
Payer: COMMERCIAL

## 2024-07-24 DIAGNOSIS — K86.1 CHRONIC PANCREATITIS, UNSPECIFIED PANCREATITIS TYPE (HCC): ICD-10-CM

## 2024-07-24 DIAGNOSIS — E11.65 TYPE 2 DIABETES MELLITUS WITH HYPERGLYCEMIA, WITHOUT LONG-TERM CURRENT USE OF INSULIN (HCC): ICD-10-CM

## 2024-07-24 PROCEDURE — 82653 EL-1 FECAL QUANTITATIVE: CPT

## 2024-07-24 RX ORDER — FLASH GLUCOSE SENSOR
1 KIT MISCELLANEOUS
Qty: 2 EACH | Refills: 5 | Status: CANCELLED | OUTPATIENT
Start: 2024-07-24

## 2024-07-24 NOTE — TELEPHONE ENCOUNTER
Patient called states he had an MRI done 2 weeks ago, He uses the  Freestyle Livia 14 day sensor  He has to take off the sensor for the MRI. He put a new one on, and now its time to replace sensor. He has no more sensor to replace.  Insurance  Medicare will not cover the cost to replace sensor refill. Insurance told patient it is too soon to refill.    Insurance will need to do an override for insurance to approve refill     His next refill for sensor is 8/26 -     Continuous Glucose Sensor (FreeStyle Livia 14 Day Sensor) MISC Use 1 Device 3 (three) times a day before meals     Are their any samples?      Patient needs sensor for glucose reading, patient is very upset and would like help to get Livia sensors    Patient will call insurance again to see if they will do an override to get sensor    Patient requesting a call back    Please Advise. Thank you.

## 2024-07-25 LAB — ELASTASE PANC STL-MCNT: 260 UG/G

## 2024-07-26 LAB
IGG SERPL-MCNC: 1110 MG/DL (ref 603–1613)
IGG1 SER-MCNC: 540 MG/DL (ref 248–810)
IGG2 SER-MCNC: 526 MG/DL (ref 130–555)
IGG3 SER-MCNC: 21 MG/DL (ref 15–102)
IGG4 SER-MCNC: 37 MG/DL (ref 2–96)

## 2024-07-31 ENCOUNTER — TELEPHONE (OUTPATIENT)
Age: 69
End: 2024-07-31

## 2024-08-01 ENCOUNTER — TELEPHONE (OUTPATIENT)
Age: 69
End: 2024-08-01

## 2024-08-01 NOTE — TELEPHONE ENCOUNTER
He has jury duty the 13th and Marilyn doesn't come back until the 12th - patient stated that they need it before then.   
He will need an appointment to discuss this then, as I cannot legally write that the patient is under my care for the jury duty letter as I have never seen him before and therefore he is not under my medical care. 
Patient called, he is asking if a letter could be written for him excusing him from Jury Duty. He has a upcoming procedure scheduled for the same day (8/13). He is asking if the letter could be added to him MyChart once its finished.       Please advise, thank you.   
Scheduled for an appt  
Consent: The patient's consent was obtained including but not limited to risks of crusting, scabbing, blistering, scarring, darker or lighter pigmentary change, recurrence, incomplete removal and infection.
Include Z78.9 (Other Specified Conditions Influencing Health Status) As An Associated Diagnosis?: No
Post-Care Instructions: I reviewed with the patient in detail post-care instructions. Patient is to wear sunprotection, and avoid picking at any of the treated lesions. Pt may apply Vaseline to crusted or scabbing areas.
Show Spray Paint Technique Variable?: Yes
Medical Necessity Clause: This procedure was medically necessary because the lesions that were treated were:
Spray Paint Text: The liquid nitrogen was applied to the skin utilizing a spray paint frosting technique.
Medical Necessity Information: It is in your best interest to select a reason for this procedure from the list below. All of these items fulfill various CMS LCD requirements except the new and changing color options.
Detail Level: Detailed

## 2024-08-01 NOTE — TELEPHONE ENCOUNTER
Patients GI provider:  Dr. Low    Number to return call: [phone]    Reason for call: Pt called needs a jury duty letter for 8/13/2024. Pt has an EUS scheduled same day. Pt asked letter be put in [employer].    Scheduled procedure/appointment date if applicable: 8/13/2024

## 2024-08-02 NOTE — TELEPHONE ENCOUNTER
Routing to Dr. Corrales for letter approval.    Dr. Low, is this okay to do?  Please advise  Thank You!

## 2024-08-09 DIAGNOSIS — E11.65 TYPE 2 DIABETES MELLITUS WITH HYPERGLYCEMIA, WITHOUT LONG-TERM CURRENT USE OF INSULIN (HCC): ICD-10-CM

## 2024-08-09 RX ORDER — METFORMIN HYDROCHLORIDE 500 MG/1
TABLET, EXTENDED RELEASE ORAL
Qty: 180 TABLET | Refills: 1 | Status: SHIPPED | OUTPATIENT
Start: 2024-08-09

## 2024-08-10 DIAGNOSIS — E11.65 TYPE 2 DIABETES MELLITUS WITH HYPERGLYCEMIA, WITHOUT LONG-TERM CURRENT USE OF INSULIN (HCC): ICD-10-CM

## 2024-08-11 RX ORDER — ROSUVASTATIN CALCIUM 5 MG/1
5 TABLET, COATED ORAL EVERY EVENING
Qty: 90 TABLET | Refills: 1 | Status: SHIPPED | OUTPATIENT
Start: 2024-08-11

## 2024-08-12 ENCOUNTER — VBI (OUTPATIENT)
Dept: ADMINISTRATIVE | Facility: OTHER | Age: 69
End: 2024-08-12

## 2024-08-12 NOTE — TELEPHONE ENCOUNTER
08/12/24 12:23 PM     Chart reviewed for Hemoglobin A1c ; nothing is submitted to the patient's insurance at this time.     Tessy Martin MA   [facility]

## 2024-08-13 ENCOUNTER — ANESTHESIA (OUTPATIENT)
Dept: GASTROENTEROLOGY | Facility: HOSPITAL | Age: 69
End: 2024-08-13

## 2024-08-13 ENCOUNTER — ANESTHESIA EVENT (OUTPATIENT)
Dept: GASTROENTEROLOGY | Facility: HOSPITAL | Age: 69
End: 2024-08-13

## 2024-08-13 ENCOUNTER — HOSPITAL ENCOUNTER (OUTPATIENT)
Dept: GASTROENTEROLOGY | Facility: HOSPITAL | Age: 69
Setting detail: OUTPATIENT SURGERY
Discharge: HOME | End: 2024-08-13
Attending: INTERNAL MEDICINE
Payer: COMMERCIAL

## 2024-08-13 VITALS
DIASTOLIC BLOOD PRESSURE: 67 MMHG | WEIGHT: 174 LBS | BODY MASS INDEX: 24.27 KG/M2 | HEART RATE: 53 BPM | SYSTOLIC BLOOD PRESSURE: 128 MMHG | OXYGEN SATURATION: 95 % | TEMPERATURE: 96.1 F | RESPIRATION RATE: 20 BRPM

## 2024-08-13 DIAGNOSIS — K86.1 CHRONIC PANCREATITIS, UNSPECIFIED PANCREATITIS TYPE (HCC): ICD-10-CM

## 2024-08-13 DIAGNOSIS — R10.13 EPIGASTRIC PAIN: ICD-10-CM

## 2024-08-13 DIAGNOSIS — K86.89 PANCREATIC MASS: Primary | ICD-10-CM

## 2024-08-13 PROCEDURE — 88305 TISSUE EXAM BY PATHOLOGIST: CPT | Performed by: PATHOLOGY

## 2024-08-13 PROCEDURE — 88342 IMHCHEM/IMCYTCHM 1ST ANTB: CPT | Performed by: PATHOLOGY

## 2024-08-13 PROCEDURE — 43238 EGD US FINE NEEDLE BX/ASPIR: CPT | Performed by: INTERNAL MEDICINE

## 2024-08-13 PROCEDURE — C1889 IMPLANT/INSERT DEVICE, NOC: HCPCS

## 2024-08-13 PROCEDURE — 88160 CYTOPATH SMEAR OTHER SOURCE: CPT | Performed by: PATHOLOGY

## 2024-08-13 PROCEDURE — 88341 IMHCHEM/IMCYTCHM EA ADD ANTB: CPT | Performed by: PATHOLOGY

## 2024-08-13 RX ORDER — LIDOCAINE HYDROCHLORIDE 20 MG/ML
INJECTION, SOLUTION EPIDURAL; INFILTRATION; INTRACAUDAL; PERINEURAL AS NEEDED
Status: DISCONTINUED | OUTPATIENT
Start: 2024-08-13 | End: 2024-08-13

## 2024-08-13 RX ORDER — PROPOFOL 10 MG/ML
INJECTION, EMULSION INTRAVENOUS CONTINUOUS PRN
Status: DISCONTINUED | OUTPATIENT
Start: 2024-08-13 | End: 2024-08-13

## 2024-08-13 RX ORDER — FENTANYL CITRATE 50 UG/ML
INJECTION, SOLUTION INTRAMUSCULAR; INTRAVENOUS AS NEEDED
Status: DISCONTINUED | OUTPATIENT
Start: 2024-08-13 | End: 2024-08-13

## 2024-08-13 RX ORDER — SODIUM CHLORIDE 9 MG/ML
INJECTION, SOLUTION INTRAVENOUS CONTINUOUS PRN
Status: DISCONTINUED | OUTPATIENT
Start: 2024-08-13 | End: 2024-08-13

## 2024-08-13 RX ADMIN — LIDOCAINE HYDROCHLORIDE 100 MG: 20 INJECTION, SOLUTION EPIDURAL; INFILTRATION; INTRACAUDAL; PERINEURAL at 12:02

## 2024-08-13 RX ADMIN — PROPOFOL 100 MCG/KG/MIN: 10 INJECTION, EMULSION INTRAVENOUS at 12:03

## 2024-08-13 RX ADMIN — FENTANYL CITRATE 50 MCG: 50 INJECTION INTRAMUSCULAR; INTRAVENOUS at 12:17

## 2024-08-13 RX ADMIN — SODIUM CHLORIDE: 0.9 INJECTION, SOLUTION INTRAVENOUS at 11:40

## 2024-08-13 RX ADMIN — PROPOFOL 80 MG: 10 INJECTION, EMULSION INTRAVENOUS at 12:02

## 2024-08-13 NOTE — ANESTHESIA PREPROCEDURE EVALUATION
Procedure:  ENDOSCOPIC ULTRASOUND (UPPER)    Has not taken his sc ozempic since 4 weeks     Relevant Problems   CARDIO   (+) Atrial fibrillation (HCC)   (+) Atrial flutter (HCC)      ENDO   (+) Type 2 diabetes mellitus with hyperglycemia, without long-term current use of insulin (HCC)      MUSCULOSKELETAL   (+) Psoriatic arthritis (HCC)      NEURO/PSYCH   (+) NDPH (new daily persistent headache)      PULMONARY   (+) CATY (obstructive sleep apnea)      History Comments History Comments   Arthritis  Psoriasis    High cholesterol  Fatty liver    A-fib (HCC)  Irregular heart beat    Non-ischemic cardiomyopathy (HCC)  Pulmonary HTN (HCC)    Hyperlipidemia  BPH (benign prostatic hyperplasia)    Psoriatic arthritis (HCC)  Fracture, foot    Osteoarthritis  Colon polyp      Surgical History     Current as of 02/21/23 1903  TRANSURETHRAL RESECTION OF PROSTATE COLONOSCOPY   ELBOW SURGERY      Substance History     Current as of 02/21/23 1903  Smoking Status: Former - 5 pack years   Smokeless Tobacco Status: Never   Alcohol use: Yes, unspecified volume   Drug use: No       Physical Exam    Airway    Mallampati score: II  TM Distance: >3 FB  Neck ROM: full     Dental   No notable dental hx     Cardiovascular  Cardiovascular exam normal    Pulmonary  Pulmonary exam normal     Other Findings   Stress test Interpretation Summary 11/2022       •  Left Ventricle: Left ventricular cavity size is normal. The left ventricular ejection fraction is 50%. Systolic function is normal. Wall motion is normal.  •  Stress ECG: No ST deviation is noted. The ECG was negative for ischemia. The stress ECG is negative for ischemia after maximal exercise, without reproduction of symptoms.  •  Peak Stress Echo: Left ventricle cavity has normal reduction in size at peak stress. The peak stress echo showed normal wall motion which was hyperdynamic compared to baseline.  •  Echo Post Impression: The study is normal.     Normal exercise stress  echocardiogram at 10.1 Mets and 91% max predicted heart rate.         Anesthesia Plan  ASA Score- 3     Anesthesia Type- IV sedation with anesthesia with ASA Monitors.         Additional Monitors:     Airway Plan:            Plan Factors-Exercise tolerance (METS): >4 METS.    Chart reviewed. EKG reviewed.  Existing labs reviewed. Patient summary reviewed.    Patient is not a current smoker.              Induction- intravenous.    Postoperative Plan-     Perioperative Resuscitation Plan - Level 1 - Full Code.       Informed Consent- Anesthetic plan and risks discussed with patient and spouse.  I personally reviewed this patient with the CRNA. Discussed and agreed on the Anesthesia Plan with the CRNA..   ECHO Interpretation Summary 2022       •  Left Ventricle: Left ventricular cavity size is normal. Wall thickness is normal. Wall motion is low normal.  •  Left Atrium: The atrium is mildly dilated.  •  Mitral Valve: There is mild regurgitation.     Low normal left ventricular function.  Mild left atrial enlargement.  Mild mitral regurgitation.  Compared to the previous echocardiogram September 21, 2016 the ejection fraction at that time was 60% with diastolic dysfunction and mild pulmonary hypertension.

## 2024-08-13 NOTE — ANESTHESIA POSTPROCEDURE EVALUATION
Post-Op Assessment Note    CV Status:  Stable  Pain Score: 0    Pain management: adequate       Mental Status:  Alert and awake   Hydration Status:  Euvolemic   PONV Controlled:  Controlled   Airway Patency:  Patent     Post Op Vitals Reviewed: Yes    No anethesia notable event occurred.    Staff: CRNA               BP   134/61   Temp   97.9   Pulse  61   Resp   20   SpO2   95

## 2024-08-14 ENCOUNTER — DOCUMENTATION (OUTPATIENT)
Dept: HEMATOLOGY ONCOLOGY | Facility: CLINIC | Age: 69
End: 2024-08-14

## 2024-08-14 ENCOUNTER — PATIENT OUTREACH (OUTPATIENT)
Dept: HEMATOLOGY ONCOLOGY | Facility: CLINIC | Age: 69
End: 2024-08-14

## 2024-08-14 DIAGNOSIS — K86.89 PANCREATIC MASS: Primary | ICD-10-CM

## 2024-08-14 NOTE — PROGRESS NOTES
Phone outreach to the patient, I spoke with Abhishek and I informed him that I am calling from [facility] in response to a referral to surgical oncology placed by Dr. He and that a  will be reaching out within 24/48 hours to arrange an appointment within 7 days. He confirmed understanding and thanked me.

## 2024-08-14 NOTE — PROGRESS NOTES
Chart rvw'd on 8/14/24    Referring provider-  Dr. He  Surgical Oncology referral to Dr. Faulkner    EUS date-  8/13/24      Impression-  EGD:   Normal exam.   EUS:   30 x 18 mm heterogenous, hypoechoic, irregular mass in the body of pancreas. Performed FNB.  The mass was abutting the splenic vein and artery.  Mild lobularity seen in the head of pancreas without any calcifications or significant honeycombing.  Pancreatic duct is normal in caliber.         Pathology-  Results Pending       Labs-  CMP and CBC up to date    Component  Ref Range & Units 7/11/24  2:09 PM   CA 19-9  0 - 35 U/mL 382 High        Imaging-  7/10/24 - MRI OF THE ABDOMEN WITH AND WITHOUT CONTRAST WITH MRCP     IMPRESSION:     Abnormal appearance of the pancreas described in detail above. Suspected changes of chronic pancreatitis. Area of hypoenhancement in the pancreatic body may be chronic pancreatitis and fibrosis. Cannot exclude neoplasm. No pancreatic head mass seen.     Peripancreatic edema suggest superimposed acute interstitial edematous pancreatitis. Correlate clinically with serum amylase/lipase.     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     Several benign liver lesions described above. 2 additional lesions are indeterminant: Focus of enhancement at the right dome and peripherally enhancing right lobe nodule described above. No evidence of underlying liver disease to suggest hepatocellular   carcinoma. Recommend correlation for any outside prior studies and short interval follow-up.    Comments -   CT Chest will be ordered.     Referral received/ Chart reviewed for work up completed     Imaging completed:  MRI abd completed at [facility]    Pathology completed:  Pancreas at [facility]    All records needed are in patients chart. No records retrieval needed at this time.

## 2024-08-16 ENCOUNTER — TELEPHONE (OUTPATIENT)
Dept: GASTROENTEROLOGY | Facility: CLINIC | Age: 69
End: 2024-08-16

## 2024-08-16 PROCEDURE — 88160 CYTOPATH SMEAR OTHER SOURCE: CPT | Performed by: PATHOLOGY

## 2024-08-16 PROCEDURE — 88342 IMHCHEM/IMCYTCHM 1ST ANTB: CPT | Performed by: PATHOLOGY

## 2024-08-16 PROCEDURE — 88305 TISSUE EXAM BY PATHOLOGIST: CPT | Performed by: PATHOLOGY

## 2024-08-16 PROCEDURE — 88341 IMHCHEM/IMCYTCHM EA ADD ANTB: CPT | Performed by: PATHOLOGY

## 2024-08-16 NOTE — TELEPHONE ENCOUNTER
I called and spoke to the patient as well as his wife Regarding the results of the biopsy from EUS performed on 9/13/2024.  Discussed that the biopsies do confirm malignancy with pancreatic adenocarcinoma.  Discussed that neck steps are going to be to follow-up with our surgical oncologist, has an appointment on 8/26/2024.  Reports that he is going on a family trip from next Monday till Friday and unless otherwise recommended would like to continue on the same.  I discussed that it is reasonable for him to continue follow-up on 8/26/2024, has CT chest coming up for staging.

## 2024-08-19 ENCOUNTER — HOSPITAL ENCOUNTER (OUTPATIENT)
Dept: CT IMAGING | Facility: HOSPITAL | Age: 69
Discharge: HOME | End: 2024-08-19
Attending: INTERNAL MEDICINE
Payer: COMMERCIAL

## 2024-08-19 DIAGNOSIS — E11.9 TYPE 2 DIABETES MELLITUS WITHOUT COMPLICATION, WITHOUT LONG-TERM CURRENT USE OF INSULIN (HCC): ICD-10-CM

## 2024-08-19 DIAGNOSIS — K86.89 PANCREATIC MASS: ICD-10-CM

## 2024-08-19 PROCEDURE — 71250 CT THORAX DX C-: CPT

## 2024-08-19 NOTE — LETTER
[facility]  [address] [place] [address]      August 27, 2024    MRN: [id number]     Phone: [phone]     Dear  Pernell,    Juice recently had a(n) Cat Scan performed on 8/19/2024 at  [facility] that was requested by Lambert He MD. The study was reviewed by a radiologist, which is a physician who specializes in medical imaging. The radiologist issued a report describing his or her findings. In that report there was a finding that the radiologist felt warranted further discussion with your health care provider and that discussion would be beneficial to you.      The results were sent to Lambert He MD on 08/22/2024 12:43 PM. We recommend that you contact Lambert He MD at [phone] or set up an appointment to discuss the results of the imaging test. If you have already heard from Lambert He MD regarding the results of your study, you can disregard this letter.     This letter is not meant to alarm you, but intended to encourage you to follow-up on your results with the provider that sent you for the imaging study. In addition, we have enclosed answers to frequently asked questions by other patients who have also received a letter to review results with their health care provider (see page two).      Thank you for choosing [facility] for your medical imaging needs.                                                                                                                                                        FREQUENTLY ASKED QUESTIONS    Why am I receiving this letter?  [place] State Law requires us to notify patients who have findings on imaging exams that may require more testing or follow-up with a health professional within the next 3 months.        How serious is the finding on the imaging test?  This letter is sent to all patients who may need follow-up or more testing within the next 3 months.  Receiving this  letter does not necessarily mean you have a life-threatening imaging finding or disease.  Recommendations in the radiologist’s imaging report are general in nature and it is up to your healthcare provider to say whether those recommendations make sense for your situation.  You are strongly encouraged to talk to your health care provider about the results and ask whether additional steps need to be taken.    Where can I get a copy of the final report for my recent radiology exam?  To get a full copy of the report you can access your records online at [email or web address] or please contact [facility] Medical Records Department at [phone] Monday through Friday between 8 am and 6 pm.         What do I need to do now?           Please contact your health care provider who requested the imaging study to discuss what further actions (if any) are needed.  You may have already heard from (your ordering provider) in regard to this test in which case you can disregard this letter.        NOTICE IN ACCORDANCE WITH THE [place] STATE “PATIENT TEST RESULT INFORMATION ACT OF 2018”    You are receiving this notice as a result of a determination by your diagnostic imaging service that further discussions of your test results are warranted and would be beneficial to you.    The complete results of your test or tests have been or will be sent to the health care practitioner that ordered the test or tests. It is recommended that you contact your health care practitioner to discuss your results as soon as possible.

## 2024-08-20 PROBLEM — C25.9 ADENOCARCINOMA OF PANCREAS (HCC): Status: ACTIVE | Noted: 2024-08-20

## 2024-08-23 ENCOUNTER — PATIENT OUTREACH (OUTPATIENT)
Dept: HEMATOLOGY ONCOLOGY | Facility: CLINIC | Age: 69
End: 2024-08-23

## 2024-08-23 DIAGNOSIS — C25.9 ADENOCARCINOMA OF PANCREAS (HCC): Primary | ICD-10-CM

## 2024-08-23 NOTE — PROGRESS NOTES
Phone outreach to the patient for initial contact, tracking, and assessment. I left message with my contact information on the patient's vm introducing myself and asking to please return my call to discuss next steps.

## 2024-08-23 NOTE — PROGRESS NOTES
"Phone outreach to the patient, I spoke with both Abhishek and Sherri, I introduced myself and explained my service to them.  I went over his upcoming appointment with Dr. Faulkner for 8/26/24 and they are aware.  The patient reports understanding his diagnosis and was \"calm\" when speaking with him.  Pt reports persistent slight upper left abdominal ache.  Pt also reports losing about 30 pounds since beginning of the year.  He denies N/V or diarrhea at present.  He reports having a good appetite and is eating 3 full meals daily.  I suggested that he get ensure and start drinking 1-2 daily for nutrition support.      Pt reports living at home with his spouse and is a good support for him.  He denies any barriers getting to or from any of his appointments and states he will drive himself and Sherri will accompany him.  He denies any physical barriers at present.  I placed referrals for Palliative Care and Nutrition.  I explained what each service can offer and the patient agreed.  At this point he declined any other resource options.  We completed a General Assessment and MST together, I provided my contact information and instructed him to please call if he has any questions or concerns.  I thanked him for his time.    "

## 2024-08-24 ENCOUNTER — APPOINTMENT (OUTPATIENT)
Dept: LAB | Facility: CLINIC | Age: 69
End: 2024-08-24
Payer: COMMERCIAL

## 2024-08-24 DIAGNOSIS — R97.20 ELEVATED PSA: ICD-10-CM

## 2024-08-24 DIAGNOSIS — Z12.5 ENCOUNTER FOR SCREENING FOR MALIGNANT NEOPLASM OF PROSTATE: ICD-10-CM

## 2024-08-24 LAB — PSA SERPL-MCNC: 4.7 NG/ML (ref 0–4)

## 2024-08-24 PROCEDURE — G0103 PSA SCREENING: HCPCS

## 2024-08-24 PROCEDURE — 36415 COLL VENOUS BLD VENIPUNCTURE: CPT

## 2024-08-26 ENCOUNTER — TELEPHONE (OUTPATIENT)
Dept: NUTRITION | Facility: CLINIC | Age: 69
End: 2024-08-26

## 2024-08-26 ENCOUNTER — OFFICE VISIT (OUTPATIENT)
Dept: HEMATOLOGY ONCOLOGY | Facility: CLINIC | Age: 69
End: 2024-08-26
Payer: COMMERCIAL

## 2024-08-26 ENCOUNTER — SOCIAL WORK (OUTPATIENT)
Dept: PALLIATIVE MEDICINE | Facility: CLINIC | Age: 69
End: 2024-08-26

## 2024-08-26 ENCOUNTER — OFFICE VISIT (OUTPATIENT)
Dept: PALLIATIVE MEDICINE | Facility: CLINIC | Age: 69
End: 2024-08-26

## 2024-08-26 ENCOUNTER — OFFICE VISIT (OUTPATIENT)
Dept: SURGICAL ONCOLOGY | Facility: CLINIC | Age: 69
End: 2024-08-26
Payer: COMMERCIAL

## 2024-08-26 VITALS
SYSTOLIC BLOOD PRESSURE: 126 MMHG | DIASTOLIC BLOOD PRESSURE: 86 MMHG | HEIGHT: 71 IN | WEIGHT: 177 LBS | BODY MASS INDEX: 24.78 KG/M2 | HEART RATE: 59 BPM | TEMPERATURE: 98 F | OXYGEN SATURATION: 97 %

## 2024-08-26 VITALS
BODY MASS INDEX: 24.5 KG/M2 | HEIGHT: 71 IN | OXYGEN SATURATION: 99 % | RESPIRATION RATE: 18 BRPM | HEART RATE: 63 BPM | SYSTOLIC BLOOD PRESSURE: 136 MMHG | TEMPERATURE: 97.7 F | WEIGHT: 175 LBS | DIASTOLIC BLOOD PRESSURE: 80 MMHG

## 2024-08-26 VITALS
TEMPERATURE: 98.7 F | HEIGHT: 71 IN | OXYGEN SATURATION: 98 % | SYSTOLIC BLOOD PRESSURE: 150 MMHG | HEART RATE: 50 BPM | RESPIRATION RATE: 18 BRPM | DIASTOLIC BLOOD PRESSURE: 90 MMHG | BODY MASS INDEX: 24.5 KG/M2 | WEIGHT: 175 LBS

## 2024-08-26 DIAGNOSIS — Z51.5 PALLIATIVE CARE BY SPECIALIST: ICD-10-CM

## 2024-08-26 DIAGNOSIS — I27.20 PULMONARY HTN (HCC): ICD-10-CM

## 2024-08-26 DIAGNOSIS — K86.89 PANCREATIC MASS: ICD-10-CM

## 2024-08-26 DIAGNOSIS — Z71.89 COUNSELING AND COORDINATION OF CARE: Primary | ICD-10-CM

## 2024-08-26 DIAGNOSIS — C25.9 ADENOCARCINOMA OF PANCREAS (HCC): Primary | ICD-10-CM

## 2024-08-26 DIAGNOSIS — G89.3 CANCER RELATED PAIN: ICD-10-CM

## 2024-08-26 DIAGNOSIS — C25.9 ADENOCARCINOMA OF PANCREAS (HCC): ICD-10-CM

## 2024-08-26 DIAGNOSIS — E11.65 TYPE 2 DIABETES MELLITUS WITH HYPERGLYCEMIA, WITHOUT LONG-TERM CURRENT USE OF INSULIN (HCC): Primary | ICD-10-CM

## 2024-08-26 PROCEDURE — NC001 PR NO CHARGE

## 2024-08-26 PROCEDURE — 99214 OFFICE O/P EST MOD 30 MIN: CPT | Performed by: INTERNAL MEDICINE

## 2024-08-26 PROCEDURE — 99205 OFFICE O/P NEW HI 60 MIN: CPT | Performed by: STUDENT IN AN ORGANIZED HEALTH CARE EDUCATION/TRAINING PROGRAM

## 2024-08-26 PROCEDURE — G2211 COMPLEX E/M VISIT ADD ON: HCPCS | Performed by: INTERNAL MEDICINE

## 2024-08-26 RX ORDER — OXYCODONE HYDROCHLORIDE 5 MG/1
5 TABLET ORAL EVERY 6 HOURS PRN
Qty: 60 TABLET | Refills: 0 | Status: SHIPPED | OUTPATIENT
Start: 2024-08-26

## 2024-08-26 RX ORDER — CEFAZOLIN SODIUM 2 G/50ML
2000 SOLUTION INTRAVENOUS ONCE
OUTPATIENT
Start: 2024-08-26 | End: 2024-08-26

## 2024-08-26 NOTE — H&P (VIEW-ONLY)
SURGICAL ONCOLOGY CONSULT    Abhishek Gimenez  1955  [id number]  [facility] [address] [id number] [phone]      ASSESSMENT AND PLAN    1. Adenocarcinoma of pancreas (HCC)  Assessment & Plan:  Today I discussed with the patient and his wife the diagnosis of pancreatic adenocarcinoma.  I discussed the specific position of his tumor and the need for distal pancreatectomy splenectomy.  I discussed that because of the long gravity of his symptoms, and the nature of a tail mass, I am recommending diagnostic laparoscopy to rule out occult peritoneal disease.  Likewise, given the relative aggressiveness of body and tail tumors as well as the longevity of his symptoms, I am recommending sandwich therapy with chemotherapy followed by surgery followed by the completion of chemotherapy.  The patient will require a repeat CT with pancreas protocol given that it has been almost 2 months since his previous scan.  We discussed the possibility of a PET scan, however, I think that the recent MRI is recent enough to demonstrate the best possible imaging of his liver masses which again suggest benign etiology.  Interval imaging and starting with chemotherapy will also allow us to better elucidate the nature of the liver masses.  We will discuss his case at our multidisciplinary tumor board.  I will see the patient in 2 to 3 months time to gauge his progress.  All questions answered today.  Orders:  -     Case request operating room: LAPAROSCOPY DIAGNOSTIC, INSERTION VENOUS PORT (PORT-A-CATH); Standing  -     EKG 12 lead; Future  -     Type and screen; Future  -     Case request operating room: LAPAROSCOPY DIAGNOSTIC, INSERTION VENOUS PORT (PORT-A-CATH)  -     CT abdomen pelvis w contrast; Future; Expected date: 08/26/2024  2. Pancreatic mass  -     Ambulatory Referral to Surgical Oncology        NEW VISIT DATA    Oncology History    Adenocarcinoma of pancreas (HCC)   8/13/2024 Biopsy    A.-B.  Pancreas, Body, FNA (Cell block and smear preparations):   Malignant  Adenocarcinoma.     8/20/2024 Initial Diagnosis    Adenocarcinoma of pancreas (HCC)     8/26/2024 -  Cancer Staged    Staging form: Pancreas, AJCC 8th Edition  - Clinical: Stage IB (cT2, cN0, cM0) - Signed by Roz Faulkner MD on 8/26/2024  Total positive nodes: 0           History of Present Illness:   New consult for dx of pancreas adenocarcinoma. Started feeling generally unwell after xmas. Then diagnosed with new-onset DM in January due to fluctuating and very high BG. He continued to have vague left upper abdominal pain and significant 30 lb weight loss starting in Jan; revisited PCP in May and he had an US done in July which showed panc body lesion as well as liver lesions. An MRI was obtained, suggesting that most if not all of the liver lesions were benign; there was no evidence of cirrhosis on this scan. He saw GI in July - they performed an EUS in August which demonstrated panc adeno. CT chest to complete staging with no concerning lesions. He does have a hx of alcohol abuse with 3-4 drinks / day. He was on ozempic for DM but has come off and his appetite has improved and he's gained a few lbs. Appetite has improved. Is very fit and active with an ECOG of 0. Had afib with ablation x 2 which was successful and he is currently not on any meds or anticoagulants.     Ca 19-9 about 400    Review of Systems  Complete ROS Surg Onc:   Constitutional: The patient ENDORSES new or recent history of general fatigue, recent weight loss, ok appetite.   Eyes: No complaints of visual problems, no scleral icterus.   ENT: no complaints of ear pain, no hoarseness, no difficulty swallowing,  no tinnitus and no new masses in head, oral cavity, or neck.   Cardiovascular: No complaints of chest pain, no palpitations, no ankle edema.   Respiratory: No complaints of shortness of breath, no cough.    Gastrointestinal: No complaints of jaundice, no bloody stools, no pale stools.   Genitourinary: No complaints of dysuria, no hematuria, no nocturia, no frequent urination, no urethral discharge.   Musculoskeletal: No complaints of weakness, paralysis, joint stiffness or arthralgias.  Integumentary: No complaints of rash, no new lesions.   Neurological: No complaints of convulsions, no seizures, no dizziness.   Hematologic/Lymphatic: No complaints of easy bruising.   Endocrine:  No hot or cold intolerance.  No polydipsia, polyphagia, or polyuria.  Allergy/immunology:  No environmental allergies.  No food allergies.  Not immunocompromised.  Skin:  No pallor or rash.  No wound.      Patient Active Problem List   Diagnosis    Atrial fibrillation (HCC)    Psoriatic arthritis (HCC)    BMI 29.0-29.9,adult    NDPH (new daily persistent headache)    Atrial flutter (HCC)    CATY (obstructive sleep apnea)    Type 2 diabetes mellitus with hyperglycemia, without long-term current use of insulin (HCC)    Elevated PSA    Elevated blood pressure reading without diagnosis of hypertension    Platelets decreased (HCC)    Adenocarcinoma of pancreas (HCC)    Pulmonary HTN (HCC)     Past Medical History:   Diagnosis Date    A-fib (HCC)     Arthritis     BPH (benign prostatic hyperplasia)     Colon polyp     Diabetes mellitus (HCC)     Fatty liver     Fracture, foot     High cholesterol     Hyperlipidemia     Irregular heart beat     Non-ischemic cardiomyopathy (HCC)     Osteoarthritis     Psoriasis     Psoriatic arthritis (HCC)     Pulmonary HTN (HCC)      Past Surgical History:   Procedure Laterality Date    CARDIAC ELECTROPHYSIOLOGY PROCEDURE N/A 02/22/2023    Procedure: Cardiac eps/afib ablation   comprehensive posterior wall;  Surgeon: Mynor Andrews MD;  Location: [facility];  Service: Cardiology    COLONOSCOPY      ELBOW SURGERY Right     KIDNEY STONE SURGERY      TRANSURETHRAL RESECTION OF PROSTATE      last assessed  7/21/15     Family History   Problem Relation Age of Onset    Diabetes Mother     Hypertension Father     Diabetes Family      Social History     Socioeconomic History    Marital status: /Civil Union     Spouse name: Not on file    Number of children: Not on file    Years of education: Not on file    Highest education level: Not on file   Occupational History    Not on file   Tobacco Use    Smoking status: Former     Current packs/day: 0.50     Average packs/day: 0.5 packs/day for 10.0 years (5.0 ttl pk-yrs)     Types: Cigarettes     Passive exposure: Past    Smokeless tobacco: Never   Vaping Use    Vaping status: Never Used   Substance and Sexual Activity    Alcohol use: Not Currently     Comment: 1 drink daily ( pt stopped drinking in July)    Drug use: No    Sexual activity: Not on file   Other Topics Concern    Not on file   Social History Narrative    Daily coffee consumption (__cups/day)     Daily cola consumption (__cans/day)      Social Determinants of Health     Financial Resource Strain: Low Risk  (2/20/2024)    Overall Financial Resource Strain (CARDIA)     Difficulty of Paying Living Expenses: Not very hard   Food Insecurity: Not on file   Transportation Needs: No Transportation Needs (2/20/2024)    PRAPARE - Transportation     Lack of Transportation (Medical): No     Lack of Transportation (Non-Medical): No   Physical Activity: Not on file   Stress: Not on file   Social Connections: Unknown (6/18/2024)    Received from [employer]    Social [employer]     How often do you feel lonely or isolated from those around you? (Adult - for ages 18 years and over): Not on file   Intimate Partner Violence: Not on file   Housing Stability: Not on file       Current Outpatient Medications:     adalimumab (HUMIRA) 40 mg/0.8 mL PSKT, Inject 40 mg under the skin every 14 (fourteen) days , Disp: , Rfl:     Alcohol Swabs 70 % PADS, May substitute brand based on insurance coverage. Check glucose TID., Disp: 100  each, Rfl: 0    Blood Glucose Monitoring Suppl (OneTouch Verio Reflect) w/Device KIT, Check blood sugars once daily. Please substitute with appropriate alternative as covered by patient's insurance. Dx: E11.65, Disp: 1 kit, Rfl: 0    Continuous Blood Gluc  (FreeStyle Livia 2 [place]) LEANA, 14 DAY READER NOT MADE, Disp: 1 each, Rfl: 1    Continuous Glucose Sensor (FreeStyle Livia 14 Day Sensor) MISC, Use 1 Device 3 (three) times a day before meals, Disp: 2 each, Rfl: 5    glucose blood (OneTouch Verio) test strip, Check blood sugars once daily. Please substitute with appropriate alternative as covered by patient's insurance. Dx: E11.65, Disp: 100 each, Rfl: 3    metFORMIN (GLUCOPHAGE-XR) 500 mg 24 hr tablet, TAKE 2 TABLETS BY MOUTH EVERY DAY WITH BREAKFAST, Disp: 180 tablet, Rfl: 1    OneTouch Delica Lancets 33G MISC, Check blood sugars once daily. Please substitute with appropriate alternative as covered by patient's insurance. Dx: E11.65, Disp: 100 each, Rfl: 3    oxyCODONE (Roxicodone) 5 immediate release tablet, Take 1 tablet (5 mg total) by mouth every 6 (six) hours as needed for moderate pain Max Daily Amount: 20 mg, Disp: 60 tablet, Rfl: 0    pantoprazole (PROTONIX) 40 mg tablet, Take 1 tablet (40 mg total) by mouth daily before breakfast, Disp: 30 tablet, Rfl: 5    rosuvastatin (CRESTOR) 5 mg tablet, TAKE 1 TABLET BY MOUTH EVERY DAY IN THE EVENING, Disp: 90 tablet, Rfl: 1    sildenafil (VIAGRA) 100 mg tablet, TAKE ONE (1) TABLET BY MOUTH DAILY AS NEEDED, Disp: , Rfl:     Insulin Pen Needle (BD Pen Needle Mallory 2nd Gen) 32G X 4 MM MISC, For use with insulin pen. Pharmacy may dispense brand covered by insurance., Disp: 100 each, Rfl: 0    Insulin Pen Needle (BD Pen Needle Mallory U/F) 32G X 4 MM MISC, Use daily as directed with insulin pen, Disp: 100 each, Rfl: 3    semaglutide, 0.25 or 0.5 mg/dose, (Ozempic, 0.25 or 0.5 MG/DOSE,) 2 mg/3 mL injection pen, Inject 0.75 mL (0.5 mg total) under the skin every 7  days (Patient not taking: Reported on 8/26/2024), Disp: 3 mL, Rfl: 3  Allergies   Allergen Reactions    Penicillins Rash         Vitals:    08/26/24 1310   BP: 126/86   Pulse: 59   Temp: 98 °F (36.7 °C)   SpO2: 97%       Physical Exam   Constitutional: General appearance: The Patient is well-developed and well-nourished who appears the stated age in no acute distress. Patient is pleasant and talkative.     HEENT:  Normocephalic.  Sclerae are anicteric. Mucous membranes are moist. Neck is supple without adenopathy. No JVD.     Chest: The lungs are clear to auscultation.     Cardiac: Heart is regular rate.     Abdomen: Abdomen is soft, non-tender, non-distended and without masses.     Extremities: There is no clubbing or cyanosis. There is no edema.  Symmetric.  Neuro: Grossly nonfocal. Gait is normal.     Lymphatic: No evidence of cervical adenopathy bilaterally.   No evidence of axillary adenopathy bilaterally. No evidence of inguinal adenopathy bilaterally.     Skin: Warm, anicteric.    Psych:  Patient is pleasant and talkative.      Imaging  CT chest wo contrast    Result Date: 8/22/2024  Narrative: CT CHEST WITHOUT IV CONTRAST INDICATION: K86.89: Other specified diseases of pancreas. Pancreatic mass. Metastatic disease evaluation COMPARISON: CT scan of the chest dated 1/6/2017 and abdominal MRI dated 7/10/2024. TECHNIQUE: CT examination of the chest was performed without intravenous contrast. Multiplanar 2D reformatted images were created from the source data. This examination, like all CT scans performed in the [facility], was performed utilizing techniques to minimize radiation dose exposure, including the use of iterative reconstruction and automated exposure control. Radiation dose length product (DLP) for this visit: 316 mGy-cm FINDINGS: LUNGS: 3 mm fissural nodule along the left major fissure on image 124 of series 3. 3 mm fissural nodule along the right major fissure on image 65 of series  2. 2 mm pulmonary nodule along the left major fissure on image 114 of series 3.41. There is no tracheal or endobronchial lesion. PLEURA: Unremarkable. HEART/GREAT VESSELS: Heart is unremarkable for patient's age. No thoracic aortic aneurysm. Small amount of aortic valvular cusp calcification. Small amount of coronary calcification in the left main and left anterior descending coronary. MEDIASTINUM AND DOE: There are few small pretracheal lymph nodes that measure up to 7 mm. These lymph nodes are similar to prior CT and probably within normal limits. There are small prevascular lymph nodes as well that measure up to 9 mm that are unchanged compared to the prior study and probably within normal limits. There is an aortopulmonary window lymph node that measures 0.9 x 1.3 cm that is also unchanged compared to prior study. No axillary or hilar lymphadenopathy. CHEST WALL AND LOWER NECK: Unremarkable. VISUALIZED STRUCTURES IN THE UPPER ABDOMEN: Mild enlargement of the spleen that measures 14 cm in anterior to posterior dimension. Small low-attenuation lesion in segment 4A of the left hepatic lobe that measures 7 mm image 95 of series 2. Low-attenuation lesion in segment 5 of the right hepatic lobe that measures 1.3 x 1.3 cm image 124 of series 2. There is a lesion involving segment one of the liver that measures 2.3 x 3.7 cm image 109 of series 2. There is a mass involving the body/tail  of the pancreas that measures 1.9 x 3.7 cm image 121 of series 2. 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 OSSEOUS STRUCTURES: Moderate endplate degenerative change in the thoracic spine. Marked degenerative change in the bilateral shoulders. No osteolytic or osteoblastic lesion.     Impression: No findings of metastatic disease in the chest. Small bilateral fissural nodules measuring 2 to 3 mm in size with a benign appearance. No imaging follow-up of these nodules is necessary. Mass  along the body/tail of the pancreas is again visualized in the upper abdomen suspicious for a pancreatic cancer. Finding is unchanged compared to abdominal MRI from 7/10/2024. 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 Scattered small hepatic lesions that may indicate hepatic metastatic disease. Correlation with PET/CT or biopsy should be considered. MIld splenomegaly The study was marked in EPIC for immediate notification. Workstation performed: [id number]     Endoscopic ultrasonography, GI (Upper)    Result Date: 8/13/2024  Narrative: Table formatting from the original result was not included. [facility] [address] [facility] [address] [phone] DATE OF SERVICE: 8/13/24 PHYSICIAN(S): Attending: Lambert He MD Fellow: Conor Hoffman MD INDICATION: Epigastric pain, Chronic pancreatitis, unspecified pancreatitis type (HCC) POST-OP DIAGNOSIS: See the impression below. PREPROCEDURE: Informed consent was obtained for the procedure, including sedation.  Risks of perforation, hemorrhage, adverse drug reaction and aspiration were discussed. The patient was placed in the left lateral decubitus position. Patient was explained about the risks and benefits of the procedure. Risks including but not limited to bleeding, infection, and perforation were explained in detail. Also explained about less than 100% sensitivity with the exam and other alternatives. PROCEDURE: EUS UPPER DETAILS OF PROCEDURE: Patient was taken to the procedure room where a time out was performed to confirm correct patient and correct procedure. The patient underwent monitored anesthesia care, which was administered by an anesthesia professional. The patient's blood pressure, heart rate, oxygen, respirations, level of consciousness, ECG and ETCO2 were monitored throughout the procedure. The endoscope and linear scope were introduced through the mouth and advanced to the  second part of the duodenum. The patient experienced no blood loss. The procedure was not difficult. The patient tolerated the procedure well. There were no apparent adverse events. ANESTHESIA INFORMATION: ASA: III Anesthesia Type: IV Sedation with Anesthesia MEDICATIONS: No administrations occurring from 1158 to 1240 on 08/13/24 FINDINGS: The esophagus appeared normal. The stomach appeared normal. The duodenum appeared normal. The celiac artery and aorta were visualized, including the abdominal aorta and appeared normal. The left kidney, spleen and left adrenal appeared normal. The parenchyma of the liver appeared normal. The parenchyma was visualized in the left lobe of the liver. Heterogeneous, hypoechoic and irregular mass measuring 30 mm x 18 mm with poorly defined margins was visualized in the body of the pancreas. Apparent abutment of the splenic artery and splenic vein; 4 successful fine needle biopsy passes were taken with a 25 gauge Field Squared needle using a transgastric approach guided by Doppler. An adequate sample was obtained. Onsite cytologist was present The parenchyma was visualized in the head of the pancreas and neck of the pancreas. The parenchyma had localized lobularity with honeycombing. The parenchyma had localized hyperechoic foci without shadowing. The pancreatic duct appeared normal and measured 1.7 mm at the body. Pancreatic tail was atrophic. The bile duct appeared normal. The bile duct measured 2.6 mm at the distal end. The gallbladder was visualized and appeared normal. The gallbladder wall thickness was normal. SPECIMENS: ID Type Source Tests Collected by Time Destination 1 : pancreatic body Tissue Pancreas TISSUE EXAM Lambert He MD 8/13/2024 12:09 PM       Impression: EGD: Normal exam. EUS: 30 x 18 mm heterogenous, hypoechoic, irregular mass in the body of pancreas. Performed FNB.  The mass was abutting the splenic vein and artery.  Mild lobularity seen in the head of  pancreas without any calcifications or significant honeycombing.  Pancreatic duct is normal in caliber.  RECOMMENDATION: Await pathology results Follow up with GI Clinic Recommend surgical oncology and oncology evaluation.   Lambert He MD         I personally reviewed and interpreted the available history, laboratory and imaging data, including: MR, CT x 2, labs, US, EUS, bx, med onc c/s, pall med consult. Discussed with Dr Aaron

## 2024-08-26 NOTE — TELEPHONE ENCOUNTER
Received notification by RN Navigator Trevon Armenta on 8/23/24 that pt has triggered for oncology nutrition care (reason for referral: Pancreatic CA dx and Malnutrition Screening Tool (MST) Triggers: scored a 3 indicating 24-33# (11-15 kg) recent wt loss and is not eating poorly due to a decreased appetite. (Date of MST: 8/23/24)).    Contacted Abhishek today to establish care.  No answer.  Left voice message with the reason for today's call and this RD’s contact information asking that Abhishek call back as able/desired.

## 2024-08-26 NOTE — PROGRESS NOTES
SURGICAL ONCOLOGY CONSULT    Abhishek Gimenez  1955  [id number]  [facility] [address] [id number] [phone]      ASSESSMENT AND PLAN    1. Adenocarcinoma of pancreas (HCC)  Assessment & Plan:  Today I discussed with the patient and his wife the diagnosis of pancreatic adenocarcinoma.  I discussed the specific position of his tumor and the need for distal pancreatectomy splenectomy.  I discussed that because of the long gravity of his symptoms, and the nature of a tail mass, I am recommending diagnostic laparoscopy to rule out occult peritoneal disease.  Likewise, given the relative aggressiveness of body and tail tumors as well as the longevity of his symptoms, I am recommending sandwich therapy with chemotherapy followed by surgery followed by the completion of chemotherapy.  The patient will require a repeat CT with pancreas protocol given that it has been almost 2 months since his previous scan.  We discussed the possibility of a PET scan, however, I think that the recent MRI is recent enough to demonstrate the best possible imaging of his liver masses which again suggest benign etiology.  Interval imaging and starting with chemotherapy will also allow us to better elucidate the nature of the liver masses.  We will discuss his case at our multidisciplinary tumor board.  I will see the patient in 2 to 3 months time to gauge his progress.  All questions answered today.  Orders:  -     Case request operating room: LAPAROSCOPY DIAGNOSTIC, INSERTION VENOUS PORT (PORT-A-CATH); Standing  -     EKG 12 lead; Future  -     Type and screen; Future  -     Case request operating room: LAPAROSCOPY DIAGNOSTIC, INSERTION VENOUS PORT (PORT-A-CATH)  -     CT abdomen pelvis w contrast; Future; Expected date: 08/26/2024  2. Pancreatic mass  -     Ambulatory Referral to Surgical Oncology        NEW VISIT DATA    Oncology History    Adenocarcinoma of pancreas (HCC)   8/13/2024 Biopsy    A.-B.  Pancreas, Body, FNA (Cell block and smear preparations):   Malignant  Adenocarcinoma.     8/20/2024 Initial Diagnosis    Adenocarcinoma of pancreas (HCC)     8/26/2024 -  Cancer Staged    Staging form: Pancreas, AJCC 8th Edition  - Clinical: Stage IB (cT2, cN0, cM0) - Signed by Roz Faulkner MD on 8/26/2024  Total positive nodes: 0           History of Present Illness:   New consult for dx of pancreas adenocarcinoma. Started feeling generally unwell after xmas. Then diagnosed with new-onset DM in January due to fluctuating and very high BG. He continued to have vague left upper abdominal pain and significant 30 lb weight loss starting in Jan; revisited PCP in May and he had an US done in July which showed panc body lesion as well as liver lesions. An MRI was obtained, suggesting that most if not all of the liver lesions were benign; there was no evidence of cirrhosis on this scan. He saw GI in July - they performed an EUS in August which demonstrated panc adeno. CT chest to complete staging with no concerning lesions. He does have a hx of alcohol abuse with 3-4 drinks / day. He was on ozempic for DM but has come off and his appetite has improved and he's gained a few lbs. Appetite has improved. Is very fit and active with an ECOG of 0. Had afib with ablation x 2 which was successful and he is currently not on any meds or anticoagulants.     Ca 19-9 about 400    Review of Systems  Complete ROS Surg Onc:   Constitutional: The patient ENDORSES new or recent history of general fatigue, recent weight loss, ok appetite.   Eyes: No complaints of visual problems, no scleral icterus.   ENT: no complaints of ear pain, no hoarseness, no difficulty swallowing,  no tinnitus and no new masses in head, oral cavity, or neck.   Cardiovascular: No complaints of chest pain, no palpitations, no ankle edema.   Respiratory: No complaints of shortness of breath, no cough.    Gastrointestinal: No complaints of jaundice, no bloody stools, no pale stools.   Genitourinary: No complaints of dysuria, no hematuria, no nocturia, no frequent urination, no urethral discharge.   Musculoskeletal: No complaints of weakness, paralysis, joint stiffness or arthralgias.  Integumentary: No complaints of rash, no new lesions.   Neurological: No complaints of convulsions, no seizures, no dizziness.   Hematologic/Lymphatic: No complaints of easy bruising.   Endocrine:  No hot or cold intolerance.  No polydipsia, polyphagia, or polyuria.  Allergy/immunology:  No environmental allergies.  No food allergies.  Not immunocompromised.  Skin:  No pallor or rash.  No wound.      Patient Active Problem List   Diagnosis    Atrial fibrillation (HCC)    Psoriatic arthritis (HCC)    BMI 29.0-29.9,adult    NDPH (new daily persistent headache)    Atrial flutter (HCC)    CATY (obstructive sleep apnea)    Type 2 diabetes mellitus with hyperglycemia, without long-term current use of insulin (HCC)    Elevated PSA    Elevated blood pressure reading without diagnosis of hypertension    Platelets decreased (HCC)    Adenocarcinoma of pancreas (HCC)    Pulmonary HTN (HCC)     Past Medical History:   Diagnosis Date    A-fib (HCC)     Arthritis     BPH (benign prostatic hyperplasia)     Colon polyp     Diabetes mellitus (HCC)     Fatty liver     Fracture, foot     High cholesterol     Hyperlipidemia     Irregular heart beat     Non-ischemic cardiomyopathy (HCC)     Osteoarthritis     Psoriasis     Psoriatic arthritis (HCC)     Pulmonary HTN (HCC)      Past Surgical History:   Procedure Laterality Date    CARDIAC ELECTROPHYSIOLOGY PROCEDURE N/A 02/22/2023    Procedure: Cardiac eps/afib ablation   comprehensive posterior wall;  Surgeon: Mynor Andrews MD;  Location: [facility];  Service: Cardiology    COLONOSCOPY      ELBOW SURGERY Right     KIDNEY STONE SURGERY      TRANSURETHRAL RESECTION OF PROSTATE      last assessed  7/21/15     Family History   Problem Relation Age of Onset    Diabetes Mother     Hypertension Father     Diabetes Family      Social History     Socioeconomic History    Marital status: /Civil Union     Spouse name: Not on file    Number of children: Not on file    Years of education: Not on file    Highest education level: Not on file   Occupational History    Not on file   Tobacco Use    Smoking status: Former     Current packs/day: 0.50     Average packs/day: 0.5 packs/day for 10.0 years (5.0 ttl pk-yrs)     Types: Cigarettes     Passive exposure: Past    Smokeless tobacco: Never   Vaping Use    Vaping status: Never Used   Substance and Sexual Activity    Alcohol use: Not Currently     Comment: 1 drink daily ( pt stopped drinking in July)    Drug use: No    Sexual activity: Not on file   Other Topics Concern    Not on file   Social History Narrative    Daily coffee consumption (__cups/day)     Daily cola consumption (__cans/day)      Social Determinants of Health     Financial Resource Strain: Low Risk  (2/20/2024)    Overall Financial Resource Strain (CARDIA)     Difficulty of Paying Living Expenses: Not very hard   Food Insecurity: Not on file   Transportation Needs: No Transportation Needs (2/20/2024)    PRAPARE - Transportation     Lack of Transportation (Medical): No     Lack of Transportation (Non-Medical): No   Physical Activity: Not on file   Stress: Not on file   Social Connections: Unknown (6/18/2024)    Received from [employer]    Social [employer]     How often do you feel lonely or isolated from those around you? (Adult - for ages 18 years and over): Not on file   Intimate Partner Violence: Not on file   Housing Stability: Not on file       Current Outpatient Medications:     adalimumab (HUMIRA) 40 mg/0.8 mL PSKT, Inject 40 mg under the skin every 14 (fourteen) days , Disp: , Rfl:     Alcohol Swabs 70 % PADS, May substitute brand based on insurance coverage. Check glucose TID., Disp: 100  each, Rfl: 0    Blood Glucose Monitoring Suppl (OneTouch Verio Reflect) w/Device KIT, Check blood sugars once daily. Please substitute with appropriate alternative as covered by patient's insurance. Dx: E11.65, Disp: 1 kit, Rfl: 0    Continuous Blood Gluc  (FreeStyle Livia 2 [place]) LEANA, 14 DAY READER NOT MADE, Disp: 1 each, Rfl: 1    Continuous Glucose Sensor (FreeStyle Livia 14 Day Sensor) MISC, Use 1 Device 3 (three) times a day before meals, Disp: 2 each, Rfl: 5    glucose blood (OneTouch Verio) test strip, Check blood sugars once daily. Please substitute with appropriate alternative as covered by patient's insurance. Dx: E11.65, Disp: 100 each, Rfl: 3    metFORMIN (GLUCOPHAGE-XR) 500 mg 24 hr tablet, TAKE 2 TABLETS BY MOUTH EVERY DAY WITH BREAKFAST, Disp: 180 tablet, Rfl: 1    OneTouch Delica Lancets 33G MISC, Check blood sugars once daily. Please substitute with appropriate alternative as covered by patient's insurance. Dx: E11.65, Disp: 100 each, Rfl: 3    oxyCODONE (Roxicodone) 5 immediate release tablet, Take 1 tablet (5 mg total) by mouth every 6 (six) hours as needed for moderate pain Max Daily Amount: 20 mg, Disp: 60 tablet, Rfl: 0    pantoprazole (PROTONIX) 40 mg tablet, Take 1 tablet (40 mg total) by mouth daily before breakfast, Disp: 30 tablet, Rfl: 5    rosuvastatin (CRESTOR) 5 mg tablet, TAKE 1 TABLET BY MOUTH EVERY DAY IN THE EVENING, Disp: 90 tablet, Rfl: 1    sildenafil (VIAGRA) 100 mg tablet, TAKE ONE (1) TABLET BY MOUTH DAILY AS NEEDED, Disp: , Rfl:     Insulin Pen Needle (BD Pen Needle Mallory 2nd Gen) 32G X 4 MM MISC, For use with insulin pen. Pharmacy may dispense brand covered by insurance., Disp: 100 each, Rfl: 0    Insulin Pen Needle (BD Pen Needle Mallory U/F) 32G X 4 MM MISC, Use daily as directed with insulin pen, Disp: 100 each, Rfl: 3    semaglutide, 0.25 or 0.5 mg/dose, (Ozempic, 0.25 or 0.5 MG/DOSE,) 2 mg/3 mL injection pen, Inject 0.75 mL (0.5 mg total) under the skin every 7  days (Patient not taking: Reported on 8/26/2024), Disp: 3 mL, Rfl: 3  Allergies   Allergen Reactions    Penicillins Rash         Vitals:    08/26/24 1310   BP: 126/86   Pulse: 59   Temp: 98 °F (36.7 °C)   SpO2: 97%       Physical Exam   Constitutional: General appearance: The Patient is well-developed and well-nourished who appears the stated age in no acute distress. Patient is pleasant and talkative.     HEENT:  Normocephalic.  Sclerae are anicteric. Mucous membranes are moist. Neck is supple without adenopathy. No JVD.     Chest: The lungs are clear to auscultation.     Cardiac: Heart is regular rate.     Abdomen: Abdomen is soft, non-tender, non-distended and without masses.     Extremities: There is no clubbing or cyanosis. There is no edema.  Symmetric.  Neuro: Grossly nonfocal. Gait is normal.     Lymphatic: No evidence of cervical adenopathy bilaterally.   No evidence of axillary adenopathy bilaterally. No evidence of inguinal adenopathy bilaterally.     Skin: Warm, anicteric.    Psych:  Patient is pleasant and talkative.      Imaging  CT chest wo contrast    Result Date: 8/22/2024  Narrative: CT CHEST WITHOUT IV CONTRAST INDICATION: K86.89: Other specified diseases of pancreas. Pancreatic mass. Metastatic disease evaluation COMPARISON: CT scan of the chest dated 1/6/2017 and abdominal MRI dated 7/10/2024. TECHNIQUE: CT examination of the chest was performed without intravenous contrast. Multiplanar 2D reformatted images were created from the source data. This examination, like all CT scans performed in the [facility], was performed utilizing techniques to minimize radiation dose exposure, including the use of iterative reconstruction and automated exposure control. Radiation dose length product (DLP) for this visit: 316 mGy-cm FINDINGS: LUNGS: 3 mm fissural nodule along the left major fissure on image 124 of series 3. 3 mm fissural nodule along the right major fissure on image 65 of series  2. 2 mm pulmonary nodule along the left major fissure on image 114 of series 3.41. There is no tracheal or endobronchial lesion. PLEURA: Unremarkable. HEART/GREAT VESSELS: Heart is unremarkable for patient's age. No thoracic aortic aneurysm. Small amount of aortic valvular cusp calcification. Small amount of coronary calcification in the left main and left anterior descending coronary. MEDIASTINUM AND DOE: There are few small pretracheal lymph nodes that measure up to 7 mm. These lymph nodes are similar to prior CT and probably within normal limits. There are small prevascular lymph nodes as well that measure up to 9 mm that are unchanged compared to the prior study and probably within normal limits. There is an aortopulmonary window lymph node that measures 0.9 x 1.3 cm that is also unchanged compared to prior study. No axillary or hilar lymphadenopathy. CHEST WALL AND LOWER NECK: Unremarkable. VISUALIZED STRUCTURES IN THE UPPER ABDOMEN: Mild enlargement of the spleen that measures 14 cm in anterior to posterior dimension. Small low-attenuation lesion in segment 4A of the left hepatic lobe that measures 7 mm image 95 of series 2. Low-attenuation lesion in segment 5 of the right hepatic lobe that measures 1.3 x 1.3 cm image 124 of series 2. There is a lesion involving segment one of the liver that measures 2.3 x 3.7 cm image 109 of series 2. There is a mass involving the body/tail  of the pancreas that measures 1.9 x 3.7 cm image 121 of series 2. 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 OSSEOUS STRUCTURES: Moderate endplate degenerative change in the thoracic spine. Marked degenerative change in the bilateral shoulders. No osteolytic or osteoblastic lesion.     Impression: No findings of metastatic disease in the chest. Small bilateral fissural nodules measuring 2 to 3 mm in size with a benign appearance. No imaging follow-up of these nodules is necessary. Mass  along the body/tail of the pancreas is again visualized in the upper abdomen suspicious for a pancreatic cancer. Finding is unchanged compared to abdominal MRI from 7/10/2024. 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 Scattered small hepatic lesions that may indicate hepatic metastatic disease. Correlation with PET/CT or biopsy should be considered. MIld splenomegaly The study was marked in EPIC for immediate notification. Workstation performed: [id number]     Endoscopic ultrasonography, GI (Upper)    Result Date: 8/13/2024  Narrative: Table formatting from the original result was not included. [facility] [address] [facility] [address] [phone] DATE OF SERVICE: 8/13/24 PHYSICIAN(S): Attending: Lambert He MD Fellow: Conor Hoffman MD INDICATION: Epigastric pain, Chronic pancreatitis, unspecified pancreatitis type (HCC) POST-OP DIAGNOSIS: See the impression below. PREPROCEDURE: Informed consent was obtained for the procedure, including sedation.  Risks of perforation, hemorrhage, adverse drug reaction and aspiration were discussed. The patient was placed in the left lateral decubitus position. Patient was explained about the risks and benefits of the procedure. Risks including but not limited to bleeding, infection, and perforation were explained in detail. Also explained about less than 100% sensitivity with the exam and other alternatives. PROCEDURE: EUS UPPER DETAILS OF PROCEDURE: Patient was taken to the procedure room where a time out was performed to confirm correct patient and correct procedure. The patient underwent monitored anesthesia care, which was administered by an anesthesia professional. The patient's blood pressure, heart rate, oxygen, respirations, level of consciousness, ECG and ETCO2 were monitored throughout the procedure. The endoscope and linear scope were introduced through the mouth and advanced to the  second part of the duodenum. The patient experienced no blood loss. The procedure was not difficult. The patient tolerated the procedure well. There were no apparent adverse events. ANESTHESIA INFORMATION: ASA: III Anesthesia Type: IV Sedation with Anesthesia MEDICATIONS: No administrations occurring from 1158 to 1240 on 08/13/24 FINDINGS: The esophagus appeared normal. The stomach appeared normal. The duodenum appeared normal. The celiac artery and aorta were visualized, including the abdominal aorta and appeared normal. The left kidney, spleen and left adrenal appeared normal. The parenchyma of the liver appeared normal. The parenchyma was visualized in the left lobe of the liver. Heterogeneous, hypoechoic and irregular mass measuring 30 mm x 18 mm with poorly defined margins was visualized in the body of the pancreas. Apparent abutment of the splenic artery and splenic vein; 4 successful fine needle biopsy passes were taken with a 25 gauge iodine needle using a transgastric approach guided by Doppler. An adequate sample was obtained. Onsite cytologist was present The parenchyma was visualized in the head of the pancreas and neck of the pancreas. The parenchyma had localized lobularity with honeycombing. The parenchyma had localized hyperechoic foci without shadowing. The pancreatic duct appeared normal and measured 1.7 mm at the body. Pancreatic tail was atrophic. The bile duct appeared normal. The bile duct measured 2.6 mm at the distal end. The gallbladder was visualized and appeared normal. The gallbladder wall thickness was normal. SPECIMENS: ID Type Source Tests Collected by Time Destination 1 : pancreatic body Tissue Pancreas TISSUE EXAM Lambert He MD 8/13/2024 12:09 PM       Impression: EGD: Normal exam. EUS: 30 x 18 mm heterogenous, hypoechoic, irregular mass in the body of pancreas. Performed FNB.  The mass was abutting the splenic vein and artery.  Mild lobularity seen in the head of  pancreas without any calcifications or significant honeycombing.  Pancreatic duct is normal in caliber.  RECOMMENDATION: Await pathology results Follow up with GI Clinic Recommend surgical oncology and oncology evaluation.   Lambert He MD         I personally reviewed and interpreted the available history, laboratory and imaging data, including: MR, CT x 2, labs, US, EUS, bx, med onc c/s, pall med consult. Discussed with Dr Aaron

## 2024-08-26 NOTE — ASSESSMENT & PLAN NOTE
Today I discussed with the patient and his wife the diagnosis of pancreatic adenocarcinoma.  I discussed the specific position of his tumor and the need for distal pancreatectomy splenectomy.  I discussed that because of the long gravity of his symptoms, and the nature of a tail mass, I am recommending diagnostic laparoscopy to rule out occult peritoneal disease.  Likewise, given the relative aggressiveness of body and tail tumors as well as the longevity of his symptoms, I am recommending sandwich therapy with chemotherapy followed by surgery followed by the completion of chemotherapy.  The patient will require a repeat CT with pancreas protocol given that it has been almost 2 months since his previous scan.  We discussed the possibility of a PET scan, however, I think that the recent MRI is recent enough to demonstrate the best possible imaging of his liver masses which again suggest benign etiology.  Interval imaging and starting with chemotherapy will also allow us to better elucidate the nature of the liver masses.  We will discuss his case at our multidisciplinary tumor board.  I will see the patient in 2 to 3 months time to gauge his progress.  All questions answered today.

## 2024-08-26 NOTE — RESULT ENCOUNTER NOTE
Inform patient via [employer].  Please review the pathology/lab result of further discussion.    Copied from [employer] message :       Georgiana Weiss,     I hope you are doing well. The CT chest had not shown any evidence of disease in the chest area. Small spots were seen in the liver which will require further evaluation. Dr. Aaron and Dr. Faulkner will probably discuss the work up of these with you.     Best regards,     Lambert He MD

## 2024-08-26 NOTE — LETTER
August 26, 2024     Oscar Aaron MD  [address] [facility] [address]    Patient: Abhishek Gimenez   YOB: 1955   Date of Visit: 8/26/2024       Dear Dr. Aaron:    Thank you for referring Abhishek Gimenez to me for evaluation. Below are my notes for this consultation.    If you have questions, please do not hesitate to call me. I look forward to following your patient along with you.         Sincerely,        Roz Faulkner MD        CC: No Recipients    Roz Faulkner MD  8/26/2024  2:57 PM  Sign when Signing Visit  SURGICAL ONCOLOGY CONSULT    Abhishek Gimenez  1955  [id number]  [facility] [address] [address]  [phone]      ASSESSMENT AND PLAN    1. Adenocarcinoma of pancreas (HCC)  Assessment & Plan:  Today I discussed with the patient and his wife the diagnosis of pancreatic adenocarcinoma.  I discussed the specific position of his tumor and the need for distal pancreatectomy splenectomy.  I discussed that because of the long gravity of his symptoms, and the nature of a tail mass, I am recommending diagnostic laparoscopy to rule out occult peritoneal disease.  Likewise, given the relative aggressiveness of body and tail tumors as well as the longevity of his symptoms, I am recommending sandwich therapy with chemotherapy followed by surgery followed by the completion of chemotherapy.  The patient will require a repeat CT with pancreas protocol given that it has been almost 2 months since his previous scan.  We discussed the possibility of a PET scan, however, I think that the recent MRI is recent enough to demonstrate the best possible imaging of his liver masses which again suggest benign etiology.  Interval imaging and starting with chemotherapy will also allow us to better elucidate the nature of the liver masses.  We will discuss his case at our multidisciplinary tumor board.  I  will see the patient in 2 to 3 months time to gauge his progress.  All questions answered today.  Orders:  -     Case request operating room: LAPAROSCOPY DIAGNOSTIC, INSERTION VENOUS PORT (PORT-A-CATH); Standing  -     EKG 12 lead; Future  -     Type and screen; Future  -     Case request operating room: LAPAROSCOPY DIAGNOSTIC, INSERTION VENOUS PORT (PORT-A-CATH)  -     CT abdomen pelvis w contrast; Future; Expected date: 08/26/2024  2. Pancreatic mass  -     Ambulatory Referral to Surgical Oncology        NEW VISIT DATA    Oncology History   Adenocarcinoma of pancreas (HCC)   8/13/2024 Biopsy    A.-B.  Pancreas, Body, FNA (Cell block and smear preparations):   Malignant  Adenocarcinoma.     8/20/2024 Initial Diagnosis    Adenocarcinoma of pancreas (HCC)     8/26/2024 -  Cancer Staged    Staging form: Pancreas, AJCC 8th Edition  - Clinical: Stage IB (cT2, cN0, cM0) - Signed by Roz Faulkner MD on 8/26/2024  Total positive nodes: 0           History of Present Illness:   New consult for dx of pancreas adenocarcinoma. Started feeling generally unwell after xmas. Then diagnosed with new-onset DM in January due to fluctuating and very high BG. He continued to have vague left upper abdominal pain and significant 30 lb weight loss starting in Jan; revisited PCP in May and he had an US done in July which showed panc body lesion as well as liver lesions. An MRI was obtained, suggesting that most if not all of the liver lesions were benign; there was no evidence of cirrhosis on this scan. He saw GI in July - they performed an EUS in August which demonstrated panc adeno. CT chest to complete staging with no concerning lesions. He does have a hx of alcohol abuse with 3-4 drinks / day. He was on ozempic for DM but has come off and his appetite has improved and he's gained a few lbs. Appetite has improved. Is very fit and active with an ECOG of 0. Had afib with ablation x 2 which was successful and he is currently not on any  meds or anticoagulants.     Ca 19-9 about 400    Review of Systems  Complete ROS Surg Onc:   Constitutional: The patient ENDORSES new or recent history of general fatigue, recent weight loss, ok appetite.   Eyes: No complaints of visual problems, no scleral icterus.   ENT: no complaints of ear pain, no hoarseness, no difficulty swallowing,  no tinnitus and no new masses in head, oral cavity, or neck.   Cardiovascular: No complaints of chest pain, no palpitations, no ankle edema.   Respiratory: No complaints of shortness of breath, no cough.   Gastrointestinal: No complaints of jaundice, no bloody stools, no pale stools.   Genitourinary: No complaints of dysuria, no hematuria, no nocturia, no frequent urination, no urethral discharge.   Musculoskeletal: No complaints of weakness, paralysis, joint stiffness or arthralgias.  Integumentary: No complaints of rash, no new lesions.   Neurological: No complaints of convulsions, no seizures, no dizziness.   Hematologic/Lymphatic: No complaints of easy bruising.   Endocrine:  No hot or cold intolerance.  No polydipsia, polyphagia, or polyuria.  Allergy/immunology:  No environmental allergies.  No food allergies.  Not immunocompromised.  Skin:  No pallor or rash.  No wound.      Patient Active Problem List   Diagnosis   • Atrial fibrillation (HCC)   • Psoriatic arthritis (HCC)   • BMI 29.0-29.9,adult   • NDPH (new daily persistent headache)   • Atrial flutter (HCC)   • CATY (obstructive sleep apnea)   • Type 2 diabetes mellitus with hyperglycemia, without long-term current use of insulin (HCC)   • Elevated PSA   • Elevated blood pressure reading without diagnosis of hypertension   • Platelets decreased (HCC)   • Adenocarcinoma of pancreas (HCC)   • Pulmonary HTN (HCC)     Past Medical History:   Diagnosis Date   • A-fib (HCC)    • Arthritis    • BPH (benign prostatic hyperplasia)    • Colon polyp    • Diabetes mellitus (HCC)    • Fatty liver    • Fracture, foot    • High  cholesterol    • Hyperlipidemia    • Irregular heart beat    • Non-ischemic cardiomyopathy (HCC)    • Osteoarthritis    • Psoriasis    • Psoriatic arthritis (HCC)    • Pulmonary HTN (HCC)      Past Surgical History:   Procedure Laterality Date   • CARDIAC ELECTROPHYSIOLOGY PROCEDURE N/A 02/22/2023    Procedure: Cardiac eps/afib ablation   comprehensive posterior wall;  Surgeon: Mynor Andrews MD;  Location: [facility];  Service: Cardiology   • COLONOSCOPY     • ELBOW SURGERY Right    • KIDNEY STONE SURGERY     • TRANSURETHRAL RESECTION OF PROSTATE      last assessed 7/21/15     Family History   Problem Relation Age of Onset   • Diabetes Mother    • Hypertension Father    • Diabetes Family      Social History     Socioeconomic History   • Marital status: /Civil Union     Spouse name: Not on file   • Number of children: Not on file   • Years of education: Not on file   • Highest education level: Not on file   Occupational History   • Not on file   Tobacco Use   • Smoking status: Former     Current packs/day: 0.50     Average packs/day: 0.5 packs/day for 10.0 years (5.0 ttl pk-yrs)     Types: Cigarettes     Passive exposure: Past   • Smokeless tobacco: Never   Vaping Use   • Vaping status: Never Used   Substance and Sexual Activity   • Alcohol use: Not Currently     Comment: 1 drink daily ( pt stopped drinking in July)   • Drug use: No   • Sexual activity: Not on file   Other Topics Concern   • Not on file   Social History Narrative    Daily coffee consumption (__cups/day)     Daily cola consumption (__cans/day)      Social Determinants of Health     Financial Resource Strain: Low Risk  (2/20/2024)    Overall Financial Resource Strain (CARDIA)    • Difficulty of Paying Living Expenses: Not very hard   Food Insecurity: Not on file   Transportation Needs: No Transportation Needs (2/20/2024)    PRAPARE - Transportation    • Lack of Transportation (Medical): No    • Lack of Transportation (Non-Medical): No    Physical Activity: Not on file   Stress: Not on file   Social Connections: Unknown (6/18/2024)    Received from [facility]    Social Connections    • How often do you feel lonely or isolated from those around you? (Adult - for ages 18 years and over): Not on file   Intimate Partner Violence: Not on file   Housing Stability: Not on file       Current Outpatient Medications:   •  adalimumab (HUMIRA) 40 mg/0.8 mL PSKT, Inject 40 mg under the skin every 14 (fourteen) days , Disp: , Rfl:   •  Alcohol Swabs 70 % PADS, May substitute brand based on insurance coverage. Check glucose TID., Disp: 100 each, Rfl: 0  •  Blood Glucose Monitoring Suppl (OneTouch Verio Reflect) w/Device KIT, Check blood sugars once daily. Please substitute with appropriate alternative as covered by patient's insurance. Dx: E11.65, Disp: 1 kit, Rfl: 0  •  Continuous Blood Gluc  (FreeStyle Livia 2 [place]) LEANA, 14 DAY READER NOT MADE, Disp: 1 each, Rfl: 1  •  Continuous Glucose Sensor (FreeStyle Livia 14 Day Sensor) MISC, Use 1 Device 3 (three) times a day before meals, Disp: 2 each, Rfl: 5  •  glucose blood (OneTouch Verio) test strip, Check blood sugars once daily. Please substitute with appropriate alternative as covered by patient's insurance. Dx: E11.65, Disp: 100 each, Rfl: 3  •  metFORMIN (GLUCOPHAGE-XR) 500 mg 24 hr tablet, TAKE 2 TABLETS BY MOUTH EVERY DAY WITH BREAKFAST, Disp: 180 tablet, Rfl: 1  •  OneTouch Delica Lancets 33G MISC, Check blood sugars once daily. Please substitute with appropriate alternative as covered by patient's insurance. Dx: E11.65, Disp: 100 each, Rfl: 3  •  oxyCODONE (Roxicodone) 5 immediate release tablet, Take 1 tablet (5 mg total) by mouth every 6 (six) hours as needed for moderate pain Max Daily Amount: 20 mg, Disp: 60 tablet, Rfl: 0  •  pantoprazole (PROTONIX) 40 mg tablet, Take 1 tablet (40 mg total) by mouth daily before breakfast, Disp: 30 tablet, Rfl: 5  •  rosuvastatin (CRESTOR) 5 mg tablet, TAKE  1 TABLET BY MOUTH EVERY DAY IN THE EVENING, Disp: 90 tablet, Rfl: 1  •  sildenafil (VIAGRA) 100 mg tablet, TAKE ONE (1) TABLET BY MOUTH DAILY AS NEEDED, Disp: , Rfl:   •  Insulin Pen Needle (BD Pen Needle Mallory 2nd Gen) 32G X 4 MM MISC, For use with insulin pen. Pharmacy may dispense brand covered by insurance., Disp: 100 each, Rfl: 0  •  Insulin Pen Needle (BD Pen Needle Mallory U/F) 32G X 4 MM MISC, Use daily as directed with insulin pen, Disp: 100 each, Rfl: 3  •  semaglutide, 0.25 or 0.5 mg/dose, (Ozempic, 0.25 or 0.5 MG/DOSE,) 2 mg/3 mL injection pen, Inject 0.75 mL (0.5 mg total) under the skin every 7 days (Patient not taking: Reported on 8/26/2024), Disp: 3 mL, Rfl: 3  Allergies   Allergen Reactions   • Penicillins Rash         Vitals:    08/26/24 1310   BP: 126/86   Pulse: 59   Temp: 98 °F (36.7 °C)   SpO2: 97%       Physical Exam   Constitutional: General appearance: The Patient is well-developed and well-nourished who appears the stated age in no acute distress. Patient is pleasant and talkative.     HEENT:  Normocephalic.  Sclerae are anicteric. Mucous membranes are moist. Neck is supple without adenopathy. No JVD.     Chest: The lungs are clear to auscultation.     Cardiac: Heart is regular rate.     Abdomen: Abdomen is soft, non-tender, non-distended and without masses.     Extremities: There is no clubbing or cyanosis. There is no edema.  Symmetric.  Neuro: Grossly nonfocal. Gait is normal.     Lymphatic: No evidence of cervical adenopathy bilaterally.   No evidence of axillary adenopathy bilaterally. No evidence of inguinal adenopathy bilaterally.     Skin: Warm, anicteric.    Psych:  Patient is pleasant and talkative.      Imaging  CT chest wo contrast    Result Date: 8/22/2024  Narrative: CT CHEST WITHOUT IV CONTRAST INDICATION: K86.89: Other specified diseases of pancreas. Pancreatic mass. Metastatic disease evaluation COMPARISON: CT scan of the chest dated 1/6/2017 and abdominal MRI dated 7/10/2024.  TECHNIQUE: CT examination of the chest was performed without intravenous contrast. Multiplanar 2D reformatted images were created from the source data. This examination, like all CT scans performed in the [facility], was performed utilizing techniques to minimize radiation dose exposure, including the use of iterative reconstruction and automated exposure control. Radiation dose length product (DLP) for this visit: 316 mGy-cm FINDINGS: LUNGS: 3 mm fissural nodule along the left major fissure on image 124 of series 3. 3 mm fissural nodule along the right major fissure on image 65 of series 2. 2 mm pulmonary nodule along the left major fissure on image 114 of series 3.41. There is no tracheal or endobronchial lesion. PLEURA: Unremarkable. HEART/GREAT VESSELS: Heart is unremarkable for patient's age. No thoracic aortic aneurysm. Small amount of aortic valvular cusp calcification. Small amount of coronary calcification in the left main and left anterior descending coronary. MEDIASTINUM AND DOE: There are few small pretracheal lymph nodes that measure up to 7 mm. These lymph nodes are similar to prior CT and probably within normal limits. There are small prevascular lymph nodes as well that measure up to 9 mm that are unchanged compared to the prior study and probably within normal limits. There is an aortopulmonary window lymph node that measures 0.9 x 1.3 cm that is also unchanged compared to prior study. No axillary or hilar lymphadenopathy. CHEST WALL AND LOWER NECK: Unremarkable. VISUALIZED STRUCTURES IN THE UPPER ABDOMEN: Mild enlargement of the spleen that measures 14 cm in anterior to posterior dimension. Small low-attenuation lesion in segment 4A of the left hepatic lobe that measures 7 mm image 95 of series 2. Low-attenuation lesion in segment 5 of the right hepatic lobe that measures 1.3 x 1.3 cm image 124 of series 2. There is a lesion involving segment one of the liver that measures 2.3 x  3.7 cm image 109 of series 2. There is a mass involving the body/tail  of the pancreas that measures 1.9 x 3.7 cm image 121 of series 2. 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 OSSEOUS STRUCTURES: Moderate endplate degenerative change in the thoracic spine. Marked degenerative change in the bilateral shoulders. No osteolytic or osteoblastic lesion.     Impression: No findings of metastatic disease in the chest. Small bilateral fissural nodules measuring 2 to 3 mm in size with a benign appearance. No imaging follow-up of these nodules is necessary. Mass along the body/tail of the pancreas is again visualized in the upper abdomen suspicious for a pancreatic cancer. Finding is unchanged compared to abdominal MRI from 7/10/2024. 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 Scattered small hepatic lesions that may indicate hepatic metastatic disease. Correlation with PET/CT or biopsy should be considered. MIld splenomegaly The study was marked in EPIC for immediate notification. Workstation performed: [id number]     Endoscopic ultrasonography, GI (Upper)    Result Date: 8/13/2024  Narrative: Table formatting from the original result was not included. [facility] [address] [facility] [address] [phone] DATE OF SERVICE: 8/13/24 PHYSICIAN(S): Attending: Lambert He MD Fellow: Conor Hoffman MD INDICATION: Epigastric pain, Chronic pancreatitis, unspecified pancreatitis type (HCC) POST-OP DIAGNOSIS: See the impression below. PREPROCEDURE: Informed consent was obtained for the procedure, including sedation.  Risks of perforation, hemorrhage, adverse drug reaction and aspiration were discussed. The patient was placed in the left lateral decubitus position. Patient was explained about the risks and benefits of the procedure. Risks including but not limited to bleeding, infection, and  perforation were explained in detail. Also explained about less than 100% sensitivity with the exam and other alternatives. PROCEDURE: EUS UPPER DETAILS OF PROCEDURE: Patient was taken to the procedure room where a time out was performed to confirm correct patient and correct procedure. The patient underwent monitored anesthesia care, which was administered by an anesthesia professional. The patient's blood pressure, heart rate, oxygen, respirations, level of consciousness, ECG and ETCO2 were monitored throughout the procedure. The endoscope and linear scope were introduced through the mouth and advanced to the second part of the duodenum. The patient experienced no blood loss. The procedure was not difficult. The patient tolerated the procedure well. There were no apparent adverse events. ANESTHESIA INFORMATION: ASA: III Anesthesia Type: IV Sedation with Anesthesia MEDICATIONS: No administrations occurring from 1158 to 1240 on 08/13/24 FINDINGS: The esophagus appeared normal. The stomach appeared normal. The duodenum appeared normal. The celiac artery and aorta were visualized, including the abdominal aorta and appeared normal. The left kidney, spleen and left adrenal appeared normal. The parenchyma of the liver appeared normal. The parenchyma was visualized in the left lobe of the liver. Heterogeneous, hypoechoic and irregular mass measuring 30 mm x 18 mm with poorly defined margins was visualized in the body of the pancreas. Apparent abutment of the splenic artery and splenic vein; 4 successful fine needle biopsy passes were taken with a 25 gauge [employer] needle using a transgastric approach guided by Doppler. An adequate sample was obtained. Onsite cytologist was present The parenchyma was visualized in the head of the pancreas and neck of the pancreas. The parenchyma had localized lobularity with honeycombing. The parenchyma had localized hyperechoic foci without shadowing. The pancreatic duct appeared  normal and measured 1.7 mm at the body. Pancreatic tail was atrophic. The bile duct appeared normal. The bile duct measured 2.6 mm at the distal end. The gallbladder was visualized and appeared normal. The gallbladder wall thickness was normal. SPECIMENS: ID Type Source Tests Collected by Time Destination 1 : pancreatic body Tissue Pancreas TISSUE EXAM Lambert He MD 8/13/2024 12:09 PM       Impression: EGD: Normal exam. EUS: 30 x 18 mm heterogenous, hypoechoic, irregular mass in the body of pancreas. Performed FNB.  The mass was abutting the splenic vein and artery.  Mild lobularity seen in the head of pancreas without any calcifications or significant honeycombing.  Pancreatic duct is normal in caliber.  RECOMMENDATION: Await pathology results Follow up with GI Clinic Recommend surgical oncology and oncology evaluation.   Lambert He MD         I personally reviewed and interpreted the available history, laboratory and imaging data, including: MR, CT x 2, labs, US, EUS, bx, med onc c/s, pall med consult. Discussed with Dr Aaron

## 2024-08-26 NOTE — PROGRESS NOTES
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 Reviewed [employer] if ever needed in the future.  Financial concerns: None  Advanced Directive: AD on file designating wife Sherri as substitute decision-maker  Other medical or social work providers involved: Oncology; SurgOnc; GI  Patient/caregiver current level of coping: Pt reports every few days he has a \"crying episode,\" but overall is very well-supported by family and friends. Reviewed roles of PSC SW and LCSW. LV CSC emailed to pt/family per request.    Understanding: Pt appears understanding of current medical status  Patient/family concerns and areas of need: Pain; Weight-loss; MMJ  Patient's interests: Motorcycle Rides; Traveling; Bicycling    I have spent 20 minutes with Patient and family today in which greater than " 50% of this time was spent in counseling/coordination of care.    *All questions may not be answered due to constraints.  Follow-up discussions may need to occur

## 2024-08-26 NOTE — PROGRESS NOTES
Ambulatory Visit  Name: Abhishek Gimenez      : 1955      MRN: [id number]  Encounter Provider: Kamar Jung PA-C  Encounter Date: 2024   Encounter department: [facility] PALLIATIVE CARE Start    Assessment & Plan   1. Adenocarcinoma of pancreas (HCC)  -     Ambulatory referral to Palliative Care  2. Palliative care by specialist  3. Cancer related pain  -     oxyCODONE (Roxicodone) 5 immediate release tablet; Take 1 tablet (5 mg total) by mouth every 6 (six) hours as needed for moderate pain Max Daily Amount: 20 mg  4. Pulmonary HTN (HCC)    Start OxyIR 5mg q6h PRN   Opioid agreement completed  Tylenol 975mg q8h ATC  Schedule MMJ visit  Palliative SW assessment  Follow up in [place] office in 4 weeks.     Decisional apparatus: Patient is competent on my exam today. If competence is lost, patient's substitute decision maker would default to spouse by [place] Act 169.   Advance Directive / Living Will / POLST: yes     PDMP Review: I have reviewed the patient's controlled substance dispensing history in the Prescription Drug Monitoring Program in compliance with the [facility] regulations before prescribing any controlled substances.    History of Present Illness     Abhishek Gimenez is a 68 y.o. male with recent diagnosis of pancreatic cancer who presents for palliative care consult for symptom management, goals of care counseling.     Patient presents today and reports doing okay. He has had some pain but has not been too bad. His pain is starting to get a little worse each day.  He has been taking tylenol OTC.Discussed opioid therapy and safe limits of acetaminophen use OTC. Reviewed risks and side effects. Also discussed MMJ, which patient is considering.  He has lost significant weight since  but recently has increased about 4 lbs. He has been abstaining from alcohol. He is disease-focused in his care with hopes to prolong survivability. He has a living will. His spouse is his HCR. He has  "follow up with oncology today to discuss initial therapies.       Review of Systems   Constitutional:  Positive for unexpected weight change. Negative for appetite change and fatigue.   HENT: Negative.     Eyes: Negative.    Respiratory:  Negative for shortness of breath.    Cardiovascular:  Negative for chest pain.   Gastrointestinal:  Positive for abdominal pain. Negative for abdominal distention, constipation, diarrhea, nausea and vomiting.   Genitourinary: Negative.    Musculoskeletal:  Negative for arthralgias, back pain, gait problem and myalgias.   Skin:  Negative for color change and wound.   Neurological:  Negative for dizziness, weakness, light-headedness and headaches.   Psychiatric/Behavioral:  Negative for confusion, dysphoric mood and sleep disturbance. The patient is not nervous/anxious.          Objective     /90   Pulse (!) 50   Temp 98.7 °F (37.1 °C)   Resp 18   Ht 5' 11\" (1.803 m)   Wt 79.4 kg (175 lb)   SpO2 98%   BMI 24.41 kg/m²     Physical Exam  Nursing note reviewed.   Constitutional:       General: He is not in acute distress.  HENT:      Head: Normocephalic and atraumatic.      Right Ear: External ear normal.      Left Ear: External ear normal.      Nose: Nose normal.      Mouth/Throat:      Pharynx: Oropharynx is clear.   Eyes:      Extraocular Movements: Extraocular movements intact.   Cardiovascular:      Rate and Rhythm: Normal rate.   Pulmonary:      Effort: Pulmonary effort is normal.   Abdominal:      General: Abdomen is flat.   Musculoskeletal:         General: No deformity.   Skin:     Findings: No rash.   Neurological:      Mental Status: He is alert and oriented to person, place, and time.   Psychiatric:         Mood and Affect: Mood normal.         Behavior: Behavior normal.         Recent labs:  Lab Results   Component Value Date/Time    SODIUM 138 07/11/2024 02:09 PM    SODIUM 143 02/10/2023 10:42 AM    K 3.8 07/11/2024 02:09 PM    K 4.3 02/10/2023 10:42 AM    BUN " 16 07/11/2024 02:09 PM    BUN 21 02/10/2023 10:42 AM    CREATININE 0.86 07/11/2024 02:09 PM    CREATININE 0.83 11/09/2023 08:56 AM    GLUC 104 07/11/2024 02:09 PM    GLUC 96 02/10/2023 10:42 AM    CALCIUM 9.7 07/11/2024 02:09 PM    CALCIUM 9.5 03/15/2016 07:26 AM    AST 20 07/11/2024 02:09 PM    AST 25 11/09/2023 08:56 AM    ALT 28 07/11/2024 02:09 PM    ALT 47 11/09/2023 08:56 AM    ALB 4.3 07/11/2024 02:09 PM    ALB 4.3 11/09/2023 08:56 AM    TP 7.4 07/11/2024 02:09 PM    TP 7.2 11/09/2023 08:56 AM    EGFR 90 07/11/2024 02:09 PM    EGFR 97 11/09/2023 08:56 AM     Lab Results   Component Value Date/Time    HGB 15.5 07/11/2024 02:09 PM    WBC 6.29 07/11/2024 02:09 PM     07/11/2024 02:09 PM    INR 1.03 02/22/2023 07:25 AM     Lab Results   Component Value Date/Time    GBC0WALKLCNS 2.140 01/11/2023 10:16 AM    RXG0JTGOGBTH 3.760 03/15/2016 07:26 AM       Recent Imaging:  Procedure: CT chest wo contrast    Result Date: 8/22/2024  Narrative: CT CHEST WITHOUT IV CONTRAST INDICATION: K86.89: Other specified diseases of pancreas. Pancreatic mass. Metastatic disease evaluation COMPARISON: CT scan of the chest dated 1/6/2017 and abdominal MRI dated 7/10/2024. TECHNIQUE: CT examination of the chest was performed without intravenous contrast. Multiplanar 2D reformatted images were created from the source data. This examination, like all CT scans performed in the [facility], was performed utilizing techniques to minimize radiation dose exposure, including the use of iterative reconstruction and automated exposure control. Radiation dose length product (DLP) for this visit: 316 mGy-cm FINDINGS: LUNGS: 3 mm fissural nodule along the left major fissure on image 124 of series 3. 3 mm fissural nodule along the right major fissure on image 65 of series 2. 2 mm pulmonary nodule along the left major fissure on image 114 of series 3.41. There is no tracheal or endobronchial lesion. PLEURA: Unremarkable.  HEART/GREAT VESSELS: Heart is unremarkable for patient's age. No thoracic aortic aneurysm. Small amount of aortic valvular cusp calcification. Small amount of coronary calcification in the left main and left anterior descending coronary. MEDIASTINUM AND DOE: There are few small pretracheal lymph nodes that measure up to 7 mm. These lymph nodes are similar to prior CT and probably within normal limits. There are small prevascular lymph nodes as well that measure up to 9 mm that are unchanged compared to the prior study and probably within normal limits. There is an aortopulmonary window lymph node that measures 0.9 x 1.3 cm that is also unchanged compared to prior study. No axillary or hilar lymphadenopathy. CHEST WALL AND LOWER NECK: Unremarkable. VISUALIZED STRUCTURES IN THE UPPER ABDOMEN: Mild enlargement of the spleen that measures 14 cm in anterior to posterior dimension. Small low-attenuation lesion in segment 4A of the left hepatic lobe that measures 7 mm image 95 of series 2. Low-attenuation lesion in segment 5 of the right hepatic lobe that measures 1.3 x 1.3 cm image 124 of series 2. There is a lesion involving segment one of the liver that measures 2.3 x 3.7 cm image 109 of series 2. There is a mass involving the body/tail  of the pancreas that measures 1.9 x 3.7 cm image 121 of series 2. 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 OSSEOUS STRUCTURES: Moderate endplate degenerative change in the thoracic spine. Marked degenerative change in the bilateral shoulders. No osteolytic or osteoblastic lesion.     Impression: No findings of metastatic disease in the chest. Small bilateral fissural nodules measuring 2 to 3 mm in size with a benign appearance. No imaging follow-up of these nodules is necessary. Mass along the body/tail of the pancreas is again visualized in the upper abdomen suspicious for a pancreatic cancer. Finding is unchanged compared to  abdominal MRI from 7/10/2024. 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 Scattered small hepatic lesions that may indicate hepatic metastatic disease. Correlation with PET/CT or biopsy should be considered. MIld splenomegaly The study was marked in EPIC for immediate notification. Workstation performed: [id number]     Procedure: Endoscopic ultrasonography, GI (Upper)    Result Date: 8/13/2024  Narrative: Table formatting from the original result was not included. [facility] [address] [place] [address] [phone] DATE OF SERVICE: 8/13/24 PHYSICIAN(S): Attending: Lambert He MD Fellow: Conor Hoffman MD INDICATION: Epigastric pain, Chronic pancreatitis, unspecified pancreatitis type (HCC) POST-OP DIAGNOSIS: See the impression below. PREPROCEDURE: Informed consent was obtained for the procedure, including sedation.  Risks of perforation, hemorrhage, adverse drug reaction and aspiration were discussed. The patient was placed in the left lateral decubitus position. Patient was explained about the risks and benefits of the procedure. Risks including but not limited to bleeding, infection, and perforation were explained in detail. Also explained about less than 100% sensitivity with the exam and other alternatives. PROCEDURE: EUS UPPER DETAILS OF PROCEDURE: Patient was taken to the procedure room where a time out was performed to confirm correct patient and correct procedure. The patient underwent monitored anesthesia care, which was administered by an anesthesia professional. The patient's blood pressure, heart rate, oxygen, respirations, level of consciousness, ECG and ETCO2 were monitored throughout the procedure. The endoscope and linear scope were introduced through the mouth and advanced to the second part of the duodenum. The patient experienced no blood loss. The procedure was not difficult. The patient tolerated the procedure  well. There were no apparent adverse events. ANESTHESIA INFORMATION: ASA: III Anesthesia Type: IV Sedation with Anesthesia MEDICATIONS: No administrations occurring from 1158 to 1240 on 08/13/24 FINDINGS: The esophagus appeared normal. The stomach appeared normal. The duodenum appeared normal. The celiac artery and aorta were visualized, including the abdominal aorta and appeared normal. The left kidney, spleen and left adrenal appeared normal. The parenchyma of the liver appeared normal. The parenchyma was visualized in the left lobe of the liver. Heterogeneous, hypoechoic and irregular mass measuring 30 mm x 18 mm with poorly defined margins was visualized in the body of the pancreas. Apparent abutment of the splenic artery and splenic vein; 4 successful fine needle biopsy passes were taken with a 25 gauge [employer] needle using a transgastric approach guided by Doppler. An adequate sample was obtained. Onsite cytologist was present The parenchyma was visualized in the head of the pancreas and neck of the pancreas. The parenchyma had localized lobularity with honeycombing. The parenchyma had localized hyperechoic foci without shadowing. The pancreatic duct appeared normal and measured 1.7 mm at the body. Pancreatic tail was atrophic. The bile duct appeared normal. The bile duct measured 2.6 mm at the distal end. The gallbladder was visualized and appeared normal. The gallbladder wall thickness was normal. SPECIMENS: ID Type Source Tests Collected by Time Destination 1 : pancreatic body Tissue Pancreas TISSUE EXAM Lambert He MD 8/13/2024 12:09 PM       Impression: EGD: Normal exam. EUS: 30 x 18 mm heterogenous, hypoechoic, irregular mass in the body of pancreas. Performed FNB.  The mass was abutting the splenic vein and artery.  Mild lobularity seen in the head of pancreas without any calcifications or significant honeycombing.  Pancreatic duct is normal in caliber.  RECOMMENDATION: Await pathology results  Follow up with GI Clinic Recommend surgical oncology and oncology evaluation.   Lambert He MD     Procedure: MRI abdomen w wo contrast and mrcp    Result Date: 7/10/2024  Narrative: MRI OF THE ABDOMEN WITH AND WITHOUT CONTRAST WITH MRCP INDICATION: 68 years / Male. K86.9: Disease of pancreas, unspecified. Hypoechoic pancreatic head lesion on ultrasound concerning for malignancy. Presented with epigastric pain. COMPARISON: 7/7/2024 abdominal ultrasound TECHNIQUE: Multiplanar/multisequence MRI of the abdomen with 3D MRCP was performed before and after administration of contrast. IV Contrast: 8 mL of Gadobutrol injection (SINGLE-DOSE) Image quality:  Motion degrades images. FINDINGS: LOWER CHEST:   Within normal limits. LIVER: Normal signal intensity and morphology. 3.4 cm benign hemangioma adjacent to the IVC, segment 2/8. 1.0 and 0.4 cm right lobe nodules, segment 7/8, also most compatible with hemangiomas. Probable subcentimeter high medial dome hemangioma (7/8). Few, scattered, subcentimeter simple cysts. Non-mass arterial phase enhancement at the junction of the medial and lateral left lobe segments in the periphery of the right lobe typical of transient hepatic attenuation difference. May be due to vascular shunting. (11/41 and 64). Tiny focus of arterial phase enhancement at the right hepatic dome (11/20) not seen on any other image. 1.0 cm mildly T2 hyperintense right segment 5/6 nodule with complete arterial peripheral enhancement (7/22 and 11/59) without fill-in on delayed phases. Patent hepatic and portal veins. BILE DUCTS:  Normal caliber and morphology. GALLBLADDER:  Within normal limits. Patent cystic duct. SPLEEN:  Within normal limits. PANCREAS: Decreased pancreatic body and tail attenuation on T1-weighted images without IV contrast. Distal body and tail atrophy. Ill-defined 3.8 x 1.1 cm length by depth area of early hypoenhancement in the body (11/187). No pancreatic head mass identified. Evidence of  surrounding edema. Several tiny cysts contiguous with the main duct in the body and tail. Measure up to 0.3 cm. No suspicious features. Only the proximal pancreatic duct is well seen, normal caliber and morphology. In the area of T1 hypointensity the duct is poorly visualized. No dilation. ADRENAL GLANDS:  Within normal limits. KIDNEYS/PROXIMAL URETERS:  Within normal limits. BOWEL:   Within normal limits. PERITONEUM/RETROPERITONEUM:  No ascites or fluid collections. LYMPH NODES: Trace perisplenic free fluid. No fluid collections. 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 Patent superior mesenteric vessels. Normal aortic caliber and enhancement. Developmental retroaortic left renal vein. BONES:  No suspicious osseous lesion. ABDOMINAL WALL:  Within normal limits.     Impression: Abnormal appearance of the pancreas described in detail above. Suspected changes of chronic pancreatitis. Area of hypoenhancement in the pancreatic body may be chronic pancreatitis and fibrosis. Cannot exclude neoplasm. No pancreatic head mass seen. Peripancreatic edema suggest superimposed acute interstitial edematous pancreatitis. Correlate clinically with serum amylase/lipase. 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 Several benign liver lesions described above. 2 additional lesions are indeterminant: Focus of enhancement at the right dome and peripherally enhancing right lobe nodule described above. No evidence of underlying liver disease to suggest hepatocellular carcinoma. Recommend correlation for any outside prior studies and short interval follow-up. Workstation performed: [id number]     Procedure: US abdomen complete    Result Date: 7/7/2024  Narrative: ABDOMEN ULTRASOUND, COMPLETE INDICATION: R10.13: Epigastric pain. COMPARISON: Right upper quadrant ultrasound 3/30/2016. TECHNIQUE: Real-time ultrasound of the abdomen was performed with a curvilinear transducer with  both volumetric sweeps and still imaging techniques. FINDINGS: PANCREAS: There is a 3.6 cm pancreatic head hypoechoic lesion AORTA AND IVC: Visualized portions are normal for patient age. LIVER: Size: Within normal range. The liver measures 15.3 cm in the midclavicular line. Contour: Surface contour is smooth. Parenchyma: Echogenicity and echotexture are within normal limits. Multiple heterogeneous hypoechoic and hyperechoic solid liver lesions measuring up to 3 cm in the right hepatic lobe. Few scattered cysts. Limited imaging of the main portal vein shows it to be patent and hepatopetal. BILIARY: The gallbladder is normal in caliber. Mild wall thickening without pericholecystic fluid. There is probable layering sludge without evidence for shadowing calculi. No sonographic Martinez sign. No intrahepatic biliary dilatation. CBD measures 3.0 mm. No choledocholithiasis. KIDNEY: Right kidney measures 11.7 x 4.9 x 5.3 cm. Volume 156.9 mL Nonobstructing 6 mm lower pole calculus. Left kidney measures 11.3 x 5.5 x 5.4 cm. Volume 175.0 mL Scattered simple cysts measuring up to 1.4 cm. SPLEEN: Measures 12.8 x 11.3 x 5.6 cm. Volume 425.2 mL Top normal in size. Otherwise, within normal limits. ASCITES: None. Top normal gemma hepatic lymph node measuring up to 1 cm in short axis.     Impression: Hypoechoic pancreatic head lesion measuring approximately 3.6 cm, concerning for malignancy. Several heterogeneous solid liver lesions, concerning for metastases. MRI abdomen with and without contrast is recommended for further evaluation. Probable gallbladder sludge with mild gallbladder wall thickening, but no pericholecystic fluid or sonographic Martinez sign to suggest acute cholecystitis. The study was marked in EPIC for immediate notification. Workstation performed: [id number]     Procedure: US superficial lump (non extremity)    Result Date: 6/18/2024  Narrative: CHEST WALL ULTRASOUND INDICATION: R22.2: Localized swelling, mass and  lump, trunk. COMPARISON: None TECHNIQUE: Real-time ultrasound of the left upper posterior chest wall was performed with a linear transducer with both volumetric sweeps and still imaging techniques. FINDINGS: Numerous subcutaneous well-circumscribed lesions with echogenicity similar to the surrounding fat are likely lipomas. These are located in the left upper and right upper posterior chest wall. The patient was unsure of the exact location of the palpable abnormality.     Impression: Multiple suspected left upper and right upper posterior chest wall lipomas. Workstation performed: [id number]        Administrative Statements   I have spent a total time of 50 minutes in caring for this patient on the day of the visit/encounter including Risks and benefits of tx options, Instructions for management, Patient and family education, Importance of tx compliance, Risk factor reductions, Counseling / Coordination of care, Documenting in the medical record, Reviewing / ordering tests, medicine, procedures  , Obtaining or reviewing history  , and Communicating with other healthcare professionals . Topics discussed with the patient / family include symptom assessment and management, medication review, medication adjustment, psychosocial support, advanced directives, goals of care, medical marijuana, opioid titration, supportive listening, and anticipatory guidance.

## 2024-08-27 ENCOUNTER — OFFICE VISIT (OUTPATIENT)
Dept: FAMILY MEDICINE CLINIC | Facility: CLINIC | Age: 69
End: 2024-08-27
Payer: COMMERCIAL

## 2024-08-27 ENCOUNTER — APPOINTMENT (OUTPATIENT)
Dept: LAB | Facility: CLINIC | Age: 69
End: 2024-08-27
Payer: COMMERCIAL

## 2024-08-27 ENCOUNTER — LAB REQUISITION (OUTPATIENT)
Dept: LAB | Facility: HOSPITAL | Age: 69
End: 2024-08-27
Payer: COMMERCIAL

## 2024-08-27 VITALS
HEART RATE: 69 BPM | BODY MASS INDEX: 24.33 KG/M2 | WEIGHT: 173.8 LBS | TEMPERATURE: 97.8 F | HEIGHT: 71 IN | OXYGEN SATURATION: 98 % | SYSTOLIC BLOOD PRESSURE: 122 MMHG | DIASTOLIC BLOOD PRESSURE: 84 MMHG

## 2024-08-27 DIAGNOSIS — C25.9 ADENOCARCINOMA OF PANCREAS (HCC): ICD-10-CM

## 2024-08-27 DIAGNOSIS — C25.9 MALIGNANT NEOPLASM OF PANCREAS, UNSPECIFIED (HCC): ICD-10-CM

## 2024-08-27 DIAGNOSIS — Z01.818 PRE-OP TESTING: Primary | ICD-10-CM

## 2024-08-27 DIAGNOSIS — E11.65 TYPE 2 DIABETES MELLITUS WITH HYPERGLYCEMIA, WITHOUT LONG-TERM CURRENT USE OF INSULIN (HCC): ICD-10-CM

## 2024-08-27 PROBLEM — R03.0 ELEVATED BLOOD PRESSURE READING WITHOUT DIAGNOSIS OF HYPERTENSION: Status: RESOLVED | Noted: 2024-02-23 | Resolved: 2024-08-27

## 2024-08-27 LAB
ABO GROUP BLD: NORMAL
BASOPHILS # BLD AUTO: 0.04 THOUSANDS/ÂΜL (ref 0–0.1)
BASOPHILS NFR BLD AUTO: 0 % (ref 0–1)
BLD GP AB SCN SERPL QL: NEGATIVE
EOSINOPHIL # BLD AUTO: 0.3 THOUSAND/ÂΜL (ref 0–0.61)
EOSINOPHIL NFR BLD AUTO: 3 % (ref 0–6)
ERYTHROCYTE [DISTWIDTH] IN BLOOD BY AUTOMATED COUNT: 13.2 % (ref 11.6–15.1)
HCT VFR BLD AUTO: 44.5 % (ref 36.5–49.3)
HGB BLD-MCNC: 15.1 G/DL (ref 12–17)
IMM GRANULOCYTES # BLD AUTO: 0.03 THOUSAND/UL (ref 0–0.2)
IMM GRANULOCYTES NFR BLD AUTO: 0 % (ref 0–2)
LYMPHOCYTES # BLD AUTO: 1.71 THOUSANDS/ÂΜL (ref 0.6–4.47)
LYMPHOCYTES NFR BLD AUTO: 18 % (ref 14–44)
MCH RBC QN AUTO: 32.3 PG (ref 26.8–34.3)
MCHC RBC AUTO-ENTMCNC: 33.9 G/DL (ref 31.4–37.4)
MCV RBC AUTO: 95 FL (ref 82–98)
MONOCYTES # BLD AUTO: 0.73 THOUSAND/ÂΜL (ref 0.17–1.22)
MONOCYTES NFR BLD AUTO: 8 % (ref 4–12)
NEUTROPHILS # BLD AUTO: 6.54 THOUSANDS/ÂΜL (ref 1.85–7.62)
NEUTS SEG NFR BLD AUTO: 71 % (ref 43–75)
NRBC BLD AUTO-RTO: 0 /100 WBCS
PLATELET # BLD AUTO: 148 THOUSANDS/UL (ref 149–390)
PMV BLD AUTO: 10.6 FL (ref 8.9–12.7)
RBC # BLD AUTO: 4.68 MILLION/UL (ref 3.88–5.62)
RH BLD: POSITIVE
SL AMB POCT HEMOGLOBIN AIC: 6.6 (ref ?–6.5)
SPECIMEN EXPIRATION DATE: NORMAL
WBC # BLD AUTO: 9.35 THOUSAND/UL (ref 4.31–10.16)

## 2024-08-27 PROCEDURE — 80053 COMPREHEN METABOLIC PANEL: CPT

## 2024-08-27 PROCEDURE — 86850 RBC ANTIBODY SCREEN: CPT | Performed by: STUDENT IN AN ORGANIZED HEALTH CARE EDUCATION/TRAINING PROGRAM

## 2024-08-27 PROCEDURE — 99213 OFFICE O/P EST LOW 20 MIN: CPT | Performed by: FAMILY MEDICINE

## 2024-08-27 PROCEDURE — 36415 COLL VENOUS BLD VENIPUNCTURE: CPT

## 2024-08-27 PROCEDURE — 83036 HEMOGLOBIN GLYCOSYLATED A1C: CPT | Performed by: FAMILY MEDICINE

## 2024-08-27 PROCEDURE — 86301 IMMUNOASSAY TUMOR CA 19-9: CPT

## 2024-08-27 PROCEDURE — 93000 ELECTROCARDIOGRAM COMPLETE: CPT | Performed by: FAMILY MEDICINE

## 2024-08-27 PROCEDURE — 86900 BLOOD TYPING SEROLOGIC ABO: CPT | Performed by: STUDENT IN AN ORGANIZED HEALTH CARE EDUCATION/TRAINING PROGRAM

## 2024-08-27 PROCEDURE — 85025 COMPLETE CBC W/AUTO DIFF WBC: CPT

## 2024-08-27 PROCEDURE — 86901 BLOOD TYPING SEROLOGIC RH(D): CPT | Performed by: STUDENT IN AN ORGANIZED HEALTH CARE EDUCATION/TRAINING PROGRAM

## 2024-08-27 NOTE — PROGRESS NOTES
Ambulatory Visit  Name: Abhishek Gimenez      : 1955      MRN: [id number]  Encounter Provider: Winston Smith MD  Encounter Date: 2024   Encounter department: [facility]    Assessment & Plan   1. Pre-op testing  -     POCT ECG- sinus bradycardia  Otherwise normal  Cleared for surgery    2. Adenocarcinoma of pancreas (HCC)    3. Type 2 diabetes mellitus with hyperglycemia, without long-term current use of insulin (HCC)  -     POCT hemoglobin A1c- 6.6  Stable controlled    Follow up after surgery       History of Present Illness     Patient is here for a pre-op exam for distal pancreatectomy splenectomy to be done on on  by Dr. Faulkner surgical oncology due to adenocarcinoma of the pancreas.  He denies any symptoms today related to this such as nausea or vomiting. Was losing weight and ozempic was stopped  Has Type 2 DM and takes medications daily.        Review of Systems   Constitutional:  Negative for activity change, appetite change, fatigue and fever.   HENT:  Negative for congestion and ear discharge.    Respiratory:  Negative for cough and shortness of breath.    Cardiovascular:  Negative for chest pain and palpitations.   Gastrointestinal:  Negative for diarrhea and nausea.   Musculoskeletal:  Negative for arthralgias and back pain.   Skin:  Negative for color change and rash.   Neurological:  Negative for dizziness and headaches.   Psychiatric/Behavioral:  Negative for agitation and behavioral problems.      Pertinent Medical History       Medical History Reviewed by provider this encounter:  Tobacco  Allergies  Meds  Problems  Med Hx  Surg Hx  Fam Hx       Past Medical History   Past Medical History:   Diagnosis Date   • A-fib (HCC)    • Arthritis    • BPH (benign prostatic hyperplasia)    • Colon polyp    • Diabetes mellitus (HCC)    • Fatty liver    • Fracture, foot    • High cholesterol    • Hyperlipidemia    • Irregular heart beat    • Non-ischemic  cardiomyopathy (HCC)    • Osteoarthritis    • Psoriasis    • Psoriatic arthritis (HCC)    • Pulmonary HTN (HCC)      Past Surgical History:   Procedure Laterality Date   • CARDIAC ELECTROPHYSIOLOGY PROCEDURE N/A 02/22/2023    Procedure: Cardiac eps/afib ablation   comprehensive posterior wall;  Surgeon: Mynor Andrews MD;  Location: [facility];  Service: Cardiology   • COLONOSCOPY     • ELBOW SURGERY Right    • KIDNEY STONE SURGERY     • TRANSURETHRAL RESECTION OF PROSTATE      last assessed 7/21/15     Family History   Problem Relation Age of Onset   • Diabetes Mother    • Hypertension Father    • Diabetes Family      Current Outpatient Medications on File Prior to Visit   Medication Sig Dispense Refill   • adalimumab (HUMIRA) 40 mg/0.8 mL PSKT Inject 40 mg under the skin every 14 (fourteen) days      • Alcohol Swabs 70 % PADS May substitute brand based on insurance coverage. Check glucose TID. 100 each 0   • Blood Glucose Monitoring Suppl (OneTouch Verio Reflect) w/Device KIT Check blood sugars once daily. Please substitute with appropriate alternative as covered by patient's insurance. Dx: E11.65 1 kit 0   • Continuous Blood Gluc  (FreeStyle Livia 2 [place]) LEANA 14 DAY READER NOT MADE 1 each 1   • Continuous Glucose Sensor (FreeStyle Livia 14 Day Sensor) MISC Use 1 Device 3 (three) times a day before meals 2 each 5   • glucose blood (OneTouch Verio) test strip Check blood sugars once daily. Please substitute with appropriate alternative as covered by patient's insurance. Dx: E11.65 100 each 3   • Insulin Pen Needle (BD Pen Needle Mallory 2nd Gen) 32G X 4 MM MISC For use with insulin pen. Pharmacy may dispense brand covered by insurance. 100 each 0   • Insulin Pen Needle (BD Pen Needle Mallory U/F) 32G X 4 MM MISC Use daily as directed with insulin pen 100 each 3   • metFORMIN (GLUCOPHAGE-XR) 500 mg 24 hr tablet TAKE 2 TABLETS BY MOUTH EVERY DAY WITH BREAKFAST 180 tablet 1   • OneTouch Delica Lancets 33G  MISC Check blood sugars once daily. Please substitute with appropriate alternative as covered by patient's insurance. Dx: E11.65 100 each 3   • oxyCODONE (Roxicodone) 5 immediate release tablet Take 1 tablet (5 mg total) by mouth every 6 (six) hours as needed for moderate pain Max Daily Amount: 20 mg 60 tablet 0   • pantoprazole (PROTONIX) 40 mg tablet Take 1 tablet (40 mg total) by mouth daily before breakfast 30 tablet 5   • rosuvastatin (CRESTOR) 5 mg tablet TAKE 1 TABLET BY MOUTH EVERY DAY IN THE EVENING 90 tablet 1   • sildenafil (VIAGRA) 100 mg tablet TAKE ONE (1) TABLET BY MOUTH DAILY AS NEEDED     • [DISCONTINUED] semaglutide, 0.25 or 0.5 mg/dose, (Ozempic, 0.25 or 0.5 MG/DOSE,) 2 mg/3 mL injection pen Inject 0.75 mL (0.5 mg total) under the skin every 7 days (Patient not taking: Reported on 8/26/2024) 3 mL 3     No current facility-administered medications on file prior to visit.     Allergies   Allergen Reactions   • Penicillins Rash      Current Outpatient Medications on File Prior to Visit   Medication Sig Dispense Refill   • adalimumab (HUMIRA) 40 mg/0.8 mL PSKT Inject 40 mg under the skin every 14 (fourteen) days      • Alcohol Swabs 70 % PADS May substitute brand based on insurance coverage. Check glucose TID. 100 each 0   • Blood Glucose Monitoring Suppl (OneTouch Verio Reflect) w/Device KIT Check blood sugars once daily. Please substitute with appropriate alternative as covered by patient's insurance. Dx: E11.65 1 kit 0   • Continuous Blood Gluc  (FreeStyle Livia 2 [place]) LEANA 14 DAY READER NOT MADE 1 each 1   • Continuous Glucose Sensor (FreeStyle Livia 14 Day Sensor) MISC Use 1 Device 3 (three) times a day before meals 2 each 5   • glucose blood (OneTouch Verio) test strip Check blood sugars once daily. Please substitute with appropriate alternative as covered by patient's insurance. Dx: E11.65 100 each 3   • Insulin Pen Needle (BD Pen Needle Mallory 2nd Gen) 32G X 4 MM MISC For use with  "insulin pen. Pharmacy may dispense brand covered by insurance. 100 each 0   • Insulin Pen Needle (BD Pen Needle Mallory U/F) 32G X 4 MM MISC Use daily as directed with insulin pen 100 each 3   • metFORMIN (GLUCOPHAGE-XR) 500 mg 24 hr tablet TAKE 2 TABLETS BY MOUTH EVERY DAY WITH BREAKFAST 180 tablet 1   • OneTouch Delica Lancets 33G MISC Check blood sugars once daily. Please substitute with appropriate alternative as covered by patient's insurance. Dx: E11.65 100 each 3   • oxyCODONE (Roxicodone) 5 immediate release tablet Take 1 tablet (5 mg total) by mouth every 6 (six) hours as needed for moderate pain Max Daily Amount: 20 mg 60 tablet 0   • pantoprazole (PROTONIX) 40 mg tablet Take 1 tablet (40 mg total) by mouth daily before breakfast 30 tablet 5   • rosuvastatin (CRESTOR) 5 mg tablet TAKE 1 TABLET BY MOUTH EVERY DAY IN THE EVENING 90 tablet 1   • sildenafil (VIAGRA) 100 mg tablet TAKE ONE (1) TABLET BY MOUTH DAILY AS NEEDED     • [DISCONTINUED] semaglutide, 0.25 or 0.5 mg/dose, (Ozempic, 0.25 or 0.5 MG/DOSE,) 2 mg/3 mL injection pen Inject 0.75 mL (0.5 mg total) under the skin every 7 days (Patient not taking: Reported on 8/26/2024) 3 mL 3     No current facility-administered medications on file prior to visit.      Social History     Tobacco Use   • Smoking status: Former     Current packs/day: 0.50     Average packs/day: 0.5 packs/day for 10.0 years (5.0 ttl pk-yrs)     Types: Cigarettes     Passive exposure: Past   • Smokeless tobacco: Never   Vaping Use   • Vaping status: Never Used   Substance and Sexual Activity   • Alcohol use: Not Currently     Comment: 1 drink daily ( pt stopped drinking in July)   • Drug use: No   • Sexual activity: Not on file     Objective     /84 (BP Location: Left arm, Patient Position: Sitting)   Pulse 69   Temp 97.8 °F (36.6 °C) (Temporal)   Ht 5' 11\" (1.803 m)   Wt 78.8 kg (173 lb 12.8 oz)   SpO2 98%   BMI 24.24 kg/m²     Physical Exam  Constitutional:       General: " He is not in acute distress.     Appearance: He is well-developed. He is not diaphoretic.   Eyes:      General: No scleral icterus.     Pupils: Pupils are equal, round, and reactive to light.   Cardiovascular:      Rate and Rhythm: Normal rate and regular rhythm.      Heart sounds: Normal heart sounds. No murmur heard.  Pulmonary:      Effort: Pulmonary effort is normal. No respiratory distress.      Breath sounds: Normal breath sounds. No wheezing.   Abdominal:      General: Bowel sounds are normal. There is no distension.      Palpations: Abdomen is soft.      Tenderness: There is no abdominal tenderness.   Skin:     General: Skin is warm and dry.      Findings: No rash.   Neurological:      Mental Status: He is alert and oriented to person, place, and time.       Administrative Statements   I have spent a total time of 20 minutes in caring for this patient on the day of the visit/encounter including Instructions for management.

## 2024-08-27 NOTE — PROGRESS NOTES
Oncology Outpatient Consult Note  Abhishek Gimenez 68 y.o. male MRN: @ Encounter: [id number]        Date:  8/27/2024        Assessment/ Plan:    1 pancreatic adenocarcinoma T2 N0 M0 considered stage Ib  2 diabetes mellitus  3 weight loss  4 paroxysmal atrial fibrillation  Plan: Patient will undergo diagnostic laparoscopy on 9/5 to check for peritoneal metastasis.  He is also scheduled for CT of the abdomen with pancreatic protocol.  We will see him in follow-up on 9/9.  We will discuss the possibility of a beginning of chemotherapy probably with use of FOLFIRINOX.  Will write results of the scans and the laparotomy.  We also asked him to get a CBC CA 19-9 and a CMP.           CC: Consultation for pancreatic adenocarcinoma of body and tail.      HPI:  Abhishek Gimenez is seen for initial consultation 8/26/2024   68-year-old gentleman who noted the onset of diabetes mellitus and February 2024 since that time he has been losing weight he is lost 30 pounds over the 6 months time.  He also developed intermittent abdominal pain epigastric left upper quadrant that continued to worsen.  He was started on Ozempic.  He drinks 3-4 beers every other day he last used tobacco 40 years ago but does not smoke now.  He has had a negative colonoscopy.  He underwent an EGD which was nondiagnostic subsequently underwent an EGD EUS which showed normal esophagus normal stomach normal duodenum.  Celiac artery and aorta were visualized and appeared normal.  The left kidney spleen left adrenal appeared normal parenchyma of the liver.  Normal this was the left lobe of the liver.  He had a heterogeneous hypoechoic irregular mass measuring 30 mm x 18 mm with poorly defined margins in the body of pancreas it abutted the splenic artery and splenic vein needle biopsies were done for total.  The head of the pancreas and neck of the pancreas appeared to have some honeycombing.  Bile duct appeared normal gallbladder appeared normal.  This was done  on 7/17/2024  Biopsy was consistent with adenocarcinoma.  CAT scan of the chest was negative.  On 7/10/2026 MRI of the abdomen showed the ill-defined 3.8 x 1.1 cm mass in the body of the pancreas the liver showed a 3.4 cm benign hemangioma 1.0 and 0.4 cm right lobe nodules all the seem to be compatible with hemangiomas.  He was also seen today by allergy.  They noted the tumor need for distal pancreatic ectomy with splenectomy..  They recommended diagnostic laparoscopy to rule out occult peritoneal metastasis.  They also recommended Chito therapy with chemotherapy follow-up surgery follow-up chemotherapy.  They recommended a CT scan with pancreatic protocol since it was 2 months since his previous scan.  The case will be discussed at the multidisciplinary tumor board  The past medical history A-fib osteoarthritis BPH colonic polyps diabetes mellitus.  He has nonischemic cardiomyopathy and osteoarthritis and psoriasis.          Cancer Staging:  Cancer Staging   Adenocarcinoma of pancreas (HCC)  Staging form: Pancreas, AJCC 8th Edition  - Clinical: Stage IB (cT2, cN0, cM0) - Signed by Roz Faulkner MD on 8/26/2024  Total positive nodes: 0      Molecular Testing:     Previous Hematologic/ Oncologic History:    Oncology History   Adenocarcinoma of pancreas (HCC)   8/13/2024 Biopsy    A.-B.  Pancreas, Body, FNA (Cell block and smear preparations):   Malignant  Adenocarcinoma.     8/20/2024 Initial Diagnosis    Adenocarcinoma of pancreas (HCC)     8/26/2024 -  Cancer Staged    Staging form: Pancreas, AJCC 8th Edition  - Clinical: Stage IB (cT2, cN0, cM0) - Signed by Roz Faulkner MD on 8/26/2024  Total positive nodes: 0               Test Results:    Imaging: .CT chest wo contrast    Result Date: 8/22/2024  Narrative: CT CHEST WITHOUT IV CONTRAST INDICATION: K86.89: Other specified diseases of pancreas. Pancreatic mass. Metastatic disease evaluation COMPARISON: CT scan of the chest dated 1/6/2017 and abdominal  MRI dated 7/10/2024. TECHNIQUE: CT examination of the chest was performed without intravenous contrast. Multiplanar 2D reformatted images were created from the source data. This examination, like all CT scans performed in the [facility], was performed utilizing techniques to minimize radiation dose exposure, including the use of iterative reconstruction and automated exposure control. Radiation dose length product (DLP) for this visit: 316 mGy-cm FINDINGS: LUNGS: 3 mm fissural nodule along the left major fissure on image 124 of series 3. 3 mm fissural nodule along the right major fissure on image 65 of series 2. 2 mm pulmonary nodule along the left major fissure on image 114 of series 3.41. There is no tracheal or endobronchial lesion. PLEURA: Unremarkable. HEART/GREAT VESSELS: Heart is unremarkable for patient's age. No thoracic aortic aneurysm. Small amount of aortic valvular cusp calcification. Small amount of coronary calcification in the left main and left anterior descending coronary. MEDIASTINUM AND DOE: There are few small pretracheal lymph nodes that measure up to 7 mm. These lymph nodes are similar to prior CT and probably within normal limits. There are small prevascular lymph nodes as well that measure up to 9 mm that are unchanged compared to the prior study and probably within normal limits. There is an aortopulmonary window lymph node that measures 0.9 x 1.3 cm that is also unchanged compared to prior study. No axillary or hilar lymphadenopathy. CHEST WALL AND LOWER NECK: Unremarkable. VISUALIZED STRUCTURES IN THE UPPER ABDOMEN: Mild enlargement of the spleen that measures 14 cm in anterior to posterior dimension. Small low-attenuation lesion in segment 4A of the left hepatic lobe that measures 7 mm image 95 of series 2. Low-attenuation lesion in segment 5 of the right hepatic lobe that measures 1.3 x 1.3 cm image 124 of series 2. There is a lesion involving segment one of the liver  that measures 2.3 x 3.7 cm image 109 of series 2. There is a mass involving the body/tail  of the pancreas that measures 1.9 x 3.7 cm image 121 of series 2. 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 OSSEOUS STRUCTURES: Moderate endplate degenerative change in the thoracic spine. Marked degenerative change in the bilateral shoulders. No osteolytic or osteoblastic lesion.     Impression: No findings of metastatic disease in the chest. Small bilateral fissural nodules measuring 2 to 3 mm in size with a benign appearance. No imaging follow-up of these nodules is necessary. Mass along the body/tail of the pancreas is again visualized in the upper abdomen suspicious for a pancreatic cancer. Finding is unchanged compared to abdominal MRI from 7/10/2024. 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 Scattered small hepatic lesions that may indicate hepatic metastatic disease. Correlation with PET/CT or biopsy should be considered. MIld splenomegaly The study was marked in EPIC for immediate notification. Workstation performed: [id number]     Endoscopic ultrasonography, GI (Upper)    Result Date: 8/13/2024  Narrative: Table formatting from the original result was not included. [facility] [address] [facility] [id number] [phone] DATE OF SERVICE: 8/13/24 PHYSICIAN(S): Attending: Lambert He MD Fellow: Conor Hoffman MD INDICATION: Epigastric pain, Chronic pancreatitis, unspecified pancreatitis type (HCC) POST-OP DIAGNOSIS: See the impression below. PREPROCEDURE: Informed consent was obtained for the procedure, including sedation.  Risks of perforation, hemorrhage, adverse drug reaction and aspiration were discussed. The patient was placed in the left lateral decubitus position. Patient was explained about the risks and benefits of the procedure. Risks including but not limited to bleeding,  infection, and perforation were explained in detail. Also explained about less than 100% sensitivity with the exam and other alternatives. PROCEDURE: EUS UPPER DETAILS OF PROCEDURE: Patient was taken to the procedure room where a time out was performed to confirm correct patient and correct procedure. The patient underwent monitored anesthesia care, which was administered by an anesthesia professional. The patient's blood pressure, heart rate, oxygen, respirations, level of consciousness, ECG and ETCO2 were monitored throughout the procedure. The endoscope and linear scope were introduced through the mouth and advanced to the second part of the duodenum. The patient experienced no blood loss. The procedure was not difficult. The patient tolerated the procedure well. There were no apparent adverse events. ANESTHESIA INFORMATION: ASA: III Anesthesia Type: IV Sedation with Anesthesia MEDICATIONS: No administrations occurring from 1158 to 1240 on 08/13/24 FINDINGS: The esophagus appeared normal. The stomach appeared normal. The duodenum appeared normal. The celiac artery and aorta were visualized, including the abdominal aorta and appeared normal. The left kidney, spleen and left adrenal appeared normal. The parenchyma of the liver appeared normal. The parenchyma was visualized in the left lobe of the liver. Heterogeneous, hypoechoic and irregular mass measuring 30 mm x 18 mm with poorly defined margins was visualized in the body of the pancreas. Apparent abutment of the splenic artery and splenic vein; 4 successful fine needle biopsy passes were taken with a 25 gauge [employer] needle using a transgastric approach guided by Doppler. An adequate sample was obtained. Onsite cytologist was present The parenchyma was visualized in the head of the pancreas and neck of the pancreas. The parenchyma had localized lobularity with honeycombing. The parenchyma had localized hyperechoic foci without shadowing. The  "pancreatic duct appeared normal and measured 1.7 mm at the body. Pancreatic tail was atrophic. The bile duct appeared normal. The bile duct measured 2.6 mm at the distal end. The gallbladder was visualized and appeared normal. The gallbladder wall thickness was normal. SPECIMENS: ID Type Source Tests Collected by Time Destination 1 : pancreatic body Tissue Pancreas TISSUE EXAM Lambert He MD 8/13/2024 12:09 PM       Impression: EGD: Normal exam. EUS: 30 x 18 mm heterogenous, hypoechoic, irregular mass in the body of pancreas. Performed FNB.  The mass was abutting the splenic vein and artery.  Mild lobularity seen in the head of pancreas without any calcifications or significant honeycombing.  Pancreatic duct is normal in caliber.  RECOMMENDATION: Await pathology results Follow up with GI Clinic Recommend surgical oncology and oncology evaluation.   Lambert He MD       Labs:   Lab Results   Component Value Date    WBC 6.29 07/11/2024    HGB 15.5 07/11/2024    HCT 44.6 07/11/2024    MCV 91 07/11/2024     07/11/2024     Lab Results   Component Value Date     03/15/2016    K 3.8 07/11/2024     07/11/2024    CO2 28 07/11/2024    BUN 16 07/11/2024    CREATININE 0.86 07/11/2024    GLUCOSE 106 (H) 11/15/2016    GLUF 106 (H) 05/30/2024    CALCIUM 9.7 07/11/2024    AST 20 07/11/2024    ALT 28 07/11/2024    ALKPHOS 81 07/11/2024    PROT 6.6 03/15/2016    BILITOT 0.5 03/15/2016    EGFR 90 07/11/2024         No results found for: \"SPEP\", \"UPEP\"    Lab Results   Component Value Date    PSA 4.701 (H) 08/24/2024    PSA 3.83 01/16/2024    PSA 2.9 01/11/2023       No results found for: \"CEA\"    No results found for: \"\"    No results found for: \"AFP\"    No results found for: \"IRON\", \"TIBC\", \"FERRITIN\"    Lab Results   Component Value Date    AKKILVDZ12 466 06/10/2021           ROS: Review of Systems   Constitutional:  Positive for appetite change, fatigue and unexpected weight change.   HENT:  Negative.   "   Eyes: Negative.    Respiratory: Negative.     Cardiovascular: Negative.    Gastrointestinal:  Positive for abdominal pain.   Endocrine: Negative.    Genitourinary: Negative.     Musculoskeletal: Negative.    Skin: Negative.    Neurological: Negative.    Hematological: Negative.             Active Problems:   Patient Active Problem List   Diagnosis    Atrial fibrillation (HCC)    Psoriatic arthritis (HCC)    BMI 29.0-29.9,adult    NDPH (new daily persistent headache)    Atrial flutter (HCC)    CATY (obstructive sleep apnea)    Type 2 diabetes mellitus with hyperglycemia, without long-term current use of insulin (HCC)    Elevated PSA    Elevated blood pressure reading without diagnosis of hypertension    Platelets decreased (HCC)    Adenocarcinoma of pancreas (HCC)    Pulmonary HTN (HCC)       Past Medical History:   Past Medical History:   Diagnosis Date    A-fib (HCC)     Arthritis     BPH (benign prostatic hyperplasia)     Colon polyp     Diabetes mellitus (HCC)     Fatty liver     Fracture, foot     High cholesterol     Hyperlipidemia     Irregular heart beat     Non-ischemic cardiomyopathy (HCC)     Osteoarthritis     Psoriasis     Psoriatic arthritis (HCC)     Pulmonary HTN (HCC)        Surgical History:   Past Surgical History:   Procedure Laterality Date    CARDIAC ELECTROPHYSIOLOGY PROCEDURE N/A 02/22/2023    Procedure: Cardiac eps/afib ablation   comprehensive posterior wall;  Surgeon: Mynor Andrews MD;  Location: [facility];  Service: Cardiology    COLONOSCOPY      ELBOW SURGERY Right     KIDNEY STONE SURGERY      TRANSURETHRAL RESECTION OF PROSTATE      last assessed 7/21/15       Family History:    Family History   Problem Relation Age of Onset    Diabetes Mother     Hypertension Father     Diabetes Family        Cancer-related family history is not on file.    Social History:   Social History     Socioeconomic History    Marital status: /Civil Union     Spouse name: Not on file    Number  of children: Not on file    Years of education: Not on file    Highest education level: Not on file   Occupational History    Not on file   Tobacco Use    Smoking status: Former     Current packs/day: 0.50     Average packs/day: 0.5 packs/day for 10.0 years (5.0 ttl pk-yrs)     Types: Cigarettes     Passive exposure: Past    Smokeless tobacco: Never   Vaping Use    Vaping status: Never Used   Substance and Sexual Activity    Alcohol use: Not Currently     Comment: 1 drink daily ( pt stopped drinking in July)    Drug use: No    Sexual activity: Not on file   Other Topics Concern    Not on file   Social History Narrative    Daily coffee consumption (__cups/day)     Daily cola consumption (__cans/day)      Social Determinants of Health     Financial Resource Strain: Low Risk  (2/20/2024)    Overall Financial Resource Strain (CARDIA)     Difficulty of Paying Living Expenses: Not very hard   Food Insecurity: Not on file   Transportation Needs: No Transportation Needs (2/20/2024)    PRAPARE - Transportation     Lack of Transportation (Medical): No     Lack of Transportation (Non-Medical): No   Physical Activity: Not on file   Stress: Not on file   Social Connections: Unknown (6/18/2024)    Received from [facility]     How often do you feel lonely or isolated from those around you? (Adult - for ages 18 years and over): Not on file   Intimate Partner Violence: Not on file   Housing Stability: Not on file       Current Medications:   Current Outpatient Medications   Medication Sig Dispense Refill    adalimumab (HUMIRA) 40 mg/0.8 mL PSKT Inject 40 mg under the skin every 14 (fourteen) days       Alcohol Swabs 70 % PADS May substitute brand based on insurance coverage. Check glucose TID. 100 each 0    Blood Glucose Monitoring Suppl (OneTouch Verio Reflect) w/Device KIT Check blood sugars once daily. Please substitute with appropriate alternative as covered by patient's insurance. Dx: E11.65 1 kit 0     Continuous Blood Gluc  (FreeStyle Livia 2 Derwood) LEANA 14 DAY READER NOT MADE 1 each 1    Continuous Glucose Sensor (FreeStyle Livia 14 Day Sensor) MISC Use 1 Device 3 (three) times a day before meals 2 each 5    glucose blood (OneTouch Verio) test strip Check blood sugars once daily. Please substitute with appropriate alternative as covered by patient's insurance. Dx: E11.65 100 each 3    metFORMIN (GLUCOPHAGE-XR) 500 mg 24 hr tablet TAKE 2 TABLETS BY MOUTH EVERY DAY WITH BREAKFAST 180 tablet 1    OneTouch Delica Lancets 33G MISC Check blood sugars once daily. Please substitute with appropriate alternative as covered by patient's insurance. Dx: E11.65 100 each 3    oxyCODONE (Roxicodone) 5 immediate release tablet Take 1 tablet (5 mg total) by mouth every 6 (six) hours as needed for moderate pain Max Daily Amount: 20 mg 60 tablet 0    pantoprazole (PROTONIX) 40 mg tablet Take 1 tablet (40 mg total) by mouth daily before breakfast 30 tablet 5    rosuvastatin (CRESTOR) 5 mg tablet TAKE 1 TABLET BY MOUTH EVERY DAY IN THE EVENING 90 tablet 1    sildenafil (VIAGRA) 100 mg tablet TAKE ONE (1) TABLET BY MOUTH DAILY AS NEEDED      Insulin Pen Needle (BD Pen Needle Mallory 2nd Gen) 32G X 4 MM MISC For use with insulin pen. Pharmacy may dispense brand covered by insurance. 100 each 0    Insulin Pen Needle (BD Pen Needle Mallory U/F) 32G X 4 MM MISC Use daily as directed with insulin pen 100 each 3     No current facility-administered medications for this visit.       Allergies:   Allergies   Allergen Reactions    Penicillins Rash         Physical Exam:    Body surface area is 1.99 meters squared.    Wt Readings from Last 3 Encounters:   08/27/24 78.8 kg (173 lb 12.8 oz)   08/26/24 80.3 kg (177 lb)   08/26/24 79.4 kg (175 lb)        Temp Readings from Last 3 Encounters:   08/27/24 97.8 °F (36.6 °C) (Temporal)   08/26/24 98 °F (36.7 °C)   08/26/24 97.7 °F (36.5 °C) (Temporal)        BP Readings from Last 3 Encounters:    08/27/24 122/84   08/26/24 126/86   08/26/24 136/80         Pulse Readings from Last 3 Encounters:   08/27/24 69   08/26/24 59   08/26/24 63     @LASTSAO2(3)@    Physical Exam  Constitutional:       Appearance: Normal appearance. He is normal weight.   HENT:      Head: Normocephalic and atraumatic.   Eyes:      Extraocular Movements: Extraocular movements intact.      Conjunctiva/sclera: Conjunctivae normal.      Pupils: Pupils are equal, round, and reactive to light.   Cardiovascular:      Rate and Rhythm: Normal rate and regular rhythm.      Heart sounds: Normal heart sounds.   Pulmonary:      Effort: Pulmonary effort is normal.      Breath sounds: Normal breath sounds.   Abdominal:      General: Abdomen is flat. Bowel sounds are normal.      Palpations: Abdomen is soft.   Musculoskeletal:         General: Normal range of motion.      Cervical back: Normal range of motion and neck supple.   Skin:     General: Skin is warm and dry.   Neurological:      General: No focal deficit present.      Mental Status: He is alert and oriented to person, place, and time. Mental status is at baseline.     He really has no major abdominal pain on palpation.      Goals and Barriers:  Current Goal: Prolong Survival from Cancer.   Barriers: None.      Patient's Capacity to Self Care:  Patient is able to self care.      Emergency Contacts:    [unfilled], ,   *No Contact Specified*, ,     Code Status: [unfilled]  Advance Directive and Living Will: Yes    Power of :    POLST:

## 2024-08-28 LAB
ALBUMIN SERPL BCG-MCNC: 4.1 G/DL (ref 3.5–5)
ALP SERPL-CCNC: 73 U/L (ref 34–104)
ALT SERPL W P-5'-P-CCNC: 20 U/L (ref 7–52)
ANION GAP SERPL CALCULATED.3IONS-SCNC: 9 MMOL/L (ref 4–13)
AST SERPL W P-5'-P-CCNC: 17 U/L (ref 13–39)
BILIRUB SERPL-MCNC: 0.62 MG/DL (ref 0.2–1)
BUN SERPL-MCNC: 22 MG/DL (ref 5–25)
CALCIUM SERPL-MCNC: 9.6 MG/DL (ref 8.4–10.2)
CANCER AG19-9 SERPL-ACNC: 462 U/ML (ref 0–35)
CHLORIDE SERPL-SCNC: 102 MMOL/L (ref 96–108)
CO2 SERPL-SCNC: 31 MMOL/L (ref 21–32)
CREAT SERPL-MCNC: 0.79 MG/DL (ref 0.6–1.3)
GFR SERPL CREATININE-BSD FRML MDRD: 93 ML/MIN/1.73SQ M
GLUCOSE P FAST SERPL-MCNC: 116 MG/DL (ref 65–99)
POTASSIUM SERPL-SCNC: 3.9 MMOL/L (ref 3.5–5.3)
PROT SERPL-MCNC: 7.3 G/DL (ref 6.4–8.4)
SODIUM SERPL-SCNC: 142 MMOL/L (ref 135–147)

## 2024-08-30 ENCOUNTER — HOSPITAL ENCOUNTER (OUTPATIENT)
Dept: CT IMAGING | Facility: CLINIC | Age: 69
Discharge: HOME | End: 2024-08-30
Payer: COMMERCIAL

## 2024-08-30 DIAGNOSIS — C25.9 ADENOCARCINOMA OF PANCREAS (HCC): ICD-10-CM

## 2024-08-30 PROCEDURE — 74177 CT ABD & PELVIS W/CONTRAST: CPT

## 2024-08-30 RX ADMIN — IOHEXOL 100 ML: 350 INJECTION, SOLUTION INTRAVENOUS at 13:34

## 2024-08-30 NOTE — TELEPHONE ENCOUNTER
Second attempt to contact Abhishek to establish oncology nutrition care. Spoke with Abhishek. Introduced self and oncology nutrition services available.. Abhishek reports that he is doing okay at this time and does not have any nutrition questions/concerns.     Encouraged Abhishek to reach out with any nutrition related questions/concerns. Pt has RD contact info.

## 2024-09-03 ENCOUNTER — SOCIAL WORK (OUTPATIENT)
Dept: PALLIATIVE MEDICINE | Facility: CLINIC | Age: 69
End: 2024-09-03

## 2024-09-03 ENCOUNTER — TELEPHONE (OUTPATIENT)
Age: 69
End: 2024-09-03

## 2024-09-03 DIAGNOSIS — Z71.89 COUNSELING AND COORDINATION OF CARE: Primary | ICD-10-CM

## 2024-09-03 PROCEDURE — NC001 PR NO CHARGE

## 2024-09-03 NOTE — PRE-PROCEDURE INSTRUCTIONS
Pre-Surgery Instructions:   Medication Instructions    metFORMIN (GLUCOPHAGE-XR) 500 mg 24 hr tablet Hold day of surgery.    oxyCODONE (Roxicodone) 5 immediate release tablet Uses PRN- OK to take day of surgery    pantoprazole (PROTONIX) 40 mg tablet Take day of surgery.    rosuvastatin (CRESTOR) 5 mg tablet Take night before surgery      Medication instructions for day surgery reviewed. Please use only a sip of water to take your instructed medications. Avoid all over the counter vitamins, supplements and NSAIDS for one week prior to surgery per anesthesia guidelines. Tylenol is ok to take as needed.     You will receive a call one business day prior to surgery with an arrival time and hospital directions. If your surgery is scheduled on a Monday, the hospital will be calling you on the Friday prior to your surgery. If you have not heard from anyone by 8pm, please call the hospital supervisor through the hospital  at [phone]. ([place] [phone] or [place] [phone]).    Do not eat or drink anything after midnight the night before your surgery, including candy, mints, lifesavers, or chewing gum. Do not drink alcohol 24hrs before your surgery. Try not to smoke at least 24hrs before your surgery.       Follow the pre surgery showering instructions as listed in the “My Surgical Experience Booklet” or otherwise provided by your surgeon's office. Do not use a blade to shave the surgical area 1 week before surgery. It is okay to use a clean electric clippers up to 24 hours before surgery. Do not apply any lotions, creams, including makeup, cologne, deodorant, or perfumes after showering on the day of your surgery. Do not use dry shampoo, hair spray, hair gel, or any type of hair products.     No contact lenses, eye make-up, or artificial eyelashes. Remove nail polish, including gel polish, and any artificial, gel, or acrylic nails if possible. Remove all jewelry including rings and body piercing  jewelry.     Wear causal clothing that is easy to take on and off. Consider your type of surgery.    Keep any valuables, jewelry, piercings at home. Please bring any specially ordered equipment (sling, braces) if indicated.    Arrange for a responsible person to drive you to and from the hospital on the day of your surgery. Please confirm the visitor policy for the day of your procedure when you receive your phone call with an arrival time.     Call the surgeon's office with any new illnesses, exposures, or additional questions prior to surgery.    Please reference your “My Surgical Experience Booklet” for additional information to prepare for your upcoming surgery.

## 2024-09-03 NOTE — PROGRESS NOTES
[facility] SW task received that pt requesting additional resources for emotional support/coping. LSW spoke with pt who states most days he is able to cope and has a very strong support system, however feels he may be interested in some additional resources as there are days he feels he cannot control his emotions (ex crying). LSW reviewed with pt role of [facility] LCSW for therapy services. Pt would be interested in appt with LCSW--preference would be for [facility]. Pt also inquired about additional supports for his wife. Reviewed role of [facility] SW for on-going support for family as well as [facility] (wife confirms she received prior email from LSW of  [facility] newsletter). Discussed with pt/wife LSW can also provide outside resources for therapy services for wife should she be interested in the future. Confirmed pt/wife have contact information for LSW for any future needs.

## 2024-09-04 ENCOUNTER — ANESTHESIA EVENT (OUTPATIENT)
Dept: PERIOP | Facility: HOSPITAL | Age: 69
End: 2024-09-04
Payer: COMMERCIAL

## 2024-09-04 ENCOUNTER — TELEMEDICINE (OUTPATIENT)
Dept: PALLIATIVE MEDICINE | Facility: CLINIC | Age: 69
End: 2024-09-04

## 2024-09-04 DIAGNOSIS — Z71.89 COUNSELING AND COORDINATION OF CARE: Primary | ICD-10-CM

## 2024-09-04 PROCEDURE — 99024 POSTOP FOLLOW-UP VISIT: CPT

## 2024-09-04 NOTE — PROGRESS NOTES
"Palliative Outpatient Assessment of Need    LCSW completed an assessment of need which was completed with (patient, family, or both) in the office or via phone/video conference    Relationship status:   Duration of relationship: 37 years  Name of significant other: Sherri  Children and Ages: 2 daughters living, one passed away from cancer  Pets: 1 dog Marylou, 1 cat  Other important family information: family is local  Living situation (where and whom):  W wife  Patient's primary caregiver:  self  Any limitations of caregiver:  Environmental concerns or barriers:   history:  Employment history/source of income: retired, owned [employer]   Disability:    Concerns regarding literacy:   Spirituality/ Holiness:  no  Patient's strengths, social supports, and resources:  Cultural information:   Mental Health current or previous: anxiety, depression, PTSD  Substance use or history: tried marijuana and was helpful, wants to consider getting MMJ card  Sleep: \"not bad\"  Exercise:  Diet/nutrition: good appetite  Durable Medical Equipment needs:  Transportation: drives self  Financial concerns:  Advanced Directive:  Other medical or social work providers involved:  Patient/caregiver current level of coping:  Understanding:  Patient/family concerns and areas of need:  Patient's interests: motorcycles, biking, traveling, camping    Abhishek spoke about doing fairly well most of the time but having moments where he breaks down crying. He was diagnosed with pacreatic cancer in July 2024. He is having surgery to biopsy his liver tomorrow and he is anxious about the wait to start treatment. He said that he lost a daughter to cancer at 25yo about 20 years ago and having cancer himself is re triggering memories from her illness and death. He said he \"just feels lost\". He said that he isn't sure about therapy but wanted to give it a try because he is open to anything that may help. He worries about his wife and asked if " "she could join sessions. LCSW encoruaged Abhishek he may invite his wife to appointments if he would like her there. He introduced LCSW to wife briefly and invited her to next meeting. He said that he enjoys camping and hopes to be able to continue this in the fall between treatments. He said that he and his wife love to travel and have been traveling since they lost their daughter. He said they travel and spend their money enjoying their time together because they feel \"life is too short\" and this new diagnosis is also a reminder of this. Next meeting 9/11 at 9am online but Abhishek knows to call if his treatment schedule conflicts with this appointment.    I have spent 60 minutes with Patient  today in which greater than 50% of this time was spent in counseling/coordination of care.    *All questions may not be answered due to constraints.  Follow-up discussions may need to occur        "

## 2024-09-05 ENCOUNTER — APPOINTMENT (OUTPATIENT)
Dept: RADIOLOGY | Facility: HOSPITAL | Age: 69
End: 2024-09-05
Payer: COMMERCIAL

## 2024-09-05 ENCOUNTER — HOSPITAL ENCOUNTER (OUTPATIENT)
Facility: HOSPITAL | Age: 69
Setting detail: OUTPATIENT SURGERY
Discharge: HOME | End: 2024-09-05
Attending: STUDENT IN AN ORGANIZED HEALTH CARE EDUCATION/TRAINING PROGRAM | Admitting: STUDENT IN AN ORGANIZED HEALTH CARE EDUCATION/TRAINING PROGRAM
Payer: COMMERCIAL

## 2024-09-05 ENCOUNTER — ANESTHESIA (OUTPATIENT)
Dept: PERIOP | Facility: HOSPITAL | Age: 69
End: 2024-09-05
Payer: COMMERCIAL

## 2024-09-05 ENCOUNTER — HOSPITAL ENCOUNTER (OUTPATIENT)
Dept: RADIOLOGY | Facility: HOSPITAL | Age: 69
Setting detail: OUTPATIENT SURGERY
Discharge: HOME | End: 2024-09-05
Payer: COMMERCIAL

## 2024-09-05 VITALS
HEIGHT: 71 IN | HEART RATE: 42 BPM | WEIGHT: 175 LBS | TEMPERATURE: 96.9 F | RESPIRATION RATE: 16 BRPM | OXYGEN SATURATION: 97 % | SYSTOLIC BLOOD PRESSURE: 150 MMHG | BODY MASS INDEX: 24.5 KG/M2 | DIASTOLIC BLOOD PRESSURE: 86 MMHG

## 2024-09-05 DIAGNOSIS — C25.9 ADENOCARCINOMA OF PANCREAS (HCC): ICD-10-CM

## 2024-09-05 LAB
ABO GROUP BLD: NORMAL
GLUCOSE SERPL-MCNC: 83 MG/DL (ref 65–140)
GLUCOSE SERPL-MCNC: 90 MG/DL (ref 65–140)
RH BLD: POSITIVE

## 2024-09-05 PROCEDURE — 88112 CYTOPATH CELL ENHANCE TECH: CPT | Performed by: PATHOLOGY

## 2024-09-05 PROCEDURE — 77001 FLUOROGUIDE FOR VEIN DEVICE: CPT | Performed by: STUDENT IN AN ORGANIZED HEALTH CARE EDUCATION/TRAINING PROGRAM

## 2024-09-05 PROCEDURE — 77001 FLUOROGUIDE FOR VEIN DEVICE: CPT

## 2024-09-05 PROCEDURE — C1788 PORT, INDWELLING, IMP: HCPCS | Performed by: STUDENT IN AN ORGANIZED HEALTH CARE EDUCATION/TRAINING PROGRAM

## 2024-09-05 PROCEDURE — 36561 INSERT TUNNELED CV CATH: CPT | Performed by: STUDENT IN AN ORGANIZED HEALTH CARE EDUCATION/TRAINING PROGRAM

## 2024-09-05 PROCEDURE — 49320 DIAG LAPARO SEPARATE PROC: CPT | Performed by: STUDENT IN AN ORGANIZED HEALTH CARE EDUCATION/TRAINING PROGRAM

## 2024-09-05 PROCEDURE — 82948 REAGENT STRIP/BLOOD GLUCOSE: CPT

## 2024-09-05 PROCEDURE — 76937 US GUIDE VASCULAR ACCESS: CPT | Performed by: STUDENT IN AN ORGANIZED HEALTH CARE EDUCATION/TRAINING PROGRAM

## 2024-09-05 PROCEDURE — 71045 X-RAY EXAM CHEST 1 VIEW: CPT

## 2024-09-05 DEVICE — IMPLANTABLE DEVICE: Type: IMPLANTABLE DEVICE | Site: CHEST | Status: FUNCTIONAL

## 2024-09-05 RX ORDER — ROCURONIUM BROMIDE 10 MG/ML
INJECTION, SOLUTION INTRAVENOUS AS NEEDED
Status: DISCONTINUED | OUTPATIENT
Start: 2024-09-05 | End: 2024-09-05

## 2024-09-05 RX ORDER — SODIUM CHLORIDE, SODIUM LACTATE, POTASSIUM CHLORIDE, CALCIUM CHLORIDE 600; 310; 30; 20 MG/100ML; MG/100ML; MG/100ML; MG/100ML
INJECTION, SOLUTION INTRAVENOUS CONTINUOUS PRN
Status: DISCONTINUED | OUTPATIENT
Start: 2024-09-05 | End: 2024-09-05

## 2024-09-05 RX ORDER — FENTANYL CITRATE/PF 50 MCG/ML
25 SYRINGE (ML) INJECTION
Status: DISCONTINUED | OUTPATIENT
Start: 2024-09-05 | End: 2024-09-05 | Stop reason: HOSPADM

## 2024-09-05 RX ORDER — HYDROMORPHONE HCL/PF 1 MG/ML
0.5 SYRINGE (ML) INJECTION
Status: DISCONTINUED | OUTPATIENT
Start: 2024-09-05 | End: 2024-09-05 | Stop reason: HOSPADM

## 2024-09-05 RX ORDER — DEXAMETHASONE SODIUM PHOSPHATE 10 MG/ML
INJECTION, SOLUTION INTRAMUSCULAR; INTRAVENOUS AS NEEDED
Status: DISCONTINUED | OUTPATIENT
Start: 2024-09-05 | End: 2024-09-05

## 2024-09-05 RX ORDER — ONDANSETRON 2 MG/ML
INJECTION INTRAMUSCULAR; INTRAVENOUS AS NEEDED
Status: DISCONTINUED | OUTPATIENT
Start: 2024-09-05 | End: 2024-09-05

## 2024-09-05 RX ORDER — GLYCOPYRROLATE 0.2 MG/ML
INJECTION INTRAMUSCULAR; INTRAVENOUS AS NEEDED
Status: DISCONTINUED | OUTPATIENT
Start: 2024-09-05 | End: 2024-09-05

## 2024-09-05 RX ORDER — ONDANSETRON 2 MG/ML
4 INJECTION INTRAMUSCULAR; INTRAVENOUS ONCE AS NEEDED
Status: DISCONTINUED | OUTPATIENT
Start: 2024-09-05 | End: 2024-09-05 | Stop reason: HOSPADM

## 2024-09-05 RX ORDER — CEFAZOLIN SODIUM 2 G/50ML
2000 SOLUTION INTRAVENOUS ONCE
Status: COMPLETED | OUTPATIENT
Start: 2024-09-05 | End: 2024-09-05

## 2024-09-05 RX ORDER — FENTANYL CITRATE 50 UG/ML
INJECTION, SOLUTION INTRAMUSCULAR; INTRAVENOUS AS NEEDED
Status: DISCONTINUED | OUTPATIENT
Start: 2024-09-05 | End: 2024-09-05

## 2024-09-05 RX ORDER — HYDRALAZINE HYDROCHLORIDE 20 MG/ML
10 INJECTION INTRAMUSCULAR; INTRAVENOUS EVERY 6 HOURS PRN
Status: DISCONTINUED | OUTPATIENT
Start: 2024-09-05 | End: 2024-09-05 | Stop reason: HOSPADM

## 2024-09-05 RX ORDER — PROPOFOL 10 MG/ML
INJECTION, EMULSION INTRAVENOUS AS NEEDED
Status: DISCONTINUED | OUTPATIENT
Start: 2024-09-05 | End: 2024-09-05

## 2024-09-05 RX ORDER — LIDOCAINE HYDROCHLORIDE 10 MG/ML
INJECTION, SOLUTION EPIDURAL; INFILTRATION; INTRACAUDAL; PERINEURAL AS NEEDED
Status: DISCONTINUED | OUTPATIENT
Start: 2024-09-05 | End: 2024-09-05

## 2024-09-05 RX ORDER — SODIUM CHLORIDE, SODIUM LACTATE, POTASSIUM CHLORIDE, CALCIUM CHLORIDE 600; 310; 30; 20 MG/100ML; MG/100ML; MG/100ML; MG/100ML
125 INJECTION, SOLUTION INTRAVENOUS CONTINUOUS
Status: DISCONTINUED | OUTPATIENT
Start: 2024-09-05 | End: 2024-09-05 | Stop reason: HOSPADM

## 2024-09-05 RX ORDER — HYDROMORPHONE HCL/PF 1 MG/ML
SYRINGE (ML) INJECTION AS NEEDED
Status: DISCONTINUED | OUTPATIENT
Start: 2024-09-05 | End: 2024-09-05

## 2024-09-05 RX ORDER — MIDAZOLAM HYDROCHLORIDE 2 MG/2ML
INJECTION, SOLUTION INTRAMUSCULAR; INTRAVENOUS AS NEEDED
Status: DISCONTINUED | OUTPATIENT
Start: 2024-09-05 | End: 2024-09-05

## 2024-09-05 RX ADMIN — SUGAMMADEX 400 MG: 100 INJECTION, SOLUTION INTRAVENOUS at 14:23

## 2024-09-05 RX ADMIN — PROPOFOL 200 MG: 10 INJECTION, EMULSION INTRAVENOUS at 12:54

## 2024-09-05 RX ADMIN — LIDOCAINE HYDROCHLORIDE 50 MG: 10 INJECTION, SOLUTION EPIDURAL; INFILTRATION; INTRACAUDAL; PERINEURAL at 12:52

## 2024-09-05 RX ADMIN — FENTANYL CITRATE 50 MCG: 50 INJECTION INTRAMUSCULAR; INTRAVENOUS at 14:10

## 2024-09-05 RX ADMIN — FENTANYL CITRATE 50 MCG: 50 INJECTION INTRAMUSCULAR; INTRAVENOUS at 13:23

## 2024-09-05 RX ADMIN — GLYCOPYRROLATE 0.2 MG: 0.2 INJECTION, SOLUTION INTRAMUSCULAR; INTRAVENOUS at 12:52

## 2024-09-05 RX ADMIN — SODIUM CHLORIDE, SODIUM LACTATE, POTASSIUM CHLORIDE, AND CALCIUM CHLORIDE: .6; .31; .03; .02 INJECTION, SOLUTION INTRAVENOUS at 12:40

## 2024-09-05 RX ADMIN — CEFAZOLIN SODIUM 2000 MG: 2 SOLUTION INTRAVENOUS at 13:00

## 2024-09-05 RX ADMIN — ROCURONIUM BROMIDE 50 MG: 10 INJECTION, SOLUTION INTRAVENOUS at 12:56

## 2024-09-05 RX ADMIN — HYDROMORPHONE HYDROCHLORIDE 0.25 MG: 1 INJECTION, SOLUTION INTRAMUSCULAR; INTRAVENOUS; SUBCUTANEOUS at 14:15

## 2024-09-05 RX ADMIN — SODIUM CHLORIDE, SODIUM LACTATE, POTASSIUM CHLORIDE, AND CALCIUM CHLORIDE 125 ML/HR: .6; .31; .03; .02 INJECTION, SOLUTION INTRAVENOUS at 11:55

## 2024-09-05 RX ADMIN — FENTANYL CITRATE 50 MCG: 50 INJECTION INTRAMUSCULAR; INTRAVENOUS at 12:52

## 2024-09-05 RX ADMIN — MIDAZOLAM 2 MG: 1 INJECTION INTRAMUSCULAR; INTRAVENOUS at 12:45

## 2024-09-05 RX ADMIN — SODIUM CHLORIDE, SODIUM LACTATE, POTASSIUM CHLORIDE, AND CALCIUM CHLORIDE: .6; .31; .03; .02 INJECTION, SOLUTION INTRAVENOUS at 14:16

## 2024-09-05 RX ADMIN — ONDANSETRON 4 MG: 2 INJECTION INTRAMUSCULAR; INTRAVENOUS at 14:16

## 2024-09-05 RX ADMIN — ROCURONIUM BROMIDE 20 MG: 10 INJECTION, SOLUTION INTRAVENOUS at 14:00

## 2024-09-05 RX ADMIN — PHENYLEPHRINE HYDROCHLORIDE 30 MCG/MIN: 10 INJECTION INTRAVENOUS at 13:35

## 2024-09-05 RX ADMIN — DEXAMETHASONE SODIUM PHOSPHATE 10 MG: 10 INJECTION, SOLUTION INTRAMUSCULAR; INTRAVENOUS at 12:56

## 2024-09-05 NOTE — ANESTHESIA PREPROCEDURE EVALUATION
Procedure:  DIAGNOSTIC LAPAROSCOPY (Abdomen)  INSERTION VENOUS PORT (PORT-A-CATH) (Chest)    Relevant Problems   CARDIO   (+) Atrial fibrillation (HCC)   (+) Atrial flutter (HCC)      ENDO   (+) Type 2 diabetes mellitus with hyperglycemia, without long-term current use of insulin (HCC)      GI/HEPATIC   (+) Adenocarcinoma of pancreas (HCC)      MUSCULOSKELETAL   (+) Psoriatic arthritis (HCC)      NEURO/PSYCH   (+) NDPH (new daily persistent headache)      PULMONARY   (+) CATY (obstructive sleep apnea)      Pulmonary HTN  A-Fib s/p ablation in Feb 2023    Pancreatic Ca related pain - takes Oxycodone at home    TTE Nov 2022:  •  Left Ventricle: Left ventricular cavity size is normal. Wall thickness is normal. Wall motion is low normal.  •  Left Atrium: The atrium is mildly dilated.  •  Mitral Valve: There is mild regurgitation.     Low normal left ventricular function.  Mild left atrial enlargement.  Mild mitral regurgitation.  Compared to the previous echocardiogram September 21, 2016 the ejection fraction at that time was 60% with diastolic dysfunction and mild pulmonary hypertension.      Physical Exam    Airway    Mallampati score: II  TM Distance: >3 FB  Neck ROM: full     Dental       Cardiovascular  Cardiovascular exam normal    Pulmonary  Pulmonary exam normal     Other Findings      Anesthesia Plan  ASA Score- 3     Anesthesia Type- general with ASA Monitors.         Additional Monitors:     Airway Plan: ETT.           Plan Factors-    Chart reviewed. EKG reviewed.  Existing labs reviewed. Patient summary reviewed.                  Induction- intravenous.    Postoperative Plan-         Informed Consent- Anesthetic plan and risks discussed with patient.  I personally reviewed this patient with the CRNA. Discussed and agreed on the Anesthesia Plan with the CRNA..

## 2024-09-05 NOTE — DISCHARGE INSTR - AVS FIRST PAGE
Surgical Oncology Discharge Instructions    Please follow-up as instructed with Dr. Faulkner on 9/25 at 9AM, office location noted below.     Activity:  - No lifting greater than 10 pounds or strenuous physical activity or exercise for 2 weeks.  - Walking and normal light activities are encouraged.  - Normal daily activities including climbing steps are okay.  - No driving until no longer using pain medications.    Return to work:    - You may return to work in 2 weeks or sooner if you are feeling well enough.    Diet:    - You may resume your normal diet.    Wound Care:  - May shower daily beginning 24hrs post-op. No tub baths or swimming until cleared by your surgeon.  - Wash incision gently with soap and water and pat dry.  - Do not apply any creams or ointments unless instructed to do so by your surgeon.  - Bruising is not unusual and will go away with a little time.   - No dressings needed. Leave any skin tapes (steri-strips) on the incision(s) in place until they fall off on their own. Any new dressings are optional.  - Port may be accessed starting on 9/6/24.     Medications:    - You may resume all of your regular medications after discharge unless otherwise instructed. Please refer to your discharge medication list for further details.  - Please take the pain medications as directed.  - You are encouraged to use non-narcotic pain medications first and whenever possible. Reserve the use of narcotic pain medication for moderate to severe pain not controlled by non-narcotic medications.  - No driving while taking narcotic pain medications.    Additional Instructions:  - If you have any questions or concerns after discharge please call the office.  - Call office or return to ER if fever greater than 101, chills, persistent nausea/vomiting, worsening/uncontrollable pain, and/or increasing redness or purulent/foul smelling drainage from incision(s).    [facility] [place], [address]  [place], [place], [place], [address]  Phone: [phone]

## 2024-09-05 NOTE — OP NOTE
OPERATIVE REPORT  PATIENT NAME: Abhishek Gimenez    :  1955  MRN: [id number]  Pt Location: [facility] OR ROOM 07    SURGERY DATE: 2024    Surgeons and Role:     * Roz Faulkner MD - Primary     * Quincy Kaplan MD - Assisting    Preop Diagnosis:  Adenocarcinoma of pancreas (HCC) [C25.9]    Post-Op Diagnosis Codes:     * Adenocarcinoma of pancreas (HCC) [C25.9]    Procedure(s):  DIAGNOSTIC LAPAROSCOPY  INSERTION VENOUS PORT (PORT-A-CATH)    Specimen(s):  ID Type Source Tests Collected by Time Destination   1 :  Washing Peritoneal Washings NON-GYNECOLOGIC CYTOLOGY Roz Faulkner MD 2024 1410        Estimated Blood Loss:   Minimal    Drains:  * No LDAs found *    Anesthesia Type:   General    Operative Indications:  Adenocarcinoma of pancreas (HCC) [C25.9]    Operative Findings:  RT IJ port placed  No evidence of peritoneal disease    Complications:   None    Procedure and Technique:  The patient was brought to the operating suite and identified. Sedation was achieved. The right arm was padded and tucked. A shoulder roll was placed. The right neck and chest were prepped and draped in the usual sterile fashion. The port device was assembled and flushed. Timeout was performed. The right internal jugular vein was visualized with the ultrasound device. Local anesthesia was injected over the planned needle injection site as well as the planned 3 cm pocket over the right chest.  The patient was placed in reverse Trendelenburg. Starting at the deltopectoral groove, the needle was advanced toward the subclavian vein x 2 without successful access. Using ultrasound guidance, the right IJ vein was access with easy return of blood. The wire was passed through the needle and into the vein, confirming placement of the wire into the vein with both ultrasound and fluoroscopy. An incision was made sharply over the pocket site and carried down with electrocautery to the pectoral fascia.  A 2 cm region of subcutaneous  fat was excised to facilitate port access.  An inferior pocket was then created just deep to the dermis to accommodate the port. The pocket was inspected for hemostastis. The subcutaneous tunnel from the port site to the injection site was then infiltrated with local anesthetic, and the pre loaded port with the catheter attached was brought from the port site to the wire insertion site with the use of this subcutaneous tunneler.  Using fluoroscopic guidance, the catheter was cut to terminate at the cavoatrial junction. The dilator and sheath were then introduced over the wire and into the vein.  The wire and dilator were removed.  The sheath was then divided and the port introduced through the sheath into the vein until the sheath was completely removed.  Fluoroscopy confirmed proper placement of the catheter into the vein. The port was then accesses with successful draw back of blood and easy forward flushing. The port was then fixed with 3-0 Prolene to the pre-pectoral fascia.  The port site was inspected for hemostasis and found to be hemostatic.  The dermis was then closed with running 3-0 Vicryl and the skin of both the pocket and the needle insertion site were closed with 4-0 Monocryl followed by skin glue.  We then turned our attention toward the diagnostic laparoscopy.  Local anesthetic was infused over Lin's point and a 5 mm stab incision was made.  Under direct visualization, a 5 mm 0 degree scope was advanced into the abdominal cavity which was insufflated to 15 mmHg.  Upon immediate inspection there was no evidence of injury to deeper structure.  All 4 quadrants of the abdomen were carefully inspected with no evidence of peritoneal disease.  A second 5 mm incision was made just superior to the umbilicus and an additional port placed.  At all 4 quadrants saline was instilled into the abdomen.  A sample was then collected for cytology.  The effluent was then completely suctioned from the abdomen.  The  air was completely evacuated and the ports removed under direct visualization.  Incisions were closed with 4-0 Monocryl followed by skin glue.  The patient tolerated the procedure well.     I was present for the entire procedure.    Patient Disposition:  PACU     This procedure was not performed to treat colon cancer through resection           SIGNATURE: Roz Faulkner MD  DATE: September 5, 2024  TIME: 4:19 PM

## 2024-09-05 NOTE — INTERVAL H&P NOTE
H&P reviewed. After examining the patient I find no changes in the patients condition since the H&P had been written.    Vitals:    09/05/24 1120   BP:    Pulse:    Resp: 18   Temp:    SpO2:

## 2024-09-05 NOTE — ANESTHESIA POSTPROCEDURE EVALUATION
Post-Op Assessment Note    CV Status:  Stable  Pain Score: 0    Pain management: adequate       Mental Status:  Alert and awake   Hydration Status:  Euvolemic   PONV Controlled:  Controlled   Airway Patency:  Patent     Post Op Vitals Reviewed: Yes    No anethesia notable event occurred.    Staff: CRNA               BP   185./ 88   Temp (!) 96.8 °F (36 °C) (09/05/24 1440)    Pulse 55 (09/05/24 1440)   Resp 18 (09/05/24 1440)    SpO2 100 % (09/05/24 1440)

## 2024-09-09 ENCOUNTER — DOCUMENTATION (OUTPATIENT)
Dept: HEMATOLOGY ONCOLOGY | Facility: CLINIC | Age: 69
End: 2024-09-09

## 2024-09-09 ENCOUNTER — TELEPHONE (OUTPATIENT)
Dept: HEMATOLOGY ONCOLOGY | Facility: CLINIC | Age: 69
End: 2024-09-09

## 2024-09-09 ENCOUNTER — OFFICE VISIT (OUTPATIENT)
Dept: HEMATOLOGY ONCOLOGY | Facility: CLINIC | Age: 69
End: 2024-09-09
Payer: COMMERCIAL

## 2024-09-09 VITALS
HEART RATE: 65 BPM | DIASTOLIC BLOOD PRESSURE: 82 MMHG | WEIGHT: 174 LBS | RESPIRATION RATE: 18 BRPM | SYSTOLIC BLOOD PRESSURE: 124 MMHG | HEIGHT: 71 IN | BODY MASS INDEX: 24.36 KG/M2 | OXYGEN SATURATION: 98 %

## 2024-09-09 DIAGNOSIS — E11.65 TYPE 2 DIABETES MELLITUS WITH HYPERGLYCEMIA, WITHOUT LONG-TERM CURRENT USE OF INSULIN (HCC): ICD-10-CM

## 2024-09-09 DIAGNOSIS — R16.0 LIVER MASSES: ICD-10-CM

## 2024-09-09 DIAGNOSIS — C25.9 ADENOCARCINOMA OF PANCREAS (HCC): Primary | ICD-10-CM

## 2024-09-09 PROCEDURE — 99214 OFFICE O/P EST MOD 30 MIN: CPT | Performed by: INTERNAL MEDICINE

## 2024-09-09 PROCEDURE — 88112 CYTOPATH CELL ENHANCE TECH: CPT | Performed by: PATHOLOGY

## 2024-09-09 PROCEDURE — G2211 COMPLEX E/M VISIT ADD ON: HCPCS | Performed by: INTERNAL MEDICINE

## 2024-09-09 RX ORDER — FLASH GLUCOSE SENSOR
1 KIT MISCELLANEOUS
Qty: 2 EACH | Refills: 5 | Status: SHIPPED | OUTPATIENT
Start: 2024-09-09

## 2024-09-09 NOTE — PROGRESS NOTES
In-basket message received from Dr. Faulkner [facility]. Chart reviewed and prep completed.

## 2024-09-09 NOTE — H&P (VIEW-ONLY)
Hematology/Oncology Outpatient Follow- up Note  Abhishek Gimenez 68 y.o. male MRN: @ Encounter: [id number]        Date:  9/9/2024        Assessment / Plan:    1 pancreatic adenocarcinoma T2 N0 M0 considered stage Ib  2 negative peritoneal and omental metastatic disease by laparoscope and washings  3 abnormal liver CT and MRI to be seen by IR through surgical oncology for biopsy  4 paroxysmal atrial fibrillation  5 diabetes mellitus  6 weight loss trying to be stabilized  Plan: Patient will undergo FOLFIRINOX.  Side effects were discussed.  Consent will be obtained.  IR biopsy of liver will be considered.  He will return in 2 weeks.      HPI: 68-year-old gentleman who comes in for follow-up.  He has a T2 N0 M0 stage Ib pancreatic cancer however he has abnormalities in his liver.  I spoke with Dr. Faulkner of surgical oncology.  She spoke with interventional radiology placed a consult in as 1 these needs to be biopsied if he has metastatic disease or not.  Otherwise we will begin him on FOLFIRINOX.  Side effects were discussed and consents will be obtained.  He will come back in 2 weeks we will try begin his therapy at that time.  Will await also an IR consultation and possible biopsy.      Interval History: Note from 8/26/2024:  Assessment/ Plan:    1 pancreatic adenocarcinoma T2 N0 M0 considered stage Ib  2 diabetes mellitus  3 weight loss  4 paroxysmal atrial fibrillation  Plan: Patient will undergo diagnostic laparoscopy on 9/5 to check for peritoneal metastasis.  He is also scheduled for CT of the abdomen with pancreatic protocol.  We will see him in follow-up on 9/9.  We will discuss the possibility of a beginning of chemotherapy probably with use of FOLFIRINOX.  Will write results of the scans and the laparotomy.  We also asked him to get a CBC CA 19-9 and a CMP.  CC: Consultation for pancreatic adenocarcinoma of body and tail.  HPI:  Abhishek Gimenez is seen for initial consultation 8/26/2024   68-year-old  gentleman who noted the onset of diabetes mellitus and February 2024 since that time he has been losing weight he is lost 30 pounds over the 6 months time.  He also developed intermittent abdominal pain epigastric left upper quadrant that continued to worsen.  He was started on Ozempic.  He drinks 3-4 beers every other day he last used tobacco 40 years ago but does not smoke now.  He has had a negative colonoscopy.  He underwent an EGD which was nondiagnostic subsequently underwent an EGD EUS which showed normal esophagus normal stomach normal duodenum.  Celiac artery and aorta were visualized and appeared normal.  The left kidney spleen left adrenal appeared normal parenchyma of the liver.  Normal this was the left lobe of the liver.  He had a heterogeneous hypoechoic irregular mass measuring 30 mm x 18 mm with poorly defined margins in the body of pancreas it abutted the splenic artery and splenic vein needle biopsies were done for total.  The head of the pancreas and neck of the pancreas appeared to have some honeycombing.  Bile duct appeared normal gallbladder appeared normal.  This was done on 7/17/2024  Biopsy was consistent with adenocarcinoma.  CAT scan of the chest was negative.  On 7/10/2026 MRI of the abdomen showed the ill-defined 3.8 x 1.1 cm mass in the body of the pancreas the liver showed a 3.4 cm benign hemangioma 1.0 and 0.4 cm right lobe nodules all the seem to be compatible with hemangiomas.  He was also seen today by allergy.  They noted the tumor need for distal pancreatic ectomy with splenectomy..  They recommended diagnostic laparoscopy to rule out occult peritoneal metastasis.  They also recommended Chito therapy with chemotherapy follow-up surgery follow-up chemotherapy.  They recommended a CT scan with pancreatic protocol since it was 2 months since his previous scan.  The case will be discussed at the multidisciplinary tumor board  The past medical history A-fib osteoarthritis BPH  colonic polyps diabetes mellitus.  He has nonischemic cardiomyopathy and osteoarthritis and psoriasis.      Cancer Staging:  Cancer Staging   Adenocarcinoma of pancreas (HCC)  Staging form: Pancreas, AJCC 8th Edition  - Clinical: Stage IB (cT2, cN0, cM0) - Signed by Roz Faulkner MD on 8/26/2024  Total positive nodes: 0      Molecular Testing:     Previous Hematologic/ Oncologic History:    Oncology History   Adenocarcinoma of pancreas (HCC)   8/13/2024 Biopsy    A.-B.  Pancreas, Body, FNA (Cell block and smear preparations):   Malignant  Adenocarcinoma.     8/20/2024 Initial Diagnosis    Adenocarcinoma of pancreas (HCC)     8/26/2024 -  Cancer Staged    Staging form: Pancreas, AJCC 8th Edition  - Clinical: Stage IB (cT2, cN0, cM0) - Signed by Roz Faulkner MD on 8/26/2024  Total positive nodes: 0       9/17/2024 -  Chemotherapy    alteplase (CATHFLO), 2 mg, Intracatheter, Every 1 Minute as needed, 0 of 12 cycles  pegfilgrastim (NEULASTA ONPRO), 6 mg, Subcutaneous, Once, 0 of 12 cycles  fosaprepitant (EMEND) IVPB, 150 mg, Intravenous, Once, 0 of 12 cycles  irinotecan (CAMPTOSAR) chemo infusion, 150 mg/m2, Intravenous, Once, 0 of 12 cycles  oxaliplatin (ELOXATIN) chemo infusion, 65 mg/m2, Intravenous, Once, 0 of 12 cycles  fluorouracil (ADRUCIL) ambulatory infusion Soln, 1,200 mg/m2/day, Intravenous, Over 46 hours, 0 of 12 cycles         Current Hematologic/ Oncologic Treatment:       Cycle 1         Test Results:    Imaging: FL guided central venous access device insertion    Result Date: 9/6/2024  Narrative: C-ARM - FL GUIDED CENTRAL VENOUS ACCESS DEVICE INSERTION INDICATION: C25.9: Malignant neoplasm of pancreas, unspecified. Procedure guidance. TECHNIQUE: Fluoroscopic guidance provided. COMPARISON: None FLUOROSCOPY TIME: 18.6 seconds 4 FLUOROSCOPIC IMAGES FINDINGS: Fluoroscopy provided for procedure guidance. Right chest port catheter tip in the cavoatrial region. Osseous and soft tissue detail limited by  technique.     Impression: Fluoroscopy provided for procedure guidance. Please refer to the separate procedure note for additional details. Workstation performed: [id number]     XR chest portable    Result Date: 9/5/2024  Narrative: XR CHEST PORTABLE INDICATION: Post internal jugular port placement. COMPARISON: Chest radiograph 4/22/2021, CT chest 8/19/2024 FINDINGS: Right chest port tip projects at the cavoatrial junction. Clear lungs. No pneumothorax or pleural effusion. Normal cardiomediastinal silhouette. Paravertebral ossifications thoracic spine. Osteoarthritis left glenohumeral joint. Normal upper abdomen.     Impression: No pneumothorax following right chest port insertion. Workstation performed: [id number]     CT abdomen pelvis w contrast    Result Date: 9/4/2024  Narrative: CT ABDOMEN AND PELVIS WITH IV CONTRAST INDICATION: C25.9: Malignant neoplasm of pancreas, unspecified. Fine-needle aspiration/biopsy dated 8/13/2024 positive for malignancy, adenocarcinoma.. COMPARISON: Correlation with MRI abdomen with and without contrast 7/10/2024; TECHNIQUE: CT examination of the abdomen and pelvis was performed. Multiplanar 2D reformatted images were created from the source data. This examination, like all CT scans performed in the [facility], was performed utilizing techniques to minimize radiation dose exposure, including the use of iterative reconstruction and automated exposure control. Radiation dose length product (DLP) for this visit: 1275 mGy-cm IV Contrast: 100 mL of iohexol (OMNIPAQUE) Enteric Contrast: Not administered. FINDINGS: ABDOMEN LOWER CHEST: No clinically significant abnormality in the visualized lower chest. LIVER/BILIARY TREE: Redemonstrated 3.5 cm hemangioma adjacent to the IVC and caudate lobe demonstrates no significant interval change. Numerous small hypo and hyperenhancing hepatic lesions are present as also noted on the comparison MRI. While some of these may reflect  combination of cysts, hemangioma/flash filling hemangiomas, and vascular shunts, there are several with a more suspicious appearance. In particular, a 1.2 cm lesion in segment 5/6 (series 301 image 49) is felt suspicious. On the comparison MRI this lesion demonstrated intermediate T2 hyperintensity with early ringlike enhancement and delayed targetoid hypoenhancement. An additional 1 cm lesion in segment 4A (301/29) and 0.9 cm lesion in the hepatic dome (301/20) are also felt to be suspicious. No significant change in size from the MRI. GALLBLADDER: No calcified gallstones. No pericholecystic inflammatory change. SPLEEN: Splenomegaly measuring 14.0 cm in maximum AP dimension. PANCREAS There is a lesion suspicious for pancreatic cancer, which will be described based on Society of Abdominal Radiologists and American Pancreatic Association recommendations: MORPHOLOGIC EVALUATION: Appearance: Hypoattenuating. Size: Approximately 6.8 x 2.8 cm (301/42), similar to comparison MRI. Location: Pancreatic body and tail. Pancreatic duct narrowing/abrupt cut-off with or without upstream dilatation: Absent. Biliary tree abrupt cut-off with or without upstream dilatation: Absent. ARTERIAL EVALUATION: Superior Mesenteric Artery Involvement: Absent. Incidental note of mild narrowing of the proximal superior mesenteric artery by atherosclerosis. Celiac Axis Involvement: Present. Degree of solid soft-tissue contact: </= 180 degrees. Difficult to evaluate due to location. There is abutment of the celiac bifurcation and encasement of the proximal common hepatic and splenic arteries, though the celiac artery itself does not appear to be encased greater than 180 degrees. Degree of increased hazy attenuation/stranding contact: </= 180 degrees. Focal vessel narrowing or contour irregularity: Absent. Common Hepatic Artery Involvement: Present. Degree of solid soft-tissue contact: > 180 degrees. Encasement of the proximal common hepatic  artery at its origin (201/42 and 43) with mild focal irregular narrowing. Degree of increased hazy attenuation/stranding contact: REPORT AS LESS THAN OR EQUAL  DEGREES OR GREATER THAN 180 DEGREES. Focal vessel narrowing or contour irregularity: Present. Mild irregular narrowing proximally (201/43) Extension to celiac axis: Absent. Extension to the bifurcation of right/left hepatic artery: Absent. Arterial Variant: Type: Replaced right hepatic artery. Identified on 201/48-50 Tumor involvement: Absent. VENOUS EVALUATION: Main Portal Vein Involvement: Present. Degree of solid soft-tissue contact: </= 180 degrees. Abutment of the proximal aspect of the main portal vein just beyond the confluence (301/46) Degree of increased hazy attenuation/stranding contact: </= 180 degrees. Focal vessel narrowing or contour irregularity (tethering or teardrop): Absent. Superior Mesenteric Vein Involvement: Encasement of the superior mesenteric vein with focal moderate narrowing (301/48). FOR EVALUATION FOR POTENTIAL EXTRAPANCREATIC TUMOR, PLEASE SEE THE REST OF THE BODY OF THIS REPORT. ADRENAL GLANDS: Unremarkable. KIDNEYS/URETERS: 3 mm nonobstructing right renal calculus. Simple left renal cyst. No hydronephrosis. STOMACH AND BOWEL: Unremarkable. APPENDIX: Normal. ABDOMINOPELVIC CAVITY: No ascites. No pneumoperitoneum. Prominent lymph nodes in the gemma hepatis not meeting size criteria for lymphadenopathy. (For example series 301, image 41). VESSELS: Atherosclerotic changes. No abdominal aortic aneurysm. Vascular involvement by tumor as detailed above. Extensive collateral vessels in the left hemiabdomen. Incidental note of retroaortic left renal vein. PELVIS REPRODUCTIVE ORGANS: Unremarkable for patient's age. URINARY BLADDER: Unremarkable. ABDOMINAL WALL/INGUINAL REGIONS: Unremarkable. BONES: No acute fracture or suspicious osseous lesion. Spinal degenerative changes.     Impression: 1. Known pancreatic malignancy with  vascular involvement as detailed above. No significant change from MRI. 2. Numerous hepatic lesions similar to the previous MRI. While some of these could reflect benign findings, several such as a 1.2 cm hypoenhancing lesion in the periphery of segment 5/6 are suspicious for metastases. This may be amenable to percutaneous biopsy. The study was marked in EPIC for significant notification. Resident: DARIAN YOUNG I, the attending radiologist, have reviewed the images and agree with the final report above. Workstation performed: [id number]     CT chest wo contrast    Result Date: 8/22/2024  Narrative: CT CHEST WITHOUT IV CONTRAST INDICATION: K86.89: Other specified diseases of pancreas. Pancreatic mass. Metastatic disease evaluation COMPARISON: CT scan of the chest dated 1/6/2017 and abdominal MRI dated 7/10/2024. TECHNIQUE: CT examination of the chest was performed without intravenous contrast. Multiplanar 2D reformatted images were created from the source data. This examination, like all CT scans performed in the [facility], was performed utilizing techniques to minimize radiation dose exposure, including the use of iterative reconstruction and automated exposure control. Radiation dose length product (DLP) for this visit: 316 mGy-cm FINDINGS: LUNGS: 3 mm fissural nodule along the left major fissure on image 124 of series 3. 3 mm fissural nodule along the right major fissure on image 65 of series 2. 2 mm pulmonary nodule along the left major fissure on image 114 of series 3.41. There is no tracheal or endobronchial lesion. PLEURA: Unremarkable. HEART/GREAT VESSELS: Heart is unremarkable for patient's age. No thoracic aortic aneurysm. Small amount of aortic valvular cusp calcification. Small amount of coronary calcification in the left main and left anterior descending coronary. MEDIASTINUM AND DOE: There are few small pretracheal lymph nodes that measure up to 7 mm. These lymph nodes are  similar to prior CT and probably within normal limits. There are small prevascular lymph nodes as well that measure up to 9 mm that are unchanged compared to the prior study and probably within normal limits. There is an aortopulmonary window lymph node that measures 0.9 x 1.3 cm that is also unchanged compared to prior study. No axillary or hilar lymphadenopathy. CHEST WALL AND LOWER NECK: Unremarkable. VISUALIZED STRUCTURES IN THE UPPER ABDOMEN: Mild enlargement of the spleen that measures 14 cm in anterior to posterior dimension. Small low-attenuation lesion in segment 4A of the left hepatic lobe that measures 7 mm image 95 of series 2. Low-attenuation lesion in segment 5 of the right hepatic lobe that measures 1.3 x 1.3 cm image 124 of series 2. There is a lesion involving segment one of the liver that measures 2.3 x 3.7 cm image 109 of series 2. There is a mass involving the body/tail  of the pancreas that measures 1.9 x 3.7 cm image 121 of series 2. 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 OSSEOUS STRUCTURES: Moderate endplate degenerative change in the thoracic spine. Marked degenerative change in the bilateral shoulders. No osteolytic or osteoblastic lesion.     Impression: No findings of metastatic disease in the chest. Small bilateral fissural nodules measuring 2 to 3 mm in size with a benign appearance. No imaging follow-up of these nodules is necessary. Mass along the body/tail of the pancreas is again visualized in the upper abdomen suspicious for a pancreatic cancer. Finding is unchanged compared to abdominal MRI from 7/10/2024. 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 Scattered small hepatic lesions that may indicate hepatic metastatic disease. Correlation with PET/CT or biopsy should be considered. MIld splenomegaly The study was marked in EPIC for immediate notification. Workstation performed:  [id number]     Endoscopic ultrasonography, GI (Upper)    Result Date: 8/13/2024  Narrative: Table formatting from the original result was not included. [facility] [address]  [place] [address] [phone] DATE OF SERVICE: 8/13/24 PHYSICIAN(S): Attending: Lambert He MD Fellow: Conor Hoffman MD INDICATION: Epigastric pain, Chronic pancreatitis, unspecified pancreatitis type (HCC) POST-OP DIAGNOSIS: See the impression below. PREPROCEDURE: Informed consent was obtained for the procedure, including sedation.  Risks of perforation, hemorrhage, adverse drug reaction and aspiration were discussed. The patient was placed in the left lateral decubitus position. Patient was explained about the risks and benefits of the procedure. Risks including but not limited to bleeding, infection, and perforation were explained in detail. Also explained about less than 100% sensitivity with the exam and other alternatives. PROCEDURE: EUS UPPER DETAILS OF PROCEDURE: Patient was taken to the procedure room where a time out was performed to confirm correct patient and correct procedure. The patient underwent monitored anesthesia care, which was administered by an anesthesia professional. The patient's blood pressure, heart rate, oxygen, respirations, level of consciousness, ECG and ETCO2 were monitored throughout the procedure. The endoscope and linear scope were introduced through the mouth and advanced to the second part of the duodenum. The patient experienced no blood loss. The procedure was not difficult. The patient tolerated the procedure well. There were no apparent adverse events. ANESTHESIA INFORMATION: ASA: III Anesthesia Type: IV Sedation with Anesthesia MEDICATIONS: No administrations occurring from 1158 to 1240 on 08/13/24 FINDINGS: The esophagus appeared normal. The stomach appeared normal. The duodenum appeared normal. The celiac artery and aorta were visualized, including the abdominal aorta  and appeared normal. The left kidney, spleen and left adrenal appeared normal. The parenchyma of the liver appeared normal. The parenchyma was visualized in the left lobe of the liver. Heterogeneous, hypoechoic and irregular mass measuring 30 mm x 18 mm with poorly defined margins was visualized in the body of the pancreas. Apparent abutment of the splenic artery and splenic vein; 4 successful fine needle biopsy passes were taken with a 25 gauge [employer] needle using a transgastric approach guided by Doppler. An adequate sample was obtained. Onsite cytologist was present The parenchyma was visualized in the head of the pancreas and neck of the pancreas. The parenchyma had localized lobularity with honeycombing. The parenchyma had localized hyperechoic foci without shadowing. The pancreatic duct appeared normal and measured 1.7 mm at the body. Pancreatic tail was atrophic. The bile duct appeared normal. The bile duct measured 2.6 mm at the distal end. The gallbladder was visualized and appeared normal. The gallbladder wall thickness was normal. SPECIMENS: ID Type Source Tests Collected by Time Destination 1 : pancreatic body Tissue Pancreas TISSUE EXAM Lambert He MD 8/13/2024 12:09 PM       Impression: EGD: Normal exam. EUS: 30 x 18 mm heterogenous, hypoechoic, irregular mass in the body of pancreas. Performed FNB.  The mass was abutting the splenic vein and artery.  Mild lobularity seen in the head of pancreas without any calcifications or significant honeycombing.  Pancreatic duct is normal in caliber.  RECOMMENDATION: Await pathology results Follow up with GI Clinic Recommend surgical oncology and oncology evaluation.   Lambert He MD             Labs:   Lab Results   Component Value Date    WBC 9.35 08/27/2024    HGB 15.1 08/27/2024    HCT 44.5 08/27/2024    MCV 95 08/27/2024     (L) 08/27/2024     Lab Results   Component Value Date     03/15/2016    K 3.9 08/27/2024     08/27/2024     "CO2 31 08/27/2024    BUN 22 08/27/2024    CREATININE 0.79 08/27/2024    GLUCOSE 106 (H) 11/15/2016    GLUF 116 (H) 08/27/2024    CALCIUM 9.6 08/27/2024    AST 17 08/27/2024    ALT 20 08/27/2024    ALKPHOS 73 08/27/2024    PROT 6.6 03/15/2016    BILITOT 0.5 03/15/2016    EGFR 93 08/27/2024         No results found for: \"SPEP\", \"UPEP\"    Lab Results   Component Value Date    PSA 4.701 (H) 08/24/2024    PSA 3.83 01/16/2024    PSA 2.9 01/11/2023       No results found for: \"CEA\"    No results found for: \"\"    No results found for: \"AFP\"    No results found for: \"IRON\", \"TIBC\", \"FERRITIN\"    Lab Results   Component Value Date    [id number] 466 06/10/2021         ROS: Review of Systems   Constitutional: Negative.    HENT: Negative.     Eyes: Negative.    Respiratory: Negative.     Cardiovascular: Negative.    Gastrointestinal: Negative.    Endocrine: Negative.    Genitourinary: Negative.    Musculoskeletal: Negative.    Skin: Negative.    Allergic/Immunologic: Negative.    Neurological: Negative.    Hematological: Negative.          Current Medications: Reviewed  Allergies: Reviewed  PMH/FH/SH:  Reviewed      Physical Exam:    Body surface area is 1.99 meters squared.    Wt Readings from Last 3 Encounters:   09/09/24 78.9 kg (174 lb)   09/05/24 79.4 kg (175 lb)   08/27/24 78.8 kg (173 lb 12.8 oz)        Temp Readings from Last 3 Encounters:   09/05/24 (!) 96.9 °F (36.1 °C) (Temporal)   08/27/24 97.8 °F (36.6 °C) (Temporal)        BP Readings from Last 3 Encounters:   09/09/24 124/82   09/05/24 150/86   08/27/24 122/84         Pulse Readings from Last 3 Encounters:   09/09/24 65   09/05/24 (!) 42   08/27/24 69     @LASTSAO2(3)@      Physical Exam  Constitutional:       Appearance: Normal appearance. He is normal weight.   HENT:      Head: Normocephalic and atraumatic.   Eyes:      Extraocular Movements: Extraocular movements intact.      Conjunctiva/sclera: Conjunctivae normal.      Pupils: Pupils are equal, round, " and reactive to light.   Cardiovascular:      Rate and Rhythm: Normal rate and regular rhythm.      Heart sounds: Normal heart sounds.   Pulmonary:      Effort: Pulmonary effort is normal.      Breath sounds: Normal breath sounds.   Abdominal:      General: Abdomen is flat. Bowel sounds are normal.   Musculoskeletal:      Cervical back: Normal range of motion and neck supple.   Skin:     General: Skin is warm and dry.   Neurological:      General: No focal deficit present.      Mental Status: He is oriented to person, place, and time. Mental status is at baseline.           Goals and Barriers:  Current Goal: Prolong Survival from Cancer.   Barriers: None.      Patient's Capacity to Self Care:  Patient is able to self care.    Code Status: [unfilled]  Advance Directive and Living Will: Yes    Power of :

## 2024-09-09 NOTE — PROGRESS NOTES
Hematology/Oncology Outpatient Follow- up Note  Abhishek Gimenez 68 y.o. male MRN: @ Encounter: [id number]        Date:  9/9/2024        Assessment / Plan:    1 pancreatic adenocarcinoma T2 N0 M0 considered stage Ib  2 negative peritoneal and omental metastatic disease by laparoscope and washings  3 abnormal liver CT and MRI to be seen by IR through surgical oncology for biopsy  4 paroxysmal atrial fibrillation  5 diabetes mellitus  6 weight loss trying to be stabilized  Plan: Patient will undergo FOLFIRINOX.  Side effects were discussed.  Consent will be obtained.  IR biopsy of liver will be considered.  He will return in 2 weeks.      HPI: 68-year-old gentleman who comes in for follow-up.  He has a T2 N0 M0 stage Ib pancreatic cancer however he has abnormalities in his liver.  I spoke with Dr. Faulkner of surgical oncology.  She spoke with interventional radiology placed a consult in as 1 these needs to be biopsied if he has metastatic disease or not.  Otherwise we will begin him on FOLFIRINOX.  Side effects were discussed and consents will be obtained.  He will come back in 2 weeks we will try begin his therapy at that time.  Will await also an IR consultation and possible biopsy.      Interval History: Note from 8/26/2024:  Assessment/ Plan:    1 pancreatic adenocarcinoma T2 N0 M0 considered stage Ib  2 diabetes mellitus  3 weight loss  4 paroxysmal atrial fibrillation  Plan: Patient will undergo diagnostic laparoscopy on 9/5 to check for peritoneal metastasis.  He is also scheduled for CT of the abdomen with pancreatic protocol.  We will see him in follow-up on 9/9.  We will discuss the possibility of a beginning of chemotherapy probably with use of FOLFIRINOX.  Will write results of the scans and the laparotomy.  We also asked him to get a CBC CA 19-9 and a CMP.  CC: Consultation for pancreatic adenocarcinoma of body and tail.  HPI:  Abhishek Gimenez is seen for initial consultation 8/26/2024   68-year-old  gentleman who noted the onset of diabetes mellitus and February 2024 since that time he has been losing weight he is lost 30 pounds over the 6 months time.  He also developed intermittent abdominal pain epigastric left upper quadrant that continued to worsen.  He was started on Ozempic.  He drinks 3-4 beers every other day he last used tobacco 40 years ago but does not smoke now.  He has had a negative colonoscopy.  He underwent an EGD which was nondiagnostic subsequently underwent an EGD EUS which showed normal esophagus normal stomach normal duodenum.  Celiac artery and aorta were visualized and appeared normal.  The left kidney spleen left adrenal appeared normal parenchyma of the liver.  Normal this was the left lobe of the liver.  He had a heterogeneous hypoechoic irregular mass measuring 30 mm x 18 mm with poorly defined margins in the body of pancreas it abutted the splenic artery and splenic vein needle biopsies were done for total.  The head of the pancreas and neck of the pancreas appeared to have some honeycombing.  Bile duct appeared normal gallbladder appeared normal.  This was done on 7/17/2024  Biopsy was consistent with adenocarcinoma.  CAT scan of the chest was negative.  On 7/10/2026 MRI of the abdomen showed the ill-defined 3.8 x 1.1 cm mass in the body of the pancreas the liver showed a 3.4 cm benign hemangioma 1.0 and 0.4 cm right lobe nodules all the seem to be compatible with hemangiomas.  He was also seen today by allergy.  They noted the tumor need for distal pancreatic ectomy with splenectomy..  They recommended diagnostic laparoscopy to rule out occult peritoneal metastasis.  They also recommended Chito therapy with chemotherapy follow-up surgery follow-up chemotherapy.  They recommended a CT scan with pancreatic protocol since it was 2 months since his previous scan.  The case will be discussed at the multidisciplinary tumor board  The past medical history A-fib osteoarthritis BPH  colonic polyps diabetes mellitus.  He has nonischemic cardiomyopathy and osteoarthritis and psoriasis.      Cancer Staging:  Cancer Staging   Adenocarcinoma of pancreas (HCC)  Staging form: Pancreas, AJCC 8th Edition  - Clinical: Stage IB (cT2, cN0, cM0) - Signed by Roz Faulkner MD on 8/26/2024  Total positive nodes: 0      Molecular Testing:     Previous Hematologic/ Oncologic History:    Oncology History   Adenocarcinoma of pancreas (HCC)   8/13/2024 Biopsy    A.-B.  Pancreas, Body, FNA (Cell block and smear preparations):   Malignant  Adenocarcinoma.     8/20/2024 Initial Diagnosis    Adenocarcinoma of pancreas (HCC)     8/26/2024 -  Cancer Staged    Staging form: Pancreas, AJCC 8th Edition  - Clinical: Stage IB (cT2, cN0, cM0) - Signed by Roz Faulkner MD on 8/26/2024  Total positive nodes: 0       9/17/2024 -  Chemotherapy    alteplase (CATHFLO), 2 mg, Intracatheter, Every 1 Minute as needed, 0 of 12 cycles  pegfilgrastim (NEULASTA ONPRO), 6 mg, Subcutaneous, Once, 0 of 12 cycles  fosaprepitant (EMEND) IVPB, 150 mg, Intravenous, Once, 0 of 12 cycles  irinotecan (CAMPTOSAR) chemo infusion, 150 mg/m2, Intravenous, Once, 0 of 12 cycles  oxaliplatin (ELOXATIN) chemo infusion, 65 mg/m2, Intravenous, Once, 0 of 12 cycles  fluorouracil (ADRUCIL) ambulatory infusion Soln, 1,200 mg/m2/day, Intravenous, Over 46 hours, 0 of 12 cycles         Current Hematologic/ Oncologic Treatment:       Cycle 1         Test Results:    Imaging: FL guided central venous access device insertion    Result Date: 9/6/2024  Narrative: C-ARM - FL GUIDED CENTRAL VENOUS ACCESS DEVICE INSERTION INDICATION: C25.9: Malignant neoplasm of pancreas, unspecified. Procedure guidance. TECHNIQUE: Fluoroscopic guidance provided. COMPARISON: None FLUOROSCOPY TIME: 18.6 seconds 4 FLUOROSCOPIC IMAGES FINDINGS: Fluoroscopy provided for procedure guidance. Right chest port catheter tip in the cavoatrial region. Osseous and soft tissue detail limited by  technique.     Impression: Fluoroscopy provided for procedure guidance. Please refer to the separate procedure note for additional details. Workstation performed: [id number]     XR chest portable    Result Date: 9/5/2024  Narrative: XR CHEST PORTABLE INDICATION: Post internal jugular port placement. COMPARISON: Chest radiograph 4/22/2021, CT chest 8/19/2024 FINDINGS: Right chest port tip projects at the cavoatrial junction. Clear lungs. No pneumothorax or pleural effusion. Normal cardiomediastinal silhouette. Paravertebral ossifications thoracic spine. Osteoarthritis left glenohumeral joint. Normal upper abdomen.     Impression: No pneumothorax following right chest port insertion. Workstation performed: [id number]     CT abdomen pelvis w contrast    Result Date: 9/4/2024  Narrative: CT ABDOMEN AND PELVIS WITH IV CONTRAST INDICATION: C25.9: Malignant neoplasm of pancreas, unspecified. Fine-needle aspiration/biopsy dated 8/13/2024 positive for malignancy, adenocarcinoma.. COMPARISON: Correlation with MRI abdomen with and without contrast 7/10/2024; TECHNIQUE: CT examination of the abdomen and pelvis was performed. Multiplanar 2D reformatted images were created from the source data. This examination, like all CT scans performed in the [facility], was performed utilizing techniques to minimize radiation dose exposure, including the use of iterative reconstruction and automated exposure control. Radiation dose length product (DLP) for this visit: 1275 mGy-cm IV Contrast: 100 mL of iohexol (OMNIPAQUE) Enteric Contrast: Not administered. FINDINGS: ABDOMEN LOWER CHEST: No clinically significant abnormality in the visualized lower chest. LIVER/BILIARY TREE: Redemonstrated 3.5 cm hemangioma adjacent to the IVC and caudate lobe demonstrates no significant interval change. Numerous small hypo and hyperenhancing hepatic lesions are present as also noted on the comparison MRI. While some of these may reflect  combination of cysts, hemangioma/flash filling hemangiomas, and vascular shunts, there are several with a more suspicious appearance. In particular, a 1.2 cm lesion in segment 5/6 (series 301 image 49) is felt suspicious. On the comparison MRI this lesion demonstrated intermediate T2 hyperintensity with early ringlike enhancement and delayed targetoid hypoenhancement. An additional 1 cm lesion in segment 4A (301/29) and 0.9 cm lesion in the hepatic dome (301/20) are also felt to be suspicious. No significant change in size from the MRI. GALLBLADDER: No calcified gallstones. No pericholecystic inflammatory change. SPLEEN: Splenomegaly measuring 14.0 cm in maximum AP dimension. PANCREAS There is a lesion suspicious for pancreatic cancer, which will be described based on Society of Abdominal Radiologists and American Pancreatic Association recommendations: MORPHOLOGIC EVALUATION: Appearance: Hypoattenuating. Size: Approximately 6.8 x 2.8 cm (301/42), similar to comparison MRI. Location: Pancreatic body and tail. Pancreatic duct narrowing/abrupt cut-off with or without upstream dilatation: Absent. Biliary tree abrupt cut-off with or without upstream dilatation: Absent. ARTERIAL EVALUATION: Superior Mesenteric Artery Involvement: Absent. Incidental note of mild narrowing of the proximal superior mesenteric artery by atherosclerosis. Celiac Axis Involvement: Present. Degree of solid soft-tissue contact: </= 180 degrees. Difficult to evaluate due to location. There is abutment of the celiac bifurcation and encasement of the proximal common hepatic and splenic arteries, though the celiac artery itself does not appear to be encased greater than 180 degrees. Degree of increased hazy attenuation/stranding contact: </= 180 degrees. Focal vessel narrowing or contour irregularity: Absent. Common Hepatic Artery Involvement: Present. Degree of solid soft-tissue contact: > 180 degrees. Encasement of the proximal common hepatic  artery at its origin (201/42 and 43) with mild focal irregular narrowing. Degree of increased hazy attenuation/stranding contact: REPORT AS LESS THAN OR EQUAL  DEGREES OR GREATER THAN 180 DEGREES. Focal vessel narrowing or contour irregularity: Present. Mild irregular narrowing proximally (201/43) Extension to celiac axis: Absent. Extension to the bifurcation of right/left hepatic artery: Absent. Arterial Variant: Type: Replaced right hepatic artery. Identified on 201/48-50 Tumor involvement: Absent. VENOUS EVALUATION: Main Portal Vein Involvement: Present. Degree of solid soft-tissue contact: </= 180 degrees. Abutment of the proximal aspect of the main portal vein just beyond the confluence (301/46) Degree of increased hazy attenuation/stranding contact: </= 180 degrees. Focal vessel narrowing or contour irregularity (tethering or teardrop): Absent. Superior Mesenteric Vein Involvement: Encasement of the superior mesenteric vein with focal moderate narrowing (301/48). FOR EVALUATION FOR POTENTIAL EXTRAPANCREATIC TUMOR, PLEASE SEE THE REST OF THE BODY OF THIS REPORT. ADRENAL GLANDS: Unremarkable. KIDNEYS/URETERS: 3 mm nonobstructing right renal calculus. Simple left renal cyst. No hydronephrosis. STOMACH AND BOWEL: Unremarkable. APPENDIX: Normal. ABDOMINOPELVIC CAVITY: No ascites. No pneumoperitoneum. Prominent lymph nodes in the gemma hepatis not meeting size criteria for lymphadenopathy. (For example series 301, image 41). VESSELS: Atherosclerotic changes. No abdominal aortic aneurysm. Vascular involvement by tumor as detailed above. Extensive collateral vessels in the left hemiabdomen. Incidental note of retroaortic left renal vein. PELVIS REPRODUCTIVE ORGANS: Unremarkable for patient's age. URINARY BLADDER: Unremarkable. ABDOMINAL WALL/INGUINAL REGIONS: Unremarkable. BONES: No acute fracture or suspicious osseous lesion. Spinal degenerative changes.     Impression: 1. Known pancreatic malignancy with  vascular involvement as detailed above. No significant change from MRI. 2. Numerous hepatic lesions similar to the previous MRI. While some of these could reflect benign findings, several such as a 1.2 cm hypoenhancing lesion in the periphery of segment 5/6 are suspicious for metastases. This may be amenable to percutaneous biopsy. The study was marked in EPIC for significant notification. Resident: DARIAN YOUNG I, the attending radiologist, have reviewed the images and agree with the final report above. Workstation performed: [id number]     CT chest wo contrast    Result Date: 8/22/2024  Narrative: CT CHEST WITHOUT IV CONTRAST INDICATION: K86.89: Other specified diseases of pancreas. Pancreatic mass. Metastatic disease evaluation COMPARISON: CT scan of the chest dated 1/6/2017 and abdominal MRI dated 7/10/2024. TECHNIQUE: CT examination of the chest was performed without intravenous contrast. Multiplanar 2D reformatted images were created from the source data. This examination, like all CT scans performed in the [facility], was performed utilizing techniques to minimize radiation dose exposure, including the use of iterative reconstruction and automated exposure control. Radiation dose length product (DLP) for this visit: 316 mGy-cm FINDINGS: LUNGS: 3 mm fissural nodule along the left major fissure on image 124 of series 3. 3 mm fissural nodule along the right major fissure on image 65 of series 2. 2 mm pulmonary nodule along the left major fissure on image 114 of series 3.41. There is no tracheal or endobronchial lesion. PLEURA: Unremarkable. HEART/GREAT VESSELS: Heart is unremarkable for patient's age. No thoracic aortic aneurysm. Small amount of aortic valvular cusp calcification. Small amount of coronary calcification in the left main and left anterior descending coronary. MEDIASTINUM AND DOE: There are few small pretracheal lymph nodes that measure up to 7 mm. These lymph nodes are  similar to prior CT and probably within normal limits. There are small prevascular lymph nodes as well that measure up to 9 mm that are unchanged compared to the prior study and probably within normal limits. There is an aortopulmonary window lymph node that measures 0.9 x 1.3 cm that is also unchanged compared to prior study. No axillary or hilar lymphadenopathy. CHEST WALL AND LOWER NECK: Unremarkable. VISUALIZED STRUCTURES IN THE UPPER ABDOMEN: Mild enlargement of the spleen that measures 14 cm in anterior to posterior dimension. Small low-attenuation lesion in segment 4A of the left hepatic lobe that measures 7 mm image 95 of series 2. Low-attenuation lesion in segment 5 of the right hepatic lobe that measures 1.3 x 1.3 cm image 124 of series 2. There is a lesion involving segment one of the liver that measures 2.3 x 3.7 cm image 109 of series 2. There is a mass involving the body/tail  of the pancreas that measures 1.9 x 3.7 cm image 121 of series 2. 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 OSSEOUS STRUCTURES: Moderate endplate degenerative change in the thoracic spine. Marked degenerative change in the bilateral shoulders. No osteolytic or osteoblastic lesion.     Impression: No findings of metastatic disease in the chest. Small bilateral fissural nodules measuring 2 to 3 mm in size with a benign appearance. No imaging follow-up of these nodules is necessary. Mass along the body/tail of the pancreas is again visualized in the upper abdomen suspicious for a pancreatic cancer. Finding is unchanged compared to abdominal MRI from 7/10/2024. 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 Scattered small hepatic lesions that may indicate hepatic metastatic disease. Correlation with PET/CT or biopsy should be considered. MIld splenomegaly The study was marked in EPIC for immediate notification. Workstation performed:  [id number]     Endoscopic ultrasonography, GI (Upper)    Result Date: 8/13/2024  Narrative: Table formatting from the original result was not included. [facility] [address] [place] [address] [phone] DATE OF SERVICE: 8/13/24 PHYSICIAN(S): Attending: Lambert He MD Fellow: Conor Hoffman MD INDICATION: Epigastric pain, Chronic pancreatitis, unspecified pancreatitis type (HCC) POST-OP DIAGNOSIS: See the impression below. PREPROCEDURE: Informed consent was obtained for the procedure, including sedation.  Risks of perforation, hemorrhage, adverse drug reaction and aspiration were discussed. The patient was placed in the left lateral decubitus position. Patient was explained about the risks and benefits of the procedure. Risks including but not limited to bleeding, infection, and perforation were explained in detail. Also explained about less than 100% sensitivity with the exam and other alternatives. PROCEDURE: EUS UPPER DETAILS OF PROCEDURE: Patient was taken to the procedure room where a time out was performed to confirm correct patient and correct procedure. The patient underwent monitored anesthesia care, which was administered by an anesthesia professional. The patient's blood pressure, heart rate, oxygen, respirations, level of consciousness, ECG and ETCO2 were monitored throughout the procedure. The endoscope and linear scope were introduced through the mouth and advanced to the second part of the duodenum. The patient experienced no blood loss. The procedure was not difficult. The patient tolerated the procedure well. There were no apparent adverse events. ANESTHESIA INFORMATION: ASA: III Anesthesia Type: IV Sedation with Anesthesia MEDICATIONS: No administrations occurring from 1158 to 1240 on 08/13/24 FINDINGS: The esophagus appeared normal. The stomach appeared normal. The duodenum appeared normal. The celiac artery and aorta were visualized, including the abdominal aorta  and appeared normal. The left kidney, spleen and left adrenal appeared normal. The parenchyma of the liver appeared normal. The parenchyma was visualized in the left lobe of the liver. Heterogeneous, hypoechoic and irregular mass measuring 30 mm x 18 mm with poorly defined margins was visualized in the body of the pancreas. Apparent abutment of the splenic artery and splenic vein; 4 successful fine needle biopsy passes were taken with a 25 gauge [employer] needle using a transgastric approach guided by Doppler. An adequate sample was obtained. Onsite cytologist was present The parenchyma was visualized in the head of the pancreas and neck of the pancreas. The parenchyma had localized lobularity with honeycombing. The parenchyma had localized hyperechoic foci without shadowing. The pancreatic duct appeared normal and measured 1.7 mm at the body. Pancreatic tail was atrophic. The bile duct appeared normal. The bile duct measured 2.6 mm at the distal end. The gallbladder was visualized and appeared normal. The gallbladder wall thickness was normal. SPECIMENS: ID Type Source Tests Collected by Time Destination 1 : pancreatic body Tissue Pancreas TISSUE EXAM Lambert He MD 8/13/2024 12:09 PM       Impression: EGD: Normal exam. EUS: 30 x 18 mm heterogenous, hypoechoic, irregular mass in the body of pancreas. Performed FNB.  The mass was abutting the splenic vein and artery.  Mild lobularity seen in the head of pancreas without any calcifications or significant honeycombing.  Pancreatic duct is normal in caliber.  RECOMMENDATION: Await pathology results Follow up with GI Clinic Recommend surgical oncology and oncology evaluation.   Lambert He MD             Labs:   Lab Results   Component Value Date    WBC 9.35 08/27/2024    HGB 15.1 08/27/2024    HCT 44.5 08/27/2024    MCV 95 08/27/2024     (L) 08/27/2024     Lab Results   Component Value Date     03/15/2016    K 3.9 08/27/2024     08/27/2024     "CO2 31 08/27/2024    BUN 22 08/27/2024    CREATININE 0.79 08/27/2024    GLUCOSE 106 (H) 11/15/2016    GLUF 116 (H) 08/27/2024    CALCIUM 9.6 08/27/2024    AST 17 08/27/2024    ALT 20 08/27/2024    ALKPHOS 73 08/27/2024    PROT 6.6 03/15/2016    BILITOT 0.5 03/15/2016    EGFR 93 08/27/2024         No results found for: \"SPEP\", \"UPEP\"    Lab Results   Component Value Date    PSA 4.701 (H) 08/24/2024    PSA 3.83 01/16/2024    PSA 2.9 01/11/2023       No results found for: \"CEA\"    No results found for: \"\"    No results found for: \"AFP\"    No results found for: \"IRON\", \"TIBC\", \"FERRITIN\"    Lab Results   Component Value Date    IJIONRZY71 466 06/10/2021         ROS: Review of Systems   Constitutional: Negative.    HENT: Negative.     Eyes: Negative.    Respiratory: Negative.     Cardiovascular: Negative.    Gastrointestinal: Negative.    Endocrine: Negative.    Genitourinary: Negative.    Musculoskeletal: Negative.    Skin: Negative.    Allergic/Immunologic: Negative.    Neurological: Negative.    Hematological: Negative.          Current Medications: Reviewed  Allergies: Reviewed  PMH/FH/SH:  Reviewed      Physical Exam:    Body surface area is 1.99 meters squared.    Wt Readings from Last 3 Encounters:   09/09/24 78.9 kg (174 lb)   09/05/24 79.4 kg (175 lb)   08/27/24 78.8 kg (173 lb 12.8 oz)        Temp Readings from Last 3 Encounters:   09/05/24 (!) 96.9 °F (36.1 °C) (Temporal)   08/27/24 97.8 °F (36.6 °C) (Temporal)        BP Readings from Last 3 Encounters:   09/09/24 124/82   09/05/24 150/86   08/27/24 122/84         Pulse Readings from Last 3 Encounters:   09/09/24 65   09/05/24 (!) 42   08/27/24 69     @LASTSAO2(3)@      Physical Exam  Constitutional:       Appearance: Normal appearance. He is normal weight.   HENT:      Head: Normocephalic and atraumatic.   Eyes:      Extraocular Movements: Extraocular movements intact.      Conjunctiva/sclera: Conjunctivae normal.      Pupils: Pupils are equal, round, " and reactive to light.   Cardiovascular:      Rate and Rhythm: Normal rate and regular rhythm.      Heart sounds: Normal heart sounds.   Pulmonary:      Effort: Pulmonary effort is normal.      Breath sounds: Normal breath sounds.   Abdominal:      General: Abdomen is flat. Bowel sounds are normal.   Musculoskeletal:      Cervical back: Normal range of motion and neck supple.   Skin:     General: Skin is warm and dry.   Neurological:      General: No focal deficit present.      Mental Status: He is oriented to person, place, and time. Mental status is at baseline.           Goals and Barriers:  Current Goal: Prolong Survival from Cancer.   Barriers: None.      Patient's Capacity to Self Care:  Patient is able to self care.    Code Status: [unfilled]  Advance Directive and Living Will: Yes    Power of :

## 2024-09-10 ENCOUNTER — TELEPHONE (OUTPATIENT)
Dept: HEMATOLOGY ONCOLOGY | Facility: CLINIC | Age: 69
End: 2024-09-10

## 2024-09-10 DIAGNOSIS — C25.9 ADENOCARCINOMA OF PANCREAS (HCC): ICD-10-CM

## 2024-09-10 DIAGNOSIS — C25.9 ADENOCARCINOMA OF PANCREAS (HCC): Primary | ICD-10-CM

## 2024-09-10 DIAGNOSIS — R16.0 LIVER MASSES: Primary | ICD-10-CM

## 2024-09-10 DIAGNOSIS — G89.3 CANCER RELATED PAIN: ICD-10-CM

## 2024-09-10 RX ORDER — ONDANSETRON 4 MG/1
4 TABLET, FILM COATED ORAL EVERY 8 HOURS PRN
Qty: 20 TABLET | Refills: 2 | Status: CANCELLED | OUTPATIENT
Start: 2024-09-10

## 2024-09-10 RX ORDER — LIDOCAINE/PRILOCAINE 2.5 %-2.5%
CREAM (GRAM) TOPICAL AS NEEDED
Qty: 30 G | Refills: 1 | Status: SHIPPED | OUTPATIENT
Start: 2024-09-10

## 2024-09-10 RX ORDER — OXYCODONE HYDROCHLORIDE 5 MG/1
5 TABLET ORAL EVERY 6 HOURS PRN
Qty: 60 TABLET | Refills: 0 | Status: SHIPPED | OUTPATIENT
Start: 2024-09-10

## 2024-09-10 RX ORDER — ONDANSETRON 4 MG/1
4 TABLET, FILM COATED ORAL EVERY 8 HOURS PRN
Qty: 20 TABLET | Refills: 2 | Status: SHIPPED | OUTPATIENT
Start: 2024-09-10

## 2024-09-10 RX ORDER — LIDOCAINE/PRILOCAINE 2.5 %-2.5%
CREAM (GRAM) TOPICAL AS NEEDED
Qty: 30 G | Refills: 1 | Status: CANCELLED | OUTPATIENT
Start: 2024-09-10

## 2024-09-10 NOTE — TELEPHONE ENCOUNTER
Refill must be reviewed and completed by the office or provider. The refill is unable to be approved or denied by the medication management team.        Patient Id Prescription # Filled Written Drug Label Qty Days Strength MME** Prescriber Pharmacy Payment REFILL #/Auth State Detail  1 [id number] 08/26/2024 08/26/2024 oxyCODONE HCL (Tablet) 60.0 15 5 MG 30.0 [facility] Commercial Insurance 0 / 0 [place]

## 2024-09-10 NOTE — TELEPHONE ENCOUNTER
Reason for call:   [x] Refill   [] Prior Auth  [] Other:     Office:   [x] PCP/Provider - [facility]  Authorized By: Kamar Jung PA-C  [] Specialty/Provider -     Medication: oxyCODONE (Roxicodone) 5 immediate release tablet     Dose/Frequency:  Take 1 tablet (5 mg total) by mouth every 6 (six) hours as needed for moderate pain     Quantity: 60 tablet     Pharmacy: [place], [place] - [address]     Does the patient have enough for 3 days?   [] Yes   [x] No - Send as HP to POD

## 2024-09-10 NOTE — PROGRESS NOTES
Patient was provided education on Irinotecan,Oxaliplatin and fluorouacil. All education sheets were printed from [employer] and most common side effects were discussed. All questions answered and encouraged to write down questions and bring to next visit or give a call to clarify.  Patient was educated on premeds that were going to be ordered for her including Zofran and Emla cream.  Instructed to apply emla cream 45-60min prior to their appointment time and cover with transparent dressing or plastic wrap.  Discussed sheet provided  regarding when to call our office including   Temp of 100.4 and instructed not to take tylenol   Medication for nausea is not helping despite taking as ordered   Diarrhea is experienced and imodium doesn't resolve the issue.    Constipation for more than three days and taking stool softener and not bowel movement.    Encouraged questions and informing office of any concerns during treatment.   Educated regarding fluid intake and reinforeced that 6-8 cups of fluid is recommended.  Educated to avoid taking aspirin, ibuprofen and naprosyn products.  Consent obtained

## 2024-09-11 ENCOUNTER — TELEPHONE (OUTPATIENT)
Dept: SURGICAL ONCOLOGY | Facility: CLINIC | Age: 69
End: 2024-09-11

## 2024-09-11 ENCOUNTER — PATIENT OUTREACH (OUTPATIENT)
Dept: HEMATOLOGY ONCOLOGY | Facility: CLINIC | Age: 69
End: 2024-09-11

## 2024-09-11 ENCOUNTER — TELEPHONE (OUTPATIENT)
Dept: NUTRITION | Facility: CLINIC | Age: 69
End: 2024-09-11

## 2024-09-11 ENCOUNTER — TELEMEDICINE (OUTPATIENT)
Dept: PALLIATIVE MEDICINE | Facility: CLINIC | Age: 69
End: 2024-09-11

## 2024-09-11 ENCOUNTER — TELEPHONE (OUTPATIENT)
Dept: INFUSION CENTER | Facility: CLINIC | Age: 69
End: 2024-09-11

## 2024-09-11 ENCOUNTER — TELEPHONE (OUTPATIENT)
Age: 69
End: 2024-09-11

## 2024-09-11 DIAGNOSIS — C25.9 ADENOCARCINOMA OF PANCREAS (HCC): Primary | ICD-10-CM

## 2024-09-11 DIAGNOSIS — Z71.89 COUNSELING AND COORDINATION OF CARE: Primary | ICD-10-CM

## 2024-09-11 PROBLEM — Z95.828 PORT-A-CATH IN PLACE: Status: ACTIVE | Noted: 2024-09-11

## 2024-09-11 PROCEDURE — NC001 PR NO CHARGE

## 2024-09-11 RX ORDER — DEXTROSE MONOHYDRATE 50 MG/ML
20 INJECTION, SOLUTION INTRAVENOUS ONCE
OUTPATIENT
Start: 2024-09-24

## 2024-09-11 RX ORDER — ATROPINE SULFATE 1 MG/ML
0.25 INJECTION, SOLUTION INTRAVENOUS ONCE AS NEEDED
OUTPATIENT
Start: 2024-09-24

## 2024-09-11 RX ORDER — SODIUM CHLORIDE 9 MG/ML
20 INJECTION, SOLUTION INTRAVENOUS ONCE AS NEEDED
OUTPATIENT
Start: 2024-09-24

## 2024-09-11 RX ORDER — ATROPINE SULFATE 1 MG/ML
0.25 INJECTION, SOLUTION INTRAVENOUS ONCE
OUTPATIENT
Start: 2024-09-24

## 2024-09-11 NOTE — TELEPHONE ENCOUNTER
FYI:  Pt called wanting to know if a biopsy of his liver was taken during his surgery diagnostic laparoscopy on 9/5. Informed pt that from his chart cytology/peritoneal washings were obtained.Those results will be discussed at upcoming appt.Pt understood.Pt states that he also wanted to know the stage of his Cancer,informed pt as per his chart its 1B Adenocarcinoma of pancreas.Pt very appreciative of the information given. All his questions were answered to his satisfaction. Pt has F/U with Dr Faulkner 9/24 and Dr Aaron 9/23.

## 2024-09-11 NOTE — TELEPHONE ENCOUNTER
Oncology Finance Advocacy Intake and Intervention  Oncology Finance Counselor/Advocate placed call to patient. This writer informed patient that this writer is here to assist patient with billing questions, financial assistance, payment/payment plans, quotes, copayment assistance, insurance optimization, and insurance navigation.    This writer conducted a thorough benefit review of copayment, deductible, and out of pocket cost. This information is documented below and has been reviewed with patient.     Copayment:$40.00  Deductible:$500.00 Remaining $411.78  Out of Pocket Cost:$6,700.00 Remaining $5,325.65  Insurance optimization (Limited benefit vs self-pay):  Patient assistance status:Patient Outreach   Free Drug Applications:N/A  Oral Chemo Application:N/A  BIN#:  PCN#:  GRP#:  Copay:$  Interventions:    Patient was on DARS report for planned 09/13/24 treatment and also received a Patient Calls message from Lashell GONZALES:    Lashell Lew MA32 minutes ago (12:08 PM)     EF  I reached out and spoke with MYNOR now that consults have been completed with the oncology teams to review for any barriers to care and offer supportive services as needed. Distress Thermometer completed at this time. Patient scored 6/10. Referral to  placed. I reviewed and updated the members assigned to the care team in [facility].   He knows the members of the care team as well as how and when to contact them with any needs.   He verbalizes managing the schedules well.   He is currently able to drive and denies any transportation needs.    He is already established with Palliative Care.   Patients states that he is struggling with eating and drinking. Malnutrition screening tool completed. He does meet parameters for auto referral to Oncology Registered Dietician.      Patient does not smoke.   Patient states he is well supported by family and friends.  He has received information for community support groups previously.   Patient feels he has  inadequate financial resources related to COPAYS AND WONDERS IF HE HAS ADEQUATE INSURANCE COVERAGE. Routing message to Oncology Social Worker and Oncology Financial Advocacy Team.  Abhishek did tell me he lost a daughter to cancer at the age of 24. His diagnosis has brought up a lot of that trauma. He feels adequately supported by  and is encouraged by the amount of support provided.        I placed a call to the patient and introduced myself as an Oncology  and my role here at [facility]. I look for assistance for the patient by diagnosis or for the drug based on his insurance. Regretfully, there is no funding available for either in his case but I have sent an e mail to our [facility] Financial Counselor Team to send the patient an application for possible hardship assistance. That usually takes 7-10 business days through the mail but if he has any questions in completing the application or would rather contact them directly for an appointment here on the [facility] he can call [phone].     Information above was review thoroughly with patient and patient was advise of possible assistance programs/interventions. If any question arise patient can contact this writer at below information. This information was given to patient at time of contact.      Abhishek Brown  Phone:[phone]  Email: [email or web address]

## 2024-09-11 NOTE — PROGRESS NOTES
Palliative Supportive Care  met with patient/family to continue to provide emotional support and guidance.      Updated biopsychosocial information relevant to support:     The patient was identified by name and date of birth. Abhishek was informed that this is a telemedicine visit and that the visit is being conducted through the Epic Embedded platform. They agree to proceed..  My office door was closed. No one else was in the room. They acknowledged consent and understanding of privacy and security of the video platform. The patient has agreed to participate and understands they can discontinue the visit at any time.    Abhishek spoke about having a biopsy done on his stomach but not his liver which confused him and he needs to call his doctor. He said that he is having crying spells about every few days and when he does he thinks about the possibility he could die or how this all affects his wife. Sherri was in the background and Raulito passed her the phone to talk. She said that she feels very scared because they went through a terrible 4 year cancer ceja with their daughter who suffered tremendously. She said she fears the same will happen now and she has been told every person is different but it is hard to believe that after what they've been through. LCSW supported Sherri and Raulito in processing the trauma of losing their daughter and how this is a huge trigger during their current cancer journey. Sherri said that she is a natural caregiver and she is focusing on what she can do for Raulito at this time. LCSW encouraged Sherri to make sure she is also doing self care to prevent caregiver burn out. She listed several things she does for herself such as walking the dog, getting occasional massages, and visiting with family. Raulito took the phone back. He said he has a concern that he will not be able to do anything than watch TV which would be really hard for him as he enjoys being outdoors or at  least doing something he finds productive such as working on the sickweather with a family member or painting. LCSW encouraged Raulito to begin a symptom journal when he starts treatment so he may be able to start identifying patterns of how he feels and can coordinate when he may have days of more energy to enjoy some of his previously enjoyable activities within reason. They are hopeful to be able to ride bikes up at [employer] this fall but are playing it by ear.       Identified areas of need include: emotional support    Resources provided:    Areas that need future follow-up include: reduce anxiety/depressed mood    I have spent 60 minutes with Patient  today in which greater than 50% of this time was spent in counseling/coordination of care     Palliative  will follow-up as requested by patient, family, and primary team.  Please contact with any specific requests

## 2024-09-11 NOTE — PROGRESS NOTES
I reached out and spoke with MYNOR now that consults have been completed with the oncology teams to review for any barriers to care and offer supportive services as needed. Distress Thermometer completed at this time. Patient scored 6/10. Referral to  placed. I reviewed and updated the members assigned to the care team in [facility].   He knows the members of the care team as well as how and when to contact them with any needs.   He verbalizes managing the schedules well.   He is currently able to drive and denies any transportation needs.    He is already established with Palliative Care.   Patients states that he is struggling with eating and drinking. Malnutrition screening tool completed. He does meet parameters for auto referral to Oncology Registered Dietician.      Patient does not smoke.   Patient states he is well supported by family and friends.  He has received information for community support groups previously.   Patient feels he has inadequate financial resources related to COPAYS AND WONDERS IF HE HAS ADEQUATE INSURANCE COVERAGE. Routing message to Oncology Social Worker and Oncology Financial Advocacy Team.  Mynor did tell me he lost a daughter to cancer at the age of 24. His diagnosis has brought up a lot of that trauma. He feels adequately supported by  and is encouraged by the amount of support provided.      Based on individual needs I will follow up in about 3 weeks.   I have provided my direct contact information and welcome them to contact me if their needs as discussed above change. They were appreciative for the call.

## 2024-09-11 NOTE — TELEPHONE ENCOUNTER
Contacted Abhishek to set up nutrition consult after receiving notification  Lashell Lew MA  on 9/11/24 that pt is appropriate for oncology nutrition care (reason for referral: Ambulatory referral for struggling with eating and drinking ). Spoke with Abhishek, introduced self and oncology nutrition services available. Initial oncology nutrition consult set up for 10/1 during Abhishek's infusion.

## 2024-09-11 NOTE — TELEPHONE ENCOUNTER
Spoke with patient regarding upcoming first appointment 9/13/24 at 11am, verified appointment date/time, discussed infusion centers policies and procedures, patient to have labs completed for first chemo during appt.  patient verbalizes understanding and has no further questions at this time.

## 2024-09-12 ENCOUNTER — TELEPHONE (OUTPATIENT)
Age: 69
End: 2024-09-12

## 2024-09-12 DIAGNOSIS — C25.9 ADENOCARCINOMA OF PANCREAS (HCC): Primary | ICD-10-CM

## 2024-09-12 NOTE — TELEPHONE ENCOUNTER
FYI:  Pt called has liver bx sched for 9/18 and is scheduled to start his chemo treatment on 9/17 which will require him to wear a pump infusion for 4 days. Wanted to know if he can still have bx as scheduled with chemo infusing at the same time.Pls advise

## 2024-09-12 NOTE — TELEPHONE ENCOUNTER
FYI:  Pt called back and was provided information that chemo will be delayed 1 week per provider/Dr Aaron/Danielle GAYLE. Pt questions labs then tomorrow should they be resched as well? Pt has sched appt with Dr Aaron on 9/23 wanting to know if that's needed since his chemo schedule is changing and wanting to make sure someone from Infusion schedule team will be calling him to resched all the chemo appts.Will send to [facility] as well.

## 2024-09-12 NOTE — TELEPHONE ENCOUNTER
"FYI:  Call back to pt ,LMOM to call back for the following information: \"Provider stated we should wait until bx is done and then start chemo so I will defer 1 week.\" Per Danielle PANTOJA RN/Poncho.   Will await call back from pt.  "

## 2024-09-13 ENCOUNTER — HOSPITAL ENCOUNTER (OUTPATIENT)
Dept: INFUSION CENTER | Facility: CLINIC | Age: 69
Discharge: HOME | End: 2024-09-13

## 2024-09-13 ENCOUNTER — TELEPHONE (OUTPATIENT)
Dept: INFUSION CENTER | Facility: CLINIC | Age: 69
End: 2024-09-13

## 2024-09-13 ENCOUNTER — TELEPHONE (OUTPATIENT)
Dept: NUTRITION | Facility: CLINIC | Age: 69
End: 2024-09-13

## 2024-09-13 ENCOUNTER — TELEPHONE (OUTPATIENT)
Dept: HEMATOLOGY ONCOLOGY | Facility: CLINIC | Age: 69
End: 2024-09-13

## 2024-09-13 NOTE — TELEPHONE ENCOUNTER
Patient called this morning to cancel his lab appt and treatment for next week.  Patient stated he is having a biopsy so he will not be coming for treatment next week.  Please let us know how we should proceed with his infusion appts.

## 2024-09-13 NOTE — TELEPHONE ENCOUNTER
Called and spoke with patient and spouse. They are made aware that infusion will be calling them today to reschedule the chemotherapy 1 week since he will be having a liver biopsy on 9/18. Pt verbalizes understanding. Made aware to keep the appt with  on 9/23 because he will be most likely starting chemo this week and we may have a preliminary report from the liver bx.

## 2024-09-13 NOTE — TELEPHONE ENCOUNTER
Received call from Abhishek regarding oncology nutrition consult scheduled for 10/1 during his infusion. His infusion schedule has changed so he will not be at [facility] on 10/1, we rescheduled our nutrition consult to 10/8 during his infusion.

## 2024-09-16 ENCOUNTER — TELEPHONE (OUTPATIENT)
Dept: SURGICAL ONCOLOGY | Facility: CLINIC | Age: 69
End: 2024-09-16

## 2024-09-16 NOTE — TELEPHONE ENCOUNTER
Spoke to patient to reschedule his appointment with Dr Faulkner due to her being in OR , rescheduled to 9/23 @8:15 am at [place] and patient ok with time and location.

## 2024-09-17 ENCOUNTER — TELEPHONE (OUTPATIENT)
Dept: SURGICAL ONCOLOGY | Facility: CLINIC | Age: 69
End: 2024-09-17

## 2024-09-17 ENCOUNTER — PATIENT OUTREACH (OUTPATIENT)
Dept: CASE MANAGEMENT | Facility: HOSPITAL | Age: 69
End: 2024-09-17

## 2024-09-17 ENCOUNTER — HOSPITAL ENCOUNTER (OUTPATIENT)
Dept: INFUSION CENTER | Facility: HOSPITAL | Age: 69
Discharge: HOME | End: 2024-09-17

## 2024-09-17 DIAGNOSIS — C25.9 ADENOCARCINOMA OF PANCREAS (HCC): Primary | ICD-10-CM

## 2024-09-17 NOTE — PROGRESS NOTES
Biopsychosocial and Barriers Assessment    Type of Cancer: pancreatic, stage I  Treatment plan: chemo  Noted barriers to care: none noted  Cultural/Shinto concerns:none noted  Hair Loss/ Wig resources needed: not discussed    DT completed:   DT score: 7/10  Issues noted: pain, changes in eating, insurance    Marital status/Lives with: , lives with his wife  Pt's support system: pt reports a strong support system  Mental Health history: none noted  Substance Abuse: none noted    Employment/income source: retired  Concerns with bills (treatment vs household): yes, concern with insurance coverage, doesn't fully understand coverage  Noted issues with home: none    Narrative note:      MSW s/w pt by phone this morning after receiving a referral based on his DT.  Pt's primary concern is his insurance coverage.  In conversation, he mentioned that he has had Medicare for the past year and a half.  When I clarified that he has a [employer] replacement plan, he says that he is unaware of this and thought that he had straight Medicare and nothing else.  His chart has a [employer] card scanned into the media file.  He tells me that so far it has been very good coverage but with needing cancer treatment, he has no idea what to expect in terms of bills.  We agreed that a referral to FA would be best so they can review his expected OOP with him.  He again stated that he didn't realize that he has a MC replacement plan and isn't sure if his wife realizes that either.    Pt also shared with me that his young daughter  in  from [place] [facility] cancer.  He agrees that his own cancer diagnosis has brought up many emotions from her time in treatment.  He spoke briefly about the many cancer centers that she went to and how she had the best available treatment, but sadly her cancer continued to spread and she passed away.  He says that he has a very strong support system with his family.  He also notes that he will be  starting counseling sessions with Layla from Palliative Care.  He admits to being confused with staff and their roles in his care and says that he's getting many phone calls and it's hard to keep track.   I offered pt my direct number but he asked instead if I would call periodically and check in with him instead.  He is grateful for the support shown to him throughout this process.    Pt will start treatment next week and I will plan to check in with him afterward to see how things are going.  I will remain available to assist or support him as needed moving forward.  Email referral to FA team will be sent as well.  No other needs noted at this time.

## 2024-09-17 NOTE — TELEPHONE ENCOUNTER
Placed a call to the patient responding to e mail received today:  Good morning,    Pt Abhishek Gimenez [birth year] would very much appreciate a benefit review and any expected OOP costs he may have.  He seems very confused about his insurance coverage and tells me that he has Medicare, but his chart shows a [employer] replacement plan.  He was unaware of this and thought he had straight Medicare.  He is a [facility] patient.  If someone can please review his coverage and just give him an idea of any expected costs, he would be very appreciative.      Thanks,    Patient now is aware of the type of insurance he has and when asked if he needs to know his deductible or OOP he said he doesn't need that information but will take advantage of whatever is offered to him. I reminded him about an application for CC should be coming in the mail and stated that he was opening it as we spoke.

## 2024-09-17 NOTE — TELEPHONE ENCOUNTER
Left a message for patient to call me back to reschedule the post op that was made for 9/23 with Dr Faulkner. Left my direct number to reach me.

## 2024-09-18 ENCOUNTER — HOSPITAL ENCOUNTER (OUTPATIENT)
Dept: CT IMAGING | Facility: HOSPITAL | Age: 69
Discharge: HOME | End: 2024-09-18
Attending: INTERNAL MEDICINE
Payer: COMMERCIAL

## 2024-09-18 VITALS
OXYGEN SATURATION: 98 % | HEIGHT: 71 IN | DIASTOLIC BLOOD PRESSURE: 85 MMHG | WEIGHT: 173.5 LBS | HEART RATE: 52 BPM | RESPIRATION RATE: 19 BRPM | SYSTOLIC BLOOD PRESSURE: 142 MMHG | TEMPERATURE: 97.4 F | BODY MASS INDEX: 24.29 KG/M2

## 2024-09-18 DIAGNOSIS — R16.0 LIVER MASSES: ICD-10-CM

## 2024-09-18 LAB
ANION GAP SERPL CALCULATED.3IONS-SCNC: 6 MMOL/L (ref 4–13)
BUN SERPL-MCNC: 25 MG/DL (ref 5–25)
CALCIUM SERPL-MCNC: 9.4 MG/DL (ref 8.4–10.2)
CHLORIDE SERPL-SCNC: 101 MMOL/L (ref 96–108)
CO2 SERPL-SCNC: 32 MMOL/L (ref 21–32)
CREAT SERPL-MCNC: 0.84 MG/DL (ref 0.6–1.3)
ERYTHROCYTE [DISTWIDTH] IN BLOOD BY AUTOMATED COUNT: 12.9 % (ref 11.6–15.1)
GFR SERPL CREATININE-BSD FRML MDRD: 91 ML/MIN/1.73SQ M
GLUCOSE P FAST SERPL-MCNC: 96 MG/DL (ref 65–99)
GLUCOSE SERPL-MCNC: 96 MG/DL (ref 65–140)
GLUCOSE SERPL-MCNC: 97 MG/DL (ref 65–140)
HCT VFR BLD AUTO: 41.6 % (ref 36.5–49.3)
HGB BLD-MCNC: 13.9 G/DL (ref 12–17)
INR PPP: 1 (ref 0.85–1.19)
MCH RBC QN AUTO: 31.2 PG (ref 26.8–34.3)
MCHC RBC AUTO-ENTMCNC: 33.4 G/DL (ref 31.4–37.4)
MCV RBC AUTO: 94 FL (ref 82–98)
PLATELET # BLD AUTO: 141 THOUSANDS/UL (ref 149–390)
PMV BLD AUTO: 9.5 FL (ref 8.9–12.7)
POTASSIUM SERPL-SCNC: 3.7 MMOL/L (ref 3.5–5.3)
PROTHROMBIN TIME: 13.9 SECONDS (ref 12.3–15)
RBC # BLD AUTO: 4.45 MILLION/UL (ref 3.88–5.62)
SODIUM SERPL-SCNC: 139 MMOL/L (ref 135–147)
WBC # BLD AUTO: 5.91 THOUSAND/UL (ref 4.31–10.16)

## 2024-09-18 PROCEDURE — 88307 TISSUE EXAM BY PATHOLOGIST: CPT | Performed by: PATHOLOGY

## 2024-09-18 PROCEDURE — 99152 MOD SED SAME PHYS/QHP 5/>YRS: CPT | Performed by: RADIOLOGY

## 2024-09-18 PROCEDURE — 47000 NEEDLE BIOPSY OF LIVER PERQ: CPT

## 2024-09-18 PROCEDURE — 47000 NEEDLE BIOPSY OF LIVER PERQ: CPT | Performed by: RADIOLOGY

## 2024-09-18 PROCEDURE — 80048 BASIC METABOLIC PNL TOTAL CA: CPT | Performed by: INTERNAL MEDICINE

## 2024-09-18 PROCEDURE — 76942 ECHO GUIDE FOR BIOPSY: CPT

## 2024-09-18 PROCEDURE — 85610 PROTHROMBIN TIME: CPT | Performed by: INTERNAL MEDICINE

## 2024-09-18 PROCEDURE — 85027 COMPLETE CBC AUTOMATED: CPT | Performed by: INTERNAL MEDICINE

## 2024-09-18 PROCEDURE — 76942 ECHO GUIDE FOR BIOPSY: CPT | Performed by: RADIOLOGY

## 2024-09-18 PROCEDURE — 82948 REAGENT STRIP/BLOOD GLUCOSE: CPT

## 2024-09-18 RX ORDER — FENTANYL CITRATE 50 UG/ML
INJECTION, SOLUTION INTRAMUSCULAR; INTRAVENOUS AS NEEDED
Status: COMPLETED | OUTPATIENT
Start: 2024-09-18 | End: 2024-09-18

## 2024-09-18 RX ORDER — ACETAMINOPHEN 325 MG/1
650 TABLET ORAL EVERY 4 HOURS PRN
Status: DISCONTINUED | OUTPATIENT
Start: 2024-09-18 | End: 2024-09-22 | Stop reason: HOSPADM

## 2024-09-18 RX ORDER — OXYCODONE HYDROCHLORIDE 5 MG/1
5 TABLET ORAL EVERY 4 HOURS PRN
Status: DISCONTINUED | OUTPATIENT
Start: 2024-09-18 | End: 2024-09-22 | Stop reason: HOSPADM

## 2024-09-18 RX ORDER — INSULIN GLARGINE 100 [IU]/ML
26 INJECTION, SOLUTION SUBCUTANEOUS
COMMUNITY

## 2024-09-18 RX ORDER — MIDAZOLAM HYDROCHLORIDE 2 MG/2ML
INJECTION, SOLUTION INTRAMUSCULAR; INTRAVENOUS AS NEEDED
Status: COMPLETED | OUTPATIENT
Start: 2024-09-18 | End: 2024-09-18

## 2024-09-18 RX ADMIN — MIDAZOLAM HYDROCHLORIDE 1 MG: 1 INJECTION, SOLUTION INTRAMUSCULAR; INTRAVENOUS at 11:17

## 2024-09-18 RX ADMIN — FENTANYL CITRATE 50 MCG: 50 INJECTION, SOLUTION INTRAMUSCULAR; INTRAVENOUS at 11:13

## 2024-09-18 RX ADMIN — MIDAZOLAM HYDROCHLORIDE 1 MG: 1 INJECTION, SOLUTION INTRAMUSCULAR; INTRAVENOUS at 11:13

## 2024-09-18 RX ADMIN — FENTANYL CITRATE 50 MCG: 50 INJECTION, SOLUTION INTRAMUSCULAR; INTRAVENOUS at 11:17

## 2024-09-18 RX ADMIN — OXYCODONE HYDROCHLORIDE 5 MG: 5 TABLET ORAL at 14:39

## 2024-09-18 NOTE — INTERVAL H&P NOTE
"H&P reviewed. There have been no interval changes since the time the H&P was written.    BP (!) 178/89   Pulse 55   Temp (!) 97.2 °F (36.2 °C) (Temporal)   Resp 13   Ht 5' 11\" (1.803 m)   Wt 78.7 kg (173 lb 8 oz)   SpO2 97%   BMI 24.20 kg/m²     Prior imaging was reviewed.     68-year-old man with a history of stage Ib pancreatic cancer with additional hepatic lesions noted on CT abd/pelvis.  He presents to interventional radiology today for biopsy of hepatic lesion.    Informed written consent was obtained.    CECI Odell   "

## 2024-09-18 NOTE — BRIEF OP NOTE (RAD/CATH)
INTERVENTIONAL RADIOLOGY PROCEDURE NOTE    Date: 9/18/2024    Procedure:   Procedure Summary       Date: 09/18/24 Room / Location: [facility] CT Scan    Anesthesia Start:  Anesthesia Stop:     Procedure: IR BIOPSY LIVER MASS Diagnosis:       Liver masses      (Enlarging liver lesions)    Scheduled Providers:  Responsible Provider:     Anesthesia Type: Not recorded ASA Status: Not recorded            Preoperative diagnosis:   1. Liver masses         Postoperative diagnosis: Same.    Surgeon: David Mckenna MD     Assistant: None. No qualified resident was available.    Blood loss: 1 mL    Specimens: 4 core needle samples placed into formalin.     Findings: Successful segment 5 liver lesion biopsy.    Complications: None immediate.    Anesthesia: conscious sedation and local

## 2024-09-19 ENCOUNTER — TELEPHONE (OUTPATIENT)
Dept: SURGICAL ONCOLOGY | Facility: CLINIC | Age: 69
End: 2024-09-19

## 2024-09-19 ENCOUNTER — PREP FOR PROCEDURE (OUTPATIENT)
Dept: INTERVENTIONAL RADIOLOGY/VASCULAR | Facility: CLINIC | Age: 69
End: 2024-09-19

## 2024-09-19 ENCOUNTER — DOCUMENTATION (OUTPATIENT)
Dept: HEMATOLOGY ONCOLOGY | Facility: CLINIC | Age: 69
End: 2024-09-19

## 2024-09-19 DIAGNOSIS — R16.0 LIVER MASSES: ICD-10-CM

## 2024-09-19 DIAGNOSIS — C25.9 ADENOCARCINOMA OF PANCREAS (HCC): Primary | ICD-10-CM

## 2024-09-19 PROCEDURE — 88307 TISSUE EXAM BY PATHOLOGIST: CPT | Performed by: PATHOLOGY

## 2024-09-19 RX ORDER — SODIUM CHLORIDE 9 MG/ML
30 INJECTION, SOLUTION INTRAVENOUS CONTINUOUS
OUTPATIENT
Start: 2024-09-19

## 2024-09-19 NOTE — PROGRESS NOTES
RECTAL/GI MULTIDISCIPLINARY CASE REVIEW    DATE: 9/19/24      PRESENTING DOCTOR:  Dr. Faulkner      DIAGNOSIS:  Pancreatic Cancer      Abhishek Gimenez was presented at the Rectal/GI Multidisciplinary Conference today.      PHYSICIAN RECOMMENDED PLAN:    - Recommended to repeat liver biopsy and IR will reevaluate for sampling as per Dr. Biggs.  Referral to genetics placed.    Team agreed to plan.    The final treatment plan will be left to the discretion of the patient and the treating physician.     DISCLAIMERS:  TO THE TREATING PHYSICIAN:  This conference is a meeting of clinicians from various specialty areas who evaluate and discuss patients for whom a multidisciplinary treatment approach is being considered. Please note that the above opinion was a consensus of the conference attendees and is intended only to assist in quality care of the discussed patient.  The responsibility for follow up on the input given during the conference, along with any final decisions regarding plan of care, is that of the patient and the patient's provider. Accordingly, appointments have only been recommended based on this information and have NOT been scheduled unless otherwise noted.      TO THE PATIENT:  This summary is a brief record of major aspects of your cancer treatment. You may choose to share a copy with any of your doctors or nurses. However, this is not a detailed or comprehensive record of your care.      NCCN guidelines were readily available for review at this discussion

## 2024-09-19 NOTE — TELEPHONE ENCOUNTER
Called and spoke to patient, cancelled appointment for 9/26/24 with Dr. Faulkner per Dr. Faulkner.

## 2024-09-19 NOTE — TELEPHONE ENCOUNTER
Called to report that results from the liver biopsy showed normal liver tissue and are very unlikely to have achieved a biopsy of the target tissue. This will need to be repeated. Reassured that this will NOT delay his start of chemo on Tuesday. I told him to expect a call from IR to schedule a repeat biopsy as discussed on our MDTB call this AM, as a diagnosis of metastatic disease in his setting will alter any surgical plans. All questions answered.

## 2024-09-20 ENCOUNTER — TELEPHONE (OUTPATIENT)
Dept: INFUSION CENTER | Facility: CLINIC | Age: 69
End: 2024-09-20

## 2024-09-20 NOTE — TELEPHONE ENCOUNTER
Called pt to review new pt information regarding appointment on 9/24 12pm. All information reviewed including no show and visitor policies. All questions answered.

## 2024-09-23 ENCOUNTER — OFFICE VISIT (OUTPATIENT)
Dept: HEMATOLOGY ONCOLOGY | Facility: CLINIC | Age: 69
End: 2024-09-23
Payer: COMMERCIAL

## 2024-09-23 ENCOUNTER — APPOINTMENT (OUTPATIENT)
Dept: LAB | Facility: HOSPITAL | Age: 69
End: 2024-09-23
Payer: COMMERCIAL

## 2024-09-23 ENCOUNTER — HOSPITAL ENCOUNTER (OUTPATIENT)
Dept: INFUSION CENTER | Facility: CLINIC | Age: 69
Discharge: HOME | End: 2024-09-23

## 2024-09-23 VITALS
HEIGHT: 71 IN | OXYGEN SATURATION: 99 % | BODY MASS INDEX: 24.22 KG/M2 | RESPIRATION RATE: 16 BRPM | WEIGHT: 173 LBS | TEMPERATURE: 97.2 F | DIASTOLIC BLOOD PRESSURE: 80 MMHG | HEART RATE: 55 BPM | SYSTOLIC BLOOD PRESSURE: 132 MMHG

## 2024-09-23 DIAGNOSIS — R16.0 LIVER MASSES: ICD-10-CM

## 2024-09-23 DIAGNOSIS — I48.0 PAROXYSMAL ATRIAL FIBRILLATION (HCC): Primary | ICD-10-CM

## 2024-09-23 DIAGNOSIS — C25.9 ADENOCARCINOMA OF PANCREAS (HCC): ICD-10-CM

## 2024-09-23 LAB
ALBUMIN SERPL BCG-MCNC: 3.9 G/DL (ref 3.5–5)
ALP SERPL-CCNC: 74 U/L (ref 34–104)
ALT SERPL W P-5'-P-CCNC: 22 U/L (ref 7–52)
ANION GAP SERPL CALCULATED.3IONS-SCNC: 3 MMOL/L (ref 4–13)
AST SERPL W P-5'-P-CCNC: 21 U/L (ref 13–39)
BASOPHILS # BLD AUTO: 0.04 THOUSANDS/ΜL (ref 0–0.1)
BASOPHILS NFR BLD AUTO: 1 % (ref 0–1)
BILIRUB SERPL-MCNC: 0.49 MG/DL (ref 0.2–1)
BUN SERPL-MCNC: 20 MG/DL (ref 5–25)
CALCIUM SERPL-MCNC: 9 MG/DL (ref 8.4–10.2)
CHLORIDE SERPL-SCNC: 104 MMOL/L (ref 96–108)
CO2 SERPL-SCNC: 34 MMOL/L (ref 21–32)
CREAT SERPL-MCNC: 0.81 MG/DL (ref 0.6–1.3)
EOSINOPHIL # BLD AUTO: 0.22 THOUSAND/ΜL (ref 0–0.61)
EOSINOPHIL NFR BLD AUTO: 4 % (ref 0–6)
ERYTHROCYTE [DISTWIDTH] IN BLOOD BY AUTOMATED COUNT: 13.1 % (ref 11.6–15.1)
GFR SERPL CREATININE-BSD FRML MDRD: 92 ML/MIN/1.73SQ M
GLUCOSE P FAST SERPL-MCNC: 110 MG/DL (ref 65–99)
HCT VFR BLD AUTO: 43.6 % (ref 36.5–49.3)
HGB BLD-MCNC: 14 G/DL (ref 12–17)
IMM GRANULOCYTES # BLD AUTO: 0.01 THOUSAND/UL (ref 0–0.2)
IMM GRANULOCYTES NFR BLD AUTO: 0 % (ref 0–2)
LYMPHOCYTES # BLD AUTO: 1.42 THOUSANDS/ΜL (ref 0.6–4.47)
LYMPHOCYTES NFR BLD AUTO: 25 % (ref 14–44)
MCH RBC QN AUTO: 30.6 PG (ref 26.8–34.3)
MCHC RBC AUTO-ENTMCNC: 32.1 G/DL (ref 31.4–37.4)
MCV RBC AUTO: 95 FL (ref 82–98)
MONOCYTES # BLD AUTO: 0.69 THOUSAND/ΜL (ref 0.17–1.22)
MONOCYTES NFR BLD AUTO: 12 % (ref 4–12)
NEUTROPHILS # BLD AUTO: 3.42 THOUSANDS/ΜL (ref 1.85–7.62)
NEUTS SEG NFR BLD AUTO: 58 % (ref 43–75)
NRBC BLD AUTO-RTO: 0 /100 WBCS
PLATELET # BLD AUTO: 132 THOUSANDS/UL (ref 149–390)
PMV BLD AUTO: 9.6 FL (ref 8.9–12.7)
POTASSIUM SERPL-SCNC: 4.1 MMOL/L (ref 3.5–5.3)
PROT SERPL-MCNC: 6.9 G/DL (ref 6.4–8.4)
RBC # BLD AUTO: 4.58 MILLION/UL (ref 3.88–5.62)
SODIUM SERPL-SCNC: 141 MMOL/L (ref 135–147)
WBC # BLD AUTO: 5.8 THOUSAND/UL (ref 4.31–10.16)

## 2024-09-23 PROCEDURE — G2211 COMPLEX E/M VISIT ADD ON: HCPCS | Performed by: INTERNAL MEDICINE

## 2024-09-23 PROCEDURE — 36415 COLL VENOUS BLD VENIPUNCTURE: CPT

## 2024-09-23 PROCEDURE — 85025 COMPLETE CBC W/AUTO DIFF WBC: CPT

## 2024-09-23 PROCEDURE — 80053 COMPREHEN METABOLIC PANEL: CPT

## 2024-09-23 PROCEDURE — 99213 OFFICE O/P EST LOW 20 MIN: CPT | Performed by: INTERNAL MEDICINE

## 2024-09-23 NOTE — PROGRESS NOTES
Hematology/Oncology Outpatient Follow- up Note  Abhishek Gimenez 68 y.o. male MRN: @ Encounter: [id number]        Date:  9/23/2024        Assessment / Plan:    1 pancreatic adenocarcinoma T2 N0 M0 considered stage Ib.  2 negative peritoneal and omental metastatic disease by laparoscope and washings  3 abnormal liver CT and MRI to be seen by IR again for possible biopsy  4 repeat biopsy to be done as the first biopsy was negative but not definitively diagnostic  5 paroxysmal atrial fibs  6 diabetes mellitus  7 weight loss which seems to be stabilizing  Plan: Patient will undergo FOLFIRINOX he is aware side effects will begin tomorrow IR biopsy repeat is to be done waiting for notification for appointment and time.  He will return here in 2 weeks we will see how he is.  At that point we will try and look depending on biopsy reports either sending the biopsy off for further testing or doing testing of liquid biopsy and genetic testing.          HPI: 68-year-old gentleman here with pancreatic adenocarcinoma.  He is awaiting a repeat biopsy of his liver.  However he is anxious to start treatment.  We will treat him with chemotherapy whether it is malignant and metastatic disease or not.  It would be either neoadjuvant therapy prior to surgery or treatment to begin from metastatic disease.  He is awaiting repeat IR evaluation and repeat biopsy.  He is actually feeling fairly well.  He has no major pain in his abdomen or back.  He is able to get around fairly well.  He stabilized his weight and his appetite is slightly better.      Interval History: Note from 9/9/2024:  Assessment / Plan:    1 pancreatic adenocarcinoma T2 N0 M0 considered stage Ib  2 negative peritoneal and omental metastatic disease by laparoscope and washings  3 abnormal liver CT and MRI to be seen by IR through surgical oncology for biopsy  4 paroxysmal atrial fibrillation  5 diabetes mellitus  6 weight loss trying to be stabilized  Plan: Patient will  undergo FOLFIRINOX.  Side effects were discussed.  Consent will be obtained.  IR biopsy of liver will be considered.  He will return in 2 weeks.  HPI: 68-year-old gentleman who comes in for follow-up.  He has a T2 N0 M0 stage Ib pancreatic cancer however he has abnormalities in his liver.  I spoke with Dr. Faulkner of surgical oncology.  She spoke with interventional radiology placed a consult in as 1 these needs to be biopsied if he has metastatic disease or not.  Otherwise we will begin him on FOLFIRINOX.  Side effects were discussed and consents will be obtained.  He will come back in 2 weeks we will try begin his therapy at that time.  Will await also an IR consultation and possible biopsy.  Interval History: Note from 8/26/2024:  Assessment/ Plan:    1 pancreatic adenocarcinoma T2 N0 M0 considered stage Ib  2 diabetes mellitus  3 weight loss  4 paroxysmal atrial fibrillation  Plan: Patient will undergo diagnostic laparoscopy on 9/5 to check for peritoneal metastasis.  He is also scheduled for CT of the abdomen with pancreatic protocol.  We will see him in follow-up on 9/9.  We will discuss the possibility of a beginning of chemotherapy probably with use of FOLFIRINOX.  Will write results of the scans and the laparotomy.  We also asked him to get a CBC CA 19-9 and a CMP.  CC: Consultation for pancreatic adenocarcinoma of body and tail.  HPI:  Abhishek Gimenez is seen for initial consultation 8/26/2024   68-year-old gentleman who noted the onset of diabetes mellitus and February 2024 since that time he has been losing weight he is lost 30 pounds over the 6 months time.  He also developed intermittent abdominal pain epigastric left upper quadrant that continued to worsen.  He was started on Ozempic.  He drinks 3-4 beers every other day he last used tobacco 40 years ago but does not smoke now.  He has had a negative colonoscopy.  He underwent an EGD which was nondiagnostic subsequently underwent an EGD EUS which showed  normal esophagus normal stomach normal duodenum.  Celiac artery and aorta were visualized and appeared normal.  The left kidney spleen left adrenal appeared normal parenchyma of the liver.  Normal this was the left lobe of the liver.  He had a heterogeneous hypoechoic irregular mass measuring 30 mm x 18 mm with poorly defined margins in the body of pancreas it abutted the splenic artery and splenic vein needle biopsies were done for total.  The head of the pancreas and neck of the pancreas appeared to have some honeycombing.  Bile duct appeared normal gallbladder appeared normal.  This was done on 7/17/2024  Biopsy was consistent with adenocarcinoma.  CAT scan of the chest was negative.  On 7/10/2026 MRI of the abdomen showed the ill-defined 3.8 x 1.1 cm mass in the body of the pancreas the liver showed a 3.4 cm benign hemangioma 1.0 and 0.4 cm right lobe nodules all the seem to be compatible with hemangiomas.  He was also seen today by allergy.  They noted the tumor need for distal pancreatic ectomy with splenectomy..  They recommended diagnostic laparoscopy to rule out occult peritoneal metastasis.  They also recommended  therapy with chemotherapy follow-up surgery follow-up chemotherapy.  They recommended a CT scan with pancreatic protocol since it was 2 months since his previous scan.  The case will be discussed at the multidisciplinary tumor board  The past medical history A-fib osteoarthritis BPH colonic polyps diabetes mellitus.  He has nonischemic cardiomyopathy and osteoarthritis and psoriasis.      Cancer Staging:  Cancer Staging   Adenocarcinoma of pancreas (HCC)  Staging form: Pancreas, AJCC 8th Edition  - Clinical: Stage IB (cT2, cN0, cM0) - Signed by Roz Faulkner MD on 8/26/2024  Total positive nodes: 0      Molecular Testing:     Previous Hematologic/ Oncologic History:    Oncology History   Adenocarcinoma of pancreas (HCC)   8/13/2024 Biopsy    A.-B.  Pancreas, Body, FNA (Cell block and smear  preparations):   Malignant  Adenocarcinoma.     8/20/2024 Initial Diagnosis    Adenocarcinoma of pancreas (HCC)     8/26/2024 -  Cancer Staged    Staging form: Pancreas, AJCC 8th Edition  - Clinical: Stage IB (cT2, cN0, cM0) - Signed by Roz Faulkner MD on 8/26/2024  Total positive nodes: 0       9/24/2024 -  Chemotherapy    alteplase (CATHFLO), 2 mg, Intracatheter, Every 1 Minute as needed, 1 of 12 cycles  pegfilgrastim (NEULASTA ONPRO), 6 mg, Subcutaneous, Once, 1 of 12 cycles  fosaprepitant (EMEND) IVPB, 150 mg, Intravenous, Once, 1 of 12 cycles  irinotecan (CAMPTOSAR) chemo infusion, 150 mg/m2 = 294 mg, Intravenous, Once, 1 of 12 cycles  oxaliplatin (ELOXATIN) chemo infusion, 65 mg/m2 = 127.4 mg, Intravenous, Once, 1 of 12 cycles  fluorouracil (ADRUCIL) ambulatory infusion Soln, 1,200 mg/m2/day = 4,705 mg, Intravenous, Over 46 hours, 1 of 12 cycles         Current Hematologic/ Oncologic Treatment:       Cycle 1         Test Results:    Imaging: IR biopsy liver mass    Result Date: 9/18/2024  Narrative: PROCEDURE: Ultrasound-guided liver lesion biopsy Procedural Personnel Attending physician(s): Dr. Mckenna Pre-procedure diagnosis: Pancreatic adenocarcinoma Post-procedure diagnosis: Same Indication: Patient with history of pancreatic adenocarcinoma noted to have liver lesions presents for liver lesion biopsy. PROCEDURE SUMMARY: - Percutaneous US-guided liver lesion biopsy PROCEDURE DETAILS: Pre-procedure Reference imaging for biopsy target: CT abdomen/pelvis 8/30/2024 Consent: Informed consent for the procedure including risks, benefits and alternatives was obtained and time-out was performed prior to the procedure. Preparation: The site was prepared and draped using maximal sterile barrier technique including cutaneous antisepsis. Anesthesia/sedation Level of anesthesia/sedation: Moderate sedation (conscious sedation) Anesthesia/sedation administered by: IR nurse under attending supervision with continuous  monitoring of the patient's level of consciousness and physiologic status Total intra-service sedation time (minutes): 15 Biopsy Local anesthesia was administered. Under US guidance, 17-gauge coaxial introducer needle was advanced to the segment 5 liver lesion. 18-gauge core biopsy needle was used to obtain 4 core needle samples which were placed in formalin. D-Stat hemostatic agent was used to perform tract embolization during needle removal. Postprocedure ultrasound showed no hematoma. Additional Details Specimens removed: 4 core needle samples placed into formalin. Estimated blood loss (mL): 1 Complications: No immediate complications.     Impression: Ultrasound-guided biopsy of segment 5 liver lesion. Plan: Follow-up on biopsy results. Workstation performed: [id number]     FL guided central venous access device insertion    Result Date: 9/6/2024  Narrative: C-ARM - FL GUIDED CENTRAL VENOUS ACCESS DEVICE INSERTION INDICATION: C25.9: Malignant neoplasm of pancreas, unspecified. Procedure guidance. TECHNIQUE: Fluoroscopic guidance provided. COMPARISON: None FLUOROSCOPY TIME: 18.6 seconds 4 FLUOROSCOPIC IMAGES FINDINGS: Fluoroscopy provided for procedure guidance. Right chest port catheter tip in the cavoatrial region. Osseous and soft tissue detail limited by technique.     Impression: Fluoroscopy provided for procedure guidance. Please refer to the separate procedure note for additional details. Workstation performed: [id number]     XR chest portable    Result Date: 9/5/2024  Narrative: XR CHEST PORTABLE INDICATION: Post internal jugular port placement. COMPARISON: Chest radiograph 4/22/2021, CT chest 8/19/2024 FINDINGS: Right chest port tip projects at the cavoatrial junction. Clear lungs. No pneumothorax or pleural effusion. Normal cardiomediastinal silhouette. Paravertebral ossifications thoracic spine. Osteoarthritis left glenohumeral joint. Normal upper abdomen.     Impression: No pneumothorax following right  chest port insertion. Workstation performed: [id number]     CT abdomen pelvis w contrast    Result Date: 9/4/2024  Narrative: CT ABDOMEN AND PELVIS WITH IV CONTRAST INDICATION: C25.9: Malignant neoplasm of pancreas, unspecified. Fine-needle aspiration/biopsy dated 8/13/2024 positive for malignancy, adenocarcinoma.. COMPARISON: Correlation with MRI abdomen with and without contrast 7/10/2024; TECHNIQUE: CT examination of the abdomen and pelvis was performed. Multiplanar 2D reformatted images were created from the source data. This examination, like all CT scans performed in the [facility], was performed utilizing techniques to minimize radiation dose exposure, including the use of iterative reconstruction and automated exposure control. Radiation dose length product (DLP) for this visit: 1275 mGy-cm IV Contrast: 100 mL of iohexol (OMNIPAQUE) Enteric Contrast: Not administered. FINDINGS: ABDOMEN LOWER CHEST: No clinically significant abnormality in the visualized lower chest. LIVER/BILIARY TREE: Redemonstrated 3.5 cm hemangioma adjacent to the IVC and caudate lobe demonstrates no significant interval change. Numerous small hypo and hyperenhancing hepatic lesions are present as also noted on the comparison MRI. While some of these may reflect combination of cysts, hemangioma/flash filling hemangiomas, and vascular shunts, there are several with a more suspicious appearance. In particular, a 1.2 cm lesion in segment 5/6 (series 301 image 49) is felt suspicious. On the comparison MRI this lesion demonstrated intermediate T2 hyperintensity with early ringlike enhancement and delayed targetoid hypoenhancement. An additional 1 cm lesion in segment 4A (301/29) and 0.9 cm lesion in the hepatic dome (301/20) are also felt to be suspicious. No significant change in size from the MRI. GALLBLADDER: No calcified gallstones. No pericholecystic inflammatory change. SPLEEN: Splenomegaly measuring 14.0 cm in maximum  AP dimension. PANCREAS There is a lesion suspicious for pancreatic cancer, which will be described based on Society of Abdominal Radiologists and American Pancreatic Association recommendations: MORPHOLOGIC EVALUATION: Appearance: Hypoattenuating. Size: Approximately 6.8 x 2.8 cm (301/42), similar to comparison MRI. Location: Pancreatic body and tail. Pancreatic duct narrowing/abrupt cut-off with or without upstream dilatation: Absent. Biliary tree abrupt cut-off with or without upstream dilatation: Absent. ARTERIAL EVALUATION: Superior Mesenteric Artery Involvement: Absent. Incidental note of mild narrowing of the proximal superior mesenteric artery by atherosclerosis. Celiac Axis Involvement: Present. Degree of solid soft-tissue contact: </= 180 degrees. Difficult to evaluate due to location. There is abutment of the celiac bifurcation and encasement of the proximal common hepatic and splenic arteries, though the celiac artery itself does not appear to be encased greater than 180 degrees. Degree of increased hazy attenuation/stranding contact: </= 180 degrees. Focal vessel narrowing or contour irregularity: Absent. Common Hepatic Artery Involvement: Present. Degree of solid soft-tissue contact: > 180 degrees. Encasement of the proximal common hepatic artery at its origin (201/42 and 43) with mild focal irregular narrowing. Degree of increased hazy attenuation/stranding contact: REPORT AS LESS THAN OR EQUAL  DEGREES OR GREATER THAN 180 DEGREES. Focal vessel narrowing or contour irregularity: Present. Mild irregular narrowing proximally (201/43) Extension to celiac axis: Absent. Extension to the bifurcation of right/left hepatic artery: Absent. Arterial Variant: Type: Replaced right hepatic artery. Identified on 201/48-50 Tumor involvement: Absent. VENOUS EVALUATION: Main Portal Vein Involvement: Present. Degree of solid soft-tissue contact: </= 180 degrees. Abutment of the proximal aspect of the main portal  vein just beyond the confluence (301/46) Degree of increased hazy attenuation/stranding contact: </= 180 degrees. Focal vessel narrowing or contour irregularity (tethering or teardrop): Absent. Superior Mesenteric Vein Involvement: Encasement of the superior mesenteric vein with focal moderate narrowing (301/48). FOR EVALUATION FOR POTENTIAL EXTRAPANCREATIC TUMOR, PLEASE SEE THE REST OF THE BODY OF THIS REPORT. ADRENAL GLANDS: Unremarkable. KIDNEYS/URETERS: 3 mm nonobstructing right renal calculus. Simple left renal cyst. No hydronephrosis. STOMACH AND BOWEL: Unremarkable. APPENDIX: Normal. ABDOMINOPELVIC CAVITY: No ascites. No pneumoperitoneum. Prominent lymph nodes in the gemma hepatis not meeting size criteria for lymphadenopathy. (For example series 301, image 41). VESSELS: Atherosclerotic changes. No abdominal aortic aneurysm. Vascular involvement by tumor as detailed above. Extensive collateral vessels in the left hemiabdomen. Incidental note of retroaortic left renal vein. PELVIS REPRODUCTIVE ORGANS: Unremarkable for patient's age. URINARY BLADDER: Unremarkable. ABDOMINAL WALL/INGUINAL REGIONS: Unremarkable. BONES: No acute fracture or suspicious osseous lesion. Spinal degenerative changes.     Impression: 1. Known pancreatic malignancy with vascular involvement as detailed above. No significant change from MRI. 2. Numerous hepatic lesions similar to the previous MRI. While some of these could reflect benign findings, several such as a 1.2 cm hypoenhancing lesion in the periphery of segment 5/6 are suspicious for metastases. This may be amenable to percutaneous biopsy. The study was marked in EPIC for significant notification. Resident: DARIAN YOUNG I, the attending radiologist, have reviewed the images and agree with the final report above. Workstation performed: [id number]             Labs:   Lab Results   Component Value Date    WBC 5.80 09/23/2024    HGB 14.0 09/23/2024    HCT 43.6 09/23/2024    MCV  "95 09/23/2024     (L) 09/23/2024     Lab Results   Component Value Date     03/15/2016    K 4.1 09/23/2024     09/23/2024    CO2 34 (H) 09/23/2024    BUN 20 09/23/2024    CREATININE 0.81 09/23/2024    GLUCOSE 106 (H) 11/15/2016    GLUF 110 (H) 09/23/2024    CALCIUM 9.0 09/23/2024    AST 21 09/23/2024    ALT 22 09/23/2024    ALKPHOS 74 09/23/2024    PROT 6.6 03/15/2016    BILITOT 0.5 03/15/2016    EGFR 92 09/23/2024         No results found for: \"SPEP\", \"UPEP\"    Lab Results   Component Value Date    PSA 4.701 (H) 08/24/2024    PSA 3.83 01/16/2024    PSA 2.9 01/11/2023       No results found for: \"CEA\"    No results found for: \"\"    No results found for: \"AFP\"    No results found for: \"IRON\", \"TIBC\", \"FERRITIN\"    Lab Results   Component Value Date    AZNLJZSD49 466 06/10/2021         ROS: Review of Systems   Constitutional: Negative.    HENT: Negative.     Eyes: Negative.    Respiratory: Negative.     Cardiovascular: Negative.    Gastrointestinal: Negative.    Endocrine: Negative.    Genitourinary: Negative.    Musculoskeletal: Negative.    Skin: Negative.    Allergic/Immunologic: Negative.    Neurological: Negative.    Hematological: Negative.          Current Medications: Reviewed  Allergies: Reviewed  PMH/FH/SH:  Reviewed      Physical Exam:    Body surface area is 1.98 meters squared.    Wt Readings from Last 3 Encounters:   09/23/24 78.5 kg (173 lb)   09/18/24 78.7 kg (173 lb 8 oz)   09/09/24 78.9 kg (174 lb)        Temp Readings from Last 3 Encounters:   09/23/24 (!) 97.2 °F (36.2 °C) (Temporal)   09/18/24 (!) 97.4 °F (36.3 °C) (Temporal)        BP Readings from Last 3 Encounters:   09/23/24 132/80   09/18/24 142/85   09/09/24 124/82         Pulse Readings from Last 3 Encounters:   09/23/24 55   09/18/24 (!) 52   09/09/24 65     @LASTSAO2(3)@      Physical Exam  Constitutional:       Appearance: Normal appearance. He is normal weight.   HENT:      Head: Normocephalic and atraumatic. "   Eyes:      Extraocular Movements: Extraocular movements intact.      Conjunctiva/sclera: Conjunctivae normal.      Pupils: Pupils are equal, round, and reactive to light.   Cardiovascular:      Rate and Rhythm: Normal rate and regular rhythm.      Heart sounds: Normal heart sounds.   Pulmonary:      Effort: Pulmonary effort is normal.      Breath sounds: Normal breath sounds.   Abdominal:      General: Abdomen is flat. Bowel sounds are normal.      Palpations: Abdomen is soft.   Musculoskeletal:         General: Normal range of motion.      Cervical back: Normal range of motion and neck supple.   Skin:     General: Skin is warm and dry.   Neurological:      General: No focal deficit present.      Mental Status: He is alert and oriented to person, place, and time. Mental status is at baseline.     No major focal neurodeficits.  Abdomen is relatively benign.  No cervical axillary or inguinal adenopathy.      Goals and Barriers:  Current Goal: Prolong Survival from Cancer.   Barriers: None.      Patient's Capacity to Self Care:  Patient is able to self care.    Code Status: [unfilled]  Advance Directive and Living Will: Yes    Power of :

## 2024-09-24 ENCOUNTER — HOSPITAL ENCOUNTER (OUTPATIENT)
Dept: INFUSION CENTER | Facility: CLINIC | Age: 69
Discharge: HOME | End: 2024-09-24
Payer: COMMERCIAL

## 2024-09-24 VITALS
WEIGHT: 180 LBS | SYSTOLIC BLOOD PRESSURE: 151 MMHG | RESPIRATION RATE: 18 BRPM | HEIGHT: 71 IN | DIASTOLIC BLOOD PRESSURE: 83 MMHG | BODY MASS INDEX: 25.2 KG/M2 | TEMPERATURE: 98.1 F | HEART RATE: 50 BPM

## 2024-09-24 DIAGNOSIS — C25.9 ADENOCARCINOMA OF PANCREAS (HCC): Primary | ICD-10-CM

## 2024-09-24 PROCEDURE — 96413 CHEMO IV INFUSION 1 HR: CPT

## 2024-09-24 PROCEDURE — 96417 CHEMO IV INFUS EACH ADDL SEQ: CPT

## 2024-09-24 PROCEDURE — G0498 CHEMO EXTEND IV INFUS W/PUMP: HCPCS

## 2024-09-24 PROCEDURE — 96367 TX/PROPH/DG ADDL SEQ IV INF: CPT

## 2024-09-24 PROCEDURE — 96415 CHEMO IV INFUSION ADDL HR: CPT

## 2024-09-24 PROCEDURE — 96375 TX/PRO/DX INJ NEW DRUG ADDON: CPT

## 2024-09-24 RX ORDER — SODIUM CHLORIDE 9 MG/ML
20 INJECTION, SOLUTION INTRAVENOUS ONCE AS NEEDED
Status: DISCONTINUED | OUTPATIENT
Start: 2024-09-24 | End: 2024-09-27 | Stop reason: HOSPADM

## 2024-09-24 RX ORDER — DEXTROSE MONOHYDRATE 50 MG/ML
20 INJECTION, SOLUTION INTRAVENOUS ONCE
Status: COMPLETED | OUTPATIENT
Start: 2024-09-24 | End: 2024-09-24

## 2024-09-24 RX ORDER — ATROPINE SULFATE 1 MG/ML
0.25 INJECTION, SOLUTION INTRAVENOUS ONCE AS NEEDED
Status: DISCONTINUED | OUTPATIENT
Start: 2024-09-24 | End: 2024-09-27 | Stop reason: HOSPADM

## 2024-09-24 RX ORDER — ATROPINE SULFATE 1 MG/ML
0.25 INJECTION, SOLUTION INTRAVENOUS ONCE
Status: COMPLETED | OUTPATIENT
Start: 2024-09-24 | End: 2024-09-24

## 2024-09-24 RX ADMIN — DEXAMETHASONE SODIUM PHOSPHATE: 10 INJECTION, SOLUTION INTRAMUSCULAR; INTRAVENOUS at 12:29

## 2024-09-24 RX ADMIN — DEXTROSE 20 ML/HR: 5 SOLUTION INTRAVENOUS at 13:40

## 2024-09-24 RX ADMIN — OXALIPLATIN 127.4 MG: 5 INJECTION, SOLUTION INTRAVENOUS at 13:47

## 2024-09-24 RX ADMIN — FOSAPREPITANT 150 MG: 150 INJECTION, POWDER, LYOPHILIZED, FOR SOLUTION INTRAVENOUS at 12:55

## 2024-09-24 RX ADMIN — SODIUM CHLORIDE 20 ML/HR: 9 INJECTION, SOLUTION INTRAVENOUS at 12:30

## 2024-09-24 RX ADMIN — ATROPINE SULFATE 0.25 MG: 1 INJECTION, SOLUTION INTRAVENOUS at 13:38

## 2024-09-24 RX ADMIN — IRINOTECAN HYDROCHLORIDE 294 MG: 20 INJECTION, SOLUTION INTRAVENOUS at 15:47

## 2024-09-24 NOTE — PROGRESS NOTES
Patient arrives to infusion center for FOLFIRINOX Chemotherapy. Patient offers no complaints today. Port accessed, patient tolerated entire infusion without complication. CADD pump connected with clamps open. Patient educated about chemotherapy, pump complications. AVS offered.     Next appointment: 9/26/24 @ 3382 for Neulasta + BALA noriega

## 2024-09-25 ENCOUNTER — TELEMEDICINE (OUTPATIENT)
Dept: PALLIATIVE MEDICINE | Facility: CLINIC | Age: 69
End: 2024-09-25

## 2024-09-25 DIAGNOSIS — Z71.89 COUNSELING AND COORDINATION OF CARE: Primary | ICD-10-CM

## 2024-09-25 PROCEDURE — 99024 POSTOP FOLLOW-UP VISIT: CPT

## 2024-09-25 NOTE — PROGRESS NOTES
Palliative Supportive Care  met with patient/family to continue to provide emotional support and guidance.      Updated biopsychosocial information relevant to support:     The patient was identified by name and date of birth. Raulito was informed that this is a telemedicine visit and that the visit is being conducted through the Epic Embedded platform. They agree to proceed..  My office door was closed. No one else was in the room. They acknowledged consent and understanding of privacy and security of the video platform. The patient has agreed to participate and understands they can discontinue the visit at any time.    Raulito spoke about feeling better now that he has started treatment. He said he feels a little tired but overall good so far. He said he feels he is accepting his situation and trying to remain positive. He said this would be his last visit because he could not afford the copay. [facility] he should not be charged for any copay for these visits and offered him the number for the financial department. He said he is already in touch with them. He said he would like to end visits for now as he feels he is doing well. LCSW encouraged Raulito he is always able to re-engage in the future. He asked what palliative care does because he was not sure if he wanted to have his next appointment with the provider due to financial costs. LCSW provided psycho-education on palliative care services. Raulito said that he will continue to see a provider as he needs his pain medications. [facility] also offered to send DBT resources that he may look over for self help with mindfulness activities and coping strategies. He said he would look them over during his long treatments.       Identified areas of need include: emotional support    Resources provided: DBT self help resources    Areas that need future follow-up include: Raulito will reach out in the future if needing more support    I have spent 20 minutes with  Patient  today in which greater than 50% of this time was spent in counseling/coordination of care     Palliative  will follow-up as requested by patient, family, and primary team.  Please contact with any specific requests

## 2024-09-26 ENCOUNTER — HOSPITAL ENCOUNTER (OUTPATIENT)
Dept: INFUSION CENTER | Facility: CLINIC | Age: 69
Discharge: HOME | End: 2024-09-26
Payer: COMMERCIAL

## 2024-09-26 DIAGNOSIS — G89.3 CANCER RELATED PAIN: ICD-10-CM

## 2024-09-26 DIAGNOSIS — C25.9 ADENOCARCINOMA OF PANCREAS (HCC): Primary | ICD-10-CM

## 2024-09-26 PROCEDURE — 96372 THER/PROPH/DIAG INJ SC/IM: CPT

## 2024-09-26 RX ORDER — OXYCODONE HYDROCHLORIDE 5 MG/1
5 TABLET ORAL EVERY 6 HOURS PRN
Qty: 90 TABLET | Refills: 0 | Status: SHIPPED | OUTPATIENT
Start: 2024-09-26

## 2024-09-26 RX ADMIN — PEGFILGRASTIM 6 MG: KIT SUBCUTANEOUS at 15:49

## 2024-09-26 NOTE — TELEPHONE ENCOUNTER
Reason for call:   [x] Refill   [] Prior Auth  [] Other:     Office: [facility]  [] PCP/Provider -   [x] Specialty/Provider - Palliative Care/ Kamar Jung     Medication: oxycodone     Dose/Frequency: 5 mg/ 1 tab every 6 hours PRN     Quantity: 60 tabs    Pharmacy: [facility] in [place]     Does the patient have enough for 3 days?   [] Yes   [x] No - Send as HP to POD

## 2024-09-26 NOTE — TELEPHONE ENCOUNTER
Chart and PDMP reviewed, refill appropriate. Sent to patient's preferred pharmacy. Upcoming appt with Dr. Gtz 10/15, supply should be sufficient to get to that visit.

## 2024-09-26 NOTE — TELEPHONE ENCOUNTER
Medication: oxycodone 5mg  PDMP    09/10/2024 09/10/2024 oxyCODONE HCL (Tablet) 60.0 15 5 MG 30.0 KRIS NOLASCO   08/26/2024 08/26/2024 oxyCODONE HCL (Tablet) 60.0 15 5 MG 30.0 KRIS NOLASCO

## 2024-09-26 NOTE — PROGRESS NOTES
Pt presents for chemo ball disconnect and neulasta onpro offering no complaints. Chemo ball infused over 46hrs and appears empty and deflated. Chemo ball disconnected without difficulty. Port flushed positive blood return noted. Port a cath deaccessed without difficulty. Neulasta Onpro applied to RLQ of abdomen. AVS and neulasta education given to pt. Next appointment on 10/7/24 at 8:30am.

## 2024-09-30 ENCOUNTER — NURSE TRIAGE (OUTPATIENT)
Age: 69
End: 2024-09-30

## 2024-09-30 ENCOUNTER — TELEPHONE (OUTPATIENT)
Age: 69
End: 2024-09-30

## 2024-09-30 NOTE — TELEPHONE ENCOUNTER
Called this patient and spouse earlier today. He states that he was not feeling well since Friday. He has nausea once and he has just been feeling very tired and sob with minimal exertion. They are educated to please go to ER if the SOB is getting worse however I reminded him that this could be all side effects of chemotherapy he received last week. They are concerned that this was too strong for him and I did make them aware I would try to move their appt prior to infusion date if possible and not post.   Instructed them to give me a call during the week to let me know how he was feeling. Family is agreeable.

## 2024-09-30 NOTE — TELEPHONE ENCOUNTER
Gerhard Weiss. He wanted Dr. Gtz to know that Dr. Aaron changed the frequency of the oxycodone from q6h prn to q4h prn. He states he is now much more comfortable. He had no other concerns at this time.

## 2024-09-30 NOTE — TELEPHONE ENCOUNTER
"Pt with c/o of having difficulty breathing with ambulation.  (Mild)  Also c/o of dizziness, bloating, headache and severe fatigue.    Mrs. Gimenez asking if there should be a dose reduction for chemotherapy and could surgery be an option instead of chemo.    Answer Assessment - Initial Assessment Questions  1. RESPIRATORY STATUS: \"Describe your breathing?\" (e.g., wheezing, shortness of breath, unable to speak, severe coughing)       Difficulty breathing with ambulation  2. ONSET: \"When did this breathing problem begin?\"       Friday September 27  3. PATTERN \"Does the difficult breathing come and go, or has it been constant since it started?\"       constant  4. SEVERITY: \"How bad is your breathing?\" (e.g., mild, moderate, severe)     - MILD: No SOB at rest, mild SOB with walking, speaks normally in sentences, can lay down, no retractions, pulse < 100.     - MODERATE: SOB at rest, SOB with minimal exertion and prefers to sit, cannot lie down flat, speaks in phrases, mild retractions, audible wheezing, pulse 100-120.     - SEVERE: Very SOB at rest, speaks in single words, struggling to breathe, sitting hunched forward, retractions, pulse > 120       Mild eith ambulation  5. RECURRENT SYMPTOM: \"Have you had difficulty breathing before?\" If Yes, ask: \"When was the last time?\" and \"What happened that time?\"       Before with a fib  6. CARDIAC HISTORY: \"Do you have any history of heart disease?\" (e.g., heart attack, angina, bypass surgery, angioplasty)       Had a fib in the past  7. LUNG HISTORY: \"Do you have any history of lung disease?\"  (e.g., pulmonary embolus, asthma, emphysema)      no  8. CAUSE: \"What do you think is causing the breathing problem?\"       unknown  9. OTHER SYMPTOMS: \"Do you have any other symptoms? (e.g., dizziness, runny nose, cough, chest pain, fever)      Bloated, losing a pound a day    11. TRAVEL: \"Have you traveled out of the country in the last month?\" (e.g., travel history, exposures)        " no    Protocols used: Breathing Difficulty-ADULT-[place]

## 2024-10-01 ENCOUNTER — TELEPHONE (OUTPATIENT)
Age: 69
End: 2024-10-01

## 2024-10-01 ENCOUNTER — PATIENT OUTREACH (OUTPATIENT)
Dept: CASE MANAGEMENT | Facility: HOSPITAL | Age: 69
End: 2024-10-01

## 2024-10-01 NOTE — PROGRESS NOTES
MSW s/w pt by phone this morning to check in since he's started tx.  He tells me that the day of was a long day but overall went well, and he says that he was feeling well the day after.  He says that then from Friday-Monday he had a hard time with feeling sick and side effects but today is feeling better.  He notes that it did get a little better each day.  He says that he has reached out to Med On[facility] to discuss and will have an appt next week, he thinks that he may have a dose reduction next time but he's not entirely sure.  We discussed that this often happens with first treatments and now we can be better prepared to help him manage the rest more successfully.  He also notes that he was hoping for more pain relief and shared that eating and drinking has been tough for him d/t stomach pain, but he's hopeful with time that this will continue to improve.  He says that emotionally he is doing reasonably well, he notes that he has times where he finds himself getting emotional but says that he does not fight it and he does give himself time to cry and vent those feelings, which we both agreed was likely more helpful in the long run.  He is very complimentary of his care in all aspects and says that he has been very appreciative of the support shown to him.  We agreed that I will continue to check in with him periodically and provide support as needed.  He denies any other needs today but thanked me for checking in.  I did encourage him to reach out in the interim as well should he need anything before we speak again.

## 2024-10-01 NOTE — TELEPHONE ENCOUNTER
"FYI:  Pt called had one cycle chemo finished last week. Has spoken with Dr Aarons office and they are aware and will attempt to modify dosing with next cycle per pt.     Pt has changed the way he takes his Oxycodone 5mg from q6 to q4 and is finding better relief with that for his L side abd pain.    States his pain is increased with eating as well as drinking.He stated that when he has a BM soft/diarrhea the pain is somewhat relieved even passing gas dec the pain.    Diet is changing due to chemo SE and he will also begin to try nutritional supplements-Boost/Ensure. Pt is DM so reccommended Glucerna if BS not controlled with Boost/Ensure.Pt has lost some weight over last several days. Has an appt with [facility] dietician soon to discuss diet management.    Support was provided to pt and his wife.Pt advised to speak to Dr Aaron at next OV regarding pain control going forward and what medications can be use ie Bentyl/Reglan to possibly achieve better pain control stacy when eating.Advised pt to try moist heat PRN to abdomen L side for comfort,pt understands and will try.    Pt and wife very appreciative of the talk and support given to them today. All questions were answered to their satisfaction.    Question for Dr Faulkner is when will the next IR liver BX be scheduled for since first one was a \"bust\" per pt. Please refer to Dr Aaron OV note dated 9/23 mentions waiting on \"repeat bx results\".Pls advise  "

## 2024-10-02 DIAGNOSIS — C25.9 ADENOCARCINOMA OF PANCREAS (HCC): Primary | ICD-10-CM

## 2024-10-02 RX ORDER — DEXTROSE MONOHYDRATE 50 MG/ML
20 INJECTION, SOLUTION INTRAVENOUS ONCE
Status: CANCELLED | OUTPATIENT
Start: 2024-10-08

## 2024-10-02 RX ORDER — ATROPINE SULFATE 1 MG/ML
0.25 INJECTION, SOLUTION INTRAVENOUS ONCE AS NEEDED
Status: CANCELLED | OUTPATIENT
Start: 2024-10-08

## 2024-10-02 RX ORDER — ATROPINE SULFATE 1 MG/ML
0.25 INJECTION, SOLUTION INTRAVENOUS ONCE
Status: CANCELLED | OUTPATIENT
Start: 2024-10-08

## 2024-10-02 RX ORDER — SODIUM CHLORIDE 9 MG/ML
20 INJECTION, SOLUTION INTRAVENOUS ONCE AS NEEDED
Status: CANCELLED | OUTPATIENT
Start: 2024-10-08

## 2024-10-04 ENCOUNTER — TELEPHONE (OUTPATIENT)
Dept: HEMATOLOGY ONCOLOGY | Facility: CLINIC | Age: 69
End: 2024-10-04

## 2024-10-04 NOTE — TELEPHONE ENCOUNTER
Called patient with change in appt schedule . Left message with all info and with hope line tel number

## 2024-10-05 DIAGNOSIS — R10.13 EPIGASTRIC PAIN: ICD-10-CM

## 2024-10-07 ENCOUNTER — HOSPITAL ENCOUNTER (OUTPATIENT)
Dept: INFUSION CENTER | Facility: CLINIC | Age: 69
Discharge: HOME | End: 2024-10-07
Payer: COMMERCIAL

## 2024-10-07 ENCOUNTER — OFFICE VISIT (OUTPATIENT)
Dept: HEMATOLOGY ONCOLOGY | Facility: CLINIC | Age: 69
End: 2024-10-07
Payer: COMMERCIAL

## 2024-10-07 VITALS
RESPIRATION RATE: 16 BRPM | SYSTOLIC BLOOD PRESSURE: 122 MMHG | HEART RATE: 64 BPM | WEIGHT: 175 LBS | DIASTOLIC BLOOD PRESSURE: 70 MMHG | OXYGEN SATURATION: 97 % | HEIGHT: 71 IN | TEMPERATURE: 98.2 F | BODY MASS INDEX: 24.5 KG/M2

## 2024-10-07 DIAGNOSIS — I48.3 TYPICAL ATRIAL FLUTTER (HCC): ICD-10-CM

## 2024-10-07 DIAGNOSIS — D70.1 CHEMOTHERAPY INDUCED NEUTROPENIA (HCC): ICD-10-CM

## 2024-10-07 DIAGNOSIS — T45.1X5A CHEMOTHERAPY INDUCED NEUTROPENIA (HCC): ICD-10-CM

## 2024-10-07 DIAGNOSIS — C25.9 ADENOCARCINOMA OF PANCREAS (HCC): Primary | ICD-10-CM

## 2024-10-07 DIAGNOSIS — G47.33 OSA (OBSTRUCTIVE SLEEP APNEA): ICD-10-CM

## 2024-10-07 DIAGNOSIS — C25.9 ADENOCARCINOMA OF PANCREAS (HCC): ICD-10-CM

## 2024-10-07 DIAGNOSIS — Z95.828 PORT-A-CATH IN PLACE: Primary | ICD-10-CM

## 2024-10-07 LAB
ALBUMIN SERPL BCG-MCNC: 3.8 G/DL (ref 3.5–5)
ALP SERPL-CCNC: 140 U/L (ref 34–104)
ALT SERPL W P-5'-P-CCNC: 27 U/L (ref 7–52)
ANION GAP SERPL CALCULATED.3IONS-SCNC: 7 MMOL/L (ref 4–13)
AST SERPL W P-5'-P-CCNC: 23 U/L (ref 13–39)
BASOPHILS # BLD AUTO: 0.07 THOUSANDS/ΜL (ref 0–0.1)
BASOPHILS NFR BLD AUTO: 1 % (ref 0–1)
BILIRUB SERPL-MCNC: 0.4 MG/DL (ref 0.2–1)
BUN SERPL-MCNC: 17 MG/DL (ref 5–25)
CALCIUM SERPL-MCNC: 8.9 MG/DL (ref 8.4–10.2)
CHLORIDE SERPL-SCNC: 102 MMOL/L (ref 96–108)
CO2 SERPL-SCNC: 29 MMOL/L (ref 21–32)
CREAT SERPL-MCNC: 0.74 MG/DL (ref 0.6–1.3)
EOSINOPHIL # BLD AUTO: 0.31 THOUSAND/ΜL (ref 0–0.61)
EOSINOPHIL NFR BLD AUTO: 3 % (ref 0–6)
ERYTHROCYTE [DISTWIDTH] IN BLOOD BY AUTOMATED COUNT: 13.2 % (ref 11.6–15.1)
GFR SERPL CREATININE-BSD FRML MDRD: 96 ML/MIN/1.73SQ M
GLUCOSE SERPL-MCNC: 177 MG/DL (ref 65–140)
HCT VFR BLD AUTO: 39.5 % (ref 36.5–49.3)
HGB BLD-MCNC: 13.1 G/DL (ref 12–17)
IMM GRANULOCYTES # BLD AUTO: 0.14 THOUSAND/UL (ref 0–0.2)
IMM GRANULOCYTES NFR BLD AUTO: 1 % (ref 0–2)
LYMPHOCYTES # BLD AUTO: 1.32 THOUSANDS/ΜL (ref 0.6–4.47)
LYMPHOCYTES NFR BLD AUTO: 11 % (ref 14–44)
MCH RBC QN AUTO: 30.5 PG (ref 26.8–34.3)
MCHC RBC AUTO-ENTMCNC: 33.2 G/DL (ref 31.4–37.4)
MCV RBC AUTO: 92 FL (ref 82–98)
MONOCYTES # BLD AUTO: 0.67 THOUSAND/ΜL (ref 0.17–1.22)
MONOCYTES NFR BLD AUTO: 5 % (ref 4–12)
NEUTROPHILS # BLD AUTO: 9.79 THOUSANDS/ΜL (ref 1.85–7.62)
NEUTS SEG NFR BLD AUTO: 79 % (ref 43–75)
NRBC BLD AUTO-RTO: 0 /100 WBCS
PLATELET # BLD AUTO: 111 THOUSANDS/UL (ref 149–390)
PMV BLD AUTO: 9.9 FL (ref 8.9–12.7)
POTASSIUM SERPL-SCNC: 3.7 MMOL/L (ref 3.5–5.3)
PROT SERPL-MCNC: 6.7 G/DL (ref 6.4–8.4)
RBC # BLD AUTO: 4.3 MILLION/UL (ref 3.88–5.62)
SODIUM SERPL-SCNC: 138 MMOL/L (ref 135–147)
WBC # BLD AUTO: 12.3 THOUSAND/UL (ref 4.31–10.16)

## 2024-10-07 PROCEDURE — G2211 COMPLEX E/M VISIT ADD ON: HCPCS | Performed by: INTERNAL MEDICINE

## 2024-10-07 PROCEDURE — 80053 COMPREHEN METABOLIC PANEL: CPT | Performed by: INTERNAL MEDICINE

## 2024-10-07 PROCEDURE — 99214 OFFICE O/P EST MOD 30 MIN: CPT | Performed by: INTERNAL MEDICINE

## 2024-10-07 PROCEDURE — 85025 COMPLETE CBC W/AUTO DIFF WBC: CPT | Performed by: INTERNAL MEDICINE

## 2024-10-07 RX ORDER — PANTOPRAZOLE SODIUM 40 MG/1
40 TABLET, DELAYED RELEASE ORAL
Qty: 30 TABLET | Refills: 5 | Status: SHIPPED | OUTPATIENT
Start: 2024-10-07 | End: 2025-04-05

## 2024-10-07 NOTE — PROGRESS NOTES
CLARIFY DX RESPONSE NOTE:    10/7/2024: Patient diagnosis should include chemotherapy induced neutropenia we will place it on all his next treatments.  Included the treatment that he initially had thank you Dr. Aaron

## 2024-10-07 NOTE — PROGRESS NOTES
Hematology/Oncology Outpatient Follow- up Note  Abhishek Gimenez 68 y.o. male MRN: @ Encounter: [id number]        Date:  10/7/2024        Assessment / Plan:    1 pancreatic adenocarcinoma T2 N0 M0 considered stage Ib  2 negative peritoneal and omental metastatic disease by laparoscope and washings  3 abnormal liver CT and MRI to be seen by IR again for repeat biopsy.  Is going for second opinion regarding that as well.  4 paroxysmal atrial fibrillation  5 type 2 diabetes mellitus  Plan: Patient will undergo a reduced dose of FOLFIRINOX beginning tomorrow for cycle 2 of treatment.  Will see how he did.  Hopefully will have less problems.  He will also testing through 60        HPI: 68-year-old gentleman completing his first cycle of chemotherapy.  He did well for several days then at the onset at the completion of his infusion after 2 days of nausea intermittently unable to eat 1 or 2 episodes of vomiting and diarrhea.  This lasted 4 days.  After 1 week even start subsided and he is now feeling pretty well he is eating and gaining back weight that he lost he is eating and feeling reasonably well.  We discussed the possibility of changing his treatment and lowering the percentage.  We would give him 75% dosage.  We did see if he would tolerate that better.  He is already on antiemetics we will continue the.  In addition he and his wife asked about testing we will look into genetic testing as well as Caris and [id number] testing of liquid biopsy and what tissue he does have from his FNAs.  Will see if we can get a better outlook regarding mutations.  Also is concerned about another biopsy apparently is getting a second opinion at [facility] in [facility] regarding that.  They can review the films and perhaps make some suggestions hopefully.  Presently has minimal GI pain he is not nauseated or vomiting at present bowel movements have stabilized.    Interval History: Note from  9/23/2024:  Assessment / Plan:    1 pancreatic adenocarcinoma T2 N0 M0 considered stage Ib.  2 negative peritoneal and omental metastatic disease by laparoscope and washings  3 abnormal liver CT and MRI to be seen by IR again for possible biopsy  4 repeat biopsy to be done as the first biopsy was negative but not definitively diagnostic  5 paroxysmal atrial fibs  6 diabetes mellitus  7 weight loss which seems to be stabilizing  Plan: Patient will undergo FOLFIRINOX he is aware side effects will begin tomorrow IR biopsy repeat is to be done waiting for notification for appointment and time.  He will return here in 2 weeks we will see how he is.  At that point we will try and look depending on biopsy reports either sending the biopsy off for further testing or doing testing of liquid biopsy and genetic testing.  HPI: 68-year-old gentleman here with pancreatic adenocarcinoma.  He is awaiting a repeat biopsy of his liver.  However he is anxious to start treatment.  We will treat him with chemotherapy whether it is malignant and metastatic disease or not.  It would be either neoadjuvant therapy prior to surgery or treatment to begin from metastatic disease.  He is awaiting repeat IR evaluation and repeat biopsy.  He is actually feeling fairly well.  He has no major pain in his abdomen or back.  He is able to get around fairly well.  He stabilized his weight and his appetite is slightly better.  Interval History: Note from 9/9/2024:  Assessment / Plan:    1 pancreatic adenocarcinoma T2 N0 M0 considered stage Ib  2 negative peritoneal and omental metastatic disease by laparoscope and washings  3 abnormal liver CT and MRI to be seen by IR through surgical oncology for biopsy  4 paroxysmal atrial fibrillation  5 diabetes mellitus  6 weight loss trying to be stabilized  Plan: Patient will undergo FOLFIRINOX.  Side effects were discussed.  Consent will be obtained.  IR biopsy of liver will be considered.  He will return in 2  weeks.  HPI: 68-year-old gentleman who comes in for follow-up.  He has a T2 N0 M0 stage Ib pancreatic cancer however he has abnormalities in his liver.  I spoke with Dr. Faulkner of surgical oncology.  She spoke with interventional radiology placed a consult in as 1 these needs to be biopsied if he has metastatic disease or not.  Otherwise we will begin him on FOLFIRINOX.  Side effects were discussed and consents will be obtained.  He will come back in 2 weeks we will try begin his therapy at that time.  Will await also an IR consultation and possible biopsy.  Interval History: Note from 8/26/2024:  Assessment/ Plan:    1 pancreatic adenocarcinoma T2 N0 M0 considered stage Ib  2 diabetes mellitus  3 weight loss  4 paroxysmal atrial fibrillation  Plan: Patient will undergo diagnostic laparoscopy on 9/5 to check for peritoneal metastasis.  He is also scheduled for CT of the abdomen with pancreatic protocol.  We will see him in follow-up on 9/9.  We will discuss the possibility of a beginning of chemotherapy probably with use of FOLFIRINOX.  Will write results of the scans and the laparotomy.  We also asked him to get a CBC CA 19-9 and a CMP.  CC: Consultation for pancreatic adenocarcinoma of body and tail.  HPI:  Abhishek Gimenez is seen for initial consultation 8/26/2024   68-year-old gentleman who noted the onset of diabetes mellitus and February 2024 since that time he has been losing weight he is lost 30 pounds over the 6 months time.  He also developed intermittent abdominal pain epigastric left upper quadrant that continued to worsen.  He was started on Ozempic.  He drinks 3-4 beers every other day he last used tobacco 40 years ago but does not smoke now.  He has had a negative colonoscopy.  He underwent an EGD which was nondiagnostic subsequently underwent an EGD EUS which showed normal esophagus normal stomach normal duodenum.  Celiac artery and aorta were visualized and appeared normal.  The left kidney spleen  left adrenal appeared normal parenchyma of the liver.  Normal this was the left lobe of the liver.  He had a heterogeneous hypoechoic irregular mass measuring 30 mm x 18 mm with poorly defined margins in the body of pancreas it abutted the splenic artery and splenic vein needle biopsies were done for total.  The head of the pancreas and neck of the pancreas appeared to have some honeycombing.  Bile duct appeared normal gallbladder appeared normal.  This was done on 7/17/2024  Biopsy was consistent with adenocarcinoma.  CAT scan of the chest was negative.  On 7/10/2026 MRI of the abdomen showed the ill-defined 3.8 x 1.1 cm mass in the body of the pancreas the liver showed a 3.4 cm benign hemangioma 1.0 and 0.4 cm right lobe nodules all the seem to be compatible with hemangiomas.  He was also seen today by allergy.  They noted the tumor need for distal pancreatic ectomy with splenectomy..  They recommended diagnostic laparoscopy to rule out occult peritoneal metastasis.  They also recommended  therapy with chemotherapy follow-up surgery follow-up chemotherapy.  They recommended a CT scan with pancreatic protocol since it was 2 months since his previous scan.  The case will be discussed at the multidisciplinary tumor board  The past medical history A-fib osteoarthritis BPH colonic polyps diabetes mellitus.  He has nonischemic cardiomyopathy and osteoarthritis and psoriasis.      Cancer Staging:  Cancer Staging   Adenocarcinoma of pancreas (HCC)  Staging form: Pancreas, AJCC 8th Edition  - Clinical: Stage IB (cT2, cN0, cM0) - Signed by Roz Faulkner MD on 8/26/2024  Total positive nodes: 0      Molecular Testing:     Previous Hematologic/ Oncologic History:    Oncology History   Adenocarcinoma of pancreas (HCC)   8/13/2024 Biopsy    A.-B.  Pancreas, Body, FNA (Cell block and smear preparations):   Malignant  Adenocarcinoma.     8/20/2024 Initial Diagnosis    Adenocarcinoma of pancreas (HCC)     8/26/2024 -  Cancer  Staged    Staging form: Pancreas, AJCC 8th Edition  - Clinical: Stage IB (cT2, cN0, cM0) - Signed by Roz Faulkner MD on 8/26/2024  Total positive nodes: 0       9/24/2024 -  Chemotherapy    alteplase (CATHFLO), 2 mg, Intracatheter, Every 1 Minute as needed, 1 of 12 cycles  pegfilgrastim (NEULASTA ONPRO), 6 mg, Subcutaneous, Once, 1 of 12 cycles  Administration: 6 mg (9/26/2024)  fosaprepitant (EMEND) IVPB, 150 mg, Intravenous, Once, 1 of 12 cycles  Administration: 150 mg (9/24/2024)  irinotecan (CAMPTOSAR) chemo infusion, 150 mg/m2 = 294 mg, Intravenous, Once, 1 of 12 cycles  Administration: 294 mg (9/24/2024)  oxaliplatin (ELOXATIN) chemo infusion, 65 mg/m2 = 127.4 mg, Intravenous, Once, 1 of 12 cycles  Administration: 127.4 mg (9/24/2024)  fluorouracil (ADRUCIL) ambulatory infusion Soln, 1,200 mg/m2/day = 4,705 mg, Intravenous, Over 46 hours, 1 of 12 cycles         Current Hematologic/ Oncologic Treatment:       Cycle 1         Test Results:    Imaging: IR biopsy liver mass    Result Date: 9/18/2024  Narrative: PROCEDURE: Ultrasound-guided liver lesion biopsy Procedural Personnel Attending physician(s): Dr. Mckenna Pre-procedure diagnosis: Pancreatic adenocarcinoma Post-procedure diagnosis: Same Indication: Patient with history of pancreatic adenocarcinoma noted to have liver lesions presents for liver lesion biopsy. PROCEDURE SUMMARY: - Percutaneous US-guided liver lesion biopsy PROCEDURE DETAILS: Pre-procedure Reference imaging for biopsy target: CT abdomen/pelvis 8/30/2024 Consent: Informed consent for the procedure including risks, benefits and alternatives was obtained and time-out was performed prior to the procedure. Preparation: The site was prepared and draped using maximal sterile barrier technique including cutaneous antisepsis. Anesthesia/sedation Level of anesthesia/sedation: Moderate sedation (conscious sedation) Anesthesia/sedation administered by: IR nurse under attending supervision with  "continuous monitoring of the patient's level of consciousness and physiologic status Total intra-service sedation time (minutes): 15 Biopsy Local anesthesia was administered. Under US guidance, 17-gauge coaxial introducer needle was advanced to the segment 5 liver lesion. 18-gauge core biopsy needle was used to obtain 4 core needle samples which were placed in formalin. D-Stat hemostatic agent was used to perform tract embolization during needle removal. Postprocedure ultrasound showed no hematoma. Additional Details Specimens removed: 4 core needle samples placed into formalin. Estimated blood loss (mL): 1 Complications: No immediate complications.     Impression: Ultrasound-guided biopsy of segment 5 liver lesion. Plan: Follow-up on biopsy results. Workstation performed: [id number]     US OUTSIDE FILMS    Result Date: 9/18/2024  Narrative: This is a non-reportable study used for image storage. It has been automatically finalized and does not contain a result.            Labs:   Lab Results   Component Value Date    WBC 12.30 (H) 10/07/2024    HGB 13.1 10/07/2024    HCT 39.5 10/07/2024    MCV 92 10/07/2024     (L) 10/07/2024     Lab Results   Component Value Date     03/15/2016    K 3.7 10/07/2024     10/07/2024    CO2 29 10/07/2024    BUN 17 10/07/2024    CREATININE 0.74 10/07/2024    GLUCOSE 106 (H) 11/15/2016    GLUF 110 (H) 09/23/2024    CALCIUM 8.9 10/07/2024    AST 23 10/07/2024    ALT 27 10/07/2024    ALKPHOS 140 (H) 10/07/2024    PROT 6.6 03/15/2016    BILITOT 0.5 03/15/2016    EGFR 96 10/07/2024         No results found for: \"SPEP\", \"UPEP\"    Lab Results   Component Value Date    PSA 4.701 (H) 08/24/2024    PSA 3.83 01/16/2024    PSA 2.9 01/11/2023       No results found for: \"CEA\"    No results found for: \"\"    No results found for: \"AFP\"    No results found for: \"IRON\", \"TIBC\", \"FERRITIN\"    Lab Results   Component Value Date    GIXMOLQN18 466 06/10/2021         ROS: Review of " Systems   Constitutional:  Positive for fatigue.   HENT: Negative.     Eyes: Negative.    Respiratory: Negative.     Cardiovascular: Negative.    Gastrointestinal: Negative.    Endocrine: Negative.    Genitourinary: Negative.    Musculoskeletal: Negative.    Skin: Negative.    Allergic/Immunologic: Negative.    Neurological: Negative.    Hematological: Negative.          Current Medications: Reviewed  Allergies: Reviewed  PMH/FH/SH:  Reviewed      Physical Exam:    Body surface area is 1.99 meters squared.    Wt Readings from Last 3 Encounters:   10/07/24 79.4 kg (175 lb)   09/24/24 81.6 kg (180 lb)   09/23/24 78.5 kg (173 lb)        Temp Readings from Last 3 Encounters:   10/07/24 98.2 °F (36.8 °C) (Temporal)   09/24/24 98.1 °F (36.7 °C) (Temporal)   09/23/24 (!) 97.2 °F (36.2 °C) (Temporal)        BP Readings from Last 3 Encounters:   10/07/24 122/70   09/24/24 151/83   09/23/24 132/80         Pulse Readings from Last 3 Encounters:   10/07/24 64   09/24/24 (!) 50   09/23/24 55     @LASTSAO2(3)@      Physical Exam  Constitutional:       Appearance: Normal appearance. He is normal weight.   HENT:      Head: Normocephalic and atraumatic.   Eyes:      Extraocular Movements: Extraocular movements intact.      Conjunctiva/sclera: Conjunctivae normal.      Pupils: Pupils are equal, round, and reactive to light.   Cardiovascular:      Rate and Rhythm: Normal rate and regular rhythm.      Heart sounds: Normal heart sounds.   Pulmonary:      Effort: Pulmonary effort is normal.      Breath sounds: Normal breath sounds.   Abdominal:      General: Abdomen is flat. Bowel sounds are normal.      Palpations: Abdomen is soft.   Musculoskeletal:         General: Normal range of motion.      Cervical back: Normal range of motion and neck supple.   Skin:     General: Skin is warm and dry.   Neurological:      General: No focal deficit present.      Mental Status: He is alert and oriented to person, place, and time. Mental status is  at baseline.           Goals and Barriers:  Current Goal: Prolong Survival from Cancer.   Barriers: None.      Patient's Capacity to Self Care:  Patient is able to self care.    Code Status: [unfilled]  Advance Directive and Living Will: Yes    Power of :

## 2024-10-07 NOTE — PROGRESS NOTES
Pt here for port flush and labs, offering no complaints. PAC accessed with positive blood return noted, labs drawn. PAC flushed and de-accessed. AVS given. Next appt 10/8 12pm. Walked out in stable condition.

## 2024-10-08 ENCOUNTER — DOCUMENTATION (OUTPATIENT)
Dept: GENETICS | Facility: CLINIC | Age: 69
End: 2024-10-08

## 2024-10-08 ENCOUNTER — TELEPHONE (OUTPATIENT)
Dept: HEMATOLOGY ONCOLOGY | Facility: CLINIC | Age: 69
End: 2024-10-08

## 2024-10-08 ENCOUNTER — HOSPITAL ENCOUNTER (OUTPATIENT)
Dept: INFUSION CENTER | Facility: CLINIC | Age: 69
Discharge: HOME | End: 2024-10-08
Payer: COMMERCIAL

## 2024-10-08 ENCOUNTER — NUTRITION (OUTPATIENT)
Dept: NUTRITION | Facility: CLINIC | Age: 69
End: 2024-10-08

## 2024-10-08 ENCOUNTER — TRANSCRIBE ORDERS (OUTPATIENT)
Dept: HEMATOLOGY ONCOLOGY | Facility: CLINIC | Age: 69
End: 2024-10-08

## 2024-10-08 VITALS
OXYGEN SATURATION: 95 % | SYSTOLIC BLOOD PRESSURE: 142 MMHG | BODY MASS INDEX: 24.11 KG/M2 | TEMPERATURE: 97.1 F | RESPIRATION RATE: 18 BRPM | DIASTOLIC BLOOD PRESSURE: 81 MMHG | WEIGHT: 172.2 LBS | HEART RATE: 59 BPM | HEIGHT: 71 IN

## 2024-10-08 DIAGNOSIS — Z71.3 NUTRITIONAL COUNSELING: Primary | ICD-10-CM

## 2024-10-08 DIAGNOSIS — C25.9 ADENOCARCINOMA OF PANCREAS (HCC): Primary | ICD-10-CM

## 2024-10-08 RX ORDER — ATROPINE SULFATE 1 MG/ML
0.25 INJECTION, SOLUTION INTRAVENOUS ONCE
Status: COMPLETED | OUTPATIENT
Start: 2024-10-08 | End: 2024-10-08

## 2024-10-08 RX ORDER — ATROPINE SULFATE 1 MG/ML
0.25 INJECTION, SOLUTION INTRAVENOUS ONCE AS NEEDED
Status: DISCONTINUED | OUTPATIENT
Start: 2024-10-08 | End: 2024-10-11 | Stop reason: HOSPADM

## 2024-10-08 RX ORDER — DEXTROSE MONOHYDRATE 50 MG/ML
20 INJECTION, SOLUTION INTRAVENOUS ONCE
Status: COMPLETED | OUTPATIENT
Start: 2024-10-08 | End: 2024-10-08

## 2024-10-08 RX ORDER — SODIUM CHLORIDE 9 MG/ML
20 INJECTION, SOLUTION INTRAVENOUS ONCE AS NEEDED
Status: DISCONTINUED | OUTPATIENT
Start: 2024-10-08 | End: 2024-10-11 | Stop reason: HOSPADM

## 2024-10-08 RX ADMIN — IRINOTECAN HYDROCHLORIDE 227 MG: 20 INJECTION, SOLUTION INTRAVENOUS at 15:45

## 2024-10-08 RX ADMIN — DEXTROSE 20 ML/HR: 5 SOLUTION INTRAVENOUS at 13:41

## 2024-10-08 RX ADMIN — FOSAPREPITANT 150 MG: 150 INJECTION, POWDER, LYOPHILIZED, FOR SOLUTION INTRAVENOUS at 13:01

## 2024-10-08 RX ADMIN — ATROPINE SULFATE 0.25 MG: 1 INJECTION, SOLUTION INTRAVENOUS at 15:41

## 2024-10-08 RX ADMIN — DEXAMETHASONE SODIUM PHOSPHATE: 10 INJECTION, SOLUTION INTRAMUSCULAR; INTRAVENOUS at 12:36

## 2024-10-08 RX ADMIN — OXALIPLATIN 98.5 MG: 5 INJECTION, SOLUTION INTRAVENOUS at 13:41

## 2024-10-08 RX ADMIN — SODIUM CHLORIDE 20 ML/HR: 0.9 INJECTION, SOLUTION INTRAVENOUS at 12:36

## 2024-10-08 NOTE — TELEPHONE ENCOUNTER
(Medication time out/change to orders)    Date patient scheduled: 10/8/24    Original medication ordered: folfirinox    New Medication ordered: n/a- dose reduction to 75% of original dose    Office RN notified patient- Provider discussed with patient     Spoke to infusion nurses and made them aware yesterday of change for today.

## 2024-10-08 NOTE — PROGRESS NOTES
Outpatient Oncology Nutrition Consultation   Type of Consult: Initial Consult  Care Location: [facility]  - [facility] w/pt & wife (Sherri)    Reason for referral: notification  Lashell Lew MA  on 9/11/24 that pt is appropriate for oncology nutrition care (reason for referral: Ambulatory referral for struggling with eating and drinking ).     Nutrition Assessment:   Oncology Diagnosis & Treatments:   Cancer of the pancreas diagnosed 8/20/24.   Began chemotherapy 9/24/24; Modified FOLFIRINOX every 14 days   Oncology History   Adenocarcinoma of pancreas (HCC)   8/13/2024 Biopsy    A.-B.  Pancreas, Body, FNA (Cell block and smear preparations):   Malignant  Adenocarcinoma.     8/20/2024 Initial Diagnosis    Adenocarcinoma of pancreas (HCC)     8/26/2024 -  Cancer Staged    Staging form: Pancreas, AJCC 8th Edition  - Clinical: Stage IB (cT2, cN0, cM0) - Signed by Roz Faulkner MD on 8/26/2024  Total positive nodes: 0       9/24/2024 -  Chemotherapy    alteplase (CATHFLO), 2 mg, Intracatheter, Every 1 Minute as needed, 2 of 12 cycles  pegfilgrastim (NEULASTA ONPRO), 6 mg, Subcutaneous, Once, 2 of 12 cycles  Administration: 6 mg (9/26/2024)  fosaprepitant (EMEND) IVPB, 150 mg, Intravenous, Once, 2 of 12 cycles  Administration: 150 mg (9/24/2024)  irinotecan (CAMPTOSAR) chemo infusion, 150 mg/m2 = 294 mg, Intravenous, Once, 2 of 12 cycles  Dose modification: 112.5 mg/m2 (75 % of original dose 150 mg/m2, Cycle 2, Reason: Dose Not Tolerated)  Administration: 294 mg (9/24/2024)  oxaliplatin (ELOXATIN) chemo infusion, 65 mg/m2 = 127.4 mg, Intravenous, Once, 2 of 12 cycles  Dose modification: 48.75 mg/m2 (75 % of original dose 65 mg/m2, Cycle 2, Reason: Dose Not Tolerated)  Administration: 127.4 mg (9/24/2024)  fluorouracil (ADRUCIL) ambulatory infusion Soln, 1,200 mg/m2/day = 4,705 mg, Intravenous, Over 46 hours, 2 of 12 cycles  Dose modification: 900 mg/m2/day (75 % of original dose 1,200 mg/m2/day, Cycle 3, Reason: Dose  Not Tolerated)       Past Medical & Surgical Hx:   Patient Active Problem List   Diagnosis    Atrial fibrillation (HCC)    Psoriatic arthritis (HCC)    NDPH (new daily persistent headache)    Atrial flutter (HCC)    CATY (obstructive sleep apnea)    Type 2 diabetes mellitus with hyperglycemia, without long-term current use of insulin (HCC)    Elevated PSA    Platelets decreased (HCC)    Adenocarcinoma of pancreas (HCC)    Pulmonary HTN (HCC)    Pre-op testing    Liver masses    Port-A-Cath in place    Chemotherapy induced neutropenia (HCC)     Past Medical History:   Diagnosis Date    A-fib (HCC)     Arthritis     BPH (benign prostatic hyperplasia)     Cancer (HCC)     Colon polyp     Diabetes mellitus (HCC)     Fatty liver     Fracture, foot     High cholesterol     Hyperlipidemia     Irregular heart beat     Non-ischemic cardiomyopathy (HCC)     Osteoarthritis     Psoriasis     Psoriatic arthritis (HCC)     Pulmonary HTN (HCC)      Past Surgical History:   Procedure Laterality Date    CARDIAC ELECTROPHYSIOLOGY PROCEDURE N/A 02/22/2023    Procedure: Cardiac eps/afib ablation   comprehensive posterior wall;  Surgeon: Mynor Andrews MD;  Location: [facility];  Service: Cardiology    COLONOSCOPY      ELBOW SURGERY Right     FL GUIDED CENTRAL VENOUS ACCESS DEVICE INSERTION  9/5/2024    IR BIOPSY LIVER MASS  9/18/2024    KIDNEY STONE SURGERY      LAPAROSCOPY N/A 9/5/2024    Procedure: DIAGNOSTIC LAPAROSCOPY;  Surgeon: Roz Faulkner MD;  Location: [facility];  Service: Surgical Oncology    TRANSURETHRAL RESECTION OF PROSTATE      last assessed 7/21/15    TUNNELED VENOUS PORT PLACEMENT N/A 9/5/2024    Procedure: INSERTION VENOUS PORT (PORT-A-CATH);  Surgeon: Roz Faulkner MD;  Location: [facility];  Service: Surgical Oncology       Review of Medications:   Vitamins, Supplements and Herbals: No, pt denies taking supplements and Discussed the potential interactions of high dose antioxidants with cancer tx and  recommended exercising caution when taking these supplements    Current Outpatient Medications:     adalimumab (HUMIRA) 40 mg/0.8 mL PSKT, Inject 40 mg under the skin every 14 (fourteen) days  (Patient not taking: Reported on 9/23/2024), Disp: , Rfl:     Alcohol Swabs 70 % PADS, May substitute brand based on insurance coverage. Check glucose TID. (Patient not taking: Reported on 9/23/2024), Disp: 100 each, Rfl: 0    Blood Glucose Monitoring Suppl (OneTouch Verio Reflect) w/Device KIT, Check blood sugars once daily. Please substitute with appropriate alternative as covered by patient's insurance. Dx: E11.65, Disp: 1 kit, Rfl: 0    Continuous Blood Gluc  (FreeStyle Livia 2 [place]) LEANA, 14 DAY READER NOT MADE, Disp: 1 each, Rfl: 1    Continuous Glucose Sensor (FreeStyle Livia 14 Day Sensor) MISC, Use 1 Device 3 (three) times a day before meals, Disp: 2 each, Rfl: 5    fluorouracil 3,635 mg in CADD/Elastomeric Infusion Device, Infuse 3,635 mg (900 mg/m2/day x 2.02 m2) into a catheter in a vein via infusion device over 46 hours for 2 days  Infusion planned for October 8, 2024., Disp: 1 each, Rfl: 3    fluorouracil 4,850 mg in CADD/Elastomeric Infusion Device, Infuse 4,850 mg (1,200 mg/m2/day x 2.02 m2) into a catheter in a vein via infusion device over 46 hours for 2 days  Infusion planned for October 8, 2024., Disp: 1 each, Rfl: 3    glucose blood (OneTouch Verio) test strip, Check blood sugars once daily. Please substitute with appropriate alternative as covered by patient's insurance. Dx: E11.65, Disp: 100 each, Rfl: 3    insulin glargine (LANTUS) 100 units/mL subcutaneous injection, Inject 26 Units under the skin daily at bedtime, Disp: , Rfl:     Insulin Pen Needle (BD Pen Needle Mallory 2nd Gen) 32G X 4 MM MISC, For use with insulin pen. Pharmacy may dispense brand covered by insurance., Disp: 100 each, Rfl: 0    Insulin Pen Needle (BD Pen Needle Mallory U/F) 32G X 4 MM MISC, Use daily as directed with insulin  pen, Disp: 100 each, Rfl: 3    lidocaine-prilocaine (EMLA) cream, Apply topically as needed for mild pain Apply to port area 45-60min prior to treatment and cover with dressing or plastic wrap, Disp: 30 g, Rfl: 1    metFORMIN (GLUCOPHAGE-XR) 500 mg 24 hr tablet, TAKE 2 TABLETS BY MOUTH EVERY DAY WITH BREAKFAST, Disp: 180 tablet, Rfl: 1    ondansetron (ZOFRAN) 4 mg tablet, Take 1 tablet (4 mg total) by mouth every 8 (eight) hours as needed for nausea or vomiting, Disp: 20 tablet, Rfl: 2    OneTouch Delica Lancets 33G MISC, Check blood sugars once daily. Please substitute with appropriate alternative as covered by patient's insurance. Dx: E11.65, Disp: 100 each, Rfl: 3    oxyCODONE (Roxicodone) 5 immediate release tablet, Take 1 tablet (5 mg total) by mouth every 6 (six) hours as needed for moderate pain Max Daily Amount: 20 mg, Disp: 90 tablet, Rfl: 0    pantoprazole (PROTONIX) 40 mg tablet, Take 1 tablet (40 mg total) by mouth daily before breakfast, Disp: 30 tablet, Rfl: 5    rosuvastatin (CRESTOR) 5 mg tablet, TAKE 1 TABLET BY MOUTH EVERY DAY IN THE EVENING, Disp: 90 tablet, Rfl: 1    sildenafil (VIAGRA) 100 mg tablet, TAKE ONE (1) TABLET BY MOUTH DAILY AS NEEDED, Disp: , Rfl:   No current facility-administered medications for this visit.    Facility-Administered Medications Ordered in Other Visits:     alteplase (CATHFLO) injection 2 mg, 2 mg, Intracatheter, Q1MIN PRN, Oscar Aaron MD    alteplase (CATHFLO) injection 2 mg, 2 mg, Intracatheter, Q1MIN PRN, Butch Peña MD    Atropine Sulfate injection 0.25 mg, 0.25 mg, Intravenous, Once, Butch Peña MD    Atropine Sulfate injection 0.25 mg, 0.25 mg, Intravenous, Once PRN, Butch Peña MD    dextrose 5 % infusion, 20 mL/hr, Intravenous, Once, Butch Peña MD    irinotecan (CAMPTOSAR) 227 mg in dextrose 5 % 500 mL chemo infusion, 112.5 mg/m2 (Treatment Plan Recorded), Intravenous, Once, Oscar Aaron MD    oxaliplatin (ELOXATIN) 98.5 mg in  "dextrose 5 % 250 mL chemo infusion, 48.75 mg/m2 (Treatment Plan Recorded), Intravenous, Once, Oscar Aaron MD, Last Rate: 134.9 mL/hr at 10/08/24 1341, 98.5 mg at 10/08/24 1341    sodium chloride 0.9 % infusion, 20 mL/hr, Intravenous, Once PRN, Butch Peña MD, Last Rate: 20 mL/hr at 10/08/24 1236, 20 mL/hr at 10/08/24 1236    Most Recent Lab Results:   Lab Results   Component Value Date    WBC 12.30 (H) 10/07/2024    NEUTROABS 9.79 (H) 10/07/2024    CHOLESTEROL 212 (H) 02/20/2024    CHOL 190 11/15/2016    TRIG 369 (H) 02/20/2024    HDL 40 02/20/2024    LDLCALC 98 02/20/2024    ALT 27 10/07/2024    AST 23 10/07/2024    ALB 3.8 10/07/2024     03/15/2016    SODIUM 138 10/07/2024    SODIUM 141 09/23/2024    K 3.7 10/07/2024    K 4.1 09/23/2024     10/07/2024    BUN 17 10/07/2024    BUN 20 09/23/2024    CREATININE 0.74 10/07/2024    CREATININE 0.81 09/23/2024    EGFR 96 10/07/2024    GLUCOSE 106 (H) 11/15/2016    POCGLU 97 09/18/2024    GLUF 110 (H) 09/23/2024    GLUF 96 09/18/2024    GLUC 177 (H) 10/07/2024    HGBA1C 6.6 (A) 08/27/2024    HGBA1C 5.9 05/24/2024    HGBA1C 10.8 (A) 02/20/2024    CALCIUM 8.9 10/07/2024    FOLATE >20.0 (H) 06/10/2021    MG 1.9 02/23/2023     Anthropometric Measurements:   Height: 71\"  Ht Readings from Last 1 Encounters:   10/08/24 5' 10.98\" (1.803 m)     Wt Readings from Last 20 Encounters:   10/08/24 78.1 kg (172 lb 3.2 oz)   10/07/24 79.4 kg (175 lb)   09/24/24 81.6 kg (180 lb)   09/23/24 78.5 kg (173 lb)   09/18/24 78.7 kg (173 lb 8 oz)   09/09/24 78.9 kg (174 lb)   09/05/24 79.4 kg (175 lb)   08/27/24 78.8 kg (173 lb 12.8 oz)   08/26/24 80.3 kg (177 lb)   08/26/24 79.4 kg (175 lb)   08/26/24 79.4 kg (175 lb)   08/13/24 78.9 kg (174 lb)   07/15/24 80.4 kg (177 lb 3.2 oz)   06/24/24 81.8 kg (180 lb 6.4 oz)   05/24/24 83.9 kg (185 lb)   04/09/24 86.6 kg (191 lb)   02/23/24 87.5 kg (193 lb)   02/20/24 85.3 kg (188 lb)   04/25/23 86.6 kg (191 lb)   02/22/23 82.6 kg (182 " lb)     Weight History:   Usual Weight: 200# at [place] time 2023; weight loss after diagnosed with DM and started on Ozempic   Varian: n/a  Home Scale: (10/8/24) 165#    Oncology Nutrition-Anthropometrics      Flowsheet Row Nutrition from 10/8/2024 in [facility] Oncology Dietitian [place]   Patient age (years): 68 years   Patient (male) height (in): 71 in   Current weight (lbs): 172.2 lbs   Current weight to be used for anthropometric calculations (kg) 78.3 kg   BMI: 24   IBW male 172 lb   IBW (kg) male 78.2 kg   IBW % (male) 100.1 %   Adjusted BW (male): 172.1 lbs   Adjusted BW in kg (male): 78.2 kg   % weight change after 1 month: -1 %   Weight change after 1 month (lbs) -1.8 lbs   % weight change after 3 months: -2.8 %   Weight change after 3 months (lbs) -5 lbs   % weight change after 6 months: -9.8 %   Weight change after 6 months (lbs) -18.8 lbs            Nutrition-Focused Physical Findings: none observed    Food/Nutrition-Related History & Client/Social History:    Current Nutrition Impact Symptoms:  [] Nausea  [x] Reduced Appetite  [] Acid Reflux    [] Vomiting  [x] Unintended Wt Loss   -about 19# in 6 months  [] Malabsorption    [] Diarrhea  [] Unintended Wt Gain  [] Dumping Syndrome    [] Constipation  [] Thick Mucous/Secretions  [] Abdominal Pain    [] Dysgeusia (Altered Taste)  [] Xerostomia (Dry Mouth)  [] Gas    [] Dysosmia (Altered Smell)  [] Thrush  [] Difficulty Chewing    [] Oral Mucositis (Sore Mouth)  [] Fatigue  [x] Hyperglycemia   -T2DM   -metformin, ozempic  Lab Results   Component Value Date    HGBA1C 6.6 (A) 08/27/2024      [] Odynophagia  [] Esophagitis  [] Other:    [] Dysphagia  [] Early Satiety  [] No Problems Eating      Food Allergies & Intolerances: yes: lactose intolerant     Current Diet: CHO-Controlled and Lactose-Free  Current Nutrition Intake: Unchanged from usual  Appetite: Fair   Nutrition Route: PO  Oral Care: brushes BID  Activity level: ADL, ambulates independently.      24 Hr Diet Recall:   Breakfast: cereal with milk  Snack: PB pretzels, oat muffin, blueberries  Lunch: PB&J  Dinner: burger; soup; fish or chicken     Beverages: water with crystal light flavoring (16oz x2-3)  Supplements:   None      Oncology Nutrition-Estimated Needs      Flowsheet Row Nutrition from 10/8/2024 in [facility] Oncology Dietitian [place]   Weight type used Actual weight   Weight in kilograms (kg) used for estimated needs 78.3 kg   Energy needs formula:  30-35 kcal/kg   Energy needs based on 30 kcal/k kcal   Energy needs based on 35 kcal/k kcal   Protein needs formula: 1.2-1.5 g/kg   Protein needs based on 1.2 g/k g   Protein needs based on 1.5 g/kg 117 g   Fluid needs formula: 30-35 mL/kg   Fluid needs based on 30 mL/kg 2346 mL   Fluid needs in ounces 79 oz   Fluid needs based on 35 mL/kg 2737 mL   Fluid needs in ounces 93 oz             Discussion & Intervention:   Abhishek was evaluated today for an initial RD consultation regarding unintended weight loss.  Abhishek is currently undergoing tx for Pancreatic Cancer.  He is here for infusion today and doing okay at this time. He explains that he has been losing weight which had initially started after he began taking Ozempic for DM control. Reviewed 24 hour recall, which revealed an suboptimal po intake, and discussed ways to increase kcal, protein, and fluid intakes, optimize nutrient intake, and adhere to a carbohydrate controlled diet.  Also reviewed the importance of wt management throughout the tx process and the role of a high kcal/ high protein diet and carbohydrate controlled diet in managing wt and overall health.  Based on today's assessment, discussion included: MNT for: DM, Pancreatic cancer, weight loss, how to modify foods for anticipated nutrition impact symptoms pt may experience during CA tx, a diabetic diet & food choices to include at all meals & snacks, spreading carbohydrate intake throughout the day &  counting carbohydrates for adequate glycemic control, adequate hydration & fluid choices, and having consistent and planned snacks between meals.   Moving forward, Abhishek was encouraged to increase kcal, protein, and fluid intakes.  Materials Provided: PCAN Diet and Nutrition booklet and Heal Well Booklet  All questions and concerns addressed during today’s visit.  Abhishek has RD contact information.    Nutrition Diagnosis:   Inadequate Energy Intake related to physiological causes, disease state and treatment related issues as evidenced by food recall, wt loss and discussion with pt and/or family.  Increased Nutrient Needs (kcal & pro) related to increased demand for nutrients and disease state as evidenced by cancer dx and pt undergoing tx for cancer.  Monitoring & Evaluation:   Goals:  weight maintenance/stabilization  adequate nutrition impact symptom management  pt to meet >/=75% estimated nutrition needs daily  adequate glycemic control  increase protein intake  increase fluid intake  increase calorie intake    Progress Towards Goals: Initiated    Nutrition Rx & Recommendations:  Diet: High Calorie, High Protein (for high calorie foods see pages 52-53, and for high protein foods see pages 49-51 in your Eating Hints book)  Small, frequent meals/snacks may be easier to tolerate than 3 large daily meals.  Aim for 5-6 small meals per day (every 2-3 hours).  Include protein at all meals/snacks.  Include a variety of foods (as tolerated/allowed by diet).  Incorporate physical activity as able/allowed.  Stay hydrated by sipping fluids of choice/tolerance throughout the day.  Liquid nutrition may be better tolerated than solids at times.  Alter food choices and eating patterns to accommodate changing needs.  Refer to Eating Hints book for other meal/snack ideas and symptom management.  Weigh yourself regularly. If you notice weight loss, make an effort to increase your daily food/calorie intake. If you continue to  notice loss after these efforts, reach out to your dietitian to establish a plan to stabilize weight.    Nutrition therapy for Carbohydrate Controlled Diet:     Foods with carbohydrates make your blood glucose level go up. Learning how to count carbohydrates can help you control your blood glucose levels. First, identify the foods you eat that contain carbohydrates.   Foods that commonly contain carbohydrates include: grains (breads, pasta, cereal, rice), fruit, starchy vegetables (potatoes, corn, peas), snack foods (chips, pretzels, crackers), dairy products, sweets/desserts.   Foods that do not usually contain carbohydrates: chicken, pork, beef, fish, seafood, eggs, tofu, cheese, butter, sour cream, avocado, nuts, seeds, olives, mayonnaise, water, black coffee, unsweetened tea, and zero-calorie drinks. Vegetables with no or low carbohydrate include green beans, cauliflower, tomatoes, and onions.     Blood Glucose management:   Try to eat about the same time everyday   Take medicine or insulin as directed by your physician  Do not skip meals   Balance each meal/snack by including a protein and a fat with a carbohydrate. Ex: peanut butter on crackers, cheese and crackers, greek yogurt and granola.  Include protein at all meals and snacks (chicken, fish, beans/legumes, nuts/nut butters, oatmeal, quinoa, greek yogurt, cottage cheese).  Limit sugar sweetened beverages and foods with added sugars.    Follow Up Plan:  11/5/24 during infusion  Recommend Referral to Other Providers: none at this time

## 2024-10-08 NOTE — PROGRESS NOTES
Pt presents for FOLFIRINOX offering no complaints. Confirmed with Danielle Stoddard RN that pt is to receive treatment as ordered, no Leucovorin or Fluorouracil push. Pt tolerated treatment well. CADD pump connected per decreased dose ordered. AVS printed. Next appointment 10/10 3:30pm.

## 2024-10-08 NOTE — PATIENT INSTRUCTIONS
Nutrition Rx & Recommendations:  Diet: High Calorie, High Protein (for high calorie foods see pages 52-53, and for high protein foods see pages 49-51 in your Eating Hints book)  Small, frequent meals/snacks may be easier to tolerate than 3 large daily meals.  Aim for 5-6 small meals per day (every 2-3 hours).  Include protein at all meals/snacks.  Include a variety of foods (as tolerated/allowed by diet).  Incorporate physical activity as able/allowed.  Stay hydrated by sipping fluids of choice/tolerance throughout the day.  Liquid nutrition may be better tolerated than solids at times.  Alter food choices and eating patterns to accommodate changing needs.  Refer to Eating Hints book for other meal/snack ideas and symptom management.  Weigh yourself regularly. If you notice weight loss, make an effort to increase your daily food/calorie intake. If you continue to notice loss after these efforts, reach out to your dietitian to establish a plan to stabilize weight.    Nutrition therapy for Carbohydrate Controlled Diet:     Foods with carbohydrates make your blood glucose level go up. Learning how to count carbohydrates can help you control your blood glucose levels. First, identify the foods you eat that contain carbohydrates.   Foods that commonly contain carbohydrates include: grains (breads, pasta, cereal, rice), fruit, starchy vegetables (potatoes, corn, peas), snack foods (chips, pretzels, crackers), dairy products, sweets/desserts.   Foods that do not usually contain carbohydrates: chicken, pork, beef, fish, seafood, eggs, tofu, cheese, butter, sour cream, avocado, nuts, seeds, olives, mayonnaise, water, black coffee, unsweetened tea, and zero-calorie drinks. Vegetables with no or low carbohydrate include green beans, cauliflower, tomatoes, and onions.     Blood Glucose management:   Try to eat about the same time everyday   Take medicine or insulin as directed by your physician  Do not skip meals   Balance each  meal/snack by including a protein and a fat with a carbohydrate. Ex: peanut butter on crackers, cheese and crackers, greek yogurt and granola.  Include protein at all meals and snacks (chicken, fish, beans/legumes, nuts/nut butters, oatmeal, quinoa, greek yogurt, cottage cheese).  Limit sugar sweetened beverages and foods with added sugars.    Follow Up Plan: 11/5/24 during infusion  Recommend Referral to Other Providers: none at this time

## 2024-10-09 ENCOUNTER — APPOINTMENT (OUTPATIENT)
Dept: LAB | Facility: HOSPITAL | Age: 69
End: 2024-10-09
Payer: COMMERCIAL

## 2024-10-09 DIAGNOSIS — C25.9 ADENOCARCINOMA OF PANCREAS (HCC): Primary | ICD-10-CM

## 2024-10-09 DIAGNOSIS — C25.9 MALIGNANT NEOPLASM OF PANCREAS, UNSPECIFIED LOCATION OF MALIGNANCY (HCC): ICD-10-CM

## 2024-10-09 PROCEDURE — 36415 COLL VENOUS BLD VENIPUNCTURE: CPT

## 2024-10-10 ENCOUNTER — HOSPITAL ENCOUNTER (OUTPATIENT)
Dept: INFUSION CENTER | Facility: CLINIC | Age: 69
Discharge: HOME | End: 2024-10-10
Payer: COMMERCIAL

## 2024-10-10 DIAGNOSIS — C25.9 ADENOCARCINOMA OF PANCREAS (HCC): Primary | ICD-10-CM

## 2024-10-10 PROCEDURE — 96372 THER/PROPH/DIAG INJ SC/IM: CPT

## 2024-10-10 RX ADMIN — PEGFILGRASTIM 6 MG: KIT SUBCUTANEOUS at 15:46

## 2024-10-10 NOTE — PROGRESS NOTES
Patient presents for CADD pump disconnection. Offers no complaints at this time. Pump deflated upon assessment. Port flushed and de-accessed with positive blood return.Neulasta Onpro attached to right abdomen. Verified with blinking green light. Pt aware of disconnect date and time.  AVS declined, aware of next appointment on 10/21/24 at 10:30 am

## 2024-10-11 ENCOUNTER — DOCUMENTATION (OUTPATIENT)
Dept: GENETICS | Facility: CLINIC | Age: 69
End: 2024-10-11

## 2024-10-11 ENCOUNTER — OFFICE VISIT (OUTPATIENT)
Dept: GENETICS | Facility: CLINIC | Age: 69
End: 2024-10-11

## 2024-10-11 ENCOUNTER — TELEPHONE (OUTPATIENT)
Dept: HEMATOLOGY ONCOLOGY | Facility: CLINIC | Age: 69
End: 2024-10-11

## 2024-10-11 ENCOUNTER — TELEPHONE (OUTPATIENT)
Age: 69
End: 2024-10-11

## 2024-10-11 DIAGNOSIS — C25.9 ADENOCARCINOMA OF PANCREAS (HCC): ICD-10-CM

## 2024-10-11 DIAGNOSIS — C25.9 ADENOCARCINOMA OF PANCREAS (HCC): Primary | ICD-10-CM

## 2024-10-11 DIAGNOSIS — E11.65 TYPE 2 DIABETES MELLITUS WITH HYPERGLYCEMIA, WITHOUT LONG-TERM CURRENT USE OF INSULIN (HCC): Primary | ICD-10-CM

## 2024-10-11 DIAGNOSIS — R16.0 LIVER MASSES: ICD-10-CM

## 2024-10-11 RX ORDER — INSULIN GLARGINE 100 [IU]/ML
20 INJECTION, SOLUTION SUBCUTANEOUS
Qty: 10 ML | Refills: 1 | Status: SHIPPED | OUTPATIENT
Start: 2024-10-11 | End: 2024-10-17 | Stop reason: SDUPTHER

## 2024-10-11 RX ORDER — LANCETS 33 GAUGE
EACH MISCELLANEOUS
COMMUNITY

## 2024-10-11 RX ORDER — BLOOD-GLUCOSE METER
EACH MISCELLANEOUS
COMMUNITY

## 2024-10-11 RX ORDER — FLASH GLUCOSE SENSOR
KIT MISCELLANEOUS
COMMUNITY
Start: 2024-10-08

## 2024-10-11 RX ORDER — INSULIN GLARGINE 100 [IU]/ML
26 INJECTION, SOLUTION SUBCUTANEOUS
Qty: 10 ML | Refills: 0 | OUTPATIENT
Start: 2024-10-11

## 2024-10-11 NOTE — TELEPHONE ENCOUNTER
Called and spoke with patient, he states he is feeling better and finally has some energy. He states he was the one who scheduled the liver bx and wanted to make sure this was ok. Discussed with provider and infusion treatment unfortunately needed to get delayed a week since bx was scheduled day after chemotherapy. Pt verbalizes understanding and communication sent to infusion to change the appt.

## 2024-10-11 NOTE — TELEPHONE ENCOUNTER
Dr Smith always prescribed it and he is down to 20 units. [facility]

## 2024-10-11 NOTE — TELEPHONE ENCOUNTER
Reason for call:   [x] Refill   [] Prior Auth  [] Other:     Office:   [x] PCP/Provider -   [] Specialty/Provider -     Medication:     insulin glargine (LANTUS) 100 units/mL subcutaneous injection       Dose/Frequency: Inject 20 Units under the skin daily at bedtime     Quantity: 10 mL    Pharmacy: [place], [place] - [id number]     Does the patient have enough for 3 days?   [x] Yes   [] No - Send as HP to POD

## 2024-10-11 NOTE — PROGRESS NOTES
Pre-Test Genetic Counseling:    Discussion:  I introduced myself to Abhishek and let him know that his oncologist Dr. Aaron referred him to the Cancer Risk and Genetics program given his pancreatic cancer diagnosis.     I reviewed the following with Abhishek:    While the majority of cancer occurs by chance, 10-20% of pancreatic cancer has an underlying genetic cause.  Genetic testing is available which can determine if there is an underlying genetic cause to Abhishek's pancreatic cancer. Understanding if there is an underlying genetic cause can:  Provide physicians and oncology team with additional information to help determine the most appropriate treatment/management decisions and eligibility for clinical trials.    Determine if there is an increased risk for any additional cancers.  Help family members understand their cancer risk.       During today's appointment, Abhishek and I discussed the possibility of genetic testing. Testing may take up to 2-3 weeks and so prompt testing is recommended should it have impact on treatment.     Abhishek's personal and family history was collected ahead of today's visit and is available below. We discussed that Abhishek meets NCCN criteria based on his diagnosis of pancreatic cancer, however obtaining family history is important in ordering appropriate testing and interpreting results.    Personal and Family History:  Oncology History   Adenocarcinoma of pancreas (HCC)   8/13/2024 Biopsy    A.-B.  Pancreas, Body, FNA (Cell block and smear preparations):   Malignant  Adenocarcinoma.     8/20/2024 Initial Diagnosis    Adenocarcinoma of pancreas (HCC)     8/26/2024 -  Cancer Staged    Staging form: Pancreas, AJCC 8th Edition  - Clinical: Stage IB (cT2, cN0, cM0) - Signed by Roz Faulkner MD on 8/26/2024  Total positive nodes: 0       9/24/2024 -  Chemotherapy    alteplase (CATHFLO), 2 mg, Intracatheter, Every 1 Minute as needed, 3 of 12 cycles  pegfilgrastim (NEULASTA ONPRO), 6 mg,  Subcutaneous, Once, 3 of 12 cycles  Administration: 6 mg (9/26/2024), 6 mg (10/10/2024)  fosaprepitant (EMEND) IVPB, 150 mg, Intravenous, Once, 3 of 12 cycles  Administration: 150 mg (9/24/2024), 150 mg (10/8/2024)  irinotecan (CAMPTOSAR) chemo infusion, 150 mg/m2 = 294 mg, Intravenous, Once, 3 of 12 cycles  Dose modification: 112.5 mg/m2 (75 % of original dose 150 mg/m2, Cycle 2, Reason: Dose Not Tolerated)  Administration: 294 mg (9/24/2024), 227 mg (10/8/2024)  oxaliplatin (ELOXATIN) chemo infusion, 65 mg/m2 = 127.4 mg, Intravenous, Once, 3 of 12 cycles  Dose modification: 48.75 mg/m2 (75 % of original dose 65 mg/m2, Cycle 2, Reason: Dose Not Tolerated)  Administration: 127.4 mg (9/24/2024), 98.5 mg (10/8/2024)  fluorouracil (ADRUCIL) ambulatory infusion Soln, 1,200 mg/m2/day = 4,705 mg, Intravenous, Over 46 hours, 3 of 12 cycles  Dose modification: 900 mg/m2/day (75 % of original dose 1,200 mg/m2/day, Cycle 3, Reason: Dose Not Tolerated)         Family history:   Do you have Ashkenazi Mormon ancestry? No  If yes, maternal, paternal, or both?    Do you have any children? Yes  How many sons? 0  How many daughters? 1, 43  Do any of your children have a history of cancer? No  If yes type/age of diagnosis:     Do you have any brothers or sisters? Yes  How many brothers? 3  How many sisters? 1  Are they from the same parents? Yes  If no how maternal/paternal half-siblings:  Do any of your brothers or sisters have a history of cancer? Yes  If yes who and the type/age of diagnosis:   Sister with history of skin cancer  Brother with history of early stage prostate cancer dx 60          Do you have nieces or nephews? Yes  Do any of them have a history of cancer? Yes  If yes type/age of diagnosis:  Nephew with history of colon cancer dx 42 (dad had history of prostate cancer)    Does your mother have a history of cancer? No  If yes, cancer type and age of diagnosis:   Is your mother still living? No  Age/Age of death:  78    Thinking about your mother's family (aunts, uncles, cousins, grandparents) is there anyone with a history of cancer? Yes  If yes, list relationship, cancer type and age of diagnosis:  Aunt with history of colon cancer  Grandmother with history of lung cancer    Does your father have a history of cancer? Yes  If yes, cancer type and age of diagnosis: history of skin cancer (hx exposure)  Is your father still living? No  Age/age of death: 60    Thinking about your father's family (aunts, uncles, cousins, grandparents) is there anyone with a history of cancer? No  If yes, list relationship, cancer type and age of diagnosis:      Types of Results  Positive Result :  May explain personal diagnosis/family history. Can provide care team information for the most appropriate treatment plan and provide insight about additional cancer risks to inform future screening/management. Positive results can initiate testing for other family members who may be at risk (children, siblings, etc)    Negative Result:  Does not give an explanation for diagnosis and/or family history. Treatment plan will be based on clinical presentation and/or additional testing. Negative result cannot be passed down to children, but they are still at elevated risk.    Uncertain Result (VUS):  Common, but treated like a negative result clinically. At the time of testing, the lab is unable to definitely classify this variant as pathogenic (harmful) or benign (harmless). 90% are downgraded over time, meaning most VUS results are later classified as benign.     Billing:  Most individuals pay <$100 for hereditary cancer genetic testing. If insurance covers the cost of the testing, individuals may still pay out of pocket secondary to co-pays, co-insurance, or deductibles. If the cost of the testing exceeds $100, the lab will reach out to the patient via phone or e-mail. The patient will then have the option to proceed with the testing, cancel the testing,  or elect the self-pay option of $250. Abhishek verbalized understanding.     Plan:  Meets NCCN V1.2025 Testing Criteria for Pancreatic Cancer Susceptibility Genes: personal history of pancreatic cancer  Meets NCCN V2.2024 Testing Criteria for the Evaluation of Kevin syndrome: 2 second-degree relatives diagnosed with CRC with one being diagnosed under 50    Patient decided to proceed with testing and provided consent.  A blood kit was mailed out on 10/11 along with information on genetic testing and the lab's billing policy.     Genetic Testing Preformed: [employer]: Cancer® +RNAinsight® (59 genes): APC, DANA, AXIN2, BAP1, BARD1, BMPR1A, BRCA1, BRCA2, BRIP1, CDH1, CDK4, CDKN1B, CDKN2A, CHEK2, CTNNA1, DICER1, EGLN1, EPCAM, FH, FLCN, GREM1, HOXB13, KIF1B, KIT, MAX, MEN1, MET, MITF, MLH1, MSH2, MSH3, MSH6, MUTYH, NF1, NTHL1, PALB2, PDGFRA PMS2, POLD1, POLE, POT1, PTEN, RAD51C, RAD51D, RB1, RET, SDHA, SDHAF2, SDHB, SDHC, SDHD, SMAD4, SMARCA4, STK11, WXZC444, TP53, TSC1, TSC2, VHL     Result Delivery:  Results will be delivered over Tango.  It may take up to 2-3 weeks to complete once test is started.    In the event that we need to contact the patient:   I confirmed the patient's mobile number on file as the best number to call with results  I confirmed with the patient that we can leave a voicemail on the provided numbers

## 2024-10-11 NOTE — TELEPHONE ENCOUNTER
Patient calling to speak with Dr. Aaron.  He is scheduled for a biopsy on 10/23/24 and has chemo 10/22/24.  He wants to know if it is okay to have the biopsy the day after his treatment

## 2024-10-14 ENCOUNTER — TELEPHONE (OUTPATIENT)
Age: 69
End: 2024-10-14

## 2024-10-14 NOTE — TELEPHONE ENCOUNTER
Call received from patient and his wife. Wanted to relay information they got from a [facility]. Stated one doctor there recommended not delaying chemo and resuming it, and to get his liver biopsy done. Another doctor stated to delay the biopsy due to his risk of infection. They would like a call back from Dr Aaron when he has the chance to discuss all of this with him and see if they should keep the biopsy scheduled and have it done, which is scheduled for 10/23. They are aware of their appointment with Dr Aaron on Monday but would like to go over this with him and what the second opinion they got was.

## 2024-10-15 RX ORDER — ATROPINE SULFATE 1 MG/ML
0.25 INJECTION, SOLUTION INTRAVENOUS ONCE AS NEEDED
Status: CANCELLED | OUTPATIENT
Start: 2024-10-22

## 2024-10-15 RX ORDER — SODIUM CHLORIDE 9 MG/ML
20 INJECTION, SOLUTION INTRAVENOUS ONCE AS NEEDED
Status: CANCELLED | OUTPATIENT
Start: 2024-10-22

## 2024-10-15 RX ORDER — DEXTROSE MONOHYDRATE 50 MG/ML
20 INJECTION, SOLUTION INTRAVENOUS ONCE
Status: CANCELLED | OUTPATIENT
Start: 2024-10-22

## 2024-10-15 RX ORDER — ATROPINE SULFATE 1 MG/ML
0.25 INJECTION, SOLUTION INTRAVENOUS ONCE
Status: CANCELLED | OUTPATIENT
Start: 2024-10-22

## 2024-10-16 ENCOUNTER — TELEMEDICINE (OUTPATIENT)
Dept: PALLIATIVE MEDICINE | Facility: CLINIC | Age: 69
End: 2024-10-16
Payer: COMMERCIAL

## 2024-10-16 ENCOUNTER — TELEPHONE (OUTPATIENT)
Dept: PALLIATIVE MEDICINE | Facility: CLINIC | Age: 69
End: 2024-10-16

## 2024-10-16 DIAGNOSIS — Z79.899 MEDICAL MARIJUANA USE: ICD-10-CM

## 2024-10-16 DIAGNOSIS — T45.1X5A CHEMOTHERAPY INDUCED NEUTROPENIA (HCC): ICD-10-CM

## 2024-10-16 DIAGNOSIS — G47.00 INSOMNIA: ICD-10-CM

## 2024-10-16 DIAGNOSIS — Z51.5 PALLIATIVE CARE BY SPECIALIST: ICD-10-CM

## 2024-10-16 DIAGNOSIS — G89.3 CANCER-RELATED PAIN: ICD-10-CM

## 2024-10-16 DIAGNOSIS — C25.9 ADENOCARCINOMA OF PANCREAS (HCC): Primary | ICD-10-CM

## 2024-10-16 DIAGNOSIS — R11.0 NAUSEA: ICD-10-CM

## 2024-10-16 DIAGNOSIS — R63.0 LOSS OF APPETITE: ICD-10-CM

## 2024-10-16 DIAGNOSIS — D70.1 CHEMOTHERAPY INDUCED NEUTROPENIA (HCC): ICD-10-CM

## 2024-10-16 PROCEDURE — 99214 OFFICE O/P EST MOD 30 MIN: CPT | Performed by: INTERNAL MEDICINE

## 2024-10-16 PROCEDURE — G2211 COMPLEX E/M VISIT ADD ON: HCPCS | Performed by: INTERNAL MEDICINE

## 2024-10-16 NOTE — PATIENT INSTRUCTIONS
It was good to speak with you today. Thank you for your time.    We discussed medical marijuana (MMJ) today and I have issued a certification for its use. You should expect an e-mail from the [place] Dept of Health verifying this. They will mail you your MMJ certification card (in three days to three weeks).  Please review and sign the informed consent document that we are mailing you; you and I reviewed it today. Please send back the signed copy (mail or fax), or bring it to your next Palliative visit.  List of dispensaries provided. Once you have your MMJ card, please call your preferred dispensary to set up an appointment with the dispensary pharmacist to review symptoms and product options.  Symptoms to discuss: cancer-related pain, insomnia, loss of appetite, nausea  I recommend you ask family/friends to register as an MMJ caregiver (otherwise no one can go into the dispensary with you). They register on the same site you did (www.medical[place].gov). They may need a background check.  Return in about 1 month in our [place] office.  Call us for refills on medications that we supply, as needed.  If something changes and you need to come in sooner, please call our office.    PRESCRIPTION REFILL REMINDER:  All medication refills should be requested prior to Noon on Friday. Any refill requests after noon on Friday would be addressed the following Monday.    MEDICATION SAFETY ISSUES:  Do not drive under the influence of narcotics (including opioids), watch for adverse effects including confusion / altered mental status / respiratory depression (slowed breathing), keep medications stored in a safe/locked environment, do not use alcohol while opioids or other narcotics are in your system. Do not travel with more than the minimum number of tablets or capsules required for the trip.

## 2024-10-16 NOTE — TELEPHONE ENCOUNTER
Called Pt to make his next f/u appt with Dr. Ward. Mailed MMJ Consent, MMJ Certification and AVS to the Pt.

## 2024-10-16 NOTE — PROGRESS NOTES
Virtual Regular Visit  Name: Abhishek Gimenez      : 1955      MRN: [id number]  Encounter Provider: Blair Gates MD  Encounter Date: 10/16/2024   Encounter department: [facility]    Verification of patient location:    Patient is located at Home in the following state in which I hold an active license [place]    Intake and Identification:    Reason(s) for virtual visit: televideo, follow-up, symptom management, psychosocial support, and medical marijuana. The patient is unable to visit the clinic due to distance.    Encounter provider Blair Gates MD, located at:  [address] [address] [phone]    Recent Visits  No visits were found meeting these conditions.  Showing recent visits within past 7 days and meeting all other requirements  Today's Visits  Date Type Provider Dept   10/16/24 Telemedicine Blair Gates MD [facility]   Showing today's visits and meeting all other requirements  Future Appointments  No visits were found meeting these conditions.  Showing future appointments within next 150 days and meeting all other requirements       Patient agrees to participate in a virtual check in via telephone or video visit instead of presenting to the office to address urgent/immediate medical needs, or to respect quarantine and public health guidelines. Patient is aware this is a billable service.     After connecting through LIN TVideo, the patient was identified by name and date of birth. Abhishek Gimenez was informed that this was a telemedicine visit and that the visit is being conducted through the Epic Embedded platform. He agrees to proceed. My office door was closed. No one else was in the room. He acknowledged consent and understanding of privacy and security of the video platform. I informed the patient that I have reviewed his record in EPIC and presented the  opportunity for him to ask any questions regarding the visit today. The patient has agreed to participate and understands they can discontinue the visit at any time.     Assessment & Plan   1. Adenocarcinoma of pancreas (HCC)  Assessment & Plan:  Pancreatic adenocarcinoma receiving mFOLFIRINOX. Continue disease-directed cares.  2. Loss of appetite  Assessment & Plan:  Appetite will wax and wane, worsened after chemotherapy but he is able to recover between sessions. MMJ may help stimulate appetite.  3. Cancer-related pain  Assessment & Plan:  Continue effective analgesic regimen. MMJ may also help w/ pain.  4. Insomnia  Assessment & Plan:  MMJ may help with this symptom.  5. Nausea  Assessment & Plan:  Continue effective antiemetic regimen. MMJ may also help with this symptom.  6. Palliative care by specialist  Assessment & Plan:  Emotional / psychosocial support provided today.  Reviewed notes ([facility] SW/therapy, Genetics, RD, Medical Oncology), labs (10/7/24 Cr 0.74, alb 3.8, Hb 13.1; 8/27/24 CA 19-9 462), imaging + procedures (9/5/24 CXR, 8/30/24 CTAP). See below for more data.  Return in about 1 month (around 11/16/2024).  Medication safety issues addressed - no driving under the influence of narcotics (including opioids), watch for adverse effects including AMS or respiratory depression (slowed breathing), keep medications stored in a safe/locked environment, do not use alcohol while opioids or other narcotics are in one's system, do not travel with more than the minimum number of tablets or capsules required for the trip.  I have personally queried the patient's controlled substance dispensing history in the Prescription Drug Monitoring Program in compliance with regulations before I have prescribed any controlled substances. The prescription history is consistent with prescribed therapy and our practice policies.    7. Medical marijuana use  Assessment & Plan:  The patient qualifies for use of MMJ in the State  "of [place] by having the following medical condition: pancreatic cancer.  From a palliative care standpoint the patient is suffering with cancer-related pain, insomnia, loss of appetite, nausea. These symptoms and side effects might be alleviated with use of MMJ products. The patient registered online (patient ID #[id number]). The patient read and I reviewed the informed consent document with the patient. I answered all questions related to it before the patient signed it. The patient's medical certification was completed on this date (certification #[id number]). The patient was given a copy of the medical certification.  The patient accepts extensive counseling today on MMJ risks and benefits, legal concerns, possible adverse effects, routes of administration, active ingredients such as THC+CBD, etc.  I issued a certification for MMJ use with palliative intent, to help alleviate cancer-related symptoms and cancer-treatment-related side effects. I do not endorse the belief that MMJ can treat cancer and strongly encouraged the patient to continue treatments and surveillance as recommend by cancer specialists.  I suggested the patient ask family or friends to register as an MMJ caregiver so they can go with the patient to the dispensary, or in the patient's stead. Information provided on the MMJ caregiver program.      Subjective   Chief Complaint   Patient presents with    Virtual Regular Visit    Cancer    Pain    Insomnia    Nausea    Loss of Appetite    Counseling        History of Present Illness     Abhishek Gimenez is a 68 y.o. male w/ pancreatic adenocarcinoma receiving mFOLFIRINOX; pulmonary HTN, diabetes, pAfib. He follows w/ Dr Aaron (Medical Oncology).    Patient is known to [facility]; seen 8/26/245 in consult by Kamar Jung PA-C.    Patient has registered online with the [facility] of Health medical marijuana program. He is hopeful that medical marijuana products will help him with \"a little stomach " "pain\" that he is experiencing, insomnia, loss of appetite, and nausea. He feels his antiemetic regimen helps but anything he can do to help improve his appetite and overall wellbeing would be appreciated. He states that he is \"down 35 pounds\" since the start of 2024.    Patient feels his mood has improved and he feels \"less upset\" and is tearful less often. He is appreciative of the support provided by the [facility] psychotherapist.    Patient feels he is tolerating his cancer therapies fairly well and he is overall doing \"pretty darn good\". He feels well supported by his family, especially his wife Sherri.      Current Outpatient Medications:     Continuous Glucose Sensor (FreeStyle Livia 14 Day Sensor) MISC, USE 1 DEVICE 3 (THREE) TIMES A DAY BEFORE MEALS, Disp: , Rfl:     adalimumab (HUMIRA) 40 mg/0.8 mL PSKT, Inject 40 mg under the skin every 14 (fourteen) days  (Patient not taking: Reported on 9/23/2024), Disp: , Rfl:     Alcohol Swabs 70 % PADS, May substitute brand based on insurance coverage. Check glucose TID. (Patient not taking: Reported on 9/23/2024), Disp: 100 each, Rfl: 0    Benzocaine-Isopropyl Alcohol 6-70 % PADS, Use, Disp: , Rfl:     Blood Glucose Monitoring Suppl ([employer] Blood Glucose Meter) w/Device KIT, Use, Disp: , Rfl:     Blood Glucose Monitoring Suppl (OneTouch Verio Reflect) w/Device KIT, Check blood sugars once daily. Please substitute with appropriate alternative as covered by patient's insurance. Dx: E11.65, Disp: 1 kit, Rfl: 0    Continuous Blood Gluc  (FreeStyle Livia 2 [place]) LEANA, 14 DAY READER NOT MADE, Disp: 1 each, Rfl: 1    Continuous Glucose Sensor (FreeStyle Livia 14 Day Sensor) MISC, Use 1 Device 3 (three) times a day before meals, Disp: 2 each, Rfl: 5    Glucose Blood (BLOOD GLUCOSE TEST STRIPS 333 VI), Use, Disp: , Rfl:     glucose blood (OneTouch Verio) test strip, Check blood sugars once daily. Please substitute with appropriate alternative as covered by patient's " insurance. Dx: E11.65, Disp: 100 each, Rfl: 3    insulin glargine (LANTUS) 100 units/mL subcutaneous injection, Inject 20 Units under the skin daily at bedtime, Disp: 10 mL, Rfl: 1    Insulin Pen Needle (BD Pen Needle Mallory 2nd Gen) 32G X 4 MM MISC, For use with insulin pen. Pharmacy may dispense brand covered by insurance., Disp: 100 each, Rfl: 0    Insulin Pen Needle (BD Pen Needle Mallory U/F) 32G X 4 MM MISC, Use daily as directed with insulin pen, Disp: 100 each, Rfl: 3    Lancets 33G MISC, Use, Disp: , Rfl:     lidocaine-prilocaine (EMLA) cream, Apply topically as needed for mild pain Apply to port area 45-60min prior to treatment and cover with dressing or plastic wrap, Disp: 30 g, Rfl: 1    metFORMIN (GLUCOPHAGE-XR) 500 mg 24 hr tablet, TAKE 2 TABLETS BY MOUTH EVERY DAY WITH BREAKFAST, Disp: 180 tablet, Rfl: 1    ondansetron (ZOFRAN) 4 mg tablet, Take 1 tablet (4 mg total) by mouth every 8 (eight) hours as needed for nausea or vomiting, Disp: 20 tablet, Rfl: 2    OneTouch Delica Lancets 33G MISC, Check blood sugars once daily. Please substitute with appropriate alternative as covered by patient's insurance. Dx: E11.65, Disp: 100 each, Rfl: 3    oxyCODONE (Roxicodone) 5 immediate release tablet, Take 1 tablet (5 mg total) by mouth every 6 (six) hours as needed for moderate pain Max Daily Amount: 20 mg, Disp: 90 tablet, Rfl: 0    pantoprazole (PROTONIX) 40 mg tablet, Take 1 tablet (40 mg total) by mouth daily before breakfast, Disp: 30 tablet, Rfl: 5    rosuvastatin (CRESTOR) 5 mg tablet, TAKE 1 TABLET BY MOUTH EVERY DAY IN THE EVENING, Disp: 90 tablet, Rfl: 1    sildenafil (VIAGRA) 100 mg tablet, TAKE ONE (1) TABLET BY MOUTH DAILY AS NEEDED, Disp: , Rfl:     Objective   Video assessment of patient:   Physical Exam  Constitutional:       General: He is awake. He is not in acute distress.     Appearance: He is well-groomed and normal weight. He is not toxic-appearing.   HENT:      Head: Normocephalic and  atraumatic.      Right Ear: External ear normal.      Left Ear: External ear normal.   Eyes:      General: No scleral icterus.        Right eye: No discharge.         Left eye: No discharge.      Extraocular Movements: Extraocular movements intact.      Conjunctiva/sclera: Conjunctivae normal.   Pulmonary:      Effort: Pulmonary effort is normal. No tachypnea, bradypnea, accessory muscle usage, prolonged expiration or respiratory distress.      Comments: Able to speak comfortably in complete sentences on room air at rest.  Abdominal:      Tenderness: There is no guarding.   Musculoskeletal:      Cervical back: Normal range of motion.   Skin:     Coloration: Skin is not pale.   Neurological:      Mental Status: He is alert and oriented to person, place, and time.      Cranial Nerves: No dysarthria or facial asymmetry.   Psychiatric:         Attention and Perception: Attention normal.         Mood and Affect: Mood normal.         Speech: Speech normal.         Behavior: Behavior normal. Behavior is cooperative.         Thought Content: Thought content normal.         Cognition and Memory: Cognition and memory normal.         Judgment: Judgment normal.         Recent labs:  Lab Results   Component Value Date/Time    SODIUM 138 10/07/2024 08:38 AM    SODIUM 143 02/10/2023 10:42 AM    K 3.7 10/07/2024 08:38 AM    K 4.3 02/10/2023 10:42 AM    BUN 17 10/07/2024 08:38 AM    BUN 21 02/10/2023 10:42 AM    CREATININE 0.74 10/07/2024 08:38 AM    CREATININE 0.83 11/09/2023 08:56 AM    GLUC 177 (H) 10/07/2024 08:38 AM    GLUC 96 02/10/2023 10:42 AM    CALCIUM 8.9 10/07/2024 08:38 AM    CALCIUM 9.5 03/15/2016 07:26 AM    AST 23 10/07/2024 08:38 AM    AST 25 11/09/2023 08:56 AM    ALT 27 10/07/2024 08:38 AM    ALT 47 11/09/2023 08:56 AM    ALB 3.8 10/07/2024 08:38 AM    ALB 4.3 11/09/2023 08:56 AM    TP 6.7 10/07/2024 08:38 AM    TP 7.2 11/09/2023 08:56 AM    EGFR 96 10/07/2024 08:38 AM    EGFR 97 11/09/2023 08:56 AM     Lab  Results   Component Value Date/Time    HGB 13.1 10/07/2024 08:38 AM    WBC 12.30 (H) 10/07/2024 08:38 AM     (L) 10/07/2024 08:38 AM    INR 1.00 09/18/2024 10:37 AM     Lab Results   Component Value Date/Time    HGO2QKFUYYXK 2.140 01/11/2023 10:16 AM    QHB0MJTUTRAK 3.760 03/15/2016 07:26 AM       Recent Imaging:  Procedure: IR biopsy liver mass    Result Date: 9/18/2024  Narrative: PROCEDURE: Ultrasound-guided liver lesion biopsy Procedural Personnel Attending physician(s): Dr. Mckenna Pre-procedure diagnosis: Pancreatic adenocarcinoma Post-procedure diagnosis: Same Indication: Patient with history of pancreatic adenocarcinoma noted to have liver lesions presents for liver lesion biopsy. PROCEDURE SUMMARY: - Percutaneous US-guided liver lesion biopsy PROCEDURE DETAILS: Pre-procedure Reference imaging for biopsy target: CT abdomen/pelvis 8/30/2024 Consent: Informed consent for the procedure including risks, benefits and alternatives was obtained and time-out was performed prior to the procedure. Preparation: The site was prepared and draped using maximal sterile barrier technique including cutaneous antisepsis. Anesthesia/sedation Level of anesthesia/sedation: Moderate sedation (conscious sedation) Anesthesia/sedation administered by: IR nurse under attending supervision with continuous monitoring of the patient's level of consciousness and physiologic status Total intra-service sedation time (minutes): 15 Biopsy Local anesthesia was administered. Under US guidance, 17-gauge coaxial introducer needle was advanced to the segment 5 liver lesion. 18-gauge core biopsy needle was used to obtain 4 core needle samples which were placed in formalin. D-Stat hemostatic agent was used to perform tract embolization during needle removal. Postprocedure ultrasound showed no hematoma. Additional Details Specimens removed: 4 core needle samples placed into formalin. Estimated blood loss (mL): 1 Complications: No immediate  complications.     Impression: Ultrasound-guided biopsy of segment 5 liver lesion. Plan: Follow-up on biopsy results. Workstation performed: [id number]     Procedure: US OUTSIDE FILMS    Result Date: 9/18/2024  Narrative: This is a non-reportable study used for image storage. It has been automatically finalized and does not contain a result.    Procedure: FL guided central venous access device insertion    Result Date: 9/6/2024  Narrative: C-ARM - FL GUIDED CENTRAL VENOUS ACCESS DEVICE INSERTION INDICATION: C25.9: Malignant neoplasm of pancreas, unspecified. Procedure guidance. TECHNIQUE: Fluoroscopic guidance provided. COMPARISON: None FLUOROSCOPY TIME: 18.6 seconds 4 FLUOROSCOPIC IMAGES FINDINGS: Fluoroscopy provided for procedure guidance. Right chest port catheter tip in the cavoatrial region. Osseous and soft tissue detail limited by technique.     Impression: Fluoroscopy provided for procedure guidance. Please refer to the separate procedure note for additional details. Workstation performed: [id number]     Procedure: XR chest portable    Result Date: 9/5/2024  Narrative: XR CHEST PORTABLE INDICATION: Post internal jugular port placement. COMPARISON: Chest radiograph 4/22/2021, CT chest 8/19/2024 FINDINGS: Right chest port tip projects at the cavoatrial junction. Clear lungs. No pneumothorax or pleural effusion. Normal cardiomediastinal silhouette. Paravertebral ossifications thoracic spine. Osteoarthritis left glenohumeral joint. Normal upper abdomen.     Impression: No pneumothorax following right chest port insertion. Workstation performed: [id number]     Procedure: Mammo outside images    Result Date: 9/5/2024  Narrative: This is a non-reportable study used for image storage. It has been automatically finalized and does not contain a result.    Procedure: IR OUTSIDE FILMS    Result Date: 9/5/2024  Narrative: This is a non-reportable study used for image storage. It has been automatically finalized and  does not contain a result.    Procedure: CT abdomen pelvis w contrast    Result Date: 9/4/2024  Narrative: CT ABDOMEN AND PELVIS WITH IV CONTRAST INDICATION: C25.9: Malignant neoplasm of pancreas, unspecified. Fine-needle aspiration/biopsy dated 8/13/2024 positive for malignancy, adenocarcinoma.. COMPARISON: Correlation with MRI abdomen with and without contrast 7/10/2024; TECHNIQUE: CT examination of the abdomen and pelvis was performed. Multiplanar 2D reformatted images were created from the source data. This examination, like all CT scans performed in the [facility], was performed utilizing techniques to minimize radiation dose exposure, including the use of iterative reconstruction and automated exposure control. Radiation dose length product (DLP) for this visit: 1275 mGy-cm IV Contrast: 100 mL of iohexol (OMNIPAQUE) Enteric Contrast: Not administered. FINDINGS: ABDOMEN LOWER CHEST: No clinically significant abnormality in the visualized lower chest. LIVER/BILIARY TREE: Redemonstrated 3.5 cm hemangioma adjacent to the IVC and caudate lobe demonstrates no significant interval change. Numerous small hypo and hyperenhancing hepatic lesions are present as also noted on the comparison MRI. While some of these may reflect combination of cysts, hemangioma/flash filling hemangiomas, and vascular shunts, there are several with a more suspicious appearance. In particular, a 1.2 cm lesion in segment 5/6 (series 301 image 49) is felt suspicious. On the comparison MRI this lesion demonstrated intermediate T2 hyperintensity with early ringlike enhancement and delayed targetoid hypoenhancement. An additional 1 cm lesion in segment 4A (301/29) and 0.9 cm lesion in the hepatic dome (301/20) are also felt to be suspicious. No significant change in size from the MRI. GALLBLADDER: No calcified gallstones. No pericholecystic inflammatory change. SPLEEN: Splenomegaly measuring 14.0 cm in maximum AP dimension.  PANCREAS There is a lesion suspicious for pancreatic cancer, which will be described based on Society of Abdominal Radiologists and American Pancreatic Association recommendations: MORPHOLOGIC EVALUATION: Appearance: Hypoattenuating. Size: Approximately 6.8 x 2.8 cm (301/42), similar to comparison MRI. Location: Pancreatic body and tail. Pancreatic duct narrowing/abrupt cut-off with or without upstream dilatation: Absent. Biliary tree abrupt cut-off with or without upstream dilatation: Absent. ARTERIAL EVALUATION: Superior Mesenteric Artery Involvement: Absent. Incidental note of mild narrowing of the proximal superior mesenteric artery by atherosclerosis. Celiac Axis Involvement: Present. Degree of solid soft-tissue contact: </= 180 degrees. Difficult to evaluate due to location. There is abutment of the celiac bifurcation and encasement of the proximal common hepatic and splenic arteries, though the celiac artery itself does not appear to be encased greater than 180 degrees. Degree of increased hazy attenuation/stranding contact: </= 180 degrees. Focal vessel narrowing or contour irregularity: Absent. Common Hepatic Artery Involvement: Present. Degree of solid soft-tissue contact: > 180 degrees. Encasement of the proximal common hepatic artery at its origin (201/42 and 43) with mild focal irregular narrowing. Degree of increased hazy attenuation/stranding contact: REPORT AS LESS THAN OR EQUAL  DEGREES OR GREATER THAN 180 DEGREES. Focal vessel narrowing or contour irregularity: Present. Mild irregular narrowing proximally (201/43) Extension to celiac axis: Absent. Extension to the bifurcation of right/left hepatic artery: Absent. Arterial Variant: Type: Replaced right hepatic artery. Identified on 201/48-50 Tumor involvement: Absent. VENOUS EVALUATION: Main Portal Vein Involvement: Present. Degree of solid soft-tissue contact: </= 180 degrees. Abutment of the proximal aspect of the main portal vein just  beyond the confluence (301/46) Degree of increased hazy attenuation/stranding contact: </= 180 degrees. Focal vessel narrowing or contour irregularity (tethering or teardrop): Absent. Superior Mesenteric Vein Involvement: Encasement of the superior mesenteric vein with focal moderate narrowing (301/48). FOR EVALUATION FOR POTENTIAL EXTRAPANCREATIC TUMOR, PLEASE SEE THE REST OF THE BODY OF THIS REPORT. ADRENAL GLANDS: Unremarkable. KIDNEYS/URETERS: 3 mm nonobstructing right renal calculus. Simple left renal cyst. No hydronephrosis. STOMACH AND BOWEL: Unremarkable. APPENDIX: Normal. ABDOMINOPELVIC CAVITY: No ascites. No pneumoperitoneum. Prominent lymph nodes in the gemma hepatis not meeting size criteria for lymphadenopathy. (For example series 301, image 41). VESSELS: Atherosclerotic changes. No abdominal aortic aneurysm. Vascular involvement by tumor as detailed above. Extensive collateral vessels in the left hemiabdomen. Incidental note of retroaortic left renal vein. PELVIS REPRODUCTIVE ORGANS: Unremarkable for patient's age. URINARY BLADDER: Unremarkable. ABDOMINAL WALL/INGUINAL REGIONS: Unremarkable. BONES: No acute fracture or suspicious osseous lesion. Spinal degenerative changes.     Impression: 1. Known pancreatic malignancy with vascular involvement as detailed above. No significant change from MRI. 2. Numerous hepatic lesions similar to the previous MRI. While some of these could reflect benign findings, several such as a 1.2 cm hypoenhancing lesion in the periphery of segment 5/6 are suspicious for metastases. This may be amenable to percutaneous biopsy. The study was marked in EPIC for significant notification. Resident: DARIAN YOUNG I, the attending radiologist, have reviewed the images and agree with the final report above. Workstation performed: [id number]     Procedure: Mammo outside images    Result Date: 8/30/2024  Narrative: This is a non-reportable study used for image storage. It has been  automatically finalized and does not contain a result.    Procedure: CT chest wo contrast    Result Date: 8/22/2024  Narrative: CT CHEST WITHOUT IV CONTRAST INDICATION: K86.89: Other specified diseases of pancreas. Pancreatic mass. Metastatic disease evaluation COMPARISON: CT scan of the chest dated 1/6/2017 and abdominal MRI dated 7/10/2024. TECHNIQUE: CT examination of the chest was performed without intravenous contrast. Multiplanar 2D reformatted images were created from the source data. This examination, like all CT scans performed in the [facility], was performed utilizing techniques to minimize radiation dose exposure, including the use of iterative reconstruction and automated exposure control. Radiation dose length product (DLP) for this visit: 316 mGy-cm FINDINGS: LUNGS: 3 mm fissural nodule along the left major fissure on image 124 of series 3. 3 mm fissural nodule along the right major fissure on image 65 of series 2. 2 mm pulmonary nodule along the left major fissure on image 114 of series 3.41. There is no tracheal or endobronchial lesion. PLEURA: Unremarkable. HEART/GREAT VESSELS: Heart is unremarkable for patient's age. No thoracic aortic aneurysm. Small amount of aortic valvular cusp calcification. Small amount of coronary calcification in the left main and left anterior descending coronary. MEDIASTINUM AND DOE: There are few small pretracheal lymph nodes that measure up to 7 mm. These lymph nodes are similar to prior CT and probably within normal limits. There are small prevascular lymph nodes as well that measure up to 9 mm that are unchanged compared to the prior study and probably within normal limits. There is an aortopulmonary window lymph node that measures 0.9 x 1.3 cm that is also unchanged compared to prior study. No axillary or hilar lymphadenopathy. CHEST WALL AND LOWER NECK: Unremarkable. VISUALIZED STRUCTURES IN THE UPPER ABDOMEN: Mild enlargement of the spleen that  measures 14 cm in anterior to posterior dimension. Small low-attenuation lesion in segment 4A of the left hepatic lobe that measures 7 mm image 95 of series 2. Low-attenuation lesion in segment 5 of the right hepatic lobe that measures 1.3 x 1.3 cm image 124 of series 2. There is a lesion involving segment one of the liver that measures 2.3 x 3.7 cm image 109 of series 2. There is a mass involving the body/tail  of the pancreas that measures 1.9 x 3.7 cm image 121 of series 2. 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 OSSEOUS STRUCTURES: Moderate endplate degenerative change in the thoracic spine. Marked degenerative change in the bilateral shoulders. No osteolytic or osteoblastic lesion.     Impression: No findings of metastatic disease in the chest. Small bilateral fissural nodules measuring 2 to 3 mm in size with a benign appearance. No imaging follow-up of these nodules is necessary. Mass along the body/tail of the pancreas is again visualized in the upper abdomen suspicious for a pancreatic cancer. Finding is unchanged compared to abdominal MRI from 7/10/2024. 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 Scattered small hepatic lesions that may indicate hepatic metastatic disease. Correlation with PET/CT or biopsy should be considered. MIld splenomegaly The study was marked in EPIC for immediate notification. Workstation performed: [id number]     Procedure: Mammo outside images    Result Date: 8/19/2024  Narrative: This is a non-reportable study used for image storage. It has been automatically finalized and does not contain a result.    Procedure: Endoscopic ultrasonography, GI (Upper)    Result Date: 8/13/2024  Narrative: Table formatting from the original result was not included. [facility] [address] [facility] [address] [phone] DATE OF SERVICE: 8/13/24 PHYSICIAN(S):  Attending: Lambert He MD Fellow: Conor Hoffman MD INDICATION: Epigastric pain, Chronic pancreatitis, unspecified pancreatitis type (HCC) POST-OP DIAGNOSIS: See the impression below. PREPROCEDURE: Informed consent was obtained for the procedure, including sedation.  Risks of perforation, hemorrhage, adverse drug reaction and aspiration were discussed. The patient was placed in the left lateral decubitus position. Patient was explained about the risks and benefits of the procedure. Risks including but not limited to bleeding, infection, and perforation were explained in detail. Also explained about less than 100% sensitivity with the exam and other alternatives. PROCEDURE: EUS UPPER DETAILS OF PROCEDURE: Patient was taken to the procedure room where a time out was performed to confirm correct patient and correct procedure. The patient underwent monitored anesthesia care, which was administered by an anesthesia professional. The patient's blood pressure, heart rate, oxygen, respirations, level of consciousness, ECG and ETCO2 were monitored throughout the procedure. The endoscope and linear scope were introduced through the mouth and advanced to the second part of the duodenum. The patient experienced no blood loss. The procedure was not difficult. The patient tolerated the procedure well. There were no apparent adverse events. ANESTHESIA INFORMATION: ASA: III Anesthesia Type: IV Sedation with Anesthesia MEDICATIONS: No administrations occurring from 1158 to 1240 on 08/13/24 FINDINGS: The esophagus appeared normal. The stomach appeared normal. The duodenum appeared normal. The celiac artery and aorta were visualized, including the abdominal aorta and appeared normal. The left kidney, spleen and left adrenal appeared normal. The parenchyma of the liver appeared normal. The parenchyma was visualized in the left lobe of the liver. Heterogeneous, hypoechoic and irregular mass measuring 30 mm x 18 mm with poorly defined  margins was visualized in the body of the pancreas. Apparent abutment of the splenic artery and splenic vein; 4 successful fine needle biopsy passes were taken with a 25 gauge [employer] needle using a transgastric approach guided by Doppler. An adequate sample was obtained. Onsite cytologist was present The parenchyma was visualized in the head of the pancreas and neck of the pancreas. The parenchyma had localized lobularity with honeycombing. The parenchyma had localized hyperechoic foci without shadowing. The pancreatic duct appeared normal and measured 1.7 mm at the body. Pancreatic tail was atrophic. The bile duct appeared normal. The bile duct measured 2.6 mm at the distal end. The gallbladder was visualized and appeared normal. The gallbladder wall thickness was normal. SPECIMENS: ID Type Source Tests Collected by Time Destination 1 : pancreatic body Tissue Pancreas TISSUE EXAM Lambert He MD 8/13/2024 12:09 PM       Impression: EGD: Normal exam. EUS: 30 x 18 mm heterogenous, hypoechoic, irregular mass in the body of pancreas. Performed FNB.  The mass was abutting the splenic vein and artery.  Mild lobularity seen in the head of pancreas without any calcifications or significant honeycombing.  Pancreatic duct is normal in caliber.  RECOMMENDATION: Await pathology results Follow up with GI Clinic Recommend surgical oncology and oncology evaluation.   Lambert He MD     Procedure: IR OUTSIDE FILMS    Result Date: 8/13/2024  Narrative: This is a non-reportable study used for image storage. It has been automatically finalized and does not contain a result.    Procedure: MRI abdomen w wo contrast and mrcp    Result Date: 7/10/2024  Narrative: MRI OF THE ABDOMEN WITH AND WITHOUT CONTRAST WITH MRCP INDICATION: 68 years / Male. K86.9: Disease of pancreas, unspecified. Hypoechoic pancreatic head lesion on ultrasound concerning for malignancy. Presented with epigastric pain. COMPARISON: 7/7/2024 abdominal  ultrasound TECHNIQUE: Multiplanar/multisequence MRI of the abdomen with 3D MRCP was performed before and after administration of contrast. IV Contrast: 8 mL of Gadobutrol injection (SINGLE-DOSE) Image quality:  Motion degrades images. FINDINGS: LOWER CHEST:   Within normal limits. LIVER: Normal signal intensity and morphology. 3.4 cm benign hemangioma adjacent to the IVC, segment 2/8. 1.0 and 0.4 cm right lobe nodules, segment 7/8, also most compatible with hemangiomas. Probable subcentimeter high medial dome hemangioma (7/8). Few, scattered, subcentimeter simple cysts. Non-mass arterial phase enhancement at the junction of the medial and lateral left lobe segments in the periphery of the right lobe typical of transient hepatic attenuation difference. May be due to vascular shunting. (11/41 and 64). Tiny focus of arterial phase enhancement at the right hepatic dome (11/20) not seen on any other image. 1.0 cm mildly T2 hyperintense right segment 5/6 nodule with complete arterial peripheral enhancement (7/22 and 11/59) without fill-in on delayed phases. Patent hepatic and portal veins. BILE DUCTS:  Normal caliber and morphology. GALLBLADDER:  Within normal limits. Patent cystic duct. SPLEEN:  Within normal limits. PANCREAS: Decreased pancreatic body and tail attenuation on T1-weighted images without IV contrast. Distal body and tail atrophy. Ill-defined 3.8 x 1.1 cm length by depth area of early hypoenhancement in the body (11/187). No pancreatic head mass identified. Evidence of surrounding edema. Several tiny cysts contiguous with the main duct in the body and tail. Measure up to 0.3 cm. No suspicious features. Only the proximal pancreatic duct is well seen, normal caliber and morphology. In the area of T1 hypointensity the duct is poorly visualized. No dilation. ADRENAL GLANDS:  Within normal limits. KIDNEYS/PROXIMAL URETERS:  Within normal limits. BOWEL:   Within normal limits. PERITONEUM/RETROPERITONEUM:  No  ascites or fluid collections. LYMPH NODES: Trace perisplenic free fluid. No fluid collections. 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 Patent superior mesenteric vessels. Normal aortic caliber and enhancement. Developmental retroaortic left renal vein. BONES:  No suspicious osseous lesion. ABDOMINAL WALL:  Within normal limits.     Impression: Abnormal appearance of the pancreas described in detail above. Suspected changes of chronic pancreatitis. Area of hypoenhancement in the pancreatic body may be chronic pancreatitis and fibrosis. Cannot exclude neoplasm. No pancreatic head mass seen. Peripancreatic edema suggest superimposed acute interstitial edematous pancreatitis. Correlate clinically with serum amylase/lipase. 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 Several benign liver lesions described above. 2 additional lesions are indeterminant: Focus of enhancement at the right dome and peripherally enhancing right lobe nodule described above. No evidence of underlying liver disease to suggest hepatocellular carcinoma. Recommend correlation for any outside prior studies and short interval follow-up. Workstation performed: [id number]     Procedure: MRI OUTSIDE FILMS    Result Date: 7/10/2024  Narrative: This is a non-reportable study used for image storage. It has been automatically finalized and does not contain a result.    Procedure: US abdomen complete    Result Date: 7/7/2024  Narrative: ABDOMEN ULTRASOUND, COMPLETE INDICATION: R10.13: Epigastric pain. COMPARISON: Right upper quadrant ultrasound 3/30/2016. TECHNIQUE: Real-time ultrasound of the abdomen was performed with a curvilinear transducer with both volumetric sweeps and still imaging techniques. FINDINGS: PANCREAS: There is a 3.6 cm pancreatic head hypoechoic lesion AORTA AND IVC: Visualized portions are normal for patient age. LIVER: Size: Within normal range. The liver measures 15.3 cm  in the midclavicular line. Contour: Surface contour is smooth. Parenchyma: Echogenicity and echotexture are within normal limits. Multiple heterogeneous hypoechoic and hyperechoic solid liver lesions measuring up to 3 cm in the right hepatic lobe. Few scattered cysts. Limited imaging of the main portal vein shows it to be patent and hepatopetal. BILIARY: The gallbladder is normal in caliber. Mild wall thickening without pericholecystic fluid. There is probable layering sludge without evidence for shadowing calculi. No sonographic Martinez sign. No intrahepatic biliary dilatation. CBD measures 3.0 mm. No choledocholithiasis. KIDNEY: Right kidney measures 11.7 x 4.9 x 5.3 cm. Volume 156.9 mL Nonobstructing 6 mm lower pole calculus. Left kidney measures 11.3 x 5.5 x 5.4 cm. Volume 175.0 mL Scattered simple cysts measuring up to 1.4 cm. SPLEEN: Measures 12.8 x 11.3 x 5.6 cm. Volume 425.2 mL Top normal in size. Otherwise, within normal limits. ASCITES: None. Top normal gemma hepatic lymph node measuring up to 1 cm in short axis.     Impression: Hypoechoic pancreatic head lesion measuring approximately 3.6 cm, concerning for malignancy. Several heterogeneous solid liver lesions, concerning for metastases. MRI abdomen with and without contrast is recommended for further evaluation. Probable gallbladder sludge with mild gallbladder wall thickening, but no pericholecystic fluid or sonographic Martinez sign to suggest acute cholecystitis. The study was marked in EPIC for immediate notification. Workstation performed: [id number]     Procedure: US OUTSIDE FILMS    Result Date: 7/7/2024  Narrative: This is a non-reportable study used for image storage. It has been automatically finalized and does not contain a result.     38 minutes total time spent on 10/16/2024 in caring for this patient including obtaining/reviewing history, symptom assessment and management, medication review / adjustment, psychosocial support, reviewing /  "ordering tests, medicines, imaging, procedures, medical marijuana, supportive listening, anticipatory guidance, patient/family education, instructions for management, risks/benefits of treatment(s), and documenting in the medical record. All patient/family questions were answered during this discussion.    Blair Gates MD  [facility]  [phone]    Portions of this document may have been created using dictation software and as such some \"sound alike\" terms may have been generated by the system. Do not hesitate to contact me with any questions or clarifications.   VIRTUAL VISIT DISCLAIMER: Abhishek MOLLY Gimenez acknowledges that he has consented to an online visit or consultation. He understands that the online visit is based solely on information provided by him, and that, in the absence of a face-to-face physical evaluation by the physician, the diagnosis he receives is both limited and provisional in terms of accuracy and completeness. This is not intended to replace a full medical face-to-face evaluation by the physician. Abhishek Gimenez understands and accepts these terms.  "

## 2024-10-16 NOTE — ASSESSMENT & PLAN NOTE
Emotional / psychosocial support provided today.  Reviewed notes ([facility] SW/therapy, Genetics, RD, Medical Oncology), labs (10/7/24 Cr 0.74, alb 3.8, Hb 13.1; 8/27/24 CA 19-9 462), imaging + procedures (9/5/24 CXR, 8/30/24 CTAP). See below for more data.  Return in about 1 month (around 11/16/2024).  Medication safety issues addressed - no driving under the influence of narcotics (including opioids), watch for adverse effects including AMS or respiratory depression (slowed breathing), keep medications stored in a safe/locked environment, do not use alcohol while opioids or other narcotics are in one's system, do not travel with more than the minimum number of tablets or capsules required for the trip.  I have personally queried the patient's controlled substance dispensing history in the Prescription Drug Monitoring Program in compliance with regulations before I have prescribed any controlled substances. The prescription history is consistent with prescribed therapy and our practice policies.

## 2024-10-16 NOTE — ASSESSMENT & PLAN NOTE
Appetite will wax and wane, worsened after chemotherapy but he is able to recover between sessions. MMJ may help stimulate appetite.

## 2024-10-16 NOTE — ASSESSMENT & PLAN NOTE
The patient qualifies for use of MMJ in the [facility] by having the following medical condition: pancreatic cancer.  From a palliative care standpoint the patient is suffering with cancer-related pain, insomnia, loss of appetite, nausea. These symptoms and side effects might be alleviated with use of MMJ products. The patient registered online (patient ID #[id number]). The patient read and I reviewed the informed consent document with the patient. I answered all questions related to it before the patient signed it. The patient's medical certification was completed on this date (certification #[id number]). The patient was given a copy of the medical certification.  The patient accepts extensive counseling today on MMJ risks and benefits, legal concerns, possible adverse effects, routes of administration, active ingredients such as THC+CBD, etc.  I issued a certification for MMJ use with palliative intent, to help alleviate cancer-related symptoms and cancer-treatment-related side effects. I do not endorse the belief that MMJ can treat cancer and strongly encouraged the patient to continue treatments and surveillance as recommend by cancer specialists.  I suggested the patient ask family or friends to register as an MMJ caregiver so they can go with the patient to the dispensary, or in the patient's stead. Information provided on the MMJ caregiver program.

## 2024-10-17 ENCOUNTER — TELEPHONE (OUTPATIENT)
Age: 69
End: 2024-10-17

## 2024-10-17 DIAGNOSIS — E11.65 TYPE 2 DIABETES MELLITUS WITH HYPERGLYCEMIA, WITHOUT LONG-TERM CURRENT USE OF INSULIN (HCC): Primary | ICD-10-CM

## 2024-10-17 DIAGNOSIS — E11.65 TYPE 2 DIABETES MELLITUS WITH HYPERGLYCEMIA, WITHOUT LONG-TERM CURRENT USE OF INSULIN (HCC): ICD-10-CM

## 2024-10-17 RX ORDER — INSULIN GLARGINE 100 [IU]/ML
20 INJECTION, SOLUTION SUBCUTANEOUS
Status: CANCELLED | OUTPATIENT
Start: 2024-10-17

## 2024-10-17 RX ORDER — INSULIN GLARGINE 100 [IU]/ML
20 INJECTION, SOLUTION SUBCUTANEOUS
Qty: 10 ML | Refills: 3 | Status: SHIPPED | OUTPATIENT
Start: 2024-10-17

## 2024-10-17 RX ORDER — INSULIN GLARGINE 100 [IU]/ML
20 INJECTION, SOLUTION SUBCUTANEOUS
Qty: 6 ML | Refills: 3 | Status: SHIPPED | OUTPATIENT
Start: 2024-10-17 | End: 2024-11-16

## 2024-10-17 NOTE — TELEPHONE ENCOUNTER
Patient called back he spoke to pharmacy it is still going to pharmacy as vials.  Called pharmacy and pharmacist said insurance prefers brand Lantus solostar pen.    Pended if ok    Thank you

## 2024-10-17 NOTE — TELEPHONE ENCOUNTER
Patient called in to inform PCP that the wrong Rx was sent in to the pharmacy, he has been using the lantus solar star pen injection. Pls advise.

## 2024-10-21 ENCOUNTER — HOSPITAL ENCOUNTER (OUTPATIENT)
Dept: INFUSION CENTER | Facility: CLINIC | Age: 69
Discharge: HOME | End: 2024-10-21
Payer: COMMERCIAL

## 2024-10-21 ENCOUNTER — OFFICE VISIT (OUTPATIENT)
Dept: HEMATOLOGY ONCOLOGY | Facility: CLINIC | Age: 69
End: 2024-10-21
Payer: COMMERCIAL

## 2024-10-21 VITALS
HEIGHT: 71 IN | OXYGEN SATURATION: 98 % | SYSTOLIC BLOOD PRESSURE: 134 MMHG | WEIGHT: 172.5 LBS | HEART RATE: 66 BPM | TEMPERATURE: 97.4 F | DIASTOLIC BLOOD PRESSURE: 78 MMHG | BODY MASS INDEX: 24.15 KG/M2 | RESPIRATION RATE: 18 BRPM

## 2024-10-21 DIAGNOSIS — C25.9 ADENOCARCINOMA OF PANCREAS (HCC): ICD-10-CM

## 2024-10-21 DIAGNOSIS — Z95.828 PORT-A-CATH IN PLACE: Primary | ICD-10-CM

## 2024-10-21 DIAGNOSIS — E11.65 TYPE 2 DIABETES MELLITUS WITH HYPERGLYCEMIA, WITHOUT LONG-TERM CURRENT USE OF INSULIN (HCC): ICD-10-CM

## 2024-10-21 DIAGNOSIS — G89.3 CANCER RELATED PAIN: ICD-10-CM

## 2024-10-21 DIAGNOSIS — C25.9 ADENOCARCINOMA OF PANCREAS (HCC): Primary | ICD-10-CM

## 2024-10-21 DIAGNOSIS — D70.1 CHEMOTHERAPY INDUCED NEUTROPENIA (HCC): ICD-10-CM

## 2024-10-21 DIAGNOSIS — T45.1X5A CHEMOTHERAPY INDUCED NEUTROPENIA (HCC): ICD-10-CM

## 2024-10-21 LAB
ALBUMIN SERPL BCG-MCNC: 4.1 G/DL (ref 3.5–5)
ALP SERPL-CCNC: 192 U/L (ref 34–104)
ALT SERPL W P-5'-P-CCNC: 34 U/L (ref 7–52)
ANION GAP SERPL CALCULATED.3IONS-SCNC: 8 MMOL/L (ref 4–13)
AST SERPL W P-5'-P-CCNC: 27 U/L (ref 13–39)
BASOPHILS # BLD AUTO: 0.11 THOUSANDS/ΜL (ref 0–0.1)
BASOPHILS NFR BLD AUTO: 1 % (ref 0–1)
BILIRUB SERPL-MCNC: 0.42 MG/DL (ref 0.2–1)
BUN SERPL-MCNC: 18 MG/DL (ref 5–25)
CALCIUM SERPL-MCNC: 9.4 MG/DL (ref 8.4–10.2)
CHLORIDE SERPL-SCNC: 103 MMOL/L (ref 96–108)
CO2 SERPL-SCNC: 29 MMOL/L (ref 21–32)
CREAT SERPL-MCNC: 0.75 MG/DL (ref 0.6–1.3)
EOSINOPHIL # BLD AUTO: 0.28 THOUSAND/ΜL (ref 0–0.61)
EOSINOPHIL NFR BLD AUTO: 2 % (ref 0–6)
ERYTHROCYTE [DISTWIDTH] IN BLOOD BY AUTOMATED COUNT: 13.9 % (ref 11.6–15.1)
GFR SERPL CREATININE-BSD FRML MDRD: 95 ML/MIN/1.73SQ M
GLUCOSE SERPL-MCNC: 88 MG/DL (ref 65–140)
HCT VFR BLD AUTO: 39.9 % (ref 36.5–49.3)
HGB BLD-MCNC: 13.1 G/DL (ref 12–17)
IMM GRANULOCYTES # BLD AUTO: 0.36 THOUSAND/UL (ref 0–0.2)
IMM GRANULOCYTES NFR BLD AUTO: 2 % (ref 0–2)
LYMPHOCYTES # BLD AUTO: 1.49 THOUSANDS/ΜL (ref 0.6–4.47)
LYMPHOCYTES NFR BLD AUTO: 8 % (ref 14–44)
MCH RBC QN AUTO: 30.6 PG (ref 26.8–34.3)
MCHC RBC AUTO-ENTMCNC: 32.8 G/DL (ref 31.4–37.4)
MCV RBC AUTO: 93 FL (ref 82–98)
MONOCYTES # BLD AUTO: 1.41 THOUSAND/ΜL (ref 0.17–1.22)
MONOCYTES NFR BLD AUTO: 8 % (ref 4–12)
NEUTROPHILS # BLD AUTO: 13.99 THOUSANDS/ΜL (ref 1.85–7.62)
NEUTS SEG NFR BLD AUTO: 79 % (ref 43–75)
NRBC BLD AUTO-RTO: 0 /100 WBCS
PLATELET # BLD AUTO: 144 THOUSANDS/UL (ref 149–390)
PMV BLD AUTO: 10.1 FL (ref 8.9–12.7)
POTASSIUM SERPL-SCNC: 4 MMOL/L (ref 3.5–5.3)
PROT SERPL-MCNC: 7.1 G/DL (ref 6.4–8.4)
RBC # BLD AUTO: 4.28 MILLION/UL (ref 3.88–5.62)
SODIUM SERPL-SCNC: 140 MMOL/L (ref 135–147)
WBC # BLD AUTO: 17.64 THOUSAND/UL (ref 4.31–10.16)

## 2024-10-21 PROCEDURE — 80053 COMPREHEN METABOLIC PANEL: CPT | Performed by: INTERNAL MEDICINE

## 2024-10-21 PROCEDURE — 99214 OFFICE O/P EST MOD 30 MIN: CPT | Performed by: INTERNAL MEDICINE

## 2024-10-21 PROCEDURE — G2211 COMPLEX E/M VISIT ADD ON: HCPCS | Performed by: INTERNAL MEDICINE

## 2024-10-21 PROCEDURE — 85025 COMPLETE CBC W/AUTO DIFF WBC: CPT | Performed by: INTERNAL MEDICINE

## 2024-10-21 RX ORDER — FLASH GLUCOSE SENSOR
1 KIT MISCELLANEOUS
Qty: 2 EACH | Refills: 5 | Status: SHIPPED | OUTPATIENT
Start: 2024-10-21

## 2024-10-21 RX ORDER — OXYCODONE HYDROCHLORIDE 5 MG/1
5 TABLET ORAL EVERY 6 HOURS PRN
Qty: 90 TABLET | Refills: 0 | Status: SHIPPED | OUTPATIENT
Start: 2024-10-21

## 2024-10-21 NOTE — TELEPHONE ENCOUNTER
Chart and PDMP reviewed, refill appropriate. Sent to patient's preferred pharmacy.    Upcoming visit with Dr. Ward 11/25

## 2024-10-21 NOTE — TELEPHONE ENCOUNTER
Medication: Oxycodone 5mg  PDMP     09/26/2024 09/26/2024 oxyCODONE HCL (Tablet) 90.0 23 5 MG 29.35 BELLA PAVON    09/10/2024 09/10/2024 oxyCODONE HCL (Tablet) 60.0 15 5 MG 30.0 KRIS NOLASCO

## 2024-10-21 NOTE — TELEPHONE ENCOUNTER
Reason for call:   [x] Refill   [] Prior Auth  [] Other:     Office:   [] PCP/Provider -   [x] Specialty/Provider - Palliative    Medication: Oxycodone     Dose/Frequency: 5 mg    Quantity: 90 tablet    [email or web address] [place] [facility] [id number]     Does the patient have enough for 3 days?   [x] Yes   [] No - Send as HP to POD

## 2024-10-21 NOTE — PROGRESS NOTES
Pt into clinic for port flush and lab draw via port. Pt offers complaints of fatigue and mild peripheral neuropathy. Educated patient on keeping warm during colder temperatures.    Tolerated procedure. Port de-accessed.     Pt aware of next appointment on 10/24/24 at 11:30am. AVS declined.

## 2024-10-21 NOTE — PROGRESS NOTES
Hematology/Oncology Outpatient Follow- up Note  Abhishek Gimenez 68 y.o. male MRN: @ Encounter: [id number]        Date:  10/21/2024        Assessment / Plan:    1 pancreatic adenocarcinoma T2 N0 M0 considered stage Ib  2 negative peritoneal and omental metastatic disease by laparoscope and washings  3 abnormal liver CT and MRI to be seen by IR again for repeat biopsy he is going for second opinion regarding that as well  4 paroxysmal atrial fibrillation  5 type 2 diabetes mellitus  Plan: He will undergo reduced dose of FOLFIRINOX however it would be increased from 75 to 80%.  He did well with the 75%.  He will come back in several weeks to see Dr. Chairez or new physician.  He needs to be scheduled for a repeat biopsy.      HPI: 68-year-old with pancreatic adenocarcinoma.  He has abnormal liver CT and MRI.  He was supposed to get a repeat biopsy it conflicted with his treatment he had been to [facility] as well and he will get it done sometime in the near future.  He will go well with his treatment today.  He is feeling fairly well no weight loss tolerated the reduction in dose to 75% very well.  Will go up to 80% today to see if he will tolerate that.  His second opinion at [facility] agreed with the treatments.  A held on his biopsy.  We will try and reschedule.  We will need to schedule it just before his next treatment if possible.  No other major suggestions.  He is doing fairly well.      Interval History: Note from 10/7/2024:  Assessment / Plan:    1 pancreatic adenocarcinoma T2 N0 M0 considered stage Ib  2 negative peritoneal and omental metastatic disease by laparoscope and washings  3 abnormal liver CT and MRI to be seen by IR again for repeat biopsy.  Is going for second opinion regarding that as well.  4 paroxysmal atrial fibrillation  5 type 2 diabetes mellitus  Plan: Patient will undergo a reduced dose of FOLFIRINOX beginning tomorrow for cycle 2 of treatment.  Will see how he  did.  Hopefully will have less problems.  He will also testing through 60  HPI: 68-year-old gentleman completing his first cycle of chemotherapy.  He did well for several days then at the onset at the completion of his infusion after 2 days of nausea intermittently unable to eat 1 or 2 episodes of vomiting and diarrhea.  This lasted 4 days.  After 1 week even start subsided and he is now feeling pretty well he is eating and gaining back weight that he lost he is eating and feeling reasonably well.  We discussed the possibility of changing his treatment and lowering the percentage.  We would give him 75% dosage.  We did see if he would tolerate that better.  He is already on antiemetics we will continue the.  In addition he and his wife asked about testing we will look into genetic testing as well as Clarissa and [id number] testing of liquid biopsy and what tissue he does have from his FNAs.  Will see if we can get a better outlook regarding mutations.  Also is concerned about another biopsy apparently is getting a second opinion at [facility] in [facility] regarding that.  They can review the films and perhaps make some suggestions hopefully.  Presently has minimal GI pain he is not nauseated or vomiting at present bowel movements have stabilized.  Interval History: Note from 9/23/2024:  Assessment / Plan:    1 pancreatic adenocarcinoma T2 N0 M0 considered stage Ib.  2 negative peritoneal and omental metastatic disease by laparoscope and washings  3 abnormal liver CT and MRI to be seen by IR again for possible biopsy  4 repeat biopsy to be done as the first biopsy was negative but not definitively diagnostic  5 paroxysmal atrial fibs  6 diabetes mellitus  7 weight loss which seems to be stabilizing  Plan: Patient will undergo FOLFIRINOX he is aware side effects will begin tomorrow IR biopsy repeat is to be done waiting for notification for appointment and time.  He will return here in 2 weeks we  will see how he is.  At that point we will try and look depending on biopsy reports either sending the biopsy off for further testing or doing testing of liquid biopsy and genetic testing.  HPI: 68-year-old gentleman here with pancreatic adenocarcinoma.  He is awaiting a repeat biopsy of his liver.  However he is anxious to start treatment.  We will treat him with chemotherapy whether it is malignant and metastatic disease or not.  It would be either neoadjuvant therapy prior to surgery or treatment to begin from metastatic disease.  He is awaiting repeat IR evaluation and repeat biopsy.  He is actually feeling fairly well.  He has no major pain in his abdomen or back.  He is able to get around fairly well.  He stabilized his weight and his appetite is slightly better.  Interval History: Note from 9/9/2024:  Assessment / Plan:    1 pancreatic adenocarcinoma T2 N0 M0 considered stage Ib  2 negative peritoneal and omental metastatic disease by laparoscope and washings  3 abnormal liver CT and MRI to be seen by IR through surgical oncology for biopsy  4 paroxysmal atrial fibrillation  5 diabetes mellitus  6 weight loss trying to be stabilized  Plan: Patient will undergo FOLFIRINOX.  Side effects were discussed.  Consent will be obtained.  IR biopsy of liver will be considered.  He will return in 2 weeks.  HPI: 68-year-old gentleman who comes in for follow-up.  He has a T2 N0 M0 stage Ib pancreatic cancer however he has abnormalities in his liver.  I spoke with Dr. Faulkner of surgical oncology.  She spoke with interventional radiology placed a consult in as 1 these needs to be biopsied if he has metastatic disease or not.  Otherwise we will begin him on FOLFIRINOX.  Side effects were discussed and consents will be obtained.  He will come back in 2 weeks we will try begin his therapy at that time.  Will await also an IR consultation and possible biopsy.  Interval History: Note from 8/26/2024:  Assessment/ Plan:    1  pancreatic adenocarcinoma T2 N0 M0 considered stage Ib  2 diabetes mellitus  3 weight loss  4 paroxysmal atrial fibrillation  Plan: Patient will undergo diagnostic laparoscopy on 9/5 to check for peritoneal metastasis.  He is also scheduled for CT of the abdomen with pancreatic protocol.  We will see him in follow-up on 9/9.  We will discuss the possibility of a beginning of chemotherapy probably with use of FOLFIRINOX.  Will write results of the scans and the laparotomy.  We also asked him to get a CBC CA 19-9 and a CMP.  CC: Consultation for pancreatic adenocarcinoma of body and tail.  HPI:  Abhishek Gimenez is seen for initial consultation 8/26/2024   68-year-old gentleman who noted the onset of diabetes mellitus and February 2024 since that time he has been losing weight he is lost 30 pounds over the 6 months time.  He also developed intermittent abdominal pain epigastric left upper quadrant that continued to worsen.  He was started on Ozempic.  He drinks 3-4 beers every other day he last used tobacco 40 years ago but does not smoke now.  He has had a negative colonoscopy.  He underwent an EGD which was nondiagnostic subsequently underwent an EGD EUS which showed normal esophagus normal stomach normal duodenum.  Celiac artery and aorta were visualized and appeared normal.  The left kidney spleen left adrenal appeared normal parenchyma of the liver.  Normal this was the left lobe of the liver.  He had a heterogeneous hypoechoic irregular mass measuring 30 mm x 18 mm with poorly defined margins in the body of pancreas it abutted the splenic artery and splenic vein needle biopsies were done for total.  The head of the pancreas and neck of the pancreas appeared to have some honeycombing.  Bile duct appeared normal gallbladder appeared normal.  This was done on 7/17/2024  Biopsy was consistent with adenocarcinoma.  CAT scan of the chest was negative.  On 7/10/2026 MRI of the abdomen showed the ill-defined 3.8 x 1.1  cm mass in the body of the pancreas the liver showed a 3.4 cm benign hemangioma 1.0 and 0.4 cm right lobe nodules all the seem to be compatible with hemangiomas.  He was also seen today by allergy.  They noted the tumor need for distal pancreatic ectomy with splenectomy..  They recommended diagnostic laparoscopy to rule out occult peritoneal metastasis.  They also recommended  therapy with chemotherapy follow-up surgery follow-up chemotherapy.  They recommended a CT scan with pancreatic protocol since it was 2 months since his previous scan.  The case will be discussed at the multidisciplinary tumor board  The past medical history A-fib osteoarthritis BPH colonic polyps diabetes mellitus.  He has nonischemic cardiomyopathy and osteoarthritis and psoriasis.      Cancer Staging:  Cancer Staging   Adenocarcinoma of pancreas (HCC)  Staging form: Pancreas, AJCC 8th Edition  - Clinical: Stage IB (cT2, cN0, cM0) - Signed by Roz Faulkner MD on 8/26/2024  Total positive nodes: 0      Molecular Testing:     Previous Hematologic/ Oncologic History:    Oncology History   Adenocarcinoma of pancreas (HCC)   8/13/2024 Biopsy    A.-B.  Pancreas, Body, FNA (Cell block and smear preparations):   Malignant  Adenocarcinoma.     8/20/2024 Initial Diagnosis    Adenocarcinoma of pancreas (HCC)     8/26/2024 -  Cancer Staged    Staging form: Pancreas, AJCC 8th Edition  - Clinical: Stage IB (cT2, cN0, cM0) - Signed by Roz Faulkner MD on 8/26/2024  Total positive nodes: 0       9/24/2024 -  Chemotherapy    alteplase (CATHFLO), 2 mg, Intracatheter, Every 1 Minute as needed, 3 of 12 cycles  pegfilgrastim (NEULASTA ONPRO), 6 mg, Subcutaneous, Once, 3 of 12 cycles  Administration: 6 mg (9/26/2024), 6 mg (10/10/2024)  fosaprepitant (EMEND) IVPB, 150 mg, Intravenous, Once, 3 of 12 cycles  Administration: 150 mg (9/24/2024), 150 mg (10/8/2024)  irinotecan (CAMPTOSAR) chemo infusion, 150 mg/m2 = 294 mg, Intravenous, Once, 3 of 12  "cycles  Dose modification: 112.5 mg/m2 (75 % of original dose 150 mg/m2, Cycle 2, Reason: Dose Not Tolerated), 120 mg/m2 (80 % of original dose 150 mg/m2, Cycle 3, Reason: Original Dose Changed)  Administration: 294 mg (9/24/2024), 227 mg (10/8/2024)  oxaliplatin (ELOXATIN) chemo infusion, 65 mg/m2 = 127.4 mg, Intravenous, Once, 3 of 12 cycles  Dose modification: 48.75 mg/m2 (75 % of original dose 65 mg/m2, Cycle 2, Reason: Dose Not Tolerated), 52 mg/m2 (80 % of original dose 65 mg/m2, Cycle 3, Reason: Original Dose Changed)  Administration: 127.4 mg (9/24/2024), 98.5 mg (10/8/2024)  fluorouracil (ADRUCIL) ambulatory infusion Soln, 1,200 mg/m2/day = 4,705 mg, Intravenous, Over 46 hours, 3 of 12 cycles  Dose modification: 900 mg/m2/day (75 % of original dose 1,200 mg/m2/day, Cycle 3, Reason: Dose Not Tolerated), 960 mg/m2/day (80 % of original dose 1,200 mg/m2/day, Cycle 4, Reason: Other (see comments), Comment: provider request)         Current Hematologic/ Oncologic Treatment:       Cycle 1         Test Results:    Imaging: No results found.          Labs:   Lab Results   Component Value Date    WBC 17.64 (H) 10/21/2024    HGB 13.1 10/21/2024    HCT 39.9 10/21/2024    MCV 93 10/21/2024     (L) 10/21/2024     Lab Results   Component Value Date     03/15/2016    K 4.0 10/21/2024     10/21/2024    CO2 29 10/21/2024    BUN 18 10/21/2024    CREATININE 0.75 10/21/2024    GLUCOSE 106 (H) 11/15/2016    GLUF 110 (H) 09/23/2024    CALCIUM 9.4 10/21/2024    AST 27 10/21/2024    ALT 34 10/21/2024    ALKPHOS 192 (H) 10/21/2024    PROT 6.6 03/15/2016    BILITOT 0.5 03/15/2016    EGFR 95 10/21/2024         No results found for: \"SPEP\", \"UPEP\"    Lab Results   Component Value Date    PSA 4.701 (H) 08/24/2024    PSA 3.83 01/16/2024    PSA 2.9 01/11/2023       No results found for: \"CEA\"    No results found for: \"\"    No results found for: \"AFP\"    No results found for: \"IRON\", \"TIBC\", \"FERRITIN\"    Lab " Results   Component Value Date    [id number] 466 06/10/2021         ROS: Review of Systems   Constitutional: Negative.    HENT: Negative.     Eyes: Negative.    Respiratory: Negative.     Cardiovascular: Negative.    Gastrointestinal: Negative.    Endocrine: Negative.    Genitourinary: Negative.    Musculoskeletal: Negative.    Skin: Negative.    Allergic/Immunologic: Negative.    Neurological: Negative.    Hematological: Negative.          Current Medications: Reviewed  Allergies: Reviewed  PMH/FH/SH:  Reviewed      Physical Exam:    Body surface area is 1.98 meters squared.    Wt Readings from Last 3 Encounters:   10/21/24 78.2 kg (172 lb 8 oz)   10/08/24 78.1 kg (172 lb 3.2 oz)   10/07/24 79.4 kg (175 lb)        Temp Readings from Last 3 Encounters:   10/21/24 (!) 97.4 °F (36.3 °C) (Temporal)   10/08/24 (!) 97.1 °F (36.2 °C) (Temporal)   10/07/24 98.2 °F (36.8 °C) (Temporal)        BP Readings from Last 3 Encounters:   10/21/24 134/78   10/08/24 142/81   10/07/24 122/70         Pulse Readings from Last 3 Encounters:   10/21/24 66   10/08/24 59   10/07/24 64     @LASTSAO2(3)@      Physical Exam  Constitutional:       Appearance: Normal appearance. He is normal weight.   HENT:      Head: Normocephalic and atraumatic.   Eyes:      Conjunctiva/sclera: Conjunctivae normal.      Pupils: Pupils are equal, round, and reactive to light.   Cardiovascular:      Rate and Rhythm: Normal rate and regular rhythm.   Pulmonary:      Effort: Pulmonary effort is normal.      Breath sounds: Normal breath sounds.   Abdominal:      General: Abdomen is flat. Bowel sounds are normal.      Palpations: Abdomen is soft.   Musculoskeletal:         General: Normal range of motion.      Cervical back: Normal range of motion and neck supple.   Skin:     General: Skin is warm and dry.   Neurological:      General: No focal deficit present.      Mental Status: He is alert and oriented to person, place, and time. Mental status is at baseline.            Goals and Barriers:  Current Goal: Prolong Survival from Cancer.   Barriers: None.      Patient's Capacity to Self Care:  Patient is able to self care.    Code Status: [unfilled]  Advance Directive and Living Will: Yes    Power of :

## 2024-10-21 NOTE — TELEPHONE ENCOUNTER
Patient called, he is requesting a refill on his     Continuous Glucose Sensor (FreeStyle Livia 14 Day Sensor) MISC     He stated he is on his last one and doesn't have any more. Pharmacy on file is best.     Please advise, thank you

## 2024-10-22 ENCOUNTER — HOSPITAL ENCOUNTER (OUTPATIENT)
Dept: INFUSION CENTER | Facility: HOSPITAL | Age: 69
Discharge: HOME | End: 2024-10-22
Payer: COMMERCIAL

## 2024-10-22 ENCOUNTER — TELEPHONE (OUTPATIENT)
Age: 69
End: 2024-10-22

## 2024-10-22 VITALS
OXYGEN SATURATION: 98 % | SYSTOLIC BLOOD PRESSURE: 132 MMHG | TEMPERATURE: 97.1 F | WEIGHT: 172 LBS | HEART RATE: 54 BPM | BODY MASS INDEX: 24.08 KG/M2 | DIASTOLIC BLOOD PRESSURE: 70 MMHG | HEIGHT: 71 IN | RESPIRATION RATE: 17 BRPM

## 2024-10-22 DIAGNOSIS — C25.9 ADENOCARCINOMA OF PANCREAS (HCC): ICD-10-CM

## 2024-10-22 DIAGNOSIS — T45.1X5A CHEMOTHERAPY INDUCED NEUTROPENIA (HCC): Primary | ICD-10-CM

## 2024-10-22 DIAGNOSIS — D70.1 CHEMOTHERAPY INDUCED NEUTROPENIA (HCC): Primary | ICD-10-CM

## 2024-10-22 LAB
CARIS BRAF V600E: NEGATIVE
CARIS HER2/NEU: NEGATIVE
CARIS MISMATCH REPAIR STATUS: NORMAL
CARIS MLH1: NORMAL
CARIS MSH2: NORMAL
CARIS MSH6: NORMAL
CARIS PD-L1 (SP142): NEGATIVE
CARIS PMS2: NORMAL

## 2024-10-22 RX ORDER — ATROPINE SULFATE 1 MG/ML
0.25 INJECTION, SOLUTION INTRAVENOUS ONCE AS NEEDED
Status: DISCONTINUED | OUTPATIENT
Start: 2024-10-22 | End: 2024-10-26 | Stop reason: HOSPADM

## 2024-10-22 RX ORDER — DEXTROSE MONOHYDRATE 50 MG/ML
20 INJECTION, SOLUTION INTRAVENOUS ONCE
Status: COMPLETED | OUTPATIENT
Start: 2024-10-22 | End: 2024-10-22

## 2024-10-22 RX ORDER — ATROPINE SULFATE 1 MG/ML
0.25 INJECTION, SOLUTION INTRAVENOUS ONCE
Status: COMPLETED | OUTPATIENT
Start: 2024-10-22 | End: 2024-10-22

## 2024-10-22 RX ORDER — SODIUM CHLORIDE 9 MG/ML
20 INJECTION, SOLUTION INTRAVENOUS ONCE AS NEEDED
Status: DISCONTINUED | OUTPATIENT
Start: 2024-10-22 | End: 2024-10-26 | Stop reason: HOSPADM

## 2024-10-22 RX ADMIN — DEXTROSE 240 MG: 5 SOLUTION INTRAVENOUS at 11:53

## 2024-10-22 RX ADMIN — ATROPINE SULFATE 0.25 MG: 1 INJECTION, SOLUTION INTRAVENOUS at 11:48

## 2024-10-22 RX ADMIN — SODIUM CHLORIDE 20 ML/HR: 0.9 INJECTION, SOLUTION INTRAVENOUS at 08:47

## 2024-10-22 RX ADMIN — FOSAPREPITANT 150 MG: 150 INJECTION, POWDER, LYOPHILIZED, FOR SOLUTION INTRAVENOUS at 09:09

## 2024-10-22 RX ADMIN — OXALIPLATIN 100 MG: 5 INJECTION, SOLUTION INTRAVENOUS at 09:48

## 2024-10-22 RX ADMIN — DEXAMETHASONE SODIUM PHOSPHATE: 10 INJECTION, SOLUTION INTRAMUSCULAR; INTRAVENOUS at 08:47

## 2024-10-22 RX ADMIN — DEXTROSE 20 ML/HR: 5 SOLUTION INTRAVENOUS at 09:45

## 2024-10-22 NOTE — PROGRESS NOTES
Dr Aaron saw patient in office yesterday and stated he was going to order 80% or original dose. Provider still wants neulasta onpro given despite WBC of 17.64 & anc of 13.99 from 10/21.

## 2024-10-22 NOTE — PROGRESS NOTES
Abhishek Gimenez  tolerated treatment well with no complications.  Elastomeric medicine ball connected, verified & unclamped by DELORIS Raymundo RN.    Abhishek Gimenez is aware of future appt on 10/24 at 11:30 at [facility].     AVS declined by Abhishek Gimenez.

## 2024-10-22 NOTE — TELEPHONE ENCOUNTER
PT called in regards to his oxycodone and his  Free Style Livia reader. States he went to pharmacy and they were not avaiable for pickup. Called [facility] and verified the prescriptions and they stated that they will be able to fill both without problem. Called patient back and informed him medications will be ready for pickup at pharmacy.

## 2024-10-24 ENCOUNTER — PATIENT OUTREACH (OUTPATIENT)
Dept: CASE MANAGEMENT | Facility: HOSPITAL | Age: 69
End: 2024-10-24

## 2024-10-24 ENCOUNTER — HOSPITAL ENCOUNTER (OUTPATIENT)
Dept: INFUSION CENTER | Facility: CLINIC | Age: 69
Discharge: HOME | End: 2024-10-24
Payer: COMMERCIAL

## 2024-10-24 DIAGNOSIS — C25.9 ADENOCARCINOMA OF PANCREAS (HCC): ICD-10-CM

## 2024-10-24 DIAGNOSIS — T45.1X5A CHEMOTHERAPY INDUCED NEUTROPENIA (HCC): Primary | ICD-10-CM

## 2024-10-24 DIAGNOSIS — D70.1 CHEMOTHERAPY INDUCED NEUTROPENIA (HCC): Primary | ICD-10-CM

## 2024-10-24 PROCEDURE — 96372 THER/PROPH/DIAG INJ SC/IM: CPT

## 2024-10-24 RX ADMIN — PEGFILGRASTIM 6 MG: KIT SUBCUTANEOUS at 11:53

## 2024-10-24 NOTE — PROGRESS NOTES
Patient reports to  infusion center for elastomeric pump disconnect. Patient offers no complaints. After 46 hours of running, pump appears empty and deflated. port flushed after disconnect, positive blood return noted, port deaccessed and band-aid placed on site. Neulasta onpro placed on left lower abdomen, blinking green with indicator full. Pt aware he can remove neulasta onpro after dose delivery is complete- tomorrow 10/25/24 at 4PM. Patient aware of next appointment on 11/4/24 at 0830 , AVS declined.

## 2024-10-24 NOTE — PROGRESS NOTES
"MSLEVAR met with pt and his wife in the infusion center earlier today prior to his disconnect appointment.  We have spoken on the phone but not yet met in person.  He tells me that the dose reduction did work well for him and they are going to slightly increase for his next treatment.  He is still having a few days of flu like side effects but says that he is sleeping well, eating well once he's feeling better, and in general doing well.  He notes that he was much more emotional after his initial diagnosis and found himself crying \"2-3 days a week\" but feels that this has slowed down moreso recently.  They both agree that this is normal for a new diagnosis and also that his diagnosis brought up emotions and memories from when their daughter was battling cancer as well.  At this point he and his wife both deny any needs but thanked me for checking in with them.  I have encouraged them to reach out as needed and will remain available to assist or support them moving forward.  "

## 2024-10-29 RX ORDER — ATROPINE SULFATE 1 MG/ML
0.25 INJECTION, SOLUTION INTRAVENOUS ONCE
Status: CANCELLED | OUTPATIENT
Start: 2024-11-05

## 2024-10-29 RX ORDER — ATROPINE SULFATE 1 MG/ML
0.25 INJECTION, SOLUTION INTRAVENOUS ONCE AS NEEDED
Status: CANCELLED | OUTPATIENT
Start: 2024-11-05

## 2024-10-29 RX ORDER — DEXTROSE MONOHYDRATE 50 MG/ML
20 INJECTION, SOLUTION INTRAVENOUS ONCE
Status: CANCELLED | OUTPATIENT
Start: 2024-11-05

## 2024-10-29 RX ORDER — SODIUM CHLORIDE 9 MG/ML
20 INJECTION, SOLUTION INTRAVENOUS ONCE AS NEEDED
Status: CANCELLED | OUTPATIENT
Start: 2024-11-05

## 2024-10-30 DIAGNOSIS — C25.9 ADENOCARCINOMA OF PANCREAS (HCC): Primary | ICD-10-CM

## 2024-11-01 DIAGNOSIS — C25.9 ADENOCARCINOMA OF PANCREAS (HCC): Primary | ICD-10-CM

## 2024-11-01 NOTE — PROGRESS NOTES
Hematology/Oncology Outpatient Follow- up Note  Abhishek Gimenez 68 y.o. male MRN: @ Encounter: [id number]        Date:  11/1/2024        Assessment / Plan:      1. Adenocarcinoma of pancreas (HCC)  -     CT abdomen pelvis w contrast; Future; Expected date: 12/12/2024  2. Paroxysmal atrial fibrillation (HCC)  3. Pulmonary HTN (HCC)  4. Type 2 diabetes mellitus with hyperglycemia, without long-term current use of insulin (HCC)    -Continue dose reduced (80% of the total dose) FOLFIRINOX.  Patient tolerating well.  He is scheduled for liver biopsy on 11/15/2024.  -Repeat CT scan after 6 cycles of chemotherapy.  -RTC 12/16/2024 with labs or sooner as needed.      HPI:    68-year-old gentleman who noted the onset of diabetes mellitus and February 2024 since that time he has been losing weight he is lost 30 pounds over the 6 months time.  He also developed intermittent abdominal pain epigastric left upper quadrant that continued to worsen.  He was started on Ozempic.  He drinks 3-4 beers every other day he last used tobacco 40 years ago but does not smoke now.  He has had a negative colonoscopy.  He underwent an EGD which was nondiagnostic subsequently underwent an EGD EUS which showed normal esophagus normal stomach normal duodenum.  Celiac artery and aorta were visualized and appeared normal.  The left kidney spleen left adrenal appeared normal parenchyma of the liver.  Normal this was the left lobe of the liver.  He had a heterogeneous hypoechoic irregular mass measuring 30 mm x 18 mm with poorly defined margins in the body of pancreas it abutted the splenic artery and splenic vein needle biopsies were done for total.  The head of the pancreas and neck of the pancreas appeared to have some honeycombing.  Bile duct appeared normal gallbladder appeared normal.  This was done on 7/17/2024  Biopsy was consistent with adenocarcinoma.  CAT scan of the chest was negative.  On 7/10/2026 MRI of the abdomen showed the  ill-defined 3.8 x 1.1 cm mass in the body of the pancreas the liver showed a 3.4 cm benign hemangioma 1.0 and 0.4 cm right lobe nodules all the seem to be compatible with hemangiomas.    Interval History:    Patient presents today for follow-up with his wife Sherri.  He feels fine and offers no complaints.  He had a second opinion with [facility] oncology and surgical oncology at [facility].  Eventually they decided to proceed with liver biopsy.  He is tolerating chemotherapy with FOLFIRINOX well.  Has occasional dizziness after the chemotherapy.  Otherwise he feels well.  Denies any nausea or vomiting.  No abdominal pain.  Weight has been stable.  No tingling or numbness.  Able to perform activities of daily living.      Cancer Staging:  Cancer Staging   Adenocarcinoma of pancreas (HCC)  Staging form: Pancreas, AJCC 8th Edition  - Clinical: Stage IB (cT2, cN0, cM0) - Signed by Roz Faulkner MD on 8/26/2024  Total positive nodes: 0      Previous Hematologic/ Oncologic History:    Oncology History   Adenocarcinoma of pancreas (HCC)   8/13/2024 Biopsy    A.-B.  Pancreas, Body, FNA (Cell block and smear preparations):   Malignant  Adenocarcinoma.     8/20/2024 Initial Diagnosis    Adenocarcinoma of pancreas (HCC)     8/26/2024 -  Cancer Staged    Staging form: Pancreas, AJCC 8th Edition  - Clinical: Stage IB (cT2, cN0, cM0) - Signed by Roz Faulkner MD on 8/26/2024  Total positive nodes: 0       9/24/2024 -  Chemotherapy    alteplase (CATHFLO), 2 mg, Intracatheter, Every 1 Minute as needed, 3 of 12 cycles  pegfilgrastim (NEULASTA ONPRO), 6 mg, Subcutaneous, Once, 3 of 12 cycles  Administration: 6 mg (9/26/2024), 6 mg (10/10/2024), 6 mg (10/24/2024)  fosaprepitant (EMEND) IVPB, 150 mg, Intravenous, Once, 3 of 12 cycles  Administration: 150 mg (9/24/2024), 150 mg (10/8/2024), 150 mg (10/22/2024)  irinotecan (CAMPTOSAR) chemo infusion, 150 mg/m2 = 294 mg, Intravenous, Once, 3 of 12 cycles  Dose  "modification: 112.5 mg/m2 (75 % of original dose 150 mg/m2, Cycle 2, Reason: Dose Not Tolerated), 120 mg/m2 (80 % of original dose 150 mg/m2, Cycle 3, Reason: Original Dose Changed)  Administration: 294 mg (9/24/2024), 227 mg (10/8/2024), 240 mg (10/22/2024)  oxaliplatin (ELOXATIN) chemo infusion, 65 mg/m2 = 127.4 mg, Intravenous, Once, 3 of 12 cycles  Dose modification: 48.75 mg/m2 (75 % of original dose 65 mg/m2, Cycle 2, Reason: Dose Not Tolerated), 52 mg/m2 (80 % of original dose 65 mg/m2, Cycle 3, Reason: Original Dose Changed)  Administration: 127.4 mg (9/24/2024), 98.5 mg (10/8/2024), 100 mg (10/22/2024)  fluorouracil (ADRUCIL) ambulatory infusion Soln, 1,200 mg/m2/day = 4,705 mg, Intravenous, Over 46 hours, 3 of 12 cycles  Dose modification: 900 mg/m2/day (75 % of original dose 1,200 mg/m2/day, Cycle 3, Reason: Dose Not Tolerated), 960 mg/m2/day (80 % of original dose 1,200 mg/m2/day, Cycle 4, Reason: Other (see comments), Comment: provider request)         Current Hematologic/ Oncologic Treatment:      FOLFIRINOX.        Test Results:    Imaging: No results found.          Labs:   Lab Results   Component Value Date    WBC 17.64 (H) 10/21/2024    HGB 13.1 10/21/2024    HCT 39.9 10/21/2024    MCV 93 10/21/2024     (L) 10/21/2024     Lab Results   Component Value Date     03/15/2016    K 4.0 10/21/2024     10/21/2024    CO2 29 10/21/2024    BUN 18 10/21/2024    CREATININE 0.75 10/21/2024    GLUCOSE 106 (H) 11/15/2016    GLUF 110 (H) 09/23/2024    CALCIUM 9.4 10/21/2024    AST 27 10/21/2024    ALT 34 10/21/2024    ALKPHOS 192 (H) 10/21/2024    PROT 6.6 03/15/2016    BILITOT 0.5 03/15/2016    EGFR 95 10/21/2024         No results found for: \"SPEP\", \"UPEP\"    Lab Results   Component Value Date    PSA 4.701 (H) 08/24/2024    PSA 3.83 01/16/2024    PSA 2.9 01/11/2023       No results found for: \"CEA\"    No results found for: \"\"    No results found for: \"AFP\"    No results found for: \"IRON\", " "\"TIBC\", \"FERRITIN\"    Lab Results   Component Value Date    QBLUXZHT18 466 06/10/2021         ROS: 14 point review of systems was negative except that mentioned in HPI.      Current Medications: Reviewed  Allergies: Reviewed  PMH/FH/SH:  Reviewed      Physical Exam:    There is no height or weight on file to calculate BSA.    Wt Readings from Last 3 Encounters:   10/22/24 78 kg (172 lb)   10/21/24 78.2 kg (172 lb 8 oz)   10/08/24 78.1 kg (172 lb 3.2 oz)        Temp Readings from Last 3 Encounters:   10/22/24 (!) 97.1 °F (36.2 °C) (Tympanic)   10/21/24 (!) 97.4 °F (36.3 °C) (Temporal)   10/08/24 (!) 97.1 °F (36.2 °C) (Temporal)        BP Readings from Last 3 Encounters:   10/22/24 132/70   10/21/24 134/78   10/08/24 142/81         Pulse Readings from Last 3 Encounters:   10/22/24 (!) 54   10/21/24 66   10/08/24 59          Physical Exam  Vitals and nursing note reviewed.   Constitutional:       General: He is not in acute distress.     Appearance: Normal appearance.   HENT:      Head: Normocephalic and atraumatic.      Mouth/Throat:      Mouth: Mucous membranes are moist.      Pharynx: Oropharynx is clear.   Eyes:      General: No scleral icterus.     Extraocular Movements: Extraocular movements intact.      Conjunctiva/sclera: Conjunctivae normal.   Cardiovascular:      Rate and Rhythm: Normal rate and regular rhythm.      Pulses: Normal pulses.      Heart sounds: No murmur heard.  Pulmonary:      Effort: Pulmonary effort is normal.      Breath sounds: Normal breath sounds. No wheezing, rhonchi or rales.   Abdominal:      General: Bowel sounds are normal.      Palpations: Abdomen is soft.      Tenderness: There is no abdominal tenderness.   Musculoskeletal:         General: No swelling or tenderness. Normal range of motion.      Cervical back: Neck supple.   Lymphadenopathy:      Cervical: No cervical adenopathy.   Skin:     General: Skin is warm and dry.      Coloration: Skin is not pale.      Findings: No " bruising, erythema or rash.   Neurological:      General: No focal deficit present.      Mental Status: He is alert and oriented to person, place, and time.      Motor: No weakness.   Psychiatric:         Mood and Affect: Mood normal.         Behavior: Behavior normal.           Goals and Barriers:  Current Goal: Prolong Survival from Cancer.   Barriers: None.      Patient's Capacity to Self Care:  Patient is able to self care.  Disclaimer: This document was prepared using [employer] technology. If a word or phrase is confusing, or does not make sense, this is likely due to recognition error which was not discovered during this clinician's review. If you believe an error has occurred, please contact me through [facility] service line for cathy?cation.      Follow Up    All questions were answered to the patient's satisfaction during this encounter. The patient knows the contact information for our office and knows to reach out for any relevant concerns related to this encounter. They are to call for any temperature 100.4 or higher, new symptoms including but not restricted to shaking chills, decreased appetite, nausea, vomiting, diarrhea, increased fatigue, shortness of breath or chest pain, confusion, and not feeling the strength to come to the clinic. For all other listed problems and medical diagnosis in their chart - they are managed by PCP and/or other specialists, which the patient acknowledges.     I spent 43 minutes reviewing the records (labs, clinician notes, outside records, medical history, ordering medicine/tests/procedures, monitoring of anti-neoplastic toxicities, interpreting the imaging/labs previously done) and coordination of care as well as direct time with the patient today, of which greater than 50% of the time was spent in counseling and coordination of care with the patient/family.

## 2024-11-04 ENCOUNTER — OFFICE VISIT (OUTPATIENT)
Dept: HEMATOLOGY ONCOLOGY | Facility: CLINIC | Age: 69
End: 2024-11-04
Payer: COMMERCIAL

## 2024-11-04 ENCOUNTER — HOSPITAL ENCOUNTER (OUTPATIENT)
Dept: INFUSION CENTER | Facility: CLINIC | Age: 69
Discharge: HOME | End: 2024-11-04
Payer: COMMERCIAL

## 2024-11-04 VITALS
DIASTOLIC BLOOD PRESSURE: 82 MMHG | HEIGHT: 71 IN | OXYGEN SATURATION: 98 % | BODY MASS INDEX: 24.5 KG/M2 | RESPIRATION RATE: 16 BRPM | SYSTOLIC BLOOD PRESSURE: 130 MMHG | TEMPERATURE: 98.2 F | WEIGHT: 175 LBS | HEART RATE: 72 BPM

## 2024-11-04 DIAGNOSIS — C25.9 ADENOCARCINOMA OF PANCREAS (HCC): Primary | ICD-10-CM

## 2024-11-04 DIAGNOSIS — I48.0 PAROXYSMAL ATRIAL FIBRILLATION (HCC): ICD-10-CM

## 2024-11-04 DIAGNOSIS — E11.65 TYPE 2 DIABETES MELLITUS WITH HYPERGLYCEMIA, WITHOUT LONG-TERM CURRENT USE OF INSULIN (HCC): ICD-10-CM

## 2024-11-04 DIAGNOSIS — C25.9 ADENOCARCINOMA OF PANCREAS (HCC): ICD-10-CM

## 2024-11-04 DIAGNOSIS — I27.20 PULMONARY HTN (HCC): ICD-10-CM

## 2024-11-04 LAB
ALBUMIN SERPL BCG-MCNC: 4 G/DL (ref 3.5–5)
ALP SERPL-CCNC: 187 U/L (ref 34–104)
ALT SERPL W P-5'-P-CCNC: 22 U/L (ref 7–52)
ANION GAP SERPL CALCULATED.3IONS-SCNC: 8 MMOL/L (ref 4–13)
AST SERPL W P-5'-P-CCNC: 17 U/L (ref 13–39)
BASOPHILS # BLD AUTO: 0.07 THOUSANDS/ΜL (ref 0–0.1)
BASOPHILS NFR BLD AUTO: 1 % (ref 0–1)
BILIRUB SERPL-MCNC: 0.48 MG/DL (ref 0.2–1)
BUN SERPL-MCNC: 15 MG/DL (ref 5–25)
CALCIUM SERPL-MCNC: 9 MG/DL (ref 8.4–10.2)
CHLORIDE SERPL-SCNC: 102 MMOL/L (ref 96–108)
CO2 SERPL-SCNC: 28 MMOL/L (ref 21–32)
CREAT SERPL-MCNC: 0.77 MG/DL (ref 0.6–1.3)
EOSINOPHIL # BLD AUTO: 0.35 THOUSAND/ΜL (ref 0–0.61)
EOSINOPHIL NFR BLD AUTO: 3 % (ref 0–6)
ERYTHROCYTE [DISTWIDTH] IN BLOOD BY AUTOMATED COUNT: 14.4 % (ref 11.6–15.1)
GFR SERPL CREATININE-BSD FRML MDRD: 94 ML/MIN/1.73SQ M
GLUCOSE SERPL-MCNC: 228 MG/DL (ref 65–140)
HCT VFR BLD AUTO: 38.7 % (ref 36.5–49.3)
HGB BLD-MCNC: 13.3 G/DL (ref 12–17)
IMM GRANULOCYTES # BLD AUTO: 0.15 THOUSAND/UL (ref 0–0.2)
IMM GRANULOCYTES NFR BLD AUTO: 1 % (ref 0–2)
LYMPHOCYTES # BLD AUTO: 1.19 THOUSANDS/ΜL (ref 0.6–4.47)
LYMPHOCYTES NFR BLD AUTO: 10 % (ref 14–44)
MCH RBC QN AUTO: 32.2 PG (ref 26.8–34.3)
MCHC RBC AUTO-ENTMCNC: 34.4 G/DL (ref 31.4–37.4)
MCV RBC AUTO: 94 FL (ref 82–98)
MONOCYTES # BLD AUTO: 0.87 THOUSAND/ΜL (ref 0.17–1.22)
MONOCYTES NFR BLD AUTO: 7 % (ref 4–12)
NEUTROPHILS # BLD AUTO: 9.2 THOUSANDS/ΜL (ref 1.85–7.62)
NEUTS SEG NFR BLD AUTO: 78 % (ref 43–75)
NRBC BLD AUTO-RTO: 0 /100 WBCS
PLATELET # BLD AUTO: 162 THOUSANDS/UL (ref 149–390)
PMV BLD AUTO: 10.6 FL (ref 8.9–12.7)
POTASSIUM SERPL-SCNC: 3.6 MMOL/L (ref 3.5–5.3)
PROT SERPL-MCNC: 6.7 G/DL (ref 6.4–8.4)
RBC # BLD AUTO: 4.13 MILLION/UL (ref 3.88–5.62)
SODIUM SERPL-SCNC: 138 MMOL/L (ref 135–147)
WBC # BLD AUTO: 11.83 THOUSAND/UL (ref 4.31–10.16)

## 2024-11-04 PROCEDURE — G2211 COMPLEX E/M VISIT ADD ON: HCPCS | Performed by: INTERNAL MEDICINE

## 2024-11-04 PROCEDURE — 99215 OFFICE O/P EST HI 40 MIN: CPT | Performed by: INTERNAL MEDICINE

## 2024-11-04 PROCEDURE — 80053 COMPREHEN METABOLIC PANEL: CPT | Performed by: INTERNAL MEDICINE

## 2024-11-04 PROCEDURE — 85025 COMPLETE CBC W/AUTO DIFF WBC: CPT | Performed by: INTERNAL MEDICINE

## 2024-11-04 NOTE — PROGRESS NOTES
Pt presents for lab specimen collection via port a cath. Port accessed, labs drawn, saline locked, and de accessed without difficulty. Declined AVS, next appointment on 11/5/24 at 8am.

## 2024-11-05 ENCOUNTER — HOSPITAL ENCOUNTER (OUTPATIENT)
Dept: INFUSION CENTER | Facility: CLINIC | Age: 69
Discharge: HOME | End: 2024-11-05
Payer: COMMERCIAL

## 2024-11-05 ENCOUNTER — NUTRITION (OUTPATIENT)
Dept: NUTRITION | Facility: CLINIC | Age: 69
End: 2024-11-05

## 2024-11-05 VITALS
HEIGHT: 71 IN | RESPIRATION RATE: 18 BRPM | WEIGHT: 169.8 LBS | BODY MASS INDEX: 23.77 KG/M2 | SYSTOLIC BLOOD PRESSURE: 133 MMHG | DIASTOLIC BLOOD PRESSURE: 80 MMHG | TEMPERATURE: 97.7 F | HEART RATE: 59 BPM

## 2024-11-05 DIAGNOSIS — Z71.3 NUTRITIONAL COUNSELING: Primary | ICD-10-CM

## 2024-11-05 DIAGNOSIS — C25.9 ADENOCARCINOMA OF PANCREAS (HCC): Primary | ICD-10-CM

## 2024-11-05 LAB
GENE DIS ANL INTERP-IMP: NORMAL
INTERPRETATION: NORMAL

## 2024-11-05 PROCEDURE — 96415 CHEMO IV INFUSION ADDL HR: CPT

## 2024-11-05 PROCEDURE — 96417 CHEMO IV INFUS EACH ADDL SEQ: CPT

## 2024-11-05 PROCEDURE — 96413 CHEMO IV INFUSION 1 HR: CPT

## 2024-11-05 PROCEDURE — 96367 TX/PROPH/DG ADDL SEQ IV INF: CPT

## 2024-11-05 PROCEDURE — 96375 TX/PRO/DX INJ NEW DRUG ADDON: CPT

## 2024-11-05 PROCEDURE — G0498 CHEMO EXTEND IV INFUS W/PUMP: HCPCS

## 2024-11-05 RX ORDER — DEXTROSE MONOHYDRATE 50 MG/ML
20 INJECTION, SOLUTION INTRAVENOUS ONCE
Status: COMPLETED | OUTPATIENT
Start: 2024-11-05 | End: 2024-11-05

## 2024-11-05 RX ORDER — ATROPINE SULFATE 1 MG/ML
0.25 INJECTION, SOLUTION INTRAVENOUS ONCE AS NEEDED
Status: DISCONTINUED | OUTPATIENT
Start: 2024-11-05 | End: 2024-11-08 | Stop reason: HOSPADM

## 2024-11-05 RX ORDER — ATROPINE SULFATE 1 MG/ML
0.25 INJECTION, SOLUTION INTRAVENOUS ONCE
Status: COMPLETED | OUTPATIENT
Start: 2024-11-05 | End: 2024-11-05

## 2024-11-05 RX ORDER — SODIUM CHLORIDE 9 MG/ML
20 INJECTION, SOLUTION INTRAVENOUS ONCE AS NEEDED
Status: DISCONTINUED | OUTPATIENT
Start: 2024-11-05 | End: 2024-11-08 | Stop reason: HOSPADM

## 2024-11-05 RX ADMIN — DEXAMETHASONE SODIUM PHOSPHATE: 10 INJECTION, SOLUTION INTRAMUSCULAR; INTRAVENOUS at 08:59

## 2024-11-05 RX ADMIN — SODIUM CHLORIDE 20 ML/HR: 0.9 INJECTION, SOLUTION INTRAVENOUS at 08:59

## 2024-11-05 RX ADMIN — OXALIPLATIN 100 MG: 5 INJECTION, SOLUTION INTRAVENOUS at 10:06

## 2024-11-05 RX ADMIN — DEXTROSE 20 ML/HR: 5 SOLUTION INTRAVENOUS at 10:03

## 2024-11-05 RX ADMIN — ATROPINE SULFATE 0.25 MG: 1 INJECTION, SOLUTION INTRAVENOUS at 12:07

## 2024-11-05 RX ADMIN — FOSAPREPITANT 150 MG: 150 INJECTION, POWDER, LYOPHILIZED, FOR SOLUTION INTRAVENOUS at 09:25

## 2024-11-05 RX ADMIN — IRINOTECAN HYDROCHLORIDE 240 MG: 20 INJECTION, SOLUTION INTRAVENOUS at 12:12

## 2024-11-05 NOTE — PATIENT INSTRUCTIONS
Nutrition Rx & Recommendations:  Diet: High Calorie, High Protein (for high calorie foods see pages 52-53, and for high protein foods see pages 49-51 in your Eating Hints book)  Small, frequent meals/snacks may be easier to tolerate than 3 large daily meals.  Aim for 5-6 small meals per day (every 2-3 hours).  Include protein at all meals/snacks.  Include a variety of foods (as tolerated/allowed by diet).  Incorporate physical activity as able/allowed.  Stay hydrated by sipping fluids of choice/tolerance throughout the day.  Liquid nutrition may be better tolerated than solids at times.  Alter food choices and eating patterns to accommodate changing needs.  Refer to Eating Hints book for other meal/snack ideas and symptom management.  Weigh yourself regularly. If you notice weight loss, make an effort to increase your daily food/calorie intake. If you continue to notice loss after these efforts, reach out to your dietitian to establish a plan to stabilize weight.  Increase intake of Ensure when appetite is low the days following chemotherapy.     Nutrition therapy for Carbohydrate Controlled Diet:     Foods with carbohydrates make your blood glucose level go up. Learning how to count carbohydrates can help you control your blood glucose levels. First, identify the foods you eat that contain carbohydrates.   Foods that commonly contain carbohydrates include: grains (breads, pasta, cereal, rice), fruit, starchy vegetables (potatoes, corn, peas), snack foods (chips, pretzels, crackers), dairy products, sweets/desserts.   Foods that do not usually contain carbohydrates: chicken, pork, beef, fish, seafood, eggs, tofu, cheese, butter, sour cream, avocado, nuts, seeds, olives, mayonnaise, water, black coffee, unsweetened tea, and zero-calorie drinks. Vegetables with no or low carbohydrate include green beans, cauliflower, tomatoes, and onions.     Follow Up Plan: 12/3/24 during infusion  Recommend Referral to Other  Providers: none at this time

## 2024-11-05 NOTE — PROGRESS NOTES
Per Dr Peña   she stated this is what Dr Aaron started patient on:    mFOLFIRINOX (no folinic acid)  mFOLFIRINOX: modified FOLinic acid (Leucovorin), Fluorouracil, IRINotecan, OXaliplatin  Regimen  Study Dates of enrollment Evidence  Sherita et alLoren 2014 2011-01 to 2013-08 Retrospective  Chemotherapy  · Fluorouracil (5-FU) 2400 mg/m2 IV continuous infusion over 46 hours, started on day 1  · Irinotecan (Camptosar) 165 mg/m2 IV over 90 minutes once on day 1  · Oxaliplatin (Eloxatin) 85 mg/m2 IV over 2 to 4 hours once on day 1  Supportive therapy  · Pegfilgrastim (Neulasta) 6 mg SC once on day 4  14-day cycle for 4 to 8 cycles, followed by complete restaging

## 2024-11-05 NOTE — PROGRESS NOTES
Pt here for chemotherapy, mFolfirinox, offering no complaints. Reached out to Dr Peña and she confirmed that patient isn't supposed to get leucovorin or 5 FU push.  Right port accessed with positive blood return noted throughout treatment.  Tolerated infusion without incident.  Port flushed and chemo ball attached.  Double checked with Chitra Patel RN.  AVS declined.  Aware of disconnect 11/7 @ 1200 pm. Walked out in stable condition

## 2024-11-07 ENCOUNTER — HOSPITAL ENCOUNTER (OUTPATIENT)
Dept: INFUSION CENTER | Facility: CLINIC | Age: 69
Discharge: HOME | End: 2024-11-07
Payer: COMMERCIAL

## 2024-11-07 DIAGNOSIS — C25.9 ADENOCARCINOMA OF PANCREAS (HCC): Primary | ICD-10-CM

## 2024-11-07 PROCEDURE — 96372 THER/PROPH/DIAG INJ SC/IM: CPT

## 2024-11-07 RX ADMIN — PEGFILGRASTIM 6 MG: KIT SUBCUTANEOUS at 12:22

## 2024-11-07 NOTE — PROGRESS NOTES
Pt to clinic for elastometric pump disconnect and Neulatsa on-pro. Offers no complaints today. Pump has been running for over 46 hours and appears empty and deflated. Pt tolerated pump disconnect and Neulasta on-pro applied to LLQ of abdomen without complications. Port de-accessed. Pt aware of next appointment on 11/18/24 at 8am. AVS declined.

## 2024-11-08 DIAGNOSIS — E11.65 TYPE 2 DIABETES MELLITUS WITH HYPERGLYCEMIA, WITHOUT LONG-TERM CURRENT USE OF INSULIN (HCC): ICD-10-CM

## 2024-11-08 RX ORDER — METFORMIN HYDROCHLORIDE 500 MG/1
1000 TABLET, EXTENDED RELEASE ORAL
Qty: 180 TABLET | Refills: 1 | Status: SHIPPED | OUTPATIENT
Start: 2024-11-08

## 2024-11-13 DIAGNOSIS — G89.3 CANCER RELATED PAIN: ICD-10-CM

## 2024-11-13 RX ORDER — SODIUM CHLORIDE 9 MG/ML
20 INJECTION, SOLUTION INTRAVENOUS ONCE AS NEEDED
Status: CANCELLED | OUTPATIENT
Start: 2024-11-19

## 2024-11-13 RX ORDER — DEXTROSE MONOHYDRATE 50 MG/ML
20 INJECTION, SOLUTION INTRAVENOUS ONCE
Status: CANCELLED | OUTPATIENT
Start: 2024-11-19

## 2024-11-13 RX ORDER — ATROPINE SULFATE 1 MG/ML
0.25 INJECTION, SOLUTION INTRAVENOUS ONCE AS NEEDED
Status: CANCELLED | OUTPATIENT
Start: 2024-11-19

## 2024-11-13 RX ORDER — ATROPINE SULFATE 1 MG/ML
0.25 INJECTION, SOLUTION INTRAVENOUS ONCE
Status: CANCELLED | OUTPATIENT
Start: 2024-11-19

## 2024-11-13 NOTE — TELEPHONE ENCOUNTER
Reason for call:   [x] Refill   [] Prior Auth  [] Other:     Office:   [] PCP/Provider -   [x] Specialty/Provider -  [facility] / Kamar Jung PA-C     Medication:   ~ oxyCODONE (Roxicodone) 5 immediate release tablet - Take 1 tablet (5 mg total) by mouth every 6 (six) hours as needed for moderate pain Max Daily Amount: 20 mg     Pharmacy:   [facility] - [address]     Does the patient have enough for 3 days?   [] Yes   [x] No - Send as HP to POD

## 2024-11-13 NOTE — TELEPHONE ENCOUNTER
Refill must be reviewed and completed by the office or provider. The refill is unable to be approved or denied by the medication management team.        Patient Id Prescription # Filled Written Drug Label Qty Days Strength MME** Prescriber Pharmacy Payment REFILL #/Auth State Detail  1 [id number] 10/23/2024 10/21/2024 oxyCODONE HCL (Tablet) 90.0 23 5 MG 29.35 [facility] Commercial Insurance 0 / 0 [place]   1 [id number] 09/26/2024 09/26/2024 oxyCODONE HCL (Tablet) 90.0 23 5 MG 29.35 [facility] Commercial Insurance 0 / 0 [place]   1 [id number] 09/10/2024 09/10/2024 oxyCODONE HCL (Tablet) 60.0 15 5 MG 30.0 [facility] Commercial Insurance 0 / 0 [place]   1 [id number] 08/26/2024 08/26/2024 oxyCODONE HCL (Tablet) 60.0 15 5 MG 30.0 [facility] Commercial Insurance 0 / 0 [place]

## 2024-11-14 ENCOUNTER — HOSPITAL ENCOUNTER (OUTPATIENT)
Dept: INFUSION CENTER | Facility: CLINIC | Age: 69
End: 2024-11-14

## 2024-11-14 DIAGNOSIS — C25.9 ADENOCARCINOMA OF PANCREAS (HCC): Primary | ICD-10-CM

## 2024-11-14 RX ORDER — OXYCODONE HYDROCHLORIDE 5 MG/1
5 TABLET ORAL EVERY 6 HOURS PRN
Qty: 90 TABLET | Refills: 0 | Status: SHIPPED | OUTPATIENT
Start: 2024-11-14

## 2024-11-14 NOTE — TELEPHONE ENCOUNTER
PDMP reviewed. Last office note reviewed. Next palliative appointment scheduled for 11/25. Will refill oxyIR as requested.

## 2024-11-15 ENCOUNTER — HOSPITAL ENCOUNTER (OUTPATIENT)
Dept: CT IMAGING | Facility: HOSPITAL | Age: 69
Discharge: HOME | End: 2024-11-15
Attending: RADIOLOGY
Payer: COMMERCIAL

## 2024-11-15 VITALS
TEMPERATURE: 97.8 F | WEIGHT: 179.68 LBS | OXYGEN SATURATION: 95 % | BODY MASS INDEX: 25.72 KG/M2 | HEART RATE: 58 BPM | DIASTOLIC BLOOD PRESSURE: 72 MMHG | SYSTOLIC BLOOD PRESSURE: 125 MMHG | RESPIRATION RATE: 16 BRPM | HEIGHT: 70 IN

## 2024-11-15 DIAGNOSIS — C25.9 ADENOCARCINOMA OF PANCREAS (HCC): ICD-10-CM

## 2024-11-15 LAB
BASOPHILS # BLD AUTO: 0.12 THOUSANDS/ÂΜL (ref 0–0.1)
BASOPHILS NFR BLD AUTO: 1 % (ref 0–1)
EOSINOPHIL # BLD AUTO: 0.17 THOUSAND/ÂΜL (ref 0–0.61)
EOSINOPHIL NFR BLD AUTO: 1 % (ref 0–6)
ERYTHROCYTE [DISTWIDTH] IN BLOOD BY AUTOMATED COUNT: 14.8 % (ref 11.6–15.1)
HCT VFR BLD AUTO: 34.7 % (ref 36.5–49.3)
HGB BLD-MCNC: 12.2 G/DL (ref 12–17)
IMM GRANULOCYTES # BLD AUTO: 0.17 THOUSAND/UL (ref 0–0.2)
IMM GRANULOCYTES NFR BLD AUTO: 1 % (ref 0–2)
INR PPP: 1.09 (ref 0.85–1.19)
LYMPHOCYTES # BLD AUTO: 1.25 THOUSANDS/ÂΜL (ref 0.6–4.47)
LYMPHOCYTES NFR BLD AUTO: 8 % (ref 14–44)
MCH RBC QN AUTO: 32.2 PG (ref 26.8–34.3)
MCHC RBC AUTO-ENTMCNC: 35.2 G/DL (ref 31.4–37.4)
MCV RBC AUTO: 92 FL (ref 82–98)
MONOCYTES # BLD AUTO: 2.04 THOUSAND/ÂΜL (ref 0.17–1.22)
MONOCYTES NFR BLD AUTO: 13 % (ref 4–12)
NEUTROPHILS # BLD AUTO: 11.8 THOUSANDS/ÂΜL (ref 1.85–7.62)
NEUTS SEG NFR BLD AUTO: 76 % (ref 43–75)
NRBC BLD AUTO-RTO: 0 /100 WBCS
PLATELET # BLD AUTO: 215 THOUSANDS/UL (ref 149–390)
PMV BLD AUTO: 10.4 FL (ref 8.9–12.7)
PROTHROMBIN TIME: 14.3 SECONDS (ref 12.3–15)
RBC # BLD AUTO: 3.79 MILLION/UL (ref 3.88–5.62)
WBC # BLD AUTO: 15.55 THOUSAND/UL (ref 4.31–10.16)

## 2024-11-15 PROCEDURE — 85610 PROTHROMBIN TIME: CPT | Performed by: RADIOLOGY

## 2024-11-15 PROCEDURE — 88342 IMHCHEM/IMCYTCHM 1ST ANTB: CPT | Performed by: STUDENT IN AN ORGANIZED HEALTH CARE EDUCATION/TRAINING PROGRAM

## 2024-11-15 PROCEDURE — 88307 TISSUE EXAM BY PATHOLOGIST: CPT | Performed by: STUDENT IN AN ORGANIZED HEALTH CARE EDUCATION/TRAINING PROGRAM

## 2024-11-15 PROCEDURE — 88341 IMHCHEM/IMCYTCHM EA ADD ANTB: CPT | Performed by: STUDENT IN AN ORGANIZED HEALTH CARE EDUCATION/TRAINING PROGRAM

## 2024-11-15 PROCEDURE — 85025 COMPLETE CBC W/AUTO DIFF WBC: CPT | Performed by: RADIOLOGY

## 2024-11-15 RX ORDER — SODIUM CHLORIDE 9 MG/ML
30 INJECTION, SOLUTION INTRAVENOUS CONTINUOUS
Status: DISCONTINUED | OUTPATIENT
Start: 2024-11-15 | End: 2024-11-16 | Stop reason: HOSPADM

## 2024-11-15 RX ORDER — MIDAZOLAM HYDROCHLORIDE 2 MG/2ML
INJECTION, SOLUTION INTRAMUSCULAR; INTRAVENOUS AS NEEDED
Status: COMPLETED | OUTPATIENT
Start: 2024-11-15 | End: 2024-11-15

## 2024-11-15 RX ORDER — OXYCODONE HYDROCHLORIDE 5 MG/1
5 TABLET ORAL ONCE AS NEEDED
Refills: 0 | Status: COMPLETED | OUTPATIENT
Start: 2024-11-15 | End: 2024-11-15

## 2024-11-15 RX ORDER — ACETAMINOPHEN 325 MG/1
650 TABLET ORAL EVERY 4 HOURS PRN
Status: DISCONTINUED | OUTPATIENT
Start: 2024-11-15 | End: 2024-11-16 | Stop reason: HOSPADM

## 2024-11-15 RX ORDER — FENTANYL CITRATE 50 UG/ML
INJECTION, SOLUTION INTRAMUSCULAR; INTRAVENOUS AS NEEDED
Status: COMPLETED | OUTPATIENT
Start: 2024-11-15 | End: 2024-11-15

## 2024-11-15 RX ADMIN — SODIUM CHLORIDE 30 ML/HR: 0.9 INJECTION, SOLUTION INTRAVENOUS at 09:21

## 2024-11-15 RX ADMIN — FENTANYL CITRATE 25 MCG: 50 INJECTION INTRAMUSCULAR; INTRAVENOUS at 10:15

## 2024-11-15 RX ADMIN — FENTANYL CITRATE 25 MCG: 50 INJECTION INTRAMUSCULAR; INTRAVENOUS at 10:25

## 2024-11-15 RX ADMIN — MIDAZOLAM 0.5 MG: 1 INJECTION INTRAMUSCULAR; INTRAVENOUS at 10:15

## 2024-11-15 RX ADMIN — FENTANYL CITRATE 50 MCG: 50 INJECTION INTRAMUSCULAR; INTRAVENOUS at 10:13

## 2024-11-15 RX ADMIN — MIDAZOLAM 0.5 MG: 1 INJECTION INTRAMUSCULAR; INTRAVENOUS at 10:25

## 2024-11-15 RX ADMIN — MIDAZOLAM 0.5 MG: 1 INJECTION INTRAMUSCULAR; INTRAVENOUS at 10:12

## 2024-11-15 RX ADMIN — MIDAZOLAM 0.5 MG: 1 INJECTION INTRAMUSCULAR; INTRAVENOUS at 10:19

## 2024-11-15 RX ADMIN — OXYCODONE 5 MG: 5 TABLET ORAL at 13:39

## 2024-11-15 NOTE — DISCHARGE INSTRUCTIONS
Percutaneous Liver Biopsy   WHAT YOU NEED TO KNOW:   A PLB is a procedure to remove a sample of tissue from your liver. The sample can be sent to a lab and tested for liver disease, cancer, or infection. After the procedure you may have pain and bruising at the biopsy site. You may also have pain in your right shoulder. These symptoms should get better in 48 to 72 hours.      DISCHARGE INSTRUCTIONS:     Contact Interventional Radiology at [phone] if:    Fever greater than 101 or chills  You have severe pain in your abdomen.    Your abdomen is larger than usual and feels hard.    Your neck is more swollen and you have trouble swallowing.    You feel weak or dizzy.    Your heart is beating faster than usual.   Your pain does not get better after you take pain medicine.    Your wound is red, swollen, or draining pus.   You have nausea or are vomiting.   Your skin is itchy, swollen, or you have a rash.   You have questions or concerns about your condition or care.    Medicines:   Acetaminophen decreases pain and fever. It is available without a doctor's order. Acetaminophen can cause liver damage if not taken correctly.   Take your home medicine as directed.  Resume your normal diet. Small sips of flat soda will help with mild nausea.    Self-care:   Rest as directed. Do not play sports, exercise, or lift anything heavier than 5 pounds for up to 1 week.     Apply firm, steady pressure if bleeding occurs. A small amount of bleeding from your wound is possible. Apply pressure with a clean gauze or towel for 5 to 10 minutes. Call 911 if bleeding becomes heavy or does not stop.    Ask your healthcare provider when to take your blood thinner or antiplatelet medicine. You may need to wait 24 to 72 hours to take your medicine. This will prevent bleeding.    Follow up with your healthcare provider as directed: Write down your questions so you remember to ask them during your visits.     Procedural Sedation   WHAT YOU NEED  TO KNOW:   Procedural sedation is medicine used during procedures to help you feel relaxed and calm. You will remember little to none of the procedure. After sedation you may feel tired, weak, or unsteady on your feet. You may also have trouble concentrating or short-term memory loss. These symptoms should go away in 24 hours or less.   DISCHARGE INSTRUCTIONS:   Call 911 or have someone else call for any of the following:   You have sudden trouble breathing.     You cannot be woken.     Contact Interventional Radiology at [phone]   SUELLEN PATIENTS: Contact Interventional Radiology at [phone] DAVID PATIENTS: Contact Interventional Radiology at [phone]) if any of the following occur:      You have a severe headache or dizziness.     Your heart is beating faster than usual.    You have a fever or chills.     Your skin is itchy, swollen, or you have a rash.     You have nausea or are vomiting for more than 8 hours after the procedure.      You have questions or concerns about your condition or care.  Self-care:   Have someone stay with you for 24 hours. This person can drive you to errands and help you do things around the house. This person can also watch for problems.      Rest and do quiet activities for 24 hours. Do not exercise, ride a bike, or play sports. Stand up slowly to prevent dizziness and falls. Take short walks around the house with another person. Slowly return to your usual activities the next day.      Do not drive or use dangerous machines or tools for 24 hours. You may injure yourself or others. Examples include a lawnmower, saw, or drill. Do not return to work for 24 hours if you use dangerous machines or tools for work.      Do not make important decisions for 24 hours. For example, do not sign important papers or invest money.      Drink liquids as directed. Liquids help flush the sedation medicine out of your body. Ask how much liquid to drink each day and which liquids are best  for you.      Eat small, frequent meals to prevent nausea and vomiting. Start with clear liquids such as juice or broth. If you do not vomit after clear liquids, you can eat your usual foods.      Do not drink alcohol or take medicines that make you drowsy. This includes medicines that help you sleep and anxiety medicines. Ask your healthcare provider if it is safe for you to take pain medicine.  Follow up with your healthcare provider as directed: Write down your questions so you remember to ask them during your visits.

## 2024-11-15 NOTE — BRIEF OP NOTE (RAD/CATH)
INTERVENTIONAL RADIOLOGY PROCEDURE NOTE    Date: 11/15/2024    Procedure:   Procedure Summary       Date: 11/15/24 Room / Location: [facility] CAT Scan    Anesthesia Start:  Anesthesia Stop:     Procedure: IR BIOPSY LIVER MASS Diagnosis:       Adenocarcinoma of pancreas (HCC)      (Repeat biopsy.  Prior did not show evidence of a mass.)    Scheduled Providers: Randi Biggs MD Responsible Provider:     Anesthesia Type: Not recorded ASA Status: Not recorded            Preoperative diagnosis:   1. Adenocarcinoma of pancreas (HCC)         Postoperative diagnosis: Same.    Surgeon: Cm Hoffman MD     Assistant: None. No qualified resident was available.    Blood loss: Less than 3 mL    Specimens: 6 passes 18-gauge core in formalin, touch prepx2    Findings: Peripheral right lobe liver lesion corresponding to previous abnormality confirmed by CT and ultrasound.  Targeted core biopsy performed with ultrasound guidance.  Lesional tissue present on touch prep.  6 cores obtained for surgical pathology.    Complications: None immediate.    Anesthesia: conscious sedation

## 2024-11-18 ENCOUNTER — HOSPITAL ENCOUNTER (OUTPATIENT)
Dept: INFUSION CENTER | Facility: CLINIC | Age: 69
Discharge: HOME | End: 2024-11-18
Payer: COMMERCIAL

## 2024-11-18 DIAGNOSIS — Z95.828 PORT-A-CATH IN PLACE: Primary | ICD-10-CM

## 2024-11-18 DIAGNOSIS — C25.9 ADENOCARCINOMA OF PANCREAS (HCC): ICD-10-CM

## 2024-11-18 LAB
ALBUMIN SERPL BCG-MCNC: 3.8 G/DL (ref 3.5–5)
ALP SERPL-CCNC: 176 U/L (ref 34–104)
ALT SERPL W P-5'-P-CCNC: 24 U/L (ref 7–52)
ANION GAP SERPL CALCULATED.3IONS-SCNC: 6 MMOL/L (ref 4–13)
AST SERPL W P-5'-P-CCNC: 19 U/L (ref 13–39)
BASOPHILS # BLD AUTO: 0.05 THOUSANDS/ÂΜL (ref 0–0.1)
BASOPHILS NFR BLD AUTO: 1 % (ref 0–1)
BILIRUB SERPL-MCNC: 0.42 MG/DL (ref 0.2–1)
BUN SERPL-MCNC: 10 MG/DL (ref 5–25)
CALCIUM SERPL-MCNC: 9.1 MG/DL (ref 8.4–10.2)
CHLORIDE SERPL-SCNC: 103 MMOL/L (ref 96–108)
CO2 SERPL-SCNC: 31 MMOL/L (ref 21–32)
CREAT SERPL-MCNC: 0.68 MG/DL (ref 0.6–1.3)
EOSINOPHIL # BLD AUTO: 0.28 THOUSAND/ÂΜL (ref 0–0.61)
EOSINOPHIL NFR BLD AUTO: 3 % (ref 0–6)
ERYTHROCYTE [DISTWIDTH] IN BLOOD BY AUTOMATED COUNT: 15.4 % (ref 11.6–15.1)
GFR SERPL CREATININE-BSD FRML MDRD: 98 ML/MIN/1.73SQ M
GLUCOSE SERPL-MCNC: 115 MG/DL (ref 65–140)
HCT VFR BLD AUTO: 36.9 % (ref 36.5–49.3)
HGB BLD-MCNC: 12.1 G/DL (ref 12–17)
IMM GRANULOCYTES # BLD AUTO: 0.06 THOUSAND/UL (ref 0–0.2)
IMM GRANULOCYTES NFR BLD AUTO: 1 % (ref 0–2)
LYMPHOCYTES # BLD AUTO: 1.2 THOUSANDS/ÂΜL (ref 0.6–4.47)
LYMPHOCYTES NFR BLD AUTO: 13 % (ref 14–44)
MCH RBC QN AUTO: 31 PG (ref 26.8–34.3)
MCHC RBC AUTO-ENTMCNC: 32.8 G/DL (ref 31.4–37.4)
MCV RBC AUTO: 95 FL (ref 82–98)
MONOCYTES # BLD AUTO: 0.87 THOUSAND/ÂΜL (ref 0.17–1.22)
MONOCYTES NFR BLD AUTO: 9 % (ref 4–12)
NEUTROPHILS # BLD AUTO: 7.09 THOUSANDS/ÂΜL (ref 1.85–7.62)
NEUTS SEG NFR BLD AUTO: 73 % (ref 43–75)
NRBC BLD AUTO-RTO: 0 /100 WBCS
PLATELET # BLD AUTO: 139 THOUSANDS/UL (ref 149–390)
PMV BLD AUTO: 9.9 FL (ref 8.9–12.7)
POTASSIUM SERPL-SCNC: 4.1 MMOL/L (ref 3.5–5.3)
PROT SERPL-MCNC: 6.5 G/DL (ref 6.4–8.4)
RBC # BLD AUTO: 3.9 MILLION/UL (ref 3.88–5.62)
SODIUM SERPL-SCNC: 140 MMOL/L (ref 135–147)
WBC # BLD AUTO: 9.55 THOUSAND/UL (ref 4.31–10.16)

## 2024-11-18 PROCEDURE — 80053 COMPREHEN METABOLIC PANEL: CPT | Performed by: INTERNAL MEDICINE

## 2024-11-18 PROCEDURE — 85025 COMPLETE CBC W/AUTO DIFF WBC: CPT | Performed by: INTERNAL MEDICINE

## 2024-11-18 RX ORDER — ONDANSETRON 4 MG/1
4 TABLET, FILM COATED ORAL EVERY 8 HOURS PRN
Qty: 20 TABLET | Refills: 3 | Status: SHIPPED | OUTPATIENT
Start: 2024-11-18

## 2024-11-18 NOTE — PROGRESS NOTES
Patient presents for central venous labs. Patient offers no complaints, requesting refill for Danielle anguiano RN aware and will refill. Port accessed, flushed, saline locked, labs drawn, port flushed and de-accessed. Band-aid placed on site.  AVS declined, next appointment 11/19/24 at 0800 reviewed.

## 2024-11-19 ENCOUNTER — HOSPITAL ENCOUNTER (OUTPATIENT)
Dept: INFUSION CENTER | Facility: CLINIC | Age: 69
Discharge: HOME | End: 2024-11-19
Payer: COMMERCIAL

## 2024-11-19 VITALS
HEART RATE: 54 BPM | HEIGHT: 71 IN | RESPIRATION RATE: 18 BRPM | DIASTOLIC BLOOD PRESSURE: 88 MMHG | BODY MASS INDEX: 24.36 KG/M2 | SYSTOLIC BLOOD PRESSURE: 161 MMHG | OXYGEN SATURATION: 98 % | WEIGHT: 174 LBS | TEMPERATURE: 97.8 F

## 2024-11-19 DIAGNOSIS — C25.9 ADENOCARCINOMA OF PANCREAS (HCC): Primary | ICD-10-CM

## 2024-11-19 DIAGNOSIS — E11.65 TYPE 2 DIABETES MELLITUS WITH HYPERGLYCEMIA, WITHOUT LONG-TERM CURRENT USE OF INSULIN (HCC): ICD-10-CM

## 2024-11-19 PROCEDURE — 88341 IMHCHEM/IMCYTCHM EA ADD ANTB: CPT | Performed by: STUDENT IN AN ORGANIZED HEALTH CARE EDUCATION/TRAINING PROGRAM

## 2024-11-19 PROCEDURE — 88307 TISSUE EXAM BY PATHOLOGIST: CPT | Performed by: STUDENT IN AN ORGANIZED HEALTH CARE EDUCATION/TRAINING PROGRAM

## 2024-11-19 PROCEDURE — 88342 IMHCHEM/IMCYTCHM 1ST ANTB: CPT | Performed by: STUDENT IN AN ORGANIZED HEALTH CARE EDUCATION/TRAINING PROGRAM

## 2024-11-19 RX ORDER — SODIUM CHLORIDE 9 MG/ML
20 INJECTION, SOLUTION INTRAVENOUS ONCE AS NEEDED
Status: DISCONTINUED | OUTPATIENT
Start: 2024-11-19 | End: 2024-11-22 | Stop reason: HOSPADM

## 2024-11-19 RX ORDER — ATROPINE SULFATE 1 MG/ML
0.25 INJECTION, SOLUTION INTRAVENOUS ONCE
Status: COMPLETED | OUTPATIENT
Start: 2024-11-19 | End: 2024-11-19

## 2024-11-19 RX ORDER — DEXTROSE MONOHYDRATE 50 MG/ML
20 INJECTION, SOLUTION INTRAVENOUS ONCE
Status: COMPLETED | OUTPATIENT
Start: 2024-11-19 | End: 2024-11-19

## 2024-11-19 RX ORDER — ATROPINE SULFATE 1 MG/ML
0.25 INJECTION, SOLUTION INTRAVENOUS ONCE AS NEEDED
Status: DISCONTINUED | OUTPATIENT
Start: 2024-11-19 | End: 2024-11-22 | Stop reason: HOSPADM

## 2024-11-19 RX ADMIN — FOSAPREPITANT 150 MG: 150 INJECTION, POWDER, LYOPHILIZED, FOR SOLUTION INTRAVENOUS at 08:52

## 2024-11-19 RX ADMIN — OXALIPLATIN 100 MG: 5 INJECTION, SOLUTION INTRAVENOUS at 09:35

## 2024-11-19 RX ADMIN — IRINOTECAN HYDROCHLORIDE 238 MG: 20 INJECTION, SOLUTION INTRAVENOUS at 11:44

## 2024-11-19 RX ADMIN — ATROPINE SULFATE 0.25 MG: 1 INJECTION, SOLUTION INTRAVENOUS at 11:39

## 2024-11-19 RX ADMIN — DEXTROSE 20 ML/HR: 5 SOLUTION INTRAVENOUS at 08:29

## 2024-11-19 RX ADMIN — DEXAMETHASONE SODIUM PHOSPHATE: 10 INJECTION, SOLUTION INTRAMUSCULAR; INTRAVENOUS at 08:29

## 2024-11-19 NOTE — PROGRESS NOTES
Pt into clinic for fluorouracil, irinotecan, and oxaliplatin. Pt offers no complaints currently. Pt experienced nausea, diarrhea, headaches, and muscle cramping after treatment, resolved.     Tolerated infusion without reaction. Port remained accessed and attached to chemo ball. Clamps open to run. Ok to be disconnected form chemo ball on 11/21/24 at 11:28am.    Pt aware of next appointment on 11/21/24 at 11:30am. AVS declined.

## 2024-11-20 RX ORDER — FLASH GLUCOSE SENSOR
1 KIT MISCELLANEOUS
Qty: 2 EACH | Refills: 0 | Status: SHIPPED | OUTPATIENT
Start: 2024-11-20

## 2024-11-21 ENCOUNTER — HOSPITAL ENCOUNTER (OUTPATIENT)
Dept: INFUSION CENTER | Facility: CLINIC | Age: 69
Discharge: HOME | End: 2024-11-21
Payer: COMMERCIAL

## 2024-11-21 DIAGNOSIS — C25.9 ADENOCARCINOMA OF PANCREAS (HCC): Primary | ICD-10-CM

## 2024-11-21 PROCEDURE — 96372 THER/PROPH/DIAG INJ SC/IM: CPT

## 2024-11-21 RX ADMIN — PEGFILGRASTIM 6 MG: KIT SUBCUTANEOUS at 11:28

## 2024-11-21 NOTE — PROGRESS NOTES
Pt presents today for a pump D/C and Neulasta On-pro application, offers no complaints, pt states that they are feelling well, pump appears empty, port flushed w/ good blood return noted, port de-accessed, pt aware of their next appt on 12/2 at 8:30,AVS declined and pt D/C home

## 2024-11-22 ENCOUNTER — TELEPHONE (OUTPATIENT)
Age: 69
End: 2024-11-22

## 2024-11-22 ENCOUNTER — HOSPITAL ENCOUNTER (OUTPATIENT)
Dept: INFUSION CENTER | Facility: CLINIC | Age: 69
End: 2024-11-22

## 2024-11-22 NOTE — TELEPHONE ENCOUNTER
Pt calling and stating that his pharmacy shorted him 40 pills when his Oxycodone 5 mg IR (q 6 hours) were filled.    He says he only has 10 pills left.    Pt would like to start using the [facility], [address], [id number]    Thank you,  IRWIN Do

## 2024-11-22 NOTE — TELEPHONE ENCOUNTER
I also called the pharmacy and they confirmed they filled #90 tablets on 11/16/24. Patient should have at least #62 tabs remaining if using as directed.    Pharmacy reports:  the pharmacy team counted the medication multiple times prior to dispensing and #90 tabs were dispensed  patient signed for the medication himself, verified by signature and 's license  they have DL on file for himself and his wife, signature matches his signature    Patient confirms he has about #10 tablets and doesn't know what happened, does not feel anyone had access to his medications.    Patient had canceled his 11/25/24 appointment with Dr. Ward at 9:30 AM, he says he did not know what the appointment was for. He states he is willing to see Dr. Ward at that time. I verified that time slot is available. Mary Kay, please schedule the patient for 9:30 AM on 11/25/24 with Dr. Ward.     Hopefully Dr. Ward can work with the patient on this issue at that visit. Counseled the patient will need to make his #10 tablets last through Monday morning. He states that he has been taking less than 4 tabs per day on average, so that he feels he will be able to do this. Will not provide early refill today.    This note was not shared with the patient due to privacy exception: note includes other individuals.

## 2024-11-25 ENCOUNTER — OFFICE VISIT (OUTPATIENT)
Dept: PALLIATIVE MEDICINE | Facility: CLINIC | Age: 69
End: 2024-11-25
Payer: COMMERCIAL

## 2024-11-25 VITALS
OXYGEN SATURATION: 97 % | TEMPERATURE: 97.3 F | HEIGHT: 70 IN | SYSTOLIC BLOOD PRESSURE: 140 MMHG | WEIGHT: 171.4 LBS | RESPIRATION RATE: 18 BRPM | HEART RATE: 59 BPM | BODY MASS INDEX: 24.54 KG/M2 | DIASTOLIC BLOOD PRESSURE: 86 MMHG

## 2024-11-25 DIAGNOSIS — G89.3 CANCER-RELATED PAIN: ICD-10-CM

## 2024-11-25 DIAGNOSIS — R63.0 LOSS OF APPETITE: ICD-10-CM

## 2024-11-25 DIAGNOSIS — C25.9 ADENOCARCINOMA OF PANCREAS (HCC): Primary | ICD-10-CM

## 2024-11-25 DIAGNOSIS — Z51.5 PALLIATIVE CARE BY SPECIALIST: ICD-10-CM

## 2024-11-25 DIAGNOSIS — R11.0 NAUSEA: ICD-10-CM

## 2024-11-25 DIAGNOSIS — Z79.899 MEDICAL MARIJUANA USE: ICD-10-CM

## 2024-11-25 DIAGNOSIS — G47.00 INSOMNIA: ICD-10-CM

## 2024-11-25 PROCEDURE — G2211 COMPLEX E/M VISIT ADD ON: HCPCS | Performed by: STUDENT IN AN ORGANIZED HEALTH CARE EDUCATION/TRAINING PROGRAM

## 2024-11-25 PROCEDURE — 99214 OFFICE O/P EST MOD 30 MIN: CPT | Performed by: STUDENT IN AN ORGANIZED HEALTH CARE EDUCATION/TRAINING PROGRAM

## 2024-11-25 NOTE — ASSESSMENT & PLAN NOTE
Palliative diagnosis: Pancreatic adenocarcinoma    Psychosocial support and presence provided  Reviewed PDMP    Emotionally coping well  Well supported by spouse, brother, and sister  No spiritual needs

## 2024-11-25 NOTE — ASSESSMENT & PLAN NOTE
Done by [facility] colleague Dr. Gates  Patient ID  #[id number]   Certification #[id number]

## 2024-11-25 NOTE — ASSESSMENT & PLAN NOTE
Diagnosed 8/2024, stage Ib  Currently on FOLFIRINOX chemotherapy    Comorbidities: New onset diabetes mellitus on insulin, CATY, psoriatic arthritis, A-fib/a flutter, mild pH

## 2024-11-25 NOTE — PROGRESS NOTES
Name: Abhishek Gimenez      : 1955      MRN: [id number]  Encounter Provider: Hira Ward DO  Encounter Date: 2024   Encounter department: [facility] PALLIATIVE [facility] [place]  :  Assessment & Plan  Adenocarcinoma of pancreas (HCC)  Diagnosed 2024, stage Ib  Currently on FOLFIRINOX chemotherapy    Comorbidities: New onset diabetes mellitus on insulin, CATY, psoriatic arthritis, A-fib/a flutter, mild pH         Cancer-related pain  Well-controlled, especially after introduction of MMJ  Takes approximately 3 tablets a day of oxycodone IR    Continue oxycodone IR 5 mg p.o. every 6 hours as needed  Normal BMs-->continue to monitor for OIC       Insomnia  Improved with MMJ       Loss of appetite  Improved with MMJ         Nausea  Improved with MMJ       Medical marijuana use  Done by [facility] colleague Dr. Gates  Patient ID  #[id number]   Certification #[id number]        Palliative care by specialist  Palliative diagnosis: Pancreatic adenocarcinoma    Psychosocial support and presence provided  Reviewed PDMP    Emotionally coping well  Well supported by spouse, brother, and sister  No spiritual needs         Recommending follow-up in 3-4 months    Decisional apparatus: Patient is competent on my exam today. If competence is lost, patient's substitute decision maker would be Urvashi Gimenez (see ACP doc on chart)  Advance Directive / Living Will / POLST: SL ACP doc on file     PDMP Review: I have reviewed the patient's controlled substance dispensing history in the Prescription Drug Monitoring Program in compliance with the [facility] regulations before prescribing any controlled substances.    History of Present Illness   Abhishek Gimenez is a 69 y.o. male who presents as a follow-up related to symptoms of pancreatic adenocarcinoma.  He has been following with Dr. Gates and was last seen virtually 10/16/2024.  Recently requested refill early for his oxycodone IR medication that was filled on  for 90 tablets.   During that telephone encounter, he stated that he only had 10 tablets left, and did not know what happened.  Appointment was scheduled for resolution of this matter.    He states that he made a mistake and found that he misplaced his medication in a different jar.  He states that he has all of the tablets he needs to now and does not need an early refill.  He states that his cancer related pain is well-controlled and he has been taking 3 tablets daily.  No adverse effects from medications. MMJ has improved virtually all of his symptoms including pain, appetite, nausea, insomnia, and fatigue.  Normal bowel and bladder habits.  On insulin for type 2 diabetes.  Emotionally doing well and has good support systems with his wife, brother, and sister.  No spiritual concerns.      Medical History Reviewed by provider this encounter:  Tobacco  Allergies  Meds  Problems  Med Hx  Surg Hx  Fam Hx     .  Past Medical History   Past Medical History:   Diagnosis Date    A-fib (HCC)     Arthritis     BPH (benign prostatic hyperplasia)     Cancer (HCC)     Colon polyp     Diabetes mellitus (HCC)     Fatty liver     Fracture, foot     High cholesterol     Hyperlipidemia     Irregular heart beat     Non-ischemic cardiomyopathy (HCC)     Osteoarthritis     Psoriasis     Psoriatic arthritis (HCC)     Pulmonary HTN (HCC)      Past Surgical History:   Procedure Laterality Date    CARDIAC ELECTROPHYSIOLOGY PROCEDURE N/A 02/22/2023    Procedure: Cardiac eps/afib ablation   comprehensive posterior wall;  Surgeon: Mynor Andrews MD;  Location: [facility];  Service: Cardiology    COLONOSCOPY      ELBOW SURGERY Right     FL GUIDED CENTRAL VENOUS ACCESS DEVICE INSERTION  9/5/2024    IR BIOPSY LIVER MASS  9/18/2024    IR BIOPSY LIVER MASS  11/15/2024    KIDNEY STONE SURGERY      LAPAROSCOPY N/A 9/5/2024    Procedure: DIAGNOSTIC LAPAROSCOPY;  Surgeon: Roz Faulkner MD;  Location: [facility] MAIN OR;  Service: Surgical Oncology     TRANSURETHRAL RESECTION OF PROSTATE      last assessed 7/21/15    TUNNELED VENOUS PORT PLACEMENT N/A 9/5/2024    Procedure: INSERTION VENOUS PORT (PORT-A-CATH);  Surgeon: Roz Faulkner MD;  Location: [facility];  Service: Surgical Oncology     Family History   Problem Relation Age of Onset    Diabetes Mother     Hypertension Father     Diabetes Family       reports that he has quit smoking. His smoking use included cigarettes. He has a 5 pack-year smoking history. He has been exposed to tobacco smoke. He has never used smokeless tobacco. He reports current alcohol use. He reports current drug use. Drug: Marijuana.  Current Outpatient Medications on File Prior to Visit   Medication Sig Dispense Refill    Benzocaine-Isopropyl Alcohol 6-70 % PADS Use      Blood Glucose Monitoring Suppl ([employer] Blood Glucose Meter) w/Device KIT Use      Blood Glucose Monitoring Suppl (OneTouch Verio Reflect) w/Device KIT Check blood sugars once daily. Please substitute with appropriate alternative as covered by patient's insurance. Dx: E11.65 1 kit 0    Continuous Blood Gluc  (FreeStyle Livia 2 [place]) LEANA 14 DAY READER NOT MADE 1 each 1    Continuous Glucose Sensor (FreeStyle Livia 14 Day Sensor) MISC USE 1 DEVICE 3 (THREE) TIMES A DAY BEFORE MEALS      Continuous Glucose Sensor (FreeStyle Livia 14 Day Sensor) MISC Use 1 Device 3 (three) times a day before meals 2 each 0    Glucose Blood (BLOOD GLUCOSE TEST STRIPS 333 VI) Use      glucose blood (OneTouch Verio) test strip Check blood sugars once daily. Please substitute with appropriate alternative as covered by patient's insurance. Dx: E11.65 100 each 3    insulin glargine (LANTUS) 100 units/mL subcutaneous injection Inject 20 Units under the skin daily at bedtime 10 mL 3    Insulin Pen Needle (BD Pen Needle Mallory 2nd Gen) 32G X 4 MM MISC For use with insulin pen. Pharmacy may dispense brand covered by insurance. 100 each 0    Insulin Pen Needle (BD Pen Needle Mallory U/F) 32G X  4 MM MISC Use daily as directed with insulin pen 100 each 3    Lancets 33G MISC Use      lidocaine-prilocaine (EMLA) cream Apply topically as needed for mild pain Apply to port area 45-60min prior to treatment and cover with dressing or plastic wrap 30 g 1    metFORMIN (GLUCOPHAGE-XR) 500 mg 24 hr tablet Take 2 tablets (1,000 mg total) by mouth daily with breakfast 180 tablet 1    ondansetron (ZOFRAN) 4 mg tablet Take 1 tablet (4 mg total) by mouth every 8 (eight) hours as needed for nausea or vomiting 20 tablet 3    OneTouch Delica Lancets 33G MISC Check blood sugars once daily. Please substitute with appropriate alternative as covered by patient's insurance. Dx: E11.65 100 each 3    oxyCODONE (Roxicodone) 5 immediate release tablet Take 1 tablet (5 mg total) by mouth every 6 (six) hours as needed for moderate pain Max Daily Amount: 20 mg 90 tablet 0    pantoprazole (PROTONIX) 40 mg tablet Take 1 tablet (40 mg total) by mouth daily before breakfast 30 tablet 5    rosuvastatin (CRESTOR) 5 mg tablet TAKE 1 TABLET BY MOUTH EVERY DAY IN THE EVENING 90 tablet 1    sildenafil (VIAGRA) 100 mg tablet TAKE ONE (1) TABLET BY MOUTH DAILY AS NEEDED      adalimumab (HUMIRA) 40 mg/0.8 mL PSKT Inject 40 mg under the skin every 14 (fourteen) days  (Patient not taking: Reported on 9/23/2024)      Alcohol Swabs 70 % PADS May substitute brand based on insurance coverage. Check glucose TID. (Patient not taking: Reported on 9/23/2024) 100 each 0    Lantus SoloStar 100 units/mL SOPN Inject 0.2 mL (20 Units total) under the skin daily at bedtime 6 mL 3     No current facility-administered medications on file prior to visit.     Allergies   Allergen Reactions    Penicillins Rash      Current Outpatient Medications on File Prior to Visit   Medication Sig Dispense Refill    Benzocaine-Isopropyl Alcohol 6-70 % PADS Use      Blood Glucose Monitoring Suppl (CVS Blood Glucose Meter) w/Device KIT Use      Blood Glucose Monitoring Suppl (OneTouch  Verio Reflect) w/Device KIT Check blood sugars once daily. Please substitute with appropriate alternative as covered by patient's insurance. Dx: E11.65 1 kit 0    Continuous Blood Gluc  (FreeStyle Livia 2 [place]) LEANA 14 DAY READER NOT MADE 1 each 1    Continuous Glucose Sensor (FreeStyle Livia 14 Day Sensor) MISC USE 1 DEVICE 3 (THREE) TIMES A DAY BEFORE MEALS      Continuous Glucose Sensor (FreeStyle Livia 14 Day Sensor) MISC Use 1 Device 3 (three) times a day before meals 2 each 0    Glucose Blood (BLOOD GLUCOSE TEST STRIPS 333 VI) Use      glucose blood (OneTouch Verio) test strip Check blood sugars once daily. Please substitute with appropriate alternative as covered by patient's insurance. Dx: E11.65 100 each 3    insulin glargine (LANTUS) 100 units/mL subcutaneous injection Inject 20 Units under the skin daily at bedtime 10 mL 3    Insulin Pen Needle (BD Pen Needle Mallory 2nd Gen) 32G X 4 MM MISC For use with insulin pen. Pharmacy may dispense brand covered by insurance. 100 each 0    Insulin Pen Needle (BD Pen Needle Mallory U/F) 32G X 4 MM MISC Use daily as directed with insulin pen 100 each 3    Lancets 33G MISC Use      lidocaine-prilocaine (EMLA) cream Apply topically as needed for mild pain Apply to port area 45-60min prior to treatment and cover with dressing or plastic wrap 30 g 1    metFORMIN (GLUCOPHAGE-XR) 500 mg 24 hr tablet Take 2 tablets (1,000 mg total) by mouth daily with breakfast 180 tablet 1    ondansetron (ZOFRAN) 4 mg tablet Take 1 tablet (4 mg total) by mouth every 8 (eight) hours as needed for nausea or vomiting 20 tablet 3    OneTouch Delica Lancets 33G MISC Check blood sugars once daily. Please substitute with appropriate alternative as covered by patient's insurance. Dx: E11.65 100 each 3    oxyCODONE (Roxicodone) 5 immediate release tablet Take 1 tablet (5 mg total) by mouth every 6 (six) hours as needed for moderate pain Max Daily Amount: 20 mg 90 tablet 0    pantoprazole  "(PROTONIX) 40 mg tablet Take 1 tablet (40 mg total) by mouth daily before breakfast 30 tablet 5    rosuvastatin (CRESTOR) 5 mg tablet TAKE 1 TABLET BY MOUTH EVERY DAY IN THE EVENING 90 tablet 1    sildenafil (VIAGRA) 100 mg tablet TAKE ONE (1) TABLET BY MOUTH DAILY AS NEEDED      adalimumab (HUMIRA) 40 mg/0.8 mL PSKT Inject 40 mg under the skin every 14 (fourteen) days  (Patient not taking: Reported on 9/23/2024)      Alcohol Swabs 70 % PADS May substitute brand based on insurance coverage. Check glucose TID. (Patient not taking: Reported on 9/23/2024) 100 each 0    Lantus SoloStar 100 units/mL SOPN Inject 0.2 mL (20 Units total) under the skin daily at bedtime 6 mL 3     No current facility-administered medications on file prior to visit.      Social History     Tobacco Use    Smoking status: Former     Current packs/day: 0.50     Average packs/day: 0.5 packs/day for 10.0 years (5.0 ttl pk-yrs)     Types: Cigarettes     Passive exposure: Past    Smokeless tobacco: Never   Vaping Use    Vaping status: Never Used   Substance and Sexual Activity    Alcohol use: Yes     Comment: 1 drink daily ( pt stopped drinking in July); rarely    Drug use: Yes     Types: Marijuana     Comment: medical marijuana tincture last taken 11/13/24    Sexual activity: Not on file        Objective   /86 (BP Location: Left arm, Patient Position: Sitting, Cuff Size: Standard)   Pulse 59   Temp (!) 97.3 °F (36.3 °C) (Tympanic)   Resp 18   Ht 5' 10\" (1.778 m)   Wt 77.7 kg (171 lb 6.4 oz)   SpO2 97%   BMI 24.59 kg/m²     Physical Exam  Constitutional:       General: He is not in acute distress.     Appearance: He is not ill-appearing.   HENT:      Head: Normocephalic and atraumatic.      Right Ear: External ear normal.      Left Ear: External ear normal.      Nose: Nose normal.   Eyes:      General: No scleral icterus.     Conjunctiva/sclera: Conjunctivae normal.   Pulmonary:      Effort: Pulmonary effort is normal. No respiratory " distress.   Skin:     Coloration: Skin is not jaundiced or pale.   Neurological:      General: No focal deficit present.      Mental Status: Mental status is at baseline.   Psychiatric:         Mood and Affect: Mood normal.         Behavior: Behavior normal.         Thought Content: Thought content normal.         Judgment: Judgment normal.         Recent labs:  Lab Results   Component Value Date/Time    SODIUM 140 11/18/2024 08:17 AM    SODIUM 143 02/10/2023 10:42 AM    K 4.1 11/18/2024 08:17 AM    K 4.3 02/10/2023 10:42 AM    BUN 10 11/18/2024 08:17 AM    BUN 21 02/10/2023 10:42 AM    CREATININE 0.68 11/18/2024 08:17 AM    CREATININE 0.83 11/09/2023 08:56 AM    GLUC 115 11/18/2024 08:17 AM    GLUC 96 02/10/2023 10:42 AM    CALCIUM 9.1 11/18/2024 08:17 AM    CALCIUM 9.5 03/15/2016 07:26 AM    AST 19 11/18/2024 08:17 AM    AST 25 11/09/2023 08:56 AM    ALT 24 11/18/2024 08:17 AM    ALT 47 11/09/2023 08:56 AM    ALB 3.8 11/18/2024 08:17 AM    ALB 4.3 11/09/2023 08:56 AM    TP 6.5 11/18/2024 08:17 AM    TP 7.2 11/09/2023 08:56 AM    EGFR 98 11/18/2024 08:17 AM    EGFR 97 11/09/2023 08:56 AM     Lab Results   Component Value Date/Time    HGB 12.1 11/18/2024 08:17 AM    WBC 9.55 11/18/2024 08:17 AM     (L) 11/18/2024 08:17 AM    INR 1.09 11/15/2024 09:17 AM     Lab Results   Component Value Date/Time    [id number] 2.140 01/11/2023 10:16 AM    [id number] 3.760 03/15/2016 07:26 AM       Recent Imaging:  Procedure: IR biopsy liver mass  Result Date: 11/15/2024  Narrative: IR BIOPSY LIVER MASS Indication: Pancreatic cancer. Indeterminate liver lesion. Previous inconclusive biopsy. Procedure and findings: Initially, noncontrast  CT was performed confirming continued presence of a 13 mm hypodense lesion in the periphery of the lower right lobe. This corresponds to previously identified indeterminate lesion on CT and MRI. The lesion was identified sonographically corresponding to a subtle 15 mm hypoechoic  nodule just underneath the capsule. The overlying skin was prepped and draped in sterile fashion. 1% lidocaine was infiltrated around the access tract. Repeat CT was then performed with the needle directed towards the sonographically identified nodule to confirm that it corresponds to the hypodense lesion seen on the CT. The biopsy was then performed with sonographic guidance. Using coaxial technique. A 17-gauge introducer was advanced to the periphery of the nodule and coaxial biopsy was performed with a bio pence device. Initially, 2 core samples were obtained and submitted for touch prep analysis which showed lesional material. Blood and atypical cells were identified. 4 additional passes were then made and samples were submitted directly into formalin. The introducer was removed and the track was embolized with D-Stat collagen thrombin. The patient tolerated this procedure well without immediate complication. This examination, like all CT scans performed in the [facility], was performed utilizing techniques to minimize radiation dose exposure, including the use of iterative reconstruction and automated exposure control. Sedation (min) : 30 minutes     Impression: Impression: 15 mm subtle hypoechoic nodule is identified in the inferior right lobe periphery corresponding to a hypodense nodule on noncontrast CT. This nodule correlates to the indeterminate nodule identified on prior imaging. Core biopsy was performed showing lesional material by touch prep. Workstation performed: [id number]     Procedure: IR biopsy liver mass  Result Date: 9/18/2024  Narrative: PROCEDURE: Ultrasound-guided liver lesion biopsy Procedural Personnel Attending physician(s): Dr. Mckenna Pre-procedure diagnosis: Pancreatic adenocarcinoma Post-procedure diagnosis: Same Indication: Patient with history of pancreatic adenocarcinoma noted to have liver lesions presents for liver lesion biopsy. PROCEDURE SUMMARY: - Percutaneous  US-guided liver lesion biopsy PROCEDURE DETAILS: Pre-procedure Reference imaging for biopsy target: CT abdomen/pelvis 8/30/2024 Consent: Informed consent for the procedure including risks, benefits and alternatives was obtained and time-out was performed prior to the procedure. Preparation: The site was prepared and draped using maximal sterile barrier technique including cutaneous antisepsis. Anesthesia/sedation Level of anesthesia/sedation: Moderate sedation (conscious sedation) Anesthesia/sedation administered by: IR nurse under attending supervision with continuous monitoring of the patient's level of consciousness and physiologic status Total intra-service sedation time (minutes): 15 Biopsy Local anesthesia was administered. Under US guidance, 17-gauge coaxial introducer needle was advanced to the segment 5 liver lesion. 18-gauge core biopsy needle was used to obtain 4 core needle samples which were placed in formalin. D-Stat hemostatic agent was used to perform tract embolization during needle removal. Postprocedure ultrasound showed no hematoma. Additional Details Specimens removed: 4 core needle samples placed into formalin. Estimated blood loss (mL): 1 Complications: No immediate complications.     Impression: Ultrasound-guided biopsy of segment 5 liver lesion. Plan: Follow-up on biopsy results. Workstation performed: [id number]     Procedure: US OUTSIDE FILMS  Result Date: 9/18/2024  Narrative: This is a non-reportable study used for image storage. It has been automatically finalized and does not contain a result.    Procedure: FL guided central venous access device insertion  Result Date: 9/6/2024  Narrative: C-ARM - FL GUIDED CENTRAL VENOUS ACCESS DEVICE INSERTION INDICATION: C25.9: Malignant neoplasm of pancreas, unspecified. Procedure guidance. TECHNIQUE: Fluoroscopic guidance provided. COMPARISON: None FLUOROSCOPY TIME: 18.6 seconds 4 FLUOROSCOPIC IMAGES FINDINGS: Fluoroscopy provided for procedure  guidance. Right chest port catheter tip in the cavoatrial region. Osseous and soft tissue detail limited by technique.     Impression: Fluoroscopy provided for procedure guidance. Please refer to the separate procedure note for additional details. Workstation performed: [id number]     Procedure: XR chest portable  Result Date: 9/5/2024  Narrative: XR CHEST PORTABLE INDICATION: Post internal jugular port placement. COMPARISON: Chest radiograph 4/22/2021, CT chest 8/19/2024 FINDINGS: Right chest port tip projects at the cavoatrial junction. Clear lungs. No pneumothorax or pleural effusion. Normal cardiomediastinal silhouette. Paravertebral ossifications thoracic spine. Osteoarthritis left glenohumeral joint. Normal upper abdomen.     Impression: No pneumothorax following right chest port insertion. Workstation performed: [id number]     Procedure: Mammo outside images  Result Date: 9/5/2024  Narrative: This is a non-reportable study used for image storage. It has been automatically finalized and does not contain a result.    Procedure: IR OUTSIDE FILMS  Result Date: 9/5/2024  Narrative: This is a non-reportable study used for image storage. It has been automatically finalized and does not contain a result.    Procedure: CT abdomen pelvis w contrast  Result Date: 9/4/2024  Narrative: CT ABDOMEN AND PELVIS WITH IV CONTRAST INDICATION: C25.9: Malignant neoplasm of pancreas, unspecified. Fine-needle aspiration/biopsy dated 8/13/2024 positive for malignancy, adenocarcinoma.. COMPARISON: Correlation with MRI abdomen with and without contrast 7/10/2024; TECHNIQUE: CT examination of the abdomen and pelvis was performed. Multiplanar 2D reformatted images were created from the source data. This examination, like all [facility] scans [facility], was performed utilizing techniques to minimize radiation dose exposure, including the use of iterative reconstruction and automated exposure control. Radiation  dose length product (DLP) for this visit: 1275 mGy-cm IV Contrast: 100 mL of iohexol (OMNIPAQUE) Enteric Contrast: Not administered. FINDINGS: ABDOMEN LOWER CHEST: No clinically significant abnormality in the visualized lower chest. LIVER/BILIARY TREE: Redemonstrated 3.5 cm hemangioma adjacent to the IVC and caudate lobe demonstrates no significant interval change. Numerous small hypo and hyperenhancing hepatic lesions are present as also noted on the comparison MRI. While some of these may reflect combination of cysts, hemangioma/flash filling hemangiomas, and vascular shunts, there are several with a more suspicious appearance. In particular, a 1.2 cm lesion in segment 5/6 (series 301 image 49) is felt suspicious. On the comparison MRI this lesion demonstrated intermediate T2 hyperintensity with early ringlike enhancement and delayed targetoid hypoenhancement. An additional 1 cm lesion in segment 4A (301/29) and 0.9 cm lesion in the hepatic dome (301/20) are also felt to be suspicious. No significant change in size from the MRI. GALLBLADDER: No calcified gallstones. No pericholecystic inflammatory change. SPLEEN: Splenomegaly measuring 14.0 cm in maximum AP dimension. PANCREAS There is a lesion suspicious for pancreatic cancer, which will be described based on Society of Abdominal Radiologists and American Pancreatic Association recommendations: MORPHOLOGIC EVALUATION: Appearance: Hypoattenuating. Size: Approximately 6.8 x 2.8 cm (301/42), similar to comparison MRI. Location: Pancreatic body and tail. Pancreatic duct narrowing/abrupt cut-off with or without upstream dilatation: Absent. Biliary tree abrupt cut-off with or without upstream dilatation: Absent. ARTERIAL EVALUATION: Superior Mesenteric Artery Involvement: Absent. Incidental note of mild narrowing of the proximal superior mesenteric artery by atherosclerosis. Celiac Axis Involvement: Present. Degree of solid soft-tissue contact: </= 180 degrees.  Difficult to evaluate due to location. There is abutment of the celiac bifurcation and encasement of the proximal common hepatic and splenic arteries, though the celiac artery itself does not appear to be encased greater than 180 degrees. Degree of increased hazy attenuation/stranding contact: </= 180 degrees. Focal vessel narrowing or contour irregularity: Absent. Common Hepatic Artery Involvement: Present. Degree of solid soft-tissue contact: > 180 degrees. Encasement of the proximal common hepatic artery at its origin (201/42 and 43) with mild focal irregular narrowing. Degree of increased hazy attenuation/stranding contact: REPORT AS LESS THAN OR EQUAL  DEGREES OR GREATER THAN 180 DEGREES. Focal vessel narrowing or contour irregularity: Present. Mild irregular narrowing proximally (201/43) Extension to celiac axis: Absent. Extension to the bifurcation of right/left hepatic artery: Absent. Arterial Variant: Type: Replaced right hepatic artery. Identified on 201/48-50 Tumor involvement: Absent. VENOUS EVALUATION: Main Portal Vein Involvement: Present. Degree of solid soft-tissue contact: </= 180 degrees. Abutment of the proximal aspect of the main portal vein just beyond the confluence (301/46) Degree of increased hazy attenuation/stranding contact: </= 180 degrees. Focal vessel narrowing or contour irregularity (tethering or teardrop): Absent. Superior Mesenteric Vein Involvement: Encasement of the superior mesenteric vein with focal moderate narrowing (301/48). FOR EVALUATION FOR POTENTIAL EXTRAPANCREATIC TUMOR, PLEASE SEE THE REST OF THE BODY OF THIS REPORT. ADRENAL GLANDS: Unremarkable. KIDNEYS/URETERS: 3 mm nonobstructing right renal calculus. Simple left renal cyst. No hydronephrosis. STOMACH AND BOWEL: Unremarkable. APPENDIX: Normal. ABDOMINOPELVIC CAVITY: No ascites. No pneumoperitoneum. Prominent lymph nodes in the gemma hepatis not meeting size criteria for lymphadenopathy. (For example series 301,  image 41). VESSELS: Atherosclerotic changes. No abdominal aortic aneurysm. Vascular involvement by tumor as detailed above. Extensive collateral vessels in the left hemiabdomen. Incidental note of retroaortic left renal vein. PELVIS REPRODUCTIVE ORGANS: Unremarkable for patient's age. URINARY BLADDER: Unremarkable. ABDOMINAL WALL/INGUINAL REGIONS: Unremarkable. BONES: No acute fracture or suspicious osseous lesion. Spinal degenerative changes.     Impression: 1. Known pancreatic malignancy with vascular involvement as detailed above. No significant change from MRI. 2. Numerous hepatic lesions similar to the previous MRI. While some of these could reflect benign findings, several such as a 1.2 cm hypoenhancing lesion in the periphery of segment 5/6 are suspicious for metastases. This may be amenable to percutaneous biopsy. The study was marked in EPIC for significant notification. Resident: DARIAN YOUNG I, the attending radiologist, have reviewed the images and agree with the final report above. Workstation performed: [id number]     Procedure: Mammo outside images  Result Date: 8/30/2024  Narrative: This is a non-reportable study used for image storage. It has been automatically finalized and does not contain a result.    Procedure: CT chest wo contrast  Result Date: 8/22/2024  Narrative: CT CHEST WITHOUT IV CONTRAST INDICATION: K86.89: Other specified diseases of pancreas. Pancreatic mass. Metastatic disease evaluation COMPARISON: CT scan of the chest dated 1/6/2017 and abdominal MRI dated 7/10/2024. TECHNIQUE: CT examination of the chest was performed without intravenous contrast. Multiplanar 2D reformatted images were created from the source data. This examination, like all CT scans performed in the [facility], was performed utilizing techniques to minimize radiation dose exposure, including the use of iterative reconstruction and automated exposure control. Radiation dose length product (DLP)  for this visit: 316 mGy-cm FINDINGS: LUNGS: 3 mm fissural nodule along the left major fissure on image 124 of series 3. 3 mm fissural nodule along the right major fissure on image 65 of series 2. 2 mm pulmonary nodule along the left major fissure on image 114 of series 3.41. There is no tracheal or endobronchial lesion. PLEURA: Unremarkable. HEART/GREAT VESSELS: Heart is unremarkable for patient's age. No thoracic aortic aneurysm. Small amount of aortic valvular cusp calcification. Small amount of coronary calcification in the left main and left anterior descending coronary. MEDIASTINUM AND DOE: There are few small pretracheal lymph nodes that measure up to 7 mm. These lymph nodes are similar to prior CT and probably within normal limits. There are small prevascular lymph nodes as well that measure up to 9 mm that are unchanged compared to the prior study and probably within normal limits. There is an aortopulmonary window lymph node that measures 0.9 x 1.3 cm that is also unchanged compared to prior study. No axillary or hilar lymphadenopathy. CHEST WALL AND LOWER NECK: Unremarkable. VISUALIZED STRUCTURES IN THE UPPER ABDOMEN: Mild enlargement of the spleen that measures 14 cm in anterior to posterior dimension. Small low-attenuation lesion in segment 4A of the left hepatic lobe that measures 7 mm image 95 of series 2. Low-attenuation lesion in segment 5 of the right hepatic lobe that measures 1.3 x 1.3 cm image 124 of series 2. There is a lesion involving segment one of the liver that measures 2.3 x 3.7 cm image 109 of series 2. There is a mass involving the body/tail  of the pancreas that measures 1.9 x 3.7 cm image 121 of series 2. 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 OSSEOUS STRUCTURES: Moderate endplate degenerative change in the thoracic spine. Marked degenerative change in the bilateral shoulders. No osteolytic or osteoblastic lesion.     Impression:  No findings of metastatic disease in the chest. Small bilateral fissural nodules measuring 2 to 3 mm in size with a benign appearance. No imaging follow-up of these nodules is necessary. Mass along the body/tail of the pancreas is again visualized in the upper abdomen suspicious for a pancreatic cancer. Finding is unchanged compared to abdominal MRI from 7/10/2024. 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 Scattered small hepatic lesions that may indicate hepatic metastatic disease. Correlation with PET/CT or biopsy should be considered. MIld splenomegaly The study was marked in EPIC for immediate notification. Workstation performed: [id number]     Procedure: Mammo outside images  Result Date: 8/19/2024  Narrative: This is a non-reportable study used for image storage. It has been automatically finalized and does not contain a result.    Procedure: Endoscopic ultrasonography, GI (Upper)  Result Date: 8/13/2024  Narrative: Table formatting from the original result was not included. [facility] [address] [facility] [address] [phone] DATE OF SERVICE: 8/13/24 PHYSICIAN(S): Attending: Lambert He MD Fellow: Conor Hoffman MD INDICATION: Epigastric pain, Chronic pancreatitis, unspecified pancreatitis type (HCC) POST-OP DIAGNOSIS: See the impression below. PREPROCEDURE: Informed consent was obtained for the procedure, including sedation.  Risks of perforation, hemorrhage, adverse drug reaction and aspiration were discussed. The patient was placed in the left lateral decubitus position. Patient was explained about the risks and benefits of the procedure. Risks including but not limited to bleeding, infection, and perforation were explained in detail. Also explained about less than 100% sensitivity with the exam and other alternatives. PROCEDURE: EUS UPPER DETAILS OF PROCEDURE: Patient was taken to the procedure room where a time out was  performed to confirm correct patient and correct procedure. The patient underwent monitored anesthesia care, which was administered by an anesthesia professional. The patient's blood pressure, heart rate, oxygen, respirations, level of consciousness, ECG and ETCO2 were monitored throughout the procedure. The endoscope and linear scope were introduced through the mouth and advanced to the second part of the duodenum. The patient experienced no blood loss. The procedure was not difficult. The patient tolerated the procedure well. There were no apparent adverse events. ANESTHESIA INFORMATION: ASA: III Anesthesia Type: IV Sedation with Anesthesia MEDICATIONS: No administrations occurring from 1158 to 1240 on 08/13/24 FINDINGS: The esophagus appeared normal. The stomach appeared normal. The duodenum appeared normal. The celiac artery and aorta were visualized, including the abdominal aorta and appeared normal. The left kidney, spleen and left adrenal appeared normal. The parenchyma of the liver appeared normal. The parenchyma was visualized in the left lobe of the liver. Heterogeneous, hypoechoic and irregular mass measuring 30 mm x 18 mm with poorly defined margins was visualized in the body of the pancreas. Apparent abutment of the splenic artery and splenic vein; 4 successful fine needle biopsy passes were taken with a 25 gauge [employer] needle using a transgastric approach guided by Doppler. An adequate sample was obtained. Onsite cytologist was present The parenchyma was visualized in the head of the pancreas and neck of the pancreas. The parenchyma had localized lobularity with honeycombing. The parenchyma had localized hyperechoic foci without shadowing. The pancreatic duct appeared normal and measured 1.7 mm at the body. Pancreatic tail was atrophic. The bile duct appeared normal. The bile duct measured 2.6 mm at the distal end. The gallbladder was visualized and appeared normal. The gallbladder wall  thickness was normal. SPECIMENS: ID Type Source Tests Collected by Time Destination 1 : pancreatic body Tissue Pancreas TISSUE EXAM Lambert eH MD 8/13/2024 12:09 PM       Impression: EGD: Normal exam. EUS: 30 x 18 mm heterogenous, hypoechoic, irregular mass in the body of pancreas. Performed FNB.  The mass was abutting the splenic vein and artery.  Mild lobularity seen in the head of pancreas without any calcifications or significant honeycombing.  Pancreatic duct is normal in caliber.  RECOMMENDATION: Await pathology results Follow up with GI Clinic Recommend surgical oncology and oncology evaluation.   Lambert He MD     Procedure: IR OUTSIDE FILMS  Result Date: 8/13/2024  Narrative: This is a non-reportable study used for image storage. It has been automatically finalized and does not contain a result.      Administrative Statements   I have spent a total time of 30 minutes in caring for this patient on the day of the visit/encounter including Instructions for management, Impressions, Counseling / Coordination of care, Documenting in the medical record, Reviewing / ordering tests, medicine, procedures  , and Obtaining or reviewing history  . Topics discussed with the patient / family include symptom assessment and management, medication review, psychosocial support, and supportive listening.

## 2024-11-25 NOTE — ASSESSMENT & PLAN NOTE
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 every 6 hours as needed  Normal BMs-->continue to monitor for OIC

## 2024-11-27 DIAGNOSIS — C25.9 ADENOCARCINOMA OF PANCREAS (HCC): Primary | ICD-10-CM

## 2024-11-27 RX ORDER — ATROPINE SULFATE 1 MG/ML
0.25 INJECTION, SOLUTION INTRAVENOUS ONCE
Status: CANCELLED | OUTPATIENT
Start: 2024-12-03

## 2024-11-27 RX ORDER — ATROPINE SULFATE 1 MG/ML
0.25 INJECTION, SOLUTION INTRAVENOUS ONCE AS NEEDED
Status: CANCELLED | OUTPATIENT
Start: 2024-12-03

## 2024-11-27 RX ORDER — DEXTROSE MONOHYDRATE 50 MG/ML
20 INJECTION, SOLUTION INTRAVENOUS ONCE
Status: CANCELLED | OUTPATIENT
Start: 2024-12-03

## 2024-11-27 RX ORDER — SODIUM CHLORIDE 9 MG/ML
20 INJECTION, SOLUTION INTRAVENOUS ONCE AS NEEDED
Status: CANCELLED | OUTPATIENT
Start: 2024-12-03

## 2024-12-02 ENCOUNTER — HOSPITAL ENCOUNTER (OUTPATIENT)
Dept: INFUSION CENTER | Facility: CLINIC | Age: 69
Discharge: HOME | End: 2024-12-02
Payer: COMMERCIAL

## 2024-12-02 DIAGNOSIS — C25.9 ADENOCARCINOMA OF PANCREAS (HCC): ICD-10-CM

## 2024-12-02 DIAGNOSIS — Z95.828 PORT-A-CATH IN PLACE: Primary | ICD-10-CM

## 2024-12-02 LAB
ALBUMIN SERPL BCG-MCNC: 3.9 G/DL (ref 3.5–5)
ALP SERPL-CCNC: 182 U/L (ref 34–104)
ALT SERPL W P-5'-P-CCNC: 23 U/L (ref 7–52)
ANION GAP SERPL CALCULATED.3IONS-SCNC: 9 MMOL/L (ref 4–13)
AST SERPL W P-5'-P-CCNC: 20 U/L (ref 13–39)
BASOPHILS # BLD AUTO: 0.05 THOUSANDS/ΜL (ref 0–0.1)
BASOPHILS NFR BLD AUTO: 1 % (ref 0–1)
BILIRUB SERPL-MCNC: 0.34 MG/DL (ref 0.2–1)
BUN SERPL-MCNC: 14 MG/DL (ref 5–25)
CALCIUM SERPL-MCNC: 8.9 MG/DL (ref 8.4–10.2)
CHLORIDE SERPL-SCNC: 103 MMOL/L (ref 96–108)
CO2 SERPL-SCNC: 27 MMOL/L (ref 21–32)
CREAT SERPL-MCNC: 0.75 MG/DL (ref 0.6–1.3)
EOSINOPHIL # BLD AUTO: 0.28 THOUSAND/ΜL (ref 0–0.61)
EOSINOPHIL NFR BLD AUTO: 3 % (ref 0–6)
ERYTHROCYTE [DISTWIDTH] IN BLOOD BY AUTOMATED COUNT: 15.7 % (ref 11.6–15.1)
GFR SERPL CREATININE-BSD FRML MDRD: 94 ML/MIN/1.73SQ M
GLUCOSE SERPL-MCNC: 185 MG/DL (ref 65–140)
HCT VFR BLD AUTO: 38.9 % (ref 36.5–49.3)
HGB BLD-MCNC: 12.7 G/DL (ref 12–17)
IMM GRANULOCYTES # BLD AUTO: 0.15 THOUSAND/UL (ref 0–0.2)
IMM GRANULOCYTES NFR BLD AUTO: 1 % (ref 0–2)
LYMPHOCYTES # BLD AUTO: 0.98 THOUSANDS/ΜL (ref 0.6–4.47)
LYMPHOCYTES NFR BLD AUTO: 9 % (ref 14–44)
MCH RBC QN AUTO: 31.4 PG (ref 26.8–34.3)
MCHC RBC AUTO-ENTMCNC: 32.6 G/DL (ref 31.4–37.4)
MCV RBC AUTO: 96 FL (ref 82–98)
MONOCYTES # BLD AUTO: 0.99 THOUSAND/ΜL (ref 0.17–1.22)
MONOCYTES NFR BLD AUTO: 9 % (ref 4–12)
NEUTROPHILS # BLD AUTO: 8.09 THOUSANDS/ΜL (ref 1.85–7.62)
NEUTS SEG NFR BLD AUTO: 77 % (ref 43–75)
NRBC BLD AUTO-RTO: 0 /100 WBCS
PLATELET # BLD AUTO: 125 THOUSANDS/UL (ref 149–390)
PMV BLD AUTO: 10.3 FL (ref 8.9–12.7)
POTASSIUM SERPL-SCNC: 3.8 MMOL/L (ref 3.5–5.3)
PROT SERPL-MCNC: 6.6 G/DL (ref 6.4–8.4)
RBC # BLD AUTO: 4.05 MILLION/UL (ref 3.88–5.62)
SODIUM SERPL-SCNC: 139 MMOL/L (ref 135–147)
WBC # BLD AUTO: 10.54 THOUSAND/UL (ref 4.31–10.16)

## 2024-12-02 PROCEDURE — 85025 COMPLETE CBC W/AUTO DIFF WBC: CPT | Performed by: INTERNAL MEDICINE

## 2024-12-02 PROCEDURE — 80053 COMPREHEN METABOLIC PANEL: CPT | Performed by: INTERNAL MEDICINE

## 2024-12-02 NOTE — PROGRESS NOTES
Patient here for lab draw from port. Offers no complaints at this time.   Port a cath accessed with positive blood draw and no complications. De accessed port and band-aid placed.   AVS declined.  Reviewed next appointment with patient for 12/3/2024 at 830am.   Walked out of clinic with no incident.

## 2024-12-03 ENCOUNTER — HOSPITAL ENCOUNTER (OUTPATIENT)
Dept: INFUSION CENTER | Facility: CLINIC | Age: 69
Discharge: HOME | End: 2024-12-03
Payer: COMMERCIAL

## 2024-12-03 ENCOUNTER — NUTRITION (OUTPATIENT)
Dept: NUTRITION | Facility: CLINIC | Age: 69
End: 2024-12-03

## 2024-12-03 VITALS
SYSTOLIC BLOOD PRESSURE: 150 MMHG | HEIGHT: 71 IN | HEART RATE: 55 BPM | BODY MASS INDEX: 24.56 KG/M2 | DIASTOLIC BLOOD PRESSURE: 79 MMHG | WEIGHT: 175.4 LBS | RESPIRATION RATE: 18 BRPM | TEMPERATURE: 97 F

## 2024-12-03 DIAGNOSIS — Z71.3 NUTRITIONAL COUNSELING: Primary | ICD-10-CM

## 2024-12-03 DIAGNOSIS — C25.9 ADENOCARCINOMA OF PANCREAS (HCC): Primary | ICD-10-CM

## 2024-12-03 PROCEDURE — 96415 CHEMO IV INFUSION ADDL HR: CPT

## 2024-12-03 PROCEDURE — 96417 CHEMO IV INFUS EACH ADDL SEQ: CPT

## 2024-12-03 PROCEDURE — G0498 CHEMO EXTEND IV INFUS W/PUMP: HCPCS

## 2024-12-03 PROCEDURE — 96375 TX/PRO/DX INJ NEW DRUG ADDON: CPT

## 2024-12-03 PROCEDURE — 96413 CHEMO IV INFUSION 1 HR: CPT

## 2024-12-03 PROCEDURE — 96367 TX/PROPH/DG ADDL SEQ IV INF: CPT

## 2024-12-03 RX ORDER — SODIUM CHLORIDE 9 MG/ML
20 INJECTION, SOLUTION INTRAVENOUS ONCE AS NEEDED
Status: DISCONTINUED | OUTPATIENT
Start: 2024-12-03 | End: 2024-12-06 | Stop reason: HOSPADM

## 2024-12-03 RX ORDER — ATROPINE SULFATE 1 MG/ML
0.25 INJECTION, SOLUTION INTRAVENOUS ONCE AS NEEDED
Status: DISCONTINUED | OUTPATIENT
Start: 2024-12-03 | End: 2024-12-06 | Stop reason: HOSPADM

## 2024-12-03 RX ORDER — ATROPINE SULFATE 1 MG/ML
0.25 INJECTION, SOLUTION INTRAVENOUS ONCE
Status: COMPLETED | OUTPATIENT
Start: 2024-12-03 | End: 2024-12-03

## 2024-12-03 RX ORDER — DEXTROSE MONOHYDRATE 50 MG/ML
20 INJECTION, SOLUTION INTRAVENOUS ONCE
Status: COMPLETED | OUTPATIENT
Start: 2024-12-03 | End: 2024-12-03

## 2024-12-03 RX ADMIN — SODIUM CHLORIDE 20 ML/HR: 0.9 INJECTION, SOLUTION INTRAVENOUS at 09:16

## 2024-12-03 RX ADMIN — ATROPINE SULFATE 0.25 MG: 1 INJECTION, SOLUTION INTRAVENOUS at 12:31

## 2024-12-03 RX ADMIN — OXALIPLATIN 100 MG: 5 INJECTION, SOLUTION INTRAVENOUS at 10:28

## 2024-12-03 RX ADMIN — DEXAMETHASONE SODIUM PHOSPHATE: 10 INJECTION, SOLUTION INTRAMUSCULAR; INTRAVENOUS at 09:17

## 2024-12-03 RX ADMIN — DEXTROSE 20 ML/HR: 5 SOLUTION INTRAVENOUS at 10:25

## 2024-12-03 RX ADMIN — IRINOTECAN HYDROCHLORIDE 239 MG: 20 INJECTION, SOLUTION INTRAVENOUS at 12:32

## 2024-12-03 RX ADMIN — FOSAPREPITANT 150 MG: 150 INJECTION, POWDER, LYOPHILIZED, FOR SOLUTION INTRAVENOUS at 09:39

## 2024-12-03 NOTE — PROGRESS NOTES
Outpatient Oncology Nutrition Consultation   Type of Consult: Follow Up  Care Location: [facility]    Reason for referral: notification  Lashell Lew MA  on 9/11/24 that pt is appropriate for oncology nutrition care (reason for referral: Ambulatory referral for struggling with eating and drinking ).     Nutrition Assessment:   Oncology Diagnosis & Treatments:   Cancer of the pancreas diagnosed 8/20/24.   Began chemotherapy 9/24/24; Modified FOLFIRINOX every 14 days   Oncology History   Adenocarcinoma of pancreas (HCC)   8/13/2024 Biopsy    A.-B.  Pancreas, Body, FNA (Cell block and smear preparations):   Malignant  Adenocarcinoma.     8/20/2024 Initial Diagnosis    Adenocarcinoma of pancreas (HCC)     8/26/2024 -  Cancer Staged    Staging form: Pancreas, AJCC 8th Edition  - Clinical: Stage IB (cT2, cN0, cM0) - Signed by Roz Faulkner MD on 8/26/2024  Total positive nodes: 0       9/24/2024 -  Chemotherapy    alteplase (CATHFLO), 2 mg, Intracatheter, Every 1 Minute as needed, 6 of 12 cycles  pegfilgrastim (NEULASTA ONPRO), 6 mg, Subcutaneous, Once, 6 of 12 cycles  Administration: 6 mg (9/26/2024), 6 mg (10/10/2024), 6 mg (10/24/2024), 6 mg (11/7/2024), 6 mg (11/21/2024)  fosaprepitant (EMEND) IVPB, 150 mg, Intravenous, Once, 6 of 12 cycles  Administration: 150 mg (9/24/2024), 150 mg (10/8/2024), 150 mg (10/22/2024), 150 mg (11/5/2024), 150 mg (11/19/2024)  irinotecan (CAMPTOSAR) chemo infusion, 150 mg/m2 = 294 mg, Intravenous, Once, 6 of 12 cycles  Dose modification: 112.5 mg/m2 (75 % of original dose 150 mg/m2, Cycle 2, Reason: Dose Not Tolerated), 120 mg/m2 (80 % of original dose 150 mg/m2, Cycle 3, Reason: Original Dose Changed)  Administration: 294 mg (9/24/2024), 227 mg (10/8/2024), 240 mg (10/22/2024), 240 mg (11/5/2024), 238 mg (11/19/2024)  oxaliplatin (ELOXATIN) chemo infusion, 65 mg/m2 = 127.4 mg, Intravenous, Once, 6 of 12 cycles  Dose modification: 48.75 mg/m2 (75 % of original dose 65 mg/m2,  Cycle 2, Reason: Dose Not Tolerated), 52 mg/m2 (80 % of original dose 65 mg/m2, Cycle 3, Reason: Original Dose Changed)  Administration: 127.4 mg (9/24/2024), 98.5 mg (10/8/2024), 100 mg (10/22/2024), 100 mg (11/5/2024), 100 mg (11/19/2024)  fluorouracil (ADRUCIL) ambulatory infusion Soln, 1,200 mg/m2/day = 4,705 mg, Intravenous, Over 46 hours, 6 of 12 cycles  Dose modification: 900 mg/m2/day (75 % of original dose 1,200 mg/m2/day, Cycle 3, Reason: Dose Not Tolerated), 960 mg/m2/day (80 % of original dose 1,200 mg/m2/day, Cycle 4, Reason: Other (see comments), Comment: provider request)       Past Medical & Surgical Hx:   Patient Active Problem List   Diagnosis    Atrial fibrillation (HCC)    Psoriatic arthritis (HCC)    NDPH (new daily persistent headache)    Atrial flutter (HCC)    CATY (obstructive sleep apnea)    Type 2 diabetes mellitus with hyperglycemia, without long-term current use of insulin (HCC)    Elevated PSA    Platelets decreased (HCC)    Adenocarcinoma of pancreas (HCC)    Pulmonary HTN (HCC)    Pre-op testing    Liver masses    Port-A-Cath in place    Chemotherapy induced neutropenia (HCC)    Palliative care by specialist    Medical marijuana use    Nausea    Cancer-related pain    Insomnia    Loss of appetite     Past Medical History:   Diagnosis Date    A-fib (HCC)     Arthritis     BPH (benign prostatic hyperplasia)     Cancer (HCC)     Colon polyp     Diabetes mellitus (HCC)     Fatty liver     Fracture, foot     High cholesterol     Hyperlipidemia     Irregular heart beat     Non-ischemic cardiomyopathy (HCC)     Osteoarthritis     Psoriasis     Psoriatic arthritis (HCC)     Pulmonary HTN (HCC)      Past Surgical History:   Procedure Laterality Date    CARDIAC ELECTROPHYSIOLOGY PROCEDURE N/A 02/22/2023    Procedure: Cardiac eps/afib ablation   comprehensive posterior wall;  Surgeon: Mynor Andrews MD;  Location: [facility];  Service: Cardiology    COLONOSCOPY      ELBOW SURGERY Right      FL GUIDED CENTRAL VENOUS ACCESS DEVICE INSERTION  9/5/2024    IR BIOPSY LIVER MASS  9/18/2024    IR BIOPSY LIVER MASS  11/15/2024    KIDNEY STONE SURGERY      LAPAROSCOPY N/A 9/5/2024    Procedure: DIAGNOSTIC LAPAROSCOPY;  Surgeon: Roz Faulkner MD;  Location: [facility];  Service: Surgical Oncology    TRANSURETHRAL RESECTION OF PROSTATE      last assessed 7/21/15    TUNNELED VENOUS PORT PLACEMENT N/A 9/5/2024    Procedure: INSERTION VENOUS PORT (PORT-A-CATH);  Surgeon: Roz Faulkner MD;  Location: [facility];  Service: Surgical Oncology       Review of Medications:   Vitamins, Supplements and Herbals: No, pt denies taking supplements; MMJ     Current Outpatient Medications:     adalimumab (HUMIRA) 40 mg/0.8 mL PSKT, Inject 40 mg under the skin every 14 (fourteen) days  (Patient not taking: Reported on 9/23/2024), Disp: , Rfl:     Alcohol Swabs 70 % PADS, May substitute brand based on insurance coverage. Check glucose TID. (Patient not taking: Reported on 9/23/2024), Disp: 100 each, Rfl: 0    Benzocaine-Isopropyl Alcohol 6-70 % PADS, Use, Disp: , Rfl:     Blood Glucose Monitoring Suppl ([employer] Blood Glucose Meter) w/Device KIT, Use, Disp: , Rfl:     Blood Glucose Monitoring Suppl (OneTouch Verio Reflect) w/Device KIT, Check blood sugars once daily. Please substitute with appropriate alternative as covered by patient's insurance. Dx: E11.65, Disp: 1 kit, Rfl: 0    Continuous Blood Gluc  (FreeStyle Livia 2 [place]) LEANA, 14 DAY READER NOT MADE, Disp: 1 each, Rfl: 1    Continuous Glucose Sensor (FreeStyle Livia 14 Day Sensor) MISC, USE 1 DEVICE 3 (THREE) TIMES A DAY BEFORE MEALS, Disp: , Rfl:     Continuous Glucose Sensor (FreeStyle Livia 14 Day Sensor) MISC, Use 1 Device 3 (three) times a day before meals, Disp: 2 each, Rfl: 0    fluorouracil 3,820 mg in CADD/Elastomeric Infusion Device, Infuse 3,820 mg (960 mg/m2/day x 1.99 m2) into a catheter in a vein via infusion device over 46 hours for 2 days   Infusion planned for December 3, 2024., Disp: 1 each, Rfl: 3    Glucose Blood (BLOOD GLUCOSE TEST STRIPS 333 VI), Use, Disp: , Rfl:     glucose blood (OneTouch Verio) test strip, Check blood sugars once daily. Please substitute with appropriate alternative as covered by patient's insurance. Dx: E11.65, Disp: 100 each, Rfl: 3    insulin glargine (LANTUS) 100 units/mL subcutaneous injection, Inject 20 Units under the skin daily at bedtime, Disp: 10 mL, Rfl: 3    Insulin Pen Needle (BD Pen Needle Mallory 2nd Gen) 32G X 4 MM MISC, For use with insulin pen. Pharmacy may dispense brand covered by insurance., Disp: 100 each, Rfl: 0    Insulin Pen Needle (BD Pen Needle Mallory U/F) 32G X 4 MM MISC, Use daily as directed with insulin pen, Disp: 100 each, Rfl: 3    Lancets 33G MISC, Use, Disp: , Rfl:     Lantus SoloStar 100 units/mL SOPN, Inject 0.2 mL (20 Units total) under the skin daily at bedtime, Disp: 6 mL, Rfl: 3    lidocaine-prilocaine (EMLA) cream, Apply topically as needed for mild pain Apply to port area 45-60min prior to treatment and cover with dressing or plastic wrap, Disp: 30 g, Rfl: 1    metFORMIN (GLUCOPHAGE-XR) 500 mg 24 hr tablet, Take 2 tablets (1,000 mg total) by mouth daily with breakfast, Disp: 180 tablet, Rfl: 1    ondansetron (ZOFRAN) 4 mg tablet, Take 1 tablet (4 mg total) by mouth every 8 (eight) hours as needed for nausea or vomiting, Disp: 20 tablet, Rfl: 3    OneTouch Delica Lancets 33G MISC, Check blood sugars once daily. Please substitute with appropriate alternative as covered by patient's insurance. Dx: E11.65, Disp: 100 each, Rfl: 3    oxyCODONE (Roxicodone) 5 immediate release tablet, Take 1 tablet (5 mg total) by mouth every 6 (six) hours as needed for moderate pain Max Daily Amount: 20 mg, Disp: 90 tablet, Rfl: 0    pantoprazole (PROTONIX) 40 mg tablet, Take 1 tablet (40 mg total) by mouth daily before breakfast, Disp: 30 tablet, Rfl: 5    rosuvastatin (CRESTOR) 5 mg tablet, TAKE 1 TABLET BY  MOUTH EVERY DAY IN THE EVENING, Disp: 90 tablet, Rfl: 1    sildenafil (VIAGRA) 100 mg tablet, TAKE ONE (1) TABLET BY MOUTH DAILY AS NEEDED, Disp: , Rfl:   No current facility-administered medications for this visit.    Facility-Administered Medications Ordered in Other Visits:     alteplase (CATHFLO) injection 2 mg, 2 mg, Intracatheter, Q1MIN PRN, Butch Peña MD    alteplase (CATHFLO) injection 2 mg, 2 mg, Intracatheter, Q1MIN PRN, Butch Peña MD    Atropine Sulfate injection 0.25 mg, 0.25 mg, Intravenous, Once, Butch Peña MD    Atropine Sulfate injection 0.25 mg, 0.25 mg, Intravenous, Once PRN, Butch Peña MD    dextrose 5 % infusion, 20 mL/hr, Intravenous, Once, Butch Peña MD    irinotecan (CAMPTOSAR) 239 mg in dextrose 5 % 500 mL chemo infusion, 120 mg/m2 (Treatment Plan Recorded), Intravenous, Once, Butch Peña MD    oxaliplatin (ELOXATIN) 100 mg in dextrose 5 % 250 mL chemo infusion, 100 mg, Intravenous, Once, Butch Peña MD    sodium chloride 0.9 % infusion, 20 mL/hr, Intravenous, Once PRN, Butch Peña MD, Last Rate: 20 mL/hr at 12/03/24 0916, 20 mL/hr at 12/03/24 0916    Most Recent Lab Results:   Lab Results   Component Value Date    WBC 10.54 (H) 12/02/2024    NEUTROABS 8.09 (H) 12/02/2024    CHOLESTEROL 212 (H) 02/20/2024    CHOL 190 11/15/2016    TRIG 369 (H) 02/20/2024    HDL 40 02/20/2024    LDLCALC 98 02/20/2024    ALT 23 12/02/2024    AST 20 12/02/2024    ALB 3.9 12/02/2024     03/15/2016    SODIUM 139 12/02/2024    SODIUM 140 11/18/2024    K 3.8 12/02/2024    K 4.1 11/18/2024     12/02/2024    BUN 14 12/02/2024    BUN 10 11/18/2024    CREATININE 0.75 12/02/2024    CREATININE 0.68 11/18/2024    EGFR 94 12/02/2024    GLUCOSE 106 (H) 11/15/2016    POCGLU 97 09/18/2024    GLUF 110 (H) 09/23/2024    GLUF 96 09/18/2024    GLUC 185 (H) 12/02/2024    HGBA1C 6.6 (A) 08/27/2024    HGBA1C 5.9 05/24/2024    HGBA1C 10.8 (A) 02/20/2024    CALCIUM 8.9 12/02/2024  "   FOLATE >20.0 (H) 06/10/2021    MG 1.9 02/23/2023     Anthropometric Measurements:   Height: 71\"  Ht Readings from Last 1 Encounters:   12/03/24 5' 10.98\" (1.803 m)     Wt Readings from Last 20 Encounters:   12/03/24 79.6 kg (175 lb 6.4 oz)   11/25/24 77.7 kg (171 lb 6.4 oz)   11/19/24 78.9 kg (174 lb)   11/15/24 81.5 kg (179 lb 10.8 oz)   11/05/24 77 kg (169 lb 12.8 oz)   11/04/24 79.4 kg (175 lb)   10/22/24 78 kg (172 lb)   10/21/24 78.2 kg (172 lb 8 oz)   10/08/24 78.1 kg (172 lb 3.2 oz)   10/07/24 79.4 kg (175 lb)   09/24/24 81.6 kg (180 lb)   09/23/24 78.5 kg (173 lb)   09/18/24 78.7 kg (173 lb 8 oz)   09/09/24 78.9 kg (174 lb)   09/05/24 79.4 kg (175 lb)   08/27/24 78.8 kg (173 lb 12.8 oz)   08/26/24 80.3 kg (177 lb)   08/26/24 79.4 kg (175 lb)   08/26/24 79.4 kg (175 lb)   08/13/24 78.9 kg (174 lb)     Weight History:   Usual Weight: 200# in December 2023; weight loss after diagnosed with DM and started on Ozempic   Varian: n/a  Home Scale: (10/8/24) 165#; (11/5/24) home weight maintaining in the [facility]     Oncology Nutrition-Anthropometrics      Flowsheet Row Nutrition from 12/3/2024 in [facility] [place] Nutrition from 11/5/2024 in [facility] [place]   Patient age (years): 69 years 68 years   Patient (male) height (in): 71 in 71 in   Current weight (lbs): 175.4 lbs 169.8 lbs   Current weight to be used for anthropometric calculations (kg) 79.7 kg 77.2 kg   BMI: 24.5 23.7   IBW male 172 lb 172 lb   IBW (kg) male 78.2 kg 78.2 kg   IBW % (male) 102 % 98.7 %   Adjusted BW (male): 172.9 lbs 171.5 lbs   Adjusted BW in kg (male): 78.6 kg 78 kg   % weight change after 1 week: 2.3 % --   Weight change after 1 week (lbs) 4 lbs --   % weight change after 1 month: 0.2 % -3 %   Weight change after 1 month (lbs) 0.4 lbs -5.2 lbs   % weight change after 3 months: 0.2 % -2.4 %   Weight change after 3 months (lbs) 0.4 lbs -4.2 lbs   % weight change after 6 months: -- -8.2 % "   Weight change after 6 months (lbs) -- -15.2 lbs            Nutrition-Focused Physical Findings: none observed    Food/Nutrition-Related History & Client/Social History:    Current Nutrition Impact Symptoms:  [x] Nausea   -after chemo; improved w/ zofran and MMJ [x] Reduced Appetite   -decreased Thursday - Sunday after chemo, then returns to normal   -improved w/ MMJ  [] Acid Reflux    [] Vomiting  [] Unintended Wt Loss   -hx  -resolved  [] Malabsorption    [] Diarrhea  [] Unintended Wt Gain  [] Dumping Syndrome    [] Constipation  [] Thick Mucous/Secretions  [] Abdominal Pain    [] Dysgeusia (Altered Taste)  [] Xerostomia (Dry Mouth)  [] Gas    [] Dysosmia (Altered Smell)  [] Thrush  [] Difficulty Chewing    [] Oral Mucositis (Sore Mouth)  [] Fatigue  [x] Hyperglycemia   -T2DM   -metformin, ozempic  Lab Results   Component Value Date    HGBA1C 6.6 (A) 08/27/2024      [] Odynophagia  [] Esophagitis  [] Other:    [] Dysphagia  [] Early Satiety  [] No Problems Eating      Food Allergies & Intolerances: yes: lactose intolerant     Current Diet: CHO-Controlled and Lactose-Free  Current Nutrition Intake: Increased since last visit  Appetite: Fair ; poor appetite Thursday through Sunday after chemo   Nutrition Route: PO  Oral Care: brushes BID  Activity level: ADL, ambulates independently.     24 Hr Diet Recall:   Breakfast: cereal with milk  Snack: fruit   Lunch: sandwich   Dinner: soup; chicken      Beverages: water with crystal light flavoring (16oz x2-3)  Supplements:   Ensure Original (8 oz, 220 kcal, 9 g pro) 1x daily       Oncology Nutrition-Estimated Needs      Flowsheet Row Nutrition from 12/3/2024 in [facility] [place] Nutrition from 11/5/2024 in [facility] [place]   Weight type used Actual weight Actual weight   Weight in kilograms (kg) used for estimated needs 79.7 kg 77.2 kg   Energy needs formula:  30-35 kcal/kg 30-35 kcal/kg   Energy needs based on 30 kcal/kg:  2392 kcal 2315 kcal   Energy needs based on 35 kcal/k kcal 2701 kcal   Protein needs formula: 1.2-1.5 g/kg 1.2-1.5 g/kg   Protein needs based on 1.2 g/k g 93 g   Protein needs based on 1.5 g/kg 120 g 116 g   Fluid needs formula: 30-35 mL/kg 30-35 mL/kg   Fluid needs based on 30 mL/kg 2385 mL 2319 mL   Fluid needs in ounces 81 oz 78 oz   Fluid needs based on 35 mL/kg 2783 mL 2706 mL   Fluid needs in ounces 94 oz 91 oz             Discussion & Intervention:   Abhishek was evaluated today for an RD follow up regarding unintended weight loss.  Abhishek is currently undergoing tx for Pancreatic Cancer.  He is here for infusion today. He is doing well today. His appetite has improved somewhat. He typically has nausea after chemo, but this is controlled with zofran and MMJ. His weight is stable x3 months. He will continue to try to eat well and maintain his weight. Based on today's assessment, discussion included: MNT for: DM, Pancreatic cancer, BG management, how to modify foods for anticipated nutrition impact symptoms pt may experience during CA tx, adequate hydration & fluid choices, having consistent and planned snacks between meals, and continuing oral nutrition supplements.   Moving forward, Abhishek will continue current nutrition plan of care.  Materials Provided: not applicable  All questions and concerns addressed during today’s visit.  Abhishek has RD contact information.    Nutrition Diagnosis:   Increased Nutrient Needs (kcal & pro) related to increased demand for nutrients and disease state as evidenced by cancer dx and pt undergoing tx for cancer.  Monitoring & Evaluation:   Goals:  weight maintenance/stabilization  adequate nutrition impact symptom management  pt to meet >/=75% estimated nutrition needs daily  adequate glycemic control  increase protein intake  increase fluid intake  increase calorie intake    Progress Towards Goals: Progressing    Nutrition Rx & Recommendations:  Diet: High Calorie,  High Protein (for high calorie foods see pages 52-53, and for high protein foods see pages 49-51 in your Eating Hints book)  Small, frequent meals/snacks may be easier to tolerate than 3 large daily meals.  Aim for 5-6 small meals per day (every 2-3 hours).  Include protein at all meals/snacks.  Include a variety of foods (as tolerated/allowed by diet).  Incorporate physical activity as able/allowed.  Stay hydrated by sipping fluids of choice/tolerance throughout the day.  Liquid nutrition may be better tolerated than solids at times.  Alter food choices and eating patterns to accommodate changing needs.  Refer to Eating Hints book for other meal/snack ideas and symptom management.  For nausea/vomiting: try plain/bland foods; try lemon/lime flavors; eat 5-6 small meals/day (except when vomiting); do not skip meals/snacks; choose appealing foods; sip only small amounts of liquids during meals; stay hydrated in between meals; eat foods/drinks that are room temperature; eat dry toast/crackers (see pages  21-22 and 31-32 in your Eating Hints book).  Weigh yourself regularly. If you notice weight loss, make an effort to increase your daily food/calorie intake. If you continue to notice loss after these efforts, reach out to your dietitian to establish a plan to stabilize weight.  Increase intake of Ensure if appetite/weight decreases.     Nutrition therapy for Carbohydrate Controlled Diet:     Foods with carbohydrates make your blood glucose level go up. Learning how to count carbohydrates can help you control your blood glucose levels. First, identify the foods you eat that contain carbohydrates.   Foods that commonly contain carbohydrates include: grains (breads, pasta, cereal, rice), fruit, starchy vegetables (potatoes, corn, peas), snack foods (chips, pretzels, crackers), dairy products, sweets/desserts.   Foods that do not usually contain carbohydrates: chicken, pork, beef, fish, seafood, eggs, tofu, cheese, butter,  sour cream, avocado, nuts, seeds, olives, mayonnaise, water, black coffee, unsweetened tea, and zero-calorie drinks. Vegetables with no or low carbohydrate include green beans, cauliflower, tomatoes, and onions.     Follow Up Plan:  1/14/25 during infusion  Recommend Referral to Other Providers: none at this time

## 2024-12-03 NOTE — PATIENT INSTRUCTIONS
Nutrition Rx & Recommendations:  Diet: High Calorie, High Protein (for high calorie foods see pages 52-53, and for high protein foods see pages 49-51 in your Eating Hints book)  Small, frequent meals/snacks may be easier to tolerate than 3 large daily meals.  Aim for 5-6 small meals per day (every 2-3 hours).  Include protein at all meals/snacks.  Include a variety of foods (as tolerated/allowed by diet).  Incorporate physical activity as able/allowed.  Stay hydrated by sipping fluids of choice/tolerance throughout the day.  Liquid nutrition may be better tolerated than solids at times.  Alter food choices and eating patterns to accommodate changing needs.  Refer to Eating Hints book for other meal/snack ideas and symptom management.  For nausea/vomiting: try plain/bland foods; try lemon/lime flavors; eat 5-6 small meals/day (except when vomiting); do not skip meals/snacks; choose appealing foods; sip only small amounts of liquids during meals; stay hydrated in between meals; eat foods/drinks that are room temperature; eat dry toast/crackers (see pages  21-22 and 31-32 in your Eating Hints book).  Weigh yourself regularly. If you notice weight loss, make an effort to increase your daily food/calorie intake. If you continue to notice loss after these efforts, reach out to your dietitian to establish a plan to stabilize weight.  Increase intake of Ensure if appetite/weight decreases.     Nutrition therapy for Carbohydrate Controlled Diet:     Foods with carbohydrates make your blood glucose level go up. Learning how to count carbohydrates can help you control your blood glucose levels. First, identify the foods you eat that contain carbohydrates.   Foods that commonly contain carbohydrates include: grains (breads, pasta, cereal, rice), fruit, starchy vegetables (potatoes, corn, peas), snack foods (chips, pretzels, crackers), dairy products, sweets/desserts.   Foods that do not usually contain carbohydrates: chicken,  pork, beef, fish, seafood, eggs, tofu, cheese, butter, sour cream, avocado, nuts, seeds, olives, mayonnaise, water, black coffee, unsweetened tea, and zero-calorie drinks. Vegetables with no or low carbohydrate include green beans, cauliflower, tomatoes, and onions.     Follow Up Plan: 1/14/25 during infusion  Recommend Referral to Other Providers: none at this time

## 2024-12-03 NOTE — PROGRESS NOTES
Pt here for chemotherapy, mFOLFIRINOX offering no complaints.  Right port accessed with positive blood return noted throughout treatment.  Tolerated infusion without incident.  Port flushed and chemo ball attached, double checked with Chitra Patel RN.  AVS declined. Next appt is 12/5 @ 1230 pm   Walked out in stable condition

## 2024-12-04 ENCOUNTER — TELEPHONE (OUTPATIENT)
Age: 69
End: 2024-12-04

## 2024-12-04 ENCOUNTER — TELEPHONE (OUTPATIENT)
Dept: HEMATOLOGY ONCOLOGY | Facility: CLINIC | Age: 69
End: 2024-12-04

## 2024-12-04 NOTE — TELEPHONE ENCOUNTER
Patient calling to report had a liver biopsy on 11/15  with IR at [facility], this was ordered by Dr. Faulkner.  He reports no one has reviewed the results with him and is requesting a call for results review.

## 2024-12-05 ENCOUNTER — OFFICE VISIT (OUTPATIENT)
Dept: HEMATOLOGY ONCOLOGY | Facility: CLINIC | Age: 69
End: 2024-12-05
Payer: COMMERCIAL

## 2024-12-05 ENCOUNTER — HOSPITAL ENCOUNTER (OUTPATIENT)
Dept: INFUSION CENTER | Facility: CLINIC | Age: 69
Discharge: HOME | End: 2024-12-05
Payer: COMMERCIAL

## 2024-12-05 VITALS
SYSTOLIC BLOOD PRESSURE: 132 MMHG | HEIGHT: 70 IN | DIASTOLIC BLOOD PRESSURE: 78 MMHG | TEMPERATURE: 96.3 F | WEIGHT: 173 LBS | RESPIRATION RATE: 18 BRPM | HEART RATE: 52 BPM | OXYGEN SATURATION: 98 % | BODY MASS INDEX: 24.77 KG/M2

## 2024-12-05 DIAGNOSIS — C25.9 ADENOCARCINOMA OF PANCREAS (HCC): Primary | ICD-10-CM

## 2024-12-05 DIAGNOSIS — E78.49 OTHER HYPERLIPIDEMIA: ICD-10-CM

## 2024-12-05 DIAGNOSIS — G89.3 CANCER-RELATED PAIN: ICD-10-CM

## 2024-12-05 DIAGNOSIS — L40.50 PSORIATIC ARTHRITIS (HCC): ICD-10-CM

## 2024-12-05 DIAGNOSIS — E11.65 TYPE 2 DIABETES MELLITUS WITH HYPERGLYCEMIA, WITHOUT LONG-TERM CURRENT USE OF INSULIN (HCC): ICD-10-CM

## 2024-12-05 DIAGNOSIS — T45.1X5A CHEMOTHERAPY INDUCED NEUTROPENIA (HCC): ICD-10-CM

## 2024-12-05 DIAGNOSIS — D70.1 CHEMOTHERAPY INDUCED NEUTROPENIA (HCC): ICD-10-CM

## 2024-12-05 PROCEDURE — 96372 THER/PROPH/DIAG INJ SC/IM: CPT

## 2024-12-05 PROCEDURE — 99215 OFFICE O/P EST HI 40 MIN: CPT | Performed by: INTERNAL MEDICINE

## 2024-12-05 PROCEDURE — G2211 COMPLEX E/M VISIT ADD ON: HCPCS | Performed by: INTERNAL MEDICINE

## 2024-12-05 RX ADMIN — PEGFILGRASTIM 6 MG: KIT SUBCUTANEOUS at 12:23

## 2024-12-05 NOTE — PROGRESS NOTES
Hematology/Oncology Outpatient Follow- up Note  Abhishek Gimenez 69 y.o. male MRN: @ Encounter: [id number]        Date:  12/5/2024        Assessment / Plan:      1. Adenocarcinoma of pancreas (HCC)  Assessment & Plan:  Liver biopsy positive for metastatic disease.  Discussed what it means to have metastatic disease (makes his disease a stage IV).  Continue FOLFIRINOX.  Patient tolerating well.  Plan is to repeat CT scan on 12/10/2024 to assess the response.  RTC 12/16/2024 as previously scheduled.  2. Type 2 diabetes mellitus with hyperglycemia, without long-term current use of insulin (HCC)  Assessment & Plan:  On Lantus and metformin.  Being followed by PCP.  Lab Results   Component Value Date    HGBA1C 6.6 (A) 08/27/2024     3. Cancer-related pain  Assessment & Plan:  Follows with palliative.  Pain meds as per palliative team.  4. Chemotherapy induced neutropenia (HCC)  5. Psoriatic arthritis (HCC)  6. Other hyperlipidemia  Assessment & Plan:  On statins.  Management as per PCP.         HPI:    68-year-old gentleman who noted the onset of diabetes mellitus and February 2024 since that time he has been losing weight he is lost 30 pounds over the 6 months time.  He also developed intermittent abdominal pain epigastric left upper quadrant that continued to worsen.  He was started on Ozempic.  He drinks 3-4 beers every other day he last used tobacco 40 years ago but does not smoke now.  He has had a negative colonoscopy.  He underwent an EGD which was nondiagnostic subsequently underwent an EGD EUS which showed normal esophagus normal stomach normal duodenum.  Celiac artery and aorta were visualized and appeared normal.  The left kidney spleen left adrenal appeared normal parenchyma of the liver.  Normal this was the left lobe of the liver.  He had a heterogeneous hypoechoic irregular mass measuring 30 mm x 18 mm with poorly defined margins in the body of pancreas it abutted the splenic artery and splenic vein  needle biopsies were done for total.  The head of the pancreas and neck of the pancreas appeared to have some honeycombing.  Bile duct appeared normal gallbladder appeared normal.  This was done on 7/17/2024  Biopsy was consistent with adenocarcinoma.  CAT scan of the chest was negative.  On 7/10/2026 MRI of the abdomen showed the ill-defined 3.8 x 1.1 cm mass in the body of the pancreas the liver showed a 3.4 cm benign hemangioma 1.0 and 0.4 cm right lobe nodules all the seem to be compatible with hemangiomas.    Interval History:    Patient presents today for follow-up and to discuss the results of the liver biopsy.  Patient states they had read the results of the biopsy on his [employer] and did the research on the Internet and what that means.  Denies any abdominal pain.  No vomiting.  Mild nausea during the chemo.  Has been gaining weight.  Appetite is good.  Denies any fevers or chills.      Cancer Staging:  Cancer Staging   Adenocarcinoma of pancreas (HCC)  Staging form: Pancreas, AJCC 8th Edition  - Clinical: Stage IB (cT2, cN0, cM0) - Signed by Roz Faulkner MD on 8/26/2024  Total positive nodes: 0      Molecular Testing:     Previous Hematologic/ Oncologic History:    Oncology History   Adenocarcinoma of pancreas (HCC)   8/13/2024 Biopsy    A.-B.  Pancreas, Body, FNA (Cell block and smear preparations):   Malignant  Adenocarcinoma.     8/20/2024 Initial Diagnosis    Adenocarcinoma of pancreas (HCC)     8/26/2024 -  Cancer Staged    Staging form: Pancreas, AJCC 8th Edition  - Clinical: Stage IB (cT2, cN0, cM0) - Signed by Roz Faulkner MD on 8/26/2024  Total positive nodes: 0       9/24/2024 -  Chemotherapy    alteplase (CATHFLO), 2 mg, Intracatheter, Every 1 Minute as needed, 6 of 12 cycles  pegfilgrastim (NEULASTA ONPRO), 6 mg, Subcutaneous, Once, 6 of 12 cycles  Administration: 6 mg (9/26/2024), 6 mg (10/10/2024), 6 mg (10/24/2024), 6 mg (11/7/2024), 6 mg (11/21/2024), 6 mg  (12/5/2024)  fosaprepitant (EMEND) IVPB, 150 mg, Intravenous, Once, 6 of 12 cycles  Administration: 150 mg (9/24/2024), 150 mg (10/8/2024), 150 mg (10/22/2024), 150 mg (11/5/2024), 150 mg (11/19/2024), 150 mg (12/3/2024)  irinotecan (CAMPTOSAR) chemo infusion, 150 mg/m2 = 294 mg, Intravenous, Once, 6 of 12 cycles  Dose modification: 112.5 mg/m2 (75 % of original dose 150 mg/m2, Cycle 2, Reason: Dose Not Tolerated), 120 mg/m2 (80 % of original dose 150 mg/m2, Cycle 3, Reason: Original Dose Changed)  Administration: 294 mg (9/24/2024), 227 mg (10/8/2024), 240 mg (10/22/2024), 240 mg (11/5/2024), 238 mg (11/19/2024), 239 mg (12/3/2024)  oxaliplatin (ELOXATIN) chemo infusion, 65 mg/m2 = 127.4 mg, Intravenous, Once, 6 of 12 cycles  Dose modification: 48.75 mg/m2 (75 % of original dose 65 mg/m2, Cycle 2, Reason: Dose Not Tolerated), 52 mg/m2 (80 % of original dose 65 mg/m2, Cycle 3, Reason: Original Dose Changed)  Administration: 127.4 mg (9/24/2024), 98.5 mg (10/8/2024), 100 mg (10/22/2024), 100 mg (11/5/2024), 100 mg (11/19/2024), 100 mg (12/3/2024)  fluorouracil (ADRUCIL) ambulatory infusion Soln, 1,200 mg/m2/day = 4,705 mg, Intravenous, Over 46 hours, 6 of 12 cycles  Dose modification: 900 mg/m2/day (75 % of original dose 1,200 mg/m2/day, Cycle 3, Reason: Dose Not Tolerated), 960 mg/m2/day (80 % of original dose 1,200 mg/m2/day, Cycle 4, Reason: Other (see comments), Comment: provider request)     11/15/2024 Biopsy    Final Diagnosis   A. Liver mass, biopsy:  - Metastatic adenocarcinoma.         12/5/2024 -  Cancer Staged    Staging form: Pancreas, AJCC 8th Edition  - Pathologic stage from 12/5/2024: Stage IV (pT2, cM1) - Signed by Butch Peña MD on 12/5/2024           Current Hematologic/ Oncologic Treatment:      Dose Reduced FOLFIRINOX.        Test Results:    Imaging: IR biopsy liver mass  Result Date: 11/15/2024  Narrative: IR BIOPSY LIVER MASS Indication: Pancreatic cancer. Indeterminate liver lesion.  Previous inconclusive biopsy. Procedure and findings: Initially, noncontrast  CT was performed confirming continued presence of a 13 mm hypodense lesion in the periphery of the lower right lobe. This corresponds to previously identified indeterminate lesion on CT and MRI. The lesion was identified sonographically corresponding to a subtle 15 mm hypoechoic nodule just underneath the capsule. The overlying skin was prepped and draped in sterile fashion. 1% lidocaine was infiltrated around the access tract. Repeat CT was then performed with the needle directed towards the sonographically identified nodule to confirm that it corresponds to the hypodense lesion seen on the CT. The biopsy was then performed with sonographic guidance. Using coaxial technique. A 17-gauge introducer was advanced to the periphery of the nodule and coaxial biopsy was performed with a bio pence device. Initially, 2 core samples were obtained and submitted for touch prep analysis which showed lesional material. Blood and atypical cells were identified. 4 additional passes were then made and samples were submitted directly into formalin. The introducer was removed and the track was embolized with D-Stat collagen thrombin. The patient tolerated this procedure well without immediate complication. This examination, like all CT scans performed in the [facility], was performed utilizing techniques to minimize radiation dose exposure, including the use of iterative reconstruction and automated exposure control. Sedation (min) : 30 minutes     Impression: Impression: 15 mm subtle hypoechoic nodule is identified in the inferior right lobe periphery corresponding to a hypodense nodule on noncontrast CT. This nodule correlates to the indeterminate nodule identified on prior imaging. Core biopsy was performed showing lesional material by touch prep. Workstation performed: [id number]             Labs:   Lab Results   Component Value  "Date    WBC 10.54 (H) 12/02/2024    HGB 12.7 12/02/2024    HCT 38.9 12/02/2024    MCV 96 12/02/2024     (L) 12/02/2024     Lab Results   Component Value Date     03/15/2016    K 3.8 12/02/2024     12/02/2024    CO2 27 12/02/2024    BUN 14 12/02/2024    CREATININE 0.75 12/02/2024    GLUCOSE 106 (H) 11/15/2016    GLUF 110 (H) 09/23/2024    CALCIUM 8.9 12/02/2024    AST 20 12/02/2024    ALT 23 12/02/2024    ALKPHOS 182 (H) 12/02/2024    PROT 6.6 03/15/2016    BILITOT 0.5 03/15/2016    EGFR 94 12/02/2024         No results found for: \"SPEP\", \"UPEP\"    Lab Results   Component Value Date    PSA 4.701 (H) 08/24/2024    PSA 3.83 01/16/2024    PSA 2.9 01/11/2023       No results found for: \"CEA\"    No results found for: \"\"    No results found for: \"AFP\"    No results found for: \"IRON\", \"TIBC\", \"FERRITIN\"    Lab Results   Component Value Date    [id number] 466 06/10/2021         ROS: Review of Systems   Constitutional:  Negative for activity change, appetite change, fatigue, fever and unexpected weight change.   HENT:  Negative for mouth sores, nosebleeds and sore throat.    Respiratory:  Negative for cough, chest tightness and shortness of breath.    Cardiovascular:  Negative for chest pain and palpitations.   Gastrointestinal:  Positive for nausea (mild). Negative for abdominal pain, constipation, diarrhea and vomiting.   Genitourinary:  Negative for dysuria.   Musculoskeletal:  Negative for arthralgias, back pain and myalgias.   Skin:  Negative for pallor and rash.   Neurological:  Positive for headaches (happens during chemo and then resolves). Negative for dizziness, weakness, light-headedness and numbness.   Hematological:  Negative for adenopathy. Does not bruise/bleed easily.   Psychiatric/Behavioral:  Negative for behavioral problems.          Current Medications: Reviewed  Allergies: Reviewed  PMH/FH/SH:  Reviewed      Physical Exam:    Body surface area is 1.96 meters squared.    Wt Readings " from Last 3 Encounters:   12/05/24 78.5 kg (173 lb)   12/03/24 79.6 kg (175 lb 6.4 oz)   11/25/24 77.7 kg (171 lb 6.4 oz)        Temp Readings from Last 3 Encounters:   12/05/24 (!) 96.3 °F (35.7 °C) (Temporal)   12/03/24 (!) 97 °F (36.1 °C) (Temporal)   11/25/24 (!) 97.3 °F (36.3 °C) (Tympanic)        BP Readings from Last 3 Encounters:   12/05/24 132/78   12/03/24 150/79   11/25/24 140/86         Pulse Readings from Last 3 Encounters:   12/05/24 (!) 52   12/03/24 55   11/25/24 59         Physical Exam  Vitals and nursing note reviewed.   Constitutional:       General: He is not in acute distress.     Appearance: Normal appearance.   HENT:      Head: Normocephalic and atraumatic.      Mouth/Throat:      Mouth: Mucous membranes are moist.      Pharynx: Oropharynx is clear.   Eyes:      General: No scleral icterus.     Extraocular Movements: Extraocular movements intact.      Conjunctiva/sclera: Conjunctivae normal.   Cardiovascular:      Rate and Rhythm: Normal rate and regular rhythm.      Pulses: Normal pulses.      Heart sounds: No murmur heard.  Pulmonary:      Effort: Pulmonary effort is normal.      Breath sounds: Normal breath sounds. No wheezing, rhonchi or rales.   Abdominal:      General: Bowel sounds are normal.      Palpations: Abdomen is soft.      Tenderness: There is no abdominal tenderness.   Musculoskeletal:         General: No swelling or tenderness. Normal range of motion.      Cervical back: Neck supple.   Lymphadenopathy:      Cervical: No cervical adenopathy.   Skin:     General: Skin is warm and dry.      Coloration: Skin is not pale.      Findings: No bruising, erythema or rash.   Neurological:      General: No focal deficit present.      Mental Status: He is alert and oriented to person, place, and time.      Motor: No weakness.   Psychiatric:         Mood and Affect: Mood normal.         Behavior: Behavior normal.           Goals and Barriers:  Current Goal: Prolong Survival from Cancer.    Barriers: None.      Patient's Capacity to Self Care:  Patient is able to self care.    Disclaimer: This document was prepared using [employer] technology. If a word or phrase is confusing, or does not make sense, this is likely due to recognition error which was not discovered during this clinician's review. If you believe an error has occurred, please contact me through [facility] service line for cathy?cation.      Follow Up    All questions were answered to the patient's satisfaction during this encounter. The patient knows the contact information for our office and knows to reach out for any relevant concerns related to this encounter. They are to call for any temperature 100.4 or higher, new symptoms including but not restricted to shaking chills, decreased appetite, nausea, vomiting, diarrhea, increased fatigue, shortness of breath or chest pain, confusion, and not feeling the strength to come to the clinic. For all other listed problems and medical diagnosis in their chart - they are managed by PCP and/or other specialists, which the patient acknowledges.     I spent 47 minutes reviewing the records (labs, clinician notes, outside records, medical history, ordering medicine/tests/procedures, monitoring of anti-neoplastic toxicities, interpreting the imaging/labs previously done) and coordination of care as well as direct time with the patient today, of which greater than 50% of the time was spent in counseling and coordination of care with the patient/family.

## 2024-12-05 NOTE — ASSESSMENT & PLAN NOTE
On Lantus and metformin.  Being followed by PCP.  Lab Results   Component Value Date    HGBA1C 6.6 (A) 08/27/2024

## 2024-12-05 NOTE — PROGRESS NOTES
Pt to clinic for elastometric pump disconnect. Offers no complaints today. Pump has been running for over 46 hours and appears empty and deflated. Pt tolerated pump disconnect without complications. Port de-accessed. Neulasta placed on pt's abdomen with green light blinking. Pt aware of next appointment on 12/16/24 at 930. AVS declined.

## 2024-12-05 NOTE — ASSESSMENT & PLAN NOTE
Liver biopsy positive for metastatic disease.  Discussed what it means to have metastatic disease (makes his disease a stage IV).  Continue FOLFIRINOX.  Patient tolerating well.  Plan is to repeat CT scan on 12/10/2024 to assess the response.  RTC 12/16/2024 as previously scheduled.

## 2024-12-06 DIAGNOSIS — G89.3 CANCER RELATED PAIN: ICD-10-CM

## 2024-12-06 RX ORDER — OXYCODONE HYDROCHLORIDE 5 MG/1
5 TABLET ORAL EVERY 6 HOURS PRN
Qty: 90 TABLET | Refills: 0 | Status: SHIPPED | OUTPATIENT
Start: 2024-12-06

## 2024-12-06 NOTE — TELEPHONE ENCOUNTER
1 [id number] 11/16/2024 11/14/2024 oxyCODONE HCL (Tablet) 90.0 22 5 MG 30.68 BRENT MERCER [facility] Commercial Insurance 0 / 0 [place]   1 [id number] 10/23/2024 10/21/2024 oxyCODONE HCL (Tablet) 90.0 23 5 MG 29.35 BELLA [facility] Commercial Insurance 0 / 0 [place]   1 [id number] 09/26/2024 09/26/2024 oxyCODONE HCL (Tablet) 90.0 23 5 MG 29.35 BELLA [facility] Commercial [place] 0 / 0 [place]   1 [id number] 09/10/2024 09/10/2024 oxyCODONE HCL (Tablet) 60.0 15 5 MG 30.0 [facility] Commercial [place] / 0 [place]   1 [id number] 08/26/2024 08/26/2024 oxyCODONE HCL (Tablet) 60.0 15 5 MG 30.0 [facility] Commercial [place] / 0 [place]

## 2024-12-06 NOTE — TELEPHONE ENCOUNTER
Reason for call:   [x] Refill   [] Prior Auth  [] Other:     Office:   [] PCP/Provider -   [x] Specialty/Provider -       oxyCODONE (Roxicodone) 5 immediate release tablet 5 mg, Oral, Every 6 hours PRN       Quantity: 30    Ph[facility]: [email or web address]    Does the patient have enough for 3 days?   [] Yes   [x] No - Send as HP to POD

## 2024-12-10 ENCOUNTER — HOSPITAL ENCOUNTER (OUTPATIENT)
Dept: CT IMAGING | Facility: HOSPITAL | Age: 69
Discharge: HOME | End: 2024-12-10
Attending: INTERNAL MEDICINE
Payer: COMMERCIAL

## 2024-12-10 DIAGNOSIS — C25.9 ADENOCARCINOMA OF PANCREAS (HCC): ICD-10-CM

## 2024-12-10 PROCEDURE — 74177 CT ABD & PELVIS W/CONTRAST: CPT

## 2024-12-10 RX ADMIN — IOHEXOL 100 ML: 350 INJECTION, SOLUTION INTRAVENOUS at 10:50

## 2024-12-11 DIAGNOSIS — C25.9 ADENOCARCINOMA OF PANCREAS (HCC): Primary | ICD-10-CM

## 2024-12-11 RX ORDER — ATROPINE SULFATE 1 MG/ML
0.25 INJECTION, SOLUTION INTRAVENOUS ONCE
Status: CANCELLED | OUTPATIENT
Start: 2024-12-17

## 2024-12-11 RX ORDER — ATROPINE SULFATE 1 MG/ML
0.25 INJECTION, SOLUTION INTRAVENOUS ONCE AS NEEDED
Status: CANCELLED | OUTPATIENT
Start: 2024-12-17

## 2024-12-11 RX ORDER — DEXTROSE MONOHYDRATE 50 MG/ML
20 INJECTION, SOLUTION INTRAVENOUS ONCE
Status: CANCELLED | OUTPATIENT
Start: 2024-12-17

## 2024-12-11 RX ORDER — SODIUM CHLORIDE 9 MG/ML
20 INJECTION, SOLUTION INTRAVENOUS ONCE AS NEEDED
Status: CANCELLED | OUTPATIENT
Start: 2024-12-17

## 2024-12-13 DIAGNOSIS — E11.65 TYPE 2 DIABETES MELLITUS WITH HYPERGLYCEMIA, WITHOUT LONG-TERM CURRENT USE OF INSULIN (HCC): ICD-10-CM

## 2024-12-13 RX ORDER — INSULIN GLARGINE 100 [IU]/ML
20 INJECTION, SOLUTION SUBCUTANEOUS
Qty: 6 ML | Refills: 3 | Status: SHIPPED | OUTPATIENT
Start: 2024-12-13

## 2024-12-16 ENCOUNTER — TELEPHONE (OUTPATIENT)
Age: 69
End: 2024-12-16

## 2024-12-16 ENCOUNTER — TELEMEDICINE (OUTPATIENT)
Dept: HEMATOLOGY ONCOLOGY | Facility: CLINIC | Age: 69
End: 2024-12-16
Payer: COMMERCIAL

## 2024-12-16 ENCOUNTER — HOSPITAL ENCOUNTER (OUTPATIENT)
Dept: INFUSION CENTER | Facility: CLINIC | Age: 69
Discharge: HOME | End: 2024-12-16
Payer: COMMERCIAL

## 2024-12-16 DIAGNOSIS — Z95.828 PORT-A-CATH IN PLACE: Primary | ICD-10-CM

## 2024-12-16 DIAGNOSIS — C25.9 ADENOCARCINOMA OF PANCREAS (HCC): ICD-10-CM

## 2024-12-16 DIAGNOSIS — C25.9 ADENOCARCINOMA OF PANCREAS (HCC): Primary | ICD-10-CM

## 2024-12-16 DIAGNOSIS — E11.65 TYPE 2 DIABETES MELLITUS WITH HYPERGLYCEMIA, WITHOUT LONG-TERM CURRENT USE OF INSULIN (HCC): ICD-10-CM

## 2024-12-16 LAB
ALBUMIN SERPL BCG-MCNC: 3.9 G/DL (ref 3.5–5)
ALP SERPL-CCNC: 166 U/L (ref 34–104)
ALT SERPL W P-5'-P-CCNC: 21 U/L (ref 7–52)
ANION GAP SERPL CALCULATED.3IONS-SCNC: 9 MMOL/L (ref 4–13)
AST SERPL W P-5'-P-CCNC: 19 U/L (ref 13–39)
BASOPHILS # BLD AUTO: 0.04 THOUSANDS/ÂΜL (ref 0–0.1)
BASOPHILS NFR BLD AUTO: 0 % (ref 0–1)
BILIRUB SERPL-MCNC: 0.36 MG/DL (ref 0.2–1)
BUN SERPL-MCNC: 15 MG/DL (ref 5–25)
CALCIUM SERPL-MCNC: 9.1 MG/DL (ref 8.4–10.2)
CHLORIDE SERPL-SCNC: 101 MMOL/L (ref 96–108)
CO2 SERPL-SCNC: 30 MMOL/L (ref 21–32)
CREAT SERPL-MCNC: 0.77 MG/DL (ref 0.6–1.3)
EOSINOPHIL # BLD AUTO: 0.13 THOUSAND/ÂΜL (ref 0–0.61)
EOSINOPHIL NFR BLD AUTO: 1 % (ref 0–6)
ERYTHROCYTE [DISTWIDTH] IN BLOOD BY AUTOMATED COUNT: 15.9 % (ref 11.6–15.1)
GFR SERPL CREATININE-BSD FRML MDRD: 93 ML/MIN/1.73SQ M
GLUCOSE SERPL-MCNC: 208 MG/DL (ref 65–140)
HCT VFR BLD AUTO: 37.6 % (ref 36.5–49.3)
HGB BLD-MCNC: 12.2 G/DL (ref 12–17)
IMM GRANULOCYTES # BLD AUTO: 0.13 THOUSAND/UL (ref 0–0.2)
IMM GRANULOCYTES NFR BLD AUTO: 1 % (ref 0–2)
LYMPHOCYTES # BLD AUTO: 0.82 THOUSANDS/ÂΜL (ref 0.6–4.47)
LYMPHOCYTES NFR BLD AUTO: 7 % (ref 14–44)
MCH RBC QN AUTO: 31.5 PG (ref 26.8–34.3)
MCHC RBC AUTO-ENTMCNC: 32.4 G/DL (ref 31.4–37.4)
MCV RBC AUTO: 97 FL (ref 82–98)
MONOCYTES # BLD AUTO: 1.16 THOUSAND/ÂΜL (ref 0.17–1.22)
MONOCYTES NFR BLD AUTO: 10 % (ref 4–12)
NEUTROPHILS # BLD AUTO: 9.76 THOUSANDS/ÂΜL (ref 1.85–7.62)
NEUTS SEG NFR BLD AUTO: 81 % (ref 43–75)
NRBC BLD AUTO-RTO: 0 /100 WBCS
PLATELET # BLD AUTO: 112 THOUSANDS/UL (ref 149–390)
PMV BLD AUTO: 10.6 FL (ref 8.9–12.7)
POTASSIUM SERPL-SCNC: 3.9 MMOL/L (ref 3.5–5.3)
PROT SERPL-MCNC: 6.7 G/DL (ref 6.4–8.4)
RBC # BLD AUTO: 3.87 MILLION/UL (ref 3.88–5.62)
SODIUM SERPL-SCNC: 140 MMOL/L (ref 135–147)
WBC # BLD AUTO: 12.04 THOUSAND/UL (ref 4.31–10.16)

## 2024-12-16 PROCEDURE — G2211 COMPLEX E/M VISIT ADD ON: HCPCS | Performed by: INTERNAL MEDICINE

## 2024-12-16 PROCEDURE — 99214 OFFICE O/P EST MOD 30 MIN: CPT | Performed by: INTERNAL MEDICINE

## 2024-12-16 PROCEDURE — 85025 COMPLETE CBC W/AUTO DIFF WBC: CPT | Performed by: INTERNAL MEDICINE

## 2024-12-16 PROCEDURE — 80053 COMPREHEN METABOLIC PANEL: CPT | Performed by: INTERNAL MEDICINE

## 2024-12-16 NOTE — TELEPHONE ENCOUNTER
Berenice from [facility]'s Radiology Reading Room calling with significant findings on CT AP done 12/10.

## 2024-12-16 NOTE — PROGRESS NOTES
Virtual Regular Visit  Name: Abhishek Gimenez      : 1955      MRN: [id number]  Encounter Provider: Butch Peña MD  Encounter Date: 2024   Encounter department: [facility] [place]      Verification of patient location:  Patient is located at Home in the following state in which I hold an active license [place] :  Assessment & Plan  Adenocarcinoma of pancreas (HCC)  Patient tolerating current treatment well.  Continue FOLFIRINOX every 14 days.  Recent CT scan with decrease in the size of the liver metastasis and stable pancreatic lesion.  Will repeat CA 19-9 levels with the next blood draw.    Okay to proceed with cycle 7 on 2024.  RTC in 2 weeks or sooner as needed with labs.    Orders:    Cancer antigen 19-9; Future    Type 2 diabetes mellitus with hyperglycemia, without long-term current use of insulin (HCC)  On Lantus and metformin.  Continue management as per endocrinology.  Lab Results   Component Value Date    HGBA1C 6.6 (A) 2024            Adenocarcinoma of pancreas (HCC)               Encounter provider Butch Peña MD    The patient was identified by name and date of birth. Abhishek Gimenez was informed that this is a telemedicine visit and that the visit is being conducted through the Epic Embedded platform. He agrees to proceed..  My office door was closed. No one else was in the room.  He acknowledged consent and understanding of privacy and security of the video platform. The patient has agreed to participate and understands they can discontinue the visit at any time.    Patient is aware this is a billable service.     History of Present Illness     68-year-old gentleman who noted the onset of diabetes mellitus and 2024 since that time he has been losing weight he is lost 30 pounds over the 6 months time.  He also developed intermittent abdominal pain epigastric left upper quadrant that continued to worsen.  He was started on  Ozempic.  He drinks 3-4 beers every other day he last used tobacco 40 years ago but does not smoke now.  He has had a negative colonoscopy.  He underwent an EGD which was nondiagnostic subsequently underwent an EGD EUS which showed normal esophagus normal stomach normal duodenum.  Celiac artery and aorta were visualized and appeared normal.  The left kidney spleen left adrenal appeared normal parenchyma of the liver.  Normal this was the left lobe of the liver.  He had a heterogeneous hypoechoic irregular mass measuring 30 mm x 18 mm with poorly defined margins in the body of pancreas it abutted the splenic artery and splenic vein needle biopsies were done for total.  The head of the pancreas and neck of the pancreas appeared to have some honeycombing.  Bile duct appeared normal gallbladder appeared normal.  This was done on 7/17/2024  Biopsy was consistent with adenocarcinoma.  CAT scan of the chest was negative.  On 7/10/2026 MRI of the abdomen showed the ill-defined 3.8 x 1.1 cm mass in the body of the pancreas the liver showed a 3.4 cm benign hemangioma 1.0 and 0.4 cm right lobe nodules all the seem to be compatible with hemangiomas.      Patient seen via televisit to discuss the results of the CT scan.  Patient feels fine and offers no complaints.  He reports dizziness for a couple days after he receives chemotherapy which resolves on its own.  Currently patient doing well.  He is maintaining his caloric intake and weight.  Has been adjusting the insulin dose to regulate his blood glucose levels.  Denies abdominal pain.  No diarrhea or constipation.  Review of Systems   Constitutional:  Negative for activity change, appetite change, fatigue, fever and unexpected weight change.   HENT:  Negative for mouth sores, nosebleeds and sore throat.    Eyes:  Negative for redness and visual disturbance.   Respiratory:  Negative for cough, chest tightness, shortness of breath and wheezing.    Cardiovascular:  Negative for  chest pain, palpitations and leg swelling.   Gastrointestinal:  Negative for abdominal pain, blood in stool, constipation, diarrhea, nausea and vomiting.   Genitourinary:  Negative for dysuria, flank pain and hematuria.   Musculoskeletal:  Negative for arthralgias, back pain, gait problem and myalgias.   Skin:  Negative for pallor, rash and wound.   Neurological:  Negative for dizziness, seizures, speech difficulty, weakness, light-headedness, numbness and headaches.   Hematological:  Negative for adenopathy. Does not bruise/bleed easily.   Psychiatric/Behavioral:  Negative for behavioral problems and confusion.        Objective   There were no vitals taken for this visit.    Physical Exam  Constitutional:       General: He is not in acute distress.     Appearance: Normal appearance.   HENT:      Head: Normocephalic and atraumatic.   Pulmonary:      Effort: Pulmonary effort is normal.   Neurological:      Mental Status: He is alert and oriented to person, place, and time.   Psychiatric:         Mood and Affect: Mood normal.         Behavior: Behavior normal.         Visit Time  Total Visit Duration: 36 minutes

## 2024-12-16 NOTE — ASSESSMENT & PLAN NOTE
On Lantus and metformin.  Continue management as per endocrinology.  Lab Results   Component Value Date    HGBA1C 6.6 (A) 08/27/2024

## 2024-12-16 NOTE — PROGRESS NOTES
Pt to clinic for labs only offering no complaints. Pt port accessed, flushed, and labs drawn without complication. Pt port de-accessed. Pt aware of next appointment on 12/17/24 at 830. AVS declined.

## 2024-12-16 NOTE — ASSESSMENT & PLAN NOTE
Patient tolerating current treatment well.  Continue FOLFIRINOX every 14 days.  Recent CT scan with decrease in the size of the liver metastasis and stable pancreatic lesion.  Will repeat CA 19-9 levels with the next blood draw.    Okay to proceed with cycle 7 on 12/17/2024.  RTC in 2 weeks or sooner as needed with labs.    Orders:    Cancer antigen 19-9; Future

## 2024-12-17 ENCOUNTER — HOSPITAL ENCOUNTER (OUTPATIENT)
Dept: INFUSION CENTER | Facility: CLINIC | Age: 69
Discharge: HOME | End: 2024-12-17
Payer: COMMERCIAL

## 2024-12-17 VITALS
DIASTOLIC BLOOD PRESSURE: 82 MMHG | HEIGHT: 71 IN | RESPIRATION RATE: 17 BRPM | SYSTOLIC BLOOD PRESSURE: 148 MMHG | WEIGHT: 179 LBS | TEMPERATURE: 97.8 F | HEART RATE: 63 BPM | BODY MASS INDEX: 25.06 KG/M2

## 2024-12-17 DIAGNOSIS — C25.9 ADENOCARCINOMA OF PANCREAS (HCC): Primary | ICD-10-CM

## 2024-12-17 PROCEDURE — 96375 TX/PRO/DX INJ NEW DRUG ADDON: CPT

## 2024-12-17 PROCEDURE — G0498 CHEMO EXTEND IV INFUS W/PUMP: HCPCS

## 2024-12-17 PROCEDURE — 96415 CHEMO IV INFUSION ADDL HR: CPT

## 2024-12-17 PROCEDURE — 96367 TX/PROPH/DG ADDL SEQ IV INF: CPT

## 2024-12-17 PROCEDURE — 96417 CHEMO IV INFUS EACH ADDL SEQ: CPT

## 2024-12-17 PROCEDURE — 96413 CHEMO IV INFUSION 1 HR: CPT

## 2024-12-17 RX ORDER — ATROPINE SULFATE 1 MG/ML
0.25 INJECTION, SOLUTION INTRAVENOUS ONCE AS NEEDED
Status: DISCONTINUED | OUTPATIENT
Start: 2024-12-17 | End: 2024-12-20 | Stop reason: HOSPADM

## 2024-12-17 RX ORDER — DEXTROSE MONOHYDRATE 50 MG/ML
20 INJECTION, SOLUTION INTRAVENOUS ONCE
Status: COMPLETED | OUTPATIENT
Start: 2024-12-17 | End: 2024-12-17

## 2024-12-17 RX ORDER — ATROPINE SULFATE 1 MG/ML
0.25 INJECTION, SOLUTION INTRAVENOUS ONCE
Status: COMPLETED | OUTPATIENT
Start: 2024-12-17 | End: 2024-12-17

## 2024-12-17 RX ORDER — SODIUM CHLORIDE 9 MG/ML
20 INJECTION, SOLUTION INTRAVENOUS ONCE AS NEEDED
Status: DISCONTINUED | OUTPATIENT
Start: 2024-12-17 | End: 2024-12-20 | Stop reason: HOSPADM

## 2024-12-17 RX ADMIN — SODIUM CHLORIDE 20 ML/HR: 9 INJECTION, SOLUTION INTRAVENOUS at 09:15

## 2024-12-17 RX ADMIN — DEXTROSE 20 ML/HR: 5 SOLUTION INTRAVENOUS at 10:17

## 2024-12-17 RX ADMIN — FOSAPREPITANT 150 MG: 150 INJECTION, POWDER, LYOPHILIZED, FOR SOLUTION INTRAVENOUS at 09:40

## 2024-12-17 RX ADMIN — IRINOTECAN HYDROCHLORIDE 239 MG: 20 INJECTION, SOLUTION INTRAVENOUS at 12:37

## 2024-12-17 RX ADMIN — DEXAMETHASONE SODIUM PHOSPHATE: 10 INJECTION, SOLUTION INTRAMUSCULAR; INTRAVENOUS at 09:17

## 2024-12-17 RX ADMIN — OXALIPLATIN 103.5 MG: 5 INJECTION, SOLUTION INTRAVENOUS at 10:24

## 2024-12-17 RX ADMIN — ATROPINE SULFATE 0.25 MG: 1 INJECTION, SOLUTION INTRAVENOUS at 12:30

## 2024-12-17 NOTE — PROGRESS NOTES
Patient to clinic for modified FOLFIRINOX. Complains of slight dizziness and fafigue x 2 days after chemo. Symptoms aheve now resolved. Office RN, Danielle Stoddard made aware. Port accessed with positive blood flow throughout treatment.  Cadd pump connected and clamps open. Patient aware of disconnect date and time on 12/19/24 at 12:30 pm. AVS declined.

## 2024-12-19 ENCOUNTER — HOSPITAL ENCOUNTER (OUTPATIENT)
Dept: INFUSION CENTER | Facility: CLINIC | Age: 69
Discharge: HOME | End: 2024-12-19
Payer: COMMERCIAL

## 2024-12-19 DIAGNOSIS — D70.1 CHEMOTHERAPY INDUCED NEUTROPENIA (HCC): Primary | ICD-10-CM

## 2024-12-19 DIAGNOSIS — T45.1X5A CHEMOTHERAPY INDUCED NEUTROPENIA (HCC): Primary | ICD-10-CM

## 2024-12-19 DIAGNOSIS — C25.9 ADENOCARCINOMA OF PANCREAS (HCC): ICD-10-CM

## 2024-12-19 PROCEDURE — 96372 THER/PROPH/DIAG INJ SC/IM: CPT

## 2024-12-19 RX ADMIN — PEGFILGRASTIM 6 MG: KIT SUBCUTANEOUS at 12:33

## 2024-12-19 NOTE — PROGRESS NOTES
Patient here for his cadd disconnect and onpro Neulasta placement. Patient offers no complaints at this.   Cadd pump appears deflated after running over 46 hours, disconnected by Sandee MONTIEL RN, port a cath flushed and disconnected, band aid placed.   Neulasta placed on right of abdomen, green blinking light activated. Instructed patient to take off after 27 hours, 12/20/2024 at 1530.   AVS declined.   Reviewed next appointment for 12/30/2024 at 1330.  Walked out of clinic with no incident.

## 2024-12-24 DIAGNOSIS — E11.65 TYPE 2 DIABETES MELLITUS WITH HYPERGLYCEMIA, WITHOUT LONG-TERM CURRENT USE OF INSULIN (HCC): ICD-10-CM

## 2024-12-24 DIAGNOSIS — G89.3 CANCER RELATED PAIN: ICD-10-CM

## 2024-12-24 DIAGNOSIS — C25.9 ADENOCARCINOMA OF PANCREAS (HCC): Primary | ICD-10-CM

## 2024-12-24 RX ORDER — SODIUM CHLORIDE 9 MG/ML
20 INJECTION, SOLUTION INTRAVENOUS ONCE AS NEEDED
OUTPATIENT
Start: 2024-12-31

## 2024-12-24 RX ORDER — ATROPINE SULFATE 1 MG/ML
0.25 INJECTION, SOLUTION INTRAVENOUS ONCE
OUTPATIENT
Start: 2024-12-31

## 2024-12-24 RX ORDER — DEXTROSE MONOHYDRATE 50 MG/ML
20 INJECTION, SOLUTION INTRAVENOUS ONCE
OUTPATIENT
Start: 2024-12-31

## 2024-12-24 RX ORDER — OXYCODONE HYDROCHLORIDE 5 MG/1
5 TABLET ORAL EVERY 6 HOURS PRN
Qty: 90 TABLET | Refills: 0 | Status: SHIPPED | OUTPATIENT
Start: 2024-12-24

## 2024-12-24 RX ORDER — INSULIN GLARGINE 100 [IU]/ML
20 INJECTION, SOLUTION SUBCUTANEOUS
Qty: 15 ML | Refills: 2 | Status: SHIPPED | OUTPATIENT
Start: 2024-12-24 | End: 2025-01-23

## 2024-12-24 RX ORDER — ATROPINE SULFATE 1 MG/ML
0.25 INJECTION, SOLUTION INTRAVENOUS ONCE AS NEEDED
OUTPATIENT
Start: 2024-12-31

## 2024-12-24 NOTE — TELEPHONE ENCOUNTER
Refill must be reviewed and completed by the office or provider. The refill is unable to be approved or denied by the medication management team.        Patient Id Prescription # Filled Written Drug Label Qty Days Strength MME** Prescriber Pharmacy Payment REFILL #/Auth State Detail  1 [id number] 12/06/2024 12/06/2024 oxyCODONE HCL (Tablet) 28.0 7 5 MG 30.0 PEG PARRA [facility] [place] 0 / 0 [place]   1 [id number] 11/16/2024 11/14/2024 oxyCODONE HCL (Tablet) 90.0 22 5 MG 30.68 BRENT MERCER [facility] Commercial Insurance 0 / 0 [place]   1 [id number] 10/23/2024 10/21/2024 oxyCODONE HCL (Tablet) 90.0 23 5 MG 29.35 BELLA PAVON [facility] Commercial [place] 0 / 0 [place]

## 2024-12-27 DIAGNOSIS — E11.65 TYPE 2 DIABETES MELLITUS WITH HYPERGLYCEMIA, WITHOUT LONG-TERM CURRENT USE OF INSULIN (HCC): ICD-10-CM

## 2024-12-27 RX ORDER — FLASH GLUCOSE SENSOR
KIT MISCELLANEOUS
Qty: 2 EACH | Refills: 5 | Status: SHIPPED | OUTPATIENT
Start: 2024-12-27

## 2024-12-30 ENCOUNTER — HOSPITAL ENCOUNTER (OUTPATIENT)
Dept: INFUSION CENTER | Facility: CLINIC | Age: 69
Discharge: HOME | End: 2024-12-30
Payer: COMMERCIAL

## 2024-12-30 DIAGNOSIS — C25.9 ADENOCARCINOMA OF PANCREAS (HCC): ICD-10-CM

## 2024-12-30 DIAGNOSIS — Z95.828 PORT-A-CATH IN PLACE: Primary | ICD-10-CM

## 2024-12-30 LAB
ALBUMIN SERPL BCG-MCNC: 4.2 G/DL (ref 3.5–5)
ALP SERPL-CCNC: 162 U/L (ref 34–104)
ALT SERPL W P-5'-P-CCNC: 22 U/L (ref 7–52)
ANION GAP SERPL CALCULATED.3IONS-SCNC: 8 MMOL/L (ref 4–13)
AST SERPL W P-5'-P-CCNC: 21 U/L (ref 13–39)
BASOPHILS # BLD AUTO: 0.05 THOUSANDS/ΜL (ref 0–0.1)
BASOPHILS NFR BLD AUTO: 0 % (ref 0–1)
BILIRUB SERPL-MCNC: 0.45 MG/DL (ref 0.2–1)
BUN SERPL-MCNC: 14 MG/DL (ref 5–25)
CALCIUM SERPL-MCNC: 9.1 MG/DL (ref 8.4–10.2)
CHLORIDE SERPL-SCNC: 103 MMOL/L (ref 96–108)
CO2 SERPL-SCNC: 28 MMOL/L (ref 21–32)
CREAT SERPL-MCNC: 0.76 MG/DL (ref 0.6–1.3)
EOSINOPHIL # BLD AUTO: 0.22 THOUSAND/ΜL (ref 0–0.61)
EOSINOPHIL NFR BLD AUTO: 2 % (ref 0–6)
ERYTHROCYTE [DISTWIDTH] IN BLOOD BY AUTOMATED COUNT: 15.2 % (ref 11.6–15.1)
GFR SERPL CREATININE-BSD FRML MDRD: 94 ML/MIN/1.73SQ M
GLUCOSE SERPL-MCNC: 123 MG/DL (ref 65–140)
HCT VFR BLD AUTO: 39.4 % (ref 36.5–49.3)
HGB BLD-MCNC: 12.8 G/DL (ref 12–17)
IMM GRANULOCYTES # BLD AUTO: 0.1 THOUSAND/UL (ref 0–0.2)
IMM GRANULOCYTES NFR BLD AUTO: 1 % (ref 0–2)
LYMPHOCYTES # BLD AUTO: 1.17 THOUSANDS/ΜL (ref 0.6–4.47)
LYMPHOCYTES NFR BLD AUTO: 10 % (ref 14–44)
MCH RBC QN AUTO: 31.4 PG (ref 26.8–34.3)
MCHC RBC AUTO-ENTMCNC: 32.5 G/DL (ref 31.4–37.4)
MCV RBC AUTO: 97 FL (ref 82–98)
MONOCYTES # BLD AUTO: 1.24 THOUSAND/ΜL (ref 0.17–1.22)
MONOCYTES NFR BLD AUTO: 10 % (ref 4–12)
NEUTROPHILS # BLD AUTO: 9.32 THOUSANDS/ΜL (ref 1.85–7.62)
NEUTS SEG NFR BLD AUTO: 77 % (ref 43–75)
NRBC BLD AUTO-RTO: 0 /100 WBCS
PLATELET # BLD AUTO: 116 THOUSANDS/UL (ref 149–390)
PMV BLD AUTO: 9.9 FL (ref 8.9–12.7)
POTASSIUM SERPL-SCNC: 3.5 MMOL/L (ref 3.5–5.3)
PROT SERPL-MCNC: 6.7 G/DL (ref 6.4–8.4)
RBC # BLD AUTO: 4.08 MILLION/UL (ref 3.88–5.62)
SODIUM SERPL-SCNC: 139 MMOL/L (ref 135–147)
WBC # BLD AUTO: 12.1 THOUSAND/UL (ref 4.31–10.16)

## 2024-12-30 PROCEDURE — 85025 COMPLETE CBC W/AUTO DIFF WBC: CPT | Performed by: INTERNAL MEDICINE

## 2024-12-30 PROCEDURE — 80053 COMPREHEN METABOLIC PANEL: CPT | Performed by: INTERNAL MEDICINE

## 2024-12-30 NOTE — PROGRESS NOTES
Patient arrives to infusion center for port maintenance with labs. Patient offers no complaints. Port accessed, labs collected, flush performed. Port de-accessed, AVS offered.     Next appointment: 12/31/24 @ 0800

## 2024-12-31 ENCOUNTER — HOSPITAL ENCOUNTER (OUTPATIENT)
Dept: INFUSION CENTER | Facility: CLINIC | Age: 69
Discharge: HOME | End: 2024-12-31
Payer: COMMERCIAL

## 2024-12-31 VITALS
HEART RATE: 54 BPM | HEIGHT: 71 IN | OXYGEN SATURATION: 98 % | TEMPERATURE: 96.8 F | SYSTOLIC BLOOD PRESSURE: 150 MMHG | RESPIRATION RATE: 16 BRPM | DIASTOLIC BLOOD PRESSURE: 96 MMHG | WEIGHT: 181.2 LBS | BODY MASS INDEX: 25.37 KG/M2

## 2024-12-31 DIAGNOSIS — C25.9 ADENOCARCINOMA OF PANCREAS (HCC): Primary | ICD-10-CM

## 2024-12-31 RX ORDER — DEXTROSE MONOHYDRATE 50 MG/ML
20 INJECTION, SOLUTION INTRAVENOUS ONCE
Status: COMPLETED | OUTPATIENT
Start: 2024-12-31 | End: 2024-12-31

## 2024-12-31 RX ORDER — ATROPINE SULFATE 1 MG/ML
0.25 INJECTION, SOLUTION INTRAVENOUS ONCE
Status: COMPLETED | OUTPATIENT
Start: 2024-12-31 | End: 2024-12-31

## 2024-12-31 RX ORDER — FAMOTIDINE 10 MG/ML
20 INJECTION, SOLUTION INTRAVENOUS ONCE
Status: COMPLETED | OUTPATIENT
Start: 2024-12-31 | End: 2024-12-31

## 2024-12-31 RX ORDER — ATROPINE SULFATE 1 MG/ML
0.25 INJECTION, SOLUTION INTRAVENOUS ONCE AS NEEDED
Status: DISCONTINUED | OUTPATIENT
Start: 2024-12-31 | End: 2025-01-03 | Stop reason: HOSPADM

## 2024-12-31 RX ORDER — SODIUM CHLORIDE 9 MG/ML
20 INJECTION, SOLUTION INTRAVENOUS ONCE AS NEEDED
Status: DISCONTINUED | OUTPATIENT
Start: 2024-12-31 | End: 2025-01-03 | Stop reason: HOSPADM

## 2024-12-31 RX ORDER — DIPHENHYDRAMINE HYDROCHLORIDE 50 MG/ML
25 INJECTION INTRAMUSCULAR; INTRAVENOUS
Status: ACTIVE | OUTPATIENT
Start: 2024-12-31 | End: 2024-12-31

## 2024-12-31 RX ORDER — HYDROCORTISONE SODIUM SUCCINATE 100 MG/2ML
50 INJECTION INTRAMUSCULAR; INTRAVENOUS
Status: ACTIVE | OUTPATIENT
Start: 2024-12-31 | End: 2024-12-31

## 2024-12-31 RX ADMIN — ATROPINE SULFATE 0.25 MG: 1 INJECTION, SOLUTION INTRAVENOUS at 12:56

## 2024-12-31 RX ADMIN — OXALIPLATIN 104.5 MG: 5 INJECTION, SOLUTION INTRAVENOUS at 09:43

## 2024-12-31 RX ADMIN — FAMOTIDINE 20 MG: 10 INJECTION, SOLUTION INTRAVENOUS at 11:41

## 2024-12-31 RX ADMIN — SODIUM CHLORIDE 500 ML: 0.9 INJECTION, SOLUTION INTRAVENOUS at 11:43

## 2024-12-31 RX ADMIN — FOSAPREPITANT 150 MG: 150 INJECTION, POWDER, LYOPHILIZED, FOR SOLUTION INTRAVENOUS at 08:55

## 2024-12-31 RX ADMIN — IRINOTECAN HYDROCHLORIDE 241 MG: 20 INJECTION, SOLUTION INTRAVENOUS at 13:00

## 2024-12-31 RX ADMIN — DEXAMETHASONE SODIUM PHOSPHATE: 10 INJECTION, SOLUTION INTRAMUSCULAR; INTRAVENOUS at 08:32

## 2024-12-31 RX ADMIN — HYDROCORTISONE SODIUM SUCCINATE 50 MG: 100 INJECTION, POWDER, FOR SOLUTION INTRAMUSCULAR; INTRAVENOUS at 11:39

## 2024-12-31 RX ADMIN — DEXTROSE 20 ML/HR: 5 SOLUTION INTRAVENOUS at 09:38

## 2024-12-31 RX ADMIN — DIPHENHYDRAMINE HYDROCHLORIDE 25 MG: 50 INJECTION, SOLUTION INTRAMUSCULAR; INTRAVENOUS at 11:38

## 2024-12-31 NOTE — PROGRESS NOTES
Pt tolerated pump hook-up without complications. Pump hooked up to pt's port and clamps open to run per Harshil Hernadez RN and Mariia Jose RN. Aware of when to return for pump disconnect on 1/2/25 at 1pm.

## 2024-12-31 NOTE — PROGRESS NOTES
Abhishek Gimenez presents for modified Folfirinix, offers no complaints. Port accessed and flushed with positive blood return. During Oxaliplatin treatment running at 135.4 ml/hr, patient reported feeling flush with 16.9 mL remaining in the bag. Oxaliplatin stopped and hypersensitivity protocol initiated. Patient received 25 mg Benadryl, 50 mg Solu-Cortef, 20 mg Pepcid and a 500 mL bolus. Danielle Stoddard RN, notified and stated proceed with Irinotecan and CADD and ok to not complete oxaliplatin, order in treatment plan. Vitals stable. Tolerated remainder of treatment well with no complications.     Abhishek Gimenez is aware of future appt on 1/2/2025 at 1 pm.     AVS declined.

## 2025-01-02 ENCOUNTER — HOSPITAL ENCOUNTER (OUTPATIENT)
Dept: INFUSION CENTER | Facility: CLINIC | Age: 70
Discharge: HOME | End: 2025-01-02
Payer: COMMERCIAL

## 2025-01-02 DIAGNOSIS — T45.1X5A CHEMOTHERAPY INDUCED NEUTROPENIA (HCC): Primary | ICD-10-CM

## 2025-01-02 DIAGNOSIS — D70.1 CHEMOTHERAPY INDUCED NEUTROPENIA (HCC): Primary | ICD-10-CM

## 2025-01-02 DIAGNOSIS — C25.9 ADENOCARCINOMA OF PANCREAS (HCC): ICD-10-CM

## 2025-01-02 PROCEDURE — 96372 THER/PROPH/DIAG INJ SC/IM: CPT

## 2025-01-02 RX ADMIN — PEGFILGRASTIM 6 MG: KIT SUBCUTANEOUS at 13:20

## 2025-01-08 DIAGNOSIS — C25.9 ADENOCARCINOMA OF PANCREAS (HCC): Primary | ICD-10-CM

## 2025-01-08 RX ORDER — ATROPINE SULFATE 1 MG/ML
0.25 INJECTION, SOLUTION INTRAVENOUS ONCE
Status: CANCELLED | OUTPATIENT
Start: 2025-01-14

## 2025-01-08 RX ORDER — ATROPINE SULFATE 1 MG/ML
0.25 INJECTION, SOLUTION INTRAVENOUS ONCE AS NEEDED
Status: CANCELLED | OUTPATIENT
Start: 2025-01-14

## 2025-01-08 RX ORDER — SODIUM CHLORIDE 9 MG/ML
20 INJECTION, SOLUTION INTRAVENOUS ONCE AS NEEDED
Status: CANCELLED | OUTPATIENT
Start: 2025-01-14

## 2025-01-08 RX ORDER — DEXTROSE MONOHYDRATE 50 MG/ML
20 INJECTION, SOLUTION INTRAVENOUS ONCE
Status: CANCELLED | OUTPATIENT
Start: 2025-01-14

## 2025-01-13 ENCOUNTER — HOSPITAL ENCOUNTER (OUTPATIENT)
Dept: INFUSION CENTER | Facility: CLINIC | Age: 70
Discharge: HOME | End: 2025-01-13
Payer: COMMERCIAL

## 2025-01-13 ENCOUNTER — TELEPHONE (OUTPATIENT)
Dept: HEMATOLOGY ONCOLOGY | Facility: CLINIC | Age: 70
End: 2025-01-13

## 2025-01-13 DIAGNOSIS — C25.9 ADENOCARCINOMA OF PANCREAS (HCC): Primary | ICD-10-CM

## 2025-01-13 DIAGNOSIS — C25.9 ADENOCARCINOMA OF PANCREAS (HCC): ICD-10-CM

## 2025-01-13 DIAGNOSIS — Z95.828 PORT-A-CATH IN PLACE: Primary | ICD-10-CM

## 2025-01-13 LAB
ALBUMIN SERPL BCG-MCNC: 4.2 G/DL (ref 3.5–5)
ALP SERPL-CCNC: 160 U/L (ref 34–104)
ALT SERPL W P-5'-P-CCNC: 22 U/L (ref 7–52)
ANION GAP SERPL CALCULATED.3IONS-SCNC: 8 MMOL/L (ref 4–13)
AST SERPL W P-5'-P-CCNC: 20 U/L (ref 13–39)
BASOPHILS # BLD AUTO: 0.05 THOUSANDS/ΜL (ref 0–0.1)
BASOPHILS NFR BLD AUTO: 1 % (ref 0–1)
BILIRUB SERPL-MCNC: 0.36 MG/DL (ref 0.2–1)
BUN SERPL-MCNC: 13 MG/DL (ref 5–25)
CALCIUM SERPL-MCNC: 9.5 MG/DL (ref 8.4–10.2)
CHLORIDE SERPL-SCNC: 104 MMOL/L (ref 96–108)
CO2 SERPL-SCNC: 29 MMOL/L (ref 21–32)
CREAT SERPL-MCNC: 0.66 MG/DL (ref 0.6–1.3)
EOSINOPHIL # BLD AUTO: 0.2 THOUSAND/ΜL (ref 0–0.61)
EOSINOPHIL NFR BLD AUTO: 2 % (ref 0–6)
ERYTHROCYTE [DISTWIDTH] IN BLOOD BY AUTOMATED COUNT: 14.9 % (ref 11.6–15.1)
GFR SERPL CREATININE-BSD FRML MDRD: 100 ML/MIN/1.73SQ M
GLUCOSE SERPL-MCNC: 145 MG/DL (ref 65–140)
HCT VFR BLD AUTO: 38.3 % (ref 36.5–49.3)
HGB BLD-MCNC: 12.5 G/DL (ref 12–17)
IMM GRANULOCYTES # BLD AUTO: 0.1 THOUSAND/UL (ref 0–0.2)
IMM GRANULOCYTES NFR BLD AUTO: 1 % (ref 0–2)
LYMPHOCYTES # BLD AUTO: 0.99 THOUSANDS/ΜL (ref 0.6–4.47)
LYMPHOCYTES NFR BLD AUTO: 9 % (ref 14–44)
MCH RBC QN AUTO: 31.6 PG (ref 26.8–34.3)
MCHC RBC AUTO-ENTMCNC: 32.6 G/DL (ref 31.4–37.4)
MCV RBC AUTO: 97 FL (ref 82–98)
MONOCYTES # BLD AUTO: 1.01 THOUSAND/ΜL (ref 0.17–1.22)
MONOCYTES NFR BLD AUTO: 9 % (ref 4–12)
NEUTROPHILS # BLD AUTO: 8.73 THOUSANDS/ΜL (ref 1.85–7.62)
NEUTS SEG NFR BLD AUTO: 78 % (ref 43–75)
NRBC BLD AUTO-RTO: 0 /100 WBCS
PLATELET # BLD AUTO: 108 THOUSANDS/UL (ref 149–390)
PMV BLD AUTO: 10.3 FL (ref 8.9–12.7)
POTASSIUM SERPL-SCNC: 3.9 MMOL/L (ref 3.5–5.3)
PROT SERPL-MCNC: 7 G/DL (ref 6.4–8.4)
RBC # BLD AUTO: 3.95 MILLION/UL (ref 3.88–5.62)
SODIUM SERPL-SCNC: 141 MMOL/L (ref 135–147)
WBC # BLD AUTO: 11.08 THOUSAND/UL (ref 4.31–10.16)

## 2025-01-13 PROCEDURE — 85025 COMPLETE CBC W/AUTO DIFF WBC: CPT | Performed by: INTERNAL MEDICINE

## 2025-01-13 PROCEDURE — 80053 COMPREHEN METABOLIC PANEL: CPT | Performed by: INTERNAL MEDICINE

## 2025-01-13 RX ORDER — DIPHENHYDRAMINE HYDROCHLORIDE 50 MG/ML
25 INJECTION INTRAMUSCULAR; INTRAVENOUS ONCE
Status: CANCELLED
Start: 2025-01-14 | End: 2025-01-14

## 2025-01-13 NOTE — TELEPHONE ENCOUNTER
Medication time out/change to orders    Date patient scheduled: tomorrow    Original medication ordered: folfinirox    New Medication ordered: dexamethasone and benadryl IV    Office RN notified patient? Message sent via my chart  Timeout done with Lashell GAYLE

## 2025-01-13 NOTE — PROGRESS NOTES
Pt here for port flush and labs, offering no complaints. PAC accessed with positive blood return noted, labs drawn. PAC flushed and de-accessed. AVS declined. Next appt 1/14 8am. Walked out in stable condition.

## 2025-01-13 NOTE — PROGRESS NOTES
Time out completed with office RN, Danielle Stoddard:    IV Benadryl and Solumedrol are being added to pt's treatment plan d/t prior reaction.

## 2025-01-14 ENCOUNTER — TELEPHONE (OUTPATIENT)
Dept: HEMATOLOGY ONCOLOGY | Facility: CLINIC | Age: 70
End: 2025-01-14

## 2025-01-14 ENCOUNTER — NUTRITION (OUTPATIENT)
Dept: NUTRITION | Facility: CLINIC | Age: 70
End: 2025-01-14

## 2025-01-14 ENCOUNTER — OFFICE VISIT (OUTPATIENT)
Dept: HEMATOLOGY ONCOLOGY | Facility: CLINIC | Age: 70
End: 2025-01-14
Payer: COMMERCIAL

## 2025-01-14 ENCOUNTER — HOSPITAL ENCOUNTER (OUTPATIENT)
Dept: INFUSION CENTER | Facility: CLINIC | Age: 70
Discharge: HOME | End: 2025-01-14
Payer: COMMERCIAL

## 2025-01-14 VITALS
TEMPERATURE: 97 F | HEART RATE: 59 BPM | RESPIRATION RATE: 17 BRPM | WEIGHT: 179.6 LBS | BODY MASS INDEX: 25.15 KG/M2 | DIASTOLIC BLOOD PRESSURE: 86 MMHG | HEIGHT: 71 IN | SYSTOLIC BLOOD PRESSURE: 159 MMHG

## 2025-01-14 DIAGNOSIS — C25.9 ADENOCARCINOMA OF PANCREAS (HCC): Primary | ICD-10-CM

## 2025-01-14 DIAGNOSIS — E78.49 OTHER HYPERLIPIDEMIA: ICD-10-CM

## 2025-01-14 DIAGNOSIS — Z71.3 NUTRITIONAL COUNSELING: Primary | ICD-10-CM

## 2025-01-14 DIAGNOSIS — E11.65 TYPE 2 DIABETES MELLITUS WITH HYPERGLYCEMIA, WITHOUT LONG-TERM CURRENT USE OF INSULIN (HCC): ICD-10-CM

## 2025-01-14 PROCEDURE — 99215 OFFICE O/P EST HI 40 MIN: CPT | Performed by: INTERNAL MEDICINE

## 2025-01-14 PROCEDURE — 86301 IMMUNOASSAY TUMOR CA 19-9: CPT

## 2025-01-14 RX ORDER — ATROPINE SULFATE 1 MG/ML
0.25 INJECTION, SOLUTION INTRAVENOUS ONCE AS NEEDED
Status: DISCONTINUED | OUTPATIENT
Start: 2025-01-14 | End: 2025-01-17 | Stop reason: HOSPADM

## 2025-01-14 RX ORDER — SODIUM CHLORIDE 9 MG/ML
20 INJECTION, SOLUTION INTRAVENOUS ONCE AS NEEDED
Status: DISCONTINUED | OUTPATIENT
Start: 2025-01-14 | End: 2025-01-17 | Stop reason: HOSPADM

## 2025-01-14 RX ORDER — DEXTROSE MONOHYDRATE 50 MG/ML
20 INJECTION, SOLUTION INTRAVENOUS ONCE
Status: COMPLETED | OUTPATIENT
Start: 2025-01-14 | End: 2025-01-14

## 2025-01-14 RX ORDER — DIPHENHYDRAMINE HYDROCHLORIDE 50 MG/ML
25 INJECTION INTRAMUSCULAR; INTRAVENOUS ONCE
Status: COMPLETED | OUTPATIENT
Start: 2025-01-14 | End: 2025-01-14

## 2025-01-14 RX ORDER — ATROPINE SULFATE 1 MG/ML
0.25 INJECTION, SOLUTION INTRAVENOUS ONCE
Status: COMPLETED | OUTPATIENT
Start: 2025-01-14 | End: 2025-01-14

## 2025-01-14 RX ADMIN — OXALIPLATIN 105.05 MG: 5 INJECTION, SOLUTION INTRAVENOUS at 10:25

## 2025-01-14 RX ADMIN — DEXAMETHASONE SODIUM PHOSPHATE 10 MG: 10 INJECTION, SOLUTION INTRAMUSCULAR; INTRAVENOUS at 09:14

## 2025-01-14 RX ADMIN — ATROPINE SULFATE 0.25 MG: 1 INJECTION, SOLUTION INTRAVENOUS at 12:28

## 2025-01-14 RX ADMIN — DEXAMETHASONE SODIUM PHOSPHATE: 10 INJECTION, SOLUTION INTRAMUSCULAR; INTRAVENOUS at 08:47

## 2025-01-14 RX ADMIN — IRINOTECAN HYDROCHLORIDE 242 MG: 20 INJECTION, SOLUTION INTRAVENOUS at 12:31

## 2025-01-14 RX ADMIN — DIPHENHYDRAMINE HYDROCHLORIDE 25 MG: 50 INJECTION, SOLUTION INTRAMUSCULAR; INTRAVENOUS at 08:44

## 2025-01-14 RX ADMIN — FOSAPREPITANT DIMEGLUMINE 150 MG: 150 INJECTION, POWDER, LYOPHILIZED, FOR SOLUTION INTRAVENOUS at 09:36

## 2025-01-14 RX ADMIN — DEXTROSE 20 ML/HR: 5 SOLUTION INTRAVENOUS at 10:26

## 2025-01-14 NOTE — PROGRESS NOTES
Name: Abhishek Gimenez      : 1955      MRN: [id number]  Encounter Provider: Butch Peña MD  Encounter Date: 2025   Encounter department: [facility] [place]  :  Assessment & Plan  Adenocarcinoma of pancreas (HCC)  Patient tolerating current treatment well.  Continue FOLFIRINOX every 14 days.  CT scan in 2024 with decrease in the size of the liver metastasis and stable pancreatic lesion.  Check  levels.   RTC in 4 weeks or sooner as needed with labs.           Other hyperlipidemia  On statins.  Management as per PCP.         Type 2 diabetes mellitus with hyperglycemia, without long-term current use of insulin (HCC)  On Lantus and metformin.  Continue management as per endocrinology.  Lab Results   Component Value Date    HGBA1C 6.6 (A) 2024                  History of Present Illness   No chief complaint on file.  68-year-old gentleman who noted the onset of diabetes mellitus and 2024 since that time he has been losing weight he is lost 30 pounds over the 6 months time.  He also developed intermittent abdominal pain epigastric left upper quadrant that continued to worsen.  He was started on Ozempic.  He drinks 3-4 beers every other day he last used tobacco 40 years ago but does not smoke now.  He has had a negative colonoscopy.  He underwent an EGD which was nondiagnostic subsequently underwent an EGD EUS which showed normal esophagus normal stomach normal duodenum.  Celiac artery and aorta were visualized and appeared normal.  The left kidney spleen left adrenal appeared normal parenchyma of the liver.  Normal this was the left lobe of the liver.  He had a heterogeneous hypoechoic irregular mass measuring 30 mm x 18 mm with poorly defined margins in the body of pancreas it abutted the splenic artery and splenic vein needle biopsies were done for total.  The head of the pancreas and neck of the pancreas appeared to have some  honeycombing.  Bile duct appeared normal gallbladder appeared normal.  This was done on 7/17/2024  Biopsy was consistent with adenocarcinoma.  CAT scan of the chest was negative.  On 7/10/2026 MRI of the abdomen showed the ill-defined 3.8 x 1.1 cm mass in the body of the pancreas the liver showed a 3.4 cm benign hemangioma 1.0 and 0.4 cm right lobe nodules all the seem to be compatible with hemangiomas.     Interval History:  Patient presents today for follow-up with his wife.  Feels fine.  Reports headaches after chemotherapy which resolves over the next couple of days.  Denies any nausea vomiting.  Abdominal pain has resolved.  No fevers.  Denies any diarrhea or constipation.  Tolerating treatment fairly well.  Cycle 9-day 1 today. MMW helped with mucositis.   Oncology History   Cancer Staging   Adenocarcinoma of pancreas (HCC)  Staging form: Pancreas, AJCC 8th Edition  - Clinical: Stage IB (cT2, cN0, cM0) - Signed by Roz Faulkner MD on 8/26/2024  Total positive nodes: 0  - Pathologic stage from 12/5/2024: Stage IV (pT2, cM1) - Signed by Butch Peña MD on 12/5/2024  Oncology History   Adenocarcinoma of pancreas (HCC)   8/13/2024 Biopsy    A.-B.  Pancreas, Body, FNA (Cell block and smear preparations):   Malignant  Adenocarcinoma.     8/20/2024 Initial Diagnosis    Adenocarcinoma of pancreas (HCC)     8/26/2024 -  Cancer Staged    Staging form: Pancreas, AJCC 8th Edition  - Clinical: Stage IB (cT2, cN0, cM0) - Signed by Roz Faulkner MD on 8/26/2024  Total positive nodes: 0       9/24/2024 -  Chemotherapy    alteplase (CATHFLO), 2 mg, Intracatheter, Every 1 Minute as needed, 9 of 12 cycles  pegfilgrastim (NEULASTA ONPRO), 6 mg, Subcutaneous, Once, 9 of 12 cycles  Administration: 6 mg (9/26/2024), 6 mg (10/10/2024), 6 mg (10/24/2024), 6 mg (11/7/2024), 6 mg (11/21/2024), 6 mg (12/5/2024), 6 mg (12/19/2024), 6 mg (1/2/2025)  fosaprepitant (EMEND) IVPB, 150 mg, Intravenous, Once, 9 of 12  cycles  Administration: 150 mg (9/24/2024), 150 mg (10/8/2024), 150 mg (10/22/2024), 150 mg (11/5/2024), 150 mg (11/19/2024), 150 mg (12/3/2024), 150 mg (12/17/2024), 150 mg (1/14/2025), 150 mg (12/31/2024)  irinotecan (CAMPTOSAR) chemo infusion, 150 mg/m2 = 294 mg, Intravenous, Once, 9 of 12 cycles  Dose modification: 112.5 mg/m2 (75 % of original dose 150 mg/m2, Cycle 2, Reason: Dose Not Tolerated), 120 mg/m2 (80 % of original dose 150 mg/m2, Cycle 3, Reason: Original Dose Changed)  Administration: 294 mg (9/24/2024), 227 mg (10/8/2024), 240 mg (10/22/2024), 240 mg (11/5/2024), 238 mg (11/19/2024), 239 mg (12/3/2024), 239 mg (12/17/2024), 242 mg (1/14/2025), 241 mg (12/31/2024)  oxaliplatin (ELOXATIN) chemo infusion, 65 mg/m2 = 127.4 mg, Intravenous, Once, 9 of 12 cycles  Dose modification: 48.75 mg/m2 (75 % of original dose 65 mg/m2, Cycle 2, Reason: Dose Not Tolerated), 52 mg/m2 (80 % of original dose 65 mg/m2, Cycle 3, Reason: Original Dose Changed)  Administration: 127.4 mg (9/24/2024), 98.5 mg (10/8/2024), 100 mg (10/22/2024), 100 mg (11/5/2024), 100 mg (11/19/2024), 100 mg (12/3/2024), 103.5 mg (12/17/2024), 105.05 mg (1/14/2025), 104.5 mg (12/31/2024)  fluorouracil (ADRUCIL) ambulatory infusion Soln, 1,200 mg/m2/day = 4,705 mg, Intravenous, Over 46 hours, 9 of 12 cycles  Dose modification: 900 mg/m2/day (75 % of original dose 1,200 mg/m2/day, Cycle 3, Reason: Dose Not Tolerated), 960 mg/m2/day (80 % of original dose 1,200 mg/m2/day, Cycle 4, Reason: Other (see comments), Comment: provider request)     11/15/2024 Biopsy    Final Diagnosis   A. Liver mass, biopsy:  - Metastatic adenocarcinoma.         12/5/2024 -  Cancer Staged    Staging form: Pancreas, AJCC 8th Edition  - Pathologic stage from 12/5/2024: Stage IV (pT2, cM1) - Signed by Butch Peña MD on 12/5/2024          Pertinent Medical History   01/14/25: Reviewed    Review of Systems  14 point review of systems was negative except that mentioned  in HPI.        Objective   There were no vitals taken for this visit.    Pain Screening:     ECOG   1  Physical Exam  Vitals and nursing note reviewed.   Constitutional:       General: He is not in acute distress.     Appearance: Normal appearance.   HENT:      Head: Normocephalic and atraumatic.      Mouth/Throat:      Mouth: Mucous membranes are moist.      Pharynx: Oropharynx is clear.   Eyes:      General: No scleral icterus.     Extraocular Movements: Extraocular movements intact.      Conjunctiva/sclera: Conjunctivae normal.   Cardiovascular:      Rate and Rhythm: Normal rate and regular rhythm.      Pulses: Normal pulses.      Heart sounds: No murmur heard.  Pulmonary:      Effort: Pulmonary effort is normal.      Breath sounds: Normal breath sounds. No wheezing, rhonchi or rales.   Abdominal:      General: Bowel sounds are normal.      Palpations: Abdomen is soft.      Tenderness: There is no abdominal tenderness.   Musculoskeletal:         General: No swelling or tenderness. Normal range of motion.      Cervical back: Neck supple.   Lymphadenopathy:      Cervical: No cervical adenopathy.   Skin:     General: Skin is warm and dry.      Coloration: Skin is not pale.      Findings: No bruising, erythema or rash.   Neurological:      General: No focal deficit present.      Mental Status: He is alert and oriented to person, place, and time.      Motor: No weakness.   Psychiatric:         Mood and Affect: Mood normal.         Behavior: Behavior normal.         Labs: I have reviewed the following labs:  Lab Results   Component Value Date/Time    WBC 11.08 (H) 01/13/2025 02:06 PM    RBC 3.95 01/13/2025 02:06 PM    Hemoglobin 12.5 01/13/2025 02:06 PM    Hematocrit 38.3 01/13/2025 02:06 PM    MCV 97 01/13/2025 02:06 PM    MCH 31.6 01/13/2025 02:06 PM    RDW 14.9 01/13/2025 02:06 PM    Platelets 108 (L) 01/13/2025 02:06 PM    Segmented % 78 (H) 01/13/2025 02:06 PM    Lymphocytes % 9 (L) 01/13/2025 02:06 PM     Monocytes % 9 01/13/2025 02:06 PM    Eosinophils Relative 2 01/13/2025 02:06 PM    Basophils Relative 1 01/13/2025 02:06 PM    Immature Grans % 1 01/13/2025 02:06 PM    Absolute Neutrophils 8.73 (H) 01/13/2025 02:06 PM     Lab Results   Component Value Date/Time    Potassium 3.9 01/13/2025 02:06 PM    Chloride 104 01/13/2025 02:06 PM    CO2 29 01/13/2025 02:06 PM    BUN 13 01/13/2025 02:06 PM    Creatinine 0.66 01/13/2025 02:06 PM    Glucose, Fasting 110 (H) 09/23/2024 07:46 AM    Calcium 9.5 01/13/2025 02:06 PM    AST 20 01/13/2025 02:06 PM    ALT 22 01/13/2025 02:06 PM    Alkaline Phosphatase 160 (H) 01/13/2025 02:06 PM    Total Protein 7.0 01/13/2025 02:06 PM    Albumin 4.2 01/13/2025 02:06 PM    Total Bilirubin 0.36 01/13/2025 02:06 PM    eGFR 100 01/13/2025 02:06 PM               Disclaimer: This document was prepared using [employer] technology. If a word or phrase is confusing, or does not make sense, this is likely due to recognition error which was not discovered during this clinician's review. If you believe an error has occurred, please contact me through [facility] service line for cathy?cation.      Follow Up    All questions were answered to the patient's satisfaction during this encounter. The patient knows the contact information for our office and knows to reach out for any relevant concerns related to this encounter. They are to call for any temperature 100.4 or higher, new symptoms including but not restricted to shaking chills, decreased appetite, nausea, vomiting, diarrhea, increased fatigue, shortness of breath or chest pain, confusion, and not feeling the strength to come to the clinic. For all other listed problems and medical diagnosis in their chart - they are managed by PCP and/or other specialists, which the patient acknowledges.     I spent 41 minutes reviewing the records (labs, clinician notes, outside records, medical history, ordering medicine/tests/procedures, monitoring of  anti-neoplastic toxicities, interpreting the imaging/labs previously done) and coordination of care as well as direct time with the patient today, of which greater than 50% of the time was spent in counseling and coordination of care with the patient/family.

## 2025-01-14 NOTE — PROGRESS NOTES
Pt here for modified FOLFIRINOX, offering no complaints. PAC accessed with positive blood return noted. Tolerated entire infusion without complication. Chemo ball hooked up to pt port, clamps opened to run. AVS declined. Next appt 1/16 1230pm. Walked out in stable condition.

## 2025-01-14 NOTE — PATIENT INSTRUCTIONS
Nutrition Rx & Recommendations:  Diet: High Calorie, High Protein (for high calorie foods see pages 52-53, and for high protein foods see pages 49-51 in your Eating Hints book)  Small, frequent meals/snacks may be easier to tolerate than 3 large daily meals.  Aim for 5-6 small meals per day (every 2-3 hours).  Include protein at all meals/snacks.  Include a variety of foods (as tolerated/allowed by diet).  Incorporate physical activity as able/allowed.  Stay hydrated by sipping fluids of choice/tolerance throughout the day.  Liquid nutrition may be better tolerated than solids at times.  Alter food choices and eating patterns to accommodate changing needs.  Refer to Eating Hints book for other meal/snack ideas and symptom management.  Weigh yourself regularly. If you notice weight loss, make an effort to increase your daily food/calorie intake. If you continue to notice loss after these efforts, reach out to your dietitian to establish a plan to stabilize weight.  Increase intake of Ensure if appetite/weight decreases.     Nutrition therapy for Carbohydrate Controlled Diet:     Foods with carbohydrates make your blood glucose level go up. Learning how to count carbohydrates can help you control your blood glucose levels. First, identify the foods you eat that contain carbohydrates.   Foods that commonly contain carbohydrates include: grains (breads, pasta, cereal, rice), fruit, starchy vegetables (potatoes, corn, peas), snack foods (chips, pretzels, crackers), dairy products, sweets/desserts.   Foods that do not usually contain carbohydrates: chicken, pork, beef, fish, seafood, eggs, tofu, cheese, butter, sour cream, avocado, nuts, seeds, olives, mayonnaise, water, black coffee, unsweetened tea, and zero-calorie drinks. Vegetables with no or low carbohydrate include green beans, cauliflower, tomatoes, and onions.     Follow Up Plan: 1/28/25 during infusion  Recommend Referral to Other Providers: none at this time

## 2025-01-14 NOTE — PROGRESS NOTES
Outpatient Oncology Nutrition Consultation   Type of Consult: Follow Up  Care Location: Infusion Center  - met w/pt & wife (Sherri)    Reason for referral: notification  Lashell Lew MA  on 9/11/24 that pt is appropriate for oncology nutrition care (reason for referral: Ambulatory referral for struggling with eating and drinking ).     Nutrition Assessment:   Oncology Diagnosis & Treatments:   Cancer of the pancreas diagnosed 8/20/24.   Began chemotherapy 9/24/24; Modified FOLFIRINOX every 14 days   Oncology History   Adenocarcinoma of pancreas (HCC)   8/13/2024 Biopsy    A.-B.  Pancreas, Body, FNA (Cell block and smear preparations):   Malignant  Adenocarcinoma.     8/20/2024 Initial Diagnosis    Adenocarcinoma of pancreas (HCC)     8/26/2024 -  Cancer Staged    Staging form: Pancreas, AJCC 8th Edition  - Clinical: Stage IB (cT2, cN0, cM0) - Signed by Roz Faulkner MD on 8/26/2024  Total positive nodes: 0       9/24/2024 -  Chemotherapy    alteplase (CATHFLO), 2 mg, Intracatheter, Every 1 Minute as needed, 9 of 12 cycles  pegfilgrastim (NEULASTA ONPRO), 6 mg, Subcutaneous, Once, 9 of 12 cycles  Administration: 6 mg (9/26/2024), 6 mg (10/10/2024), 6 mg (10/24/2024), 6 mg (11/7/2024), 6 mg (11/21/2024), 6 mg (12/5/2024), 6 mg (12/19/2024), 6 mg (1/2/2025)  fosaprepitant (EMEND) IVPB, 150 mg, Intravenous, Once, 9 of 12 cycles  Administration: 150 mg (9/24/2024), 150 mg (10/8/2024), 150 mg (10/22/2024), 150 mg (11/5/2024), 150 mg (11/19/2024), 150 mg (12/3/2024), 150 mg (12/17/2024), 150 mg (12/31/2024)  irinotecan (CAMPTOSAR) chemo infusion, 150 mg/m2 = 294 mg, Intravenous, Once, 9 of 12 cycles  Dose modification: 112.5 mg/m2 (75 % of original dose 150 mg/m2, Cycle 2, Reason: Dose Not Tolerated), 120 mg/m2 (80 % of original dose 150 mg/m2, Cycle 3, Reason: Original Dose Changed)  Administration: 294 mg (9/24/2024), 227 mg (10/8/2024), 240 mg (10/22/2024), 240 mg (11/5/2024), 238 mg (11/19/2024), 239 mg  (12/3/2024), 239 mg (12/17/2024), 241 mg (12/31/2024)  oxaliplatin (ELOXATIN) chemo infusion, 65 mg/m2 = 127.4 mg, Intravenous, Once, 9 of 12 cycles  Dose modification: 48.75 mg/m2 (75 % of original dose 65 mg/m2, Cycle 2, Reason: Dose Not Tolerated), 52 mg/m2 (80 % of original dose 65 mg/m2, Cycle 3, Reason: Original Dose Changed)  Administration: 127.4 mg (9/24/2024), 98.5 mg (10/8/2024), 100 mg (10/22/2024), 100 mg (11/5/2024), 100 mg (11/19/2024), 100 mg (12/3/2024), 103.5 mg (12/17/2024), 104.5 mg (12/31/2024)  fluorouracil (ADRUCIL) ambulatory infusion Soln, 1,200 mg/m2/day = 4,705 mg, Intravenous, Over 46 hours, 9 of 12 cycles  Dose modification: 900 mg/m2/day (75 % of original dose 1,200 mg/m2/day, Cycle 3, Reason: Dose Not Tolerated), 960 mg/m2/day (80 % of original dose 1,200 mg/m2/day, Cycle 4, Reason: Other (see comments), Comment: provider request)     11/15/2024 Biopsy    Final Diagnosis   A. Liver mass, biopsy:  - Metastatic adenocarcinoma.         12/5/2024 -  Cancer Staged    Staging form: Pancreas, AJCC 8th Edition  - Pathologic stage from 12/5/2024: Stage IV (pT2, cM1) - Signed by Butch Peña MD on 12/5/2024         Past Medical & Surgical Hx:   Patient Active Problem List   Diagnosis    Atrial fibrillation (HCC)    Psoriatic arthritis (HCC)    NDPH (new daily persistent headache)    Atrial flutter (HCC)    CATY (obstructive sleep apnea)    Type 2 diabetes mellitus with hyperglycemia, without long-term current use of insulin (HCC)    Elevated PSA    Platelets decreased (HCC)    Adenocarcinoma of pancreas (HCC)    Pulmonary HTN (HCC)    Pre-op testing    Liver masses    Port-A-Cath in place    Chemotherapy induced neutropenia (HCC)    Palliative care by specialist    Medical marijuana use    Nausea    Cancer-related pain    Insomnia    Loss of appetite    Other hyperlipidemia     Past Medical History:   Diagnosis Date    A-fib (HCC)     Arthritis     BPH (benign prostatic hyperplasia)      Cancer (HCC)     Colon polyp     Diabetes mellitus (HCC)     Fatty liver     Fracture, foot     High cholesterol     Hyperlipidemia     Irregular heart beat     Non-ischemic cardiomyopathy (HCC)     Osteoarthritis     Psoriasis     Psoriatic arthritis (HCC)     Pulmonary HTN (HCC)      Past Surgical History:   Procedure Laterality Date    CARDIAC ELECTROPHYSIOLOGY PROCEDURE N/A 02/22/2023    Procedure: Cardiac eps/afib ablation   comprehensive posterior wall;  Surgeon: Mynor Andrews MD;  Location: [facility];  Service: Cardiology    COLONOSCOPY      ELBOW SURGERY Right     FL GUIDED CENTRAL VENOUS ACCESS DEVICE INSERTION  9/5/2024    IR BIOPSY LIVER MASS  9/18/2024    IR BIOPSY LIVER MASS  11/15/2024    KIDNEY STONE SURGERY      LAPAROSCOPY N/A 9/5/2024    Procedure: DIAGNOSTIC LAPAROSCOPY;  Surgeon: Roz Faulkner MD;  Location: [facility];  Service: Surgical Oncology    TRANSURETHRAL RESECTION OF PROSTATE      last assessed 7/21/15    TUNNELED VENOUS PORT PLACEMENT N/A 9/5/2024    Procedure: INSERTION VENOUS PORT (PORT-A-CATH);  Surgeon: Roz Faulkner MD;  Location: [facility];  Service: Surgical Oncology       Review of Medications:   Vitamins, Supplements and Herbals: No, pt denies taking supplements; MMJ     Current Outpatient Medications:     adalimumab (HUMIRA) 40 mg/0.8 mL PSKT, Inject 40 mg under the skin every 14 (fourteen) days  (Patient not taking: Reported on 9/23/2024), Disp: , Rfl:     Alcohol Swabs 70 % PADS, May substitute brand based on insurance coverage. Check glucose TID. (Patient not taking: Reported on 9/23/2024), Disp: 100 each, Rfl: 0    Benzocaine-Isopropyl Alcohol 6-70 % PADS, Use, Disp: , Rfl:     Blood Glucose Monitoring Suppl ([employer] Blood Glucose Meter) w/Device KIT, Use, Disp: , Rfl:     Blood Glucose Monitoring Suppl (OneTouch Verio Reflect) w/Device KIT, Check blood sugars once daily. Please substitute with appropriate alternative as covered by patient's insurance. Dx:  E11.65, Disp: 1 kit, Rfl: 0    Continuous Blood Gluc  (FreeStyle Livia 2 [place]) LEANA, 14 DAY READER NOT MADE, Disp: 1 each, Rfl: 1    Continuous Glucose Sensor (FreeStyle Livia 14 Day Sensor) MISC, USE 1 DEVICE 3 (THREE) TIMES A DAY BEFORE MEALS, Disp: , Rfl:     Continuous Glucose Sensor (FreeStyle Livia 14 Day Sensor) MISC, USE 1 DEVICE EVERY 14 DAYS AS DIRECTED, Disp: 2 each, Rfl: 5    fluorouracil 3,880 mg in CADD/Elastomeric Infusion Device, Infuse 3,880 mg (960 mg/m2/day x 2.02 m2) into a catheter in a vein via infusion device over 46 hours for 2 days  Infusion planned for January 14, 2025., Disp: 1 each, Rfl: 3    Glucose Blood (BLOOD GLUCOSE TEST STRIPS 333 VI), Use, Disp: , Rfl:     glucose blood (OneTouch Verio) test strip, Check blood sugars once daily. Please substitute with appropriate alternative as covered by patient's insurance. Dx: E11.65, Disp: 100 each, Rfl: 3    insulin glargine (LANTUS) 100 units/mL subcutaneous injection, Inject 20 Units under the skin daily at bedtime, Disp: 6 mL, Rfl: 3    Insulin Pen Needle (BD Pen Needle Mallory 2nd Gen) 32G X 4 MM MISC, For use with insulin pen. Pharmacy may dispense brand covered by insurance., Disp: 100 each, Rfl: 0    Insulin Pen Needle (BD Pen Needle Mallory U/F) 32G X 4 MM MISC, Use daily as directed with insulin pen, Disp: 100 each, Rfl: 3    Lancets 33G MISC, Use, Disp: , Rfl:     Lantus SoloStar 100 units/mL SOPN, INJECT 0.2 ML (20 UNITS TOTAL) UNDER THE SKIN DAILY AT BEDTIME, Disp: 15 mL, Rfl: 2    lidocaine-prilocaine (EMLA) cream, Apply topically as needed for mild pain Apply to port area 45-60min prior to treatment and cover with dressing or plastic wrap, Disp: 30 g, Rfl: 1    metFORMIN (GLUCOPHAGE-XR) 500 mg 24 hr tablet, Take 2 tablets (1,000 mg total) by mouth daily with breakfast, Disp: 180 tablet, Rfl: 1    ondansetron (ZOFRAN) 4 mg tablet, Take 1 tablet (4 mg total) by mouth every 8 (eight) hours as needed for nausea or vomiting, Disp:  20 tablet, Rfl: 3    OneTouch Delica Lancets 33G MISC, Check blood sugars once daily. Please substitute with appropriate alternative as covered by patient's insurance. Dx: E11.65, Disp: 100 each, Rfl: 3    oxyCODONE (Roxicodone) 5 immediate release tablet, Take 1 tablet (5 mg total) by mouth every 6 (six) hours as needed for moderate pain Max Daily Amount: 20 mg, Disp: 90 tablet, Rfl: 0    pantoprazole (PROTONIX) 40 mg tablet, Take 1 tablet (40 mg total) by mouth daily before breakfast, Disp: 30 tablet, Rfl: 5    rosuvastatin (CRESTOR) 5 mg tablet, TAKE 1 TABLET BY MOUTH EVERY DAY IN THE EVENING, Disp: 90 tablet, Rfl: 1    sildenafil (VIAGRA) 100 mg tablet, TAKE ONE (1) TABLET BY MOUTH DAILY AS NEEDED, Disp: , Rfl:   No current facility-administered medications for this visit.    Facility-Administered Medications Ordered in Other Visits:     alteplase (CATHFLO) injection 2 mg, 2 mg, Intracatheter, Q1MIN PRN, Butch Peña MD    alteplase (CATHFLO) injection 2 mg, 2 mg, Intracatheter, Q1MIN PRN, Butch Peña MD    Atropine Sulfate injection 0.25 mg, 0.25 mg, Intravenous, Once, Butch Peña MD    Atropine Sulfate injection 0.25 mg, 0.25 mg, Intravenous, Once PRN, Butch Peña MD    dexamethasone (DECADRON) 20 mg in sodium chloride 0.9 % 50 mL IVPB, 20 mg, Intravenous, Once, Butch Peña MD    irinotecan (CAMPTOSAR) 242 mg in dextrose 5 % 500 mL chemo infusion, 120 mg/m2 (Treatment Plan Recorded), Intravenous, Once, Butch Peña MD    oxaliplatin (ELOXATIN) 105.05 mg in dextrose 5 % 250 mL chemo infusion, 52 mg/m2 (Treatment Plan Recorded), Intravenous, Once, Butch Peña MD, Last Rate: 135.5 mL/hr at 01/14/25 1025, 105.05 mg at 01/14/25 1025    sodium chloride 0.9 % infusion, 20 mL/hr, Intravenous, Once PRN, Butch Peña MD    Most Recent Lab Results:   Lab Results   Component Value Date    WBC 11.08 (H) 01/13/2025    NEUTROABS 8.73 (H) 01/13/2025    CHOLESTEROL 212 (H) 02/20/2024     "CHOL 190 11/15/2016    TRIG 369 (H) 02/20/2024    HDL 40 02/20/2024    LDLCALC 98 02/20/2024    ALT 22 01/13/2025    AST 20 01/13/2025    ALB 4.2 01/13/2025     03/15/2016    SODIUM 141 01/13/2025    SODIUM 139 12/30/2024    K 3.9 01/13/2025    K 3.5 12/30/2024     01/13/2025    BUN 13 01/13/2025    BUN 14 12/30/2024    CREATININE 0.66 01/13/2025    CREATININE 0.76 12/30/2024    EGFR 100 01/13/2025    GLUCOSE 106 (H) 11/15/2016    POCGLU 97 09/18/2024    GLUF 110 (H) 09/23/2024    GLUF 96 09/18/2024    GLUC 145 (H) 01/13/2025    HGBA1C 6.6 (A) 08/27/2024    HGBA1C 5.9 05/24/2024    HGBA1C 10.8 (A) 02/20/2024    CALCIUM 9.5 01/13/2025    FOLATE >20.0 (H) 06/10/2021    MG 1.9 02/23/2023     Anthropometric Measurements:   Height: 71\"  Ht Readings from Last 1 Encounters:   01/14/25 5' 10.98\" (1.803 m)     Wt Readings from Last 20 Encounters:   01/14/25 81.5 kg (179 lb 9.6 oz)   12/31/24 82.2 kg (181 lb 3.2 oz)   12/17/24 81.2 kg (179 lb)   12/05/24 78.5 kg (173 lb)   12/03/24 79.6 kg (175 lb 6.4 oz)   11/25/24 77.7 kg (171 lb 6.4 oz)   11/19/24 78.9 kg (174 lb)   11/15/24 81.5 kg (179 lb 10.8 oz)   11/05/24 77 kg (169 lb 12.8 oz)   11/04/24 79.4 kg (175 lb)   10/22/24 78 kg (172 lb)   10/21/24 78.2 kg (172 lb 8 oz)   10/08/24 78.1 kg (172 lb 3.2 oz)   10/07/24 79.4 kg (175 lb)   09/24/24 81.6 kg (180 lb)   09/23/24 78.5 kg (173 lb)   09/18/24 78.7 kg (173 lb 8 oz)   09/09/24 78.9 kg (174 lb)   09/05/24 79.4 kg (175 lb)   08/27/24 78.8 kg (173 lb 12.8 oz)     Weight History:   Usual Weight: 200# in December 2023; weight loss after diagnosed with DM and started on Ozempic   Varian: n/a  Home Scale: (10/8/24) 165#; (11/5/24) home weight maintaining in the [facility]     Oncology Nutrition-Anthropometrics      Flowsheet Row Nutrition from 1/14/2025 in [facility] Oncology Dietitian [place] Nutrition from 12/3/2024 in [facility] Oncology Dietitian [place]   Patient age (years): 69 years 69 years   Patient " (male) height (in): 71 in 71 in   Current weight (lbs): 179.6 lbs 175.4 lbs   Current weight to be used for anthropometric calculations (kg) 81.6 kg 79.7 kg   BMI: 25 24.5   IBW male 172 lb 172 lb   IBW (kg) male 78.2 kg 78.2 kg   IBW % (male) 104.4 % 102 %   Adjusted BW (male): 173.9 lbs 172.9 lbs   Adjusted BW in kg (male): 79 kg 78.6 kg   % weight change after 1 week: -- 2.3 %   Weight change after 1 week (lbs) -- 4 lbs   % weight change after 1 month: 0.3 % 0.2 %   Weight change after 1 month (lbs) 0.6 lbs 0.4 lbs   % weight change after 3 months: 4.1 % 0.2 %   Weight change after 3 months (lbs) 7.1 lbs 0.4 lbs            Nutrition-Focused Physical Findings: none observed    Food/Nutrition-Related History & Client/Social History:    Current Nutrition Impact Symptoms:  [] Nausea   -after chemo; improved w/ zofran and MMJ [x] Reduced Appetite   -decreased Thursday - Sunday after chemo, then returns to normal   -improved w/ MMJ  [] Acid Reflux    [] Vomiting  [] Unintended Wt Loss   -hx  -resolved  [] Malabsorption    [] Diarrhea  [] Unintended Wt Gain  [] Dumping Syndrome    [] Constipation  [] Thick Mucous/Secretions  [] Abdominal Pain    [] Dysgeusia (Altered Taste)  [] Xerostomia (Dry Mouth)  [] Gas    [] Dysosmia (Altered Smell)  [] Thrush  [] Difficulty Chewing    [] Oral Mucositis (Sore Mouth)  [] Fatigue  [x] Hyperglycemia   -T2DM   -metformin, ozempic  Lab Results   Component Value Date    HGBA1C 6.6 (A) 08/27/2024      [] Odynophagia  [] Esophagitis  [] Other:    [] Dysphagia  [] Early Satiety  [] No Problems Eating      Food Allergies & Intolerances: yes: lactose intolerant     Current Diet: CHO-Controlled and Lactose-Free  Current Nutrition Intake: Unchanged from last visit  Appetite: Fair ; poor appetite Thursday through Sunday after chemo   Nutrition Route: PO  Oral Care: brushes BID  Activity level: ADL, ambulates independently.     24 Hr Diet Recall:   Breakfast: usually cereal with milk; this  morning had a few gingerbread cookies and coffee  Snack: fruit   Lunch: sandwich; soup    Dinner: seafood singh     Beverages: water with crystal light flavoring (16oz x2-3)  Supplements:   Ensure Original (8 oz, 220 kcal, 9 g pro) 1x daily       Oncology Nutrition-Estimated Needs      [facility]   Weight type used Actual weight Actual weight   Weight in kilograms (kg) used for estimated needs 81.6 kg 79.7 kg   Energy needs formula:  30-35 kcal/kg 30-35 kcal/kg   Energy needs based on 30 kcal/k kcal 2392 kcal   Energy needs based on 35 kcal/k kcal 2790 kcal   Protein needs formula: 1.2-1.5 g/kg 1.2-1.5 g/kg   Protein needs based on 1.2 g/k g 96 g   Protein needs based on 1.5 g/kg 122 g 120 g   Fluid needs formula: 30-35 mL/kg 30-35 mL/kg   Fluid needs based on 30 mL/kg 2454 mL 2385 mL   Fluid needs in ounces 83 oz 81 oz   Fluid needs based on 35 mL/kg 2863 mL 2783 mL   Fluid needs in ounces 97 oz 94 oz             Discussion & Intervention:   Abhishek was evaluated today for an RD follow up regarding unintended weight loss.  Abhishek is currently undergoing tx for Pancreatic Cancer.  He is here for infusion today. He is doing well today. His appetite and PO intakes are unchanged. His appetite typically decreases after chemo for a few days and then returns to normal. His weight has stabilized. Based on today's assessment, discussion included: MNT for: DM, Pancreatic cancer, BG management, spreading carbohydrate intake throughout the day & counting carbohydrates for adequate glycemic control, adequate hydration & fluid choices, having consistent and planned snacks between meals, and continuing oral nutrition supplements.   Moving forward, Abhishek will continue current nutrition plan of care.  Materials Provided: not applicable  All questions and concerns  addressed during today’s visit.  Abhishek has RD contact information.    Nutrition Diagnosis:   Increased Nutrient Needs (kcal & pro) related to increased demand for nutrients and disease state as evidenced by cancer dx and pt undergoing tx for cancer.  Monitoring & Evaluation:   Goals:  weight maintenance/stabilization  adequate nutrition impact symptom management  pt to meet >/=75% estimated nutrition needs daily  adequate glycemic control  increase protein intake  increase fluid intake  increase calorie intake    Progress Towards Goals: Progressing    Nutrition Rx & Recommendations:  Diet: High Calorie, High Protein (for high calorie foods see pages 52-53, and for high protein foods see pages 49-51 in your Eating Hints book)  Small, frequent meals/snacks may be easier to tolerate than 3 large daily meals.  Aim for 5-6 small meals per day (every 2-3 hours).  Include protein at all meals/snacks.  Include a variety of foods (as tolerated/allowed by diet).  Incorporate physical activity as able/allowed.  Stay hydrated by sipping fluids of choice/tolerance throughout the day.  Liquid nutrition may be better tolerated than solids at times.  Alter food choices and eating patterns to accommodate changing needs.  Refer to Eating Hints book for other meal/snack ideas and symptom management.  Weigh yourself regularly. If you notice weight loss, make an effort to increase your daily food/calorie intake. If you continue to notice loss after these efforts, reach out to your dietitian to establish a plan to stabilize weight.  Increase intake of Ensure if appetite/weight decreases.     Nutrition therapy for Carbohydrate Controlled Diet:     Foods with carbohydrates make your blood glucose level go up. Learning how to count carbohydrates can help you control your blood glucose levels. First, identify the foods you eat that contain carbohydrates.   Foods that commonly contain carbohydrates include: grains (breads, pasta, cereal,  rice), fruit, starchy vegetables (potatoes, corn, peas), snack foods (chips, pretzels, crackers), dairy products, sweets/desserts.   Foods that do not usually contain carbohydrates: chicken, pork, beef, fish, seafood, eggs, tofu, cheese, butter, sour cream, avocado, nuts, seeds, olives, mayonnaise, water, black coffee, unsweetened tea, and zero-calorie drinks. Vegetables with no or low carbohydrate include green beans, cauliflower, tomatoes, and onions.     Follow Up Plan:  1/28/25 during infusion  Recommend Referral to Other Providers: none at this time

## 2025-01-14 NOTE — TELEPHONE ENCOUNTER
Called patient with new appt scheduled . Left message with all info and with hope line tel number

## 2025-01-14 NOTE — ASSESSMENT & PLAN NOTE
Patient tolerating current treatment well.  Continue FOLFIRINOX every 14 days.  CT scan in December 2024 with decrease in the size of the liver metastasis and stable pancreatic lesion.  Check  levels.   RTC in 4 weeks or sooner as needed with labs.

## 2025-01-16 ENCOUNTER — HOSPITAL ENCOUNTER (OUTPATIENT)
Dept: INFUSION CENTER | Facility: CLINIC | Age: 70
Discharge: HOME | End: 2025-01-16
Payer: COMMERCIAL

## 2025-01-16 DIAGNOSIS — C25.9 ADENOCARCINOMA OF PANCREAS (HCC): ICD-10-CM

## 2025-01-16 DIAGNOSIS — T45.1X5A CHEMOTHERAPY INDUCED NEUTROPENIA (HCC): Primary | ICD-10-CM

## 2025-01-16 DIAGNOSIS — D70.1 CHEMOTHERAPY INDUCED NEUTROPENIA (HCC): Primary | ICD-10-CM

## 2025-01-16 PROCEDURE — 96372 THER/PROPH/DIAG INJ SC/IM: CPT

## 2025-01-16 RX ADMIN — PEGFILGRASTIM 6 MG: KIT SUBCUTANEOUS at 12:47

## 2025-01-16 NOTE — PROGRESS NOTES
Pt to clinic for elastometric pump disconnect and Neulasta On-Pro. Offers no complaints today other than slight lightheadedness, which pt states is normal for him. Pump has been running for over 46 hours and appears empty and deflated. Pt tolerated pump disconnect and Neulasta On-Pro applied to RLQ of abdomen without complications. Port de-accessed. Pt aware of next appointment on 1/27/25 at 12:30pm. AVS declined.

## 2025-01-17 DIAGNOSIS — E11.65 TYPE 2 DIABETES MELLITUS WITH HYPERGLYCEMIA, WITHOUT LONG-TERM CURRENT USE OF INSULIN (HCC): ICD-10-CM

## 2025-01-17 RX ORDER — FLASH GLUCOSE SENSOR
KIT MISCELLANEOUS
Qty: 2 EACH | Refills: 0 | OUTPATIENT
Start: 2025-01-17

## 2025-01-19 DIAGNOSIS — G89.3 CANCER RELATED PAIN: ICD-10-CM

## 2025-01-20 RX ORDER — OXYCODONE HYDROCHLORIDE 5 MG/1
5 TABLET ORAL EVERY 6 HOURS PRN
Qty: 90 TABLET | Refills: 0 | Status: SHIPPED | OUTPATIENT
Start: 2025-01-20

## 2025-01-20 NOTE — TELEPHONE ENCOUNTER
Refill must be reviewed and completed by the office or provider. The refill is unable to be approved or denied by the medication management team.      Patient Id Prescription # Filled Written Drug Label Qty Days Strength MME** Prescriber Pharmacy Payment REFILL #/Auth State Detail   1 [id number] 12/24/2024 12/24/2024 oxyCODONE HCL (Tablet) 90.0 23 5 MG 29.35 [facility] Medicare 0 / 0 [place]    1 [id number] 12/06/2024 12/06/2024 oxyCODONE HCL (Tablet) 28.0 7 5 MG 30.0 [facility] [place] 0 / 0 [place]    1 [id number] 11/16/2024 11/14/2024 oxyCODONE HCL (Tablet) 90.0 22 5 MG 30.68 BRENT MERCER [facility] Commercial Insurance 0 / 0 [place]    1 [id number] 10/23/2024 10/21/2024 oxyCODONE HCL (Tablet) 90.0 23 5 MG 29.35 BELLA PAVON [facility] Commercial Insurance 0 / 0 [place]

## 2025-01-21 ENCOUNTER — APPOINTMENT (OUTPATIENT)
Dept: LAB | Facility: CLINIC | Age: 70
End: 2025-01-21
Payer: COMMERCIAL

## 2025-01-21 DIAGNOSIS — Z12.5 SPECIAL SCREENING FOR MALIGNANT NEOPLASM OF PROSTATE: ICD-10-CM

## 2025-01-21 PROCEDURE — 36415 COLL VENOUS BLD VENIPUNCTURE: CPT

## 2025-01-21 PROCEDURE — G0103 PSA SCREENING: HCPCS

## 2025-01-22 DIAGNOSIS — C25.9 ADENOCARCINOMA OF PANCREAS (HCC): Primary | ICD-10-CM

## 2025-01-22 LAB — PSA SERPL-MCNC: 2.34 NG/ML (ref 0–4)

## 2025-01-22 RX ORDER — ATROPINE SULFATE 1 MG/ML
0.25 INJECTION, SOLUTION INTRAVENOUS ONCE AS NEEDED
OUTPATIENT
Start: 2025-01-28

## 2025-01-22 RX ORDER — SODIUM CHLORIDE 9 MG/ML
20 INJECTION, SOLUTION INTRAVENOUS ONCE AS NEEDED
OUTPATIENT
Start: 2025-01-28

## 2025-01-22 RX ORDER — ATROPINE SULFATE 1 MG/ML
0.25 INJECTION, SOLUTION INTRAVENOUS ONCE
OUTPATIENT
Start: 2025-01-28

## 2025-01-22 RX ORDER — DIPHENHYDRAMINE HYDROCHLORIDE 50 MG/ML
25 INJECTION INTRAMUSCULAR; INTRAVENOUS ONCE
Start: 2025-01-28 | End: 2025-01-28

## 2025-01-22 RX ORDER — DEXTROSE MONOHYDRATE 50 MG/ML
20 INJECTION, SOLUTION INTRAVENOUS ONCE
OUTPATIENT
Start: 2025-01-28

## 2025-01-24 LAB — CANCER AG19-9 SERPL-ACNC: 67 U/ML (ref 0–35)

## 2025-01-27 ENCOUNTER — HOSPITAL ENCOUNTER (OUTPATIENT)
Dept: INFUSION CENTER | Facility: CLINIC | Age: 70
Discharge: HOME | End: 2025-01-27
Payer: COMMERCIAL

## 2025-01-27 DIAGNOSIS — Z95.828 PORT-A-CATH IN PLACE: Primary | ICD-10-CM

## 2025-01-27 DIAGNOSIS — C25.9 ADENOCARCINOMA OF PANCREAS (HCC): ICD-10-CM

## 2025-01-27 LAB
ALBUMIN SERPL BCG-MCNC: 4.5 G/DL (ref 3.5–5)
ALP SERPL-CCNC: 172 U/L (ref 34–104)
ALT SERPL W P-5'-P-CCNC: 29 U/L (ref 7–52)
ANION GAP SERPL CALCULATED.3IONS-SCNC: 6 MMOL/L (ref 4–13)
AST SERPL W P-5'-P-CCNC: 25 U/L (ref 13–39)
BASOPHILS # BLD AUTO: 0.04 THOUSANDS/ΜL (ref 0–0.1)
BASOPHILS NFR BLD AUTO: 0 % (ref 0–1)
BILIRUB SERPL-MCNC: 0.41 MG/DL (ref 0.2–1)
BUN SERPL-MCNC: 23 MG/DL (ref 5–25)
CALCIUM SERPL-MCNC: 9.8 MG/DL (ref 8.4–10.2)
CHLORIDE SERPL-SCNC: 107 MMOL/L (ref 96–108)
CO2 SERPL-SCNC: 28 MMOL/L (ref 21–32)
CREAT SERPL-MCNC: 0.77 MG/DL (ref 0.6–1.3)
EOSINOPHIL # BLD AUTO: 0.2 THOUSAND/ΜL (ref 0–0.61)
EOSINOPHIL NFR BLD AUTO: 2 % (ref 0–6)
ERYTHROCYTE [DISTWIDTH] IN BLOOD BY AUTOMATED COUNT: 14.7 % (ref 11.6–15.1)
GFR SERPL CREATININE-BSD FRML MDRD: 93 ML/MIN/1.73SQ M
GLUCOSE SERPL-MCNC: 95 MG/DL (ref 65–140)
HCT VFR BLD AUTO: 41.8 % (ref 36.5–49.3)
HGB BLD-MCNC: 13.3 G/DL (ref 12–17)
IMM GRANULOCYTES # BLD AUTO: 0.11 THOUSAND/UL (ref 0–0.2)
IMM GRANULOCYTES NFR BLD AUTO: 1 % (ref 0–2)
LYMPHOCYTES # BLD AUTO: 1.19 THOUSANDS/ΜL (ref 0.6–4.47)
LYMPHOCYTES NFR BLD AUTO: 9 % (ref 14–44)
MCH RBC QN AUTO: 31.3 PG (ref 26.8–34.3)
MCHC RBC AUTO-ENTMCNC: 31.8 G/DL (ref 31.4–37.4)
MCV RBC AUTO: 98 FL (ref 82–98)
MONOCYTES # BLD AUTO: 1.25 THOUSAND/ΜL (ref 0.17–1.22)
MONOCYTES NFR BLD AUTO: 10 % (ref 4–12)
NEUTROPHILS # BLD AUTO: 9.9 THOUSANDS/ΜL (ref 1.85–7.62)
NEUTS SEG NFR BLD AUTO: 78 % (ref 43–75)
NRBC BLD AUTO-RTO: 0 /100 WBCS
PLATELET # BLD AUTO: 130 THOUSANDS/UL (ref 149–390)
PMV BLD AUTO: 10.1 FL (ref 8.9–12.7)
POTASSIUM SERPL-SCNC: 3.9 MMOL/L (ref 3.5–5.3)
PROT SERPL-MCNC: 7.6 G/DL (ref 6.4–8.4)
RBC # BLD AUTO: 4.25 MILLION/UL (ref 3.88–5.62)
SODIUM SERPL-SCNC: 141 MMOL/L (ref 135–147)
WBC # BLD AUTO: 12.69 THOUSAND/UL (ref 4.31–10.16)

## 2025-01-27 PROCEDURE — 85025 COMPLETE CBC W/AUTO DIFF WBC: CPT | Performed by: INTERNAL MEDICINE

## 2025-01-27 PROCEDURE — 80053 COMPREHEN METABOLIC PANEL: CPT | Performed by: INTERNAL MEDICINE

## 2025-01-27 NOTE — PROGRESS NOTES
Pt presents for port a cath flush. Pt offers no complaints. Port accessed, labs drawn, flushed, saline locked and de-accessed without difficulty. AVS declined, Next appointment reviewed on 1/28 at 8:30am.

## 2025-01-28 ENCOUNTER — HOSPITAL ENCOUNTER (OUTPATIENT)
Dept: INFUSION CENTER | Facility: CLINIC | Age: 70
Discharge: HOME | End: 2025-01-28
Payer: COMMERCIAL

## 2025-01-28 ENCOUNTER — NUTRITION (OUTPATIENT)
Dept: NUTRITION | Facility: CLINIC | Age: 70
End: 2025-01-28

## 2025-01-28 VITALS
TEMPERATURE: 98.6 F | HEART RATE: 59 BPM | DIASTOLIC BLOOD PRESSURE: 81 MMHG | RESPIRATION RATE: 18 BRPM | WEIGHT: 181.8 LBS | HEIGHT: 71 IN | SYSTOLIC BLOOD PRESSURE: 156 MMHG | BODY MASS INDEX: 25.45 KG/M2

## 2025-01-28 DIAGNOSIS — C25.9 ADENOCARCINOMA OF PANCREAS (HCC): Primary | ICD-10-CM

## 2025-01-28 DIAGNOSIS — Z71.3 NUTRITIONAL COUNSELING: Primary | ICD-10-CM

## 2025-01-28 PROCEDURE — 96415 CHEMO IV INFUSION ADDL HR: CPT

## 2025-01-28 PROCEDURE — 96417 CHEMO IV INFUS EACH ADDL SEQ: CPT

## 2025-01-28 PROCEDURE — 96367 TX/PROPH/DG ADDL SEQ IV INF: CPT

## 2025-01-28 PROCEDURE — 96375 TX/PRO/DX INJ NEW DRUG ADDON: CPT

## 2025-01-28 PROCEDURE — G0498 CHEMO EXTEND IV INFUS W/PUMP: HCPCS

## 2025-01-28 PROCEDURE — 96413 CHEMO IV INFUSION 1 HR: CPT

## 2025-01-28 RX ORDER — ATROPINE SULFATE 1 MG/ML
0.25 INJECTION, SOLUTION INTRAVENOUS ONCE
Status: COMPLETED | OUTPATIENT
Start: 2025-01-28 | End: 2025-01-28

## 2025-01-28 RX ORDER — SODIUM CHLORIDE 9 MG/ML
20 INJECTION, SOLUTION INTRAVENOUS ONCE AS NEEDED
Status: DISCONTINUED | OUTPATIENT
Start: 2025-01-28 | End: 2025-01-31 | Stop reason: HOSPADM

## 2025-01-28 RX ORDER — ATROPINE SULFATE 1 MG/ML
0.25 INJECTION, SOLUTION INTRAVENOUS ONCE AS NEEDED
Status: DISCONTINUED | OUTPATIENT
Start: 2025-01-28 | End: 2025-01-31 | Stop reason: HOSPADM

## 2025-01-28 RX ORDER — DIPHENHYDRAMINE HYDROCHLORIDE 50 MG/ML
25 INJECTION INTRAMUSCULAR; INTRAVENOUS ONCE
Status: COMPLETED | OUTPATIENT
Start: 2025-01-28 | End: 2025-01-28

## 2025-01-28 RX ORDER — DEXTROSE MONOHYDRATE 50 MG/ML
20 INJECTION, SOLUTION INTRAVENOUS ONCE
Status: COMPLETED | OUTPATIENT
Start: 2025-01-28 | End: 2025-01-28

## 2025-01-28 RX ADMIN — DEXTROSE 20 ML/HR: 5 SOLUTION INTRAVENOUS at 08:53

## 2025-01-28 RX ADMIN — FOSAPREPITANT 150 MG: 150 INJECTION, POWDER, LYOPHILIZED, FOR SOLUTION INTRAVENOUS at 09:45

## 2025-01-28 RX ADMIN — DEXAMETHASONE SODIUM PHOSPHATE 10 MG: 10 INJECTION, SOLUTION INTRAMUSCULAR; INTRAVENOUS at 09:20

## 2025-01-28 RX ADMIN — DIPHENHYDRAMINE HYDROCHLORIDE 25 MG: 50 INJECTION, SOLUTION INTRAMUSCULAR; INTRAVENOUS at 08:54

## 2025-01-28 RX ADMIN — DEXAMETHASONE SODIUM PHOSPHATE: 10 INJECTION, SOLUTION INTRAMUSCULAR; INTRAVENOUS at 08:53

## 2025-01-28 RX ADMIN — OXALIPLATIN 104.5 MG: 5 INJECTION, SOLUTION INTRAVENOUS at 10:22

## 2025-01-28 RX ADMIN — ATROPINE SULFATE 0.25 MG: 1 INJECTION, SOLUTION INTRAVENOUS at 12:30

## 2025-01-28 RX ADMIN — IRINOTECAN HYDROCHLORIDE 241 MG: 20 INJECTION, SOLUTION INTRAVENOUS at 12:35

## 2025-01-28 NOTE — PROGRESS NOTES
Outpatient Oncology Nutrition Consultation   Type of Consult: Follow Up  Care Location: [facility]    Reason for referral: notification  Lashell Lew MA  on 9/11/24 that pt is appropriate for oncology nutrition care (reason for referral: Ambulatory referral for struggling with eating and drinking).     Nutrition Assessment:   Oncology Diagnosis & Treatments:   Cancer of the pancreas diagnosed 8/20/24.   Began chemotherapy 9/24/24; Modified FOLFIRINOX every 14 days   Oncology History   Adenocarcinoma of pancreas (HCC)   8/13/2024 Biopsy    A.-B.  Pancreas, Body, FNA (Cell block and smear preparations):   Malignant  Adenocarcinoma.     8/20/2024 Initial Diagnosis    Adenocarcinoma of pancreas (HCC)     8/26/2024 -  Cancer Staged    Staging form: Pancreas, AJCC 8th Edition  - Clinical: Stage IB (cT2, cN0, cM0) - Signed by Roz Faulkner MD on 8/26/2024  Total positive nodes: 0       9/24/2024 -  Chemotherapy    alteplase (CATHFLO), 2 mg, Intracatheter, Every 1 Minute as needed, 10 of 12 cycles  pegfilgrastim (NEULASTA ONPRO), 6 mg, Subcutaneous, Once, 10 of 12 cycles  Administration: 6 mg (9/26/2024), 6 mg (10/10/2024), 6 mg (10/24/2024), 6 mg (11/7/2024), 6 mg (11/21/2024), 6 mg (12/5/2024), 6 mg (12/19/2024), 6 mg (1/16/2025), 6 mg (1/2/2025)  fosaprepitant (EMEND) IVPB, 150 mg, Intravenous, Once, 10 of 12 cycles  Administration: 150 mg (9/24/2024), 150 mg (10/8/2024), 150 mg (10/22/2024), 150 mg (11/5/2024), 150 mg (11/19/2024), 150 mg (12/3/2024), 150 mg (12/17/2024), 150 mg (1/14/2025), 150 mg (12/31/2024)  irinotecan (CAMPTOSAR) chemo infusion, 150 mg/m2 = 294 mg, Intravenous, Once, 10 of 12 cycles  Dose modification: 112.5 mg/m2 (75 % of original dose 150 mg/m2, Cycle 2, Reason: Dose Not Tolerated), 120 mg/m2 (80 % of original dose 150 mg/m2, Cycle 3, Reason: Original Dose Changed)  Administration: 294 mg (9/24/2024), 227 mg (10/8/2024), 240 mg (10/22/2024), 240 mg (11/5/2024), 238 mg (11/19/2024), 239  mg (12/3/2024), 239 mg (12/17/2024), 242 mg (1/14/2025), 241 mg (12/31/2024)  oxaliplatin (ELOXATIN) chemo infusion, 65 mg/m2 = 127.4 mg, Intravenous, Once, 10 of 12 cycles  Dose modification: 48.75 mg/m2 (75 % of original dose 65 mg/m2, Cycle 2, Reason: Dose Not Tolerated), 52 mg/m2 (80 % of original dose 65 mg/m2, Cycle 3, Reason: Original Dose Changed)  Administration: 127.4 mg (9/24/2024), 98.5 mg (10/8/2024), 100 mg (10/22/2024), 100 mg (11/5/2024), 100 mg (11/19/2024), 100 mg (12/3/2024), 103.5 mg (12/17/2024), 105.05 mg (1/14/2025), 104.5 mg (12/31/2024)  fluorouracil (ADRUCIL) ambulatory infusion Soln, 1,200 mg/m2/day = 4,705 mg, Intravenous, Over 46 hours, 10 of 12 cycles  Dose modification: 900 mg/m2/day (75 % of original dose 1,200 mg/m2/day, Cycle 3, Reason: Dose Not Tolerated), 960 mg/m2/day (80 % of original dose 1,200 mg/m2/day, Cycle 4, Reason: Other (see comments), Comment: provider request)     11/15/2024 Biopsy    Final Diagnosis   A. Liver mass, biopsy:  - Metastatic adenocarcinoma.         12/5/2024 -  Cancer Staged    Staging form: Pancreas, AJCC 8th Edition  - Pathologic stage from 12/5/2024: Stage IV (pT2, cM1) - Signed by Butch Peña MD on 12/5/2024         Past Medical & Surgical Hx:   Patient Active Problem List   Diagnosis    Atrial fibrillation (HCC)    Psoriatic arthritis (HCC)    NDPH (new daily persistent headache)    Atrial flutter (HCC)    CATY (obstructive sleep apnea)    Type 2 diabetes mellitus with hyperglycemia, without long-term current use of insulin (HCC)    Elevated PSA    Platelets decreased (HCC)    Adenocarcinoma of pancreas (HCC)    Pulmonary HTN (HCC)    Pre-op testing    Liver masses    Port-A-Cath in place    Chemotherapy induced neutropenia (HCC)    Palliative care by specialist    Medical marijuana use    Nausea    Cancer-related pain    Insomnia    Loss of appetite    Other hyperlipidemia     Past Medical History:   Diagnosis Date    A-fib (HCC)     Arthritis      BPH (benign prostatic hyperplasia)     Cancer (HCC)     Colon polyp     Diabetes mellitus (HCC)     Fatty liver     Fracture, foot     High cholesterol     Hyperlipidemia     Irregular heart beat     Non-ischemic cardiomyopathy (HCC)     Osteoarthritis     Psoriasis     Psoriatic arthritis (HCC)     Pulmonary HTN (HCC)      Past Surgical History:   Procedure Laterality Date    CARDIAC ELECTROPHYSIOLOGY PROCEDURE N/A 02/22/2023    Procedure: Cardiac eps/afib ablation   comprehensive posterior wall;  Surgeon: Mynor Andrews MD;  Location: [facility];  Service: Cardiology    COLONOSCOPY      ELBOW SURGERY Right     FL GUIDED CENTRAL VENOUS ACCESS DEVICE INSERTION  9/5/2024    IR BIOPSY LIVER MASS  9/18/2024    IR BIOPSY LIVER MASS  11/15/2024    KIDNEY STONE SURGERY      LAPAROSCOPY N/A 9/5/2024    Procedure: DIAGNOSTIC LAPAROSCOPY;  Surgeon: Roz Faulkner MD;  Location: [facility];  Service: Surgical Oncology    TRANSURETHRAL RESECTION OF PROSTATE      last assessed 7/21/15    TUNNELED VENOUS PORT PLACEMENT N/A 9/5/2024    Procedure: INSERTION VENOUS PORT (PORT-A-CATH);  Surgeon: Roz Faulkner MD;  Location: [facility];  Service: Surgical Oncology       Review of Medications:   Vitamins, Supplements and Herbals: No, pt denies taking supplements; MMJ     Current Outpatient Medications:     adalimumab (HUMIRA) 40 mg/0.8 mL PSKT, Inject 40 mg under the skin every 14 (fourteen) days  (Patient not taking: Reported on 9/23/2024), Disp: , Rfl:     Alcohol Swabs 70 % PADS, May substitute brand based on insurance coverage. Check glucose TID. (Patient not taking: Reported on 9/23/2024), Disp: 100 each, Rfl: 0    Benzocaine-Isopropyl Alcohol 6-70 % PADS, Use, Disp: , Rfl:     Blood Glucose Monitoring Suppl ([employer] Blood Glucose Meter) w/Device KIT, Use, Disp: , Rfl:     Blood Glucose Monitoring Suppl (OneTouch Verio Reflect) w/Device KIT, Check blood sugars once daily. Please substitute with appropriate  alternative as covered by patient's insurance. Dx: E11.65, Disp: 1 kit, Rfl: 0    Continuous Blood Gluc  (FreeStyle Livia 2 Taft) LEANA, 14 DAY READER NOT MADE, Disp: 1 each, Rfl: 1    Continuous Glucose Sensor (FreeStyle Livia 14 Day Sensor) MISC, USE 1 DEVICE 3 (THREE) TIMES A DAY BEFORE MEALS, Disp: , Rfl:     Continuous Glucose Sensor (FreeStyle Livia 14 Day Sensor) MISC, USE 1 DEVICE EVERY 14 DAYS AS DIRECTED, Disp: 2 each, Rfl: 5    fluorouracil 3,860 mg in CADD/Elastomeric Infusion Device, Infuse 3,860 mg (960 mg/m2/day x 2.01 m2) into a catheter in a vein via infusion device over 46 hours for 2 days  Infusion planned for January 28, 2025., Disp: 1 each, Rfl: 3    Glucose Blood (BLOOD GLUCOSE TEST STRIPS 333 VI), Use, Disp: , Rfl:     glucose blood (OneTouch Verio) test strip, Check blood sugars once daily. Please substitute with appropriate alternative as covered by patient's insurance. Dx: E11.65, Disp: 100 each, Rfl: 3    insulin glargine (LANTUS) 100 units/mL subcutaneous injection, Inject 20 Units under the skin daily at bedtime, Disp: 6 mL, Rfl: 3    Insulin Pen Needle (BD Pen Needle Mallory 2nd Gen) 32G X 4 MM MISC, For use with insulin pen. Pharmacy may dispense brand covered by insurance., Disp: 100 each, Rfl: 0    Insulin Pen Needle (BD Pen Needle Mallory U/F) 32G X 4 MM MISC, Use daily as directed with insulin pen, Disp: 100 each, Rfl: 3    Lancets 33G MISC, Use, Disp: , Rfl:     Lantus SoloStar 100 units/mL SOPN, INJECT 0.2 ML (20 UNITS TOTAL) UNDER THE SKIN DAILY AT BEDTIME, Disp: 15 mL, Rfl: 2    lidocaine-prilocaine (EMLA) cream, Apply topically as needed for mild pain Apply to port area 45-60min prior to treatment and cover with dressing or plastic wrap, Disp: 30 g, Rfl: 1    metFORMIN (GLUCOPHAGE-XR) 500 mg 24 hr tablet, Take 2 tablets (1,000 mg total) by mouth daily with breakfast, Disp: 180 tablet, Rfl: 1    ondansetron (ZOFRAN) 4 mg tablet, Take 1 tablet (4 mg total) by mouth every 8  (eight) hours as needed for nausea or vomiting, Disp: 20 tablet, Rfl: 3    OneTouch Delica Lancets 33G MISC, Check blood sugars once daily. Please substitute with appropriate alternative as covered by patient's insurance. Dx: E11.65, Disp: 100 each, Rfl: 3    oxyCODONE (Roxicodone) 5 immediate release tablet, Take 1 tablet (5 mg total) by mouth every 6 (six) hours as needed for moderate pain Max Daily Amount: 20 mg, Disp: 90 tablet, Rfl: 0    pantoprazole (PROTONIX) 40 mg tablet, Take 1 tablet (40 mg total) by mouth daily before breakfast, Disp: 30 tablet, Rfl: 5    rosuvastatin (CRESTOR) 5 mg tablet, TAKE 1 TABLET BY MOUTH EVERY DAY IN THE EVENING, Disp: 90 tablet, Rfl: 1    sildenafil (VIAGRA) 100 mg tablet, TAKE ONE (1) TABLET BY MOUTH DAILY AS NEEDED, Disp: , Rfl:   No current facility-administered medications for this visit.    Facility-Administered Medications Ordered in Other Visits:     alteplase (CATHFLO) injection 2 mg, 2 mg, Intracatheter, Q1MIN PRN, Butch Peña MD    alteplase (CATHFLO) injection 2 mg, 2 mg, Intracatheter, Q1MIN PRN, Butch Peña MD    Atropine Sulfate injection 0.25 mg, 0.25 mg, Intravenous, Once, Butch Peña MD    Atropine Sulfate injection 0.25 mg, 0.25 mg, Intravenous, Once PRN, Butch Peña MD    dexamethasone (DECADRON) 10 mg in sodium chloride 0.9 % 50 mL IVPB, 10 mg, Intravenous, Once, Butch Peña MD    fosaprepitant (EMEND) 150 mg in sodium chloride 0.9 % 250 mL IVPB, 150 mg, Intravenous, Once, Butch Peña MD    irinotecan (CAMPTOSAR) 241 mg in dextrose 5 % 500 mL chemo infusion, 120 mg/m2 (Treatment Plan Recorded), Intravenous, Once, Butch Peña MD    oxaliplatin (ELOXATIN) 104.5 mg in dextrose 5 % 250 mL chemo infusion, 52 mg/m2 (Treatment Plan Recorded), Intravenous, Once, Butch Peña MD    sodium chloride 0.9 % infusion, 20 mL/hr, Intravenous, Once PRN, Butch Peña MD    Most Recent Lab Results:   Lab Results   Component Value Date  "   WBC 12.69 (H) 01/27/2025    NEUTROABS 9.90 (H) 01/27/2025    CHOLESTEROL 212 (H) 02/20/2024    CHOL 190 11/15/2016    TRIG 369 (H) 02/20/2024    HDL 40 02/20/2024    LDLCALC 98 02/20/2024    ALT 29 01/27/2025    AST 25 01/27/2025    ALB 4.5 01/27/2025     03/15/2016    SODIUM 141 01/27/2025    SODIUM 141 01/13/2025    K 3.9 01/27/2025    K 3.9 01/13/2025     01/27/2025    BUN 23 01/27/2025    BUN 13 01/13/2025    CREATININE 0.77 01/27/2025    CREATININE 0.66 01/13/2025    EGFR 93 01/27/2025    GLUCOSE 106 (H) 11/15/2016    POCGLU 97 09/18/2024    GLUF 110 (H) 09/23/2024    GLUF 96 09/18/2024    GLUC 95 01/27/2025    HGBA1C 6.6 (A) 08/27/2024    HGBA1C 5.9 05/24/2024    HGBA1C 10.8 (A) 02/20/2024    CALCIUM 9.8 01/27/2025    FOLATE >20.0 (H) 06/10/2021    MG 1.9 02/23/2023     Anthropometric Measurements:   Height: 71\"  Ht Readings from Last 1 Encounters:   01/28/25 5' 10.98\" (1.803 m)     Wt Readings from Last 20 Encounters:   01/28/25 82.5 kg (181 lb 12.8 oz)   01/14/25 81.5 kg (179 lb 9.6 oz)   12/31/24 82.2 kg (181 lb 3.2 oz)   12/17/24 81.2 kg (179 lb)   12/05/24 78.5 kg (173 lb)   12/03/24 79.6 kg (175 lb 6.4 oz)   11/25/24 77.7 kg (171 lb 6.4 oz)   11/19/24 78.9 kg (174 lb)   11/15/24 81.5 kg (179 lb 10.8 oz)   11/05/24 77 kg (169 lb 12.8 oz)   11/04/24 79.4 kg (175 lb)   10/22/24 78 kg (172 lb)   10/21/24 78.2 kg (172 lb 8 oz)   10/08/24 78.1 kg (172 lb 3.2 oz)   10/07/24 79.4 kg (175 lb)   09/24/24 81.6 kg (180 lb)   09/23/24 78.5 kg (173 lb)   09/18/24 78.7 kg (173 lb 8 oz)   09/09/24 78.9 kg (174 lb)   09/05/24 79.4 kg (175 lb)     Weight History:   Usual Weight: 200# in December 2023; weight loss after diagnosed with DM and started on Ozempic   Varian: n/a  Home Scale: (10/8/24) 165#; (11/5/24) home weight maintaining in the [facility]     Oncology Nutrition-Anthropometrics      Flowsheet Taty Nutrition from 1/28/2025 in [facility] Oncology Dietitian Armando Nutrition from 1/14/2025 in [facility]" [facility] Diet[facility] [place]   Patient age (years): 69 years 69 years   Patient (male) height (in): 71 in 71 in   Current weight (lbs): 181.8 lbs 179.6 lbs   Current weight to be used for anthropometric calculations (kg) 82.6 kg 81.6 kg   BMI: 25.4 25   IBW male 172 lb 172 lb   IBW (kg) male 78.2 kg 78.2 kg   IBW % (male) 105.7 % 104.4 %   Adjusted BW (male): 174.5 lbs 173.9 lbs   Adjusted BW in kg (male): 79.3 kg 79 kg   % weight change after 1 month: 0.3 % 0.3 %   Weight change after 1 month (lbs) 0.6 lbs 0.6 lbs   % weight change after 3 months: 5.7 % 4.1 %   Weight change after 3 months (lbs) 9.8 lbs 7.1 lbs            Nutrition-Focused Physical Findings: none observed    Food/Nutrition-Related History & Client/Social History:    Current Nutrition Impact Symptoms:  [] Nausea   -after chemo; improved w/ zofran and MMJ [x] Reduced Appetite   -decreased Thursday - Sunday after chemo, then returns to normal   -improving w/ MMJ  [] Acid Reflux    [] Vomiting  [] Unintended Wt Loss   -hx [] Malabsorption    [] Diarrhea  [] Unintended Wt Gain  [] Dumping Syndrome    [] Constipation  [] Thick Mucous/Secretions  [] Abdominal Pain    [] Dysgeusia (Altered Taste)  [] Xerostomia (Dry Mouth)  [] Gas    [] Dysosmia (Altered Smell)  [] Thrush  [] Difficulty Chewing    [] Oral Mucositis (Sore Mouth)  [] Fatigue  [x] Hyperglycemia   -T2DM   -metformin  Lab Results   Component Value Date    HGBA1C 6.6 (A) 08/27/2024      [] Odynophagia  [] Esophagitis  [] Other:    [] Dysphagia  [] Early Satiety  [] No Problems Eating      Food Allergies & Intolerances: yes: lactose intolerant     Current Diet: CHO-Controlled and Lactose-Free  Current Nutrition Intake: Unchanged from last visit  Appetite: Fair ; poor appetite Thursday through Sunday after chemo   Nutrition Route: PO  Oral Care: brushes BID  Activity level: ADL, ambulates independently.     24 Hr Diet Recall:   Breakfast:cereal with milk  Snack: banana  Lunch: sandwich;  soup; hamburger   Dinner: tortellini with broccoli, cauliflower, mushrooms     Beverages: water (16oz x2-3)  Supplements:   Ensure Original (8 oz, 220 kcal, 9 g pro) 1x daily       Oncology Nutrition-Estimated Needs      Flowsheet Row Nutrition from 2025 in [facility] Oncology Dietitian [place] Nutrition from 2025 in [facility] Oncology Dietitian [place]   Weight type used Actual weight Actual weight   Weight in kilograms (kg) used for estimated needs 82.6 kg 81.6 kg   Energy needs formula:  30-35 kcal/kg 30-35 kcal/kg   Energy needs based on 30 kcal/k kcal 2449 kcal   Energy needs based on 35 kcal/k kcal 2857 kcal   Protein needs formula: 1.2-1.5 g/kg 1.2-1.5 g/kg   Protein needs based on 1.2 g/k g 98 g   Protein needs based on 1.5 g/kg 124 g 122 g   Fluid needs formula: 30-35 mL/kg 30-35 mL/kg   Fluid needs based on 30 mL/kg 2481 mL 2454 mL   Fluid needs in ounces 84 oz 83 oz   Fluid needs based on 35 mL/kg 2895 mL 2863 mL   Fluid needs in ounces 98 oz 97 oz             Discussion & Intervention:   Abhishek was evaluated today for an RD follow up regarding unintended weight loss.  Abhishek is currently undergoing tx for Pancreatic Cancer.  He is here for infusion today. He is doing well today. His appetite and PO intakes are unchanged. He continues to monitor his BG after meals. His weight is stable x1 month, but overall has increased over the past 3 months.    Based on today's assessment, discussion included: MNT for: DM, Pancreatic cancer, BG management, spreading carbohydrate intake throughout the day & counting carbohydrates for adequate glycemic control, adequate hydration & fluid choices, having consistent and planned snacks between meals, and continuing oral nutrition supplements.   Moving forward, Abhishek will continue current nutrition plan of care.  Materials Provided: not applicable  All questions and concerns addressed during today’s visit.  Abhishek has RD contact  information.    Nutrition Diagnosis:   Increased Nutrient Needs (kcal & pro) related to increased demand for nutrients and disease state as evidenced by cancer dx and pt undergoing tx for cancer.  Monitoring & Evaluation:   Goals:  weight maintenance/stabilization  adequate nutrition impact symptom management  pt to meet >/=75% estimated nutrition needs daily  adequate glycemic control  increase protein intake  increase fluid intake  increase calorie intake    Progress Towards Goals: Progressing    Nutrition Rx & Recommendations:  Diet: High Calorie, High Protein (for high calorie foods see pages 52-53, and for high protein foods see pages 49-51 in your Eating Hints book)  Small, frequent meals/snacks may be easier to tolerate than 3 large daily meals.  Aim for 5-6 small meals per day (every 2-3 hours).  Include protein at all meals/snacks.  Include a variety of foods (as tolerated/allowed by diet).  Follow a Cancer Preventative Nutrition Pattern: colorful, plant-based, low-fat, avoid added sugars, limit alcohol, include high fiber foods, limit processed meats, limit red meat, choose lean protein sources, use low-fat cooking methods, balance calories with physical activity, avoid excessive weight gain throughout life.  Incorporate physical activity as able/allowed.  Stay hydrated by sipping fluids of choice/tolerance throughout the day.  Liquid nutrition may be better tolerated than solids at times.  Alter food choices and eating patterns to accommodate changing needs.  Refer to Eating Hints book for other meal/snack ideas and symptom management.  Weigh yourself regularly. If you notice weight loss, make an effort to increase your daily food/calorie intake. If you continue to notice loss after these efforts, reach out to your dietitian to establish a plan to stabilize weight.  Increase intake of Ensure to 2-3x daily if appetite/weight decreases.   Continue to monitor blood sugar: Foods with carbohydrates make your  blood glucose level go up. Learning how to count carbohydrates can help you control your blood glucose levels. First, identify the foods you eat that contain carbohydrates.   Foods that commonly contain carbohydrates include: grains (breads, pasta, cereal, rice), fruit, starchy vegetables (potatoes, corn, peas), snack foods (chips, pretzels, crackers), dairy products, sweets/desserts.   Foods that do not usually contain carbohydrates: chicken, pork, beef, fish, seafood, eggs, tofu, cheese, butter, sour cream, avocado, nuts, seeds, olives, mayonnaise, water, black coffee, unsweetened tea, and zero-calorie drinks. Vegetables with no or low carbohydrate include green beans, cauliflower, tomatoes, and onions.     Follow Up Plan:  2/25/25 during infusion  Recommend Referral to Other Providers: none at this time

## 2025-01-28 NOTE — PATIENT INSTRUCTIONS
Nutrition Rx & Recommendations:  Diet: High Calorie, High Protein (for high calorie foods see pages 52-53, and for high protein foods see pages 49-51 in your Eating Hints book)  Small, frequent meals/snacks may be easier to tolerate than 3 large daily meals.  Aim for 5-6 small meals per day (every 2-3 hours).  Include protein at all meals/snacks.  Include a variety of foods (as tolerated/allowed by diet).  Follow a Cancer Preventative Nutrition Pattern: colorful, plant-based, low-fat, avoid added sugars, limit alcohol, include high fiber foods, limit processed meats, limit red meat, choose lean protein sources, use low-fat cooking methods, balance calories with physical activity, avoid excessive weight gain throughout life.  Incorporate physical activity as able/allowed.  Stay hydrated by sipping fluids of choice/tolerance throughout the day.  Liquid nutrition may be better tolerated than solids at times.  Alter food choices and eating patterns to accommodate changing needs.  Refer to Eating Hints book for other meal/snack ideas and symptom management.  Weigh yourself regularly. If you notice weight loss, make an effort to increase your daily food/calorie intake. If you continue to notice loss after these efforts, reach out to your dietitian to establish a plan to stabilize weight.  Increase intake of Ensure to 2-3x daily if appetite/weight decreases.   Continue to monitor blood sugar: Foods with carbohydrates make your blood glucose level go up. Learning how to count carbohydrates can help you control your blood glucose levels. First, identify the foods you eat that contain carbohydrates.   Foods that commonly contain carbohydrates include: grains (breads, pasta, cereal, rice), fruit, starchy vegetables (potatoes, corn, peas), snack foods (chips, pretzels, crackers), dairy products, sweets/desserts.   Foods that do not usually contain carbohydrates: chicken, pork, beef, fish, seafood, eggs, tofu, cheese, butter,  sour cream, avocado, nuts, seeds, olives, mayonnaise, water, black coffee, unsweetened tea, and zero-calorie drinks. Vegetables with no or low carbohydrate include green beans, cauliflower, tomatoes, and onions.     Follow Up Plan: 2/25/25 during infusion  Recommend Referral to Other Providers: none at this time

## 2025-01-28 NOTE — PROGRESS NOTES
Pt into clinic for fluorouracil, irinotecan, and oxaliplatin. Pt offers no complaints.     Tolerated infusion without reaction. Port remained accessed and attached to chemo ball. Clamps open to run. Chemo ball ok to be disconnected on 1/30/25 at 12:14 pm.     Pt aware of next appointment on 1/30/35 at 12pm. AVS declined.

## 2025-01-30 ENCOUNTER — HOSPITAL ENCOUNTER (OUTPATIENT)
Dept: INFUSION CENTER | Facility: CLINIC | Age: 70
Discharge: HOME | End: 2025-01-30
Payer: COMMERCIAL

## 2025-01-30 DIAGNOSIS — C25.9 ADENOCARCINOMA OF PANCREAS (HCC): ICD-10-CM

## 2025-01-30 DIAGNOSIS — T45.1X5A CHEMOTHERAPY INDUCED NEUTROPENIA (HCC): Primary | ICD-10-CM

## 2025-01-30 DIAGNOSIS — C25.9 ADENOCARCINOMA OF PANCREAS (HCC): Primary | ICD-10-CM

## 2025-01-30 DIAGNOSIS — D70.1 CHEMOTHERAPY INDUCED NEUTROPENIA (HCC): Primary | ICD-10-CM

## 2025-01-30 PROCEDURE — 96372 THER/PROPH/DIAG INJ SC/IM: CPT

## 2025-01-30 RX ADMIN — PEGFILGRASTIM 6 MG: KIT SUBCUTANEOUS at 12:18

## 2025-01-30 NOTE — PROGRESS NOTES
Pt presents for CADD pump disconnection and neulasta on pro. Pt offers no complaints. Chemo ball appears empty and deflated. Port flushed and de accessed without difficulty. Neulasta on pro placed on RLQ of abdomen blinking green. Patient aware of when to remove neulasta on pro. AVS declined, next appointment reviewed on 2/10 at 4pm.

## 2025-02-04 RX ORDER — SODIUM CHLORIDE 9 MG/ML
20 INJECTION, SOLUTION INTRAVENOUS ONCE AS NEEDED
Status: CANCELLED | OUTPATIENT
Start: 2025-02-11

## 2025-02-04 RX ORDER — ATROPINE SULFATE 1 MG/ML
0.25 INJECTION, SOLUTION INTRAVENOUS ONCE AS NEEDED
Status: CANCELLED | OUTPATIENT
Start: 2025-02-11

## 2025-02-04 RX ORDER — ATROPINE SULFATE 1 MG/ML
0.25 INJECTION, SOLUTION INTRAVENOUS ONCE
Status: CANCELLED | OUTPATIENT
Start: 2025-02-11

## 2025-02-04 RX ORDER — DIPHENHYDRAMINE HYDROCHLORIDE 50 MG/ML
25 INJECTION INTRAMUSCULAR; INTRAVENOUS ONCE
Status: CANCELLED
Start: 2025-02-11 | End: 2025-02-11

## 2025-02-04 RX ORDER — DEXTROSE MONOHYDRATE 50 MG/ML
20 INJECTION, SOLUTION INTRAVENOUS ONCE
Status: CANCELLED | OUTPATIENT
Start: 2025-02-11

## 2025-02-05 DIAGNOSIS — C25.9 ADENOCARCINOMA OF PANCREAS (HCC): Primary | ICD-10-CM

## 2025-02-10 ENCOUNTER — HOSPITAL ENCOUNTER (OUTPATIENT)
Dept: INFUSION CENTER | Facility: CLINIC | Age: 70
Discharge: HOME | End: 2025-02-10
Payer: COMMERCIAL

## 2025-02-10 DIAGNOSIS — Z95.828 PORT-A-CATH IN PLACE: Primary | ICD-10-CM

## 2025-02-10 DIAGNOSIS — C25.9 ADENOCARCINOMA OF PANCREAS (HCC): ICD-10-CM

## 2025-02-10 LAB
ALBUMIN SERPL BCG-MCNC: 4.2 G/DL (ref 3.5–5)
ALP SERPL-CCNC: 163 U/L (ref 34–104)
ALT SERPL W P-5'-P-CCNC: 21 U/L (ref 7–52)
ANION GAP SERPL CALCULATED.3IONS-SCNC: 7 MMOL/L (ref 4–13)
AST SERPL W P-5'-P-CCNC: 18 U/L (ref 13–39)
BASOPHILS # BLD AUTO: 0.03 THOUSANDS/ΜL (ref 0–0.1)
BASOPHILS NFR BLD AUTO: 0 % (ref 0–1)
BILIRUB SERPL-MCNC: 0.32 MG/DL (ref 0.2–1)
BUN SERPL-MCNC: 22 MG/DL (ref 5–25)
CALCIUM SERPL-MCNC: 9.3 MG/DL (ref 8.4–10.2)
CHLORIDE SERPL-SCNC: 104 MMOL/L (ref 96–108)
CO2 SERPL-SCNC: 29 MMOL/L (ref 21–32)
CREAT SERPL-MCNC: 0.73 MG/DL (ref 0.6–1.3)
EOSINOPHIL # BLD AUTO: 0.19 THOUSAND/ΜL (ref 0–0.61)
EOSINOPHIL NFR BLD AUTO: 2 % (ref 0–6)
ERYTHROCYTE [DISTWIDTH] IN BLOOD BY AUTOMATED COUNT: 14.1 % (ref 11.6–15.1)
GFR SERPL CREATININE-BSD FRML MDRD: 95 ML/MIN/1.73SQ M
GLUCOSE SERPL-MCNC: 87 MG/DL (ref 65–140)
HCT VFR BLD AUTO: 38.1 % (ref 36.5–49.3)
HGB BLD-MCNC: 12.4 G/DL (ref 12–17)
IMM GRANULOCYTES # BLD AUTO: 0.07 THOUSAND/UL (ref 0–0.2)
IMM GRANULOCYTES NFR BLD AUTO: 1 % (ref 0–2)
LYMPHOCYTES # BLD AUTO: 1.23 THOUSANDS/ΜL (ref 0.6–4.47)
LYMPHOCYTES NFR BLD AUTO: 10 % (ref 14–44)
MCH RBC QN AUTO: 31.7 PG (ref 26.8–34.3)
MCHC RBC AUTO-ENTMCNC: 32.5 G/DL (ref 31.4–37.4)
MCV RBC AUTO: 97 FL (ref 82–98)
MONOCYTES # BLD AUTO: 1.12 THOUSAND/ΜL (ref 0.17–1.22)
MONOCYTES NFR BLD AUTO: 10 % (ref 4–12)
NEUTROPHILS # BLD AUTO: 9.16 THOUSANDS/ΜL (ref 1.85–7.62)
NEUTS SEG NFR BLD AUTO: 77 % (ref 43–75)
NRBC BLD AUTO-RTO: 0 /100 WBCS
PLATELET # BLD AUTO: 112 THOUSANDS/UL (ref 149–390)
PMV BLD AUTO: 9.9 FL (ref 8.9–12.7)
POTASSIUM SERPL-SCNC: 4.1 MMOL/L (ref 3.5–5.3)
PROT SERPL-MCNC: 7.1 G/DL (ref 6.4–8.4)
RBC # BLD AUTO: 3.91 MILLION/UL (ref 3.88–5.62)
SODIUM SERPL-SCNC: 140 MMOL/L (ref 135–147)
WBC # BLD AUTO: 11.8 THOUSAND/UL (ref 4.31–10.16)

## 2025-02-10 PROCEDURE — 85025 COMPLETE CBC W/AUTO DIFF WBC: CPT | Performed by: INTERNAL MEDICINE

## 2025-02-10 PROCEDURE — 80053 COMPREHEN METABOLIC PANEL: CPT | Performed by: INTERNAL MEDICINE

## 2025-02-10 NOTE — PROGRESS NOTES
Patient came in for lab work, offers no complaints at this time. Port a cath accessed with positive blood return, 10ml syringe discard collected, labs collected, flushed and deaccessed with band-aid placed.   AVS declined.   Next appointment for 2/11/2025 @ 0830.   Walked out of clinic with no incident.

## 2025-02-11 ENCOUNTER — HOSPITAL ENCOUNTER (OUTPATIENT)
Dept: INFUSION CENTER | Facility: CLINIC | Age: 70
Discharge: HOME | End: 2025-02-11
Payer: COMMERCIAL

## 2025-02-11 VITALS
HEART RATE: 67 BPM | WEIGHT: 183 LBS | TEMPERATURE: 96.4 F | DIASTOLIC BLOOD PRESSURE: 95 MMHG | BODY MASS INDEX: 25.62 KG/M2 | HEIGHT: 71 IN | SYSTOLIC BLOOD PRESSURE: 154 MMHG | RESPIRATION RATE: 18 BRPM

## 2025-02-11 DIAGNOSIS — C25.9 ADENOCARCINOMA OF PANCREAS (HCC): Primary | ICD-10-CM

## 2025-02-11 PROCEDURE — 96415 CHEMO IV INFUSION ADDL HR: CPT

## 2025-02-11 PROCEDURE — G0498 CHEMO EXTEND IV INFUS W/PUMP: HCPCS

## 2025-02-11 PROCEDURE — 96413 CHEMO IV INFUSION 1 HR: CPT

## 2025-02-11 PROCEDURE — 96417 CHEMO IV INFUS EACH ADDL SEQ: CPT

## 2025-02-11 PROCEDURE — 96367 TX/PROPH/DG ADDL SEQ IV INF: CPT

## 2025-02-11 PROCEDURE — 96375 TX/PRO/DX INJ NEW DRUG ADDON: CPT

## 2025-02-11 RX ORDER — SODIUM CHLORIDE 9 MG/ML
20 INJECTION, SOLUTION INTRAVENOUS ONCE AS NEEDED
Status: DISCONTINUED | OUTPATIENT
Start: 2025-02-11 | End: 2025-02-14 | Stop reason: HOSPADM

## 2025-02-11 RX ORDER — ATROPINE SULFATE 1 MG/ML
0.25 INJECTION, SOLUTION INTRAVENOUS ONCE AS NEEDED
Status: DISCONTINUED | OUTPATIENT
Start: 2025-02-11 | End: 2025-02-14 | Stop reason: HOSPADM

## 2025-02-11 RX ORDER — ATROPINE SULFATE 1 MG/ML
0.25 INJECTION, SOLUTION INTRAVENOUS ONCE
Status: COMPLETED | OUTPATIENT
Start: 2025-02-11 | End: 2025-02-11

## 2025-02-11 RX ORDER — DEXTROSE MONOHYDRATE 50 MG/ML
20 INJECTION, SOLUTION INTRAVENOUS ONCE
Status: COMPLETED | OUTPATIENT
Start: 2025-02-11 | End: 2025-02-11

## 2025-02-11 RX ORDER — DIPHENHYDRAMINE HYDROCHLORIDE 50 MG/ML
25 INJECTION INTRAMUSCULAR; INTRAVENOUS ONCE
Status: COMPLETED | OUTPATIENT
Start: 2025-02-11 | End: 2025-02-11

## 2025-02-11 RX ADMIN — ATROPINE SULFATE 0.25 MG: 1 INJECTION, SOLUTION INTRAVENOUS at 12:06

## 2025-02-11 RX ADMIN — DEXAMETHASONE SODIUM PHOSPHATE: 10 INJECTION, SOLUTION INTRAMUSCULAR; INTRAVENOUS at 08:59

## 2025-02-11 RX ADMIN — DEXTROSE 20 ML/HR: 5 SOLUTION INTRAVENOUS at 10:08

## 2025-02-11 RX ADMIN — OXALIPLATIN 105.55 MG: 5 INJECTION, SOLUTION INTRAVENOUS at 10:08

## 2025-02-11 RX ADMIN — FOSAPREPITANT 150 MG: 150 INJECTION, POWDER, LYOPHILIZED, FOR SOLUTION INTRAVENOUS at 09:24

## 2025-02-11 RX ADMIN — DIPHENHYDRAMINE HYDROCHLORIDE 25 MG: 50 INJECTION, SOLUTION INTRAMUSCULAR; INTRAVENOUS at 08:54

## 2025-02-11 RX ADMIN — SODIUM CHLORIDE 20 ML/HR: 0.9 INJECTION, SOLUTION INTRAVENOUS at 08:54

## 2025-02-11 NOTE — PROGRESS NOTES
Patient arrives to infusion center for FOLFIRINOX Chemotherapy. Patient offers no complaints today. Port accessed, patient tolerated entire infusion without complication. CADD pump connected. AVS offered.     Next appointment: 2/13/25 @ 1130    DC time is 114

## 2025-02-12 ENCOUNTER — OFFICE VISIT (OUTPATIENT)
Dept: HEMATOLOGY ONCOLOGY | Facility: CLINIC | Age: 70
End: 2025-02-12
Payer: COMMERCIAL

## 2025-02-12 VITALS
DIASTOLIC BLOOD PRESSURE: 82 MMHG | TEMPERATURE: 97.2 F | OXYGEN SATURATION: 98 % | RESPIRATION RATE: 18 BRPM | BODY MASS INDEX: 26.04 KG/M2 | HEART RATE: 64 BPM | SYSTOLIC BLOOD PRESSURE: 136 MMHG | WEIGHT: 186 LBS | HEIGHT: 71 IN

## 2025-02-12 DIAGNOSIS — Z95.828 PORT-A-CATH IN PLACE: ICD-10-CM

## 2025-02-12 DIAGNOSIS — C25.9 ADENOCARCINOMA OF PANCREAS (HCC): Primary | ICD-10-CM

## 2025-02-12 DIAGNOSIS — E11.65 TYPE 2 DIABETES MELLITUS WITH HYPERGLYCEMIA, WITHOUT LONG-TERM CURRENT USE OF INSULIN (HCC): ICD-10-CM

## 2025-02-12 DIAGNOSIS — E78.49 OTHER HYPERLIPIDEMIA: ICD-10-CM

## 2025-02-12 PROCEDURE — G2211 COMPLEX E/M VISIT ADD ON: HCPCS | Performed by: INTERNAL MEDICINE

## 2025-02-12 PROCEDURE — 99215 OFFICE O/P EST HI 40 MIN: CPT | Performed by: INTERNAL MEDICINE

## 2025-02-12 NOTE — PROGRESS NOTES
upon completion of 12 cycles of FOLFIRINOX.Name: Abhishek Gimenez      : 1955      MRN: [id number]  Encounter Provider: Butch Peña MD  Encounter Date: 2025   Encounter department: [facility] [place]  :  Assessment & Plan  Adenocarcinoma of pancreas (HCC)  Patient tolerating current treatment well.  Continue FOLFIRINOX every 14 days.  CT scan in 2024 with decrease in the size of the liver metastasis and stable pancreatic lesion.  CA 19-9 down to 67 (2025) from 462 in 2024  Will get MRI upper on completion of 12 cycles of FOLFIRINOX.  RTC 2025.             Port-A-Cath in place         Other hyperlipidemia  On statins.  Management as per PCP.           Type 2 diabetes mellitus with hyperglycemia, without long-term current use of insulin (HCC)  On Lantus and metformin.  Continue management as per endocrinology.  Lab Results   Component Value Date    HGBA1C 6.6 (A) 2024                    History of Present Illness   Chief Complaint   Patient presents with    Follow-up   68-year-old gentleman who noted the onset of diabetes mellitus and 2024 since that time he has been losing weight he is lost 30 pounds over the 6 months time.  He also developed intermittent abdominal pain epigastric left upper quadrant that continued to worsen.  He was started on Ozempic.  He drinks 3-4 beers every other day he last used tobacco 40 years ago but does not smoke now.  He has had a negative colonoscopy.  He underwent an EGD which was nondiagnostic subsequently underwent an EGD EUS which showed normal esophagus normal stomach normal duodenum.  Celiac artery and aorta were visualized and appeared normal.  The left kidney spleen left adrenal appeared normal parenchyma of the liver.  Normal this was the left lobe of the liver.  He had a heterogeneous hypoechoic irregular mass measuring 30 mm x 18 mm with poorly defined margins in the body of  pancreas it abutted the splenic artery and splenic vein needle biopsies were done for total.  The head of the pancreas and neck of the pancreas appeared to have some honeycombing.  Bile duct appeared normal gallbladder appeared normal.  This was done on 7/17/2024  Biopsy was consistent with adenocarcinoma.  CAT scan of the chest was negative.  On 7/10/2026 MRI of the abdomen showed the ill-defined 3.8 x 1.1 cm mass in the body of the pancreas the liver showed a 3.4 cm benign hemangioma 1.0 and 0.4 cm right lobe nodules all the seem to be compatible with hemangiomas.    Interval History:  Patient presents today for follow-up with his wife Sherri.  He feels fine and offers no complaints.  States he feels much better.  Reports nausea for a couple days after chemotherapy.  Taking only 1 pill of Zofran daily.  Reports that pain has remarkably improved.  He is planning on going on vacation in April for a week.  Also they state that they have a camper and usually they would like to take a vacation for up to 2 months.   Reports cold sensitivity in his hand and toes which is getting worse with subsequent treatments.   Oncology History   Cancer Staging   Adenocarcinoma of pancreas (HCC)  Staging form: Pancreas, AJCC 8th Edition  - Clinical: Stage IB (cT2, cN0, cM0) - Signed by Roz Faulkner MD on 8/26/2024  Total positive nodes: 0  - Pathologic stage from 12/5/2024: Stage IV (pT2, cM1) - Signed by Butch Peña MD on 12/5/2024  Oncology History   Adenocarcinoma of pancreas (HCC)   8/13/2024 Biopsy    A.-B.  Pancreas, Body, FNA (Cell block and smear preparations):   Malignant  Adenocarcinoma.     8/20/2024 Initial Diagnosis    Adenocarcinoma of pancreas (HCC)     8/26/2024 -  Cancer Staged    Staging form: Pancreas, AJCC 8th Edition  - Clinical: Stage IB (cT2, cN0, cM0) - Signed by Roz Faulkner MD on 8/26/2024  Total positive nodes: 0       9/24/2024 -  Chemotherapy    alteplase (CATHFLO), 2 mg, Intracatheter,  Every 1 Minute as needed, 11 of 12 cycles  pegfilgrastim (NEULASTA ONPRO), 6 mg, Subcutaneous, Once, 11 of 12 cycles  Administration: 6 mg (9/26/2024), 6 mg (10/10/2024), 6 mg (10/24/2024), 6 mg (11/7/2024), 6 mg (11/21/2024), 6 mg (12/5/2024), 6 mg (12/19/2024), 6 mg (1/16/2025), 6 mg (1/2/2025), 6 mg (1/30/2025)  fosaprepitant (EMEND) IVPB, 150 mg, Intravenous, Once, 11 of 12 cycles  Administration: 150 mg (9/24/2024), 150 mg (10/8/2024), 150 mg (10/22/2024), 150 mg (11/5/2024), 150 mg (11/19/2024), 150 mg (12/3/2024), 150 mg (12/17/2024), 150 mg (1/14/2025), 150 mg (12/31/2024), 150 mg (1/28/2025), 150 mg (2/11/2025)  irinotecan (CAMPTOSAR) chemo infusion, 150 mg/m2 = 294 mg, Intravenous, Once, 11 of 12 cycles  Dose modification: 112.5 mg/m2 (75 % of original dose 150 mg/m2, Cycle 2, Reason: Dose Not Tolerated), 120 mg/m2 (80 % of original dose 150 mg/m2, Cycle 3, Reason: Original Dose Changed)  Administration: 294 mg (9/24/2024), 227 mg (10/8/2024), 240 mg (10/22/2024), 240 mg (11/5/2024), 238 mg (11/19/2024), 239 mg (12/3/2024), 239 mg (12/17/2024), 242 mg (1/14/2025), 241 mg (12/31/2024), 241 mg (1/28/2025), 244 mg (2/11/2025)  oxaliplatin (ELOXATIN) chemo infusion, 65 mg/m2 = 127.4 mg, Intravenous, Once, 11 of 12 cycles  Dose modification: 48.75 mg/m2 (75 % of original dose 65 mg/m2, Cycle 2, Reason: Dose Not Tolerated), 52 mg/m2 (80 % of original dose 65 mg/m2, Cycle 3, Reason: Original Dose Changed)  Administration: 127.4 mg (9/24/2024), 98.5 mg (10/8/2024), 100 mg (10/22/2024), 100 mg (11/5/2024), 100 mg (11/19/2024), 100 mg (12/3/2024), 103.5 mg (12/17/2024), 105.05 mg (1/14/2025), 104.5 mg (12/31/2024), 104.5 mg (1/28/2025), 105.55 mg (2/11/2025)  fluorouracil (ADRUCIL) ambulatory infusion Soln, 1,200 mg/m2/day = 4,705 mg, Intravenous, Over 46 hours, 11 of 12 cycles  Dose modification: 900 mg/m2/day (75 % of original dose 1,200 mg/m2/day, Cycle 3, Reason: Dose Not Tolerated), 960 mg/m2/day (80 % of  "original dose 1,200 mg/m2/day, Cycle 4, Reason: Other (see comments), Comment: provider request)     11/15/2024 Biopsy    Final Diagnosis   A. Liver mass, biopsy:  - Metastatic adenocarcinoma.         12/5/2024 -  Cancer Staged    Staging form: Pancreas, AJCC 8th Edition  - Pathologic stage from 12/5/2024: Stage IV (pT2, cM1) - Signed by Butch Peña MD on 12/5/2024          Pertinent Medical History   02/12/25: reviewed         Review of Systems  14 point review of systems was negative except that mentioned in HPI.        Objective   /82 (BP Location: Left arm, Patient Position: Sitting, Cuff Size: Adult)   Pulse 64   Temp (!) 97.2 °F (36.2 °C) (Temporal)   Resp 18   Ht 5' 10.98\" (1.803 m)   Wt 84.4 kg (186 lb)   SpO2 98%   BMI 25.96 kg/m²     Pain Screening:  Pain Score: 0-No pain  ECOG   1  Physical Exam  Vitals and nursing note reviewed.   Constitutional:       General: He is not in acute distress.     Appearance: Normal appearance.   HENT:      Head: Normocephalic and atraumatic.      Mouth/Throat:      Mouth: Mucous membranes are moist.      Pharynx: Oropharynx is clear.   Eyes:      General: No scleral icterus.     Extraocular Movements: Extraocular movements intact.      Conjunctiva/sclera: Conjunctivae normal.   Cardiovascular:      Rate and Rhythm: Normal rate and regular rhythm.      Pulses: Normal pulses.      Heart sounds: No murmur heard.  Pulmonary:      Effort: Pulmonary effort is normal.      Breath sounds: Normal breath sounds. No wheezing, rhonchi or rales.   Abdominal:      General: Bowel sounds are normal.      Palpations: Abdomen is soft.      Tenderness: There is no abdominal tenderness.   Musculoskeletal:         General: No swelling or tenderness. Normal range of motion.      Cervical back: Neck supple.   Lymphadenopathy:      Cervical: No cervical adenopathy.   Skin:     General: Skin is warm and dry.      Coloration: Skin is not pale.      Findings: No bruising, erythema " or rash.   Neurological:      General: No focal deficit present.      Mental Status: He is alert and oriented to person, place, and time.      Motor: No weakness.   Psychiatric:         Mood and Affect: Mood normal.         Behavior: Behavior normal.         Labs: I have reviewed the following labs:  Lab Results   Component Value Date/Time    WBC 11.80 (H) 02/10/2025 04:06 PM    RBC 3.91 02/10/2025 04:06 PM    Hemoglobin 12.4 02/10/2025 04:06 PM    Hematocrit 38.1 02/10/2025 04:06 PM    MCV 97 02/10/2025 04:06 PM    MCH 31.7 02/10/2025 04:06 PM    RDW 14.1 02/10/2025 04:06 PM    Platelets 112 (L) 02/10/2025 04:06 PM    Segmented % 77 (H) 02/10/2025 04:06 PM    Lymphocytes % 10 (L) 02/10/2025 04:06 PM    Monocytes % 10 02/10/2025 04:06 PM    Eosinophils Relative 2 02/10/2025 04:06 PM    Basophils Relative 0 02/10/2025 04:06 PM    Immature Grans % 1 02/10/2025 04:06 PM    Absolute Neutrophils 9.16 (H) 02/10/2025 04:06 PM     Lab Results   Component Value Date/Time    Potassium 4.1 02/10/2025 04:06 PM    Chloride 104 02/10/2025 04:06 PM    CO2 29 02/10/2025 04:06 PM    BUN 22 02/10/2025 04:06 PM    Creatinine 0.73 02/10/2025 04:06 PM    Glucose, Fasting 110 (H) 09/23/2024 07:46 AM    Calcium 9.3 02/10/2025 04:06 PM    AST 18 02/10/2025 04:06 PM    ALT 21 02/10/2025 04:06 PM    Alkaline Phosphatase 163 (H) 02/10/2025 04:06 PM    Total Protein 7.1 02/10/2025 04:06 PM    Albumin 4.2 02/10/2025 04:06 PM    Total Bilirubin 0.32 02/10/2025 04:06 PM    eGFR 95 02/10/2025 04:06 PM               Disclaimer: This document was prepared using [employer] technology. If a word or phrase is confusing, or does not make sense, this is likely due to recognition error which was not discovered during this clinician's review. If you believe an error has occurred, please contact me through [employer] service line for [email or web address].      Follow Up    All questions were answered to the patient's satisfaction during this encounter. The  patient knows the contact information for our office and knows to reach out for any relevant concerns related to this encounter. They are to call for any temperature 100.4 or higher, new symptoms including but not restricted to shaking chills, decreased appetite, nausea, vomiting, diarrhea, increased fatigue, shortness of breath or chest pain, confusion, and not feeling the strength to come to the clinic. For all other listed problems and medical diagnosis in their chart - they are managed by PCP and/or other specialists, which the patient acknowledges.     I spent 40 minutes reviewing the records (labs, clinician notes, outside records, medical history, ordering medicine/tests/procedures, monitoring of anti-neoplastic toxicities, interpreting the imaging/labs previously done) and coordination of care as well as direct time with the patient today, of which greater than 50% of the time was spent in counseling and coordination of care with the patient/family.

## 2025-02-12 NOTE — ASSESSMENT & PLAN NOTE
Patient tolerating current treatment well.  Continue FOLFIRINOX every 14 days.  CT scan in December 2024 with decrease in the size of the liver metastasis and stable pancreatic lesion.  CA 19-9 down to 67 (01/14/2025) from 462 in October 2024  Will get MRI upper on completion of 12 cycles of FOLFIRINOX.  RTC 02/25/2025.

## 2025-02-13 ENCOUNTER — HOSPITAL ENCOUNTER (OUTPATIENT)
Dept: INFUSION CENTER | Facility: CLINIC | Age: 70
Discharge: HOME | End: 2025-02-13
Payer: COMMERCIAL

## 2025-02-13 DIAGNOSIS — D70.1 CHEMOTHERAPY INDUCED NEUTROPENIA (HCC): Primary | ICD-10-CM

## 2025-02-13 DIAGNOSIS — C25.9 ADENOCARCINOMA OF PANCREAS (HCC): ICD-10-CM

## 2025-02-13 DIAGNOSIS — T45.1X5A CHEMOTHERAPY INDUCED NEUTROPENIA (HCC): Primary | ICD-10-CM

## 2025-02-13 PROCEDURE — 96372 THER/PROPH/DIAG INJ SC/IM: CPT

## 2025-02-13 RX ADMIN — PEGFILGRASTIM 6 MG: KIT SUBCUTANEOUS at 12:00

## 2025-02-19 DIAGNOSIS — C25.9 ADENOCARCINOMA OF PANCREAS (HCC): Primary | ICD-10-CM

## 2025-02-19 RX ORDER — SODIUM CHLORIDE 9 MG/ML
20 INJECTION, SOLUTION INTRAVENOUS ONCE AS NEEDED
OUTPATIENT
Start: 2025-02-25

## 2025-02-19 RX ORDER — DIPHENHYDRAMINE HYDROCHLORIDE 50 MG/ML
25 INJECTION INTRAMUSCULAR; INTRAVENOUS ONCE
Start: 2025-02-25 | End: 2025-02-25

## 2025-02-19 RX ORDER — ATROPINE SULFATE 1 MG/ML
0.25 INJECTION, SOLUTION INTRAVENOUS ONCE
OUTPATIENT
Start: 2025-02-25

## 2025-02-19 RX ORDER — DEXTROSE MONOHYDRATE 50 MG/ML
20 INJECTION, SOLUTION INTRAVENOUS ONCE
OUTPATIENT
Start: 2025-02-25

## 2025-02-19 RX ORDER — ATROPINE SULFATE 1 MG/ML
0.25 INJECTION, SOLUTION INTRAVENOUS ONCE AS NEEDED
OUTPATIENT
Start: 2025-02-25

## 2025-02-24 ENCOUNTER — HOSPITAL ENCOUNTER (OUTPATIENT)
Dept: INFUSION CENTER | Facility: CLINIC | Age: 70
Discharge: HOME | End: 2025-02-24
Payer: COMMERCIAL

## 2025-02-24 DIAGNOSIS — Z95.828 PORT-A-CATH IN PLACE: Primary | ICD-10-CM

## 2025-02-24 DIAGNOSIS — C25.9 ADENOCARCINOMA OF PANCREAS (HCC): ICD-10-CM

## 2025-02-24 LAB
ALBUMIN SERPL BCG-MCNC: 4.1 G/DL (ref 3.5–5)
ALP SERPL-CCNC: 182 U/L (ref 34–104)
ALT SERPL W P-5'-P-CCNC: 32 U/L (ref 7–52)
ANION GAP SERPL CALCULATED.3IONS-SCNC: 6 MMOL/L (ref 4–13)
AST SERPL W P-5'-P-CCNC: 25 U/L (ref 13–39)
BASOPHILS # BLD AUTO: 0.04 THOUSANDS/ÂΜL (ref 0–0.1)
BASOPHILS NFR BLD AUTO: 0 % (ref 0–1)
BILIRUB SERPL-MCNC: 0.39 MG/DL (ref 0.2–1)
BUN SERPL-MCNC: 14 MG/DL (ref 5–25)
CALCIUM SERPL-MCNC: 9.5 MG/DL (ref 8.4–10.2)
CHLORIDE SERPL-SCNC: 102 MMOL/L (ref 96–108)
CO2 SERPL-SCNC: 31 MMOL/L (ref 21–32)
CREAT SERPL-MCNC: 0.79 MG/DL (ref 0.6–1.3)
EOSINOPHIL # BLD AUTO: 0.13 THOUSAND/ÂΜL (ref 0–0.61)
EOSINOPHIL NFR BLD AUTO: 1 % (ref 0–6)
ERYTHROCYTE [DISTWIDTH] IN BLOOD BY AUTOMATED COUNT: 13.8 % (ref 11.6–15.1)
GFR SERPL CREATININE-BSD FRML MDRD: 92 ML/MIN/1.73SQ M
GLUCOSE SERPL-MCNC: 153 MG/DL (ref 65–140)
HCT VFR BLD AUTO: 38.3 % (ref 36.5–49.3)
HGB BLD-MCNC: 12.9 G/DL (ref 12–17)
IMM GRANULOCYTES # BLD AUTO: 0.1 THOUSAND/UL (ref 0–0.2)
IMM GRANULOCYTES NFR BLD AUTO: 1 % (ref 0–2)
LYMPHOCYTES # BLD AUTO: 1.1 THOUSANDS/ÂΜL (ref 0.6–4.47)
LYMPHOCYTES NFR BLD AUTO: 9 % (ref 14–44)
MCH RBC QN AUTO: 32.2 PG (ref 26.8–34.3)
MCHC RBC AUTO-ENTMCNC: 33.7 G/DL (ref 31.4–37.4)
MCV RBC AUTO: 96 FL (ref 82–98)
MONOCYTES # BLD AUTO: 1.15 THOUSAND/ÂΜL (ref 0.17–1.22)
MONOCYTES NFR BLD AUTO: 9 % (ref 4–12)
NEUTROPHILS # BLD AUTO: 10.33 THOUSANDS/ÂΜL (ref 1.85–7.62)
NEUTS SEG NFR BLD AUTO: 80 % (ref 43–75)
NRBC BLD AUTO-RTO: 0 /100 WBCS
PLATELET # BLD AUTO: 113 THOUSANDS/UL (ref 149–390)
PMV BLD AUTO: 10 FL (ref 8.9–12.7)
POTASSIUM SERPL-SCNC: 3.9 MMOL/L (ref 3.5–5.3)
PROT SERPL-MCNC: 6.9 G/DL (ref 6.4–8.4)
RBC # BLD AUTO: 4.01 MILLION/UL (ref 3.88–5.62)
SODIUM SERPL-SCNC: 139 MMOL/L (ref 135–147)
WBC # BLD AUTO: 12.85 THOUSAND/UL (ref 4.31–10.16)

## 2025-02-24 PROCEDURE — 80053 COMPREHEN METABOLIC PANEL: CPT | Performed by: INTERNAL MEDICINE

## 2025-02-24 PROCEDURE — 85025 COMPLETE CBC W/AUTO DIFF WBC: CPT | Performed by: INTERNAL MEDICINE

## 2025-02-24 NOTE — PROGRESS NOTES
Pt. To clinic for labs offering no complaints. Port accessed with positive blood return noted, flushed and de-accessed without complications. AVS declined. Pt. Aware of next appt. On 2/25/25 at  8AM.

## 2025-02-24 NOTE — PROGRESS NOTES
Outpatient Oncology Nutrition Consultation   Type of Consult: Follow Up  Care Location: [facility]; met with patient and wife Sherri     Reason for referral: notification  Lashell Lew MA  on 9/11/24 that pt is appropriate for oncology nutrition care (reason for referral: Ambulatory referral for struggling with eating and drinking).     Nutrition Assessment:   Oncology Diagnosis & Treatments:   Cancer of the pancreas diagnosed 8/20/24.   Began chemotherapy 9/24/24; Modified FOLFIRINOX every 14 days   Oncology History   Adenocarcinoma of pancreas (HCC)   8/13/2024 Biopsy    A.-B.  Pancreas, Body, FNA (Cell block and smear preparations):   Malignant  Adenocarcinoma.     8/20/2024 Initial Diagnosis    Adenocarcinoma of pancreas (HCC)     8/26/2024 -  Cancer Staged    Staging form: Pancreas, AJCC 8th Edition  - Clinical: Stage IB (cT2, cN0, cM0) - Signed by Roz Faulkner MD on 8/26/2024  Total positive nodes: 0       9/24/2024 -  Chemotherapy    alteplase (CATHFLO), 2 mg, Intracatheter, Every 1 Minute as needed, 12 of 14 cycles  pegfilgrastim (NEULASTA ONPRO), 6 mg, Subcutaneous, Once, 12 of 14 cycles  Administration: 6 mg (9/26/2024), 6 mg (10/10/2024), 6 mg (10/24/2024), 6 mg (11/7/2024), 6 mg (11/21/2024), 6 mg (12/5/2024), 6 mg (12/19/2024), 6 mg (1/16/2025), 6 mg (1/2/2025), 6 mg (1/30/2025), 6 mg (2/13/2025)  fosaprepitant (EMEND) IVPB, 150 mg, Intravenous, Once, 12 of 14 cycles  Administration: 150 mg (9/24/2024), 150 mg (10/8/2024), 150 mg (10/22/2024), 150 mg (11/5/2024), 150 mg (11/19/2024), 150 mg (12/3/2024), 150 mg (12/17/2024), 150 mg (1/14/2025), 150 mg (12/31/2024), 150 mg (1/28/2025), 150 mg (2/11/2025)  irinotecan (CAMPTOSAR) chemo infusion, 150 mg/m2 = 294 mg, Intravenous, Once, 12 of 14 cycles  Dose modification: 112.5 mg/m2 (75 % of original dose 150 mg/m2, Cycle 2, Reason: Dose Not Tolerated), 120 mg/m2 (80 % of original dose 150 mg/m2, Cycle 3, Reason: Original Dose  Changed)  Administration: 294 mg (9/24/2024), 227 mg (10/8/2024), 240 mg (10/22/2024), 240 mg (11/5/2024), 238 mg (11/19/2024), 239 mg (12/3/2024), 239 mg (12/17/2024), 242 mg (1/14/2025), 241 mg (12/31/2024), 241 mg (1/28/2025), 244 mg (2/11/2025)  oxaliplatin (ELOXATIN) chemo infusion, 65 mg/m2 = 127.4 mg, Intravenous, Once, 11 of 11 cycles  Dose modification: 48.75 mg/m2 (75 % of original dose 65 mg/m2, Cycle 2, Reason: Dose Not Tolerated), 52 mg/m2 (80 % of original dose 65 mg/m2, Cycle 3, Reason: Original Dose Changed)  Administration: 127.4 mg (9/24/2024), 98.5 mg (10/8/2024), 100 mg (10/22/2024), 100 mg (11/5/2024), 100 mg (11/19/2024), 100 mg (12/3/2024), 103.5 mg (12/17/2024), 105.05 mg (1/14/2025), 104.5 mg (12/31/2024), 104.5 mg (1/28/2025), 105.55 mg (2/11/2025)  fluorouracil (ADRUCIL) ambulatory infusion Soln, 1,200 mg/m2/day = 4,705 mg, Intravenous, Over 46 hours, 12 of 14 cycles  Dose modification: 900 mg/m2/day (75 % of original dose 1,200 mg/m2/day, Cycle 3, Reason: Dose Not Tolerated), 960 mg/m2/day (80 % of original dose 1,200 mg/m2/day, Cycle 4, Reason: Other (see comments), Comment: provider request)     11/15/2024 Biopsy    Final Diagnosis   A. Liver mass, biopsy:  - Metastatic adenocarcinoma.         12/5/2024 -  Cancer Staged    Staging form: Pancreas, AJCC 8th Edition  - Pathologic stage from 12/5/2024: Stage IV (pT2, cM1) - Signed by Butch Peña MD on 12/5/2024         Past Medical & Surgical Hx:   Patient Active Problem List   Diagnosis    Atrial fibrillation (HCC)    Psoriatic arthritis (HCC)    NDPH (new daily persistent headache)    Atrial flutter (HCC)    CATY (obstructive sleep apnea)    Type 2 diabetes mellitus with hyperglycemia, without long-term current use of insulin (HCC)    Elevated PSA    Platelets decreased (HCC)    Adenocarcinoma of pancreas (HCC)    Pulmonary HTN (HCC)    Pre-op testing    Liver masses    Port-A-Cath in place    Chemotherapy induced neutropenia (HCC)     Palliative care by specialist    Medical marijuana use    Nausea    Cancer-related pain    Insomnia    Loss of appetite    Other hyperlipidemia     Past Medical History:   Diagnosis Date    A-fib (HCC)     Arthritis     BPH (benign prostatic hyperplasia)     Cancer (HCC)     Colon polyp     Diabetes mellitus (HCC)     Fatty liver     Fracture, foot     High cholesterol     Hyperlipidemia     Irregular heart beat     Non-ischemic cardiomyopathy (HCC)     Osteoarthritis     Psoriasis     Psoriatic arthritis (HCC)     Pulmonary HTN (HCC)      Past Surgical History:   Procedure Laterality Date    CARDIAC ELECTROPHYSIOLOGY PROCEDURE N/A 02/22/2023    Procedure: Cardiac eps/afib ablation   comprehensive posterior wall;  Surgeon: Mynor Andrews MD;  Location: [facility];  Service: Cardiology    COLONOSCOPY      ELBOW SURGERY Right     FL GUIDED CENTRAL VENOUS ACCESS DEVICE INSERTION  9/5/2024    IR BIOPSY LIVER MASS  9/18/2024    IR BIOPSY LIVER MASS  11/15/2024    KIDNEY STONE SURGERY      LAPAROSCOPY N/A 9/5/2024    Procedure: DIAGNOSTIC LAPAROSCOPY;  Surgeon: Roz Faulkner MD;  Location: [facility];  Service: Surgical Oncology    TRANSURETHRAL RESECTION OF PROSTATE      last assessed 7/21/15    TUNNELED VENOUS PORT PLACEMENT N/A 9/5/2024    Procedure: INSERTION VENOUS PORT (PORT-A-CATH);  Surgeon: Roz Faulkner MD;  Location: [facility];  Service: Surgical Oncology       Review of Medications:   Vitamins, Supplements and Herbals: No, pt denies taking supplements; MMJ     Current Outpatient Medications:     adalimumab (HUMIRA) 40 mg/0.8 mL PSKT, Inject 40 mg under the skin every 14 (fourteen) days  (Patient not taking: Reported on 9/23/2024), Disp: , Rfl:     Alcohol Swabs 70 % PADS, May substitute brand based on insurance coverage. Check glucose TID. (Patient not taking: Reported on 9/23/2024), Disp: 100 each, Rfl: 0    Benzocaine-Isopropyl Alcohol 6-70 % PADS, Use, Disp: , Rfl:     Blood Glucose  Monitoring Suppl ([employer] Blood Glucose Meter) w/Device KIT, Use, Disp: , Rfl:     Blood Glucose Monitoring Suppl (OneTouch Verio Reflect) w/Device KIT, Check blood sugars once daily. Please substitute with appropriate alternative as covered by patient's insurance. Dx: E11.65, Disp: 1 kit, Rfl: 0    Continuous Blood Gluc  (FreeStyle Livia 2 [place]) LEANA, 14 DAY READER NOT MADE, Disp: 1 each, Rfl: 1    Continuous Glucose Sensor (FreeStyle Livia 14 Day Sensor) MISC, USE 1 DEVICE 3 (THREE) TIMES A DAY BEFORE MEALS, Disp: , Rfl:     Continuous Glucose Sensor (FreeStyle Livia 14 Day Sensor) MISC, USE 1 DEVICE EVERY 14 DAYS AS DIRECTED, Disp: 2 each, Rfl: 5    fluorouracil 3,915 mg in CADD/Elastomeric Infusion Device, Infuse 3,915 mg (960 mg/m2/day x 2.04 m2) into a catheter in a vein via infusion device over 46 hours for 2 days  Infusion planned for February 25, 2025., Disp: 1 each, Rfl: 3    Glucose Blood (BLOOD GLUCOSE TEST STRIPS 333 VI), Use, Disp: , Rfl:     glucose blood (OneTouch Verio) test strip, Check blood sugars once daily. Please substitute with appropriate alternative as covered by patient's insurance. Dx: E11.65, Disp: 100 each, Rfl: 3    insulin glargine (LANTUS) 100 units/mL subcutaneous injection, Inject 20 Units under the skin daily at bedtime, Disp: 6 mL, Rfl: 3    Insulin Pen Needle (BD Pen Needle Mallory 2nd Gen) 32G X 4 MM MISC, For use with insulin pen. Pharmacy may dispense brand covered by insurance., Disp: 100 each, Rfl: 0    Insulin Pen Needle (BD Pen Needle Mallory U/F) 32G X 4 MM MISC, Use daily as directed with insulin pen, Disp: 100 each, Rfl: 3    Lancets 33G MISC, Use, Disp: , Rfl:     Lantus SoloStar 100 units/mL SOPN, INJECT 0.2 ML (20 UNITS TOTAL) UNDER THE SKIN DAILY AT BEDTIME, Disp: 15 mL, Rfl: 2    lidocaine-prilocaine (EMLA) cream, Apply topically as needed for mild pain Apply to port area 45-60min prior to treatment and cover with dressing or plastic wrap, Disp: 30 g, Rfl: 1     metFORMIN (GLUCOPHAGE-XR) 500 mg 24 hr tablet, Take 2 tablets (1,000 mg total) by mouth daily with breakfast, Disp: 180 tablet, Rfl: 1    ondansetron (ZOFRAN) 4 mg tablet, Take 1 tablet (4 mg total) by mouth every 8 (eight) hours as needed for nausea or vomiting, Disp: 20 tablet, Rfl: 3    OneTouch Delica Lancets 33G MISC, Check blood sugars once daily. Please substitute with appropriate alternative as covered by patient's insurance. Dx: E11.65, Disp: 100 each, Rfl: 3    oxyCODONE (Roxicodone) 5 immediate release tablet, Take 1 tablet (5 mg total) by mouth every 6 (six) hours as needed for moderate pain Max Daily Amount: 20 mg, Disp: 90 tablet, Rfl: 0    pantoprazole (PROTONIX) 40 mg tablet, Take 1 tablet (40 mg total) by mouth daily before breakfast, Disp: 30 tablet, Rfl: 5    rosuvastatin (CRESTOR) 5 mg tablet, TAKE 1 TABLET BY MOUTH EVERY DAY IN THE EVENING, Disp: 90 tablet, Rfl: 1    sildenafil (VIAGRA) 100 mg tablet, TAKE ONE (1) TABLET BY MOUTH DAILY AS NEEDED, Disp: , Rfl:   No current facility-administered medications for this visit.    Facility-Administered Medications Ordered in Other Visits:     alteplase (CATHFLO) injection 2 mg, 2 mg, Intracatheter, Q1MIN PRN, Butch Peña MD    alteplase (CATHFLO) injection 2 mg, 2 mg, Intracatheter, Q1MIN PRN, Butch Peña MD    Atropine Sulfate injection 0.25 mg, 0.25 mg, Intravenous, Once PRN, Butch Peña MD    irinotecan (CAMPTOSAR) 245 mg in dextrose 5 % 500 mL chemo infusion, 120 mg/m2 (Treatment Plan Recorded), Intravenous, Once, Butch Peña MD, Last Rate: 341.5 mL/hr at 02/25/25 1030, 245 mg at 02/25/25 1030    sodium chloride 0.9 % infusion, 20 mL/hr, Intravenous, Once PRN, Butch Peña MD, Last Rate: 20 mL/hr at 02/25/25 0910, 20 mL/hr at 02/25/25 0910    Most Recent Lab Results:   Lab Results   Component Value Date    WBC 12.85 (H) 02/24/2025    NEUTROABS 10.33 (H) 02/24/2025    CHOLESTEROL 212 (H) 02/20/2024    CHOL 190 11/15/2016     "TRIG 369 (H) 02/20/2024    HDL 40 02/20/2024    LDLCALC 98 02/20/2024    ALT 32 02/24/2025    AST 25 02/24/2025    ALB 4.1 02/24/2025     03/15/2016    SODIUM 139 02/24/2025    SODIUM 140 02/10/2025    K 3.9 02/24/2025    K 4.1 02/10/2025     02/24/2025    BUN 14 02/24/2025    BUN 22 02/10/2025    CREATININE 0.79 02/24/2025    CREATININE 0.73 02/10/2025    EGFR 92 02/24/2025    GLUCOSE 106 (H) 11/15/2016    POCGLU 97 09/18/2024    GLUF 110 (H) 09/23/2024    GLUF 96 09/18/2024    GLUC 153 (H) 02/24/2025    HGBA1C 6.6 (A) 08/27/2024    HGBA1C 5.9 05/24/2024    HGBA1C 10.8 (A) 02/20/2024    CALCIUM 9.5 02/24/2025    FOLATE >20.0 (H) 06/10/2021    MG 1.9 02/23/2023     Anthropometric Measurements:   Height: 71\"  Ht Readings from Last 1 Encounters:   02/25/25 5' 10.98\" (1.803 m)     Wt Readings from Last 20 Encounters:   02/25/25 83.9 kg (185 lb)   02/25/25 83.9 kg (185 lb)   02/12/25 84.4 kg (186 lb)   02/11/25 83 kg (183 lb)   01/28/25 82.5 kg (181 lb 12.8 oz)   01/14/25 81.5 kg (179 lb 9.6 oz)   12/31/24 82.2 kg (181 lb 3.2 oz)   12/17/24 81.2 kg (179 lb)   12/05/24 78.5 kg (173 lb)   12/03/24 79.6 kg (175 lb 6.4 oz)   11/25/24 77.7 kg (171 lb 6.4 oz)   11/19/24 78.9 kg (174 lb)   11/15/24 81.5 kg (179 lb 10.8 oz)   11/05/24 77 kg (169 lb 12.8 oz)   11/04/24 79.4 kg (175 lb)   10/22/24 78 kg (172 lb)   10/21/24 78.2 kg (172 lb 8 oz)   10/08/24 78.1 kg (172 lb 3.2 oz)   10/07/24 79.4 kg (175 lb)   09/24/24 81.6 kg (180 lb)     Weight History:   Usual Weight: 200# in December 2023; weight loss after diagnosed with DM and started on Ozempic   Varian: n/a  Home Scale: (10/8/24) 165#; (11/5/24) home weight maintaining in the [facility]     Oncology Nutrition-Anthropometrics      Flowsheet Row Nutrition from 2/25/2025 in [facility] Oncology Dietitian [place] Nutrition from 1/28/2025 in [facility] Oncology Dietitian [place]   Patient age (years): 69 years 69 years   Patient (male) height (in): 71 in 71 in "   Current weight (lbs): 185 lbs 181.8 lbs   Current weight to be used for anthropometric calculations (kg) 84.1 kg 82.6 kg   BMI: 25.8 25.4   IBW male 172 lb 172 lb   IBW (kg) male 78.2 kg 78.2 kg   IBW % (male) 107.6 % 105.7 %   Adjusted BW (male): 175.3 lbs 174.5 lbs   Adjusted BW in kg (male): 79.7 kg 79.3 kg   % weight change after 1 month: 1.8 % 0.3 %   Weight change after 1 month (lbs) 3.2 lbs 0.6 lbs   % weight change after 3 months: 7.9 % 5.7 %   Weight change after 3 months (lbs) 13.6 lbs 9.8 lbs   % weight change after 6 months: 2.8 % --   Weight change after 6 months (lbs) 5 lbs --            Nutrition-Focused Physical Findings: none observed    Food/Nutrition-Related History & Client/Social History:    Current Nutrition Impact Symptoms:  [] Nausea   -after chemo; improved w/ zofran and MMJ [x] Reduced Appetite   -decreases after chemotherapy   -improving w/ MMJ  [] Acid Reflux    [] Vomiting  [] Unintended Wt Loss   -hx [] Malabsorption    [] Diarrhea  [] Unintended Wt Gain  [] Dumping Syndrome    [] Constipation  [] Thick Mucous/Secretions  [] Abdominal Pain    [] Dysgeusia (Altered Taste)  [] Xerostomia (Dry Mouth)  [] Gas    [] Dysosmia (Altered Smell)  [] Thrush  [] Difficulty Chewing    [] Oral Mucositis (Sore Mouth)  [] Fatigue  [x] Hyperglycemia   -T2DM   -metformin  Lab Results   Component Value Date    HGBA1C 6.6 (A) 08/27/2024      [] Odynophagia  [] Esophagitis  [] Other:    [] Dysphagia  [] Early Satiety  [] No Problems Eating      Food Allergies & Intolerances: yes: lactose intolerant     Current Diet: CHO-Controlled and Lactose-Free  Current Nutrition Intake: Unchanged from last visit  Appetite: Fair ; poor appetite after chemotherapy   Nutrition Route: PO  Oral Care: brushes BID  Activity level: ADL, ambulates independently.     24 Hr Diet Recall:   Breakfast:cereal with milk  Snack: banana  Lunch: sandwich; soup; hamburger   Dinner: pasta     Beverages: water (16oz x2-3)  Supplements:    Ensure Original (8 oz, 220 kcal, 9 g pro) 1x daily       Oncology Nutrition-Estimated Needs      Flowsheet Taty Nutrition from 2025 in [facility] Oncology Dietitian [place] Nutrition from 2025 in [facility] Oncology Dietitian [place]   Weight type used Actual weight Actual weight   Weight in kilograms (kg) used for estimated needs 84.1 kg 82.6 kg   Energy needs formula:  30-35 kcal/kg 30-35 kcal/kg   Energy needs based on 30 kcal/k kcal 2479 kcal   Energy needs based on 35 kcal/k kcal 2892 kcal   Protein needs formula: 1.2-1.5 g/kg 1.2-1.5 g/kg   Protein needs based on 1.2 g/k g 99 g   Protein needs based on 1.5 g/kg 126 g 124 g   Fluid needs formula: 30-35 mL/kg 30-35 mL/kg   Fluid needs based on 30 mL/kg 2523 mL 2481 mL   Fluid needs in ounces 85 oz 84 oz   Fluid needs based on 35 mL/kg 2944 mL 2895 mL   Fluid needs in ounces 100 oz 98 oz             Discussion & Intervention:   Abhishek was evaluated today for an RD follow up regarding unintended weight loss.  Abhishek is currently undergoing tx for Pancreatic Cancer.  He is here for infusion today. He is doing well today. His appetite and PO intakes are unchanged. His weight continues to increase. He does not have any nutrition questions/concerns today.     Based on today's assessment, discussion included: MNT for: DM, Pancreatic cancer, BG management, spreading carbohydrate intake throughout the day & counting carbohydrates for adequate glycemic control, adequate hydration & fluid choices, having consistent and planned snacks between meals, and continuing oral nutrition supplements.   Moving forward, Abhishek will continue current nutrition plan of care.  Materials Provided: not applicable  All questions and concerns addressed during today’s visit.  Abhishek has RD contact information.    Nutrition Diagnosis:   Increased Nutrient Needs (kcal & pro) related to increased demand for nutrients and disease state as evidenced by cancer  dx and pt undergoing tx for cancer.  Monitoring & Evaluation:   Goals:  weight maintenance/stabilization  adequate nutrition impact symptom management  pt to meet >/=75% estimated nutrition needs daily  adequate glycemic control  increase protein intake  increase fluid intake  increase calorie intake    Progress Towards Goals: Progressing    Nutrition Rx & Recommendations:  Diet: High Calorie, High Protein (for high calorie foods see pages 52-53, and for high protein foods see pages 49-51 in your Eating Hints book)  Small, frequent meals/snacks may be easier to tolerate than 3 large daily meals.  Aim for 5-6 small meals per day (every 2-3 hours).  Include protein at all meals/snacks.  Include a variety of foods (as tolerated/allowed by diet).  Follow a Cancer Preventative Nutrition Pattern: colorful, plant-based, low-fat, avoid added sugars, limit alcohol, include high fiber foods, limit processed meats, limit red meat, choose lean protein sources, use low-fat cooking methods, balance calories with physical activity, avoid excessive weight gain throughout life.  Incorporate physical activity as able/allowed.  Stay hydrated by sipping fluids of choice/tolerance throughout the day.  Liquid nutrition may be better tolerated than solids at times.  Alter food choices and eating patterns to accommodate changing needs.  Refer to Eating Hints book for other meal/snack ideas and symptom management.  Weigh yourself regularly. If you notice weight loss, make an effort to increase your daily food/calorie intake. If you continue to notice loss after these efforts, reach out to your dietitian to establish a plan to stabilize weight.  Increase intake of Ensure to 2-3x daily if appetite/weight decreases.   Continue to monitor blood sugar: Foods with carbohydrates make your blood glucose level go up. Learning how to count carbohydrates can help you control your blood glucose levels. First, identify the foods you eat that contain  carbohydrates.   Foods that commonly contain carbohydrates include: grains (breads, pasta, cereal, rice), fruit, starchy vegetables (potatoes, corn, peas), snack foods (chips, pretzels, crackers), dairy products, sweets/desserts.   Foods that do not usually contain carbohydrates: chicken, pork, beef, fish, seafood, eggs, tofu, cheese, butter, sour cream, avocado, nuts, seeds, olives, mayonnaise, water, black coffee, unsweetened tea, and zero-calorie drinks. Vegetables with no or low carbohydrate include green beans, cauliflower, tomatoes, and onions.     Follow Up Plan:  3/25/25 during infusion  Recommend Referral to Other Providers: none at this time

## 2025-02-25 ENCOUNTER — HOSPITAL ENCOUNTER (OUTPATIENT)
Dept: INFUSION CENTER | Facility: CLINIC | Age: 70
Discharge: HOME | End: 2025-02-25
Payer: COMMERCIAL

## 2025-02-25 ENCOUNTER — NUTRITION (OUTPATIENT)
Dept: NUTRITION | Facility: CLINIC | Age: 70
End: 2025-02-25

## 2025-02-25 ENCOUNTER — OFFICE VISIT (OUTPATIENT)
Dept: HEMATOLOGY ONCOLOGY | Facility: CLINIC | Age: 70
End: 2025-02-25
Payer: COMMERCIAL

## 2025-02-25 ENCOUNTER — TELEPHONE (OUTPATIENT)
Dept: HEMATOLOGY ONCOLOGY | Facility: CLINIC | Age: 70
End: 2025-02-25

## 2025-02-25 VITALS
OXYGEN SATURATION: 98 % | BODY MASS INDEX: 25.9 KG/M2 | HEIGHT: 71 IN | WEIGHT: 185 LBS | TEMPERATURE: 97.1 F | RESPIRATION RATE: 18 BRPM | DIASTOLIC BLOOD PRESSURE: 85 MMHG | HEART RATE: 56 BPM | SYSTOLIC BLOOD PRESSURE: 159 MMHG

## 2025-02-25 VITALS
TEMPERATURE: 97.1 F | DIASTOLIC BLOOD PRESSURE: 85 MMHG | WEIGHT: 185 LBS | HEIGHT: 71 IN | HEART RATE: 56 BPM | RESPIRATION RATE: 18 BRPM | BODY MASS INDEX: 25.9 KG/M2 | SYSTOLIC BLOOD PRESSURE: 159 MMHG

## 2025-02-25 DIAGNOSIS — I27.20 PULMONARY HTN (HCC): ICD-10-CM

## 2025-02-25 DIAGNOSIS — E11.65 TYPE 2 DIABETES MELLITUS WITH HYPERGLYCEMIA, WITHOUT LONG-TERM CURRENT USE OF INSULIN (HCC): ICD-10-CM

## 2025-02-25 DIAGNOSIS — Z71.3 NUTRITIONAL COUNSELING: Primary | ICD-10-CM

## 2025-02-25 DIAGNOSIS — C25.9 ADENOCARCINOMA OF PANCREAS (HCC): Primary | ICD-10-CM

## 2025-02-25 DIAGNOSIS — G89.3 CANCER-RELATED PAIN: ICD-10-CM

## 2025-02-25 DIAGNOSIS — C78.7 SECONDARY MALIGNANT NEOPLASM OF LIVER AND INTRAHEPATIC BILE DUCT (HCC): ICD-10-CM

## 2025-02-25 PROCEDURE — 96367 TX/PROPH/DG ADDL SEQ IV INF: CPT

## 2025-02-25 PROCEDURE — G0498 CHEMO EXTEND IV INFUS W/PUMP: HCPCS

## 2025-02-25 PROCEDURE — 96375 TX/PRO/DX INJ NEW DRUG ADDON: CPT

## 2025-02-25 PROCEDURE — 96413 CHEMO IV INFUSION 1 HR: CPT

## 2025-02-25 PROCEDURE — G2211 COMPLEX E/M VISIT ADD ON: HCPCS | Performed by: INTERNAL MEDICINE

## 2025-02-25 PROCEDURE — 99214 OFFICE O/P EST MOD 30 MIN: CPT | Performed by: INTERNAL MEDICINE

## 2025-02-25 RX ORDER — DIPHENHYDRAMINE HYDROCHLORIDE 50 MG/ML
25 INJECTION INTRAMUSCULAR; INTRAVENOUS ONCE
Status: COMPLETED | OUTPATIENT
Start: 2025-02-25 | End: 2025-02-25

## 2025-02-25 RX ORDER — ATROPINE SULFATE 1 MG/ML
0.25 INJECTION, SOLUTION INTRAVENOUS ONCE
Status: COMPLETED | OUTPATIENT
Start: 2025-02-25 | End: 2025-02-25

## 2025-02-25 RX ORDER — ATROPINE SULFATE 1 MG/ML
0.25 INJECTION, SOLUTION INTRAVENOUS ONCE AS NEEDED
Status: DISCONTINUED | OUTPATIENT
Start: 2025-02-25 | End: 2025-02-28 | Stop reason: HOSPADM

## 2025-02-25 RX ORDER — SODIUM CHLORIDE 9 MG/ML
20 INJECTION, SOLUTION INTRAVENOUS ONCE AS NEEDED
Status: DISCONTINUED | OUTPATIENT
Start: 2025-02-25 | End: 2025-02-28 | Stop reason: HOSPADM

## 2025-02-25 RX ORDER — DEXTROSE MONOHYDRATE 50 MG/ML
20 INJECTION, SOLUTION INTRAVENOUS ONCE
Status: COMPLETED | OUTPATIENT
Start: 2025-02-25 | End: 2025-02-25

## 2025-02-25 RX ADMIN — ATROPINE SULFATE 0.25 MG: 1 INJECTION, SOLUTION INTRAVENOUS at 10:24

## 2025-02-25 RX ADMIN — DIPHENHYDRAMINE HYDROCHLORIDE 25 MG: 50 INJECTION, SOLUTION INTRAMUSCULAR; INTRAVENOUS at 09:10

## 2025-02-25 RX ADMIN — DEXAMETHASONE SODIUM PHOSPHATE: 10 INJECTION, SOLUTION INTRAMUSCULAR; INTRAVENOUS at 09:10

## 2025-02-25 RX ADMIN — IRINOTECAN HYDROCHLORIDE 245 MG: 20 INJECTION, SOLUTION INTRAVENOUS at 10:30

## 2025-02-25 RX ADMIN — DEXTROSE 20 ML/HR: 5 SOLUTION INTRAVENOUS at 10:30

## 2025-02-25 RX ADMIN — FOSAPREPITANT 150 MG: 150 INJECTION, POWDER, LYOPHILIZED, FOR SOLUTION INTRAVENOUS at 09:43

## 2025-02-25 RX ADMIN — SODIUM CHLORIDE 20 ML/HR: 0.9 INJECTION, SOLUTION INTRAVENOUS at 09:10

## 2025-02-25 NOTE — TELEPHONE ENCOUNTER
MO  Existing  Oxaliplatin is being removed from plan today and all treatments forward. Pls adjust times. ty        (Medication time out/change to orders)    Date patient scheduled: today    Original medication ordered: folfirinox    New Medication ordered: n/a- oxaliplatin being removed    Office RN notified patient? Pt in infusion and was informed by Harriet GAYLE Timeout done

## 2025-02-25 NOTE — ASSESSMENT & PLAN NOTE
Patient tolerating current treatment well.  Cycle 12-day 1 FOLFIRINOX (no oxaliplatin given significant neuropathy) today.  CT scan in December 2024 with decrease in the size of the liver metastasis and stable pancreatic lesion.  CA 19-9 down to 67 (01/14/2025) from 462 in October 2024  Ordered MRI abdomen.  Scheduled for 03/27/2025  Will continue with 5-FU and irinotecan every 2 weeks.   Depending on the MRI results patient would like to get a chemo break.  Will discuss upon next visit.    RTC after MRI abdomen completed.              Orders:    MRI abdomen w wo contrast; Future

## 2025-02-25 NOTE — PATIENT INSTRUCTIONS
Nutrition Rx & Recommendations:  Diet: High Calorie, High Protein (for high calorie foods see pages 52-53, and for high protein foods see pages 49-51 in your Eating Hints book)  Small, frequent meals/snacks may be easier to tolerate than 3 large daily meals.  Aim for 5-6 small meals per day (every 2-3 hours).  Include protein at all meals/snacks.  Include a variety of foods (as tolerated/allowed by diet).  Follow a Cancer Preventative Nutrition Pattern: colorful, plant-based, low-fat, avoid added sugars, limit alcohol, include high fiber foods, limit processed meats, limit red meat, choose lean protein sources, use low-fat cooking methods, balance calories with physical activity, avoid excessive weight gain throughout life.  Incorporate physical activity as able/allowed.  Stay hydrated by sipping fluids of choice/tolerance throughout the day.  Liquid nutrition may be better tolerated than solids at times.  Alter food choices and eating patterns to accommodate changing needs.  Refer to Eating Hints book for other meal/snack ideas and symptom management.  Weigh yourself regularly. If you notice weight loss, make an effort to increase your daily food/calorie intake. If you continue to notice loss after these efforts, reach out to your dietitian to establish a plan to stabilize weight.  Increase intake of Ensure to 2-3x daily if appetite/weight decreases.   Continue to monitor blood sugar: Foods with carbohydrates make your blood glucose level go up. Learning how to count carbohydrates can help you control your blood glucose levels. First, identify the foods you eat that contain carbohydrates.   Foods that commonly contain carbohydrates include: grains (breads, pasta, cereal, rice), fruit, starchy vegetables (potatoes, corn, peas), snack foods (chips, pretzels, crackers), dairy products, sweets/desserts.   Foods that do not usually contain carbohydrates: chicken, pork, beef, fish, seafood, eggs, tofu, cheese, butter,  sour cream, avocado, nuts, seeds, olives, mayonnaise, water, black coffee, unsweetened tea, and zero-calorie drinks. Vegetables with no or low carbohydrate include green beans, cauliflower, tomatoes, and onions.     Follow Up Plan: 3/25/25 during infusion  Recommend Referral to Other Providers: none at this time

## 2025-02-25 NOTE — PROGRESS NOTES
Pt's chemo ball pump hooked up to pt's port and clamps open to run. Pt tolerated pump hook-up without complications. Aware of when to return for pump disconnect on 2/27/25 at 11:30am. AVS declined.

## 2025-02-25 NOTE — PROGRESS NOTES
TIMEOUT: Spoke with Danielle Stoddard RN who stated per Dr. Peña pt to no longer receive oxaliplatin starting today d/t neuropathy. Oxaliplatin removed from treatment plan and appointment time to be adjusted and sent to schedulers by Danielle.

## 2025-02-25 NOTE — PROGRESS NOTES
upon completion of 12 cycles of FOLFIRINOX.Name: Abhishek Gimenez      : 1955      MRN: [id number]  Encounter Provider: Butch Peña MD  Encounter Date: 2025   Encounter department: [facility] [place]  :  Assessment & Plan  Adenocarcinoma of pancreas (HCC)  Patient tolerating current treatment well.  Cycle 12-day 1 FOLFIRINOX (no oxaliplatin given significant neuropathy) today.  CT scan in 2024 with decrease in the size of the liver metastasis and stable pancreatic lesion.  CA 19-9 down to 67 (2025) from 462 in 2024  Ordered MRI abdomen.  Scheduled for 2025  Will continue with 5-FU and irinotecan every 2 weeks.   Depending on the MRI results patient would like to get a chemo break.  Will discuss upon next visit.    RTC after MRI abdomen completed.              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  Continue management as per endocrinology.  Lab Results   Component Value Date    HGBA1C 6.6 (A) 2024                  Cancer-related pain  Improved.              History of Present Illness   Chief Complaint   Patient presents with    Follow-up     Pt seen in infusion   68-year-old gentleman who noted the onset of diabetes mellitus and 2024 since that time he has been losing weight he is lost 30 pounds over the 6 months time.  He also developed intermittent abdominal pain epigastric left upper quadrant that continued to worsen.  He was started on Ozempic.  He drinks 3-4 beers every other day he last used tobacco 40 years ago but does not smoke now.  He has had a negative colonoscopy.  He underwent an EGD which was nondiagnostic subsequently underwent an EGD EUS which showed normal esophagus  normal stomach normal duodenum.  Celiac artery and aorta were visualized and appeared normal.  The left kidney spleen left adrenal appeared normal parenchyma of the liver.  Normal this was the left lobe of the liver.  He had a heterogeneous hypoechoic irregular mass measuring 30 mm x 18 mm with poorly defined margins in the body of pancreas it abutted the splenic artery and splenic vein needle biopsies were done for total.  The head of the pancreas and neck of the pancreas appeared to have some honeycombing.  Bile duct appeared normal gallbladder appeared normal.  This was done on 7/17/2024  Biopsy was consistent with adenocarcinoma.  CAT scan of the chest was negative.  On 7/10/2026 MRI of the abdomen showed the ill-defined 3.8 x 1.1 cm mass in the body of the pancreas the liver showed a 3.4 cm benign hemangioma 1.0 and 0.4 cm right lobe nodules all the seem to be compatible with hemangiomas.    Interval History:  Patient presents today for follow-up with his wife Sherri.  Reports continued neuropathy.  Feels fine.  Reports nausea for a couple days after chemotherapy other than that he denies any abdominal pain.  No fevers.  Appetite is good.  Denies any chest pains or shortness of breath.  No lightheadedness or dizziness.  Reports headaches after he receives chemotherapy that last for a couple days and then resolves.     Oncology History   Cancer Staging   Adenocarcinoma of pancreas (HCC)  Staging form: Pancreas, AJCC 8th Edition  - Clinical: Stage IB (cT2, cN0, cM0) - Signed by Roz Faulkner MD on 8/26/2024  Total positive nodes: 0  - Pathologic stage from 12/5/2024: Stage IV (pT2, cM1) - Signed by Butch Peña MD on 12/5/2024  Oncology History   Adenocarcinoma of pancreas (HCC)   8/13/2024 Biopsy    A.-B.  Pancreas, Body, FNA (Cell block and smear preparations):   Malignant  Adenocarcinoma.     8/20/2024 Initial Diagnosis    Adenocarcinoma of pancreas (HCC)     8/26/2024 -  Cancer Staged    Staging  form: Pancreas, AJCC 8th Edition  - Clinical: Stage IB (cT2, cN0, cM0) - Signed by Roz Faulkner MD on 8/26/2024  Total positive nodes: 0       9/24/2024 -  Chemotherapy    alteplase (CATHFLO), 2 mg, Intracatheter, Every 1 Minute as needed, 12 of 14 cycles  pegfilgrastim (NEULASTA ONPRO), 6 mg, Subcutaneous, Once, 12 of 14 cycles  Administration: 6 mg (9/26/2024), 6 mg (10/10/2024), 6 mg (10/24/2024), 6 mg (11/7/2024), 6 mg (11/21/2024), 6 mg (12/5/2024), 6 mg (12/19/2024), 6 mg (1/16/2025), 6 mg (1/2/2025), 6 mg (1/30/2025), 6 mg (2/13/2025)  fosaprepitant (EMEND) IVPB, 150 mg, Intravenous, Once, 12 of 14 cycles  Administration: 150 mg (9/24/2024), 150 mg (10/8/2024), 150 mg (10/22/2024), 150 mg (11/5/2024), 150 mg (11/19/2024), 150 mg (12/3/2024), 150 mg (12/17/2024), 150 mg (1/14/2025), 150 mg (12/31/2024), 150 mg (1/28/2025), 150 mg (2/11/2025), 150 mg (2/25/2025)  irinotecan (CAMPTOSAR) chemo infusion, 150 mg/m2 = 294 mg, Intravenous, Once, 12 of 14 cycles  Dose modification: 112.5 mg/m2 (75 % of original dose 150 mg/m2, Cycle 2, Reason: Dose Not Tolerated), 120 mg/m2 (80 % of original dose 150 mg/m2, Cycle 3, Reason: Original Dose Changed)  Administration: 294 mg (9/24/2024), 227 mg (10/8/2024), 240 mg (10/22/2024), 240 mg (11/5/2024), 238 mg (11/19/2024), 239 mg (12/3/2024), 239 mg (12/17/2024), 242 mg (1/14/2025), 241 mg (12/31/2024), 241 mg (1/28/2025), 244 mg (2/11/2025), 245 mg (2/25/2025)  oxaliplatin (ELOXATIN) chemo infusion, 65 mg/m2 = 127.4 mg, Intravenous, Once, 11 of 11 cycles  Dose modification: 48.75 mg/m2 (75 % of original dose 65 mg/m2, Cycle 2, Reason: Dose Not Tolerated), 52 mg/m2 (80 % of original dose 65 mg/m2, Cycle 3, Reason: Original Dose Changed)  Administration: 127.4 mg (9/24/2024), 98.5 mg (10/8/2024), 100 mg (10/22/2024), 100 mg (11/5/2024), 100 mg (11/19/2024), 100 mg (12/3/2024), 103.5 mg (12/17/2024), 105.05 mg (1/14/2025), 104.5 mg (12/31/2024), 104.5 mg (1/28/2025), 105.55  "mg (2/11/2025)  fluorouracil (ADRUCIL) ambulatory infusion Soln, 1,200 mg/m2/day = 4,705 mg, Intravenous, Over 46 hours, 12 of 14 cycles  Dose modification: 900 mg/m2/day (75 % of original dose 1,200 mg/m2/day, Cycle 3, Reason: Dose Not Tolerated), 960 mg/m2/day (80 % of original dose 1,200 mg/m2/day, Cycle 4, Reason: Other (see comments), Comment: provider request)     11/15/2024 Biopsy    Final Diagnosis   A. Liver mass, biopsy:  - Metastatic adenocarcinoma.         12/5/2024 -  Cancer Staged    Staging form: Pancreas, AJCC 8th Edition  - Pathologic stage from 12/5/2024: Stage IV (pT2, cM1) - Signed by Butch Peña MD on 12/5/2024          Pertinent Medical History   02/25/25: reviewed         Review of Systems  14 point review of systems was negative except that mentioned in HPI.        Objective   /85 (BP Location: Left arm, Patient Position: Sitting, Cuff Size: Adult)   Pulse 56   Temp (!) 97.1 °F (36.2 °C) (Temporal)   Resp 18   Ht 5' 10.98\" (1.803 m)   Wt 83.9 kg (185 lb)   SpO2 98%   BMI 25.82 kg/m²     Pain Screening:     ECOG   1  Physical Exam  Vitals and nursing note reviewed.   Constitutional:       General: He is not in acute distress.     Appearance: Normal appearance.   HENT:      Head: Normocephalic and atraumatic.      Mouth/Throat:      Mouth: Mucous membranes are moist.      Pharynx: Oropharynx is clear.   Eyes:      General: No scleral icterus.     Extraocular Movements: Extraocular movements intact.      Conjunctiva/sclera: Conjunctivae normal.   Cardiovascular:      Rate and Rhythm: Normal rate and regular rhythm.      Pulses: Normal pulses.      Heart sounds: No murmur heard.  Pulmonary:      Effort: Pulmonary effort is normal.      Breath sounds: Normal breath sounds. No wheezing, rhonchi or rales.   Abdominal:      General: Bowel sounds are normal.      Palpations: Abdomen is soft.      Tenderness: There is no abdominal tenderness.   Musculoskeletal:         General: No " swelling or tenderness. Normal range of motion.      Cervical back: Neck supple.   Lymphadenopathy:      Cervical: No cervical adenopathy.   Skin:     General: Skin is warm and dry.      Coloration: Skin is not pale.      Findings: No bruising, erythema or rash.   Neurological:      General: No focal deficit present.      Mental Status: He is alert and oriented to person, place, and time.      Motor: No weakness.   Psychiatric:         Mood and Affect: Mood normal.         Behavior: Behavior normal.         Labs: I have reviewed the following labs:  Lab Results   Component Value Date/Time    WBC 12.85 (H) 02/24/2025 12:19 PM    RBC 4.01 02/24/2025 12:19 PM    Hemoglobin 12.9 02/24/2025 12:19 PM    Hematocrit 38.3 02/24/2025 12:19 PM    MCV 96 02/24/2025 12:19 PM    MCH 32.2 02/24/2025 12:19 PM    RDW 13.8 02/24/2025 12:19 PM    Platelets 113 (L) 02/24/2025 12:19 PM    Segmented % 80 (H) 02/24/2025 12:19 PM    Lymphocytes % 9 (L) 02/24/2025 12:19 PM    Monocytes % 9 02/24/2025 12:19 PM    Eosinophils Relative 1 02/24/2025 12:19 PM    Basophils Relative 0 02/24/2025 12:19 PM    Immature Grans % 1 02/24/2025 12:19 PM    Absolute Neutrophils 10.33 (H) 02/24/2025 12:19 PM     Lab Results   Component Value Date/Time    Potassium 3.9 02/24/2025 12:19 PM    Chloride 102 02/24/2025 12:19 PM    CO2 31 02/24/2025 12:19 PM    BUN 14 02/24/2025 12:19 PM    Creatinine 0.79 02/24/2025 12:19 PM    Glucose, Fasting 110 (H) 09/23/2024 07:46 AM    Calcium 9.5 02/24/2025 12:19 PM    AST 25 02/24/2025 12:19 PM    ALT 32 02/24/2025 12:19 PM    Alkaline Phosphatase 182 (H) 02/24/2025 12:19 PM    Total Protein 6.9 02/24/2025 12:19 PM    Albumin 4.1 02/24/2025 12:19 PM    Total Bilirubin 0.39 02/24/2025 12:19 PM    eGFR 92 02/24/2025 12:19 PM               Disclaimer: This document was prepared using [employer] technology. If a word or phrase is confusing, or does not make sense, this is likely due to recognition error which was not  discovered during this clinician's review. If you believe an error has occurred, please contact me through [employer] service line for cathy?cation.      Follow Up    All questions were answered to the patient's satisfaction during this encounter. The patient knows the contact information for our office and knows to reach out for any relevant concerns related to this encounter. They are to call for any temperature 100.4 or higher, new symptoms including but not restricted to shaking chills, decreased appetite, nausea, vomiting, diarrhea, increased fatigue, shortness of breath or chest pain, confusion, and not feeling the strength to come to the clinic. For all other listed problems and medical diagnosis in their chart - they are managed by PCP and/or other specialists, which the patient acknowledges.     I spent 39 minutes reviewing the records (labs, clinician notes, outside records, medical history, ordering medicine/tests/procedures, monitoring of anti-neoplastic toxicities, interpreting the imaging/labs previously done) and coordination of care as well as direct time with the patient today, of which greater than 50% of the time was spent in counseling and coordination of care with the patient/family.

## 2025-02-25 NOTE — NURSING NOTE
Patient presents for [facility] complaining of peripheral neuropathy. Dr. Peña made aware.  Port accessed with positive blood return noted without complications. Chlorehexidine dressing in place. Patient tolerated treatment without incident. AVS declined. Next appointment reviewed for 2/27/25 at 12:30PM.

## 2025-02-27 ENCOUNTER — HOSPITAL ENCOUNTER (OUTPATIENT)
Dept: INFUSION CENTER | Facility: CLINIC | Age: 70
Discharge: HOME | End: 2025-02-27
Payer: COMMERCIAL

## 2025-02-27 DIAGNOSIS — G89.3 CANCER RELATED PAIN: ICD-10-CM

## 2025-02-27 DIAGNOSIS — C25.9 ADENOCARCINOMA OF PANCREAS (HCC): Primary | ICD-10-CM

## 2025-02-27 DIAGNOSIS — D70.1 CHEMOTHERAPY INDUCED NEUTROPENIA (HCC): Primary | ICD-10-CM

## 2025-02-27 DIAGNOSIS — C25.9 ADENOCARCINOMA OF PANCREAS (HCC): ICD-10-CM

## 2025-02-27 DIAGNOSIS — T45.1X5A CHEMOTHERAPY INDUCED NEUTROPENIA (HCC): Primary | ICD-10-CM

## 2025-02-27 PROCEDURE — 96372 THER/PROPH/DIAG INJ SC/IM: CPT

## 2025-02-27 RX ORDER — OXYCODONE HYDROCHLORIDE 5 MG/1
5 TABLET ORAL EVERY 6 HOURS PRN
Qty: 90 TABLET | Refills: 0 | Status: SHIPPED | OUTPATIENT
Start: 2025-02-27

## 2025-02-27 RX ADMIN — PEGFILGRASTIM 6 MG: KIT SUBCUTANEOUS at 11:50

## 2025-02-27 NOTE — TELEPHONE ENCOUNTER
01/20/2025 01/20/2025 oxyCODONE HCL (Tablet) 90.0 22 5 MG 30.68 [facility] Medicare 0 / 0 PA      1 [id number] 12/24/2024 12/24/2024 oxyCODONE HCL (Tablet) 90.0 23 5 MG 29.35 [facility] Medicare 0 / 0 PA    1 [id number] 12/06/2024 12/06/2024 oxyCODONE HCL (Tablet) 28.0 7 5 MG 30.0 [facility] Medicare

## 2025-02-27 NOTE — PROGRESS NOTES
Pt presents for CADD pump disconnection. Pt offers no complaints. Pump appears empty and deflated. Port flushed and de accessed without difficulty. Neulasta on pro placed on abdomen, blinking green. Patient aware of when to remove on pro. AVS declined, next appointment reviewed on 3/7 at 12:30pm.

## 2025-03-04 DIAGNOSIS — C25.9 ADENOCARCINOMA OF PANCREAS (HCC): Primary | ICD-10-CM

## 2025-03-04 RX ORDER — ATROPINE SULFATE 1 MG/ML
0.25 INJECTION, SOLUTION INTRAVENOUS ONCE
Status: CANCELLED | OUTPATIENT
Start: 2025-03-11

## 2025-03-04 RX ORDER — ATROPINE SULFATE 1 MG/ML
0.25 INJECTION, SOLUTION INTRAVENOUS ONCE AS NEEDED
Status: CANCELLED | OUTPATIENT
Start: 2025-03-11

## 2025-03-04 RX ORDER — SODIUM CHLORIDE 9 MG/ML
20 INJECTION, SOLUTION INTRAVENOUS ONCE AS NEEDED
Status: CANCELLED | OUTPATIENT
Start: 2025-03-11

## 2025-03-04 RX ORDER — DIPHENHYDRAMINE HYDROCHLORIDE 50 MG/ML
25 INJECTION INTRAMUSCULAR; INTRAVENOUS ONCE
Status: CANCELLED
Start: 2025-03-11 | End: 2025-03-11

## 2025-03-04 RX ORDER — DEXTROSE MONOHYDRATE 50 MG/ML
20 INJECTION, SOLUTION INTRAVENOUS ONCE
Status: CANCELLED | OUTPATIENT
Start: 2025-03-11

## 2025-03-05 ENCOUNTER — OFFICE VISIT (OUTPATIENT)
Dept: PALLIATIVE MEDICINE | Facility: CLINIC | Age: 70
End: 2025-03-05
Payer: COMMERCIAL

## 2025-03-05 VITALS
OXYGEN SATURATION: 98 % | SYSTOLIC BLOOD PRESSURE: 158 MMHG | WEIGHT: 185.8 LBS | RESPIRATION RATE: 18 BRPM | DIASTOLIC BLOOD PRESSURE: 88 MMHG | HEIGHT: 70 IN | BODY MASS INDEX: 26.6 KG/M2 | TEMPERATURE: 97.7 F | HEART RATE: 85 BPM

## 2025-03-05 DIAGNOSIS — Z51.5 PALLIATIVE CARE BY SPECIALIST: ICD-10-CM

## 2025-03-05 DIAGNOSIS — C25.9 ADENOCARCINOMA OF PANCREAS (HCC): Primary | ICD-10-CM

## 2025-03-05 DIAGNOSIS — G89.3 CANCER-RELATED PAIN: ICD-10-CM

## 2025-03-05 DIAGNOSIS — Z79.899 MEDICAL MARIJUANA USE: ICD-10-CM

## 2025-03-05 PROCEDURE — G2211 COMPLEX E/M VISIT ADD ON: HCPCS | Performed by: STUDENT IN AN ORGANIZED HEALTH CARE EDUCATION/TRAINING PROGRAM

## 2025-03-05 PROCEDURE — 99214 OFFICE O/P EST MOD 30 MIN: CPT | Performed by: STUDENT IN AN ORGANIZED HEALTH CARE EDUCATION/TRAINING PROGRAM

## 2025-03-05 NOTE — PROGRESS NOTES
Name: Abhishek Gimenez      : 1955      MRN: [id number]  Encounter Provider: Hira Ward DO  Encounter Date: 3/5/2025   Encounter department: [facility] [place]  :  Assessment & Plan  Adenocarcinoma of pancreas (HCC)  Diagnosed 2024, stage Ib  Currently on FOLFIRINOX chemotherapy (minus oxaliplatin due to significant neuropathy)    Tolerating treatments well  CA 19-9 2025 67, down from 462  CT abdomen pelvis 12/10/2024 revealed stable disease    Comorbidities: New onset diabetes mellitus on insulin, CATY, psoriatic arthritis, A-fib/a flutter, mild pulmonary hypertension       Cancer-related pain  Well controlled  Continue oxycodone IR 5 mg every 6 hours as needed (average 2 tabs/day)    Normal bowel movements, but will continue to monitor for OIC       Medical marijuana use  For alleviiating symptoms of insomnia, lost appetite, nausea, and cancer related pain  Done by [facility] colleague Dr. Gates  Patient ID  #[id number]   Certification #[id number]       Palliative care by specialist  Palliative diagnosis: Adenocarcinoma the pancreas  PPS: 80-90%    Education/counseling provided on role/purpose of palliative care; benefits/risks of treatment options by our team reviewed; instructions for management and anticipatory guidance provided; supportive listening and presence provided; provided space for life review and whole person assessment    Psychosocial/Spiritual:   -Coping well emotionally  -No social or spiritual needs  -Well supported by spouse Sherri    Follow-up planning: Recommending follow-up in approximately 4 months           Decisional apparatus: Patient is competent on my exam today. If competence is lost, patient's substitute decision maker would default to Urvashi Gimenez (see ACP doc on chart) by [place] Act 169.   Advance Directive / Living Will / POLST: Document on file     PDMP Review: I have reviewed the patient's controlled substance dispensing history in the Prescription  Drug Monitoring Program in compliance with the [place] regulations before prescribing any controlled substances.    History of Present Illness   Abhishek Gimenez is a 69 y.o. male who presents for follow-up related to diagnosis of adenocarcinoma the pancreas and symptom management.  He was last seen 11/25/2024.  At that visit, he was doing well overall and his symptoms were well-controlled with a combination of oxycodone and MMJ.  Since that visit he has seen oncology on 12/5/2024, 1/14/2025, 2/12/2025, 2/25/2025.  He continues to tolerate treatment well with FOLFIRINOX (except oxaliplatin given significant neuropathy).    He presents today by himself.  He states that approximately 1 week ago, his chronic cancer related left-sided abdominal pain got a little bit worse, so he went from taking 1 tablet of oxycodone IR a day to 2 tablets a day.  The pain is described as aching, does not radiate, maximum severity of 1/10, and oxycodone alleviates it.  No aggravating factors.  He has been having normal bowel movements, increased weight gain and appetite.  He had a little bit of increased neuropathy in his tongue and fingertips with some numbness after each chemotherapy dose.  They recently dropped the dose of his chemotherapy and got rid of oxaliplatin.  Emotionally coping okay, with normal periods of grief and sadness.  Great support from his wife.  They are planning on going on an [place] trip in the second week of April.    Medical History Reviewed by provider this encounter:     .  Past Medical History   Past Medical History:   Diagnosis Date    A-fib (HCC)     Arthritis     BPH (benign prostatic hyperplasia)     Cancer (HCC)     Colon polyp     Diabetes mellitus (HCC)     Fatty liver     Fracture, foot     High cholesterol     Hyperlipidemia     Irregular heart beat     Non-ischemic cardiomyopathy (HCC)     Osteoarthritis     Psoriasis     Psoriatic arthritis (HCC)     Pulmonary HTN (HCC)      Past Surgical History:    Procedure Laterality Date    CARDIAC ELECTROPHYSIOLOGY PROCEDURE N/A 02/22/2023    Procedure: Cardiac eps/afib ablation   comprehensive posterior wall;  Surgeon: Mynor Andrews MD;  Location: [facility];  Service: Cardiology    COLONOSCOPY      ELBOW SURGERY Right     FL GUIDED CENTRAL VENOUS ACCESS DEVICE INSERTION  9/5/2024    IR BIOPSY LIVER MASS  9/18/2024    IR BIOPSY LIVER MASS  11/15/2024    KIDNEY STONE SURGERY      LAPAROSCOPY N/A 9/5/2024    Procedure: DIAGNOSTIC LAPAROSCOPY;  Surgeon: Roz Faulkner MD;  Location: [facility];  Service: Surgical Oncology    TRANSURETHRAL RESECTION OF PROSTATE      last assessed 7/21/15    TUNNELED VENOUS PORT PLACEMENT N/A 9/5/2024    Procedure: INSERTION VENOUS PORT (PORT-A-CATH);  Surgeon: Roz Faulkner MD;  Location: [facility];  Service: Surgical Oncology     Family History   Problem Relation Age of Onset    Diabetes Mother     Hypertension Father     Diabetes Family       reports that he has quit smoking. His smoking use included cigarettes. He has a 5 pack-year smoking history. He has been exposed to tobacco smoke. He has never used smokeless tobacco. He reports current alcohol use. He reports current drug use. Drug: Marijuana.  Current Outpatient Medications   Medication Instructions    Alcohol Swabs 70 % PADS May substitute brand based on insurance coverage. Check glucose TID.    Benzocaine-Isopropyl Alcohol 6-70 % PADS Use    Blood Glucose Monitoring Suppl ([employer] Blood Glucose Meter) w/Device KIT Use    Blood Glucose Monitoring Suppl (OneTouch Verio Reflect) w/Device KIT Check blood sugars once daily. Please substitute with appropriate alternative as covered by patient's insurance. Dx: E11.65    Continuous Blood Gluc  (FreeStyle Livia 2 [place]) LEANA 14 DAY READER NOT MADE    Continuous Glucose Sensor (FreeStyle Livia 14 Day Sensor) MISC USE 1 DEVICE 3 (THREE) TIMES A DAY BEFORE MEALS    Continuous Glucose Sensor (FreeStyle Livia 14 Day  Sensor) MISC USE 1 DEVICE EVERY 14 DAYS AS DIRECTED    [START ON 3/11/2025] fluorouracil 3,915 mg in CADD/Elastomeric Infusion Device 960 mg/m2/day, Intravenous, Over 46 hours    Glucose Blood (BLOOD GLUCOSE TEST STRIPS 333 VI) Use    glucose blood (OneTouch Verio) test strip Check blood sugars once daily. Please substitute with appropriate alternative as covered by patient's insurance. Dx: E11.65    insulin glargine (LANTUS) 20 Units, Subcutaneous, Daily at bedtime    Insulin Pen Needle (BD Pen Needle Mallory 2nd Gen) 32G X 4 MM MISC For use with insulin pen. Pharmacy may dispense brand covered by insurance.    Insulin Pen Needle (BD Pen Needle Mallory U/F) 32G X 4 MM MISC Use daily as directed with insulin pen    Lancets 33G MISC Use    Lantus SoloStar 20 Units, Subcutaneous, Daily at bedtime    lidocaine-prilocaine (EMLA) cream Topical, As needed, Apply to port area 45-60min prior to treatment and cover with dressing or plastic wrap    metFORMIN (GLUCOPHAGE-XR) 1,000 mg, Oral, Daily with breakfast    ondansetron (ZOFRAN) 4 mg, Oral, Every 8 hours PRN    OneTouch Delica Lancets 33G MISC Check blood sugars once daily. Please substitute with appropriate alternative as covered by patient's insurance. Dx: E11.65    oxyCODONE (ROXICODONE) 5 mg, Oral, Every 6 hours PRN    pantoprazole (PROTONIX) 40 mg, Oral, Daily before breakfast    rosuvastatin (CRESTOR) 5 mg, Oral, Every evening    sildenafil (VIAGRA) 100 mg tablet TAKE ONE (1) TABLET BY MOUTH DAILY AS NEEDED     Allergies   Allergen Reactions    Penicillins Rash      Current Outpatient Medications on File Prior to Visit   Medication Sig Dispense Refill    Alcohol Swabs 70 % PADS May substitute brand based on insurance coverage. Check glucose TID. (Patient not taking: Reported on 9/23/2024) 100 each 0    Benzocaine-Isopropyl Alcohol 6-70 % PADS Use      Blood Glucose Monitoring Suppl (CVS Blood Glucose Meter) w/Device KIT Use      Blood Glucose Monitoring Suppl (OneTouch  Verio Reflect) w/Device KIT Check blood sugars once daily. Please substitute with appropriate alternative as covered by patient's insurance. Dx: E11.65 1 kit 0    Continuous Blood Gluc  (FreeStyle Livia 2 [place]) LEANA 14 DAY READER NOT MADE 1 each 1    Continuous Glucose Sensor (FreeStyle Livia 14 Day Sensor) MISC USE 1 DEVICE 3 (THREE) TIMES A DAY BEFORE MEALS      Continuous Glucose Sensor (FreeStyle Livia 14 Day Sensor) MISC USE 1 DEVICE EVERY 14 DAYS AS DIRECTED 2 each 5    [START ON 3/11/2025] fluorouracil 3,915 mg in CADD/Elastomeric Infusion Device Infuse 3,915 mg (960 mg/m2/day x 2.04 m2) into a catheter in a vein via infusion device over 46 hours for 2 days  Infusion planned for March 11, 2025. 1 each 3    Glucose Blood (BLOOD GLUCOSE TEST STRIPS 333 VI) Use      glucose blood (OneTouch Verio) test strip Check blood sugars once daily. Please substitute with appropriate alternative as covered by patient's insurance. Dx: E11.65 100 each 3    insulin glargine (LANTUS) 100 units/mL subcutaneous injection Inject 20 Units under the skin daily at bedtime 6 mL 3    Insulin Pen Needle (BD Pen Needle Mallory 2nd Gen) 32G X 4 MM MISC For use with insulin pen. Pharmacy may dispense brand covered by insurance. 100 each 0    Insulin Pen Needle (BD Pen Needle Mallory U/F) 32G X 4 MM MISC Use daily as directed with insulin pen 100 each 3    Lancets 33G MISC Use      Lantus SoloStar 100 units/mL SOPN INJECT 0.2 ML (20 UNITS TOTAL) UNDER THE SKIN DAILY AT BEDTIME 15 mL 2    lidocaine-prilocaine (EMLA) cream Apply topically as needed for mild pain Apply to port area 45-60min prior to treatment and cover with dressing or plastic wrap 30 g 1    metFORMIN (GLUCOPHAGE-XR) 500 mg 24 hr tablet Take 2 tablets (1,000 mg total) by mouth daily with breakfast 180 tablet 1    ondansetron (ZOFRAN) 4 mg tablet Take 1 tablet (4 mg total) by mouth every 8 (eight) hours as needed for nausea or vomiting 20 tablet 3    OneTouch Delica Lancets  33G MISC Check blood sugars once daily. Please substitute with appropriate alternative as covered by patient's insurance. Dx: E11.65 100 each 3    oxyCODONE (Roxicodone) 5 immediate release tablet Take 1 tablet (5 mg total) by mouth every 6 (six) hours as needed for moderate pain Max Daily Amount: 20 mg 90 tablet 0    pantoprazole (PROTONIX) 40 mg tablet Take 1 tablet (40 mg total) by mouth daily before breakfast 30 tablet 5    rosuvastatin (CRESTOR) 5 mg tablet TAKE 1 TABLET BY MOUTH EVERY DAY IN THE EVENING 90 tablet 1    sildenafil (VIAGRA) 100 mg tablet TAKE ONE (1) TABLET BY MOUTH DAILY AS NEEDED       No current facility-administered medications on file prior to visit.      Social History     Tobacco Use    Smoking status: Former     Current packs/day: 0.50     Average packs/day: 0.5 packs/day for 10.0 years (5.0 ttl pk-yrs)     Types: Cigarettes     Passive exposure: Past    Smokeless tobacco: Never   Vaping Use    Vaping status: Never Used   Substance and Sexual Activity    Alcohol use: Yes     Comment: 1 drink daily ( pt stopped drinking in July); rarely    Drug use: Yes     Types: Marijuana     Comment: medical marijuana tincture last taken 11/13/24    Sexual activity: Not on file        Objective   There were no vitals taken for this visit.    Physical Exam  Constitutional:       General: He is not in acute distress.     Appearance: He is not ill-appearing.   HENT:      Head: Normocephalic and atraumatic.      Right Ear: External ear normal.      Left Ear: External ear normal.      Nose: Nose normal.      Mouth/Throat:      Mouth: Mucous membranes are moist.      Pharynx: Oropharynx is clear.   Eyes:      General: No scleral icterus.     Conjunctiva/sclera: Conjunctivae normal.   Cardiovascular:      Rate and Rhythm: Normal rate and regular rhythm.      Pulses: Normal pulses.   Pulmonary:      Effort: Pulmonary effort is normal. No respiratory distress.   Abdominal:      General: There is no distension.       Palpations: Abdomen is soft.      Tenderness: There is no abdominal tenderness.   Musculoskeletal:      Cervical back: No rigidity or tenderness.      Right lower leg: No edema.      Left lower leg: No edema.   Lymphadenopathy:      Cervical: No cervical adenopathy.   Skin:     General: Skin is warm.      Capillary Refill: Capillary refill takes less than 2 seconds.      Coloration: Skin is not jaundiced or pale.   Neurological:      General: No focal deficit present.      Mental Status: He is alert. Mental status is at baseline.   Psychiatric:         Mood and Affect: Mood normal.         Behavior: Behavior normal.         Thought Content: Thought content normal.         Judgment: Judgment normal.         Recent labs:  Lab Results   Component Value Date/Time    SODIUM 139 02/24/2025 12:19 PM    SODIUM 143 02/10/2023 10:42 AM    K 3.9 02/24/2025 12:19 PM    K 4.3 02/10/2023 10:42 AM    BUN 14 02/24/2025 12:19 PM    BUN 21 02/10/2023 10:42 AM    CREATININE 0.79 02/24/2025 12:19 PM    CREATININE 0.83 11/09/2023 08:56 AM    GLUC 153 (H) 02/24/2025 12:19 PM    GLUC 96 02/10/2023 10:42 AM    CALCIUM 9.5 02/24/2025 12:19 PM    CALCIUM 9.5 03/15/2016 07:26 AM    AST 25 02/24/2025 12:19 PM    AST 25 11/09/2023 08:56 AM    ALT 32 02/24/2025 12:19 PM    ALT 47 11/09/2023 08:56 AM    ALB 4.1 02/24/2025 12:19 PM    ALB 4.3 11/09/2023 08:56 AM    TP 6.9 02/24/2025 12:19 PM    TP 7.2 11/09/2023 08:56 AM    EGFR 92 02/24/2025 12:19 PM    EGFR 97 11/09/2023 08:56 AM     Lab Results   Component Value Date/Time    HGB 12.9 02/24/2025 12:19 PM    WBC 12.85 (H) 02/24/2025 12:19 PM     (L) 02/24/2025 12:19 PM    INR 1.09 11/15/2024 09:17 AM     Lab Results   Component Value Date/Time    [id number] 2.140 01/11/2023 10:16 AM    ZXG1YZPJYJHT 3.760 03/15/2016 07:26 AM       Recent Imaging:  Procedure: CT abdomen pelvis w contrast  Result Date: 12/15/2024  Impression: 1.  Questionable improvement of liver metastases. 2.   Grossly stable appearance of pancreatic mass with adjacent perivascular extension and vascular invasion. 3.  For future follow-up, recommend consistent imaging protocol consisting of arterial and venous phases; otherwise, it would be very difficult to compare if only a single phase scan is obtained or there is difference in imaging modality. Consider MRI abdomen with and without contrast if there is no contraindication. 4.  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 5.  Additional findings as described above. The study was marked in EPIC for significant notification. Workstation performed: [id number]     Procedure: IR biopsy liver mass  Result Date: 11/15/2024  Impression: Impression: 15 mm subtle hypoechoic nodule is identified in the inferior right lobe periphery corresponding to a hypodense nodule on noncontrast CT. This nodule correlates to the indeterminate nodule identified on prior imaging. Core biopsy was performed showing lesional material by touch prep. Workstation performed: [id number]       Administrative Statements   I have spent a total time of 30 minutes in caring for this patient on the day of the visit/encounter including Impressions, Counseling / Coordination of care, Documenting in the medical record, Reviewing/placing orders in the medical record (including tests, medications, and/or procedures), and Obtaining or reviewing history  .   Topics discussed with the patient / family include symptom assessment and management, medication review, psychosocial support, supportive listening, and anticipatory guidance.

## 2025-03-05 NOTE — ASSESSMENT & PLAN NOTE
Well controlled  Continue oxycodone IR 5 mg every 6 hours as needed (average 2 tabs/day)    Normal bowel movements, but will continue to monitor for OIC

## 2025-03-05 NOTE — ASSESSMENT & PLAN NOTE
Palliative diagnosis: Adenocarcinoma the pancreas  PPS: 80-90%    Education/counseling provided on role/purpose of palliative care; benefits/risks of treatment options by our team reviewed; instructions for management and anticipatory guidance provided; supportive listening and presence provided; provided space for life review and whole person assessment    Psychosocial/Spiritual:   -Coping well emotionally  -No social or spiritual needs  -Well supported by spouse Sherri    Follow-up planning: Recommending follow-up in approximately 4 months

## 2025-03-05 NOTE — ASSESSMENT & PLAN NOTE
For alleviiating symptoms of insomnia, lost appetite, nausea, and cancer related pain  Done by [facility] colleague Dr. Gates  Patient ID  #[id number]   Certification #[id number]

## 2025-03-05 NOTE — ASSESSMENT & PLAN NOTE
Diagnosed 8/2024, stage Ib  Currently on FOLFIRINOX chemotherapy (minus oxaliplatin due to significant neuropathy)    Tolerating treatments well  CA 19-9 1/14/2025 67, down from 462  CT abdomen pelvis 12/10/2024 revealed stable disease    Comorbidities: New onset diabetes mellitus on insulin, CATY, psoriatic arthritis, A-fib/a flutter, mild pulmonary hypertension

## 2025-03-07 ENCOUNTER — HOSPITAL ENCOUNTER (OUTPATIENT)
Dept: INFUSION CENTER | Facility: CLINIC | Age: 70
End: 2025-03-07
Payer: COMMERCIAL

## 2025-03-07 DIAGNOSIS — Z95.828 PORT-A-CATH IN PLACE: Primary | ICD-10-CM

## 2025-03-07 DIAGNOSIS — C25.9 ADENOCARCINOMA OF PANCREAS (HCC): ICD-10-CM

## 2025-03-07 LAB
ALBUMIN SERPL BCG-MCNC: 4.3 G/DL (ref 3.5–5)
ALP SERPL-CCNC: 219 U/L (ref 34–104)
ALT SERPL W P-5'-P-CCNC: 27 U/L (ref 7–52)
ANION GAP SERPL CALCULATED.3IONS-SCNC: 9 MMOL/L (ref 4–13)
AST SERPL W P-5'-P-CCNC: 21 U/L (ref 13–39)
BASOPHILS # BLD AUTO: 0.1 THOUSANDS/ÂΜL (ref 0–0.1)
BASOPHILS NFR BLD AUTO: 1 % (ref 0–1)
BILIRUB SERPL-MCNC: 0.34 MG/DL (ref 0.2–1)
BUN SERPL-MCNC: 11 MG/DL (ref 5–25)
CALCIUM SERPL-MCNC: 9.2 MG/DL (ref 8.4–10.2)
CHLORIDE SERPL-SCNC: 100 MMOL/L (ref 96–108)
CO2 SERPL-SCNC: 30 MMOL/L (ref 21–32)
CREAT SERPL-MCNC: 0.81 MG/DL (ref 0.6–1.3)
EOSINOPHIL # BLD AUTO: 0.08 THOUSAND/ÂΜL (ref 0–0.61)
EOSINOPHIL NFR BLD AUTO: 0 % (ref 0–6)
ERYTHROCYTE [DISTWIDTH] IN BLOOD BY AUTOMATED COUNT: 13.8 % (ref 11.6–15.1)
GFR SERPL CREATININE-BSD FRML MDRD: 91 ML/MIN/1.73SQ M
GLUCOSE SERPL-MCNC: 240 MG/DL (ref 65–140)
HCT VFR BLD AUTO: 37.2 % (ref 36.5–49.3)
HGB BLD-MCNC: 12.3 G/DL (ref 12–17)
IMM GRANULOCYTES # BLD AUTO: 0.34 THOUSAND/UL (ref 0–0.2)
IMM GRANULOCYTES NFR BLD AUTO: 2 % (ref 0–2)
LYMPHOCYTES # BLD AUTO: 1.17 THOUSANDS/ÂΜL (ref 0.6–4.47)
LYMPHOCYTES NFR BLD AUTO: 7 % (ref 14–44)
MCH RBC QN AUTO: 32.1 PG (ref 26.8–34.3)
MCHC RBC AUTO-ENTMCNC: 33.1 G/DL (ref 31.4–37.4)
MCV RBC AUTO: 97 FL (ref 82–98)
MONOCYTES # BLD AUTO: 1.68 THOUSAND/ÂΜL (ref 0.17–1.22)
MONOCYTES NFR BLD AUTO: 9 % (ref 4–12)
NEUTROPHILS # BLD AUTO: 14.6 THOUSANDS/ÂΜL (ref 1.85–7.62)
NEUTS SEG NFR BLD AUTO: 81 % (ref 43–75)
NRBC BLD AUTO-RTO: 0 /100 WBCS
PLATELET # BLD AUTO: 119 THOUSANDS/UL (ref 149–390)
PMV BLD AUTO: 10.1 FL (ref 8.9–12.7)
POTASSIUM SERPL-SCNC: 3.8 MMOL/L (ref 3.5–5.3)
PROT SERPL-MCNC: 6.6 G/DL (ref 6.4–8.4)
RBC # BLD AUTO: 3.83 MILLION/UL (ref 3.88–5.62)
SODIUM SERPL-SCNC: 139 MMOL/L (ref 135–147)
WBC # BLD AUTO: 17.97 THOUSAND/UL (ref 4.31–10.16)

## 2025-03-07 PROCEDURE — 80053 COMPREHEN METABOLIC PANEL: CPT | Performed by: INTERNAL MEDICINE

## 2025-03-07 PROCEDURE — 85025 COMPLETE CBC W/AUTO DIFF WBC: CPT | Performed by: INTERNAL MEDICINE

## 2025-03-07 NOTE — PROGRESS NOTES
Patient here for port lab draw. Offers no complaints at this time. Port a cath accessed with positive blood return. 10ml syringe blood discard, labs collected, flushed and deaccessed. Band-aid placed.   AVS declined.   Next appointment for 3/11/2025 @ 8am   Walked out of clinic with no incident.

## 2025-03-10 DIAGNOSIS — E11.65 TYPE 2 DIABETES MELLITUS WITH HYPERGLYCEMIA, WITHOUT LONG-TERM CURRENT USE OF INSULIN (HCC): ICD-10-CM

## 2025-03-11 ENCOUNTER — HOSPITAL ENCOUNTER (OUTPATIENT)
Dept: INFUSION CENTER | Facility: CLINIC | Age: 70
Discharge: HOME | End: 2025-03-11
Payer: COMMERCIAL

## 2025-03-11 VITALS
HEART RATE: 58 BPM | RESPIRATION RATE: 16 BRPM | DIASTOLIC BLOOD PRESSURE: 78 MMHG | TEMPERATURE: 97.3 F | WEIGHT: 183.6 LBS | BODY MASS INDEX: 25.7 KG/M2 | SYSTOLIC BLOOD PRESSURE: 135 MMHG | HEIGHT: 71 IN

## 2025-03-11 DIAGNOSIS — C25.9 ADENOCARCINOMA OF PANCREAS (HCC): Primary | ICD-10-CM

## 2025-03-11 PROCEDURE — 96375 TX/PRO/DX INJ NEW DRUG ADDON: CPT

## 2025-03-11 PROCEDURE — G0498 CHEMO EXTEND IV INFUS W/PUMP: HCPCS

## 2025-03-11 PROCEDURE — 96413 CHEMO IV INFUSION 1 HR: CPT

## 2025-03-11 PROCEDURE — 96367 TX/PROPH/DG ADDL SEQ IV INF: CPT

## 2025-03-11 RX ORDER — SODIUM CHLORIDE 9 MG/ML
20 INJECTION, SOLUTION INTRAVENOUS ONCE AS NEEDED
Status: DISCONTINUED | OUTPATIENT
Start: 2025-03-11 | End: 2025-03-14 | Stop reason: HOSPADM

## 2025-03-11 RX ORDER — DEXTROSE MONOHYDRATE 50 MG/ML
20 INJECTION, SOLUTION INTRAVENOUS ONCE
Status: COMPLETED | OUTPATIENT
Start: 2025-03-11 | End: 2025-03-11

## 2025-03-11 RX ORDER — ATROPINE SULFATE 1 MG/ML
0.25 INJECTION, SOLUTION INTRAVENOUS ONCE AS NEEDED
Status: DISCONTINUED | OUTPATIENT
Start: 2025-03-11 | End: 2025-03-14 | Stop reason: HOSPADM

## 2025-03-11 RX ORDER — DIPHENHYDRAMINE HYDROCHLORIDE 50 MG/ML
25 INJECTION, SOLUTION INTRAMUSCULAR; INTRAVENOUS ONCE
Status: COMPLETED | OUTPATIENT
Start: 2025-03-11 | End: 2025-03-11

## 2025-03-11 RX ORDER — INSULIN GLARGINE 100 [IU]/ML
20 INJECTION, SOLUTION SUBCUTANEOUS
Qty: 6 ML | Refills: 0 | Status: SHIPPED | OUTPATIENT
Start: 2025-03-11 | End: 2025-04-10

## 2025-03-11 RX ORDER — ATROPINE SULFATE 1 MG/ML
0.25 INJECTION, SOLUTION INTRAVENOUS ONCE
Status: COMPLETED | OUTPATIENT
Start: 2025-03-11 | End: 2025-03-11

## 2025-03-11 RX ADMIN — DIPHENHYDRAMINE HYDROCHLORIDE 25 MG: 50 INJECTION, SOLUTION INTRAMUSCULAR; INTRAVENOUS at 08:57

## 2025-03-11 RX ADMIN — FOSAPREPITANT 150 MG: 150 INJECTION, POWDER, LYOPHILIZED, FOR SOLUTION INTRAVENOUS at 09:25

## 2025-03-11 RX ADMIN — DEXTROSE 20 ML/HR: 5 SOLUTION INTRAVENOUS at 10:12

## 2025-03-11 RX ADMIN — ATROPINE SULFATE 0.25 MG: 1 INJECTION, SOLUTION INTRAVENOUS at 10:07

## 2025-03-11 RX ADMIN — DEXAMETHASONE SODIUM PHOSPHATE: 10 INJECTION, SOLUTION INTRAMUSCULAR; INTRAVENOUS at 09:00

## 2025-03-11 RX ADMIN — IRINOTECAN HYDROCHLORIDE 245 MG: 20 INJECTION, SOLUTION INTRAVENOUS at 10:15

## 2025-03-11 NOTE — PROGRESS NOTES
Pt here for modified FOLFIRINOX, offering no complaints. PAC accessed with positive blood return noted. Tolerated entire infusion without complication. Chemo ball hooked up to pt port, clamps opened to run. AVS declined. Next appt 3/13 10am. Walked out in stable condition.

## 2025-03-11 NOTE — TELEPHONE ENCOUNTER
Patient needs an appointment. Please contact the patient to schedule an appointment. Last office visit: 8/24      Patient needs updated blood work. Please place orders. A courtesy refill was provided.     Pt needs A1c

## 2025-03-13 ENCOUNTER — HOSPITAL ENCOUNTER (OUTPATIENT)
Dept: INFUSION CENTER | Facility: CLINIC | Age: 70
Discharge: HOME | End: 2025-03-13

## 2025-03-13 DIAGNOSIS — C25.9 ADENOCARCINOMA OF PANCREAS (HCC): Primary | ICD-10-CM

## 2025-03-13 NOTE — PROGRESS NOTES
Pt to clinic for elastometric pump disconnect. Offers no complaints today. Pump has been running for over 46 hours and appears empty and deflated. Pt tolerated pump disconnect without complications. Port de-accessed. Pt aware of next appointment on 3/21/25 at 1300. AVS declined.

## 2025-03-16 DIAGNOSIS — E11.65 TYPE 2 DIABETES MELLITUS WITH HYPERGLYCEMIA, WITHOUT LONG-TERM CURRENT USE OF INSULIN (HCC): ICD-10-CM

## 2025-03-17 RX ORDER — FLASH GLUCOSE SENSOR
1 KIT MISCELLANEOUS
Qty: 2 EACH | Refills: 3 | Status: SHIPPED | OUTPATIENT
Start: 2025-03-17

## 2025-03-18 RX ORDER — SODIUM CHLORIDE 9 MG/ML
20 INJECTION, SOLUTION INTRAVENOUS ONCE AS NEEDED
Status: CANCELLED | OUTPATIENT
Start: 2025-03-25

## 2025-03-18 RX ORDER — DIPHENHYDRAMINE HYDROCHLORIDE 50 MG/ML
25 INJECTION, SOLUTION INTRAMUSCULAR; INTRAVENOUS ONCE
Status: CANCELLED
Start: 2025-03-25 | End: 2025-03-25

## 2025-03-18 RX ORDER — ATROPINE SULFATE 1 MG/ML
0.25 INJECTION, SOLUTION INTRAVENOUS ONCE
Status: CANCELLED | OUTPATIENT
Start: 2025-03-25

## 2025-03-18 RX ORDER — ATROPINE SULFATE 1 MG/ML
0.25 INJECTION, SOLUTION INTRAVENOUS ONCE AS NEEDED
Status: CANCELLED | OUTPATIENT
Start: 2025-03-25

## 2025-03-18 RX ORDER — DEXTROSE MONOHYDRATE 50 MG/ML
20 INJECTION, SOLUTION INTRAVENOUS ONCE
Status: CANCELLED | OUTPATIENT
Start: 2025-03-25

## 2025-03-19 ENCOUNTER — OFFICE VISIT (OUTPATIENT)
Dept: FAMILY MEDICINE CLINIC | Facility: CLINIC | Age: 70
End: 2025-03-19
Payer: COMMERCIAL

## 2025-03-19 VITALS
OXYGEN SATURATION: 97 % | DIASTOLIC BLOOD PRESSURE: 80 MMHG | HEIGHT: 71 IN | SYSTOLIC BLOOD PRESSURE: 140 MMHG | WEIGHT: 182 LBS | HEART RATE: 59 BPM | TEMPERATURE: 96.9 F | BODY MASS INDEX: 25.48 KG/M2

## 2025-03-19 DIAGNOSIS — I48.0 PAROXYSMAL ATRIAL FIBRILLATION (HCC): ICD-10-CM

## 2025-03-19 DIAGNOSIS — R10.13 EPIGASTRIC PAIN: ICD-10-CM

## 2025-03-19 DIAGNOSIS — E11.69 HYPERLIPIDEMIA ASSOCIATED WITH TYPE 2 DIABETES MELLITUS  (HCC): ICD-10-CM

## 2025-03-19 DIAGNOSIS — L40.50 PSORIATIC ARTHRITIS (HCC): ICD-10-CM

## 2025-03-19 DIAGNOSIS — Z00.00 MEDICARE ANNUAL WELLNESS VISIT, INITIAL: ICD-10-CM

## 2025-03-19 DIAGNOSIS — E78.5 HYPERLIPIDEMIA ASSOCIATED WITH TYPE 2 DIABETES MELLITUS  (HCC): ICD-10-CM

## 2025-03-19 DIAGNOSIS — E11.65 TYPE 2 DIABETES MELLITUS WITH HYPERGLYCEMIA, WITHOUT LONG-TERM CURRENT USE OF INSULIN (HCC): ICD-10-CM

## 2025-03-19 DIAGNOSIS — C25.9 ADENOCARCINOMA OF PANCREAS (HCC): Primary | ICD-10-CM

## 2025-03-19 DIAGNOSIS — F11.20 OPIOID DEPENDENCE, UNCOMPLICATED (HCC): ICD-10-CM

## 2025-03-19 LAB — SL AMB POCT HEMOGLOBIN AIC: 7.7 (ref ?–6.5)

## 2025-03-19 PROCEDURE — 83036 HEMOGLOBIN GLYCOSYLATED A1C: CPT | Performed by: PHYSICIAN ASSISTANT

## 2025-03-19 PROCEDURE — 99214 OFFICE O/P EST MOD 30 MIN: CPT | Performed by: PHYSICIAN ASSISTANT

## 2025-03-19 PROCEDURE — G0438 PPPS, INITIAL VISIT: HCPCS | Performed by: PHYSICIAN ASSISTANT

## 2025-03-19 RX ORDER — ROSUVASTATIN CALCIUM 5 MG/1
5 TABLET, COATED ORAL EVERY EVENING
Qty: 100 TABLET | Refills: 1 | Status: SHIPPED | OUTPATIENT
Start: 2025-03-19 | End: 2025-10-05

## 2025-03-19 RX ORDER — PANTOPRAZOLE SODIUM 40 MG/1
40 TABLET, DELAYED RELEASE ORAL
Qty: 100 TABLET | Refills: 1 | Status: SHIPPED | OUTPATIENT
Start: 2025-03-19 | End: 2025-10-05

## 2025-03-19 NOTE — ASSESSMENT & PLAN NOTE
Check FLP  Continue statin  Lab Results   Component Value Date    HGBA1C 7.7 (A) 03/19/2025     Orders:  •  Lipid Panel with Direct LDL reflex; Future

## 2025-03-19 NOTE — ASSESSMENT & PLAN NOTE
Continue basal insulin and metformin  Lower carbs in diet  Previously controlled in this regimen  Lab Results   Component Value Date    HGBA1C 7.7 (A) 03/19/2025     Orders:  •  POCT hemoglobin A1c  •  Albumin / creatinine urine ratio; Future  •  rosuvastatin (CRESTOR) 5 mg tablet; Take 1 tablet (5 mg total) by mouth every evening

## 2025-03-19 NOTE — PROGRESS NOTES
Name: Abhishek Gimenez      : 1955      MRN: [id number]  Encounter Provider: Kamar Canales PA-C  Encounter Date: 3/19/2025   Encounter department: [facility]    Assessment & Plan  Type 2 diabetes mellitus with hyperglycemia, without long-term current use of insulin (HCC)  Continue basal insulin and metformin  Lower carbs in diet  Previously controlled in this regimen  Lab Results   Component Value Date    HGBA1C 7.7 (A) 2025     Orders:  •  POCT hemoglobin A1c  •  Albumin / creatinine urine ratio; Future  •  rosuvastatin (CRESTOR) 5 mg tablet; Take 1 tablet (5 mg total) by mouth every evening    Adenocarcinoma of pancreas (HCC)  Continue with oncology       Hyperlipidemia associated with type 2 diabetes mellitus  (HCC)  Check FLP  Continue statin  Lab Results   Component Value Date    HGBA1C 7.7 (A) 2025     Orders:  •  Lipid Panel with Direct LDL reflex; Future    Epigastric pain  Controlled with oxycodone and medical marijuana with palliative  Also on PPI  Orders:  •  pantoprazole (PROTONIX) 40 mg tablet; Take 1 tablet (40 mg total) by mouth daily before breakfast    Opioid dependence, uncomplicated (HCC)  As above  Follows with palliative for cancer related pain       Psoriatic arthritis (HCC)  Symptomatically controlled       Paroxysmal atrial fibrillation (HCC)  Maintaining NSR with hx of ablation  No thinners or cardiac complaints        Medicare annual wellness visit, initial            Preventive health issues were discussed with patient, and age appropriate screening tests were ordered as noted in patient's After Visit Summary. Personalized health advice and appropriate referrals for health education or preventive services given if needed, as noted in patient's After Visit Summary.    History of Present Illness     Pt presents for follow up    DM: on basal insulin and metformin, A1c is trending up, now 7.7. +statin. No ace/arb  HLD: due for FLP, is on  crestor  BP borderline, no antihypertensive therapy currently. /80 today for me  Pancreatic cancer, follows with oncology, palliative care  Hx of PAF, hx of ablations and has been maintaining NSR. No thinners or cardiac complaints        Patient Care Team:  Winston Smith MD as PCP - General (Family Medicine)  Winston Smith MD as PCP - PCP-[facility] ([facility])  MD Lambert Garcia MD (Gastroenterology)  Roz Faulkner MD (Surgical Oncology)  Lashell Lew MA as Care Coordinator (Oncology)  Veronika Murry RD (Nutrition)  GEOVANY HaqW as  (Palliative Care)  Zonia Jose MSW as  Care Manager (Oncology)  Butch Peña MD (Internal Medicine)  Hira Ward DO (Internal Medicine)    Review of Systems   Constitutional:  Negative for chills, fatigue and fever.   HENT:  Negative for congestion, ear pain, hearing loss, nosebleeds, postnasal drip, rhinorrhea, sinus pressure, sinus pain, sneezing and sore throat.    Eyes:  Negative for pain, discharge, itching and visual disturbance.   Respiratory:  Negative for cough, chest tightness, shortness of breath and wheezing.    Cardiovascular:  Negative for chest pain, palpitations and leg swelling.   Gastrointestinal:  Negative for abdominal pain, blood in stool, constipation, diarrhea, nausea and vomiting.   Genitourinary:  Negative for frequency and urgency.   Neurological:  Negative for dizziness, light-headedness and numbness.     Medical History Reviewed by provider this encounter:  Tobacco  Allergies  Meds  Problems  Med Hx  Surg Hx  Fam Hx       Annual Wellness Visit Questionnaire   Abhishek is here for his Initial Wellness visit.     Health Risk Assessment:   Patient rates overall health as good. Patient feels that their physical health rating is slightly better. Patient is satisfied with their life. Eyesight was rated as slightly worse. Hearing was rated as slightly worse. Patient feels that their  emotional and mental health rating is same. Patients states they are never, rarely angry. Patient states they are sometimes unusually tired/fatigued. Pain experienced in the last 7 days has been some. Patient's pain rating has been 5/10. Patient states that he has experienced no weight loss or gain in last 6 months.     Depression Screening:   PHQ-2 Score: 2      Fall Risk Screening:   In the past year, patient has experienced: no history of falling in past year      Home Safety:  Patient does not have trouble with stairs inside or outside of their home. Patient has working smoke alarms and has working carbon monoxide detector.     Nutrition:   Current diet is Regular.     Medications:   Patient is not currently taking any over-the-counter supplements. Patient is able to manage medications.     Activities of Daily Living (ADLs)/Instrumental Activities of Daily Living (IADLs):   Walk and transfer into and out of bed and chair?: Yes  Dress and groom yourself?: Yes    Bathe or shower yourself?: Yes    Feed yourself? Yes  Do your laundry/housekeeping?: Yes  Manage your money, pay your bills and track your expenses?: Yes  Make your own meals?: Yes    Do your own shopping?: Yes    Previous Hospitalizations:   Any hospitalizations or ED visits within the last 12 months?: Yes    How many hospitalizations have you had in the last year?: more than 4    Advance Care Planning:   Living will: Yes    Advanced directive: Yes    Advanced directive counseling given: No    Five wishes given: No      Cognitive Screening:   Provider or family/friend/caregiver concerned regarding cognition?: No    PREVENTIVE SCREENINGS      Cardiovascular Screening:    General: Screening Not Indicated and History Lipid Disorder      Diabetes Screening:     General: Screening Not Indicated and History Diabetes      Colorectal Cancer Screening:     General: Screening Current      Prostate Cancer Screening:    General: Screening Current      Osteoporosis  "Screening:    General: Risks and Benefits Discussed and Screening Not Indicated      Abdominal Aortic Aneurysm (AAA) Screening:    Risk factors include: age between 65-76 yo and tobacco use        General: Screening Not Indicated      Lung Cancer Screening:     General: Screening Not Indicated      Hepatitis C Screening:    General: Screening Current    Screening, Brief Intervention, and Referral to Treatment (SBIRT)     Screening  Typical number of drinks in a day: 0  Typical number of drinks in a week: 0  Interpretation: Low risk drinking behavior.    Social Drivers of Health     Financial Resource Strain: Low Risk  (10/2/2024)    [facility]    Overall Financial Resource Strain (CARDIA)    • Difficulty of Paying Living Expenses: Not very hard   Food Insecurity: Patient Declined (3/19/2025)    Hunger Vital Sign    • Worried About Running Out of Food in the Last Year: Patient declined    • Ran Out of Food in the Last Year: Patient declined   Transportation Needs: Patient Declined (3/19/2025)    PRAPARE - Transportation    • Lack of Transportation (Medical): Patient declined    • Lack of Transportation (Non-Medical): Patient declined   Housing Stability: Patient Declined (3/19/2025)    Housing Stability Vital Sign    • Unable to Pay for Housing in the Last Year: Patient declined    • Number of Times Moved in the Last Year: 1    • Homeless in the Last Year: Patient declined   Utilities: Patient Declined (3/19/2025)    [facility] Utilities    • Threatened with loss of utilities: Patient declined     No results found.    Objective   /80 (BP Location: Left arm, Patient Position: Sitting, Cuff Size: Adult)   Pulse 59   Temp (!) 96.9 °F (36.1 °C)   Ht 5' 11\" (1.803 m)   Wt 82.6 kg (182 lb)   SpO2 97%   BMI 25.38 kg/m²     Physical Exam  Vitals and nursing note reviewed.   Constitutional:       General: He is not in acute distress.     Appearance: He is well-developed.   HENT:      Head: " Normocephalic and atraumatic.   Cardiovascular:      Rate and Rhythm: Normal rate and regular rhythm.      Pulses: no weak pulses.           Dorsalis pedis pulses are 2+ on the right side and 2+ on the left side.        Posterior tibial pulses are 2+ on the right side and 2+ on the left side.      Heart sounds: No murmur heard.  Pulmonary:      Effort: Pulmonary effort is normal. No respiratory distress.      Breath sounds: Normal breath sounds. No wheezing, rhonchi or rales.   Musculoskeletal:         General: No swelling.      Cervical back: Neck supple.   Feet:      Right foot:      Skin integrity: No ulcer, skin breakdown, erythema, warmth, callus or dry skin.      Left foot:      Skin integrity: No ulcer, skin breakdown, erythema, warmth, callus or dry skin.   Skin:     General: Skin is warm and dry.      Capillary Refill: Capillary refill takes less than 2 seconds.   Neurological:      Mental Status: He is alert.     Diabetic Foot Exam    Patient's shoes and socks removed.    Right Foot/Ankle   Right Foot Inspection  Skin Exam: skin normal and skin intact. No dry skin, no warmth, no callus, no erythema, no maceration, no abnormal color, no pre-ulcer, no ulcer and no callus.     Toe Exam: ROM and strength within normal limits.     Sensory   Vibration: intact  Proprioception: intact  Monofilament testing: intact    Vascular  Capillary refills: < 3 seconds  The right DP pulse is 2+. The right PT pulse is 2+.     Left Foot/Ankle  Left Foot Inspection  Skin Exam: skin normal and skin intact. No dry skin, no warmth, no erythema, no maceration, normal color, no pre-ulcer, no ulcer and no callus.     Toe Exam: ROM and strength within normal limits.     Sensory   Vibration: intact  Proprioception: intact  Monofilament testing: intact    Vascular  Capillary refills: < 3 seconds  The left DP pulse is 2+. The left PT pulse is 2+.     Assign Risk Category  No deformity present  No loss of protective sensation  No weak  pulses  Risk: 0

## 2025-03-21 ENCOUNTER — HOSPITAL ENCOUNTER (OUTPATIENT)
Dept: INFUSION CENTER | Facility: CLINIC | Age: 70
Discharge: HOME | End: 2025-03-21
Payer: COMMERCIAL

## 2025-03-21 DIAGNOSIS — C25.9 ADENOCARCINOMA OF PANCREAS (HCC): ICD-10-CM

## 2025-03-21 DIAGNOSIS — Z95.828 PORT-A-CATH IN PLACE: Primary | ICD-10-CM

## 2025-03-21 LAB
ALBUMIN SERPL BCG-MCNC: 4.1 G/DL (ref 3.5–5)
ALP SERPL-CCNC: 96 U/L (ref 34–104)
ALT SERPL W P-5'-P-CCNC: 36 U/L (ref 7–52)
ANION GAP SERPL CALCULATED.3IONS-SCNC: 8 MMOL/L (ref 4–13)
AST SERPL W P-5'-P-CCNC: 27 U/L (ref 13–39)
BASOPHILS # BLD AUTO: 0.02 THOUSANDS/ÂΜL (ref 0–0.1)
BASOPHILS NFR BLD AUTO: 1 % (ref 0–1)
BILIRUB SERPL-MCNC: 0.55 MG/DL (ref 0.2–1)
BUN SERPL-MCNC: 22 MG/DL (ref 5–25)
CALCIUM SERPL-MCNC: 9.1 MG/DL (ref 8.4–10.2)
CHLORIDE SERPL-SCNC: 106 MMOL/L (ref 96–108)
CO2 SERPL-SCNC: 27 MMOL/L (ref 21–32)
CREAT SERPL-MCNC: 0.95 MG/DL (ref 0.6–1.3)
EOSINOPHIL # BLD AUTO: 0.08 THOUSAND/ÂΜL (ref 0–0.61)
EOSINOPHIL NFR BLD AUTO: 2 % (ref 0–6)
ERYTHROCYTE [DISTWIDTH] IN BLOOD BY AUTOMATED COUNT: 13.3 % (ref 11.6–15.1)
GFR SERPL CREATININE-BSD FRML MDRD: 82 ML/MIN/1.73SQ M
GLUCOSE SERPL-MCNC: 136 MG/DL (ref 65–140)
HCT VFR BLD AUTO: 35 % (ref 36.5–49.3)
HGB BLD-MCNC: 11.9 G/DL (ref 12–17)
IMM GRANULOCYTES # BLD AUTO: 0.01 THOUSAND/UL (ref 0–0.2)
IMM GRANULOCYTES NFR BLD AUTO: 0 % (ref 0–2)
LYMPHOCYTES # BLD AUTO: 1.03 THOUSANDS/ÂΜL (ref 0.6–4.47)
LYMPHOCYTES NFR BLD AUTO: 25 % (ref 14–44)
MCH RBC QN AUTO: 32.9 PG (ref 26.8–34.3)
MCHC RBC AUTO-ENTMCNC: 34 G/DL (ref 31.4–37.4)
MCV RBC AUTO: 97 FL (ref 82–98)
MONOCYTES # BLD AUTO: 0.43 THOUSAND/ÂΜL (ref 0.17–1.22)
MONOCYTES NFR BLD AUTO: 11 % (ref 4–12)
NEUTROPHILS # BLD AUTO: 2.49 THOUSANDS/ÂΜL (ref 1.85–7.62)
NEUTS SEG NFR BLD AUTO: 61 % (ref 43–75)
NRBC BLD AUTO-RTO: 0 /100 WBCS
PLATELET # BLD AUTO: 114 THOUSANDS/UL (ref 149–390)
PMV BLD AUTO: 10.1 FL (ref 8.9–12.7)
POTASSIUM SERPL-SCNC: 3.7 MMOL/L (ref 3.5–5.3)
PROT SERPL-MCNC: 6.7 G/DL (ref 6.4–8.4)
RBC # BLD AUTO: 3.62 MILLION/UL (ref 3.88–5.62)
SODIUM SERPL-SCNC: 141 MMOL/L (ref 135–147)
WBC # BLD AUTO: 4.06 THOUSAND/UL (ref 4.31–10.16)

## 2025-03-21 PROCEDURE — 80053 COMPREHEN METABOLIC PANEL: CPT | Performed by: INTERNAL MEDICINE

## 2025-03-21 PROCEDURE — 85025 COMPLETE CBC W/AUTO DIFF WBC: CPT | Performed by: INTERNAL MEDICINE

## 2025-03-21 NOTE — PROGRESS NOTES
Patient presents today for lab draw via port. Port accessed with positive blood return. Port flushed and de accessed without complications. Patient tolerated procedure well. AVS declined. Patient aware of next appointment on 3/25 at  8:00.

## 2025-03-24 DIAGNOSIS — G89.3 CANCER RELATED PAIN: ICD-10-CM

## 2025-03-24 DIAGNOSIS — C25.9 ADENOCARCINOMA OF PANCREAS (HCC): Primary | ICD-10-CM

## 2025-03-24 RX ORDER — OXYCODONE HYDROCHLORIDE 5 MG/1
5 TABLET ORAL EVERY 6 HOURS PRN
Qty: 90 TABLET | Refills: 0 | Status: SHIPPED | OUTPATIENT
Start: 2025-03-24

## 2025-03-24 NOTE — TELEPHONE ENCOUNTER
Refill must be reviewed and completed by the office or provider. The refill is unable to be approved or denied by the medication management team.      Patient Id Prescription # Filled Written Drug Label Qty Days Strength MME** Prescriber Pharmacy Payment REFILL #/Auth State Detail   1 [id number] 02/27/2025 02/27/2025 oxyCODONE HCL (Tablet) 90.0 22 5 MG 30.68 [facility] Medicare 0 / 0 [place]    1 [id number] 01/20/2025 01/20/2025 oxyCODONE HCL (Tablet) 90.0 22 5 MG 30.68 [facility] Medicare 0 / 0 [place]    1 [id number] 12/24/2024 12/24/2024 oxyCODONE HCL (Tablet) 90.0 23 5 MG 29.35 [facility] Medicare 0 / 0 [place]    1 [id number] 12/06/2024 12/06/2024 oxyCODONE HCL (Tablet) 28.0 7 5 MG 30.0 [facility] Medicare 0 / 0 [place]

## 2025-03-25 ENCOUNTER — NUTRITION (OUTPATIENT)
Dept: NUTRITION | Facility: CLINIC | Age: 70
End: 2025-03-25

## 2025-03-25 ENCOUNTER — HOSPITAL ENCOUNTER (OUTPATIENT)
Dept: INFUSION CENTER | Facility: CLINIC | Age: 70
Discharge: HOME | End: 2025-03-25
Payer: COMMERCIAL

## 2025-03-25 VITALS
HEIGHT: 71 IN | BODY MASS INDEX: 26.26 KG/M2 | DIASTOLIC BLOOD PRESSURE: 70 MMHG | WEIGHT: 187.6 LBS | HEART RATE: 60 BPM | RESPIRATION RATE: 20 BRPM | SYSTOLIC BLOOD PRESSURE: 150 MMHG | TEMPERATURE: 97.6 F

## 2025-03-25 DIAGNOSIS — C25.9 ADENOCARCINOMA OF PANCREAS (HCC): Primary | ICD-10-CM

## 2025-03-25 DIAGNOSIS — Z71.3 NUTRITIONAL COUNSELING: Primary | ICD-10-CM

## 2025-03-25 RX ORDER — ATROPINE SULFATE 1 MG/ML
0.25 INJECTION, SOLUTION INTRAVENOUS ONCE AS NEEDED
Status: DISCONTINUED | OUTPATIENT
Start: 2025-03-25 | End: 2025-03-28 | Stop reason: HOSPADM

## 2025-03-25 RX ORDER — DIPHENHYDRAMINE HYDROCHLORIDE 50 MG/ML
25 INJECTION, SOLUTION INTRAMUSCULAR; INTRAVENOUS ONCE
Status: COMPLETED | OUTPATIENT
Start: 2025-03-25 | End: 2025-03-25

## 2025-03-25 RX ORDER — DEXTROSE MONOHYDRATE 50 MG/ML
20 INJECTION, SOLUTION INTRAVENOUS ONCE
Status: COMPLETED | OUTPATIENT
Start: 2025-03-25 | End: 2025-03-25

## 2025-03-25 RX ORDER — SODIUM CHLORIDE 9 MG/ML
20 INJECTION, SOLUTION INTRAVENOUS ONCE AS NEEDED
Status: DISCONTINUED | OUTPATIENT
Start: 2025-03-25 | End: 2025-03-28 | Stop reason: HOSPADM

## 2025-03-25 RX ORDER — ATROPINE SULFATE 1 MG/ML
0.25 INJECTION, SOLUTION INTRAVENOUS ONCE
Status: COMPLETED | OUTPATIENT
Start: 2025-03-25 | End: 2025-03-25

## 2025-03-25 RX ADMIN — DEXTROSE 20 ML/HR: 5 SOLUTION INTRAVENOUS at 09:33

## 2025-03-25 RX ADMIN — ATROPINE SULFATE 0.25 MG: 1 INJECTION, SOLUTION INTRAVENOUS at 09:30

## 2025-03-25 RX ADMIN — DIPHENHYDRAMINE HYDROCHLORIDE 25 MG: 50 INJECTION, SOLUTION INTRAMUSCULAR; INTRAVENOUS at 08:42

## 2025-03-25 RX ADMIN — DEXAMETHASONE SODIUM PHOSPHATE: 10 INJECTION, SOLUTION INTRAMUSCULAR; INTRAVENOUS at 08:21

## 2025-03-25 RX ADMIN — IRINOTECAN HYDROCHLORIDE 244 MG: 20 INJECTION, SOLUTION INTRAVENOUS at 09:37

## 2025-03-25 RX ADMIN — FOSAPREPITANT 150 MG: 150 INJECTION, POWDER, LYOPHILIZED, FOR SOLUTION INTRAVENOUS at 08:44

## 2025-03-25 RX ADMIN — SODIUM CHLORIDE 20 ML/HR: 0.9 INJECTION, SOLUTION INTRAVENOUS at 08:19

## 2025-03-25 NOTE — PATIENT INSTRUCTIONS
Nutrition Rx & Recommendations:  Diet: High Calorie, High Protein (for high calorie foods see pages 52-53, and for high protein foods see pages 49-51 in your Eating Hints book)  Small, frequent meals/snacks may be easier to tolerate than 3 large daily meals.  Aim for 5-6 small meals per day (every 2-3 hours).  Include protein at all meals/snacks.  Include a variety of foods (as tolerated/allowed by diet).  Follow a Cancer Preventative Nutrition Pattern: colorful, plant-based, low-fat, avoid added sugars, limit alcohol, include high fiber foods, limit processed meats, limit red meat, choose lean protein sources, use low-fat cooking methods, balance calories with physical activity, avoid excessive weight gain throughout life.  Incorporate physical activity as able/allowed.  Stay hydrated by sipping fluids of choice/tolerance throughout the day.  Liquid nutrition may be better tolerated than solids at times.  Alter food choices and eating patterns to accommodate changing needs.  Refer to Eating Hints book for other meal/snack ideas and symptom management.  Weigh yourself regularly. If you notice weight loss, make an effort to increase your daily food/calorie intake. If you continue to notice loss after these efforts, reach out to your dietitian to establish a plan to stabilize weight.  Continue Ensure 1x daily. Increase intake to 2-3x daily if appetite/weight decreases.   Continue to monitor blood sugar: Foods with carbohydrates make your blood glucose level go up. Learning how to count carbohydrates can help you control your blood glucose levels. First, identify the foods you eat that contain carbohydrates.   Foods that commonly contain carbohydrates include: grains (breads, pasta, cereal, rice), fruit, starchy vegetables (potatoes, corn, peas), snack foods (chips, pretzels, crackers), dairy products, sweets/desserts.   Foods that do not usually contain carbohydrates: chicken, pork, beef, fish, seafood, eggs, tofu,  cheese, butter, sour cream, avocado, nuts, seeds, olives, mayonnaise, water, black coffee, unsweetened tea, and zero-calorie drinks. Vegetables with no or low carbohydrate include green beans, cauliflower, tomatoes, and onions.     Follow Up Plan: 4/22/25 during infusion  Recommend Referral to Other Providers: none at this time

## 2025-03-25 NOTE — PROGRESS NOTES
Patient presents today for modified folfirinox offering no complaints. Port accessed with positive blood return noted. Patient tolerated treatment well with no complications. Chemo ball connected by Yuli Purvis RN and verified by Harshil Hernadez RN. Clamps open to run. AVS declined, next appointment 3/27 at 11:00.

## 2025-03-25 NOTE — PROGRESS NOTES
Outpatient Oncology Nutrition Consultation   Type of Consult: Follow Up  Care Location: [facility]    Reason for referral: notification  Lashell Lew MA  on 9/11/24 that pt is appropriate for oncology nutrition care (reason for referral: Ambulatory referral for struggling with eating and drinking).     Nutrition Assessment:   Oncology Diagnosis & Treatments:   Cancer of the pancreas diagnosed 8/20/24.   Began chemotherapy 9/24/24; Modified FOLFIRINOX every 14 days   Oncology History   Adenocarcinoma of pancreas (HCC)   8/13/2024 Biopsy    A.-B.  Pancreas, Body, FNA (Cell block and smear preparations):   Malignant  Adenocarcinoma.     8/20/2024 Initial Diagnosis    Adenocarcinoma of pancreas (HCC)     8/26/2024 -  Cancer Staged    Staging form: Pancreas, AJCC 8th Edition  - Clinical: Stage IB (cT2, cN0, cM0) - Signed by Roz Faulkner MD on 8/26/2024  Total positive nodes: 0       9/24/2024 -  Chemotherapy    alteplase (CATHFLO), 2 mg, Intracatheter, Every 1 Minute as needed, 14 of 17 cycles  pegfilgrastim (NEULASTA ONPRO), 6 mg, Subcutaneous, Once, 13 of 16 cycles  Administration: 6 mg (9/26/2024), 6 mg (10/10/2024), 6 mg (10/24/2024), 6 mg (11/7/2024), 6 mg (11/21/2024), 6 mg (12/5/2024), 6 mg (12/19/2024), 6 mg (1/16/2025), 6 mg (1/2/2025), 6 mg (1/30/2025), 6 mg (2/13/2025), 6 mg (2/27/2025)  fosaprepitant (EMEND) IVPB, 150 mg, Intravenous, Once, 14 of 17 cycles  Administration: 150 mg (9/24/2024), 150 mg (10/8/2024), 150 mg (10/22/2024), 150 mg (11/5/2024), 150 mg (11/19/2024), 150 mg (12/3/2024), 150 mg (12/17/2024), 150 mg (1/14/2025), 150 mg (12/31/2024), 150 mg (1/28/2025), 150 mg (2/11/2025), 150 mg (2/25/2025), 150 mg (3/11/2025)  irinotecan (CAMPTOSAR) chemo infusion, 150 mg/m2 = 294 mg, Intravenous, Once, 14 of 17 cycles  Dose modification: 112.5 mg/m2 (75 % of original dose 150 mg/m2, Cycle 2, Reason: Dose Not Tolerated), 120 mg/m2 (80 % of original dose 150 mg/m2, Cycle 3, Reason: Original  Dose Changed)  Administration: 294 mg (9/24/2024), 227 mg (10/8/2024), 240 mg (10/22/2024), 240 mg (11/5/2024), 238 mg (11/19/2024), 239 mg (12/3/2024), 239 mg (12/17/2024), 242 mg (1/14/2025), 241 mg (12/31/2024), 241 mg (1/28/2025), 244 mg (2/11/2025), 245 mg (2/25/2025), 245 mg (3/11/2025)  oxaliplatin (ELOXATIN) chemo infusion, 65 mg/m2 = 127.4 mg, Intravenous, Once, 11 of 11 cycles  Dose modification: 48.75 mg/m2 (75 % of original dose 65 mg/m2, Cycle 2, Reason: Dose Not Tolerated), 52 mg/m2 (80 % of original dose 65 mg/m2, Cycle 3, Reason: Original Dose Changed)  Administration: 127.4 mg (9/24/2024), 98.5 mg (10/8/2024), 100 mg (10/22/2024), 100 mg (11/5/2024), 100 mg (11/19/2024), 100 mg (12/3/2024), 103.5 mg (12/17/2024), 105.05 mg (1/14/2025), 104.5 mg (12/31/2024), 104.5 mg (1/28/2025), 105.55 mg (2/11/2025)  fluorouracil (ADRUCIL) ambulatory infusion Soln, 1,200 mg/m2/day = 4,705 mg, Intravenous, Over 46 hours, 14 of 17 cycles  Dose modification: 900 mg/m2/day (75 % of original dose 1,200 mg/m2/day, Cycle 3, Reason: Dose Not Tolerated), 960 mg/m2/day (80 % of original dose 1,200 mg/m2/day, Cycle 4, Reason: Other (see comments), Comment: provider request)     11/15/2024 Biopsy    Final Diagnosis   A. Liver mass, biopsy:  - Metastatic adenocarcinoma.         12/5/2024 -  Cancer Staged    Staging form: Pancreas, AJCC 8th Edition  - Pathologic stage from 12/5/2024: Stage IV (pT2, cM1) - Signed by Butch Peña MD on 12/5/2024         Past Medical & Surgical Hx:   Patient Active Problem List   Diagnosis    Atrial fibrillation (HCC)    Psoriatic arthritis (HCC)    NDPH (new daily persistent headache)    Atrial flutter (HCC)    CATY (obstructive sleep apnea)    Type 2 diabetes mellitus with hyperglycemia, without long-term current use of insulin (HCC)    Elevated PSA    Platelets decreased (HCC)    Adenocarcinoma of pancreas (HCC)    Pulmonary HTN (HCC)    Pre-op testing    Liver masses    Port-A-Cath in place     Chemotherapy induced neutropenia (HCC)    Palliative care by specialist    Medical marijuana use    Nausea    Cancer-related pain    Insomnia    Loss of appetite    Hyperlipidemia associated with type 2 diabetes mellitus  (HCC)    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)    Opioid dependence, uncomplicated (HCC)     Past Medical History:   Diagnosis Date    A-fib (HCC)     Arthritis     BPH (benign prostatic hyperplasia)     Cancer (HCC)     Colon polyp     Diabetes mellitus (HCC)     Fatty liver     Fracture, foot     High cholesterol     Hyperlipidemia     Irregular heart beat     Non-ischemic cardiomyopathy (HCC)     Osteoarthritis     Psoriasis     Psoriatic arthritis (HCC)     Pulmonary HTN (HCC)      Past Surgical History:   Procedure Laterality Date    CARDIAC ELECTROPHYSIOLOGY PROCEDURE N/A 02/22/2023    Procedure: Cardiac eps/afib ablation   comprehensive posterior wall;  Surgeon: Mynor Andrews MD;  Location: [facility];  Service: Cardiology    COLONOSCOPY      ELBOW SURGERY Right     FL GUIDED CENTRAL VENOUS ACCESS DEVICE INSERTION  9/5/2024    IR BIOPSY LIVER MASS  9/18/2024    IR BIOPSY LIVER MASS  11/15/2024    KIDNEY STONE SURGERY      LAPAROSCOPY N/A 9/5/2024    Procedure: DIAGNOSTIC LAPAROSCOPY;  Surgeon: Roz Faulkner MD;  Location: [facility];  Service: Surgical Oncology    TRANSURETHRAL RESECTION OF PROSTATE      last assessed 7/21/15    TUNNELED VENOUS PORT PLACEMENT N/A 9/5/2024    Procedure: INSERTION VENOUS PORT (PORT-A-CATH);  Surgeon: Roz Faulkner MD;  Location: [facility];  Service: Surgical Oncology       Review of Medications:   Vitamins, Supplements and Herbals: No, pt denies taking supplements; MMJ     Current Outpatient Medications:     Alcohol Swabs 70 % PADS, May substitute brand based on insurance coverage. Check glucose TID. (Patient not taking: Reported on 9/23/2024), Disp: 100 each, Rfl: 0    Benzocaine-Isopropyl Alcohol 6-70 %  PADS, Use, Disp: , Rfl:     Blood Glucose Monitoring Suppl ([employer] Blood Glucose Meter) w/Device KIT, Use, Disp: , Rfl:     Blood Glucose Monitoring Suppl (OneTouch Verio Reflect) w/Device KIT, Check blood sugars once daily. Please substitute with appropriate alternative as covered by patient's insurance. Dx: E11.65, Disp: 1 kit, Rfl: 0    Continuous Blood Gluc  (FreeStyle Livia 2 [place]ima) LEANA, 14 DAY READER NOT MADE, Disp: 1 each, Rfl: 1    Continuous Glucose Sensor (FreeStyle Livia 14 Day Sensor) MISC, Use 1 Device every 14 (fourteen) days, Disp: 2 each, Rfl: 3    fluorouracil 3,900 mg in CADD/Elastomeric Infusion Device, Infuse 3,900 mg (960 mg/m2/day x 2.03 m2) into a catheter in a vein via infusion device over 46 hours for 2 days  Infusion planned for March 25, 2025., Disp: 1 each, Rfl: 3    Glucose Blood (BLOOD GLUCOSE TEST STRIPS 333 VI), Use, Disp: , Rfl:     glucose blood (OneTouch Verio) test strip, Check blood sugars once daily. Please substitute with appropriate alternative as covered by patient's insurance. Dx: E11.65, Disp: 100 each, Rfl: 3    insulin glargine (LANTUS) 100 units/mL subcutaneous injection, Inject 20 Units under the skin daily at bedtime, Disp: 6 mL, Rfl: 3    Insulin Pen Needle (BD Pen Needle Mallory U/F) 32G X 4 MM MISC, Use daily as directed with insulin pen, Disp: 100 each, Rfl: 3    Lancets 33G MISC, Use, Disp: , Rfl:     Lantus SoloStar 100 units/mL SOPN, INJECT 0.2 ML (20 UNITS TOTAL) UNDER THE SKIN DAILY AT BEDTIME, Disp: 6 mL, Rfl: 0    lidocaine-prilocaine (EMLA) cream, Apply topically as needed for mild pain Apply to port area 45-60min prior to treatment and cover with dressing or plastic wrap, Disp: 30 g, Rfl: 1    metFORMIN (GLUCOPHAGE-XR) 500 mg 24 hr tablet, Take 2 tablets (1,000 mg total) by mouth daily with breakfast, Disp: 180 tablet, Rfl: 1    ondansetron (ZOFRAN) 4 mg tablet, Take 1 tablet (4 mg total) by mouth every 8 (eight) hours as needed for nausea or vomiting,  Disp: 20 tablet, Rfl: 3    OneTouch Delica Lancets 33G MISC, Check blood sugars once daily. Please substitute with appropriate alternative as covered by patient's insurance. Dx: E11.65, Disp: 100 each, Rfl: 3    oxyCODONE (Roxicodone) 5 immediate release tablet, Take 1 tablet (5 mg total) by mouth every 6 (six) hours as needed for moderate pain Max Daily Amount: 20 mg, Disp: 90 tablet, Rfl: 0    pantoprazole (PROTONIX) 40 mg tablet, Take 1 tablet (40 mg total) by mouth daily before breakfast, Disp: 100 tablet, Rfl: 1    rosuvastatin (CRESTOR) 5 mg tablet, Take 1 tablet (5 mg total) by mouth every evening, Disp: 100 tablet, Rfl: 1    sildenafil (VIAGRA) 100 mg tablet, TAKE ONE (1) TABLET BY MOUTH DAILY AS NEEDED, Disp: , Rfl:   No current facility-administered medications for this visit.    Facility-Administered Medications Ordered in Other Visits:     alteplase (CATHFLO) injection 2 mg, 2 mg, Intracatheter, Q1MIN PRN, Butch Peña MD    Atropine Sulfate injection 0.25 mg, 0.25 mg, Intravenous, Once PRN, Butch Peña MD    irinotecan (CAMPTOSAR) 244 mg in dextrose 5 % 500 mL chemo infusion, 120 mg/m2 (Treatment Plan Recorded), Intravenous, Once, Butch Peña MD    sodium chloride 0.9 % infusion, 20 mL/hr, Intravenous, Once PRN, Butch Peña MD, Last Rate: 20 mL/hr at 03/25/25 0819, 20 mL/hr at 03/25/25 0819    Most Recent Lab Results:   Lab Results   Component Value Date    WBC 4.06 (L) 03/21/2025    NEUTROABS 2.49 03/21/2025    CHOLESTEROL 212 (H) 02/20/2024    CHOL 190 11/15/2016    TRIG 369 (H) 02/20/2024    HDL 40 02/20/2024    LDLCALC 98 02/20/2024    ALT 36 03/21/2025    AST 27 03/21/2025    ALB 4.1 03/21/2025     03/15/2016    SODIUM 141 03/21/2025    SODIUM 139 03/07/2025    K 3.7 03/21/2025    K 3.8 03/07/2025     03/21/2025    BUN 22 03/21/2025    BUN 11 03/07/2025    CREATININE 0.95 03/21/2025    CREATININE 0.81 03/07/2025    EGFR 82 03/21/2025    GLUCOSE 106 (H) 11/15/2016     "POCGLU 97 09/18/2024    GLUF 110 (H) 09/23/2024    GLUF 96 09/18/2024    GLUC 136 03/21/2025    HGBA1C 7.7 (A) 03/19/2025    HGBA1C 6.6 (A) 08/27/2024    HGBA1C 5.9 05/24/2024    CALCIUM 9.1 03/21/2025    FOLATE >20.0 (H) 06/10/2021    MG 1.9 02/23/2023     Anthropometric Measurements:   Height: 71\"  Ht Readings from Last 1 Encounters:   03/25/25 5' 10.98\" (1.803 m)     Wt Readings from Last 20 Encounters:   03/25/25 85.1 kg (187 lb 9.6 oz)   03/19/25 82.6 kg (182 lb)   03/11/25 83.3 kg (183 lb 9.6 oz)   03/05/25 84.3 kg (185 lb 12.8 oz)   02/25/25 83.9 kg (185 lb)   02/25/25 83.9 kg (185 lb)   02/12/25 84.4 kg (186 lb)   02/11/25 83 kg (183 lb)   01/28/25 82.5 kg (181 lb 12.8 oz)   01/14/25 81.5 kg (179 lb 9.6 oz)   12/31/24 82.2 kg (181 lb 3.2 oz)   12/17/24 81.2 kg (179 lb)   12/05/24 78.5 kg (173 lb)   12/03/24 79.6 kg (175 lb 6.4 oz)   11/25/24 77.7 kg (171 lb 6.4 oz)   11/19/24 78.9 kg (174 lb)   11/15/24 81.5 kg (179 lb 10.8 oz)   11/05/24 77 kg (169 lb 12.8 oz)   11/04/24 79.4 kg (175 lb)   10/22/24 78 kg (172 lb)     Weight History:   Usual Weight: 200# in December 2023; weight loss after diagnosed with DM and started on Ozempic   Varian: n/a  Home Scale: (10/8/24) 165#; (11/5/24) home weight maintaining in the [facility]     Oncology Nutrition-Anthropometrics      Flowsheet Row Nutrition from 3/25/2025 in [facility] Nutrition from 2/25/2025 in [facility]   Patient age (years): 69 years 69 years   Patient (male) height (in): 71 in 71 in   Current weight (lbs): 187.6 lbs 185 lbs   Current weight to be used for anthropometric calculations (kg) 85.3 kg 84.1 kg   BMI: 26.2 25.8   IBW male 172 lb 172 lb   IBW (kg) male 78.2 kg 78.2 kg   IBW % (male) 109.1 % 107.6 %   Adjusted BW (male): 175.9 lbs 175.3 lbs   Adjusted BW in kg (male): 80 kg 79.7 kg   % weight change after 1 week: 3.1 % --   Weight change after 1 week (lbs) 5.6 lbs --   % weight change after " 1 month: 1.4 % 1.8 %   Weight change after 1 month (lbs) 2.6 lbs 3.2 lbs   % weight change after 3 months: 3.5 % 7.9 %   Weight change after 3 months (lbs) 6.4 lbs 13.6 lbs   % weight change after 6 months: -- 2.8 %   Weight change after 6 months (lbs) -- 5 lbs            Nutrition-Focused Physical Findings: none observed    Food/Nutrition-Related History & Client/Social History:    Current Nutrition Impact Symptoms:  [] Nausea   -after chemo; improved w/ zofran and MMJ [] Reduced Appetite   -decreases somewhat after chemotherapy   -improves with MMJ  [] Acid Reflux    [] Vomiting  [] Unintended Wt Loss   -hx [] Malabsorption    [] Diarrhea  [] Unintended Wt Gain  [] Dumping Syndrome    [] Constipation  [] Thick Mucous/Secretions  [] Abdominal Pain    [] Dysgeusia (Altered Taste)  [] Xerostomia (Dry Mouth)  [] Gas    [] Dysosmia (Altered Smell)  [] Thrush  [] Difficulty Chewing    [] Oral Mucositis (Sore Mouth)  [] Fatigue  [x] Hyperglycemia   -T2DM   -metformin  Lab Results   Component Value Date    HGBA1C 7.7 (A) 03/19/2025      [] Odynophagia  [] Esophagitis  [] Other:    [] Dysphagia  [] Early Satiety  [x] No Problems Eating      Food Allergies & Intolerances: yes: lactose intolerant     Current Diet: CHO-Controlled and Lactose-Free  Current Nutrition Intake: Unchanged from last visit  Appetite: Fair ; appetite decreases after chemo   Nutrition Route: PO  Oral Care: brushes BID  Activity level: ADL, ambulates independently.     24 Hr Diet Recall:   Breakfast:cereal with milk  Snack: banana; pretzels   Lunch: sandwich; soup  Dinner: pasta; fish; chicken     Beverages: water sometimes w/ crystal light flavoring (16oz x2-3)  Supplements:   Ensure Original (8 oz, 220 kcal, 9 g pro) 1x daily       Oncology Nutrition-Estimated Needs      [facility]sheet [facility]   Weight type used Actual  weight Actual weight   Weight in kilograms (kg) used for estimated needs 85.3 kg 84.1 kg   Energy needs formula:  30-35 kcal/kg 30-35 kcal/kg   Energy needs based on 30 kcal/k kcal 2523 kcal   Energy needs based on 35 kcal/k kcal 2943 kcal   Protein needs formula: 1.2-1.5 g/kg 1.2-1.5 g/kg   Protein needs based on 1.2 g/k g 101 g   Protein needs based on 1.5 g/kg 128 g 126 g   Fluid needs formula: 30-35 mL/kg 30-35 mL/kg   Fluid needs based on 30 mL/kg 2565 mL 2523 mL   Fluid needs in ounces 87 oz 85 oz   Fluid needs based on 35 mL/kg 2993 mL 2944 mL   Fluid needs in ounces 101 oz 100 oz             Discussion & Intervention:   Abhishek was evaluated today for an RD follow up regarding unintended weight loss.  Abhishek is currently undergoing tx for Pancreatic Cancer.  He is here for infusion today. He is doing well today. His appetite and PO intakes are unchanged. He typically has a good appetite which will decrease somewhat after chemo and then return to normal. His weight continues to increase. He does not have any new nutrition questions today.   Based on today's assessment, discussion/suggestions include: MNT for: DM, Pancreatic cancer, spreading carbohydrate intake throughout the day & counting carbohydrates for adequate glycemic control, adequate hydration & fluid choices, having consistent and planned snacks between meals, and continuing oral nutrition supplements.   Moving forward, Abhishek will continue current nutrition plan of care.  Materials Provided: not applicable  All questions and concerns addressed during today’s visit.  Abhishek has RD contact information.    Nutrition Diagnosis:   Increased Nutrient Needs (kcal & pro) related to increased demand for nutrients and disease state as evidenced by cancer dx and pt undergoing tx for cancer.  Monitoring & Evaluation:   Goals:  weight maintenance/stabilization  adequate nutrition impact symptom management  pt to meet >/=75% estimated  nutrition needs daily  adequate glycemic control  increase protein intake  increase fluid intake  increase calorie intake    Progress Towards Goals: Progressing and Goal(s) Met    Nutrition Rx & Recommendations:  Diet: High Calorie, High Protein (for high calorie foods see pages 52-53, and for high protein foods see pages 49-51 in your Eating Hints book)  Small, frequent meals/snacks may be easier to tolerate than 3 large daily meals.  Aim for 5-6 small meals per day (every 2-3 hours).  Include protein at all meals/snacks.  Include a variety of foods (as tolerated/allowed by diet).  Follow a Cancer Preventative Nutrition Pattern: colorful, plant-based, low-fat, avoid added sugars, limit alcohol, include high fiber foods, limit processed meats, limit red meat, choose lean protein sources, use low-fat cooking methods, balance calories with physical activity, avoid excessive weight gain throughout life.  Incorporate physical activity as able/allowed.  Stay hydrated by sipping fluids of choice/tolerance throughout the day.  Liquid nutrition may be better tolerated than solids at times.  Alter food choices and eating patterns to accommodate changing needs.  Refer to Eating Hints book for other meal/snack ideas and symptom management.  Weigh yourself regularly. If you notice weight loss, make an effort to increase your daily food/calorie intake. If you continue to notice loss after these efforts, reach out to your dietitian to establish a plan to stabilize weight.  Continue Ensure 1x daily. Increase intake to 2-3x daily if appetite/weight decreases.   Continue to monitor blood sugar: Foods with carbohydrates make your blood glucose level go up. Learning how to count carbohydrates can help you control your blood glucose levels. First, identify the foods you eat that contain carbohydrates.   Foods that commonly contain carbohydrates include: grains (breads, pasta, cereal, rice), fruit, starchy vegetables (potatoes, corn,  peas), snack foods (chips, pretzels, crackers), dairy products, sweets/desserts.   Foods that do not usually contain carbohydrates: chicken, pork, beef, fish, seafood, eggs, tofu, cheese, butter, sour cream, avocado, nuts, seeds, olives, mayonnaise, water, black coffee, unsweetened tea, and zero-calorie drinks. Vegetables with no or low carbohydrate include green beans, cauliflower, tomatoes, and onions.     Follow Up Plan:  4/22/25 during infusion  Recommend Referral to Other Providers: none at this time

## 2025-03-27 ENCOUNTER — HOSPITAL ENCOUNTER (OUTPATIENT)
Dept: MRI IMAGING | Facility: HOSPITAL | Age: 70
End: 2025-03-27
Attending: INTERNAL MEDICINE
Payer: COMMERCIAL

## 2025-03-27 ENCOUNTER — HOSPITAL ENCOUNTER (OUTPATIENT)
Dept: INFUSION CENTER | Facility: CLINIC | Age: 70
Discharge: HOME | End: 2025-03-27
Payer: COMMERCIAL

## 2025-03-27 ENCOUNTER — TELEPHONE (OUTPATIENT)
Age: 70
End: 2025-03-27

## 2025-03-27 DIAGNOSIS — C25.9 ADENOCARCINOMA OF PANCREAS (HCC): ICD-10-CM

## 2025-03-27 DIAGNOSIS — D70.1 CHEMOTHERAPY INDUCED NEUTROPENIA (HCC): Primary | ICD-10-CM

## 2025-03-27 DIAGNOSIS — T45.1X5A CHEMOTHERAPY INDUCED NEUTROPENIA (HCC): Primary | ICD-10-CM

## 2025-03-27 DIAGNOSIS — C25.9 ADENOCARCINOMA OF PANCREAS (HCC): Primary | ICD-10-CM

## 2025-03-27 PROCEDURE — 96372 THER/PROPH/DIAG INJ SC/IM: CPT

## 2025-03-27 PROCEDURE — A9585 GADOBUTROL INJECTION: HCPCS | Performed by: INTERNAL MEDICINE

## 2025-03-27 PROCEDURE — 74183 MRI ABD W/O CNTR FLWD CNTR: CPT

## 2025-03-27 RX ORDER — GADOBUTROL 604.72 MG/ML
8 INJECTION INTRAVENOUS
Status: COMPLETED | OUTPATIENT
Start: 2025-03-27 | End: 2025-03-27

## 2025-03-27 RX ADMIN — PEGFILGRASTIM 6 MG: KIT SUBCUTANEOUS at 12:47

## 2025-03-27 RX ADMIN — GADOBUTROL 8 ML: 604.72 INJECTION INTRAVENOUS at 12:54

## 2025-03-27 NOTE — TELEPHONE ENCOUNTER
Sherri is calling to change Abhishek's chemo schedule they will be away on vacation, she would like his schedule to start on 4/29.

## 2025-03-27 NOTE — PLAN OF CARE
Problem: Potential for Falls  Goal: Patient will remain free of falls  Description: INTERVENTIONS:- Educate patient/family on patient safety including physical limitations- Instruct patient to call for assistance with activity - Consult OT/PT to assist with strengthening/mobility - Keep Call bell within reach- Keep bed low and locked with side rails adjusted as appropriate- Keep care items and personal belongings within reach- Initiate and maintain comfort rounds- Make Fall Risk Sign visible to staff-   3/27/2025 1124 by Leticia Dalal RN  Outcome: Progressing       Problem: GASTROINTESTINAL - ADULT  Goal: Minimal or absence of nausea and/or vomiting  Description: INTERVENTIONS:- Administer IV fluids if ordered to ensure adequate hydration- Maintain NPO status until nausea and vomiting are resolved- Nasogastric tube if ordered- Administer ordered antiemetic medications as needed- Provide nonpharmacologic comfort measures as appropriate- Advance diet as tolerated, if ordered- Consider nutrition services referral to assist patient with adequate nutrition and appropriate food choices  Outcome: Progressing     Problem: HEMATOLOGIC - ADULT  Goal: Maintains hematologic stability  Description: INTERVENTIONS- Assess for signs and symptoms of bleeding or hemorrhage- Monitor labs- Administer supportive blood products/factors as ordered and appropriate  Outcome: Progressing

## 2025-03-27 NOTE — PROGRESS NOTES
Abhishek Gimenez to clinic for pump disconnect. Offers no complaints today. Pump has been running for over 46 hours and appears empty and deflated. Pt tolerated pump disconnect without complications. Port de-accessed. Neulasta Onpro placed after MRI to right abdomen.  Pt aware of next appointment on 4/4/25 at 12:30pm. AVS declined. Patient stated desire for a med holiday, message sent to Danielle Stoddard RN to f/u with patient.

## 2025-03-28 DIAGNOSIS — C25.9 ADENOCARCINOMA OF PANCREAS (HCC): ICD-10-CM

## 2025-03-28 RX ORDER — ONDANSETRON 4 MG/1
4 TABLET, FILM COATED ORAL EVERY 8 HOURS PRN
Qty: 20 TABLET | Refills: 0 | Status: SHIPPED | OUTPATIENT
Start: 2025-03-28

## 2025-04-03 ENCOUNTER — OFFICE VISIT (OUTPATIENT)
Dept: HEMATOLOGY ONCOLOGY | Facility: CLINIC | Age: 70
End: 2025-04-03
Payer: COMMERCIAL

## 2025-04-03 VITALS
OXYGEN SATURATION: 98 % | TEMPERATURE: 97.3 F | RESPIRATION RATE: 18 BRPM | DIASTOLIC BLOOD PRESSURE: 82 MMHG | SYSTOLIC BLOOD PRESSURE: 128 MMHG | HEART RATE: 63 BPM | HEIGHT: 71 IN | WEIGHT: 185 LBS | BODY MASS INDEX: 25.9 KG/M2

## 2025-04-03 DIAGNOSIS — L40.50 PSORIATIC ARTHRITIS (HCC): ICD-10-CM

## 2025-04-03 DIAGNOSIS — E11.69 HYPERLIPIDEMIA ASSOCIATED WITH TYPE 2 DIABETES MELLITUS  (HCC): ICD-10-CM

## 2025-04-03 DIAGNOSIS — E78.5 HYPERLIPIDEMIA ASSOCIATED WITH TYPE 2 DIABETES MELLITUS  (HCC): ICD-10-CM

## 2025-04-03 DIAGNOSIS — C25.9 ADENOCARCINOMA OF PANCREAS (HCC): Primary | ICD-10-CM

## 2025-04-03 DIAGNOSIS — G89.3 CANCER-RELATED PAIN: ICD-10-CM

## 2025-04-03 PROCEDURE — 99215 OFFICE O/P EST HI 40 MIN: CPT | Performed by: INTERNAL MEDICINE

## 2025-04-03 NOTE — PROGRESS NOTES
upon completion of 12 cycles of FOLFIRINOX.Name: Abhishek Gimenez      : 1955      MRN: [id number]  Encounter Provider: Butch Peña MD  Encounter Date: 4/3/2025   Encounter department: [facility]  :  Assessment & Plan  Adenocarcinoma of pancreas (HCC)  Patient tolerating current treatment well.  Status post 14 cycles of chemo.  Received FOLFIRINOX for 11 cycles.  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  Received FOLFIRI from cycle 12 onwards.  Most recent MRI on 3/27/2025 with decreased size of the pancreatic tumor and decrease in the size of the hepatic metastasis.  Will discuss with Dr. Faulkner if he could be a candidate for surgery.  Will get multiphase contrast-enhanced CT if patient deemed to be a candidate for surgery.  Patient to take a chemo break for 1 month.  He will return towards the end of April after his trip to [place] to resume treatment.                       Cancer-related pain  Follows with palliative.         Psoriatic arthritis (HCC)  Management as per rheumatology.         Hyperlipidemia associated with type 2 diabetes mellitus  (HCC)  Continue statins as per PCP.  Lab Results   Component Value Date    HGBA1C 7.7 (A) 2025            Return in about 4 weeks (around 2025) for Office Visit.     History of Present Illness   Chief Complaint   Patient presents with    Follow-up   68-year-old gentleman who noted the onset of diabetes mellitus and 2024 since that time he has been losing weight he is lost 30 pounds over the 6 months time.  He also developed intermittent abdominal pain epigastric left upper quadrant that continued to worsen.  He was started on Ozempic.  He drinks 3-4 beers every other day he last used tobacco 40 years ago but does not smoke now.  He has had a negative colonoscopy.  He underwent an EGD which was nondiagnostic subsequently underwent an EGD EUS which showed normal  esophagus normal stomach normal duodenum.  Celiac artery and aorta were visualized and appeared normal.  The left kidney spleen left adrenal appeared normal parenchyma of the liver.  Normal this was the left lobe of the liver.  He had a heterogeneous hypoechoic irregular mass measuring 30 mm x 18 mm with poorly defined margins in the body of pancreas it abutted the splenic artery and splenic vein needle biopsies were done for total.  The head of the pancreas and neck of the pancreas appeared to have some honeycombing.  Bile duct appeared normal gallbladder appeared normal.  This was done on 7/17/2024  Biopsy was consistent with adenocarcinoma.  CAT scan of the chest was negative.  On 7/10/2026 MRI of the abdomen showed the ill-defined 3.8 x 1.1 cm mass in the body of the pancreas the liver showed a 3.4 cm benign hemangioma 1.0 and 0.4 cm right lobe nodules all the seem to be compatible with hemangiomas.    Interval History:  Patient presents today for follow-up with his wife Sherri. Reports continued neuropathy.  Feels fine.  Is going on vacation to [place] on April 12 to 19. Taking chemo break for 1 month. Discussed MRI findings. States arthritis and psoriasis are flaring and would like to get back on Humira.   Oncology History   Cancer Staging   Adenocarcinoma of pancreas (HCC)  Staging form: Pancreas, AJCC 8th Edition  - Clinical: Stage IB (cT2, cN0, cM0) - Signed by Roz Faulkner MD on 8/26/2024  Total positive nodes: 0  - Pathologic stage from 12/5/2024: Stage IV (pT2, cM1) - Signed by Butch Peña MD on 12/5/2024  Oncology History   Adenocarcinoma of pancreas (HCC)   8/13/2024 Biopsy    A.-B.  Pancreas, Body, FNA (Cell block and smear preparations):   Malignant  Adenocarcinoma.     8/20/2024 Initial Diagnosis    Adenocarcinoma of pancreas (HCC)     8/26/2024 -  Cancer Staged    Staging form: Pancreas, AJCC 8th Edition  - Clinical: Stage IB (cT2, cN0, cM0) - Signed by Roz Faulkner MD on  8/26/2024  Total positive nodes: 0       9/24/2024 -  Chemotherapy    irinotecan (CAMPTOSAR) chemo infusion, 150 mg/m2 = 294 mg, 14 of 19 cycles  Dose modification: 112.5 mg/m2 (75 % of original dose 150 mg/m2, Cycle 2, Reason: Dose Not Tolerated), 120 mg/m2 (80 % of original dose 150 mg/m2, Cycle 3, Reason: Original Dose Changed)  Administration: 294 mg (9/24/2024), 227 mg (10/8/2024), 240 mg (10/22/2024), 240 mg (11/5/2024), 238 mg (11/19/2024), 239 mg (12/3/2024), 239 mg (12/17/2024), 242 mg (1/14/2025), 241 mg (12/31/2024), 241 mg (1/28/2025), 244 mg (2/11/2025), 245 mg (2/25/2025), 244 mg (3/25/2025), 245 mg (3/11/2025)  oxaliplatin (ELOXATIN) chemo infusion, 65 mg/m2 = 127.4 mg, 11 of 11 cycles  Dose modification: 48.75 mg/m2 (75 % of original dose 65 mg/m2, Cycle 2, Reason: Dose Not Tolerated), 52 mg/m2 (80 % of original dose 65 mg/m2, Cycle 3, Reason: Original Dose Changed)  Administration: 127.4 mg (9/24/2024), 98.5 mg (10/8/2024), 100 mg (10/22/2024), 100 mg (11/5/2024), 100 mg (11/19/2024), 100 mg (12/3/2024), 103.5 mg (12/17/2024), 105.05 mg (1/14/2025), 104.5 mg (12/31/2024), 104.5 mg (1/28/2025), 105.55 mg (2/11/2025)  fluorouracil (ADRUCIL) ambulatory infusion Soln, 1,200 mg/m2/day = 4,705 mg, 14 of 19 cycles  Dose modification: 900 mg/m2/day (75 % of original dose 1,200 mg/m2/day, Cycle 3, Reason: Dose Not Tolerated), 960 mg/m2/day (80 % of original dose 1,200 mg/m2/day, Cycle 4, Reason: Other (see comments), Comment: provider request)     11/15/2024 Biopsy    Final Diagnosis   A. Liver mass, biopsy:  - Metastatic adenocarcinoma.         12/5/2024 -  Cancer Staged    Staging form: Pancreas, AJCC 8th Edition  - Pathologic stage from 12/5/2024: Stage IV (pT2, cM1) - Signed by Butch Peña MD on 12/5/2024          Pertinent Medical History   04/03/25: reviewed         Review of Systems  14 point review of systems was negative except that mentioned in HPI.        Objective   /82 (BP Location:  "Left arm, Patient Position: Sitting, Cuff Size: Adult)   Pulse 63   Temp (!) 97.3 °F (36.3 °C) (Temporal)   Resp 18   Ht 5' 10.98\" (1.803 m)   Wt 83.9 kg (185 lb)   SpO2 98%   BMI 25.82 kg/m²     Pain Screening:  Pain Score:   1  ECOG   1  Physical Exam  Vitals and nursing note reviewed.   Constitutional:       General: He is not in acute distress.     Appearance: Normal appearance.   HENT:      Head: Normocephalic and atraumatic.      Mouth/Throat:      Mouth: Mucous membranes are moist.      Pharynx: Oropharynx is clear.   Eyes:      General: No scleral icterus.     Extraocular Movements: Extraocular movements intact.      Conjunctiva/sclera: Conjunctivae normal.   Cardiovascular:      Rate and Rhythm: Normal rate and regular rhythm.      Pulses: Normal pulses.      Heart sounds: No murmur heard.  Pulmonary:      Effort: Pulmonary effort is normal.      Breath sounds: Normal breath sounds. No wheezing, rhonchi or rales.   Abdominal:      General: Bowel sounds are normal.      Palpations: Abdomen is soft.      Tenderness: There is no abdominal tenderness.   Musculoskeletal:         General: No swelling or tenderness. Normal range of motion.      Cervical back: Neck supple.   Lymphadenopathy:      Cervical: No cervical adenopathy.   Skin:     General: Skin is warm and dry.      Coloration: Skin is not pale.      Findings: No bruising, erythema or rash.   Neurological:      General: No focal deficit present.      Mental Status: He is alert and oriented to person, place, and time.      Motor: No weakness.   Psychiatric:         Mood and Affect: Mood normal.         Behavior: Behavior normal.         Labs: I have reviewed the following labs:  Lab Results   Component Value Date/Time    WBC 4.06 (L) 03/21/2025 12:46 PM    RBC 3.62 (L) 03/21/2025 12:46 PM    Hemoglobin 11.9 (L) 03/21/2025 12:46 PM    Hematocrit 35.0 (L) 03/21/2025 12:46 PM    MCV 97 03/21/2025 12:46 PM    MCH 32.9 03/21/2025 12:46 PM    RDW 13.3 " 03/21/2025 12:46 PM    Platelets 114 (L) 03/21/2025 12:46 PM    Segmented % 61 03/21/2025 12:46 PM    Lymphocytes % 25 03/21/2025 12:46 PM    Monocytes % 11 03/21/2025 12:46 PM    Eosinophils Relative 2 03/21/2025 12:46 PM    Basophils Relative 1 03/21/2025 12:46 PM    Immature Grans % 0 03/21/2025 12:46 PM    Absolute Neutrophils 2.49 03/21/2025 12:46 PM     Lab Results   Component Value Date/Time    Potassium 3.7 03/21/2025 12:46 PM    Chloride 106 03/21/2025 12:46 PM    CO2 27 03/21/2025 12:46 PM    BUN 22 03/21/2025 12:46 PM    Creatinine 0.95 03/21/2025 12:46 PM    Glucose, Fasting 110 (H) 09/23/2024 07:46 AM    Calcium 9.1 03/21/2025 12:46 PM    AST 27 03/21/2025 12:46 PM    ALT 36 03/21/2025 12:46 PM    Alkaline Phosphatase 96 03/21/2025 12:46 PM    Total Protein 6.7 03/21/2025 12:46 PM    Albumin 4.1 03/21/2025 12:46 PM    Total Bilirubin 0.55 03/21/2025 12:46 PM    eGFR 82 03/21/2025 12:46 PM               Disclaimer: This document was prepared using [employer] technology. If a word or phrase is confusing, or does not make sense, this is likely due to recognition error which was not discovered during this clinician's review. If you believe an error has occurred, please contact me through [employer] service line for [email or web address].      Follow Up    All questions were answered to the patient's satisfaction during this encounter. The patient knows the contact information for our office and knows to reach out for any relevant concerns related to this encounter. They are to call for any temperature 100.4 or higher, new symptoms including but not restricted to shaking chills, decreased appetite, nausea, vomiting, diarrhea, increased fatigue, shortness of breath or chest pain, confusion, and not feeling the strength to come to the clinic. For all other listed problems and medical diagnosis in their chart - they are managed by PCP and/or other specialists, which the patient acknowledges.     I spent 41 minutes  reviewing the records (labs, clinician notes, outside records, medical history, ordering medicine/tests/procedures, monitoring of anti-neoplastic toxicities, interpreting the imaging/labs previously done) and coordination of care as well as direct time with the patient today, of which greater than 50% of the time was spent in counseling and coordination of care with the patient/family.

## 2025-04-03 NOTE — ASSESSMENT & PLAN NOTE
Continue statins as per PCP.  Lab Results   Component Value Date    HGBA1C 7.7 (A) 03/19/2025

## 2025-04-03 NOTE — ASSESSMENT & PLAN NOTE
Patient tolerating current treatment well.  Status post 14 cycles of chemo.  Received FOLFIRINOX for 11 cycles.  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  Received FOLFIRI from cycle 12 onwards.  Most recent MRI on 3/27/2025 with decreased size of the pancreatic tumor and decrease in the size of the hepatic metastasis.  Will discuss with Dr. Faulkner if he could be a candidate for surgery.  Will get multiphase contrast-enhanced CT if patient deemed to be a candidate for surgery.  Patient to take a chemo break for 1 month.  He will return towards the end of April after his trip to [place] to resume treatment.

## 2025-04-22 ENCOUNTER — TELEPHONE (OUTPATIENT)
Dept: NUTRITION | Facility: CLINIC | Age: 70
End: 2025-04-22

## 2025-04-22 NOTE — TELEPHONE ENCOUNTER
Abhishek was scheduled for oncology nutrition follow up during infusion today. Per chart review, infusion schedule has changed. Nutrition follow up rescheduled to infusion on 5/13.

## 2025-04-23 DIAGNOSIS — C25.9 ADENOCARCINOMA OF PANCREAS (HCC): Primary | ICD-10-CM

## 2025-04-23 RX ORDER — DEXTROSE MONOHYDRATE 50 MG/ML
20 INJECTION, SOLUTION INTRAVENOUS ONCE
Status: CANCELLED | OUTPATIENT
Start: 2025-04-29

## 2025-04-23 RX ORDER — DIPHENHYDRAMINE HYDROCHLORIDE 50 MG/ML
25 INJECTION, SOLUTION INTRAMUSCULAR; INTRAVENOUS ONCE
Status: CANCELLED
Start: 2025-04-29 | End: 2025-04-29

## 2025-04-23 RX ORDER — ATROPINE SULFATE 1 MG/ML
0.25 INJECTION, SOLUTION INTRAVENOUS ONCE
Status: CANCELLED | OUTPATIENT
Start: 2025-04-29

## 2025-04-23 RX ORDER — SODIUM CHLORIDE 9 MG/ML
20 INJECTION, SOLUTION INTRAVENOUS ONCE AS NEEDED
Status: CANCELLED | OUTPATIENT
Start: 2025-04-29

## 2025-04-23 RX ORDER — ATROPINE SULFATE 1 MG/ML
0.25 INJECTION, SOLUTION INTRAVENOUS ONCE AS NEEDED
Status: CANCELLED | OUTPATIENT
Start: 2025-04-29

## 2025-04-24 DIAGNOSIS — E11.65 TYPE 2 DIABETES MELLITUS WITH HYPERGLYCEMIA, WITHOUT LONG-TERM CURRENT USE OF INSULIN (HCC): ICD-10-CM

## 2025-04-24 RX ORDER — FLASH GLUCOSE SENSOR
1 KIT MISCELLANEOUS
Qty: 2 EACH | Refills: 5 | Status: SHIPPED | OUTPATIENT
Start: 2025-04-24

## 2025-04-24 RX ORDER — METFORMIN HYDROCHLORIDE 500 MG/1
1000 TABLET, EXTENDED RELEASE ORAL
Qty: 180 TABLET | Refills: 1 | Status: SHIPPED | OUTPATIENT
Start: 2025-04-24

## 2025-04-25 ENCOUNTER — HOSPITAL ENCOUNTER (OUTPATIENT)
Dept: INFUSION CENTER | Facility: CLINIC | Age: 70
End: 2025-04-25
Payer: COMMERCIAL

## 2025-04-25 DIAGNOSIS — C25.9 ADENOCARCINOMA OF PANCREAS (HCC): ICD-10-CM

## 2025-04-25 DIAGNOSIS — Z95.828 PORT-A-CATH IN PLACE: Primary | ICD-10-CM

## 2025-04-25 LAB
ALBUMIN SERPL BCG-MCNC: 4.2 G/DL (ref 3.5–5)
ALP SERPL-CCNC: 82 U/L (ref 34–104)
ALT SERPL W P-5'-P-CCNC: 31 U/L (ref 7–52)
ANION GAP SERPL CALCULATED.3IONS-SCNC: 5 MMOL/L (ref 4–13)
AST SERPL W P-5'-P-CCNC: 26 U/L (ref 13–39)
BASOPHILS # BLD AUTO: 0.04 THOUSANDS/ÂΜL (ref 0–0.1)
BASOPHILS NFR BLD AUTO: 1 % (ref 0–1)
BILIRUB SERPL-MCNC: 0.54 MG/DL (ref 0.2–1)
BUN SERPL-MCNC: 25 MG/DL (ref 5–25)
CALCIUM SERPL-MCNC: 9.1 MG/DL (ref 8.4–10.2)
CHLORIDE SERPL-SCNC: 105 MMOL/L (ref 96–108)
CO2 SERPL-SCNC: 29 MMOL/L (ref 21–32)
CREAT SERPL-MCNC: 0.74 MG/DL (ref 0.6–1.3)
EOSINOPHIL # BLD AUTO: 0.26 THOUSAND/ÂΜL (ref 0–0.61)
EOSINOPHIL NFR BLD AUTO: 5 % (ref 0–6)
ERYTHROCYTE [DISTWIDTH] IN BLOOD BY AUTOMATED COUNT: 13.2 % (ref 11.6–15.1)
GFR SERPL CREATININE-BSD FRML MDRD: 95 ML/MIN/1.73SQ M
GLUCOSE SERPL-MCNC: 162 MG/DL (ref 65–140)
HCT VFR BLD AUTO: 37.3 % (ref 36.5–49.3)
HGB BLD-MCNC: 12.4 G/DL (ref 12–17)
IMM GRANULOCYTES # BLD AUTO: 0.01 THOUSAND/UL (ref 0–0.2)
IMM GRANULOCYTES NFR BLD AUTO: 0 % (ref 0–2)
LYMPHOCYTES # BLD AUTO: 1.17 THOUSANDS/ÂΜL (ref 0.6–4.47)
LYMPHOCYTES NFR BLD AUTO: 21 % (ref 14–44)
MCH RBC QN AUTO: 32.1 PG (ref 26.8–34.3)
MCHC RBC AUTO-ENTMCNC: 33.2 G/DL (ref 31.4–37.4)
MCV RBC AUTO: 97 FL (ref 82–98)
MONOCYTES # BLD AUTO: 0.56 THOUSAND/ÂΜL (ref 0.17–1.22)
MONOCYTES NFR BLD AUTO: 10 % (ref 4–12)
NEUTROPHILS # BLD AUTO: 3.48 THOUSANDS/ÂΜL (ref 1.85–7.62)
NEUTS SEG NFR BLD AUTO: 63 % (ref 43–75)
NRBC BLD AUTO-RTO: 0 /100 WBCS
PLATELET # BLD AUTO: 126 THOUSANDS/UL (ref 149–390)
PMV BLD AUTO: 10 FL (ref 8.9–12.7)
POTASSIUM SERPL-SCNC: 3.8 MMOL/L (ref 3.5–5.3)
PROT SERPL-MCNC: 6.6 G/DL (ref 6.4–8.4)
RBC # BLD AUTO: 3.86 MILLION/UL (ref 3.88–5.62)
SODIUM SERPL-SCNC: 139 MMOL/L (ref 135–147)
WBC # BLD AUTO: 5.52 THOUSAND/UL (ref 4.31–10.16)

## 2025-04-25 PROCEDURE — 80053 COMPREHEN METABOLIC PANEL: CPT | Performed by: INTERNAL MEDICINE

## 2025-04-25 PROCEDURE — 85025 COMPLETE CBC W/AUTO DIFF WBC: CPT | Performed by: INTERNAL MEDICINE

## 2025-04-25 NOTE — PROGRESS NOTES
Patient presents for central venous labs. Patient offers no complaints. Port accessed, flushed, saline locked, labs drawn, port flushed and de-accessed. Band-aid placed on site.  AVS declined, next appointment 4/29/25 at 0800 reviewed.

## 2025-04-29 ENCOUNTER — HOSPITAL ENCOUNTER (OUTPATIENT)
Dept: INFUSION CENTER | Facility: CLINIC | Age: 70
Discharge: HOME | End: 2025-04-29
Payer: COMMERCIAL

## 2025-04-29 VITALS
WEIGHT: 187.6 LBS | HEART RATE: 51 BPM | SYSTOLIC BLOOD PRESSURE: 155 MMHG | TEMPERATURE: 97 F | RESPIRATION RATE: 18 BRPM | HEIGHT: 71 IN | BODY MASS INDEX: 26.26 KG/M2 | DIASTOLIC BLOOD PRESSURE: 88 MMHG

## 2025-04-29 DIAGNOSIS — C25.9 ADENOCARCINOMA OF PANCREAS (HCC): Primary | ICD-10-CM

## 2025-04-29 PROCEDURE — 96413 CHEMO IV INFUSION 1 HR: CPT

## 2025-04-29 PROCEDURE — 96375 TX/PRO/DX INJ NEW DRUG ADDON: CPT

## 2025-04-29 PROCEDURE — G0498 CHEMO EXTEND IV INFUS W/PUMP: HCPCS

## 2025-04-29 PROCEDURE — 96367 TX/PROPH/DG ADDL SEQ IV INF: CPT

## 2025-04-29 RX ORDER — ATROPINE SULFATE 1 MG/ML
0.25 INJECTION, SOLUTION INTRAVENOUS ONCE
Status: COMPLETED | OUTPATIENT
Start: 2025-04-29 | End: 2025-04-29

## 2025-04-29 RX ORDER — SODIUM CHLORIDE 9 MG/ML
20 INJECTION, SOLUTION INTRAVENOUS ONCE AS NEEDED
Status: DISCONTINUED | OUTPATIENT
Start: 2025-04-29 | End: 2025-05-02 | Stop reason: HOSPADM

## 2025-04-29 RX ORDER — DEXTROSE MONOHYDRATE 50 MG/ML
20 INJECTION, SOLUTION INTRAVENOUS ONCE
Status: COMPLETED | OUTPATIENT
Start: 2025-04-29 | End: 2025-04-29

## 2025-04-29 RX ORDER — DIPHENHYDRAMINE HYDROCHLORIDE 50 MG/ML
25 INJECTION, SOLUTION INTRAMUSCULAR; INTRAVENOUS ONCE
Status: COMPLETED | OUTPATIENT
Start: 2025-04-29 | End: 2025-04-29

## 2025-04-29 RX ORDER — ATROPINE SULFATE 1 MG/ML
0.25 INJECTION, SOLUTION INTRAVENOUS ONCE AS NEEDED
Status: DISCONTINUED | OUTPATIENT
Start: 2025-04-29 | End: 2025-05-02 | Stop reason: HOSPADM

## 2025-04-29 RX ADMIN — DEXTROSE 20 ML/HR: 5 SOLUTION INTRAVENOUS at 10:16

## 2025-04-29 RX ADMIN — DEXAMETHASONE SODIUM PHOSPHATE: 10 INJECTION, SOLUTION INTRAMUSCULAR; INTRAVENOUS at 09:01

## 2025-04-29 RX ADMIN — IRINOTECAN HYDROCHLORIDE 245 MG: 20 INJECTION, SOLUTION INTRAVENOUS at 10:21

## 2025-04-29 RX ADMIN — DIPHENHYDRAMINE HYDROCHLORIDE 25 MG: 50 INJECTION INTRAMUSCULAR; INTRAVENOUS at 09:01

## 2025-04-29 RX ADMIN — FOSAPREPITANT 150 MG: 150 INJECTION, POWDER, LYOPHILIZED, FOR SOLUTION INTRAVENOUS at 09:33

## 2025-04-29 RX ADMIN — ATROPINE SULFATE 0.25 MG: 1 INJECTION, SOLUTION INTRAVENOUS at 10:13

## 2025-04-29 NOTE — PROGRESS NOTES
Patient presents for Irenotecan and Fluorouracil  offering no complaints. Verified with Danielle Stoddard RN pt not to receive leucovoren or 5FU. Port accessed and flushed with positive blood return noted. Patient tolerated treatment without incident. Chemo ball  connected, clamps open to run. Chemo ball okay to be disconnected on 5/1/2025 at 1005.  AVS declined. Next appointment reviewed for 5/1/25 at 10:30AM.

## 2025-05-01 ENCOUNTER — HOSPITAL ENCOUNTER (OUTPATIENT)
Dept: INFUSION CENTER | Facility: CLINIC | Age: 70
Discharge: HOME | End: 2025-05-01
Attending: INTERNAL MEDICINE
Payer: COMMERCIAL

## 2025-05-01 DIAGNOSIS — T45.1X5A CHEMOTHERAPY INDUCED NEUTROPENIA (HCC): Primary | ICD-10-CM

## 2025-05-01 DIAGNOSIS — C25.9 ADENOCARCINOMA OF PANCREAS (HCC): ICD-10-CM

## 2025-05-01 DIAGNOSIS — D70.1 CHEMOTHERAPY INDUCED NEUTROPENIA (HCC): Primary | ICD-10-CM

## 2025-05-01 PROCEDURE — 96372 THER/PROPH/DIAG INJ SC/IM: CPT

## 2025-05-01 RX ADMIN — PEGFILGRASTIM 6 MG: KIT SUBCUTANEOUS at 10:07

## 2025-05-01 NOTE — PROGRESS NOTES
Abhishek Gimenez presents for cadd d/c, cadd is flat and deflated, neulast on pro applied to rlq, pt  tolerated treatment well with no complications.  Reviewed date and time of neulasta removal    Abhishek Gimenez is aware of future appt on 5/9 at 1300.     AVS  No (Declined by Abhishek Gimenez) d/c from unit stable

## 2025-05-05 ENCOUNTER — OFFICE VISIT (OUTPATIENT)
Dept: HEMATOLOGY ONCOLOGY | Facility: CLINIC | Age: 70
End: 2025-05-05
Payer: COMMERCIAL

## 2025-05-05 VITALS
HEIGHT: 71 IN | HEART RATE: 81 BPM | RESPIRATION RATE: 18 BRPM | DIASTOLIC BLOOD PRESSURE: 78 MMHG | BODY MASS INDEX: 25.76 KG/M2 | TEMPERATURE: 97.7 F | WEIGHT: 184 LBS | OXYGEN SATURATION: 96 % | SYSTOLIC BLOOD PRESSURE: 140 MMHG

## 2025-05-05 DIAGNOSIS — C25.9 ADENOCARCINOMA OF PANCREAS (HCC): Primary | ICD-10-CM

## 2025-05-05 DIAGNOSIS — E11.65 TYPE 2 DIABETES MELLITUS WITH HYPERGLYCEMIA, WITHOUT LONG-TERM CURRENT USE OF INSULIN (HCC): ICD-10-CM

## 2025-05-05 DIAGNOSIS — E78.5 HYPERLIPIDEMIA ASSOCIATED WITH TYPE 2 DIABETES MELLITUS  (HCC): ICD-10-CM

## 2025-05-05 DIAGNOSIS — E11.69 HYPERLIPIDEMIA ASSOCIATED WITH TYPE 2 DIABETES MELLITUS  (HCC): ICD-10-CM

## 2025-05-05 PROCEDURE — 99214 OFFICE O/P EST MOD 30 MIN: CPT | Performed by: INTERNAL MEDICINE

## 2025-05-05 RX ORDER — SODIUM CHLORIDE 9 MG/ML
20 INJECTION, SOLUTION INTRAVENOUS ONCE AS NEEDED
Status: CANCELLED | OUTPATIENT
Start: 2025-05-13

## 2025-05-05 RX ORDER — DIPHENHYDRAMINE HYDROCHLORIDE 50 MG/ML
25 INJECTION, SOLUTION INTRAMUSCULAR; INTRAVENOUS ONCE
Status: CANCELLED
Start: 2025-05-13 | End: 2025-05-13

## 2025-05-05 RX ORDER — ATROPINE SULFATE 1 MG/ML
0.25 INJECTION, SOLUTION INTRAVENOUS ONCE AS NEEDED
Status: CANCELLED | OUTPATIENT
Start: 2025-05-13

## 2025-05-05 RX ORDER — ATROPINE SULFATE 1 MG/ML
0.25 INJECTION, SOLUTION INTRAVENOUS ONCE
Status: CANCELLED | OUTPATIENT
Start: 2025-05-13

## 2025-05-05 RX ORDER — DEXTROSE MONOHYDRATE 50 MG/ML
20 INJECTION, SOLUTION INTRAVENOUS ONCE
Status: CANCELLED | OUTPATIENT
Start: 2025-05-13

## 2025-05-05 NOTE — ASSESSMENT & PLAN NOTE
Continue statins as per PCP.  Lab Results   Component Value Date    HGBA1C 7.7 (A) 03/19/2025

## 2025-05-05 NOTE — PROGRESS NOTES
upon completion of 12 cycles of FOLFIRINOX.Name: Abhishek Gimenez      : 1955      MRN: [id number]  Encounter Provider: Butch Peña MD  Encounter Date: 2025   Encounter department: [facility] [place]  :  Assessment & Plan  Adenocarcinoma of pancreas (HCC)   Cancer Staging   Adenocarcinoma of pancreas (HCC)  Staging form: Pancreas, AJCC 8th Edition  - Clinical: Stage IB (cT2, cN0, cM0) - Signed by Roz Faulkner MD on 2024  - Pathologic stage from 2024: Stage IV (pT2, cM1) - Signed by Butch Peña MD on 2024    Patient tolerating current treatment well.  Status post 15 cycles of chemo.  Received FOLFIRINOX for 11 cycles.  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  Received FOLFIRI from cycle 12 onwards.  Most recent MRI on 3/27/2025 with decreased size of the pancreatic tumor and decrease in the size of the hepatic metastasis.  Patient took a break x 1 month (trip to [place]) and resumed C15 on 2025.  Continue 5-FU and Irinotecan q 2 weeks.   Will repeat imaging in 3 months.                Type 2 diabetes mellitus with hyperglycemia, without long-term current use of insulin (HCC)  On Lantus and metformin.  Continue management as per endocrinology.  Lab Results   Component Value Date    HGBA1C 7.7 (A) 2025                    Hyperlipidemia associated with type 2 diabetes mellitus  (HCC)  Continue statins as per PCP.  Lab Results   Component Value Date    HGBA1C 7.7 (A) 2025              Return in about 4 weeks (around 2025) for Office Visit.   Assessment & Plan  1. Pancreatic cancer.  The patient reports feeling the same and is able to perform his usual activities. He does not experience nausea, vomiting, or diarrhea. He has had some weight fluctuations but has been stable for months. He experiences occasional hot flashes and sweats at night. He is advised to remind the phlebotomist to draw  the CA 19-9 level during his next blood draw, as it is crucial for monitoring his cancer. His next chemotherapy cycle is scheduled for 05/13/2025. He will continue with his current chemotherapy regimen. The numbness previously experienced in his hands, tongue, and feet has improved, likely due to discontinuation of Oxaliplatin. The importance of maintaining weight was emphasized, and the possibility of holding treatment occasionally, especially during summer, was discussed to allow for more outdoor activities. However, it was noted that treatment should not be held frequently to prevent cancer progression.     2. Atrial fibrillation.  The patient is not currently on any medication for atrial fibrillation. He experienced a rapid heartbeat last week and is wearing a monitor for 14 days. He will need to undergo a stress test due to his family history of heart issues.    Follow-up  Follow-up is scheduled in 1 month.      History of Present Illness   Chief Complaint   Patient presents with    Follow-up   68-year-old gentleman who noted the onset of diabetes mellitus and February 2024 since that time he has been losing weight he is lost 30 pounds over the 6 months time.  He also developed intermittent abdominal pain epigastric left upper quadrant that continued to worsen.  He was started on Ozempic.  He drinks 3-4 beers every other day he last used tobacco 40 years ago but does not smoke now.  He has had a negative colonoscopy.  He underwent an EGD which was nondiagnostic subsequently underwent an EGD EUS which showed normal esophagus normal stomach normal duodenum.  Celiac artery and aorta were visualized and appeared normal.  The left kidney spleen left adrenal appeared normal parenchyma of the liver.  Normal this was the left lobe of the liver.  He had a heterogeneous hypoechoic irregular mass measuring 30 mm x 18 mm with poorly defined margins in the body of pancreas it abutted the splenic artery and splenic vein  needle biopsies were done for total.  The head of the pancreas and neck of the pancreas appeared to have some honeycombing.  Bile duct appeared normal gallbladder appeared normal.  This was done on 7/17/2024  Biopsy was consistent with adenocarcinoma.  CAT scan of the chest was negative.  On 7/10/2026 MRI of the abdomen showed the ill-defined 3.8 x 1.1 cm mass in the body of the pancreas the liver showed a 3.4 cm benign hemangioma 1.0 and 0.4 cm right lobe nodules all the seem to be compatible with hemangiomas.    Interval History:  History of Present Illness  The patient presents for a follow-up of pancreatic cancer.    He reports a stable condition, with no significant changes in his health status. There have been no episodes of nausea or vomiting. Diarrhea, which was previously an issue months ago, is no longer a concern. Weight has remained stable for several months, with only a slight gain of a few pounds over the past week or two. There are no reports of chest pain or shortness of breath. Occasionally, he experiences hot flashes and sweating at night, but these are not severe enough to be classified as fevers. There have been no occurrences of mouth sores.    Chemotherapy was last administered on 04/29/2025. The numbness previously experienced in his hands, tongue, and feet has resolved, and he can now drink ice water without discomfort. His insulin regimen remains unchanged.    Atrial fibrillation is managed by Dr. Andrews, although he is not currently on any medication for it. Last week, he experienced a rapid heartbeat and is now wearing a monitor for 14 days. A stress test is scheduled due to a family history of heart disease.    FAMILY HISTORY  His mother and sisters have a history of heart issues.     Oncology History   Cancer Staging   Adenocarcinoma of pancreas (HCC)  Staging form: Pancreas, AJCC 8th Edition  - Clinical: Stage IB (cT2, cN0, cM0) - Signed by Roz Faulkner MD on 8/26/2024  Total  positive nodes: 0  - Pathologic stage from 12/5/2024: Stage IV (pT2, cM1) - Signed by Butch Peña MD on 12/5/2024  Oncology History   Adenocarcinoma of pancreas (HCC)   8/13/2024 Biopsy    A.-B.  Pancreas, Body, FNA (Cell block and smear preparations):   Malignant  Adenocarcinoma.     8/20/2024 Initial Diagnosis    Adenocarcinoma of pancreas (HCC)     8/26/2024 -  Cancer Staged    Staging form: Pancreas, AJCC 8th Edition  - Clinical: Stage IB (cT2, cN0, cM0) - Signed by Roz Faulkner MD on 8/26/2024  Total positive nodes: 0       9/24/2024 -  Chemotherapy    irinotecan (CAMPTOSAR) chemo infusion, 150 mg/m2 = 294 mg, 16 of 19 cycles  Dose modification: 112.5 mg/m2 (75 % of original dose 150 mg/m2, Cycle 2, Reason: Dose Not Tolerated), 120 mg/m2 (80 % of original dose 150 mg/m2, Cycle 3, Reason: Original Dose Changed)  Administration: 294 mg (9/24/2024), 227 mg (10/8/2024), 240 mg (10/22/2024), 240 mg (11/5/2024), 238 mg (11/19/2024), 239 mg (12/3/2024), 239 mg (12/17/2024), 242 mg (1/14/2025), 241 mg (12/31/2024), 241 mg (1/28/2025), 244 mg (2/11/2025), 245 mg (2/25/2025), 244 mg (3/25/2025), 245 mg (3/11/2025), 245 mg (4/29/2025), 246 mg (5/13/2025)  oxaliplatin (ELOXATIN) chemo infusion, 65 mg/m2 = 127.4 mg, 11 of 11 cycles  Dose modification: 48.75 mg/m2 (75 % of original dose 65 mg/m2, Cycle 2, Reason: Dose Not Tolerated), 52 mg/m2 (80 % of original dose 65 mg/m2, Cycle 3, Reason: Original Dose Changed)  Administration: 127.4 mg (9/24/2024), 98.5 mg (10/8/2024), 100 mg (10/22/2024), 100 mg (11/5/2024), 100 mg (11/19/2024), 100 mg (12/3/2024), 103.5 mg (12/17/2024), 105.05 mg (1/14/2025), 104.5 mg (12/31/2024), 104.5 mg (1/28/2025), 105.55 mg (2/11/2025)  fluorouracil (ADRUCIL) ambulatory infusion Soln, 1,200 mg/m2/day = 4,705 mg, 16 of 19 cycles  Dose modification: 900 mg/m2/day (75 % of original dose 1,200 mg/m2/day, Cycle 3, Reason: Dose Not Tolerated), 960 mg/m2/day (80 % of original dose 1,200  "mg/m2/day, Cycle 4, Reason: Other (see comments), Comment: provider request)     11/15/2024 Biopsy    Final Diagnosis   A. Liver mass, biopsy:  - Metastatic adenocarcinoma.         12/5/2024 -  Cancer Staged    Staging form: Pancreas, AJCC 8th Edition  - Pathologic stage from 12/5/2024: Stage IV (pT2, cM1) - Signed by Butch Peña MD on 12/5/2024          Pertinent Medical History   05/15/25: reviewed         Review of Systems  14 point review of systems was negative except that mentioned in HPI.        Objective   /78 (BP Location: Left arm, Patient Position: Sitting, Cuff Size: Adult)   Pulse 81   Temp 97.7 °F (36.5 °C) (Temporal)   Resp 18   Ht 5' 10.98\" (1.803 m)   Wt 83.5 kg (184 lb)   SpO2 96%   BMI 25.68 kg/m²     Pain Screening:  Pain Score: 0-No pain  ECOG   1  Physical Exam  Vitals and nursing note reviewed.   Constitutional:       General: He is not in acute distress.     Appearance: Normal appearance.   HENT:      Head: Normocephalic and atraumatic.      Mouth/Throat:      Mouth: Mucous membranes are moist.      Pharynx: Oropharynx is clear.     Eyes:      General: No scleral icterus.     Extraocular Movements: Extraocular movements intact.      Conjunctiva/sclera: Conjunctivae normal.       Cardiovascular:      Rate and Rhythm: Normal rate and regular rhythm.      Pulses: Normal pulses.      Heart sounds: No murmur heard.  Pulmonary:      Effort: Pulmonary effort is normal.      Breath sounds: Normal breath sounds. No wheezing, rhonchi or rales.   Abdominal:      General: Bowel sounds are normal.      Palpations: Abdomen is soft.      Tenderness: There is no abdominal tenderness.     Musculoskeletal:         General: No swelling or tenderness. Normal range of motion.      Cervical back: Neck supple.   Lymphadenopathy:      Cervical: No cervical adenopathy.     Skin:     General: Skin is warm and dry.      Coloration: Skin is not pale.      Findings: No bruising, erythema or rash. "     Neurological:      General: No focal deficit present.      Mental Status: He is alert and oriented to person, place, and time.      Motor: No weakness.     Psychiatric:         Mood and Affect: Mood normal.         Behavior: Behavior normal.         Labs: I have reviewed the following labs:  Lab Results   Component Value Date/Time    WBC 12.71 (H) 05/09/2025 01:13 PM    RBC 3.93 05/09/2025 01:13 PM    Hemoglobin 12.8 05/09/2025 01:13 PM    Hematocrit 37.6 05/09/2025 01:13 PM    MCV 96 05/09/2025 01:13 PM    MCH 32.6 05/09/2025 01:13 PM    RDW 12.8 05/09/2025 01:13 PM    Platelets 109 (L) 05/09/2025 01:13 PM    Segmented % 77 (H) 05/09/2025 01:13 PM    Lymphocytes % 9 (L) 05/09/2025 01:13 PM    Monocytes % 11 05/09/2025 01:13 PM    Eosinophils Relative 1 05/09/2025 01:13 PM    Basophils Relative 1 05/09/2025 01:13 PM    Immature Grans % 1 05/09/2025 01:13 PM    Absolute Neutrophils 9.90 (H) 05/09/2025 01:13 PM     Lab Results   Component Value Date/Time    Potassium 3.7 05/09/2025 01:13 PM    Chloride 103 05/09/2025 01:13 PM    CO2 29 05/09/2025 01:13 PM    BUN 17 05/09/2025 01:13 PM    Creatinine 0.89 05/09/2025 01:13 PM    Glucose, Fasting 110 (H) 09/23/2024 07:46 AM    Calcium 9.2 05/09/2025 01:13 PM    AST 21 05/09/2025 01:13 PM    ALT 27 05/09/2025 01:13 PM    Alkaline Phosphatase 151 (H) 05/09/2025 01:13 PM    Total Protein 6.8 05/09/2025 01:13 PM    Albumin 4.2 05/09/2025 01:13 PM    Total Bilirubin 0.33 05/09/2025 01:13 PM    eGFR 88 05/09/2025 01:13 PM               Disclaimer: This document was prepared using [email or web address] technology. If a word or phrase is confusing, or does not make sense, this is likely due to recognition error which was not discovered during this clinician's review. If you believe an error has occurred, please contact me through [employer] service line for [email or web address]?cation.      Follow Up    All questions were answered to the patient's satisfaction during this encounter. The patient  knows the contact information for our office and knows to reach out for any relevant concerns related to this encounter. They are to call for any temperature 100.4 or higher, new symptoms including but not restricted to shaking chills, decreased appetite, nausea, vomiting, diarrhea, increased fatigue, shortness of breath or chest pain, confusion, and not feeling the strength to come to the clinic. For all other listed problems and medical diagnosis in their chart - they are managed by PCP and/or other specialists, which the patient acknowledges.     I spent 37 minutes reviewing the records (labs, clinician notes, outside records, medical history, ordering medicine/tests/procedures, monitoring of anti-neoplastic toxicities, interpreting the imaging/labs previously done) and coordination of care as well as direct time with the patient today, of which greater than 50% of the time was spent in counseling and coordination of care with the patient/family.

## 2025-05-05 NOTE — ASSESSMENT & PLAN NOTE
On Lantus and metformin.  Continue management as per endocrinology.  Lab Results   Component Value Date    HGBA1C 7.7 (A) 03/19/2025

## 2025-05-05 NOTE — ASSESSMENT & PLAN NOTE
Cancer Staging   Adenocarcinoma of pancreas (HCC)  Staging form: Pancreas, AJCC 8th Edition  - Clinical: Stage IB (cT2, cN0, cM0) - Signed by Roz Faulkner MD on 8/26/2024  - Pathologic stage from 12/5/2024: Stage IV (pT2, cM1) - Signed by Butch Peña MD on 12/5/2024    Patient tolerating current treatment well.  Status post 15 cycles of chemo.  Received FOLFIRINOX for 11 cycles.  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  Received FOLFIRI from cycle 12 onwards.  Most recent MRI on 3/27/2025 with decreased size of the pancreatic tumor and decrease in the size of the hepatic metastasis.  Patient took a break x 1 month (trip to [place]) and resumed C15 on 04/29/2025.  Continue 5-FU and Irinotecan q 2 weeks.   Will repeat imaging in 3 months.

## 2025-05-06 DIAGNOSIS — C25.9 ADENOCARCINOMA OF PANCREAS (HCC): Primary | ICD-10-CM

## 2025-05-08 DIAGNOSIS — G89.3 CANCER RELATED PAIN: ICD-10-CM

## 2025-05-08 DIAGNOSIS — N52.9 ERECTILE DYSFUNCTION, UNSPECIFIED ERECTILE DYSFUNCTION TYPE: Primary | ICD-10-CM

## 2025-05-08 DIAGNOSIS — E11.65 TYPE 2 DIABETES MELLITUS WITH HYPERGLYCEMIA, WITHOUT LONG-TERM CURRENT USE OF INSULIN (HCC): ICD-10-CM

## 2025-05-08 RX ORDER — OXYCODONE HYDROCHLORIDE 5 MG/1
5 TABLET ORAL EVERY 6 HOURS PRN
Qty: 90 TABLET | Refills: 0 | Status: SHIPPED | OUTPATIENT
Start: 2025-05-08

## 2025-05-08 RX ORDER — SILDENAFIL 100 MG/1
100 TABLET, FILM COATED ORAL DAILY PRN
Qty: 10 TABLET | Refills: 3 | Status: SHIPPED | OUTPATIENT
Start: 2025-05-08

## 2025-05-08 RX ORDER — METFORMIN HYDROCHLORIDE 500 MG/1
1000 TABLET, EXTENDED RELEASE ORAL
Qty: 180 TABLET | Refills: 0 | OUTPATIENT
Start: 2025-05-08

## 2025-05-08 NOTE — TELEPHONE ENCOUNTER
Last appointment: 3/5/2025    Next scheduled appointment: 7/9/2025    Pharmacy: [facility]       PDMP review:

## 2025-05-08 NOTE — TELEPHONE ENCOUNTER
Medication: oxycodone 5mg  PDMP   03/24/2025 03/24/2025 oxyCODONE HCL (Tablet) 90.0 22 5 MG 30.68 PEG PARRA [facility] Medicare 0 / 0 [place]   02/27/2025 02/27/2025 oxyCODONE HCL (Tablet) 90.0 22 5 MG 30.68 PEG PARRA

## 2025-05-09 ENCOUNTER — HOSPITAL ENCOUNTER (OUTPATIENT)
Dept: INFUSION CENTER | Facility: CLINIC | Age: 70
End: 2025-05-09
Payer: COMMERCIAL

## 2025-05-09 DIAGNOSIS — Z95.828 PORT-A-CATH IN PLACE: Primary | ICD-10-CM

## 2025-05-09 DIAGNOSIS — C25.9 ADENOCARCINOMA OF PANCREAS (HCC): ICD-10-CM

## 2025-05-09 LAB
ALBUMIN SERPL BCG-MCNC: 4.2 G/DL (ref 3.5–5)
ALP SERPL-CCNC: 151 U/L (ref 34–104)
ALT SERPL W P-5'-P-CCNC: 27 U/L (ref 7–52)
ANION GAP SERPL CALCULATED.3IONS-SCNC: 6 MMOL/L (ref 4–13)
AST SERPL W P-5'-P-CCNC: 21 U/L (ref 13–39)
BASOPHILS # BLD AUTO: 0.06 THOUSANDS/ÂΜL (ref 0–0.1)
BASOPHILS NFR BLD AUTO: 1 % (ref 0–1)
BILIRUB SERPL-MCNC: 0.33 MG/DL (ref 0.2–1)
BUN SERPL-MCNC: 17 MG/DL (ref 5–25)
CALCIUM SERPL-MCNC: 9.2 MG/DL (ref 8.4–10.2)
CHLORIDE SERPL-SCNC: 103 MMOL/L (ref 96–108)
CO2 SERPL-SCNC: 29 MMOL/L (ref 21–32)
CREAT SERPL-MCNC: 0.89 MG/DL (ref 0.6–1.3)
EOSINOPHIL # BLD AUTO: 0.11 THOUSAND/ÂΜL (ref 0–0.61)
EOSINOPHIL NFR BLD AUTO: 1 % (ref 0–6)
ERYTHROCYTE [DISTWIDTH] IN BLOOD BY AUTOMATED COUNT: 12.8 % (ref 11.6–15.1)
GFR SERPL CREATININE-BSD FRML MDRD: 88 ML/MIN/1.73SQ M
GLUCOSE SERPL-MCNC: 161 MG/DL (ref 65–140)
HCT VFR BLD AUTO: 37.6 % (ref 36.5–49.3)
HGB BLD-MCNC: 12.8 G/DL (ref 12–17)
IMM GRANULOCYTES # BLD AUTO: 0.16 THOUSAND/UL (ref 0–0.2)
IMM GRANULOCYTES NFR BLD AUTO: 1 % (ref 0–2)
LYMPHOCYTES # BLD AUTO: 1.14 THOUSANDS/ÂΜL (ref 0.6–4.47)
LYMPHOCYTES NFR BLD AUTO: 9 % (ref 14–44)
MCH RBC QN AUTO: 32.6 PG (ref 26.8–34.3)
MCHC RBC AUTO-ENTMCNC: 34 G/DL (ref 31.4–37.4)
MCV RBC AUTO: 96 FL (ref 82–98)
MONOCYTES # BLD AUTO: 1.34 THOUSAND/ÂΜL (ref 0.17–1.22)
MONOCYTES NFR BLD AUTO: 11 % (ref 4–12)
NEUTROPHILS # BLD AUTO: 9.9 THOUSANDS/ÂΜL (ref 1.85–7.62)
NEUTS SEG NFR BLD AUTO: 77 % (ref 43–75)
NRBC BLD AUTO-RTO: 0 /100 WBCS
PLATELET # BLD AUTO: 109 THOUSANDS/UL (ref 149–390)
PMV BLD AUTO: 10.2 FL (ref 8.9–12.7)
POTASSIUM SERPL-SCNC: 3.7 MMOL/L (ref 3.5–5.3)
PROT SERPL-MCNC: 6.8 G/DL (ref 6.4–8.4)
RBC # BLD AUTO: 3.93 MILLION/UL (ref 3.88–5.62)
SODIUM SERPL-SCNC: 138 MMOL/L (ref 135–147)
WBC # BLD AUTO: 12.71 THOUSAND/UL (ref 4.31–10.16)

## 2025-05-09 PROCEDURE — 80053 COMPREHEN METABOLIC PANEL: CPT | Performed by: INTERNAL MEDICINE

## 2025-05-09 PROCEDURE — 86301 IMMUNOASSAY TUMOR CA 19-9: CPT

## 2025-05-09 PROCEDURE — 85025 COMPLETE CBC W/AUTO DIFF WBC: CPT | Performed by: INTERNAL MEDICINE

## 2025-05-09 NOTE — PROGRESS NOTES
Patient presents today for lab draw via port. Port accessed with positive blood return. Port flushed and de accessed without complications. Patient tolerated procedure well. AVS declined. Patient aware of next appointment on 5/13 at 8:00.

## 2025-05-10 LAB — CANCER AG19-9 SERPL-ACNC: 17 U/ML (ref 0–35)

## 2025-05-13 ENCOUNTER — HOSPITAL ENCOUNTER (OUTPATIENT)
Dept: INFUSION CENTER | Facility: CLINIC | Age: 70
Discharge: HOME | End: 2025-05-13
Payer: COMMERCIAL

## 2025-05-13 ENCOUNTER — NUTRITION (OUTPATIENT)
Dept: NUTRITION | Facility: CLINIC | Age: 70
End: 2025-05-13

## 2025-05-13 VITALS
HEIGHT: 71 IN | SYSTOLIC BLOOD PRESSURE: 170 MMHG | BODY MASS INDEX: 25.98 KG/M2 | TEMPERATURE: 97.8 F | WEIGHT: 185.6 LBS | RESPIRATION RATE: 20 BRPM | DIASTOLIC BLOOD PRESSURE: 82 MMHG | HEART RATE: 91 BPM

## 2025-05-13 DIAGNOSIS — C25.9 ADENOCARCINOMA OF PANCREAS (HCC): Primary | ICD-10-CM

## 2025-05-13 DIAGNOSIS — Z71.3 NUTRITIONAL COUNSELING: Primary | ICD-10-CM

## 2025-05-13 PROCEDURE — 96375 TX/PRO/DX INJ NEW DRUG ADDON: CPT

## 2025-05-13 PROCEDURE — G0498 CHEMO EXTEND IV INFUS W/PUMP: HCPCS

## 2025-05-13 PROCEDURE — 96413 CHEMO IV INFUSION 1 HR: CPT

## 2025-05-13 PROCEDURE — 96367 TX/PROPH/DG ADDL SEQ IV INF: CPT

## 2025-05-13 RX ORDER — DIPHENHYDRAMINE HYDROCHLORIDE 50 MG/ML
25 INJECTION, SOLUTION INTRAMUSCULAR; INTRAVENOUS ONCE
Status: COMPLETED | OUTPATIENT
Start: 2025-05-13 | End: 2025-05-13

## 2025-05-13 RX ORDER — SODIUM CHLORIDE 9 MG/ML
20 INJECTION, SOLUTION INTRAVENOUS ONCE AS NEEDED
Status: DISCONTINUED | OUTPATIENT
Start: 2025-05-13 | End: 2025-05-16 | Stop reason: HOSPADM

## 2025-05-13 RX ORDER — ATROPINE SULFATE 1 MG/ML
0.25 INJECTION, SOLUTION INTRAVENOUS ONCE AS NEEDED
Status: DISCONTINUED | OUTPATIENT
Start: 2025-05-13 | End: 2025-05-16 | Stop reason: HOSPADM

## 2025-05-13 RX ORDER — DEXTROSE MONOHYDRATE 50 MG/ML
20 INJECTION, SOLUTION INTRAVENOUS ONCE
Status: COMPLETED | OUTPATIENT
Start: 2025-05-13 | End: 2025-05-13

## 2025-05-13 RX ORDER — ATROPINE SULFATE 1 MG/ML
0.25 INJECTION, SOLUTION INTRAVENOUS ONCE
Status: COMPLETED | OUTPATIENT
Start: 2025-05-13 | End: 2025-05-13

## 2025-05-13 RX ADMIN — DIPHENHYDRAMINE HYDROCHLORIDE 25 MG: 50 INJECTION INTRAMUSCULAR; INTRAVENOUS at 09:31

## 2025-05-13 RX ADMIN — ATROPINE SULFATE 0.25 MG: 1 INJECTION, SOLUTION INTRAVENOUS at 09:38

## 2025-05-13 RX ADMIN — DEXAMETHASONE SODIUM PHOSPHATE: 10 INJECTION, SOLUTION INTRAMUSCULAR; INTRAVENOUS at 08:29

## 2025-05-13 RX ADMIN — IRINOTECAN HYDROCHLORIDE 246 MG: 20 INJECTION, SOLUTION INTRAVENOUS at 09:44

## 2025-05-13 RX ADMIN — SODIUM CHLORIDE 20 ML/HR: 0.9 INJECTION, SOLUTION INTRAVENOUS at 08:31

## 2025-05-13 RX ADMIN — DEXTROSE 20 ML/HR: 5 SOLUTION INTRAVENOUS at 09:36

## 2025-05-13 RX ADMIN — FOSAPREPITANT 150 MG: 150 INJECTION, POWDER, LYOPHILIZED, FOR SOLUTION INTRAVENOUS at 08:56

## 2025-05-13 NOTE — PROGRESS NOTES
Patient here for Modified Folfirinox (Irenotecan and Fluorouracil) and CADD connection. Verified with Danielle PANTOJA RN, patient does not receive Leucovorin and 5FU push, also orders are not signed that this time, received a nursing communication from the office we are ok to proceed and no changes to the plan at this time.     Port a cath accessed with positive blood return, tolerated entire treatment with no incident. CADD pump connected with no incident, clamps opened and checked with Sandee MONTIEL RN. Return appointment for 5/15/2025 @ 10am.   AVS declined.   Walked out of clinic with no incident.

## 2025-05-13 NOTE — PROGRESS NOTES
Outpatient Oncology Nutrition Consultation   Type of Consult: Follow Up  Care Location: [facility]    Reason for referral: notification  Lashell Lew MA  on 9/11/24 that pt is appropriate for oncology nutrition care (reason for referral: Ambulatory referral for struggling with eating and drinking).     Nutrition Assessment:   Oncology Diagnosis & Treatments:   Cancer of the pancreas diagnosed 8/20/24.   Began chemotherapy 9/24/24; Modified FOLFIRINOX every 14 days   Oncology History   Adenocarcinoma of pancreas (HCC)   8/13/2024 Biopsy    A.-B.  Pancreas, Body, FNA (Cell block and smear preparations):   Malignant  Adenocarcinoma.     8/20/2024 Initial Diagnosis    Adenocarcinoma of pancreas (HCC)     8/26/2024 -  Cancer Staged    Staging form: Pancreas, AJCC 8th Edition  - Clinical: Stage IB (cT2, cN0, cM0) - Signed by Roz Faulkner MD on 8/26/2024  Total positive nodes: 0       9/24/2024 -  Chemotherapy    irinotecan (CAMPTOSAR) chemo infusion, 150 mg/m2 = 294 mg, 16 of 19 cycles  Dose modification: 112.5 mg/m2 (75 % of original dose 150 mg/m2, Cycle 2, Reason: Dose Not Tolerated), 120 mg/m2 (80 % of original dose 150 mg/m2, Cycle 3, Reason: Original Dose Changed)  Administration: 294 mg (9/24/2024), 227 mg (10/8/2024), 240 mg (10/22/2024), 240 mg (11/5/2024), 238 mg (11/19/2024), 239 mg (12/3/2024), 239 mg (12/17/2024), 242 mg (1/14/2025), 241 mg (12/31/2024), 241 mg (1/28/2025), 244 mg (2/11/2025), 245 mg (2/25/2025), 244 mg (3/25/2025), 245 mg (3/11/2025), 245 mg (4/29/2025)  oxaliplatin (ELOXATIN) chemo infusion, 65 mg/m2 = 127.4 mg, 11 of 11 cycles  Dose modification: 48.75 mg/m2 (75 % of original dose 65 mg/m2, Cycle 2, Reason: Dose Not Tolerated), 52 mg/m2 (80 % of original dose 65 mg/m2, Cycle 3, Reason: Original Dose Changed)  Administration: 127.4 mg (9/24/2024), 98.5 mg (10/8/2024), 100 mg (10/22/2024), 100 mg (11/5/2024), 100 mg (11/19/2024), 100 mg (12/3/2024), 103.5 mg (12/17/2024), 105.05  mg (1/14/2025), 104.5 mg (12/31/2024), 104.5 mg (1/28/2025), 105.55 mg (2/11/2025)  fluorouracil (ADRUCIL) ambulatory infusion Soln, 1,200 mg/m2/day = 4,705 mg, 16 of 19 cycles  Dose modification: 900 mg/m2/day (75 % of original dose 1,200 mg/m2/day, Cycle 3, Reason: Dose Not Tolerated), 960 mg/m2/day (80 % of original dose 1,200 mg/m2/day, Cycle 4, Reason: Other (see comments), Comment: provider request)     11/15/2024 Biopsy    Final Diagnosis   A. Liver mass, biopsy:  - Metastatic adenocarcinoma.         12/5/2024 -  Cancer Staged    Staging form: Pancreas, AJCC 8th Edition  - Pathologic stage from 12/5/2024: Stage IV (pT2, cM1) - Signed by Butch Peña MD on 12/5/2024         Past Medical & Surgical Hx:   Patient Active Problem List   Diagnosis    Atrial fibrillation (HCC)    Psoriatic arthritis (HCC)    NDPH (new daily persistent headache)    Atrial flutter (HCC)    CATY (obstructive sleep apnea)    Type 2 diabetes mellitus with hyperglycemia, without long-term current use of insulin (HCC)    Elevated PSA    Platelets decreased (HCC)    Adenocarcinoma of pancreas (HCC)    Pulmonary HTN (HCC)    Pre-op testing    Liver masses    Port-A-Cath in place    Chemotherapy induced neutropenia (HCC)    Palliative care by specialist    Medical marijuana use    Nausea    Cancer-related pain    Insomnia    Loss of appetite    Hyperlipidemia associated with type 2 diabetes mellitus  (HCC)    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)    Opioid dependence, uncomplicated (HCC)     Past Medical History:   Diagnosis Date    A-fib (HCC)     Arthritis     BPH (benign prostatic hyperplasia)     Cancer (HCC)     Colon polyp     Diabetes mellitus (HCC)     Fatty liver     Fracture, foot     High cholesterol     Hyperlipidemia     Irregular heart beat     Non-ischemic cardiomyopathy (HCC)     Osteoarthritis     Psoriasis     Psoriatic arthritis (HCC)     Pulmonary HTN (HCC)      Past Surgical History:    Procedure Laterality Date    CARDIAC ELECTROPHYSIOLOGY PROCEDURE N/A 02/22/2023    Procedure: Cardiac eps/afib ablation   comprehensive posterior wall;  Surgeon: Mynor Andrews MD;  Location: [facility];  Service: Cardiology    COLONOSCOPY      ELBOW SURGERY Right     FL GUIDED CENTRAL VENOUS ACCESS DEVICE INSERTION  9/5/2024    IR BIOPSY LIVER MASS  9/18/2024    IR BIOPSY LIVER MASS  11/15/2024    KIDNEY STONE SURGERY      LAPAROSCOPY N/A 9/5/2024    Procedure: DIAGNOSTIC LAPAROSCOPY;  Surgeon: Roz Faulkner MD;  Location: [facility];  Service: Surgical Oncology    TRANSURETHRAL RESECTION OF PROSTATE      last assessed 7/21/15    TUNNELED VENOUS PORT PLACEMENT N/A 9/5/2024    Procedure: INSERTION VENOUS PORT (PORT-A-CATH);  Surgeon: Roz Faulkner MD;  Location: [facility];  Service: Surgical Oncology       Review of Medications:   Vitamins, Supplements and Herbals: No, pt denies taking supplements; MMJ     Current Outpatient Medications:     Alcohol Swabs 70 % PADS, May substitute brand based on insurance coverage. Check glucose TID. (Patient not taking: Reported on 9/23/2024), Disp: 100 each, Rfl: 0    Benzocaine-Isopropyl Alcohol 6-70 % PADS, Use, Disp: , Rfl:     Blood Glucose Monitoring Suppl ([employer] Blood Glucose Meter) w/Device KIT, Use, Disp: , Rfl:     Blood Glucose Monitoring Suppl (OneTouch Verio Reflect) w/Device KIT, Check blood sugars once daily. Please substitute with appropriate alternative as covered by patient's insurance. Dx: E11.65, Disp: 1 kit, Rfl: 0    Continuous Blood Gluc  (FreeStyle Livia 2 [place]) LEANA, 14 DAY READER NOT MADE, Disp: 1 each, Rfl: 1    Continuous Glucose Sensor (FreeStyle Livia 14 Day Sensor) MISC, Use 1 Device every 14 (fourteen) days, Disp: 2 each, Rfl: 5    fluorouracil 3,935 mg in CADD/Elastomeric Infusion Device, Infuse 3,935 mg (960 mg/m2/day x 2.05 m2) into a catheter in a vein via infusion device over 46 hours for 2 days  Infusion planned for  May 13, 2025., Disp: 1 each, Rfl: 3    Glucose Blood (BLOOD GLUCOSE TEST STRIPS 333 VI), Use, Disp: , Rfl:     glucose blood (OneTouch Verio) test strip, Check blood sugars once daily. Please substitute with appropriate alternative as covered by patient's insurance. Dx: E11.65, Disp: 100 each, Rfl: 3    insulin glargine (LANTUS) 100 units/mL subcutaneous injection, Inject 20 Units under the skin daily at bedtime, Disp: 6 mL, Rfl: 3    Insulin Pen Needle (BD Pen Needle Mallory U/F) 32G X 4 MM MISC, Use daily as directed with insulin pen, Disp: 100 each, Rfl: 3    Lancets 33G MISC, Use, Disp: , Rfl:     Lantus SoloStar 100 units/mL SOPN, INJECT 0.2 ML (20 UNITS TOTAL) UNDER THE SKIN DAILY AT BEDTIME, Disp: 6 mL, Rfl: 0    lidocaine-prilocaine (EMLA) cream, Apply topically as needed for mild pain Apply to port area 45-60min prior to treatment and cover with dressing or plastic wrap, Disp: 30 g, Rfl: 1    metFORMIN (GLUCOPHAGE-XR) 500 mg 24 hr tablet, Take 2 tablets (1,000 mg total) by mouth daily with breakfast, Disp: 180 tablet, Rfl: 1    ondansetron (ZOFRAN) 4 mg tablet, Take 1 tablet (4 mg total) by mouth every 8 (eight) hours as needed for nausea or vomiting, Disp: 20 tablet, Rfl: 0    OneTouch Delica Lancets 33G MISC, Check blood sugars once daily. Please substitute with appropriate alternative as covered by patient's insurance. Dx: E11.65, Disp: 100 each, Rfl: 3    oxyCODONE (Roxicodone) 5 immediate release tablet, Take 1 tablet (5 mg total) by mouth every 6 (six) hours as needed for moderate pain Max Daily Amount: 20 mg, Disp: 90 tablet, Rfl: 0    pantoprazole (PROTONIX) 40 mg tablet, Take 1 tablet (40 mg total) by mouth daily before breakfast, Disp: 100 tablet, Rfl: 1    rosuvastatin (CRESTOR) 5 mg tablet, Take 1 tablet (5 mg total) by mouth every evening, Disp: 100 tablet, Rfl: 1    sildenafil (VIAGRA) 100 mg tablet, Take 1 tablet (100 mg total) by mouth daily as needed for erectile dysfunction, Disp: 10 tablet,  Rfl: 3  No current facility-administered medications for this visit.    Facility-Administered Medications Ordered in Other Visits:     alteplase (CATHFLO) injection 2 mg, 2 mg, Intracatheter, Q1MIN PRN, Butch Peña MD    Atropine Sulfate injection 0.25 mg, 0.25 mg, Intravenous, Once, Butch Peña MD    Atropine Sulfate injection 0.25 mg, 0.25 mg, Intravenous, Once PRN, Butch Peña MD    dextrose 5 % infusion, 20 mL/hr, Intravenous, Once, Butch Peña MD    diphenhydrAMINE (BENADRYL) injection 25 mg, 25 mg, Intravenous, Once, Butch Peña MD    fosaprepitant (EMEND) 150 mg in sodium chloride 0.9 % 250 mL IVPB, 150 mg, Intravenous, Once, Butch Peña MD, Last Rate: 500 mL/hr at 05/13/25 0856, 150 mg at 05/13/25 0856    irinotecan (CAMPTOSAR) 246 mg in dextrose 5 % 500 mL chemo infusion, 120 mg/m2 (Treatment Plan Recorded), Intravenous, Once, Butch Peña MD    sodium chloride 0.9 % infusion, 20 mL/hr, Intravenous, Once PRN, Butch Peña MD, Last Rate: 20 mL/hr at 05/13/25 0831, 20 mL/hr at 05/13/25 0831    Most Recent Lab Results:   Lab Results   Component Value Date    WBC 12.71 (H) 05/09/2025    NEUTROABS 9.90 (H) 05/09/2025    CHOLESTEROL 212 (H) 02/20/2024    CHOL 190 11/15/2016    TRIG 369 (H) 02/20/2024    HDL 40 02/20/2024    LDLCALC 98 02/20/2024    ALT 27 05/09/2025    AST 21 05/09/2025    ALB 4.2 05/09/2025     03/15/2016    SODIUM 138 05/09/2025    SODIUM 139 04/25/2025    K 3.7 05/09/2025    K 3.8 04/25/2025     05/09/2025    BUN 17 05/09/2025    BUN 25 04/25/2025    CREATININE 0.89 05/09/2025    CREATININE 0.74 04/25/2025    EGFR 88 05/09/2025    GLUCOSE 106 (H) 11/15/2016    POCGLU 97 09/18/2024    GLUF 110 (H) 09/23/2024    GLUF 96 09/18/2024    GLUC 161 (H) 05/09/2025    HGBA1C 7.7 (A) 03/19/2025    HGBA1C 6.6 (A) 08/27/2024    HGBA1C 5.9 05/24/2024    CALCIUM 9.2 05/09/2025    FOLATE >20.0 (H) 06/10/2021    MG 1.9 02/23/2023     Anthropometric  "Measurements:   Height: 71\"  Ht Readings from Last 1 Encounters:   05/13/25 5' 10.98\" (1.803 m)     Wt Readings from Last 20 Encounters:   05/13/25 84.2 kg (185 lb 9.6 oz)   05/05/25 83.5 kg (184 lb)   04/29/25 85.1 kg (187 lb 9.6 oz)   04/03/25 83.9 kg (185 lb)   03/25/25 85.1 kg (187 lb 9.6 oz)   03/19/25 82.6 kg (182 lb)   03/11/25 83.3 kg (183 lb 9.6 oz)   03/05/25 84.3 kg (185 lb 12.8 oz)   02/25/25 83.9 kg (185 lb)   02/25/25 83.9 kg (185 lb)   02/12/25 84.4 kg (186 lb)   02/11/25 83 kg (183 lb)   01/28/25 82.5 kg (181 lb 12.8 oz)   01/14/25 81.5 kg (179 lb 9.6 oz)   12/31/24 82.2 kg (181 lb 3.2 oz)   12/17/24 81.2 kg (179 lb)   12/05/24 78.5 kg (173 lb)   12/03/24 79.6 kg (175 lb 6.4 oz)   11/25/24 77.7 kg (171 lb 6.4 oz)   11/19/24 78.9 kg (174 lb)     Weight History:   Usual Weight: 200# in December 2023; weight loss after diagnosed with DM and started on Ozempic   Varian: n/a  Home Scale: (10/8/24) 165#; (11/5/24) home weight maintaining in the [facility]     Oncology Nutrition-Anthropometrics      Flowsheet Row Nutrition [facility] 5[facility]13/20[facility] from 3/25/2025 in [facility]   Patient age (years): 69 years 69 years   Patient (male) height (in): 71 in 71 in   Current weight (lbs): 185.6 lbs 187.6 lbs   Current weight to be used for anthropometric calculations (kg) 84.4 kg 85.3 kg   BMI: 25.9 26.2   IBW male 172 lb 172 lb   IBW (kg) male 78.2 kg 78.2 kg   IBW % (male) 107.9 % 109.1 %   Adjusted BW (male): 175.4 lbs 175.9 lbs   Adjusted BW in kg (male): 79.7 kg 80 kg   % weight change after 1 week: 0.9 % 3.1 %   Weight change after 1 week (lbs) 1.6 lbs 5.6 lbs   % weight change after 1 month: 0.3 % 1.4 %   Weight change after 1 month (lbs) 0.6 lbs 2.6 lbs   % weight change after 3 months: -0.2 % 3.5 %   Weight change after 3 months (lbs) -0.4 lbs 6.4 lbs   % weight change after 6 months: 6.7 % --   Weight change after 6 months (lbs) 11.6 " lbs --            Nutrition-Focused Physical Findings: none observed    Food/Nutrition-Related History & Client/Social History:    Current Nutrition Impact Symptoms:  [] Nausea   -after chemo; improved w/ zofran and MMJ [] Reduced Appetite   -decreases somewhat after chemotherapy   -improves with MMJ  [] Acid Reflux    [] Vomiting  [] Unintended Wt Loss   -hx [] Malabsorption    [] Diarrhea  [] Unintended Wt Gain  [] Dumping Syndrome    [] Constipation  [] Thick Mucous/Secretions  [] Abdominal Pain    [] Dysgeusia (Altered Taste)  [] Xerostomia (Dry Mouth)  [] Gas    [] Dysosmia (Altered Smell)  [] Thrush  [] Difficulty Chewing    [] Oral Mucositis (Sore Mouth)  [] Fatigue  [x] Hyperglycemia   -T2DM   -metformin  Lab Results   Component Value Date    HGBA1C 7.7 (A) 2025      [] Odynophagia  [] Esophagitis  [] Other:    [] Dysphagia  [] Early Satiety  [x] No Problems Eating      Food Allergies & Intolerances: yes: lactose intolerant     Current Diet: CHO-Controlled and Lactose-Free  Current Nutrition Intake: Unchanged from last visit  Appetite: Fair ; appetite decreases after chemo   Nutrition Route: PO  Oral Care: brushes BID  Activity level: ADL, ambulates independently. Energy is good.     24 Hr Diet Recall:   Breakfast:cereal with milk  Snack: fruit   Lunch: sandwich  Dinner: pasta; fish; chicken; soup     Beverages: water sometimes w/ crystal light flavoring (16oz x2-3)  Supplements:   Ensure Original (8 oz, 220 kcal, 9 g pro) 1x daily       Oncology Nutrition-Estimated Needs      Flowsheet Row Nutrition from 2025 in [facility] Nutrition from 3/25/2025 in [facility]   Weight type used Actual weight Actual weight   Weight in kilograms (kg) used for estimated needs 84.4 kg 85.3 kg   Energy needs formula:  30-35 kcal/kg 30-35 kcal/kg   Energy needs based on 30 kcal/k kcal 2558 kcal   Energy needs based on 35 kcal/k kcal 2985 kcal    Protein needs formula: 1.2-1.5 g/kg 1.2-1.5 g/kg   Protein needs based on 1.2 g/k g 102 g   Protein needs based on 1.5 g/kg 127 g 128 g   Fluid needs formula: 30-35 mL/kg 30-35 mL/kg   Fluid needs based on 30 mL/kg 2535 mL 2565 mL   Fluid needs in ounces 86 oz 87 oz   Fluid needs based on 35 mL/kg 2958 mL 2993 mL   Fluid needs in ounces 100 oz 101 oz             Discussion & Intervention:   Abhishek was evaluated today for an RD follow up regarding unintended weight loss.  Abhishek is currently undergoing tx for Pancreatic Cancer.  He is here for infusion today. He is doing well today. His appetite and PO intakes are unchanged. He was recently on a trip to [place] and was very busy, he is happy to share that he felt good on his trip and had a lot of energy. His weight is stable x1 month. Based on today's assessment, discussion/suggestions include: MNT for: DM, Pancreatic cancer, spreading carbohydrate intake throughout the day & counting carbohydrates for adequate glycemic control, adequate hydration & fluid choices, having consistent and planned snacks between meals, and continuing oral nutrition supplements.   Moving forward, Abhishek will continue current nutrition plan of care.  Materials Provided: not applicable  All questions and concerns addressed during today’s visit.  Abhishek has RD contact information.    Nutrition Diagnosis:   Increased Nutrient Needs (kcal & pro) related to increased demand for nutrients and disease state as evidenced by cancer dx and pt undergoing tx for cancer.  Monitoring & Evaluation:   Goals:  weight maintenance/stabilization  adequate nutrition impact symptom management  pt to meet >/=75% estimated nutrition needs daily  adequate glycemic control  increase protein intake  increase fluid intake  increase calorie intake    Progress Towards Goals: Progressing and Goal(s) Met    Nutrition Rx & Recommendations:  Diet: High Calorie, High Protein (for high calorie foods see pages 52-53,  and for high protein foods see pages 49-51 in your Eating Hints book)  Small, frequent meals/snacks may be easier to tolerate than 3 large daily meals.  Aim for 5-6 small meals per day (every 2-3 hours).  Include protein at all meals/snacks.  Include a variety of foods (as tolerated/allowed by diet).  Follow a Cancer Preventative Nutrition Pattern: colorful, plant-based, low-fat, avoid added sugars, limit alcohol, include high fiber foods, limit processed meats, limit red meat, choose lean protein sources, use low-fat cooking methods, balance calories with physical activity, avoid excessive weight gain throughout life.  Incorporate physical activity as able/allowed.  Stay hydrated by sipping fluids of choice/tolerance throughout the day.  Liquid nutrition may be better tolerated than solids at times.  Alter food choices and eating patterns to accommodate changing needs.  Refer to Eating Hints book for other meal/snack ideas and symptom management.  Weigh yourself regularly. If you notice weight loss, make an effort to increase your daily food/calorie intake. If you continue to notice loss after these efforts, reach out to your dietitian to establish a plan to stabilize weight.  Continue Ensure 1x daily.  Continue to monitor blood sugar: Foods with carbohydrates make your blood glucose level go up. Learning how to count carbohydrates can help you control your blood glucose levels. First, identify the foods you eat that contain carbohydrates.   Foods that commonly contain carbohydrates include: grains (breads, pasta, cereal, rice), fruit, starchy vegetables (potatoes, corn, peas), snack foods (chips, pretzels, crackers), dairy products, sweets/desserts.   Foods that do not usually contain carbohydrates: chicken, pork, beef, fish, seafood, eggs, tofu, cheese, butter, sour cream, avocado, nuts, seeds, olives, mayonnaise, water, black coffee, unsweetened tea, and zero-calorie drinks. Vegetables with no or low  carbohydrate include green beans, cauliflower, tomatoes, and onions.     Follow Up Plan:  6/10/25 during infusion  Recommend Referral to Other Providers: none at this time

## 2025-05-13 NOTE — PROGRESS NOTES
Verified with office RN, Danielle Stoddard that per Dr. Peña pt is receiving FOLFIRI without 5FU push and leucovorin.

## 2025-05-13 NOTE — PATIENT INSTRUCTIONS
Nutrition Rx & Recommendations:  Diet: High Calorie, High Protein (for high calorie foods see pages 52-53, and for high protein foods see pages 49-51 in your Eating Hints book)  Small, frequent meals/snacks may be easier to tolerate than 3 large daily meals.  Aim for 5-6 small meals per day (every 2-3 hours).  Include protein at all meals/snacks.  Include a variety of foods (as tolerated/allowed by diet).  Follow a Cancer Preventative Nutrition Pattern: colorful, plant-based, low-fat, avoid added sugars, limit alcohol, include high fiber foods, limit processed meats, limit red meat, choose lean protein sources, use low-fat cooking methods, balance calories with physical activity, avoid excessive weight gain throughout life.  Incorporate physical activity as able/allowed.  Stay hydrated by sipping fluids of choice/tolerance throughout the day.  Liquid nutrition may be better tolerated than solids at times.  Alter food choices and eating patterns to accommodate changing needs.  Refer to Eating Hints book for other meal/snack ideas and symptom management.  Weigh yourself regularly. If you notice weight loss, make an effort to increase your daily food/calorie intake. If you continue to notice loss after these efforts, reach out to your dietitian to establish a plan to stabilize weight.  Continue Ensure 1x daily.  Continue to monitor blood sugar: Foods with carbohydrates make your blood glucose level go up. Learning how to count carbohydrates can help you control your blood glucose levels. First, identify the foods you eat that contain carbohydrates.   Foods that commonly contain carbohydrates include: grains (breads, pasta, cereal, rice), fruit, starchy vegetables (potatoes, corn, peas), snack foods (chips, pretzels, crackers), dairy products, sweets/desserts.   Foods that do not usually contain carbohydrates: chicken, pork, beef, fish, seafood, eggs, tofu, cheese, butter, sour cream, avocado, nuts, seeds, olives,  mayonnaise, water, black coffee, unsweetened tea, and zero-calorie drinks. Vegetables with no or low carbohydrate include green beans, cauliflower, tomatoes, and onions.     Follow Up Plan: 6/10/25 during infusion  Recommend Referral to Other Providers: none at this time

## 2025-05-15 ENCOUNTER — HOSPITAL ENCOUNTER (OUTPATIENT)
Dept: INFUSION CENTER | Facility: CLINIC | Age: 70
Discharge: HOME | End: 2025-05-15
Attending: INTERNAL MEDICINE
Payer: COMMERCIAL

## 2025-05-15 DIAGNOSIS — C25.9 ADENOCARCINOMA OF PANCREAS (HCC): ICD-10-CM

## 2025-05-15 DIAGNOSIS — D70.1 CHEMOTHERAPY INDUCED NEUTROPENIA (HCC): Primary | ICD-10-CM

## 2025-05-15 DIAGNOSIS — T45.1X5A CHEMOTHERAPY INDUCED NEUTROPENIA (HCC): Primary | ICD-10-CM

## 2025-05-15 RX ADMIN — PEGFILGRASTIM 6 MG: KIT SUBCUTANEOUS at 09:30

## 2025-05-15 NOTE — PROGRESS NOTES
Pt here for elastometric pump disconnect and neulasta onpro, offering no complaints. Pump has been running for over 46 hours and appears empty and deflated. Tolerated pump disconnect without complications.  PAC flushed and de-accessed. Onpro placed RLQ abdomen. AVS declined. Next appt 5/23 830am. Walked out in stable condition.

## 2025-05-21 DIAGNOSIS — C25.9 ADENOCARCINOMA OF PANCREAS (HCC): Primary | ICD-10-CM

## 2025-05-21 RX ORDER — DIPHENHYDRAMINE HYDROCHLORIDE 50 MG/ML
25 INJECTION, SOLUTION INTRAMUSCULAR; INTRAVENOUS ONCE
Start: 2025-05-27 | End: 2025-05-27

## 2025-05-21 RX ORDER — SODIUM CHLORIDE 9 MG/ML
20 INJECTION, SOLUTION INTRAVENOUS ONCE AS NEEDED
OUTPATIENT
Start: 2025-05-27

## 2025-05-21 RX ORDER — ATROPINE SULFATE 1 MG/ML
0.25 INJECTION, SOLUTION INTRAVENOUS ONCE AS NEEDED
OUTPATIENT
Start: 2025-05-27

## 2025-05-21 RX ORDER — ATROPINE SULFATE 1 MG/ML
0.25 INJECTION, SOLUTION INTRAVENOUS ONCE
OUTPATIENT
Start: 2025-05-27

## 2025-05-21 RX ORDER — DEXTROSE MONOHYDRATE 50 MG/ML
20 INJECTION, SOLUTION INTRAVENOUS ONCE
OUTPATIENT
Start: 2025-05-27

## 2025-05-23 ENCOUNTER — HOSPITAL ENCOUNTER (OUTPATIENT)
Dept: INFUSION CENTER | Facility: CLINIC | Age: 70
End: 2025-05-23
Payer: COMMERCIAL

## 2025-05-23 DIAGNOSIS — C25.9 ADENOCARCINOMA OF PANCREAS (HCC): ICD-10-CM

## 2025-05-23 DIAGNOSIS — Z95.828 PORT-A-CATH IN PLACE: Primary | ICD-10-CM

## 2025-05-23 LAB
ALBUMIN SERPL BCG-MCNC: 4.2 G/DL (ref 3.5–5)
ALP SERPL-CCNC: 192 U/L (ref 34–104)
ALT SERPL W P-5'-P-CCNC: 32 U/L (ref 7–52)
ANION GAP SERPL CALCULATED.3IONS-SCNC: 6 MMOL/L (ref 4–13)
AST SERPL W P-5'-P-CCNC: 22 U/L (ref 13–39)
BASOPHILS # BLD AUTO: 0.07 THOUSANDS/ÂΜL (ref 0–0.1)
BASOPHILS NFR BLD AUTO: 1 % (ref 0–1)
BILIRUB SERPL-MCNC: 0.36 MG/DL (ref 0.2–1)
BUN SERPL-MCNC: 12 MG/DL (ref 5–25)
CALCIUM SERPL-MCNC: 9.3 MG/DL (ref 8.4–10.2)
CHLORIDE SERPL-SCNC: 102 MMOL/L (ref 96–108)
CO2 SERPL-SCNC: 31 MMOL/L (ref 21–32)
CREAT SERPL-MCNC: 0.82 MG/DL (ref 0.6–1.3)
EOSINOPHIL # BLD AUTO: 0.13 THOUSAND/ÂΜL (ref 0–0.61)
EOSINOPHIL NFR BLD AUTO: 1 % (ref 0–6)
ERYTHROCYTE [DISTWIDTH] IN BLOOD BY AUTOMATED COUNT: 13.2 % (ref 11.6–15.1)
GFR SERPL CREATININE-BSD FRML MDRD: 91 ML/MIN/1.73SQ M
GLUCOSE SERPL-MCNC: 179 MG/DL (ref 65–140)
HCT VFR BLD AUTO: 38 % (ref 36.5–49.3)
HGB BLD-MCNC: 12.9 G/DL (ref 12–17)
IMM GRANULOCYTES # BLD AUTO: 0.24 THOUSAND/UL (ref 0–0.2)
IMM GRANULOCYTES NFR BLD AUTO: 2 % (ref 0–2)
LYMPHOCYTES # BLD AUTO: 1.22 THOUSANDS/ÂΜL (ref 0.6–4.47)
LYMPHOCYTES NFR BLD AUTO: 10 % (ref 14–44)
MCH RBC QN AUTO: 31.9 PG (ref 26.8–34.3)
MCHC RBC AUTO-ENTMCNC: 33.9 G/DL (ref 31.4–37.4)
MCV RBC AUTO: 94 FL (ref 82–98)
MONOCYTES # BLD AUTO: 1.35 THOUSAND/ÂΜL (ref 0.17–1.22)
MONOCYTES NFR BLD AUTO: 11 % (ref 4–12)
NEUTROPHILS # BLD AUTO: 9.32 THOUSANDS/ÂΜL (ref 1.85–7.62)
NEUTS SEG NFR BLD AUTO: 75 % (ref 43–75)
NRBC BLD AUTO-RTO: 0 /100 WBCS
PLATELET # BLD AUTO: 130 THOUSANDS/UL (ref 149–390)
PMV BLD AUTO: 9.3 FL (ref 8.9–12.7)
POTASSIUM SERPL-SCNC: 3.8 MMOL/L (ref 3.5–5.3)
PROT SERPL-MCNC: 6.8 G/DL (ref 6.4–8.4)
RBC # BLD AUTO: 4.05 MILLION/UL (ref 3.88–5.62)
SODIUM SERPL-SCNC: 139 MMOL/L (ref 135–147)
WBC # BLD AUTO: 12.33 THOUSAND/UL (ref 4.31–10.16)

## 2025-05-23 PROCEDURE — 80053 COMPREHEN METABOLIC PANEL: CPT | Performed by: INTERNAL MEDICINE

## 2025-05-23 PROCEDURE — 85025 COMPLETE CBC W/AUTO DIFF WBC: CPT | Performed by: INTERNAL MEDICINE

## 2025-05-23 NOTE — PROGRESS NOTES
Pt to clinic for labs only offering no complaints. Pt port accessed, flushed, and labs drawn without complication. Pt port de-accessed. Pt aware of next appointment on 5/27/25 at 1330. AVS declined.

## 2025-05-27 ENCOUNTER — HOSPITAL ENCOUNTER (OUTPATIENT)
Dept: INFUSION CENTER | Facility: CLINIC | Age: 70
Discharge: HOME | End: 2025-05-27
Attending: INTERNAL MEDICINE
Payer: COMMERCIAL

## 2025-05-27 VITALS
WEIGHT: 178.4 LBS | TEMPERATURE: 97.4 F | HEIGHT: 71 IN | DIASTOLIC BLOOD PRESSURE: 99 MMHG | HEART RATE: 74 BPM | RESPIRATION RATE: 18 BRPM | SYSTOLIC BLOOD PRESSURE: 167 MMHG | BODY MASS INDEX: 24.98 KG/M2

## 2025-05-27 DIAGNOSIS — C25.9 ADENOCARCINOMA OF PANCREAS (HCC): Primary | ICD-10-CM

## 2025-05-27 PROCEDURE — G0498 CHEMO EXTEND IV INFUS W/PUMP: HCPCS

## 2025-05-27 PROCEDURE — 96413 CHEMO IV INFUSION 1 HR: CPT

## 2025-05-27 PROCEDURE — 96375 TX/PRO/DX INJ NEW DRUG ADDON: CPT

## 2025-05-27 PROCEDURE — 96367 TX/PROPH/DG ADDL SEQ IV INF: CPT

## 2025-05-27 RX ORDER — ATROPINE SULFATE 1 MG/ML
0.25 INJECTION, SOLUTION INTRAVENOUS ONCE AS NEEDED
Status: DISCONTINUED | OUTPATIENT
Start: 2025-05-27 | End: 2025-05-30 | Stop reason: HOSPADM

## 2025-05-27 RX ORDER — DEXTROSE MONOHYDRATE 50 MG/ML
20 INJECTION, SOLUTION INTRAVENOUS ONCE
Status: COMPLETED | OUTPATIENT
Start: 2025-05-27 | End: 2025-05-27

## 2025-05-27 RX ORDER — ATROPINE SULFATE 1 MG/ML
0.25 INJECTION, SOLUTION INTRAVENOUS ONCE
Status: COMPLETED | OUTPATIENT
Start: 2025-05-27 | End: 2025-05-27

## 2025-05-27 RX ORDER — DIPHENHYDRAMINE HYDROCHLORIDE 50 MG/ML
25 INJECTION, SOLUTION INTRAMUSCULAR; INTRAVENOUS ONCE
Status: COMPLETED | OUTPATIENT
Start: 2025-05-27 | End: 2025-05-27

## 2025-05-27 RX ORDER — SODIUM CHLORIDE 9 MG/ML
20 INJECTION, SOLUTION INTRAVENOUS ONCE AS NEEDED
Status: DISCONTINUED | OUTPATIENT
Start: 2025-05-27 | End: 2025-05-30 | Stop reason: HOSPADM

## 2025-05-27 RX ADMIN — SODIUM CHLORIDE 20 ML/HR: 0.9 INJECTION, SOLUTION INTRAVENOUS at 13:46

## 2025-05-27 RX ADMIN — ATROPINE SULFATE 0.25 MG: 1 INJECTION, SOLUTION INTRAVENOUS at 14:57

## 2025-05-27 RX ADMIN — IRINOTECAN HYDROCHLORIDE 246 MG: 20 INJECTION, SOLUTION INTRAVENOUS at 14:57

## 2025-05-27 RX ADMIN — DEXAMETHASONE SODIUM PHOSPHATE: 10 INJECTION, SOLUTION INTRAMUSCULAR; INTRAVENOUS at 13:46

## 2025-05-27 RX ADMIN — DEXTROSE 20 ML/HR: 5 SOLUTION INTRAVENOUS at 14:57

## 2025-05-27 RX ADMIN — DIPHENHYDRAMINE HYDROCHLORIDE 25 MG: 50 INJECTION INTRAMUSCULAR; INTRAVENOUS at 14:12

## 2025-05-27 RX ADMIN — FOSAPREPITANT 150 MG: 150 INJECTION, POWDER, LYOPHILIZED, FOR SOLUTION INTRAVENOUS at 14:14

## 2025-05-27 NOTE — PROGRESS NOTES
Patient tolerated remainder of infusion without incident. CADD pump connected.     Next appointment: 5/29/25 @ 1500

## 2025-05-27 NOTE — PROGRESS NOTES
Pt into clinic for irinotecan and fluorouracil chemo ball. Pt offers no complaints.     Tolerated infusion without reaction.     Report given to Sandee MONTIEL RN.

## 2025-05-28 ENCOUNTER — OFFICE VISIT (OUTPATIENT)
Dept: GASTROENTEROLOGY | Facility: CLINIC | Age: 70
End: 2025-05-28
Payer: COMMERCIAL

## 2025-05-28 VITALS
OXYGEN SATURATION: 95 % | WEIGHT: 182 LBS | DIASTOLIC BLOOD PRESSURE: 78 MMHG | BODY MASS INDEX: 25.48 KG/M2 | SYSTOLIC BLOOD PRESSURE: 130 MMHG | TEMPERATURE: 97.5 F | HEART RATE: 73 BPM | HEIGHT: 71 IN

## 2025-05-28 DIAGNOSIS — K64.9 HEMORRHOIDS, UNSPECIFIED HEMORRHOID TYPE: Primary | ICD-10-CM

## 2025-05-28 PROCEDURE — 99213 OFFICE O/P EST LOW 20 MIN: CPT | Performed by: PHYSICIAN ASSISTANT

## 2025-05-28 RX ORDER — HYDROCORTISONE ACETATE 25 MG/1
25 SUPPOSITORY RECTAL DAILY
Qty: 14 SUPPOSITORY | Refills: 2 | Status: SHIPPED | OUTPATIENT
Start: 2025-05-28

## 2025-05-28 RX ORDER — HYDROCORTISONE 25 MG/G
CREAM TOPICAL 2 TIMES DAILY
Qty: 28 G | Refills: 2 | Status: SHIPPED | OUTPATIENT
Start: 2025-05-28

## 2025-05-28 NOTE — PROGRESS NOTES
"Name: Abhishek Gimenez      : 1955      MRN: [id number]  Encounter Provider: Emerita Ma PA-C  Encounter Date: 2025   Encounter department: [facility] [place]  :  Assessment & Plan  Hemorrhoids, unspecified hemorrhoid type  69-year-old male who presents with hemorrhoids.     Will begin Anusol suppositories nightly for 14 days.  Will begin Anusol cream twice daily.    Patient will utilize Imodium 1 tablet on the days following chemotherapy.      Continue high-fiber diet and plenty of water intake.    Patient will contact the office if symptoms worsen.        History of Present Illness   HPI  Abhishek Gimenez is a 69 y.o. male who presents to the GI clinic with chief complaint of hemorrhoids.  Patient with known metastatic pancreatic cancer on chemotherapy.  Patient reports that since he began chemotherapy he has been suffering with his hemorrhoids.  He reports that he generally experiences hemorrhoidal protrusion.  He denies any rectal bleeding.  He denies any rectal pain or irritation.  He denies any rectal itching.  Patient reports that he is set up with his home chemotherapy on  and is disconnected on  every other week.  He reports that generally on the weeks that he receives his chemotherapy  and  he has multiple bowel movements.  He reports he can go 4-5 times.  He reports sometimes he stools are soft sometimes they are loose.  Patient reports that he has utilized Preparation H in the past.  Patient's last colonoscopy was in  when he did have multiple colon polyps removed at that time.      Review of Systems       Objective   /78 (BP Location: Right arm, Patient Position: Sitting, Cuff Size: Standard)   Pulse 73   Temp 97.5 °F (36.4 °C) (Temporal)   Ht 5' 11\" (1.803 m)   Wt 82.6 kg (182 lb)   SpO2 95%   BMI 25.38 kg/m²      Physical Exam      "

## 2025-05-29 ENCOUNTER — HOSPITAL ENCOUNTER (OUTPATIENT)
Dept: INFUSION CENTER | Facility: CLINIC | Age: 70
Discharge: HOME | End: 2025-05-29
Payer: COMMERCIAL

## 2025-05-29 DIAGNOSIS — D70.1 CHEMOTHERAPY INDUCED NEUTROPENIA (HCC): Primary | ICD-10-CM

## 2025-05-29 DIAGNOSIS — T45.1X5A CHEMOTHERAPY INDUCED NEUTROPENIA (HCC): Primary | ICD-10-CM

## 2025-05-29 DIAGNOSIS — C25.9 ADENOCARCINOMA OF PANCREAS (HCC): ICD-10-CM

## 2025-05-29 PROCEDURE — 96372 THER/PROPH/DIAG INJ SC/IM: CPT

## 2025-05-29 RX ADMIN — PEGFILGRASTIM 6 MG: KIT SUBCUTANEOUS at 15:14

## 2025-05-29 NOTE — PROGRESS NOTES
Pt presents to the infusion center today for CADD Disconnect and Neulasta onpro application offering no complaints. CADD appeared deflated and completely infused. CADD disconnected, port flushed and saline locked prior to deaccessing. Neulasta onpro applied to R abdomen per pt request. Card filled out and provided to patient for removal time. Pt aware of next appointment on 6/6 at 1030

## 2025-06-03 ENCOUNTER — TELEPHONE (OUTPATIENT)
Age: 70
End: 2025-06-03

## 2025-06-03 DIAGNOSIS — E11.65 TYPE 2 DIABETES MELLITUS WITH HYPERGLYCEMIA, WITHOUT LONG-TERM CURRENT USE OF INSULIN (HCC): ICD-10-CM

## 2025-06-03 RX ORDER — INSULIN GLARGINE 100 [IU]/ML
20 INJECTION, SOLUTION SUBCUTANEOUS
Qty: 6 ML | Refills: 3 | Status: SHIPPED | OUTPATIENT
Start: 2025-06-03

## 2025-06-03 NOTE — TELEPHONE ENCOUNTER
Patient calling regarding Anusol.  Since starting patient c/o stomach upset.  States abdomen is uncomfortable.  Does have throbbing in anus as well after chemo this morning.    Hemorrhoids are getting better.  Please advise.

## 2025-06-04 RX ORDER — DIPHENHYDRAMINE HYDROCHLORIDE 50 MG/ML
25 INJECTION, SOLUTION INTRAMUSCULAR; INTRAVENOUS ONCE
Status: CANCELLED
Start: 2025-06-10 | End: 2025-06-10

## 2025-06-04 RX ORDER — DEXTROSE MONOHYDRATE 50 MG/ML
20 INJECTION, SOLUTION INTRAVENOUS ONCE
Status: CANCELLED | OUTPATIENT
Start: 2025-06-10

## 2025-06-04 RX ORDER — ATROPINE SULFATE 1 MG/ML
0.25 INJECTION, SOLUTION INTRAVENOUS ONCE AS NEEDED
Status: CANCELLED | OUTPATIENT
Start: 2025-06-10

## 2025-06-04 RX ORDER — ATROPINE SULFATE 1 MG/ML
0.25 INJECTION, SOLUTION INTRAVENOUS ONCE
Status: CANCELLED | OUTPATIENT
Start: 2025-06-10

## 2025-06-04 RX ORDER — SODIUM CHLORIDE 9 MG/ML
20 INJECTION, SOLUTION INTRAVENOUS ONCE AS NEEDED
Status: CANCELLED | OUTPATIENT
Start: 2025-06-10

## 2025-06-05 DIAGNOSIS — C25.9 ADENOCARCINOMA OF PANCREAS (HCC): Primary | ICD-10-CM

## 2025-06-06 ENCOUNTER — HOSPITAL ENCOUNTER (OUTPATIENT)
Dept: INFUSION CENTER | Facility: CLINIC | Age: 70
End: 2025-06-06
Payer: COMMERCIAL

## 2025-06-06 DIAGNOSIS — C25.9 ADENOCARCINOMA OF PANCREAS (HCC): Primary | ICD-10-CM

## 2025-06-06 DIAGNOSIS — Z95.828 PORT-A-CATH IN PLACE: Primary | ICD-10-CM

## 2025-06-06 DIAGNOSIS — C25.9 ADENOCARCINOMA OF PANCREAS (HCC): ICD-10-CM

## 2025-06-06 LAB
ALBUMIN SERPL BCG-MCNC: 4.4 G/DL (ref 3.5–5)
ALP SERPL-CCNC: 232 U/L (ref 34–104)
ALT SERPL W P-5'-P-CCNC: 33 U/L (ref 7–52)
ANION GAP SERPL CALCULATED.3IONS-SCNC: 9 MMOL/L (ref 4–13)
AST SERPL W P-5'-P-CCNC: 26 U/L (ref 13–39)
BASOPHILS # BLD AUTO: 0.12 THOUSANDS/ÂΜL (ref 0–0.1)
BASOPHILS NFR BLD AUTO: 1 % (ref 0–1)
BILIRUB SERPL-MCNC: 0.36 MG/DL (ref 0.2–1)
BUN SERPL-MCNC: 18 MG/DL (ref 5–25)
CALCIUM SERPL-MCNC: 9.5 MG/DL (ref 8.4–10.2)
CHLORIDE SERPL-SCNC: 103 MMOL/L (ref 96–108)
CO2 SERPL-SCNC: 27 MMOL/L (ref 21–32)
CREAT SERPL-MCNC: 0.87 MG/DL (ref 0.6–1.3)
EOSINOPHIL # BLD AUTO: 0.22 THOUSAND/ÂΜL (ref 0–0.61)
EOSINOPHIL NFR BLD AUTO: 1 % (ref 0–6)
ERYTHROCYTE [DISTWIDTH] IN BLOOD BY AUTOMATED COUNT: 13.4 % (ref 11.6–15.1)
GFR SERPL CREATININE-BSD FRML MDRD: 89 ML/MIN/1.73SQ M
GLUCOSE SERPL-MCNC: 191 MG/DL (ref 65–140)
HCT VFR BLD AUTO: 39.2 % (ref 36.5–49.3)
HGB BLD-MCNC: 13.5 G/DL (ref 12–17)
IMM GRANULOCYTES # BLD AUTO: 0.43 THOUSAND/UL (ref 0–0.2)
IMM GRANULOCYTES NFR BLD AUTO: 2 % (ref 0–2)
LYMPHOCYTES # BLD AUTO: 1.61 THOUSANDS/ÂΜL (ref 0.6–4.47)
LYMPHOCYTES NFR BLD AUTO: 8 % (ref 14–44)
MCH RBC QN AUTO: 32 PG (ref 26.8–34.3)
MCHC RBC AUTO-ENTMCNC: 34.4 G/DL (ref 31.4–37.4)
MCV RBC AUTO: 93 FL (ref 82–98)
MONOCYTES # BLD AUTO: 2.06 THOUSAND/ÂΜL (ref 0.17–1.22)
MONOCYTES NFR BLD AUTO: 10 % (ref 4–12)
NEUTROPHILS # BLD AUTO: 15.68 THOUSANDS/ÂΜL (ref 1.85–7.62)
NEUTS SEG NFR BLD AUTO: 78 % (ref 43–75)
NRBC BLD AUTO-RTO: 0 /100 WBCS
PLATELET # BLD AUTO: 142 THOUSANDS/UL (ref 149–390)
PMV BLD AUTO: 10.2 FL (ref 8.9–12.7)
POTASSIUM SERPL-SCNC: 3.7 MMOL/L (ref 3.5–5.3)
PROT SERPL-MCNC: 7 G/DL (ref 6.4–8.4)
RBC # BLD AUTO: 4.22 MILLION/UL (ref 3.88–5.62)
SODIUM SERPL-SCNC: 139 MMOL/L (ref 135–147)
WBC # BLD AUTO: 20.12 THOUSAND/UL (ref 4.31–10.16)

## 2025-06-06 PROCEDURE — 80053 COMPREHEN METABOLIC PANEL: CPT | Performed by: INTERNAL MEDICINE

## 2025-06-06 PROCEDURE — 85025 COMPLETE CBC W/AUTO DIFF WBC: CPT | Performed by: INTERNAL MEDICINE

## 2025-06-06 NOTE — PROGRESS NOTES
Patient presents for central venous labs and port flush. Patient offers no complaints. Port accessed, flushed, saline locked, labs drawn, port flushed and de-accessed. Band-aid placed on site. AVS declined.    Next appointment 6/10/25 @ 0830  Walked out of clinic with no incident.

## 2025-06-10 ENCOUNTER — HOSPITAL ENCOUNTER (OUTPATIENT)
Dept: INFUSION CENTER | Facility: CLINIC | Age: 70
Discharge: HOME | End: 2025-06-10
Attending: INTERNAL MEDICINE
Payer: COMMERCIAL

## 2025-06-10 ENCOUNTER — NUTRITION (OUTPATIENT)
Dept: NUTRITION | Facility: CLINIC | Age: 70
End: 2025-06-10

## 2025-06-10 VITALS
HEIGHT: 71 IN | SYSTOLIC BLOOD PRESSURE: 148 MMHG | RESPIRATION RATE: 16 BRPM | TEMPERATURE: 97.7 F | BODY MASS INDEX: 25.15 KG/M2 | DIASTOLIC BLOOD PRESSURE: 64 MMHG | WEIGHT: 179.6 LBS | HEART RATE: 56 BPM

## 2025-06-10 DIAGNOSIS — C25.9 ADENOCARCINOMA OF PANCREAS (HCC): Primary | ICD-10-CM

## 2025-06-10 DIAGNOSIS — Z71.3 NUTRITIONAL COUNSELING: Primary | ICD-10-CM

## 2025-06-10 PROCEDURE — 96413 CHEMO IV INFUSION 1 HR: CPT

## 2025-06-10 PROCEDURE — 96375 TX/PRO/DX INJ NEW DRUG ADDON: CPT

## 2025-06-10 PROCEDURE — G0498 CHEMO EXTEND IV INFUS W/PUMP: HCPCS

## 2025-06-10 PROCEDURE — 96367 TX/PROPH/DG ADDL SEQ IV INF: CPT

## 2025-06-10 RX ORDER — DIPHENHYDRAMINE HYDROCHLORIDE 50 MG/ML
25 INJECTION, SOLUTION INTRAMUSCULAR; INTRAVENOUS ONCE
Status: COMPLETED | OUTPATIENT
Start: 2025-06-10 | End: 2025-06-10

## 2025-06-10 RX ORDER — ATROPINE SULFATE 1 MG/ML
0.25 INJECTION, SOLUTION INTRAVENOUS ONCE AS NEEDED
Status: DISCONTINUED | OUTPATIENT
Start: 2025-06-10 | End: 2025-06-13 | Stop reason: HOSPADM

## 2025-06-10 RX ORDER — DEXTROSE MONOHYDRATE 50 MG/ML
20 INJECTION, SOLUTION INTRAVENOUS ONCE
Status: COMPLETED | OUTPATIENT
Start: 2025-06-10 | End: 2025-06-10

## 2025-06-10 RX ORDER — SODIUM CHLORIDE 9 MG/ML
20 INJECTION, SOLUTION INTRAVENOUS ONCE AS NEEDED
Status: DISCONTINUED | OUTPATIENT
Start: 2025-06-10 | End: 2025-06-13 | Stop reason: HOSPADM

## 2025-06-10 RX ORDER — ATROPINE SULFATE 1 MG/ML
0.25 INJECTION, SOLUTION INTRAVENOUS ONCE
Status: COMPLETED | OUTPATIENT
Start: 2025-06-10 | End: 2025-06-10

## 2025-06-10 RX ADMIN — DIPHENHYDRAMINE HYDROCHLORIDE 25 MG: 50 INJECTION INTRAMUSCULAR; INTRAVENOUS at 09:19

## 2025-06-10 RX ADMIN — SODIUM CHLORIDE 20 ML/HR: 0.9 INJECTION, SOLUTION INTRAVENOUS at 08:55

## 2025-06-10 RX ADMIN — DEXAMETHASONE SODIUM PHOSPHATE: 10 INJECTION, SOLUTION INTRAMUSCULAR; INTRAVENOUS at 08:56

## 2025-06-10 RX ADMIN — IRINOTECAN HYDROCHLORIDE 244 MG: 20 INJECTION, SOLUTION INTRAVENOUS at 10:06

## 2025-06-10 RX ADMIN — DEXTROSE 20 ML/HR: 5 SOLUTION INTRAVENOUS at 09:58

## 2025-06-10 RX ADMIN — ATROPINE SULFATE 0.25 MG: 1 INJECTION, SOLUTION INTRAVENOUS at 10:00

## 2025-06-10 RX ADMIN — FOSAPREPITANT 150 MG: 150 INJECTION, POWDER, LYOPHILIZED, FOR SOLUTION INTRAVENOUS at 09:21

## 2025-06-10 NOTE — PROGRESS NOTES
Abhishek Gimenez presents for Irinotecan + 5-FU CADD, offers no complaints. Port accessed and flushed with positive blood return. Tolerated treatment well with no complications. Chemo ball connected to port, all clamps open to run. Double checked with Sandee Perea RN.    Abhishek Gimenez is aware of future appt on 6/12/25 at 10:30 am.     AVS declined.      
none

## 2025-06-10 NOTE — PATIENT INSTRUCTIONS
"Nutrition Rx & Recommendations:  Diet: High Calorie, High Protein (for high calorie foods see pages 52-53, and for high protein foods see pages 49-51 in your Eating Hints book)  Small, frequent meals/snacks may be easier to tolerate than 3 large daily meals.  Aim for 5-6 small meals per day (every 2-3 hours).  Include protein at all meals/snacks.  Include a variety of foods (as tolerated/allowed by diet).  Follow a Cancer Preventative Nutrition Pattern: colorful, plant-based, low-fat, avoid added sugars, limit alcohol, include high fiber foods, limit processed meats, limit red meat, choose lean protein sources, use low-fat cooking methods, balance calories with physical activity, avoid excessive weight gain throughout life.  Incorporate physical activity as able/allowed.  Stay hydrated by sipping fluids of choice/tolerance throughout the day.  Liquid nutrition may be better tolerated than solids at times.  Alter food choices and eating patterns to accommodate changing needs.  Refer to Eating Hints book for other meal/snack ideas and symptom management.  For weight loss: monitor your weight at home at least 2x/week, record your weight, start by adding 250-500 extra calories per day, eat 5-6 small scheduled meals every 2-3 hrs, choose foods that are high in protein and calories (see pages 49-53 in your Eating Hints book), drink liquids with calories for example: milkshakes/smoothies/juice/soup/whole milk/chocolate milk, cook with protein fortified milk (see recipe on page 36 in your Eating Hints book), consider ready-to-drink oral nutrition supplements such as Ensure Plus, Ensure Enlive, Boost Plus, or Boost Very High Calorie, avoid \"diet\" and \"light\" foods when possible, avoid drinking too much with meals, contact your dietitian with any continued weight loss over the course of 1 week.  For more info see pages 35-37 in your Eating Hints book.  Weigh yourself regularly. If you notice weight loss, make an effort to " increase your daily food/calorie intake. If you continue to notice loss after these efforts, reach out to your dietitian to establish a plan to stabilize weight.  Ensure at least 1x daily.  Continue to monitor blood sugar: Foods with carbohydrates make your blood glucose level go up. Learning how to count carbohydrates can help you control your blood glucose levels. First, identify the foods you eat that contain carbohydrates.   Foods that commonly contain carbohydrates include: grains (breads, pasta, cereal, rice), fruit, starchy vegetables (potatoes, corn, peas), snack foods (chips, pretzels, crackers), dairy products, sweets/desserts.   Foods that do not usually contain carbohydrates: chicken, pork, beef, fish, seafood, eggs, tofu, cheese, butter, sour cream, avocado, nuts, seeds, olives, mayonnaise, water, black coffee, unsweetened tea, and zero-calorie drinks. Vegetables with no or low carbohydrate include green beans, cauliflower, tomatoes, and onions.     Follow Up Plan: 7/8/25 during infusion  Recommend Referral to Other Providers: none at this time

## 2025-06-10 NOTE — PROGRESS NOTES
Outpatient Oncology Nutrition Consultation   Type of Consult: Follow Up  Care Location: [facility]    Reason for referral: notification  Lashell Lew MA  on 9/11/24 that pt is appropriate for oncology nutrition care (reason for referral: Ambulatory referral for struggling with eating and drinking).     Nutrition Assessment:   Oncology Diagnosis & Treatments:   Cancer of the pancreas diagnosed 8/20/24.   Began chemotherapy 9/24/24; Modified FOLFIRINOX every 14 days   Oncology History   Adenocarcinoma of pancreas (HCC)   8/13/2024 Biopsy    A.-B.  Pancreas, Body, FNA (Cell block and smear preparations):   Malignant  Adenocarcinoma.     8/20/2024 Initial Diagnosis    Adenocarcinoma of pancreas (HCC)     8/26/2024 -  Cancer Staged    Staging form: Pancreas, AJCC 8th Edition  - Clinical: Stage IB (cT2, cN0, cM0) - Signed by Roz Faulkner MD on 8/26/2024  Total positive nodes: 0       9/24/2024 -  Chemotherapy    irinotecan (CAMPTOSAR) chemo infusion, 150 mg/m2 = 294 mg, 18 of 21 cycles  Dose modification: 112.5 mg/m2 (75 % of original dose 150 mg/m2, Cycle 2, Reason: Dose Not Tolerated), 120 mg/m2 (80 % of original dose 150 mg/m2, Cycle 3, Reason: Original Dose Changed)  Administration: 294 mg (9/24/2024), 227 mg (10/8/2024), 240 mg (10/22/2024), 240 mg (11/5/2024), 238 mg (11/19/2024), 239 mg (12/3/2024), 239 mg (12/17/2024), 242 mg (1/14/2025), 241 mg (12/31/2024), 241 mg (1/28/2025), 244 mg (2/11/2025), 245 mg (2/25/2025), 244 mg (3/25/2025), 245 mg (3/11/2025), 246 mg (5/27/2025), 245 mg (4/29/2025), 246 mg (5/13/2025)  oxaliplatin (ELOXATIN) chemo infusion, 65 mg/m2 = 127.4 mg, 11 of 11 cycles  Dose modification: 48.75 mg/m2 (75 % of original dose 65 mg/m2, Cycle 2, Reason: Dose Not Tolerated), 52 mg/m2 (80 % of original dose 65 mg/m2, Cycle 3, Reason: Original Dose Changed)  Administration: 127.4 mg (9/24/2024), 98.5 mg (10/8/2024), 100 mg (10/22/2024), 100 mg (11/5/2024), 100 mg (11/19/2024), 100 mg  (12/3/2024), 103.5 mg (12/17/2024), 105.05 mg (1/14/2025), 104.5 mg (12/31/2024), 104.5 mg (1/28/2025), 105.55 mg (2/11/2025)  fluorouracil (ADRUCIL) ambulatory infusion Soln, 1,200 mg/m2/day = 4,705 mg, 18 of 21 cycles  Dose modification: 900 mg/m2/day (75 % of original dose 1,200 mg/m2/day, Cycle 3, Reason: Dose Not Tolerated), 960 mg/m2/day (80 % of original dose 1,200 mg/m2/day, Cycle 4, Reason: Other (see comments), Comment: provider request)     11/15/2024 Biopsy    Final Diagnosis   A. Liver mass, biopsy:  - Metastatic adenocarcinoma.         12/5/2024 -  Cancer Staged    Staging form: Pancreas, AJCC 8th Edition  - Pathologic stage from 12/5/2024: Stage IV (pT2, cM1) - Signed by Butch Peña MD on 12/5/2024         Past Medical & Surgical Hx:   Patient Active Problem List   Diagnosis    Atrial fibrillation (HCC)    Psoriatic arthritis (HCC)    NDPH (new daily persistent headache)    Atrial flutter (HCC)    CATY (obstructive sleep apnea)    Type 2 diabetes mellitus with hyperglycemia, without long-term current use of insulin (HCC)    Elevated PSA    Platelets decreased (HCC)    Adenocarcinoma of pancreas (HCC)    Pulmonary HTN (HCC)    Pre-op testing    Liver masses    Port-A-Cath in place    Chemotherapy induced neutropenia (HCC)    Palliative care by specialist    Medical marijuana use    Nausea    Cancer-related pain    Insomnia    Loss of appetite    Hyperlipidemia associated with type 2 diabetes mellitus  (HCC)    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)    Opioid dependence, uncomplicated (HCC)     Past Medical History:   Diagnosis Date    A-fib (HCC)     Allergic     Arthritis     BPH (benign prostatic hyperplasia)     Cancer (HCC)     Colon polyp     Diabetes mellitus (HCC)     Fatty liver     Fracture, foot     High cholesterol     Hyperlipidemia     Irregular heart beat     Kidney stone     Non-ischemic cardiomyopathy (HCC)     Osteoarthritis     Pancreatic cancer  (HCC) 8/15/2024    Psoriasis     Psoriatic arthritis (HCC)     Pulmonary HTN (HCC)      Past Surgical History:   Procedure Laterality Date    CARDIAC ELECTROPHYSIOLOGY PROCEDURE N/A 02/22/2023    Procedure: Cardiac eps/afib ablation   comprehensive posterior wall;  Surgeon: Mynor Andrews MD;  Location: [facility];  Service: Cardiology    COLONOSCOPY      ELBOW SURGERY Right     FL GUIDED CENTRAL VENOUS ACCESS DEVICE INSERTION  09/05/2024    IR BIOPSY LIVER MASS  09/18/2024    IR BIOPSY LIVER MASS  11/15/2024    KIDNEY STONE SURGERY      LAPAROSCOPY N/A 09/05/2024    Procedure: DIAGNOSTIC LAPAROSCOPY;  Surgeon: Roz Faulkner MD;  Location: [facility];  Service: Surgical Oncology    PROSTATE SURGERY  2018    [place]    TRANSURETHRAL RESECTION OF PROSTATE      last assessed 7/21/15    TUNNELED VENOUS PORT PLACEMENT N/A 09/05/2024    Procedure: INSERTION VENOUS PORT (PORT-A-CATH);  Surgeon: Roz Faulkner MD;  Location: [facility];  Service: Surgical Oncology       Review of Medications:   Vitamins, Supplements and Herbals: No, pt denies taking supplements; MMJ     Current Outpatient Medications:     Alcohol Swabs 70 % PADS, May substitute brand based on insurance coverage. Check glucose TID. (Patient not taking: Reported on 9/23/2024), Disp: 100 each, Rfl: 0    Benzocaine-Isopropyl Alcohol 6-70 % PADS, Use, Disp: , Rfl:     Blood Glucose Monitoring Suppl ([employer] Blood Glucose Meter) w/Device KIT, Use, Disp: , Rfl:     Blood Glucose Monitoring Suppl (OneTouch Verio Reflect) w/Device KIT, Check blood sugars once daily. Please substitute with appropriate alternative as covered by patient's insurance. Dx: E11.65, Disp: 1 kit, Rfl: 0    Continuous Blood Gluc  (FreeStyle Livia 2 [place]) LEANA, 14 DAY READER NOT MADE, Disp: 1 each, Rfl: 1    Continuous Glucose Sensor (FreeStyle Livia 14 Day Sensor) MISC, Use 1 Device every 14 (fourteen) days, Disp: 2 each, Rfl: 5    fluorouracil 3,900 mg in CADD/Elastomeric  Infusion Device, Infuse 3,900 mg (960 mg/m2/day x 2.03 m2) into a catheter in a vein via infusion device over 46 hours for 2 days  Infusion planned for Shae 10, 2025., Disp: 1 each, Rfl: 3    Glucose Blood (BLOOD GLUCOSE TEST STRIPS 333 VI), Use, Disp: , Rfl:     glucose blood (OneTouch Verio) test strip, Check blood sugars once daily. Please substitute with appropriate alternative as covered by patient's insurance. Dx: E11.65, Disp: 100 each, Rfl: 3    hydrocortisone (ANUSOL-HC) 2.5 % rectal cream, Apply topically 2 (two) times a day, Disp: 28 g, Rfl: 2    hydrocortisone (ANUSOL-HC) 25 mg suppository, Insert 1 suppository (25 mg total) into the rectum in the morning, Disp: 14 suppository, Rfl: 2    insulin glargine (LANTUS) 100 units/mL subcutaneous injection, Inject 20 Units under the skin daily at bedtime, Disp: 6 mL, Rfl: 3    Insulin Pen Needle (BD Pen Needle Mallory U/F) 32G X 4 MM MISC, Use daily as directed with insulin pen, Disp: 100 each, Rfl: 3    Lancets 33G MISC, Use, Disp: , Rfl:     Lantus SoloStar 100 units/mL SOPN, INJECT 0.2 ML (20 UNITS TOTAL) UNDER THE SKIN DAILY AT BEDTIME, Disp: 6 mL, Rfl: 0    lidocaine-prilocaine (EMLA) cream, Apply topically as needed for mild pain Apply to port area 45-60min prior to treatment and cover with dressing or plastic wrap, Disp: 30 g, Rfl: 1    metFORMIN (GLUCOPHAGE-XR) 500 mg 24 hr tablet, Take 2 tablets (1,000 mg total) by mouth daily with breakfast, Disp: 180 tablet, Rfl: 1    ondansetron (ZOFRAN) 4 mg tablet, Take 1 tablet (4 mg total) by mouth every 8 (eight) hours as needed for nausea or vomiting, Disp: 20 tablet, Rfl: 0    OneTouch Delica Lancets 33G MISC, Check blood sugars once daily. Please substitute with appropriate alternative as covered by patient's insurance. Dx: E11.65, Disp: 100 each, Rfl: 3    oxyCODONE (Roxicodone) 5 immediate release tablet, Take 1 tablet (5 mg total) by mouth every 6 (six) hours as needed for moderate pain Max Daily Amount: 20  mg, Disp: 90 tablet, Rfl: 0    pantoprazole (PROTONIX) 40 mg tablet, Take 1 tablet (40 mg total) by mouth daily before breakfast, Disp: 100 tablet, Rfl: 1    rosuvastatin (CRESTOR) 5 mg tablet, Take 1 tablet (5 mg total) by mouth every evening, Disp: 100 tablet, Rfl: 1    sildenafil (VIAGRA) 100 mg tablet, Take 1 tablet (100 mg total) by mouth daily as needed for erectile dysfunction, Disp: 10 tablet, Rfl: 3  No current facility-administered medications for this visit.    Facility-Administered Medications Ordered in Other Visits:     alteplase (CATHFLO) injection 2 mg, 2 mg, Intracatheter, Q1MIN PRN, Butch Peña MD    Atropine Sulfate injection 0.25 mg, 0.25 mg, Intravenous, Once, Butch Peña MD    Atropine Sulfate injection 0.25 mg, 0.25 mg, Intravenous, Once PRN, Butch Peña MD    dextrose 5 % infusion, 20 mL/hr, Intravenous, Once, Butch Peña MD    fosaprepitant (EMEND) 150 mg in sodium chloride 0.9 % 250 mL IVPB, 150 mg, Intravenous, Once, Butch Peña MD, Last Rate: 500 mL/hr at 06/10/25 0921, 150 mg at 06/10/25 0921    irinotecan (CAMPTOSAR) 244 mg in dextrose 5 % 500 mL chemo infusion, 120 mg/m2 (Treatment Plan Recorded), Intravenous, Once, Butch Peña MD    sodium chloride 0.9 % infusion, 20 mL/hr, Intravenous, Once PRN, Butch Peña MD, Last Rate: 20 mL/hr at 06/10/25 0855, 20 mL/hr at 06/10/25 0855    Most Recent Lab Results:   Lab Results   Component Value Date    WBC 20.12 (H) 06/06/2025    NEUTROABS 15.68 (H) 06/06/2025    CHOLESTEROL 212 (H) 02/20/2024    CHOL 190 11/15/2016    TRIG 369 (H) 02/20/2024    HDL 40 02/20/2024    LDLCALC 98 02/20/2024    ALT 33 06/06/2025    AST 26 06/06/2025    ALB 4.4 06/06/2025     03/15/2016    SODIUM 139 06/06/2025    SODIUM 139 05/23/2025    K 3.7 06/06/2025    K 3.8 05/23/2025     06/06/2025    BUN 18 06/06/2025    BUN 12 05/23/2025    CREATININE 0.87 06/06/2025    CREATININE 0.82 05/23/2025    EGFR 89 06/06/2025     "GLUCOSE 106 (H) 11/15/2016    POCGLU 97 09/18/2024    GLUF 110 (H) 09/23/2024    GLUF 96 09/18/2024    GLUC 191 (H) 06/06/2025    HGBA1C 7.7 (A) 03/19/2025    HGBA1C 6.6 (A) 08/27/2024    HGBA1C 5.9 05/24/2024    CALCIUM 9.5 06/06/2025    FOLATE >20.0 (H) 06/10/2021    MG 1.9 02/23/2023     Anthropometric Measurements:   Height: 71\"  Ht Readings from Last 1 Encounters:   06/10/25 5' 10.98\" (1.803 m)     Wt Readings from Last 20 Encounters:   06/10/25 81.5 kg (179 lb 9.6 oz)   05/28/25 82.6 kg (182 lb)   05/27/25 80.9 kg (178 lb 6.4 oz)   05/13/25 84.2 kg (185 lb 9.6 oz)   05/05/25 83.5 kg (184 lb)   04/29/25 85.1 kg (187 lb 9.6 oz)   04/03/25 83.9 kg (185 lb)   03/25/25 85.1 kg (187 lb 9.6 oz)   03/19/25 82.6 kg (182 lb)   03/11/25 83.3 kg (183 lb 9.6 oz)   03/05/25 84.3 kg (185 lb 12.8 oz)   02/25/25 83.9 kg (185 lb)   02/25/25 83.9 kg (185 lb)   02/12/25 84.4 kg (186 lb)   02/11/25 83 kg (183 lb)   01/28/25 82.5 kg (181 lb 12.8 oz)   01/14/25 81.5 kg (179 lb 9.6 oz)   12/31/24 82.2 kg (181 lb 3.2 oz)   12/17/24 81.2 kg (179 lb)   12/05/24 78.5 kg (173 lb)     Weight History:   Usual Weight: 200# in December 2023; weight loss after diagnosed with DM and started on Ozempic   Varian: n/a  Home Scale: (10/8/24) 165#; (11/5/24) home weight maintaining in [address]     Oncology Nutrition-Anthropometrics      Flowsheet Row Nutrition from 6/10/2025 in [facility] Oncology Dietitian [place] Nutrition from 5/13/2025 in [facility] Oncology Dietitian [place]   Patient age (years): 69 years 69 years   Patient (male) height (in): 71 in 71 in   Current weight (lbs): 179.6 lbs 185.6 lbs   Current weight to be used for anthropometric calculations (kg) 81.6 kg 84.4 kg   BMI: 25 25.9   IBW male 172 lb 172 lb   IBW (kg) male 78.2 kg 78.2 kg   IBW % (male) 104.4 % 107.9 %   Adjusted BW (male): 173.9 lbs 175.4 lbs   Adjusted BW in kg (male): 79 kg 79.7 kg   % weight change after 1 week: -- 0.9 %   Weight change after 1 week " (lbs) -- 1.6 lbs   % weight change after 1 month: -3.2 % 0.3 %   Weight change after 1 month (lbs) -6 lbs 0.6 lbs   % weight change after 3 months: -2.2 % -0.2 %   Weight change after 3 months (lbs) -4 lbs -0.4 lbs   % weight change after 6 months: 3.8 % 6.7 %   Weight change after 6 months (lbs) 6.6 lbs 11.6 lbs            Nutrition-Focused Physical Findings: none observed    Food/Nutrition-Related History & Client/Social History:    Current Nutrition Impact Symptoms:  [] Nausea   -after chemo; improved w/ zofran and MMJ [] Reduced Appetite   -decreases somewhat after chemotherapy   -improves with MMJ  [] Acid Reflux    [] Vomiting  [x] Unintended Wt Loss   -about 6# in past month [] Malabsorption    [] Diarrhea   -soft and frequent BM after chemo  [] Unintended Wt Gain  [] Dumping Syndrome    [] Constipation  [] Thick Mucous/Secretions  [] Abdominal Pain    [] Dysgeusia (Altered Taste)  [] Xerostomia (Dry Mouth)  [] Gas    [] Dysosmia (Altered Smell)  [] Thrush  [] Difficulty Chewing    [] Oral Mucositis (Sore Mouth)  [] Fatigue  [x] Hyperglycemia   -T2DM   -metformin  Lab Results   Component Value Date    HGBA1C 7.7 (A) 03/19/2025      [] Odynophagia  [] Esophagitis  [] Other:    [] Dysphagia  [] Early Satiety  [x] No Problems Eating      Food Allergies & Intolerances: yes: lactose intolerant     Current Diet: CHO-Controlled and Lactose-Free  Current Nutrition Intake: Unchanged from last visit  Appetite: Fair ; appetite decreases after chemo   Nutrition Route: PO  Oral Care: brushes BID  Activity level: ADL, ambulates independently. Energy is good, doing yard work. Has trip to [place] planned with his grand kids.     24 Hr Diet Recall:   Breakfast:cereal with milk, coffee  Snack: fruit; pretzels   Lunch: sandwich  Dinner: pasta; or chicken/fish     Beverages: water sometimes w/ crystal light flavoring (16oz x2-3)  Supplements:   Ensure Original (8 oz, 220 kcal, 9 g pro) 1x daily       Oncology Nutrition-Estimated  Needs      Flowsheet [facility] Nutrition from 6/10/2025 in [facility] Oncology Dietitian [place] Nutrition from 2025 in [facility] Oncology Dietitian [place]   Weight type used Actual weight Actual weight   Weight in kilograms (kg) used for estimated needs 81.6 kg 84.4 kg   Energy needs formula:  30-35 kcal/kg 30-35 kcal/kg   Energy needs based on 30 kcal/k kcal 2531 kcal   Energy needs based on 35 kcal/k kcal 2953 kcal   Protein needs formula: 1.2-1.5 g/kg 1.2-1.5 g/kg   Protein needs based on 1.2 g/k g 101 g   Protein needs based on 1.5 g/kg 122 g 127 g   Fluid needs formula: 30-35 mL/kg 30-35 mL/kg   Fluid needs based on 30 mL/kg 2454 mL 2535 mL   Fluid needs in ounces 83 oz 86 oz   Fluid needs based on 35 mL/kg 2863 mL 2958 mL   Fluid needs in ounces 97 oz 100 oz             Discussion & Intervention:   Abhishek was evaluated today for an RD follow up regarding unintended weight loss.  Abhishek is currently undergoing tx for Pancreatic Cancer.  He is here for infusion today. He is doing well today. His appetite and PO intakes are unchanged. His weight has decreased over the past month. He has been active doing yard work  and his energy is good. Based on today's assessment, discussion/suggestions include: MNT for: DM, Pancreatic cancer, unintended weight loss, choosing a variety of colorful, plant-based foods to support a cancer preventative diet, adequate hydration & fluid choices, having consistent and planned snacks between meals, and continuing oral nutrition supplements.   Moving forward, Abhishek will continue current nutrition plan of care.  Materials Provided: not applicable  All questions and concerns addressed during today’s visit.  Abhishek has RD contact information.    Nutrition Diagnosis:   Inadequate Energy Intake related to physiological causes, disease state and treatment related issues as evidenced by food recall, wt loss and discussion with pt and/or family.  Increased  Nutrient Needs (kcal & pro) related to increased demand for nutrients and disease state as evidenced by cancer dx and pt undergoing tx for cancer.  Monitoring & Evaluation:   Goals:  weight maintenance/stabilization  adequate nutrition impact symptom management  pt to meet >/=75% estimated nutrition needs daily  adequate glycemic control  increase protein intake  increase fluid intake  increase calorie intake    Progress Towards Goals: Progressing and Goal(s) Met    Nutrition Rx & Recommendations:  Diet: High Calorie, High Protein (for high calorie foods see pages 52-53, and for high protein foods see pages 49-51 in your Eating Hints book)  Small, frequent meals/snacks may be easier to tolerate than 3 large daily meals.  Aim for 5-6 small meals per day (every 2-3 hours).  Include protein at all meals/snacks.  Include a variety of foods (as tolerated/allowed by diet).  Follow a Cancer Preventative Nutrition Pattern: colorful, plant-based, low-fat, avoid added sugars, limit alcohol, include high fiber foods, limit processed meats, limit red meat, choose lean protein sources, use low-fat cooking methods, balance calories with physical activity, avoid excessive weight gain throughout life.  Incorporate physical activity as able/allowed.  Stay hydrated by sipping fluids of choice/tolerance throughout the day.  Liquid nutrition may be better tolerated than solids at times.  Alter food choices and eating patterns to accommodate changing needs.  Refer to Eating Hints book for other meal/snack ideas and symptom management.  For weight loss: monitor your weight at home at least 2x/week, record your weight, start by adding 250-500 extra calories per day, eat 5-6 small scheduled meals every 2-3 hrs, choose foods that are high in protein and calories (see pages 49-53 in your Eating Hints book), drink liquids with calories for example: milkshakes/smoothies/juice/soup/whole milk/chocolate milk, cook with protein fortified milk  "(see recipe on page 36 in your Eating Hints book), consider ready-to-drink oral nutrition supplements such as Ensure Plus, Ensure Enlive, Boost Plus, or Boost Very High Calorie, avoid \"diet\" and \"light\" foods when possible, avoid drinking too much with meals, contact your dietitian with any continued weight loss over the course of 1 week.  For more info see pages 35-37 in your Eating Hints book.  Weigh yourself regularly. If you notice weight loss, make an effort to increase your daily food/calorie intake. If you continue to notice loss after these efforts, reach out to your dietitian to establish a plan to stabilize weight.  Ensure at least 1x daily.  Continue to monitor blood sugar: Foods with carbohydrates make your blood glucose level go up. Learning how to count carbohydrates can help you control your blood glucose levels. First, identify the foods you eat that contain carbohydrates.   Foods that commonly contain carbohydrates include: grains (breads, pasta, cereal, rice), fruit, starchy vegetables (potatoes, corn, peas), snack foods (chips, pretzels, crackers), dairy products, sweets/desserts.   Foods that do not usually contain carbohydrates: chicken, pork, beef, fish, seafood, eggs, tofu, cheese, butter, sour cream, avocado, nuts, seeds, olives, mayonnaise, water, black coffee, unsweetened tea, and zero-calorie drinks. Vegetables with no or low carbohydrate include green beans, cauliflower, tomatoes, and onions.     Follow Up Plan: 7/8/25 during infusion  Recommend Referral to Other Providers: none at this time  "

## 2025-06-11 ENCOUNTER — OFFICE VISIT (OUTPATIENT)
Dept: HEMATOLOGY ONCOLOGY | Facility: CLINIC | Age: 70
End: 2025-06-11
Payer: COMMERCIAL

## 2025-06-11 VITALS
TEMPERATURE: 98 F | WEIGHT: 179 LBS | RESPIRATION RATE: 18 BRPM | HEIGHT: 71 IN | OXYGEN SATURATION: 98 % | DIASTOLIC BLOOD PRESSURE: 80 MMHG | HEART RATE: 60 BPM | BODY MASS INDEX: 25.06 KG/M2 | SYSTOLIC BLOOD PRESSURE: 130 MMHG

## 2025-06-11 DIAGNOSIS — C25.9 ADENOCARCINOMA OF PANCREAS (HCC): Primary | ICD-10-CM

## 2025-06-11 DIAGNOSIS — C78.7 SECONDARY MALIGNANT NEOPLASM OF LIVER AND INTRAHEPATIC BILE DUCT (HCC): ICD-10-CM

## 2025-06-11 DIAGNOSIS — E78.5 HYPERLIPIDEMIA ASSOCIATED WITH TYPE 2 DIABETES MELLITUS  (HCC): ICD-10-CM

## 2025-06-11 DIAGNOSIS — I48.0 PAROXYSMAL ATRIAL FIBRILLATION (HCC): ICD-10-CM

## 2025-06-11 DIAGNOSIS — E11.69 HYPERLIPIDEMIA ASSOCIATED WITH TYPE 2 DIABETES MELLITUS  (HCC): ICD-10-CM

## 2025-06-11 DIAGNOSIS — I27.20 PULMONARY HTN (HCC): ICD-10-CM

## 2025-06-11 DIAGNOSIS — E11.65 TYPE 2 DIABETES MELLITUS WITH HYPERGLYCEMIA, WITHOUT LONG-TERM CURRENT USE OF INSULIN (HCC): ICD-10-CM

## 2025-06-11 PROCEDURE — 99215 OFFICE O/P EST HI 40 MIN: CPT | Performed by: INTERNAL MEDICINE

## 2025-06-11 NOTE — ASSESSMENT & PLAN NOTE
Patient: Rebecca CAIN: 1954       Chief Complaint   Patient presents with    Office Visit    Follow-up     Talk about metabolic syndrome   And talk about weight          HPI:    Lost 30 pounds on HCG injections previously  Gained the weight back but doesn't go over a specific amount  Diabetes has made it harder to lose weight    Not that active right now  Likes to watch streaming videos      Past Medical History:   Diagnosis Date    Fallopian tube cancer, carcinoma (CMD)     saw oncology in the past, then naturopath; had hysterectomy, partial chemo/gamma ray therapy    Hypertension     Type 2 diabetes mellitus (CMD)        Current Outpatient Medications   Medication Sig Dispense Refill    glipiZIDE (GLUCOTROL XL) 10 MG 24 hr tablet Take 1 tablet by mouth daily.      Loratadine 10 MG Cap Take  by mouth.      irbesartan-hydrochlorothiazide (AVALIDE) 300-12.5 MG per tablet Take 1 tablet by mouth daily.      Glucosamine-Chondroitin--400-375 MG Tab Take by mouth daily.       No current facility-administered medications for this visit.     ALLERGIES:   Allergen Reactions    Atorvastatin Other (See Comments)     Other reaction(s): myalgia    Lisinopril-Hydrochlorothiazide Other (See Comments)     Other reaction(s): stomach upset / cough    Lovastatin Other (See Comments)     Other reaction(s): myalgia    Penicillins Other (See Comments) and RASH    Rosuvastatin Other (See Comments)     Other reaction(s): myalgia    Adhesive   (Environmental) RASH    Latex Other (See Comments)       Social History     Tobacco Use    Smoking status: Former     Types: Cigarettes    Smokeless tobacco: Never   Substance Use Topics    Alcohol use: Yes     Comment: social    Drug use: Never       Review of Systems   Constitutional:  Positive for unexpected weight change. Negative for activity change, chills, diaphoresis, fatigue and fever.   Eyes:  Negative for visual disturbance.   Respiratory:  Negative for cough, chest tightness,  On Lantus and metformin.  Continue management as per endocrinology.  Lab Results   Component Value Date    HGBA1C 7.7 (A) 03/19/2025                        shortness of breath and wheezing.    Cardiovascular:  Negative for chest pain, palpitations and leg swelling.   Skin:  Negative for color change and wound.   Neurological:  Negative for dizziness, weakness, numbness and headaches.        Physical Exam  Vitals and nursing note reviewed.   Constitutional:       General: She is not in acute distress.     Appearance: Normal appearance. She is not ill-appearing or diaphoretic.   HENT:      Head: Normocephalic and atraumatic.      Mouth/Throat:      Mouth: Mucous membranes are moist.   Cardiovascular:      Rate and Rhythm: Normal rate.   Pulmonary:      Effort: Pulmonary effort is normal. No respiratory distress.   Skin:     Findings: No lesion or rash.   Neurological:      Mental Status: She is alert.          Assessment/Plan:    1. Class 2 obesity due to excess calories without serious comorbidity with body mass index (BMI) of 36.0 to 36.9 in adult  Acute issue. Discussed this extensively. She is considering beginning a GLP-1 agonist trial. Working on lifestyle changes regardless. Discussed ways to improve this safely extensively.    2. Type 2 diabetes mellitus with hyperglycemia, without long-term current use of insulin (CMD)  Due for lab work.  - Comprehensive Metabolic Panel; Future  - Glycohemoglobin; Future  - Lipid Panel Without Reflex; Future  - Microalbumin Urine Random; Future    3. Encounter for screening mammogram for malignant neoplasm of breast  Due for mammogram.  - MAMMO SCREENING BILATERAL W SHRADDHA; Future      Total face to face time spent with the patient: 30 minutes.  Greater than 50% of the total time was spent counseling and coordinating care.          Srinath Saldivar MD

## 2025-06-11 NOTE — ASSESSMENT & PLAN NOTE
Cancer Staging   Adenocarcinoma of pancreas (HCC)  Staging form: Pancreas, AJCC 8th Edition  - Clinical: Stage IB (cT2, cN0, cM0) - Signed by Roz Faulkner MD on 8/26/2024  - Pathologic stage from 12/5/2024: Stage IV (pT2, cM1) - Signed by Butch Peña MD on 12/5/2024    Patient tolerating current treatment well.  Received FOLFIRINOX for 11 cycles.  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  Received FOLFIRI from cycle 12 onwards.  Most recent MRI on 3/27/2025 with decreased size of the pancreatic tumor and decrease in the size of the hepatic metastasis.  Patient took a break x 1 month (trip to [place]) and resumed C15 on 04/29/2025.  Continue 5-FU and Irinotecan q 2 weeks.   Most recent CA 19-9 on 5/9/2025 was 17 (down from 67 in January 2025).    Repeat MRI after next cycle.               Orders:    MRI abdomen w wo contrast; Future

## 2025-06-11 NOTE — PROGRESS NOTES
upon completion of 12 cycles of FOLFIRINOX.Name: Abhishek Gimenez      : 1955      MRN: [id number]  Encounter Provider: Butch Peña MD  Encounter Date: 2025   Encounter department: [facility] [place]  :  Assessment & Plan  Adenocarcinoma of pancreas (HCC)   Cancer Staging   Adenocarcinoma of pancreas (HCC)  Staging form: Pancreas, AJCC 8th Edition  - Clinical: Stage IB (cT2, cN0, cM0) - Signed by Roz Faulkner MD on 2024  - Pathologic stage from 2024: Stage IV (pT2, cM1) - Signed by Butch Peña MD on 2024    Patient tolerating current treatment well.  Received FOLFIRINOX for 11 cycles.  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  Received FOLFIRI from cycle 12 onwards.  Most recent MRI on 3/27/2025 with decreased size of the pancreatic tumor and decrease in the size of the hepatic metastasis.  Patient took a break x 1 month (trip to [place]) and resumed C15 on 2025.  Continue 5-FU and Irinotecan q 2 weeks.   Most recent CA 19-9 on 2025 was 17 (down from 67 in 2025).    Repeat MRI after next cycle.               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       Type 2 diabetes mellitus with hyperglycemia, without long-term current use of insulin (HCC)  On Lantus and metformin.  Continue management as per endocrinology.  Lab Results   Component Value Date    HGBA1C 7.7 (A) 2025                       Hyperlipidemia associated with type 2 diabetes mellitus  (HCC)  Continue statins as per PCP.  Lab Results   Component Value Date    HGBA1C 7.7 (A) 2025                 Return in about 27 days (around 2025) for Office Visit.   Assessment & Plan  1. Metastatic pancreatic cancer.  The patient has metastatic pancreatic cancer with a  liver lesion confirmed to be pancreatic cancer. His CA 19-9 levels have decreased significantly from 67 in 01/2025 to 17 currently. An MRI will be scheduled post his next chemotherapy cycle to ensure the disease remains stable. If the MRI reveals substantial shrinkage or disappearance of the liver lesion, a consultation with Dr. Faulkner will be arranged to discuss potential surgical intervention.     2. Diabetes mellitus.  The patient reported elevated blood glucose levels, reaching up to 563 mg/dL, which he attributed to missing doses of his quick-acting insulin. He was advised to monitor his blood glucose levels closely and adjust his insulin dosage accordingly. Hydration was emphasized due to the diuretic effect of high blood glucose. If blood glucose levels remain high, an increase in Lantus dosage was recommended. Splitting the insulin dose was suggested if there is concern about nocturnal hypoglycemia.    3. Neuropathy.  The patient experiences numbness in his fingers and toes, likely due to chemotherapy and diabetes. Gabapentin could be considered if the neuropathy becomes bothersome. He was advised to report any interference with daily activities due to neuropathy.    4. Hemorrhoids.  The patient has been using suppositories for his hemorrhoids, which have been effective but caused stomach upset. He was advised to continue the current treatment plan as it is working.      Follow-up  The patient will follow up on 07/08/2025.      History of Present Illness   Chief Complaint   Patient presents with    Follow-up   68-year-old gentleman who noted the onset of diabetes mellitus and February 2024 since that time he has been losing weight he is lost 30 pounds over the 6 months time.  He also developed intermittent abdominal pain epigastric left upper quadrant that continued to worsen.  He was started on Ozempic.  He drinks 3-4 beers every other day he last used tobacco 40 years ago but does not smoke now.  He has had  a negative colonoscopy.  He underwent an EGD which was nondiagnostic subsequently underwent an EGD EUS which showed normal esophagus normal stomach normal duodenum.  Celiac artery and aorta were visualized and appeared normal.  The left kidney spleen left adrenal appeared normal parenchyma of the liver.  Normal this was the left lobe of the liver.  He had a heterogeneous hypoechoic irregular mass measuring 30 mm x 18 mm with poorly defined margins in the body of pancreas it abutted the splenic artery and splenic vein needle biopsies were done for total.  The head of the pancreas and neck of the pancreas appeared to have some honeycombing.  Bile duct appeared normal gallbladder appeared normal.  This was done on 7/17/2024  Biopsy was consistent with adenocarcinoma.  CAT scan of the chest was negative.  On 7/10/2026 MRI of the abdomen showed the ill-defined 3.8 x 1.1 cm mass in the body of the pancreas the liver showed a 3.4 cm benign hemangioma 1.0 and 0.4 cm right lobe nodules all the seem to be compatible with hemangiomas.    Interval History:  History of Present Illness  The patient is a 69-year-old male with metastatic pancreatic cancer who presents today for follow-up.    He reports elevated blood glucose levels, which he attributes to the omission of his rapid-acting insulin during lunch and dinner. His blood glucose levels have been fluctuating, with a peak of 563 one day, a decrease to 265, and then an increase to 300 post-coffee consumption. He notes that his blood glucose levels tend to rise on chemotherapy days, typically reaching the 300s or 400s. He experienced a significant spike to over 500 one afternoon, which caused concern about a potential hospital visit. However, after administering his rapid-acting insulin, his levels decreased within an hour. He also reports excessive thirst but no other unusual symptoms. He has been taking Lantus 30 units on chemotherapy days and metformin in the morning. He  inquires about the use of Pedialyte for electrolyte balance, as he has been consuming Gatorade Zero and Ensure.    His last blood work was conducted on 06/05/2025, and his CA 19-9 levels were within the normal range. He has discontinued oxycodone for the past month and has been managing well with one tablet daily. He occasionally experiences mild, shallow pain since stopping oxycodone.     He reports numbness in his fingers, which lasts for 3 to 4 days and resolves during his non-chemotherapy week. He does not experience tongue numbness but reports numbness in his toes. He is not currently taking gabapentin and reports no interference with his daily activities due to these symptoms.    He has one suppository left for his hemorrhoids, which he has been using for 2 weeks and has found beneficial. However, he reports stomach upset as a side effect. He was advised by Emerita to continue the treatment for a few more days.       Oncology History   Cancer Staging   Adenocarcinoma of pancreas (HCC)  Staging form: Pancreas, AJCC 8th Edition  - Clinical: Stage IB (cT2, cN0, cM0) - Signed by Roz Faulkner MD on 8/26/2024  Total positive nodes: 0  - Pathologic stage from 12/5/2024: Stage IV (pT2, cM1) - Signed by Butch Peña MD on 12/5/2024  Oncology History   Adenocarcinoma of pancreas (HCC)   8/13/2024 Biopsy    A.-B.  Pancreas, Body, FNA (Cell block and smear preparations):   Malignant  Adenocarcinoma.     8/20/2024 Initial Diagnosis    Adenocarcinoma of pancreas (HCC)     8/26/2024 -  Cancer Staged    Staging form: Pancreas, AJCC 8th Edition  - Clinical: Stage IB (cT2, cN0, cM0) - Signed by Roz Faulkner MD on 8/26/2024  Total positive nodes: 0       9/24/2024 -  Chemotherapy    irinotecan (CAMPTOSAR) chemo infusion, 150 mg/m2 = 294 mg, 18 of 23 cycles  Dose modification: 112.5 mg/m2 (75 % of original dose 150 mg/m2, Cycle 2, Reason: Dose Not Tolerated), 120 mg/m2 (80 % of original dose 150 mg/m2, Cycle 3,  "Reason: Original Dose Changed)  Administration: 294 mg (9/24/2024), 227 mg (10/8/2024), 240 mg (10/22/2024), 240 mg (11/5/2024), 238 mg (11/19/2024), 239 mg (12/3/2024), 239 mg (12/17/2024), 242 mg (1/14/2025), 241 mg (12/31/2024), 241 mg (1/28/2025), 244 mg (2/11/2025), 245 mg (2/25/2025), 244 mg (3/25/2025), 245 mg (3/11/2025), 246 mg (5/27/2025), 245 mg (4/29/2025), 246 mg (5/13/2025), 244 mg (6/10/2025)  oxaliplatin (ELOXATIN) chemo infusion, 65 mg/m2 = 127.4 mg, 11 of 11 cycles  Dose modification: 48.75 mg/m2 (75 % of original dose 65 mg/m2, Cycle 2, Reason: Dose Not Tolerated), 52 mg/m2 (80 % of original dose 65 mg/m2, Cycle 3, Reason: Original Dose Changed)  Administration: 127.4 mg (9/24/2024), 98.5 mg (10/8/2024), 100 mg (10/22/2024), 100 mg (11/5/2024), 100 mg (11/19/2024), 100 mg (12/3/2024), 103.5 mg (12/17/2024), 105.05 mg (1/14/2025), 104.5 mg (12/31/2024), 104.5 mg (1/28/2025), 105.55 mg (2/11/2025)  fluorouracil (ADRUCIL) ambulatory infusion Soln, 1,200 mg/m2/day = 4,705 mg, 18 of 23 cycles  Dose modification: 900 mg/m2/day (75 % of original dose 1,200 mg/m2/day, Cycle 3, Reason: Dose Not Tolerated), 960 mg/m2/day (80 % of original dose 1,200 mg/m2/day, Cycle 4, Reason: Other (see comments), Comment: provider request)     11/15/2024 Biopsy    Final Diagnosis   A. Liver mass, biopsy:  - Metastatic adenocarcinoma.         12/5/2024 -  Cancer Staged    Staging form: Pancreas, AJCC 8th Edition  - Pathologic stage from 12/5/2024: Stage IV (pT2, cM1) - Signed by Butch Peña MD on 12/5/2024          Pertinent Medical History   06/11/25: reviewed         Review of Systems  14 point review of systems was negative except that mentioned in HPI.        Objective   Ht 5' 10.98\" (1.803 m)   BMI 25.06 kg/m²     Pain Screening:     ECOG   1  Physical Exam  Vitals and nursing note reviewed.   Constitutional:       General: He is not in acute distress.     Appearance: Normal appearance.   HENT:      Head: " Normocephalic and atraumatic.      Mouth/Throat:      Mouth: Mucous membranes are moist.      Pharynx: Oropharynx is clear.     Eyes:      General: No scleral icterus.     Extraocular Movements: Extraocular movements intact.      Conjunctiva/sclera: Conjunctivae normal.       Cardiovascular:      Rate and Rhythm: Normal rate and regular rhythm.      Pulses: Normal pulses.      Heart sounds: No murmur heard.  Pulmonary:      Effort: Pulmonary effort is normal.      Breath sounds: Normal breath sounds. No wheezing, rhonchi or rales.   Abdominal:      General: Bowel sounds are normal.      Palpations: Abdomen is soft.      Tenderness: There is no abdominal tenderness.     Musculoskeletal:         General: No swelling or tenderness. Normal range of motion.      Cervical back: Neck supple.   Lymphadenopathy:      Cervical: No cervical adenopathy.     Skin:     General: Skin is warm and dry.      Coloration: Skin is not pale.      Findings: No bruising, erythema or rash.     Neurological:      General: No focal deficit present.      Mental Status: He is alert and oriented to person, place, and time.      Motor: No weakness.     Psychiatric:         Mood and Affect: Mood normal.         Behavior: Behavior normal.         Labs: I have reviewed the following labs:  Lab Results   Component Value Date/Time    WBC 20.12 (H) 06/06/2025 10:45 AM    RBC 4.22 06/06/2025 10:45 AM    Hemoglobin 13.5 06/06/2025 10:45 AM    Hematocrit 39.2 06/06/2025 10:45 AM    MCV 93 06/06/2025 10:45 AM    MCH 32.0 06/06/2025 10:45 AM    RDW 13.4 06/06/2025 10:45 AM    Platelets 142 (L) 06/06/2025 10:45 AM    Segmented % 78 (H) 06/06/2025 10:45 AM    Lymphocytes % 8 (L) 06/06/2025 10:45 AM    Monocytes % 10 06/06/2025 10:45 AM    Eosinophils Relative 1 06/06/2025 10:45 AM    Basophils Relative 1 06/06/2025 10:45 AM    Immature Grans % 2 06/06/2025 10:45 AM    Absolute Neutrophils 15.68 (H) 06/06/2025 10:45 AM     Lab Results   Component Value  Date/Time    Potassium 3.7 06/06/2025 10:45 AM    Chloride 103 06/06/2025 10:45 AM    CO2 27 06/06/2025 10:45 AM    BUN 18 06/06/2025 10:45 AM    Creatinine 0.87 06/06/2025 10:45 AM    Glucose, Fasting 110 (H) 09/23/2024 07:46 AM    Calcium 9.5 06/06/2025 10:45 AM    AST 26 06/06/2025 10:45 AM    ALT 33 06/06/2025 10:45 AM    Alkaline Phosphatase 232 (H) 06/06/2025 10:45 AM    Total Protein 7.0 06/06/2025 10:45 AM    Albumin 4.4 06/06/2025 10:45 AM    Total Bilirubin 0.36 06/06/2025 10:45 AM    eGFR 89 06/06/2025 10:45 AM               Disclaimer: This document was prepared using [employer] technology. If a word or phrase is confusing, or does not make sense, this is likely due to recognition error which was not discovered during this clinician's review. If you believe an error has occurred, please contact me through [facility] service line for cathy?cation.      Follow Up    All questions were answered to the patient's satisfaction during this encounter. The patient knows the contact information for our office and knows to reach out for any relevant concerns related to this encounter. They are to call for any temperature 100.4 or higher, new symptoms including but not restricted to shaking chills, decreased appetite, nausea, vomiting, diarrhea, increased fatigue, shortness of breath or chest pain, confusion, and not feeling the strength to come to the clinic. For all other listed problems and medical diagnosis in their chart - they are managed by PCP and/or other specialists, which the patient acknowledges.     I spent 40 minutes reviewing the records (labs, clinician notes, outside records, medical history, ordering medicine/tests/procedures, monitoring of anti-neoplastic toxicities, interpreting the imaging/labs previously done) and coordination of care as well as direct time with the patient today, of which greater than 50% of the time was spent in counseling and coordination of care with the patient/family.

## 2025-06-11 NOTE — ASSESSMENT & PLAN NOTE
Continue statins as per PCP.  Lab Results   Component Value Date    HGBA1C 7.7 (A) 03/19/2025

## 2025-06-12 ENCOUNTER — HOSPITAL ENCOUNTER (OUTPATIENT)
Dept: INFUSION CENTER | Facility: CLINIC | Age: 70
Discharge: HOME | End: 2025-06-12
Attending: INTERNAL MEDICINE
Payer: COMMERCIAL

## 2025-06-12 DIAGNOSIS — D70.1 CHEMOTHERAPY INDUCED NEUTROPENIA (HCC): Primary | ICD-10-CM

## 2025-06-12 DIAGNOSIS — T45.1X5A CHEMOTHERAPY INDUCED NEUTROPENIA (HCC): Primary | ICD-10-CM

## 2025-06-12 DIAGNOSIS — C25.9 ADENOCARCINOMA OF PANCREAS (HCC): ICD-10-CM

## 2025-06-12 PROCEDURE — 96372 THER/PROPH/DIAG INJ SC/IM: CPT

## 2025-06-12 RX ADMIN — PEGFILGRASTIM 6 MG: KIT SUBCUTANEOUS at 10:53

## 2025-06-12 NOTE — PROGRESS NOTES
Patient presents for CADD pump disconnection and neulasta onpro. Pump deflated upon assessment. Port flushed and de-accessed with positive blood return.Neulasta Onpro attached to Right abdomen. Verified with blinking green light. Patient made aware of disconnect date and time.  AVS declined, aware of next appointment 6/23/25 at 11 am.

## 2025-06-13 DIAGNOSIS — K64.9 HEMORRHOIDS, UNSPECIFIED HEMORRHOID TYPE: ICD-10-CM

## 2025-06-13 RX ORDER — HYDROCORTISONE ACETATE 25 MG/1
25 SUPPOSITORY RECTAL DAILY
Qty: 14 SUPPOSITORY | Refills: 2 | Status: SHIPPED | OUTPATIENT
Start: 2025-06-13

## 2025-06-16 ENCOUNTER — TELEPHONE (OUTPATIENT)
Dept: GASTROENTEROLOGY | Facility: CLINIC | Age: 70
End: 2025-06-16

## 2025-06-16 NOTE — TELEPHONE ENCOUNTER
Called patient LMOM to check on how the patient is feeling. Asked patient for a call back to provide an update.

## 2025-06-16 NOTE — TELEPHONE ENCOUNTER
----- Message from Emerita Ma PA-C sent at 6/16/2025 11:33 AM EDT -----  Can you please call patient and see how he is feeling! Thank you!  ----- Message -----  From: Emerita Ma PA-C  Sent: 5/28/2025   4:10 PM EDT  To: Emerita Ma PA-C    Call in 2 weeks

## 2025-06-17 DIAGNOSIS — C25.9 ADENOCARCINOMA OF PANCREAS (HCC): Primary | ICD-10-CM

## 2025-06-17 RX ORDER — DEXTROSE MONOHYDRATE 50 MG/ML
20 INJECTION, SOLUTION INTRAVENOUS ONCE
Status: CANCELLED | OUTPATIENT
Start: 2025-06-24

## 2025-06-17 RX ORDER — ATROPINE SULFATE 1 MG/ML
0.25 INJECTION, SOLUTION INTRAVENOUS ONCE AS NEEDED
Status: CANCELLED | OUTPATIENT
Start: 2025-06-24

## 2025-06-17 RX ORDER — DIPHENHYDRAMINE HYDROCHLORIDE 50 MG/ML
25 INJECTION, SOLUTION INTRAMUSCULAR; INTRAVENOUS ONCE
Status: CANCELLED
Start: 2025-06-24 | End: 2025-06-24

## 2025-06-17 RX ORDER — SODIUM CHLORIDE 9 MG/ML
20 INJECTION, SOLUTION INTRAVENOUS ONCE AS NEEDED
Status: CANCELLED | OUTPATIENT
Start: 2025-06-24

## 2025-06-17 RX ORDER — ATROPINE SULFATE 1 MG/ML
0.25 INJECTION, SOLUTION INTRAVENOUS ONCE
Status: CANCELLED | OUTPATIENT
Start: 2025-06-24

## 2025-06-20 DIAGNOSIS — E11.65 TYPE 2 DIABETES MELLITUS WITH HYPERGLYCEMIA, WITHOUT LONG-TERM CURRENT USE OF INSULIN (HCC): ICD-10-CM

## 2025-06-22 RX ORDER — INSULIN GLARGINE 100 [IU]/ML
20 INJECTION, SOLUTION SUBCUTANEOUS
Qty: 15 ML | Refills: 0 | Status: SHIPPED | OUTPATIENT
Start: 2025-06-22 | End: 2025-07-22

## 2025-06-23 ENCOUNTER — HOSPITAL ENCOUNTER (OUTPATIENT)
Dept: INFUSION CENTER | Facility: CLINIC | Age: 70
Discharge: HOME | End: 2025-06-23
Payer: COMMERCIAL

## 2025-06-23 DIAGNOSIS — C25.9 ADENOCARCINOMA OF PANCREAS (HCC): ICD-10-CM

## 2025-06-23 DIAGNOSIS — Z95.828 PORT-A-CATH IN PLACE: Primary | ICD-10-CM

## 2025-06-23 LAB
ALBUMIN SERPL BCG-MCNC: 4.4 G/DL (ref 3.5–5)
ALP SERPL-CCNC: 186 U/L (ref 34–104)
ALT SERPL W P-5'-P-CCNC: 43 U/L (ref 7–52)
ANION GAP SERPL CALCULATED.3IONS-SCNC: 7 MMOL/L (ref 4–13)
AST SERPL W P-5'-P-CCNC: 25 U/L (ref 13–39)
BASOPHILS # BLD AUTO: 0.05 THOUSANDS/ÂΜL (ref 0–0.1)
BASOPHILS NFR BLD AUTO: 0 % (ref 0–1)
BILIRUB SERPL-MCNC: 0.55 MG/DL (ref 0.2–1)
BUN SERPL-MCNC: 22 MG/DL (ref 5–25)
CALCIUM SERPL-MCNC: 9.9 MG/DL (ref 8.4–10.2)
CHLORIDE SERPL-SCNC: 105 MMOL/L (ref 96–108)
CO2 SERPL-SCNC: 30 MMOL/L (ref 21–32)
CREAT SERPL-MCNC: 0.94 MG/DL (ref 0.6–1.3)
EOSINOPHIL # BLD AUTO: 0.3 THOUSAND/ÂΜL (ref 0–0.61)
EOSINOPHIL NFR BLD AUTO: 2 % (ref 0–6)
ERYTHROCYTE [DISTWIDTH] IN BLOOD BY AUTOMATED COUNT: 13.9 % (ref 11.6–15.1)
GFR SERPL CREATININE-BSD FRML MDRD: 83 ML/MIN/1.73SQ M
GLUCOSE P FAST SERPL-MCNC: 119 MG/DL (ref 65–99)
GLUCOSE SERPL-MCNC: 119 MG/DL (ref 65–140)
HCT VFR BLD AUTO: 39.5 % (ref 36.5–49.3)
HGB BLD-MCNC: 13.2 G/DL (ref 12–17)
IMM GRANULOCYTES # BLD AUTO: 0.19 THOUSAND/UL (ref 0–0.2)
IMM GRANULOCYTES NFR BLD AUTO: 1 % (ref 0–2)
LYMPHOCYTES # BLD AUTO: 1.29 THOUSANDS/ÂΜL (ref 0.6–4.47)
LYMPHOCYTES NFR BLD AUTO: 10 % (ref 14–44)
MCH RBC QN AUTO: 31.1 PG (ref 26.8–34.3)
MCHC RBC AUTO-ENTMCNC: 33.4 G/DL (ref 31.4–37.4)
MCV RBC AUTO: 93 FL (ref 82–98)
MONOCYTES # BLD AUTO: 0.94 THOUSAND/ÂΜL (ref 0.17–1.22)
MONOCYTES NFR BLD AUTO: 7 % (ref 4–12)
NEUTROPHILS # BLD AUTO: 10.38 THOUSANDS/ÂΜL (ref 1.85–7.62)
NEUTS SEG NFR BLD AUTO: 80 % (ref 43–75)
NRBC BLD AUTO-RTO: 0 /100 WBCS
PLATELET # BLD AUTO: 116 THOUSANDS/UL (ref 149–390)
PMV BLD AUTO: 10.3 FL (ref 8.9–12.7)
POTASSIUM SERPL-SCNC: 3.7 MMOL/L (ref 3.5–5.3)
PROT SERPL-MCNC: 7.3 G/DL (ref 6.4–8.4)
RBC # BLD AUTO: 4.24 MILLION/UL (ref 3.88–5.62)
SODIUM SERPL-SCNC: 142 MMOL/L (ref 135–147)
WBC # BLD AUTO: 13.15 THOUSAND/UL (ref 4.31–10.16)

## 2025-06-23 PROCEDURE — 80053 COMPREHEN METABOLIC PANEL: CPT | Performed by: INTERNAL MEDICINE

## 2025-06-23 PROCEDURE — 85025 COMPLETE CBC W/AUTO DIFF WBC: CPT | Performed by: INTERNAL MEDICINE

## 2025-06-23 NOTE — PROGRESS NOTES
Patient presents today for lab draw via port. Port accessed with positive blood return. Port flushed and de accessed without complications. Patient tolerated procedure well. AVS declined. Patient aware of next appointment on 6/24 at 8:30

## 2025-06-24 ENCOUNTER — HOSPITAL ENCOUNTER (OUTPATIENT)
Dept: INFUSION CENTER | Facility: CLINIC | Age: 70
Discharge: HOME | End: 2025-06-24
Attending: INTERNAL MEDICINE
Payer: COMMERCIAL

## 2025-06-24 VITALS
HEIGHT: 71 IN | WEIGHT: 183.4 LBS | TEMPERATURE: 97.1 F | SYSTOLIC BLOOD PRESSURE: 157 MMHG | HEART RATE: 59 BPM | BODY MASS INDEX: 25.68 KG/M2 | DIASTOLIC BLOOD PRESSURE: 75 MMHG | RESPIRATION RATE: 18 BRPM

## 2025-06-24 DIAGNOSIS — E11.65 TYPE 2 DIABETES MELLITUS WITH HYPERGLYCEMIA, WITHOUT LONG-TERM CURRENT USE OF INSULIN (HCC): Primary | ICD-10-CM

## 2025-06-24 DIAGNOSIS — C25.9 ADENOCARCINOMA OF PANCREAS (HCC): Primary | ICD-10-CM

## 2025-06-24 PROCEDURE — 96367 TX/PROPH/DG ADDL SEQ IV INF: CPT

## 2025-06-24 PROCEDURE — 96375 TX/PRO/DX INJ NEW DRUG ADDON: CPT

## 2025-06-24 PROCEDURE — 96413 CHEMO IV INFUSION 1 HR: CPT

## 2025-06-24 PROCEDURE — G0498 CHEMO EXTEND IV INFUS W/PUMP: HCPCS

## 2025-06-24 RX ORDER — INSULIN LISPRO 100 [IU]/ML
10 INJECTION, SOLUTION SUBCUTANEOUS
Qty: 30 ML | Refills: 3 | Status: SHIPPED | OUTPATIENT
Start: 2025-06-24

## 2025-06-24 RX ORDER — DIPHENHYDRAMINE HYDROCHLORIDE 50 MG/ML
25 INJECTION, SOLUTION INTRAMUSCULAR; INTRAVENOUS ONCE
Status: COMPLETED | OUTPATIENT
Start: 2025-06-24 | End: 2025-06-24

## 2025-06-24 RX ORDER — ATROPINE SULFATE 1 MG/ML
0.25 INJECTION, SOLUTION INTRAVENOUS ONCE AS NEEDED
Status: DISCONTINUED | OUTPATIENT
Start: 2025-06-24 | End: 2025-06-27 | Stop reason: HOSPADM

## 2025-06-24 RX ORDER — DEXTROSE MONOHYDRATE 50 MG/ML
20 INJECTION, SOLUTION INTRAVENOUS ONCE
Status: COMPLETED | OUTPATIENT
Start: 2025-06-24 | End: 2025-06-24

## 2025-06-24 RX ORDER — SODIUM CHLORIDE 9 MG/ML
20 INJECTION, SOLUTION INTRAVENOUS ONCE AS NEEDED
Status: DISCONTINUED | OUTPATIENT
Start: 2025-06-24 | End: 2025-06-27 | Stop reason: HOSPADM

## 2025-06-24 RX ORDER — ATROPINE SULFATE 1 MG/ML
0.25 INJECTION, SOLUTION INTRAVENOUS ONCE
Status: COMPLETED | OUTPATIENT
Start: 2025-06-24 | End: 2025-06-24

## 2025-06-24 RX ADMIN — IRINOTECAN HYDROCHLORIDE 241 MG: 20 INJECTION, SOLUTION INTRAVENOUS at 10:14

## 2025-06-24 RX ADMIN — FOSAPREPITANT 150 MG: 150 INJECTION, POWDER, LYOPHILIZED, FOR SOLUTION INTRAVENOUS at 09:23

## 2025-06-24 RX ADMIN — DIPHENHYDRAMINE HYDROCHLORIDE 25 MG: 50 INJECTION INTRAMUSCULAR; INTRAVENOUS at 10:05

## 2025-06-24 RX ADMIN — DEXAMETHASONE SODIUM PHOSPHATE: 10 INJECTION, SOLUTION INTRAMUSCULAR; INTRAVENOUS at 08:56

## 2025-06-24 RX ADMIN — ATROPINE SULFATE 0.25 MG: 1 INJECTION, SOLUTION INTRAVENOUS at 10:08

## 2025-06-24 RX ADMIN — DEXTROSE 20 ML/HR: 5 SOLUTION INTRAVENOUS at 10:14

## 2025-06-24 NOTE — PROGRESS NOTES
Pt presents for chemotherapy offering no complaints. Port a cath accessed with positive blood return noted. Pt tolerated infusion without incident. Port a cath flushed positive blood return noted, CADD pump attached clamps open to run. AVS declined. Walked out in stable condition, Next appointment on 6/26/25 at 10am.

## 2025-06-26 ENCOUNTER — HOSPITAL ENCOUNTER (OUTPATIENT)
Dept: INFUSION CENTER | Facility: CLINIC | Age: 70
Discharge: HOME | End: 2025-06-26
Attending: INTERNAL MEDICINE
Payer: COMMERCIAL

## 2025-06-26 DIAGNOSIS — T45.1X5A CHEMOTHERAPY INDUCED NEUTROPENIA (HCC): Primary | ICD-10-CM

## 2025-06-26 DIAGNOSIS — D70.1 CHEMOTHERAPY INDUCED NEUTROPENIA (HCC): Primary | ICD-10-CM

## 2025-06-26 DIAGNOSIS — C25.9 ADENOCARCINOMA OF PANCREAS (HCC): ICD-10-CM

## 2025-06-26 PROCEDURE — 96372 THER/PROPH/DIAG INJ SC/IM: CPT

## 2025-06-26 RX ADMIN — PEGFILGRASTIM 6 MG: KIT SUBCUTANEOUS at 10:36

## 2025-06-26 NOTE — PROGRESS NOTES
Pt presents for chemo ball disconnect offering no complaints. Chemo ball infused over 46hrs and appears empty and deflated. Chemo ball disconnected without difficulty. Port flushed positive blood return noted. Port a cath deaccessed without difficulty. Neulasta onpro applied to LLQ of abdomen, pt educated on removal time. AVS declined. Next appointment on 7/7/25 at 10:30am.

## 2025-07-01 ENCOUNTER — TELEPHONE (OUTPATIENT)
Dept: HEMATOLOGY ONCOLOGY | Facility: CLINIC | Age: 70
End: 2025-07-01

## 2025-07-01 ENCOUNTER — OFFICE VISIT (OUTPATIENT)
Dept: FAMILY MEDICINE CLINIC | Facility: CLINIC | Age: 70
End: 2025-07-01
Payer: COMMERCIAL

## 2025-07-01 VITALS
HEART RATE: 81 BPM | BODY MASS INDEX: 25.77 KG/M2 | DIASTOLIC BLOOD PRESSURE: 74 MMHG | SYSTOLIC BLOOD PRESSURE: 138 MMHG | TEMPERATURE: 97.9 F | HEIGHT: 70 IN | WEIGHT: 180 LBS | OXYGEN SATURATION: 96 %

## 2025-07-01 DIAGNOSIS — R52 BODY ACHES: ICD-10-CM

## 2025-07-01 DIAGNOSIS — K64.9 HEMORRHOIDS, UNSPECIFIED HEMORRHOID TYPE: ICD-10-CM

## 2025-07-01 DIAGNOSIS — R05.1 ACUTE COUGH: Primary | ICD-10-CM

## 2025-07-01 DIAGNOSIS — R19.7 DIARRHEA, UNSPECIFIED TYPE: ICD-10-CM

## 2025-07-01 DIAGNOSIS — C25.9 ADENOCARCINOMA OF PANCREAS (HCC): ICD-10-CM

## 2025-07-01 PROCEDURE — 99213 OFFICE O/P EST LOW 20 MIN: CPT

## 2025-07-01 RX ORDER — SODIUM CHLORIDE 9 MG/ML
20 INJECTION, SOLUTION INTRAVENOUS ONCE AS NEEDED
Status: CANCELLED | OUTPATIENT
Start: 2025-07-08

## 2025-07-01 RX ORDER — ATROPINE SULFATE 1 MG/ML
0.25 INJECTION, SOLUTION INTRAVENOUS ONCE AS NEEDED
Status: CANCELLED | OUTPATIENT
Start: 2025-07-08

## 2025-07-01 RX ORDER — AZITHROMYCIN 250 MG/1
TABLET, FILM COATED ORAL
Qty: 6 TABLET | Refills: 0 | Status: SHIPPED | OUTPATIENT
Start: 2025-07-01 | End: 2025-07-01

## 2025-07-01 RX ORDER — AZITHROMYCIN 250 MG/1
TABLET, FILM COATED ORAL
Qty: 6 TABLET | Refills: 0 | Status: SHIPPED | OUTPATIENT
Start: 2025-07-01 | End: 2025-07-05

## 2025-07-01 RX ORDER — ATROPINE SULFATE 1 MG/ML
0.25 INJECTION, SOLUTION INTRAVENOUS ONCE
Status: CANCELLED | OUTPATIENT
Start: 2025-07-08

## 2025-07-01 RX ORDER — DIPHENHYDRAMINE HYDROCHLORIDE 50 MG/ML
25 INJECTION, SOLUTION INTRAMUSCULAR; INTRAVENOUS ONCE
Status: CANCELLED
Start: 2025-07-08 | End: 2025-07-08

## 2025-07-01 RX ORDER — DEXTROSE MONOHYDRATE 50 MG/ML
20 INJECTION, SOLUTION INTRAVENOUS ONCE
Status: CANCELLED | OUTPATIENT
Start: 2025-07-08

## 2025-07-01 RX ORDER — BENZONATATE 200 MG/1
200 CAPSULE ORAL 3 TIMES DAILY PRN
Qty: 30 CAPSULE | Refills: 0 | Status: SHIPPED | OUTPATIENT
Start: 2025-07-01 | End: 2025-07-11

## 2025-07-01 RX ORDER — HYDROCORTISONE 25 MG/G
CREAM TOPICAL 2 TIMES DAILY
Qty: 28 G | Refills: 2 | Status: SHIPPED | OUTPATIENT
Start: 2025-07-01

## 2025-07-01 NOTE — ASSESSMENT & PLAN NOTE
Pt last had chemo treatment last Thursday , does get AE of fatigue after this but states new and worsening sx   Plan as above

## 2025-07-01 NOTE — PROGRESS NOTES
Name: Abhishek Gimenez      : 1955      MRN: [id number]  Encounter Provider: Kali Delgadillo PA-C  Encounter Date: 2025   Encounter department: [facility]    Assessment & Plan  Acute cough  Pt complains of sx including dry cough, productive cough, sore throat, and rhinorrhea   Sx have been present for 4 day(s) and slightly worsening since onset.   Pt has attempted to alleviate their current sx with dayquil which has provided some relief.   Pertinent negatives: no fevers, chest pain, SOB, or difficulty breathing.   Exam unremarkable, no evidence of active bacterial infection, lungs cta, vitals stable, heart RRR  Flu and COVID testing negative in office  Suspect adverse reaction of chemotherapeutic treatment, but cannot rule out URI complicating symptoms, as patient did recently have chemotherapeutic treatment prior to symptom onset  Based on reassuring exam and vitals, recommended symptomatic treatment after discussing risk and benefits with Tessalon Perles 3 times daily as needed for cough as well as continued OTC treatment  If no improvement, recommend delayed antibiotic treatment with azithromycin after discussing risk and benefits including worsening of GI diarrhea  We will also attempt contact with oncology team to ensure agreement with plan from oncology perspective  Patient and significant other voiced understanding and agreement with plan, given low threshold of ED presentation of developing fevers, worsening URI symptoms, chest pain, shortness of breath, or worsening of any symptoms given significant history of malignancy  Orders:    azithromycin (ZITHROMAX) 250 mg tablet; Take 2 tablets today then 1 tablet daily x 4 days    benzonatate (TESSALON) 200 MG capsule; Take 1 capsule (200 mg total) by mouth 3 (three) times a day as needed for cough for up to 10 days    Adenocarcinoma of pancreas (HCC)  Pt last had chemo treatment last Thursday , does get AE of fatigue after  "this but states new and worsening sx   Plan as above       Diarrhea, unspecified type  Non-bloody, improving per pt   May be from URI but also on multiple oncologic Rx with AE of potential for diarrhea  Does not endorse abd pain  Continue to monitor in the setting of URI sx as above             History of Present Illness     Abhishek Gimenez is a 69 y.o. male with significant Hx of pancreatic adenocarcinoma presenting for cough, URI sx, diarrhea     routine labs, relevant lab work and imaging, previous encounters with oncologic tx, reviewed at this visit    **Note: Portions of the record may have been created with voice recognition software.  Occasional wrong word or \"sound alike\" substitutions may have occurred due to the inherent limitations of voice recognition software.  Please read the chart carefully and recognize, using context, where substitutions have occurred. Please contact for further clarification, when necessary.       Review of Systems   Constitutional:  Negative for chills and fever.   HENT:  Positive for rhinorrhea and sore throat. Negative for ear pain.    Eyes:  Negative for pain and visual disturbance.   Respiratory:  Positive for cough. Negative for shortness of breath.    Cardiovascular:  Negative for chest pain and palpitations.   Gastrointestinal:  Negative for abdominal pain and vomiting.   Genitourinary:  Negative for dysuria and hematuria.   Musculoskeletal:  Negative for arthralgias and back pain.   Skin:  Negative for color change and rash.   Neurological:  Negative for seizures and syncope.   All other systems reviewed and are negative.    Past Medical History[1]  Past Surgical History[2]  Family History[3]  Social History[4]  Medications[5]  Allergies   Allergen Reactions    Penicillins Rash     Immunization History   Administered Date(s) Administered    COVID-19 MODERNA VACC 0.5 ML IM 03/31/2021, 04/28/2021, 12/16/2021, 12/01/2022    COVID-19 Moderna mRNA Vaccine 12 Yr+ 50 mcg/0.5 mL " (Spikevax) 11/13/2023    INFLUENZA 11/13/2023    Influenza Injectable, MDCK, Preservative Free, Quadrivalent, 0.5 mL 09/25/2019    Influenza, recombinant, quadrivalent,injectable, preservative free 10/12/2022    Pneumococcal Conjugate 13-Valent 01/06/2020    Tdap 07/21/2015, 06/07/2023    Zoster Vaccine Recombinant 12/01/2022, 02/07/2023     Objective   There were no vitals taken for this visit.    Physical Exam  Vitals and nursing note reviewed.   Constitutional:       General: He is not in acute distress.     Appearance: He is well-developed.   HENT:      Head: Normocephalic and atraumatic.      Right Ear: Tympanic membrane normal.      Left Ear: Tympanic membrane normal.      Nose: Nose normal. No congestion or rhinorrhea.      Mouth/Throat:      Pharynx: Oropharynx is clear. Posterior oropharyngeal erythema (mild) present. No oropharyngeal exudate.     Eyes:      Conjunctiva/sclera: Conjunctivae normal.       Cardiovascular:      Rate and Rhythm: Normal rate and regular rhythm.      Heart sounds: No murmur heard.  Pulmonary:      Effort: Pulmonary effort is normal. No respiratory distress.      Breath sounds: Normal breath sounds.   Abdominal:      Palpations: Abdomen is soft.      Tenderness: There is no abdominal tenderness.     Musculoskeletal:         General: No swelling.      Cervical back: Neck supple.     Skin:     General: Skin is warm and dry.     Neurological:      Mental Status: He is alert and oriented to person, place, and time.     Psychiatric:         Mood and Affect: Mood normal.                [1]   Past Medical History:  Diagnosis Date    A-fib (HCC)     Allergic     Arthritis     BPH (benign prostatic hyperplasia)     Cancer (HCC)     Colon polyp     Diabetes mellitus (HCC)     Fatty liver     Fracture, foot     High cholesterol     Hyperlipidemia     Irregular heart beat     Kidney stone     Non-ischemic cardiomyopathy (HCC)     Osteoarthritis     Pancreatic cancer (HCC) 8/15/2024     Psoriasis     Psoriatic arthritis (HCC)     Pulmonary HTN (HCC)    [2]   Past Surgical History:  Procedure Laterality Date    CARDIAC ELECTROPHYSIOLOGY PROCEDURE N/A 2023    Procedure: Cardiac eps/afib ablation   comprehensive posterior wall;  Surgeon: Mynor Andresw MD;  Location: [facility];  Service: Cardiology    COLONOSCOPY      ELBOW SURGERY Right     FL GUIDED CENTRAL VENOUS ACCESS DEVICE INSERTION  2024    IR BIOPSY LIVER MASS  2024    IR BIOPSY LIVER MASS  11/15/2024    KIDNEY STONE SURGERY      LAPAROSCOPY N/A 2024    Procedure: DIAGNOSTIC LAPAROSCOPY;  Surgeon: Roz Faulkner MD;  Location: [facility];  Service: Surgical Oncology    PROSTATE SURGERY      Turp    TRANSURETHRAL RESECTION OF PROSTATE      last assessed 7/21/15    TUNNELED VENOUS PORT PLACEMENT N/A 2024    Procedure: INSERTION VENOUS PORT (PORT-A-CATH);  Surgeon: Roz Faulkner MD;  Location: [facility];  Service: Surgical Oncology   [3]   Family History  Problem Relation Name Age of Onset    Diabetes Mother Toshia     Diabetes type I Mother Toshia     Hypertension Father Oscar Gimenez     Brain cancer Father Oscar Gimenez     Diabetes Family     [4]   Social History  Tobacco Use    Smoking status: Former     Current packs/day: 0.00     Average packs/day: 0.5 packs/day for 20.0 years (10.0 ttl pk-yrs)     Types: Cigarettes     Start date: 1971     Quit date: 1981     Years since quittin.6     Passive exposure: Past    Smokeless tobacco: Never   Vaping Use    Vaping status: Never Used   Substance and Sexual Activity    Alcohol use: Not Currently     Comment: 1 drink daily ( pt stopped drinking in July); rarely    Drug use: Yes     Types: Marijuana     Comment: medical marijuana tincture last taken 24    Sexual activity: Yes     Partners: Female   [5]   Current Outpatient Medications on File Prior to Visit   Medication Sig    Alcohol Swabs 70 % PADS May substitute brand  based on insurance coverage. Check glucose TID. (Patient not taking: Reported on 9/23/2024)    Benzocaine-Isopropyl Alcohol 6-70 % PADS Use    Blood Glucose Monitoring Suppl ([employer] Blood Glucose Meter) w/Device KIT Use    Blood Glucose Monitoring Suppl (OneTouch Verio Reflect) w/Device KIT Check blood sugars once daily. Please substitute with appropriate alternative as covered by patient's insurance. Dx: E11.65    Continuous Blood Gluc  (FreeStyle Livia 2 [place]) LEANA 14 DAY READER NOT MADE    Continuous Glucose Sensor (FreeStyle Livia 14 Day Sensor) MISC Use 1 Device every 14 (fourteen) days    Glucose Blood (BLOOD GLUCOSE TEST STRIPS 333 VI) Use    glucose blood (OneTouch Verio) test strip Check blood sugars once daily. Please substitute with appropriate alternative as covered by patient's insurance. Dx: E11.65    hydrocortisone (ANUSOL-HC) 2.5 % rectal cream Apply topically 2 (two) times a day    hydrocortisone (ANUSOL-HC) 25 mg suppository INSERT 1 SUPPOSITORY (25 MG TOTAL) INTO THE RECTUM IN THE MORNING    insulin glargine (LANTUS) 100 units/mL subcutaneous injection Inject 20 Units under the skin daily at bedtime    insulin lispro (HumaLOG Tarun KwikPen) 100 units/mL injection pen Inject 10 Units under the skin 3 (three) times a day with meals    Insulin Pen Needle (BD Pen Needle Mallory U/F) 32G X 4 MM MISC Use daily as directed with insulin pen    Lancets 33G MISC Use    Lantus SoloStar 100 units/mL SOPN INJECT 0.2 ML (20 UNITS TOTAL) UNDER THE SKIN DAILY AT BEDTIME    lidocaine-prilocaine (EMLA) cream Apply topically as needed for mild pain Apply to port area 45-60min prior to treatment and cover with dressing or plastic wrap    metFORMIN (GLUCOPHAGE-XR) 500 mg 24 hr tablet Take 2 tablets (1,000 mg total) by mouth daily with breakfast    ondansetron (ZOFRAN) 4 mg tablet Take 1 tablet (4 mg total) by mouth every 8 (eight) hours as needed for nausea or vomiting    OneTouch Delica Lancets 33G MISC Check  blood sugars once daily. Please substitute with appropriate alternative as covered by patient's insurance. Dx: E11.65    oxyCODONE (Roxicodone) 5 immediate release tablet Take 1 tablet (5 mg total) by mouth every 6 (six) hours as needed for moderate pain Max Daily Amount: 20 mg    pantoprazole (PROTONIX) 40 mg tablet Take 1 tablet (40 mg total) by mouth daily before breakfast    rosuvastatin (CRESTOR) 5 mg tablet Take 1 tablet (5 mg total) by mouth every evening    sildenafil (VIAGRA) 100 mg tablet Take 1 tablet (100 mg total) by mouth daily as needed for erectile dysfunction

## 2025-07-01 NOTE — TELEPHONE ENCOUNTER
Attempted to call patient and left voicemail, Dr Delgadillo reached out to Dr Peña to make office aware of issues he's experiencing. Wanted to touch base as to gi symptoms and if these started right after treatment and for how long they were occurring. Asked to please give a call back.  Hope line number was left to return our call at their earliest convenience.

## 2025-07-02 ENCOUNTER — NURSE TRIAGE (OUTPATIENT)
Age: 70
End: 2025-07-02

## 2025-07-02 ENCOUNTER — TELEPHONE (OUTPATIENT)
Dept: HEMATOLOGY ONCOLOGY | Facility: CLINIC | Age: 70
End: 2025-07-02

## 2025-07-02 ENCOUNTER — HOSPITAL ENCOUNTER (OUTPATIENT)
Dept: MRI IMAGING | Facility: HOSPITAL | Age: 70
Discharge: HOME | End: 2025-07-02
Attending: INTERNAL MEDICINE
Payer: COMMERCIAL

## 2025-07-02 DIAGNOSIS — C25.9 ADENOCARCINOMA OF PANCREAS (HCC): ICD-10-CM

## 2025-07-02 DIAGNOSIS — C25.9 ADENOCARCINOMA OF PANCREAS (HCC): Primary | ICD-10-CM

## 2025-07-02 PROCEDURE — A9585 GADOBUTROL INJECTION: HCPCS | Performed by: INTERNAL MEDICINE

## 2025-07-02 PROCEDURE — 74183 MRI ABD W/O CNTR FLWD CNTR: CPT

## 2025-07-02 RX ORDER — GADOBUTROL 604.72 MG/ML
8 INJECTION INTRAVENOUS
Status: COMPLETED | OUTPATIENT
Start: 2025-07-02 | End: 2025-07-02

## 2025-07-02 RX ADMIN — GADOBUTROL 8 ML: 604.72 INJECTION INTRAVENOUS at 06:58

## 2025-07-02 NOTE — TELEPHONE ENCOUNTER
Pt returning call attempted to transfer CTS unavailable. Please call pt in regards to message. Thank you

## 2025-07-02 NOTE — TELEPHONE ENCOUNTER
Called and spoke with patient and he stated he spoke to Trevon and did take 2 capsules of the imodium. Made him aware he is to continue taking every 4-6hrs as necessary for diarrhea. I discussed that I would call and touch base with him tomorrow to see how he was feeling and to confirm that the Imodium was working well.   Did also make him aware to proceed with antibiotics ordered by PCP and since they start today he should be done with them by Sunday. He will have labs done on Monday so at this point when in infusion asked him to have the nurses reach out to me so we can confirm how he is feeling and making sure we don't need to change the date in his plan for next week.

## 2025-07-02 NOTE — TELEPHONE ENCOUNTER
Attempted to call patient and left voicemail regarding my communication from yesterday and wanted to check up on him and making sure symptoms were improving.   Asked if he could please give us a call back.  Hope line number was left to return our call at their earliest convenience.

## 2025-07-02 NOTE — TELEPHONE ENCOUNTER
REASON FOR CONVERSATION: Diarrhea    SYMPTOMS: cough and runny nose since 6/28, diarrhea 6/29 about 3 episodes daily.       OTHER HEALTH INFORMATION: pancreatic cancer.   Patient reports last chromotherapy last week 6/25, reports cold and cough onset 6/28, reports diarrhea 2/29.  He reports cough medication tessalon and zithromax prescribed to him from PCP.      He report has not started zithromax yet and wanted to check with Dr. Peña if this is ok for him to start taking. Next infusion is 7/7 asking if any adjustments need to made to his treatment. Patient asking for a call back with instructions.    PROTOCOL DISPOSITION: Callback From Office Today    CARE ADVICE PROVIDED: Drink plenty of clear fluids, at least 6-8 glasses per day.  Eat bland foods, such as bananas, applesauce, white rice, plane pasta and chicken.  May use barrier ointment such as petroleum jelly to rectal area for irritation.  Take imodium as directed, if this is not effective return call for additional recommendations from care team. Return call if no urine output, unable to drink fluids, fever 100.4 or greater        PRACTICE FOLLOW-UP: Call back to patient with instructions.         Reason for Disposition   Signs of dehydration    Answer Assessment - Initial Assessment Questions  1. What is the cancer diagnosis, and what treatment is the patient receiving? Review past medical history.  Pancreatic cancer     2. What medications is the patient taking?   Folfirinox    3. When did the diarrhea start?  6/29    4. Obtain history of bowel habits, including frequency, and liquid vs. formed stool.   Watery diarrhea 3 episodes daily since 6/29    5. Stool color, odor, and is there presence of undigested food or fat? Is there any mucus or blood present?  Brown    6. Ask the patient to describe symptoms in detail, including number of stools in a 24-hour period. Is there any weight loss?  3     7. Urine output and character, including any signs of  dehydration (e.g., dry mouth, decreased urine output, lethargy, weakness, decreased skin turgor)  3 cups of water 14 oz Gatorade    8. Does anything make the diarrhea better or worse? What remedies has the patient tried, and what have been the results?   Unsure    9. What has the patient done in the past for diarrhea management, and what was the effect?   Has not tried anything     10. Any associated additional symptoms (e.g., abdominal pain or cramps, fever, weight loss, stool incontinence, nausea, or vomiting, decreased urine output)?   Decreased urine output, normal color urine    11. Assess changes in activities of daily living and function, including diet history, food intolerances/aversions, or allergies.  None    Protocols used: ONC-Diarrhea- ADULT [place]

## 2025-07-03 ENCOUNTER — TELEPHONE (OUTPATIENT)
Dept: HEMATOLOGY ONCOLOGY | Facility: CLINIC | Age: 70
End: 2025-07-03

## 2025-07-03 NOTE — TELEPHONE ENCOUNTER
----- Message from Danielle PANTOJA sent at 7/2/2025  2:44 PM EDT -----  Check to see if the imodium worked and diarrhea is under control

## 2025-07-03 NOTE — TELEPHONE ENCOUNTER
Patient was called and inquired how he was feeling and if symptoms are resolved. Pt states since he started taking Imodium he has not had any issues with diarrhea and states feeling well today. Instructed to stop taking the Imodium now and let us know if symptoms change.

## 2025-07-07 ENCOUNTER — HOSPITAL ENCOUNTER (OUTPATIENT)
Dept: INFUSION CENTER | Facility: CLINIC | Age: 70
Discharge: HOME | End: 2025-07-07
Payer: COMMERCIAL

## 2025-07-07 DIAGNOSIS — Z95.828 PORT-A-CATH IN PLACE: Primary | ICD-10-CM

## 2025-07-07 DIAGNOSIS — C25.9 ADENOCARCINOMA OF PANCREAS (HCC): ICD-10-CM

## 2025-07-07 LAB
ALBUMIN SERPL BCG-MCNC: 4.5 G/DL (ref 3.5–5)
ALP SERPL-CCNC: 172 U/L (ref 34–104)
ALT SERPL W P-5'-P-CCNC: 31 U/L (ref 7–52)
ANION GAP SERPL CALCULATED.3IONS-SCNC: 6 MMOL/L (ref 4–13)
AST SERPL W P-5'-P-CCNC: 24 U/L (ref 13–39)
BASOPHILS # BLD AUTO: 0.04 THOUSANDS/ÂΜL (ref 0–0.1)
BASOPHILS NFR BLD AUTO: 0 % (ref 0–1)
BILIRUB SERPL-MCNC: 0.45 MG/DL (ref 0.2–1)
BUN SERPL-MCNC: 17 MG/DL (ref 5–25)
CALCIUM SERPL-MCNC: 9.4 MG/DL (ref 8.4–10.2)
CHLORIDE SERPL-SCNC: 106 MMOL/L (ref 96–108)
CO2 SERPL-SCNC: 29 MMOL/L (ref 21–32)
CREAT SERPL-MCNC: 0.81 MG/DL (ref 0.6–1.3)
EOSINOPHIL # BLD AUTO: 0.17 THOUSAND/ÂΜL (ref 0–0.61)
EOSINOPHIL NFR BLD AUTO: 2 % (ref 0–6)
ERYTHROCYTE [DISTWIDTH] IN BLOOD BY AUTOMATED COUNT: 14.3 % (ref 11.6–15.1)
GFR SERPL CREATININE-BSD FRML MDRD: 91 ML/MIN/1.73SQ M
GLUCOSE SERPL-MCNC: 181 MG/DL (ref 65–140)
HCT VFR BLD AUTO: 37.7 % (ref 36.5–49.3)
HGB BLD-MCNC: 12.6 G/DL (ref 12–17)
IMM GRANULOCYTES # BLD AUTO: 0.08 THOUSAND/UL (ref 0–0.2)
IMM GRANULOCYTES NFR BLD AUTO: 1 % (ref 0–2)
LYMPHOCYTES # BLD AUTO: 1.12 THOUSANDS/ÂΜL (ref 0.6–4.47)
LYMPHOCYTES NFR BLD AUTO: 10 % (ref 14–44)
MCH RBC QN AUTO: 31.3 PG (ref 26.8–34.3)
MCHC RBC AUTO-ENTMCNC: 33.4 G/DL (ref 31.4–37.4)
MCV RBC AUTO: 94 FL (ref 82–98)
MONOCYTES # BLD AUTO: 0.71 THOUSAND/ÂΜL (ref 0.17–1.22)
MONOCYTES NFR BLD AUTO: 6 % (ref 4–12)
NEUTROPHILS # BLD AUTO: 8.99 THOUSANDS/ÂΜL (ref 1.85–7.62)
NEUTS SEG NFR BLD AUTO: 81 % (ref 43–75)
NRBC BLD AUTO-RTO: 0 /100 WBCS
PLATELET # BLD AUTO: 145 THOUSANDS/UL (ref 149–390)
PMV BLD AUTO: 9.9 FL (ref 8.9–12.7)
POTASSIUM SERPL-SCNC: 3.9 MMOL/L (ref 3.5–5.3)
PROT SERPL-MCNC: 7.3 G/DL (ref 6.4–8.4)
RBC # BLD AUTO: 4.02 MILLION/UL (ref 3.88–5.62)
SODIUM SERPL-SCNC: 141 MMOL/L (ref 135–147)
WBC # BLD AUTO: 11.11 THOUSAND/UL (ref 4.31–10.16)

## 2025-07-07 PROCEDURE — 80053 COMPREHEN METABOLIC PANEL: CPT | Performed by: INTERNAL MEDICINE

## 2025-07-07 PROCEDURE — 85025 COMPLETE CBC W/AUTO DIFF WBC: CPT | Performed by: INTERNAL MEDICINE

## 2025-07-07 NOTE — PROGRESS NOTES
Outpatient Oncology Nutrition Consultation   Type of Consult: Follow Up  Care Location: [facility]    Reason for referral: notification  Lashell Lew MA  on 9/11/24 that pt is appropriate for oncology nutrition care (reason for referral: Ambulatory referral for struggling with eating and drinking).     Nutrition Assessment:   Oncology Diagnosis & Treatments:   Cancer of the pancreas diagnosed 8/20/24.   Began chemotherapy 9/24/24; Modified FOLFIRINOX every 14 days   Oncology History   Adenocarcinoma of pancreas (HCC)   8/13/2024 Biopsy    A.-B.  Pancreas, Body, FNA (Cell block and smear preparations):   Malignant  Adenocarcinoma.     8/20/2024 Initial Diagnosis    Adenocarcinoma of pancreas (HCC)     8/26/2024 -  Cancer Staged    Staging form: Pancreas, AJCC 8th Edition  - Clinical: Stage IB (cT2, cN0, cM0) - Signed by Roz Faulkner MD on 8/26/2024  Total positive nodes: 0       9/24/2024 -  Chemotherapy    irinotecan (CAMPTOSAR) chemo infusion, 150 mg/m2 = 294 mg, 20 of 23 cycles  Dose modification: 112.5 mg/m2 (75 % of original dose 150 mg/m2, Cycle 2, Reason: Dose Not Tolerated), 120 mg/m2 (80 % of original dose 150 mg/m2, Cycle 3, Reason: Original Dose Changed)  Administration: 294 mg (9/24/2024), 227 mg (10/8/2024), 240 mg (10/22/2024), 240 mg (11/5/2024), 238 mg (11/19/2024), 239 mg (12/3/2024), 239 mg (12/17/2024), 242 mg (1/14/2025), 241 mg (12/31/2024), 241 mg (1/28/2025), 244 mg (2/11/2025), 245 mg (2/25/2025), 244 mg (3/25/2025), 245 mg (3/11/2025), 246 mg (5/27/2025), 245 mg (4/29/2025), 246 mg (5/13/2025), 244 mg (6/10/2025), 241 mg (6/24/2025)  oxaliplatin (ELOXATIN) chemo infusion, 65 mg/m2 = 127.4 mg, 11 of 11 cycles  Dose modification: 48.75 mg/m2 (75 % of original dose 65 mg/m2, Cycle 2, Reason: Dose Not Tolerated), 52 mg/m2 (80 % of original dose 65 mg/m2, Cycle 3, Reason: Original Dose Changed)  Administration: 127.4 mg (9/24/2024), 98.5 mg (10/8/2024), 100 mg (10/22/2024), 100 mg  (11/5/2024), 100 mg (11/19/2024), 100 mg (12/3/2024), 103.5 mg (12/17/2024), 105.05 mg (1/14/2025), 104.5 mg (12/31/2024), 104.5 mg (1/28/2025), 105.55 mg (2/11/2025)  fluorouracil (ADRUCIL) ambulatory infusion Soln, 1,200 mg/m2/day = 4,705 mg, 20 of 23 cycles  Dose modification: 900 mg/m2/day (75 % of original dose 1,200 mg/m2/day, Cycle 3, Reason: Dose Not Tolerated), 960 mg/m2/day (80 % of original dose 1,200 mg/m2/day, Cycle 4, Reason: Other (see comments), Comment: provider request)     11/15/2024 Biopsy    Final Diagnosis   A. Liver mass, biopsy:  - Metastatic adenocarcinoma.         12/5/2024 -  Cancer Staged    Staging form: Pancreas, AJCC 8th Edition  - Pathologic stage from 12/5/2024: Stage IV (pT2, cM1) - Signed by Butch Peña MD on 12/5/2024         Past Medical & Surgical Hx:   Patient Active Problem List   Diagnosis    Atrial fibrillation (HCC)    Psoriatic arthritis (HCC)    NDPH (new daily persistent headache)    Atrial flutter (HCC)    CATY (obstructive sleep apnea)    Type 2 diabetes mellitus with hyperglycemia, without long-term current use of insulin (HCC)    Elevated PSA    Platelets decreased (HCC)    Adenocarcinoma of pancreas (HCC)    Pulmonary HTN (HCC)    Pre-op testing    Liver masses    Port-A-Cath in place    Chemotherapy induced neutropenia (HCC)    Palliative care by specialist    Medical marijuana use    Nausea    Cancer-related pain    Insomnia    Loss of appetite    Hyperlipidemia associated with type 2 diabetes mellitus  (HCC)    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)    Opioid dependence, uncomplicated (HCC)     Past Medical History:   Diagnosis Date    A-fib (HCC)     Allergic     Arthritis     BPH (benign prostatic hyperplasia)     Cancer (HCC)     Colon polyp     Diabetes mellitus (HCC)     Fatty liver     Fracture, foot     High cholesterol     Hyperlipidemia     Irregular heart beat     Kidney stone     Non-ischemic cardiomyopathy (HCC)      Osteoarthritis     Pancreatic cancer (HCC) 8/15/2024    Psoriasis     Psoriatic arthritis (HCC)     Pulmonary HTN (HCC)      Past Surgical History:   Procedure Laterality Date    CARDIAC ELECTROPHYSIOLOGY PROCEDURE N/A 02/22/2023    Procedure: Cardiac eps/afib ablation   comprehensive posterior wall;  Surgeon: Mynor Andrews MD;  Location: [facility];  Service: Cardiology    COLONOSCOPY      ELBOW SURGERY Right     FL GUIDED CENTRAL VENOUS ACCESS DEVICE INSERTION  09/05/2024    IR BIOPSY LIVER MASS  09/18/2024    IR BIOPSY LIVER MASS  11/15/2024    KIDNEY STONE SURGERY      LAPAROSCOPY N/A 09/05/2024    Procedure: DIAGNOSTIC LAPAROSCOPY;  Surgeon: Roz Faulkner MD;  Location: [facility];  Service: Surgical Oncology    PROSTATE SURGERY  2018    Turp    TRANSURETHRAL RESECTION OF PROSTATE      last assessed 7/21/15    TUNNELED VENOUS PORT PLACEMENT N/A 09/05/2024    Procedure: INSERTION VENOUS PORT (PORT-A-CATH);  Surgeon: Roz Faulkner MD;  Location: [facility];  Service: Surgical Oncology       Review of Medications:   Vitamins, Supplements and Herbals: No, pt denies taking supplements; MMJ     Current Outpatient Medications:     Alcohol Swabs 70 % PADS, May substitute brand based on insurance coverage. Check glucose TID. (Patient not taking: Reported on 9/23/2024), Disp: 100 each, Rfl: 0    Benzocaine-Isopropyl Alcohol 6-70 % PADS, Use, Disp: , Rfl:     benzonatate (TESSALON) 200 MG capsule, Take 1 capsule (200 mg total) by mouth 3 (three) times a day as needed for cough for up to 10 days, Disp: 30 capsule, Rfl: 0    Blood Glucose Monitoring Suppl ([employer] Blood Glucose Meter) w/Device KIT, Use, Disp: , Rfl:     Blood Glucose Monitoring Suppl (OneTouch Verio Reflect) w/Device KIT, Check blood sugars once daily. Please substitute with appropriate alternative as covered by patient's insurance. Dx: E11.65, Disp: 1 kit, Rfl: 0    Continuous Blood Gluc  (FreeStyle Livia 2 [place]) LEANA, 14 DAY READER  NOT MADE, Disp: 1 each, Rfl: 1    Continuous Glucose Sensor (FreeStyle Livia 14 Day Sensor) MISC, Use 1 Device every 14 (fourteen) days, Disp: 2 each, Rfl: 5    fluorouracil 3,900 mg in CADD/Elastomeric Infusion Device, Infuse 3,900 mg (960 mg/m2/day x 2.03 m2) into a catheter in a vein via infusion device over 46 hours for 2 days  Infusion planned for July 8, 2025., Disp: 1 each, Rfl: 3    Glucose Blood (BLOOD GLUCOSE TEST STRIPS 333 VI), Use, Disp: , Rfl:     glucose blood (OneTouch Verio) test strip, Check blood sugars once daily. Please substitute with appropriate alternative as covered by patient's insurance. Dx: E11.65, Disp: 100 each, Rfl: 3    hydrocortisone (ANUSOL-HC) 25 mg suppository, INSERT 1 SUPPOSITORY (25 MG TOTAL) INTO THE RECTUM IN THE MORNING, Disp: 14 suppository, Rfl: 2    insulin glargine (LANTUS) 100 units/mL subcutaneous injection, Inject 20 Units under the skin daily at bedtime, Disp: 6 mL, Rfl: 3    insulin lispro (HumaLOG Tarun KwikPen) 100 units/mL injection pen, Inject 10 Units under the skin 3 (three) times a day with meals, Disp: 30 mL, Rfl: 3    Insulin Pen Needle (BD Pen Needle Mallory U/F) 32G X 4 MM MISC, Use daily as directed with insulin pen, Disp: 100 each, Rfl: 3    Lancets 33G MISC, Use, Disp: , Rfl:     Lantus SoloStar 100 units/mL SOPN, INJECT 0.2 ML (20 UNITS TOTAL) UNDER THE SKIN DAILY AT BEDTIME, Disp: 15 mL, Rfl: 0    lidocaine-prilocaine (EMLA) cream, Apply topically as needed for mild pain Apply to port area 45-60min prior to treatment and cover with dressing or plastic wrap, Disp: 30 g, Rfl: 1    metFORMIN (GLUCOPHAGE-XR) 500 mg 24 hr tablet, Take 2 tablets (1,000 mg total) by mouth daily with breakfast, Disp: 180 tablet, Rfl: 1    ondansetron (ZOFRAN) 4 mg tablet, Take 1 tablet (4 mg total) by mouth every 8 (eight) hours as needed for nausea or vomiting, Disp: 20 tablet, Rfl: 0    OneTouch Delica Lancets 33G MISC, Check blood sugars once daily. Please substitute with  appropriate alternative as covered by patient's insurance. Dx: E11.65, Disp: 100 each, Rfl: 3    oxyCODONE (Roxicodone) 5 immediate release tablet, Take 1 tablet (5 mg total) by mouth every 6 (six) hours as needed for moderate pain Max Daily Amount: 20 mg, Disp: 90 tablet, Rfl: 0    pantoprazole (PROTONIX) 40 mg tablet, Take 1 tablet (40 mg total) by mouth daily before breakfast, Disp: 100 tablet, Rfl: 1    Procto-Med HC 2.5 % rectal cream, APPLY TOPICALLY 2 (TWO) TIMES A DAY, Disp: 28 g, Rfl: 2    rosuvastatin (CRESTOR) 5 mg tablet, Take 1 tablet (5 mg total) by mouth every evening, Disp: 100 tablet, Rfl: 1    sildenafil (VIAGRA) 100 mg tablet, Take 1 tablet (100 mg total) by mouth daily as needed for erectile dysfunction, Disp: 10 tablet, Rfl: 3  No current facility-administered medications for this visit.    Facility-Administered Medications Ordered in Other Visits:     alteplase (CATHFLO) injection 2 mg, 2 mg, Intracatheter, Q1MIN PRN, Butch Peña MD    alteplase (CATHFLO) injection 2 mg, 2 mg, Intracatheter, Q1MIN PRN, Butch Peña MD    Atropine Sulfate injection 0.25 mg, 0.25 mg, Intravenous, Once, Butch Peña MD    Atropine Sulfate injection 0.25 mg, 0.25 mg, Intravenous, Once PRN, Butch Peña MD    dextrose 5 % infusion, 20 mL/hr, Intravenous, Once, Butch Peña MD    irinotecan (CAMPTOSAR) 244 mg in dextrose 5 % 500 mL chemo infusion, 120 mg/m2 (Treatment Plan Recorded), Intravenous, Once, Butch Peña MD    sodium chloride 0.9 % infusion, 20 mL/hr, Intravenous, Once PRN, Butch Peña MD, Last Rate: 20 mL/hr at 07/08/25 0830, 20 mL/hr at 07/08/25 0830    Most Recent Lab Results:   Lab Results   Component Value Date    WBC 11.11 (H) 07/07/2025    NEUTROABS 8.99 (H) 07/07/2025    CHOLESTEROL 212 (H) 02/20/2024    CHOL 190 11/15/2016    TRIG 369 (H) 02/20/2024    HDL 40 02/20/2024    LDLCALC 98 02/20/2024    ALT 31 07/07/2025    AST 24 07/07/2025    ALB 4.5 07/07/2025      "03/15/2016    SODIUM 141 07/07/2025    SODIUM 142 06/23/2025    K 3.9 07/07/2025    K 3.7 06/23/2025     07/07/2025    BUN 17 07/07/2025    BUN 22 06/23/2025    CREATININE 0.81 07/07/2025    CREATININE 0.94 06/23/2025    EGFR 91 07/07/2025    GLUCOSE 106 (H) 11/15/2016    POCGLU 97 09/18/2024    GLUF 119 (H) 06/23/2025    GLUF 110 (H) 09/23/2024    GLUC 181 (H) 07/07/2025    HGBA1C 7.7 (A) 03/19/2025    HGBA1C 6.6 (A) 08/27/2024    HGBA1C 5.9 05/24/2024    CALCIUM 9.4 07/07/2025    FOLATE >20.0 (H) 06/10/2021    MG 1.9 02/23/2023     Anthropometric Measurements:   Height: 71\"  Ht Readings from Last 1 Encounters:   07/08/25 5' 10.98\" (1.803 m)     Wt Readings from Last 25 Encounters:   07/08/25 82.6 kg (182 lb 3.2 oz)   07/01/25 81.6 kg (180 lb)   06/24/25 83.2 kg (183 lb 6.4 oz)   07/02/25 81.6 kg (180 lb)   06/11/25 81.2 kg (179 lb)   06/10/25 81.5 kg (179 lb 9.6 oz)   05/28/25 82.6 kg (182 lb)   05/27/25 80.9 kg (178 lb 6.4 oz)   05/13/25 84.2 kg (185 lb 9.6 oz)   05/05/25 83.5 kg (184 lb)   04/29/25 85.1 kg (187 lb 9.6 oz)   04/03/25 83.9 kg (185 lb)   03/25/25 85.1 kg (187 lb 9.6 oz)   03/19/25 82.6 kg (182 lb)   03/11/25 83.3 kg (183 lb 9.6 oz)   03/05/25 84.3 kg (185 lb 12.8 oz)   02/25/25 83.9 kg (185 lb)   02/25/25 83.9 kg (185 lb)   02/12/25 84.4 kg (186 lb)   02/11/25 83 kg (183 lb)   01/28/25 82.5 kg (181 lb 12.8 oz)   01/14/25 81.5 kg (179 lb 9.6 oz)   12/31/24 82.2 kg (181 lb 3.2 oz)   12/17/24 81.2 kg (179 lb)   12/05/24 78.5 kg (173 lb)     Weight History:   Usual Weight: 200# in December 2023; weight loss after diagnosed with DM and started on Ozempic   Varian: n/a  Home Scale: (10/8/24) 165#; (11/5/24) home weight maintaining in the [facility]     Oncology Nutrition-Anthropometrics      Flowsheet Row Nutrition from 7/8/2025 in [facility] Oncology Dietitian [place] Nutrition from 6/10/2025 in [facility] Oncology Dietitian [place]   Patient age (years): 69 years 69 years   Patient (male) " height (in): 71 in 71 in   Current weight (lbs): 182.2 lbs 179.6 lbs   Current weight to be used for anthropometric calculations (kg) 82.8 kg 81.6 kg   BMI: 25.4 25   IBW male 172 lb 172 lb   IBW (kg) male 78.2 kg 78.2 kg   IBW % (male) 105.9 % 104.4 %   Adjusted BW (male): 174.6 lbs 173.9 lbs   Adjusted BW in kg (male): 79.4 kg 79 kg   % weight change after 1 week: 1.2 % --   Weight change after 1 week (lbs) 2.2 lbs --   % weight change after 1 month: 1.2 % -3.2 %   Weight change after 1 month (lbs) 2.2 lbs -6 lbs   % weight change after 3 months: -1.5 % -2.2 %   Weight change after 3 months (lbs) -2.8 lbs -4 lbs   % weight change after 6 months: 1.4 % 3.8 %   Weight change after 6 months (lbs) 2.6 lbs 6.6 lbs            Nutrition-Focused Physical Findings: none observed    Food/Nutrition-Related History & Client/Social History:    Current Nutrition Impact Symptoms:  [] Nausea   -after chemo; improved w/ zofran and MMJ [] Reduced Appetite   -decreases somewhat after chemotherapy   -improves with MMJ  [] Acid Reflux    [] Vomiting  [] Unintended Wt Loss   -stabilized x3 months   [] Malabsorption    [] Diarrhea    [] Unintended Wt Gain  [] Dumping Syndrome    [] Constipation  [] Thick Mucous/Secretions  [] Abdominal Pain    [] Dysgeusia (Altered Taste)  [] Xerostomia (Dry Mouth)  [] Gas    [] Dysosmia (Altered Smell)  [] Thrush  [] Difficulty Chewing    [] Oral Mucositis (Sore Mouth)  [] Fatigue  [x] Hyperglycemia   -T2DM   -metformin  Lab Results   Component Value Date    HGBA1C 7.7 (A) 03/19/2025      [] Odynophagia  [] Esophagitis  [] Other:    [] Dysphagia  [] Early Satiety  [x] No Problems Eating      Food Allergies & Intolerances: yes: lactose intolerant     Current Diet: CHO-Controlled and Lactose-Free  Current Nutrition Intake: Unchanged from last visit  Appetite: Fair ; appetite decreases after chemo   Nutrition Route: PO  Oral Care: brushes BID  Activity level: ADL, ambulates independently. Energy is  stable.     24 Hr Diet Recall:   Breakfast:cereal with milk, coffee  Snack: fruit   Lunch: sandwich  Dinner: pasta; or chicken/fish     Beverages: water(16oz x2-3)  Supplements:   Ensure Original (8 oz, 220 kcal, 9 g pro) 0-1x daily       Oncology Nutrition-Estimated Needs      Flowsheet Row Nutrition from 2025 in [facility] Oncology Dietitian [place] Nutrition from 6/10/2025 in [facility] Oncology Dietitian [place]   Weight type used Actual weight Actual weight   Weight in kilograms (kg) used for estimated needs 82.8 kg 81.6 kg   Energy needs formula:  30-35 kcal/kg 30-35 kcal/kg   Energy needs based on 30 kcal/k kcal 2449 kcal   Energy needs based on 35 kcal/k kcal 2857 kcal   Protein needs formula: 1.2-1.5 g/kg 1.2-1.5 g/kg   Protein needs based on 1.2 g/k g 98 g   Protein needs based on 1.5 g/kg 124 g 122 g   Fluid needs formula: 30-35 mL/kg 30-35 mL/kg   Fluid needs based on 30 mL/kg 2481 mL 2454 mL   Fluid needs in ounces 84 oz 83 oz   Fluid needs based on 35 mL/kg 2895 mL 2863 mL   Fluid needs in ounces 98 oz 97 oz             Discussion & Intervention:   Abhishek was evaluated today for an RD follow up regarding unintended weight loss.  Abhishek is currently undergoing tx for Pancreatic Cancer.  He is here for infusion today. He is doing well today. His appetite and PO intakes are stable. His weight has stabilized over the past month. His activity and energy are good. He has no nutrition questions/concerns today. Based on today's assessment, discussion/suggestions include: MNT for: DM, Pancreatic cancer, choosing a variety of colorful, plant-based foods to support a cancer preventative diet, adequate hydration & fluid choices, having consistent and planned snacks between meals, and continuing oral nutrition supplements.   Moving forward, Abhishek will continue current nutrition plan of care.  Materials Provided: not applicable  All questions and concerns addressed during today’s  visit.  Abhishek has RD contact information.    Nutrition Diagnosis:   Increased Nutrient Needs (kcal & pro) related to increased demand for nutrients and disease state as evidenced by cancer dx and pt undergoing tx for cancer.  Monitoring & Evaluation:   Goals:  weight maintenance/stabilization  adequate nutrition impact symptom management  pt to meet >/=75% estimated nutrition needs daily  adequate glycemic control  increase protein intake  increase fluid intake  increase calorie intake    Progress Towards Goals: Progressing and Goal(s) Met    Nutrition Rx & Recommendations:  Diet: High Calorie, High Protein (for high calorie foods see pages 52-53, and for high protein foods see pages 49-51 in your Eating Hints book)  Small, frequent meals/snacks may be easier to tolerate than 3 large daily meals.  Aim for 5-6 small meals per day (every 2-3 hours).  Include protein at all meals/snacks.  Include a variety of foods (as tolerated/allowed by diet).  Follow a Cancer Preventative Nutrition Pattern: colorful, plant-based, low-fat, avoid added sugars, limit alcohol, include high fiber foods, limit processed meats, limit red meat, choose lean protein sources, use low-fat cooking methods, balance calories with physical activity, avoid excessive weight gain throughout life.  Incorporate physical activity as able/allowed.  Stay hydrated by sipping fluids of choice/tolerance throughout the day.  Liquid nutrition may be better tolerated than solids at times.  Alter food choices and eating patterns to accommodate changing needs.  Refer to Eating Hints book for other meal/snack ideas and symptom management.  Weigh yourself regularly. If you notice weight loss, make an effort to increase your daily food/calorie intake. If you continue to notice loss after these efforts, reach out to your dietitian to establish a plan to stabilize weight.  Resume consistent intake of Ensure 1-2x daily if appetite/weight decrease.   Continue to  monitor blood sugar: Foods with carbohydrates make your blood glucose level go up. Learning how to count carbohydrates can help you control your blood glucose levels. First, identify the foods you eat that contain carbohydrates.   Foods that commonly contain carbohydrates include: grains (breads, pasta, cereal, rice), fruit, starchy vegetables (potatoes, corn, peas), snack foods (chips, pretzels, crackers), dairy products, sweets/desserts.   Foods that do not usually contain carbohydrates: chicken, pork, beef, fish, seafood, eggs, tofu, cheese, butter, sour cream, avocado, nuts, seeds, olives, mayonnaise, water, black coffee, unsweetened tea, and zero-calorie drinks. Vegetables with no or low carbohydrate include green beans, cauliflower, tomatoes, and onions.     Follow Up Plan: 8/5/25 during infusion  Recommend Referral to Other Providers: none at this time

## 2025-07-07 NOTE — PROGRESS NOTES
Pt presents for port a cath flush. Pt offers no complaints. Port accessed, labs drawn, flushed, saline locked and de-accessed without difficulty. AVS declined, Next appointment reviewed on 7/8 at 8 am.

## 2025-07-08 ENCOUNTER — OFFICE VISIT (OUTPATIENT)
Dept: HEMATOLOGY ONCOLOGY | Facility: CLINIC | Age: 70
End: 2025-07-08
Payer: COMMERCIAL

## 2025-07-08 ENCOUNTER — HOSPITAL ENCOUNTER (OUTPATIENT)
Dept: INFUSION CENTER | Facility: CLINIC | Age: 70
Discharge: HOME | End: 2025-07-08
Attending: INTERNAL MEDICINE
Payer: COMMERCIAL

## 2025-07-08 ENCOUNTER — NUTRITION (OUTPATIENT)
Dept: NUTRITION | Facility: CLINIC | Age: 70
End: 2025-07-08

## 2025-07-08 VITALS
RESPIRATION RATE: 16 BRPM | OXYGEN SATURATION: 99 % | TEMPERATURE: 96.8 F | HEART RATE: 56 BPM | BODY MASS INDEX: 25.49 KG/M2 | HEIGHT: 71 IN | SYSTOLIC BLOOD PRESSURE: 144 MMHG | WEIGHT: 182.1 LBS | DIASTOLIC BLOOD PRESSURE: 82 MMHG

## 2025-07-08 VITALS
SYSTOLIC BLOOD PRESSURE: 150 MMHG | RESPIRATION RATE: 16 BRPM | HEART RATE: 56 BPM | WEIGHT: 182.2 LBS | TEMPERATURE: 96.8 F | HEIGHT: 71 IN | DIASTOLIC BLOOD PRESSURE: 72 MMHG | BODY MASS INDEX: 25.51 KG/M2

## 2025-07-08 DIAGNOSIS — Z71.3 NUTRITIONAL COUNSELING: Primary | ICD-10-CM

## 2025-07-08 DIAGNOSIS — C25.9 ADENOCARCINOMA OF PANCREAS (HCC): Primary | ICD-10-CM

## 2025-07-08 DIAGNOSIS — I27.20 PULMONARY HTN (HCC): ICD-10-CM

## 2025-07-08 DIAGNOSIS — C25.9 ADENOCARCINOMA OF PANCREAS (HCC): ICD-10-CM

## 2025-07-08 DIAGNOSIS — R93.5 ABNORMAL MRI OF ABDOMEN: Primary | ICD-10-CM

## 2025-07-08 DIAGNOSIS — I48.0 PAROXYSMAL ATRIAL FIBRILLATION (HCC): ICD-10-CM

## 2025-07-08 PROCEDURE — G0498 CHEMO EXTEND IV INFUS W/PUMP: HCPCS

## 2025-07-08 PROCEDURE — 96375 TX/PRO/DX INJ NEW DRUG ADDON: CPT

## 2025-07-08 PROCEDURE — 96367 TX/PROPH/DG ADDL SEQ IV INF: CPT

## 2025-07-08 PROCEDURE — 99215 OFFICE O/P EST HI 40 MIN: CPT | Performed by: INTERNAL MEDICINE

## 2025-07-08 PROCEDURE — 96413 CHEMO IV INFUSION 1 HR: CPT

## 2025-07-08 RX ORDER — ATROPINE SULFATE 1 MG/ML
0.25 INJECTION, SOLUTION INTRAVENOUS ONCE AS NEEDED
Status: DISCONTINUED | OUTPATIENT
Start: 2025-07-08 | End: 2025-07-11 | Stop reason: HOSPADM

## 2025-07-08 RX ORDER — DEXTROSE MONOHYDRATE 50 MG/ML
20 INJECTION, SOLUTION INTRAVENOUS ONCE
Status: COMPLETED | OUTPATIENT
Start: 2025-07-08 | End: 2025-07-08

## 2025-07-08 RX ORDER — SODIUM CHLORIDE 9 MG/ML
20 INJECTION, SOLUTION INTRAVENOUS ONCE AS NEEDED
Status: DISCONTINUED | OUTPATIENT
Start: 2025-07-08 | End: 2025-07-11 | Stop reason: HOSPADM

## 2025-07-08 RX ORDER — ATROPINE SULFATE 1 MG/ML
0.25 INJECTION, SOLUTION INTRAVENOUS ONCE
Status: COMPLETED | OUTPATIENT
Start: 2025-07-08 | End: 2025-07-08

## 2025-07-08 RX ORDER — DIPHENHYDRAMINE HYDROCHLORIDE 50 MG/ML
25 INJECTION, SOLUTION INTRAMUSCULAR; INTRAVENOUS ONCE
Status: COMPLETED | OUTPATIENT
Start: 2025-07-08 | End: 2025-07-08

## 2025-07-08 RX ADMIN — DEXTROSE 20 ML/HR: 5 SOLUTION INTRAVENOUS at 10:05

## 2025-07-08 RX ADMIN — IRINOTECAN HYDROCHLORIDE 244 MG: 20 INJECTION, SOLUTION INTRAVENOUS at 10:09

## 2025-07-08 RX ADMIN — DEXAMETHASONE SODIUM PHOSPHATE: 10 INJECTION, SOLUTION INTRAMUSCULAR; INTRAVENOUS at 08:42

## 2025-07-08 RX ADMIN — DIPHENHYDRAMINE HYDROCHLORIDE 25 MG: 50 INJECTION INTRAMUSCULAR; INTRAVENOUS at 09:14

## 2025-07-08 RX ADMIN — SODIUM CHLORIDE 20 ML/HR: 0.9 INJECTION, SOLUTION INTRAVENOUS at 08:30

## 2025-07-08 RX ADMIN — FOSAPREPITANT 150 MG: 150 INJECTION, POWDER, LYOPHILIZED, FOR SOLUTION INTRAVENOUS at 09:17

## 2025-07-08 RX ADMIN — ATROPINE SULFATE 0.25 MG: 1 INJECTION, SOLUTION INTRAVENOUS at 10:01

## 2025-07-08 NOTE — PATIENT INSTRUCTIONS
Nutrition Rx & Recommendations:  Diet: High Calorie, High Protein (for high calorie foods see pages 52-53, and for high protein foods see pages 49-51 in your Eating Hints book)  Small, frequent meals/snacks may be easier to tolerate than 3 large daily meals.  Aim for 5-6 small meals per day (every 2-3 hours).  Include protein at all meals/snacks.  Include a variety of foods (as tolerated/allowed by diet).  Follow a Cancer Preventative Nutrition Pattern: colorful, plant-based, low-fat, avoid added sugars, limit alcohol, include high fiber foods, limit processed meats, limit red meat, choose lean protein sources, use low-fat cooking methods, balance calories with physical activity, avoid excessive weight gain throughout life.  Incorporate physical activity as able/allowed.  Stay hydrated by sipping fluids of choice/tolerance throughout the day.  Liquid nutrition may be better tolerated than solids at times.  Alter food choices and eating patterns to accommodate changing needs.  Refer to Eating Hints book for other meal/snack ideas and symptom management.  Weigh yourself regularly. If you notice weight loss, make an effort to increase your daily food/calorie intake. If you continue to notice loss after these efforts, reach out to your dietitian to establish a plan to stabilize weight.  Resume consistent intake of Ensure 1-2x daily if appetite/weight decrease.   Continue to monitor blood sugar: Foods with carbohydrates make your blood glucose level go up. Learning how to count carbohydrates can help you control your blood glucose levels. First, identify the foods you eat that contain carbohydrates.   Foods that commonly contain carbohydrates include: grains (breads, pasta, cereal, rice), fruit, starchy vegetables (potatoes, corn, peas), snack foods (chips, pretzels, crackers), dairy products, sweets/desserts.   Foods that do not usually contain carbohydrates: chicken, pork, beef, fish, seafood, eggs, tofu, cheese,  butter, sour cream, avocado, nuts, seeds, olives, mayonnaise, water, black coffee, unsweetened tea, and zero-calorie drinks. Vegetables with no or low carbohydrate include green beans, cauliflower, tomatoes, and onions.     Follow Up Plan: 8/5/25 during infusion  Recommend Referral to Other Providers: none at this time

## 2025-07-08 NOTE — PROGRESS NOTES
Abhishek Gimenez presents for 5-FU + Irinotecan, stating he finished antiobiotics on Sunday for a cold. Has been afebrile and is feeling better. Reached out to Danielle Stoddard RN, stated ok to proceed with treatment today. Port accessed and flushed with positive blood return. tolerated treatment well with no complications. Chemo ball connected to port, all clamps open to run. Double checked with Geronimo Boyce RN.     Abhishek Gimenez is aware of future appt on 7/10/25 at 11:30 am.     AVS declined.

## 2025-07-09 ENCOUNTER — OFFICE VISIT (OUTPATIENT)
Dept: PALLIATIVE MEDICINE | Facility: CLINIC | Age: 70
End: 2025-07-09
Payer: COMMERCIAL

## 2025-07-09 VITALS
HEIGHT: 70 IN | WEIGHT: 183 LBS | BODY MASS INDEX: 26.2 KG/M2 | OXYGEN SATURATION: 98 % | DIASTOLIC BLOOD PRESSURE: 72 MMHG | HEART RATE: 103 BPM | RESPIRATION RATE: 17 BRPM | TEMPERATURE: 97.9 F | SYSTOLIC BLOOD PRESSURE: 156 MMHG

## 2025-07-09 DIAGNOSIS — C25.9 ADENOCARCINOMA OF PANCREAS (HCC): Primary | ICD-10-CM

## 2025-07-09 DIAGNOSIS — Z71.89 COUNSELING REGARDING ADVANCE CARE PLANNING AND GOALS OF CARE: ICD-10-CM

## 2025-07-09 DIAGNOSIS — C25.9 ADENOCARCINOMA OF PANCREAS (HCC): ICD-10-CM

## 2025-07-09 DIAGNOSIS — Z79.899 MEDICAL MARIJUANA USE: ICD-10-CM

## 2025-07-09 DIAGNOSIS — N52.9 ERECTILE DYSFUNCTION, UNSPECIFIED ERECTILE DYSFUNCTION TYPE: ICD-10-CM

## 2025-07-09 DIAGNOSIS — Z51.5 PALLIATIVE CARE BY SPECIALIST: ICD-10-CM

## 2025-07-09 DIAGNOSIS — G89.3 CANCER-RELATED PAIN: ICD-10-CM

## 2025-07-09 PROCEDURE — 99214 OFFICE O/P EST MOD 30 MIN: CPT | Performed by: STUDENT IN AN ORGANIZED HEALTH CARE EDUCATION/TRAINING PROGRAM

## 2025-07-09 RX ORDER — ONDANSETRON 4 MG/1
4 TABLET, FILM COATED ORAL EVERY 8 HOURS PRN
Qty: 20 TABLET | Refills: 2 | Status: SHIPPED | OUTPATIENT
Start: 2025-07-09

## 2025-07-09 NOTE — ASSESSMENT & PLAN NOTE
Very well-controlled  Continue oxycodone IR 5 mg every 6 hours as needed (averaging 1 tablet every few days)    Normal bowel movements, but will continue to monitor for OIC

## 2025-07-09 NOTE — ASSESSMENT & PLAN NOTE
Diagnosed 8/2024, stage Ib  Remains on modified FOLFIRINOX chemotherapy (minus oxaliplatin due to significant neuropathy)    Tolerating treatments well  CA 19-9 17 5/9/2025, down from 67 1/14/2025  MRI abdomen 7/2/2025 reveals continue decrease of mass and tumor burden--> May be eligible for surgical oncology operation

## 2025-07-09 NOTE — PROGRESS NOTES
Name: Abhishek Gimenez      : 1955      MRN: [id number]  Encounter Provider: Hira Ward DO  Encounter Date: 2025   Encounter department: [facility] [place]  :  Assessment & Plan  Adenocarcinoma of pancreas (HCC)  Diagnosed 2024, stage Ib  Remains on modified FOLFIRINOX chemotherapy (minus oxaliplatin due to significant neuropathy)    Tolerating treatments well  CA 19-9 17 2025, down from 67 2025  MRI abdomen 2025 reveals continue decrease of mass and tumor burden--> May be eligible for surgical oncology operation       Cancer-related pain  Very well-controlled  Continue oxycodone IR 5 mg every 6 hours as needed (averaging 1 tablet every few days)    Normal bowel movements, but will continue to monitor for OIC       Medical marijuana use  For alleviating symptoms of insomnia, loss appetite, nausea, and cancer related pain  For certification done 10/16/2024  Patient ID  #[id number]   Certification #[id number]       Counseling regarding advance care planning and goals of care  Continue disease directed cares without limits  ACP documentation on file       Palliative care by specialist  Palliative diagnosis: Adenocarcinoma of the pancreas  PPS: 90%    Education/counseling provided on role/purpose of palliative care; benefits/risks of treatment options by our team reviewed; instructions for management and anticipatory guidance provided; supportive listening and presence provided; provided space for life review and whole person assessment    Follow-up planning: Recommending follow-up in approximately 3 months to early October to do medical marijuana re-certification           Decisional apparatus: Patient is competent on my exam today. If competence is lost, patient's substitute decision maker would default to Urvashi Gimenez (see ACP doc in chart) by [place] Act 169.   Advance Directive / Living Will / POLST: On file     PDMP Review: I have reviewed the patient's controlled  substance dispensing history in the Prescription Drug Monitoring Program in compliance with the [facility] regulations before prescribing any controlled substances.    History of Present Illness   Abhishek Gimenez is a 69 y.o. male who presents for follow-up related to diagnosis of adenocarcinoma the pancreas and symptom management.  He was last seen in the office 3/5/2025.  Since that appointment, he has continued to see his oncologist and undergo treatment with modified FOLFIRINOX.  Overall, he is doing well and has no physical complaints.  He continues to stay active outdoors and enjoys time with his family.  He went to [place] for vacation in April, and did a camping trip with his granddaughters in [place] in June.  He [place] family in September.  Cancer related pain is sparing and he uses oxycodone to great effect every few days.  Medical marijuana is also helpful for symptoms.    Medical History Reviewed by provider this encounter:  Tobacco  Allergies  Meds  Problems  Med Hx  Surg Hx  Fam Hx     .  Past Medical History   Past Medical History[1]  Past Surgical History[2]  Family History[3]   reports that he quit smoking about 43 years ago. His smoking use included cigarettes. He started smoking about 53 years ago. He has a 10 pack-year smoking history. He has been exposed to tobacco smoke. He has never used smokeless tobacco. He reports that he does not currently use alcohol. He reports current drug use. Drug: Marijuana.  Current Outpatient Medications   Medication Instructions    Alcohol Swabs 70 % PADS May substitute brand based on insurance coverage. Check glucose TID.    Benzocaine-Isopropyl Alcohol 6-70 % PADS Use    benzonatate (TESSALON) 200 mg, Oral, 3 times daily PRN    Blood Glucose Monitoring Suppl ([employer] Blood Glucose Meter) w/Device KIT Use    Blood Glucose Monitoring Suppl (OneTouch Verio Reflect) w/Device KIT Check blood sugars once daily. Please substitute  with appropriate alternative as covered by patient's insurance. Dx: E11.65    Continuous Blood Gluc  (FreeStyle Livia 2 [place]) LEANA 14 DAY READER NOT MADE    Continuous Glucose Sensor (FreeStyle Livia 14 Day Sensor) MISC 1 Device, Other, Every 14 days    fluorouracil 3,900 mg in CADD/Elastomeric Infusion Device 960 mg/m2/day, Intravenous, Over 46 hours    Glucose Blood (BLOOD GLUCOSE TEST STRIPS 333 VI) Use    glucose blood (OneTouch Verio) test strip Check blood sugars once daily. Please substitute with appropriate alternative as covered by patient's insurance. Dx: E11.65    HumaLOG Tarun KwikPen 10 Units, Subcutaneous, 3 times daily with meals    hydrocortisone (ANUSOL-HC) 25 mg, Rectal, Daily    insulin glargine (LANTUS) 20 Units, Subcutaneous, Daily at bedtime    Insulin Pen Needle (BD Pen Needle Mallory U/F) 32G X 4 MM MISC Use daily as directed with insulin pen    Lancets 33G MISC Use    Lantus SoloStar 20 Units, Subcutaneous, Daily at bedtime    lidocaine-prilocaine (EMLA) cream Topical, As needed, Apply to port area 45-60min prior to treatment and cover with dressing or plastic wrap    metFORMIN (GLUCOPHAGE-XR) 1,000 mg, Oral, Daily with breakfast    NON FORMULARY Medical marijuana    ondansetron (ZOFRAN) 4 mg, Oral, Every 8 hours PRN    OneTouch Delica Lancets 33G MISC Check blood sugars once daily. Please substitute with appropriate alternative as covered by patient's insurance. Dx: E11.65    oxyCODONE (ROXICODONE) 5 mg, Oral, Every 6 hours PRN    pantoprazole (PROTONIX) 40 mg, Oral, Daily before breakfast    Procto-Med HC 2.5 % rectal cream Topical, 2 times daily    rosuvastatin (CRESTOR) 5 mg, Oral, Every evening    sildenafil (VIAGRA) 100 mg, Oral, Daily PRN   Allergies[4]   Medications Ordered Prior to Encounter[5]   Social History[6]     Objective   /72 (BP Location: Left arm, Patient Position: Sitting, Cuff Size: Standard)   Pulse 103   Temp 97.9 °F (36.6 °C) (Temporal)   Resp 17    "Ht 5' 10\" (1.778 m)   Wt 83 kg (183 lb)   SpO2 98%   BMI 26.26 kg/m²     Physical Exam  Constitutional:       General: He is not in acute distress.     Appearance: He is not ill-appearing.   HENT:      Head: Normocephalic and atraumatic.      Right Ear: External ear normal.      Left Ear: External ear normal.      Nose: Nose normal.      Mouth/Throat:      Mouth: Mucous membranes are moist.      Pharynx: Oropharynx is clear.     Eyes:      General: No scleral icterus.     Conjunctiva/sclera: Conjunctivae normal.       Cardiovascular:      Rate and Rhythm: Normal rate and regular rhythm.      Pulses: Normal pulses.   Pulmonary:      Effort: Pulmonary effort is normal. No respiratory distress.   Abdominal:      General: There is no distension.      Palpations: Abdomen is soft.      Tenderness: There is no abdominal tenderness.     Musculoskeletal:      Cervical back: No rigidity or tenderness.      Right lower leg: No edema.      Left lower leg: No edema.   Lymphadenopathy:      Cervical: No cervical adenopathy.     Skin:     General: Skin is warm.      Capillary Refill: Capillary refill takes less than 2 seconds.      Coloration: Skin is not jaundiced or pale.     Neurological:      General: No focal deficit present.      Mental Status: He is alert. Mental status is at baseline.     Psychiatric:         Mood and Affect: Mood normal.         Behavior: Behavior normal.         Thought Content: Thought content normal.         Judgment: Judgment normal.         Recent labs:  Lab Results   Component Value Date/Time    SODIUM 141 07/07/2025 10:44 AM    SODIUM 143 02/10/2023 10:42 AM    K 3.9 07/07/2025 10:44 AM    K 4.3 02/10/2023 10:42 AM    BUN 17 07/07/2025 10:44 AM    BUN 21 02/10/2023 10:42 AM    CREATININE 0.81 07/07/2025 10:44 AM    CREATININE 0.83 11/09/2023 08:56 AM    GLUC 181 (H) 07/07/2025 10:44 AM    GLUC 96 02/10/2023 10:42 AM    CALCIUM 9.4 07/07/2025 10:44 AM    CALCIUM 9.5 03/15/2016 07:26 AM    AST 24 " 07/07/2025 10:44 AM    AST 25 11/09/2023 08:56 AM    ALT 31 07/07/2025 10:44 AM    ALT 47 11/09/2023 08:56 AM    ALB 4.5 07/07/2025 10:44 AM    ALB 4.3 11/09/2023 08:56 AM    TP 7.3 07/07/2025 10:44 AM    TP 7.2 11/09/2023 08:56 AM    EGFR 91 07/07/2025 10:44 AM    EGFR 97 11/09/2023 08:56 AM     Lab Results   Component Value Date/Time    HGB 12.6 07/07/2025 10:44 AM    WBC 11.11 (H) 07/07/2025 10:44 AM     (L) 07/07/2025 10:44 AM    INR 1.09 11/15/2024 09:17 AM     Lab Results   Component Value Date/Time    VIQ9OZUJJRTB 2.140 01/11/2023 10:16 AM    QBW8YDODEAFP 3.760 03/15/2016 07:26 AM       Recent Imaging:  Procedure: MRI abdomen w wo contrast and mrcp  Result Date: 7/7/2025  Impression: Known pancreatic body neoplasm is again difficult to be measured, given significant pancreatic body atrophy, but based on approximate measurements it is decreased in the size since 3/27/2025, with persistent vascular involvement as described in the body of the report There is apparent nonoccluding filling defect in the proximal aspect of the celiac trunk (series 12 image 73), new since March 2025. This is concerning for an intra-arterial thrombus versus artifact. CT abdomen and arterial and venous phase is recommended for better assessment Further decrease in the size of biopsy-proven right hepatic lobe metastatic lesion. No new metastatic lesions in the abdomen. Splenomegaly, mildly enlarged compared to 3/27/2025. The study was marked in EPIC for immediate notification. Workstation performed: [id number]     Procedure: MRI abdomen w wo contrast and mrcp  Result Date: 4/1/2025  Impression: Since July 10, 2024: 1.  Decreased size of the pancreatic tumor. Multiphase contrast-enhanced CT would be recommended to better assess for vascular involvement should surgery be considered. 2.  Decreased size of the hepatic metastasis. 3.  No new metastatic disease in the abdomen. Workstation performed: [id number]       Administrative  Statements   I have spent a total time of 30 minutes in caring for this patient on the day of the visit/encounter including Instructions for management, Patient and family education, Impressions, Counseling / Coordination of care, Documenting in the medical record, Reviewing/placing orders in the medical record (including tests, medications, and/or procedures), and Obtaining or reviewing history  .   Topics discussed with the patient / family include symptom assessment and management, medication review, psychosocial support, supportive listening, and anticipatory guidance.       [1]   Past Medical History:  Diagnosis Date    A-fib (HCC)     Allergic     Arthritis     BPH (benign prostatic hyperplasia)     Cancer (HCC)     Colon polyp     Diabetes mellitus (HCC)     Fatty liver     Fracture, foot     High cholesterol     Hyperlipidemia     Irregular heart beat     Kidney stone     Non-ischemic cardiomyopathy (HCC)     Osteoarthritis     Pancreatic cancer (HCC) 8/15/2024    Psoriasis     Psoriatic arthritis (HCC)     Pulmonary HTN (HCC)    [2]   Past Surgical History:  Procedure Laterality Date    CARDIAC ELECTROPHYSIOLOGY PROCEDURE N/A 02/22/2023    Procedure: Cardiac eps/afib ablation   comprehensive posterior wall;  Surgeon: Mynor Andrews MD;  Location: [facility];  Service: Cardiology    COLONOSCOPY      ELBOW SURGERY Right     FL GUIDED CENTRAL VENOUS ACCESS DEVICE INSERTION  09/05/2024    IR BIOPSY LIVER MASS  09/18/2024    IR BIOPSY LIVER MASS  11/15/2024    KIDNEY STONE SURGERY      LAPAROSCOPY N/A 09/05/2024    Procedure: DIAGNOSTIC LAPAROSCOPY;  Surgeon: Roz Faulkner MD;  Location: [facility];  Service: Surgical Oncology    PROSTATE SURGERY  2018    [place]    TRANSURETHRAL RESECTION OF PROSTATE      last assessed 7/21/15    TUNNELED VENOUS PORT PLACEMENT N/A 09/05/2024    Procedure: INSERTION VENOUS PORT (PORT-A-CATH);  Surgeon: Roz Faulkner MD;  Location: [facility];  Service: Surgical  Oncology   [3]   Family History  Problem Relation Name Age of Onset    Diabetes Mother Toshia     Diabetes type I Mother Toshia     Hypertension Father Oscar Gimenez     Brain cancer Father Oscar Gimenez     Diabetes Family     [4]   Allergies  Allergen Reactions    Penicillins Rash   [5]   Current Outpatient Medications on File Prior to Visit   Medication Sig Dispense Refill    Benzocaine-Isopropyl Alcohol 6-70 % PADS Use      benzonatate (TESSALON) 200 MG capsule Take 1 capsule (200 mg total) by mouth 3 (three) times a day as needed for cough for up to 10 days 30 capsule 0    Blood Glucose Monitoring Suppl ([employer]S Blood Glucose Meter) w/Device KIT Use      Blood Glucose Monitoring Suppl (OneTouch Verio Reflect) w/Device KIT Check blood sugars once daily. Please substitute with appropriate alternative as covered by patient's insurance. Dx: E11.65 1 kit 0    Continuous Blood Gluc  (FreeStyle Livia 2 [place]) LEANA 14 DAY READER NOT MADE 1 each 1    Continuous Glucose Sensor (FreeStyle Livia 14 Day Sensor) MISC Use 1 Device every 14 (fourteen) days 2 each 5    fluorouracil 3,900 mg in CADD/Elastomeric Infusion Device Infuse 3,900 mg (960 mg/m2/day x 2.03 m2) into a catheter in a vein via infusion device over 46 hours for 2 days  Infusion planned for July 8, 2025. 1 each 3    Glucose Blood (BLOOD GLUCOSE TEST STRIPS 333 VI) Use      glucose blood (OneTouch Verio) test strip Check blood sugars once daily. Please substitute with appropriate alternative as covered by patient's insurance. Dx: E11.65 100 each 3    hydrocortisone (ANUSOL-HC) 25 mg suppository INSERT 1 SUPPOSITORY (25 MG TOTAL) INTO THE RECTUM IN THE MORNING 14 suppository 2    insulin glargine (LANTUS) 100 units/mL subcutaneous injection Inject 20 Units under the skin daily at bedtime 6 mL 3    insulin lispro (HumaLOG Tarun KwikPen) 100 units/mL injection pen Inject 10 Units under the skin 3 (three) times a day with meals 30 mL 3    Insulin Pen  Needle (BD Pen Needle Mallory U/F) 32G X 4 MM MISC Use daily as directed with insulin pen 100 each 3    Lancets 33G MISC Use      Lantus SoloStar 100 units/mL SOPN INJECT 0.2 ML (20 UNITS TOTAL) UNDER THE SKIN DAILY AT BEDTIME 15 mL 0    lidocaine-prilocaine (EMLA) cream Apply topically as needed for mild pain Apply to port area 45-60min prior to treatment and cover with dressing or plastic wrap 30 g 1    metFORMIN (GLUCOPHAGE-XR) 500 mg 24 hr tablet Take 2 tablets (1,000 mg total) by mouth daily with breakfast 180 tablet 1    NON FORMULARY Medical marijuana      OneTouch Delica Lancets 33G MISC Check blood sugars once daily. Please substitute with appropriate alternative as covered by patient's insurance. Dx: E11.65 100 each 3    oxyCODONE (Roxicodone) 5 immediate release tablet Take 1 tablet (5 mg total) by mouth every 6 (six) hours as needed for moderate pain Max Daily Amount: 20 mg 90 tablet 0    pantoprazole (PROTONIX) 40 mg tablet Take 1 tablet (40 mg total) by mouth daily before breakfast 100 tablet 1    Procto-Med HC 2.5 % rectal cream APPLY TOPICALLY 2 (TWO) TIMES A DAY 28 g 2    rosuvastatin (CRESTOR) 5 mg tablet Take 1 tablet (5 mg total) by mouth every evening 100 tablet 1    sildenafil (VIAGRA) 100 mg tablet Take 1 tablet (100 mg total) by mouth daily as needed for erectile dysfunction 10 tablet 3    [DISCONTINUED] ondansetron (ZOFRAN) 4 mg tablet Take 1 tablet (4 mg total) by mouth every 8 (eight) hours as needed for nausea or vomiting 20 tablet 0    Alcohol Swabs 70 % PADS May substitute brand based on insurance coverage. Check glucose TID. (Patient not taking: Reported on 9/23/2024) 100 each 0     Current Facility-Administered Medications on File Prior to Visit   Medication Dose Route Frequency Provider Last Rate Last Admin    alteplase (CATHFLO) injection 2 mg  2 mg Intracatheter Q1MIN PRN Butch Peña MD        alteplase (CATHFLO) injection 2 mg  2 mg Intracatheter Q1MIN PRN Butch Peña MD         Atropine Sulfate injection 0.25 mg  0.25 mg Intravenous Once PRN Butch Peña MD        [COMPLETED] dextrose 5 % infusion  20 mL/hr Intravenous Once Butch Peña MD   Stopped at 25 1142    [COMPLETED] irinotecan (CAMPTOSAR) 244 mg in dextrose 5 % 500 mL chemo infusion  120 mg/m2 (Treatment Plan Recorded) Intravenous Once Butch Peña MD   Stopped at 25 1139    sodium chloride 0.9 % infusion  20 mL/hr Intravenous Once PRN Butch Peña MD   Stopped at 25 1004   [6]   Social History  Tobacco Use    Smoking status: Former     Current packs/day: 0.00     Average packs/day: 0.5 packs/day for 20.0 years (10.0 ttl pk-yrs)     Types: Cigarettes     Start date: 1971     Quit date: 1981     Years since quittin.6     Passive exposure: Past    Smokeless tobacco: Never   Vaping Use    Vaping status: Never Used   Substance and Sexual Activity    Alcohol use: Not Currently     Comment: 1 drink daily ( pt stopped drinking in July); rarely    Drug use: Yes     Types: Marijuana     Comment: medical marijuana tincture last taken 24    Sexual activity: Yes     Partners: Female

## 2025-07-09 NOTE — ASSESSMENT & PLAN NOTE
For alleviating symptoms of insomnia, loss appetite, nausea, and cancer related pain  For certification done 10/16/2024  Patient ID  #[id number]   Certification #[id number]

## 2025-07-10 ENCOUNTER — HOSPITAL ENCOUNTER (OUTPATIENT)
Dept: INFUSION CENTER | Facility: CLINIC | Age: 70
Discharge: HOME | End: 2025-07-10
Attending: INTERNAL MEDICINE
Payer: COMMERCIAL

## 2025-07-10 DIAGNOSIS — C25.9 ADENOCARCINOMA OF PANCREAS (HCC): ICD-10-CM

## 2025-07-10 DIAGNOSIS — T45.1X5A CHEMOTHERAPY INDUCED NEUTROPENIA (HCC): Primary | ICD-10-CM

## 2025-07-10 DIAGNOSIS — D70.1 CHEMOTHERAPY INDUCED NEUTROPENIA (HCC): Primary | ICD-10-CM

## 2025-07-10 PROCEDURE — 96372 THER/PROPH/DIAG INJ SC/IM: CPT

## 2025-07-10 RX ORDER — SILDENAFIL 100 MG/1
100 TABLET, FILM COATED ORAL DAILY PRN
Qty: 10 TABLET | Refills: 1 | Status: SHIPPED | OUTPATIENT
Start: 2025-07-10

## 2025-07-10 RX ADMIN — PEGFILGRASTIM 6 MG: KIT SUBCUTANEOUS at 10:54

## 2025-07-10 NOTE — PROGRESS NOTES
Patient presents for CADD pump disconnection. Pump deflated upon assessment. Port flushed and de-accessed with positive blood return.Neulasta Onpro attached to L abdomen. Verified with blinking green light. Pt aware of disconnect date and time.  AVS declined, aware of next appointment 7/21/25 at 2 pm.

## 2025-07-11 ENCOUNTER — HOSPITAL ENCOUNTER (OUTPATIENT)
Dept: INFUSION CENTER | Facility: CLINIC | Age: 70
End: 2025-07-11
Attending: INTERNAL MEDICINE
Payer: COMMERCIAL

## 2025-07-11 ENCOUNTER — TELEPHONE (OUTPATIENT)
Dept: INFUSION CENTER | Facility: CLINIC | Age: 70
End: 2025-07-11

## 2025-07-11 ENCOUNTER — TELEPHONE (OUTPATIENT)
Age: 70
End: 2025-07-11

## 2025-07-11 ENCOUNTER — RESULTS FOLLOW-UP (OUTPATIENT)
Dept: HEMATOLOGY ONCOLOGY | Facility: CLINIC | Age: 70
End: 2025-07-11

## 2025-07-11 ENCOUNTER — TELEPHONE (OUTPATIENT)
Dept: HEMATOLOGY ONCOLOGY | Facility: CLINIC | Age: 70
End: 2025-07-11

## 2025-07-11 ENCOUNTER — HOSPITAL ENCOUNTER (OUTPATIENT)
Dept: CT IMAGING | Facility: HOSPITAL | Age: 70
End: 2025-07-11
Attending: INTERNAL MEDICINE
Payer: COMMERCIAL

## 2025-07-11 DIAGNOSIS — R93.5 ABNORMAL MRI OF ABDOMEN: ICD-10-CM

## 2025-07-11 DIAGNOSIS — C25.9 ADENOCARCINOMA OF PANCREAS (HCC): Primary | ICD-10-CM

## 2025-07-11 PROCEDURE — 96372 THER/PROPH/DIAG INJ SC/IM: CPT

## 2025-07-11 PROCEDURE — 74177 CT ABD & PELVIS W/CONTRAST: CPT

## 2025-07-11 RX ADMIN — PEGFILGRASTIM 6 MG: 6 INJECTION SUBCUTANEOUS at 14:02

## 2025-07-11 RX ADMIN — IOHEXOL 100 ML: 350 INJECTION, SOLUTION INTRAVENOUS at 09:28

## 2025-07-11 NOTE — TELEPHONE ENCOUNTER
Noted- will await phone call from CT if device needs to be removed- will have to go to infusion to get removed if that is case.

## 2025-07-11 NOTE — TELEPHONE ENCOUNTER
----- Message from Butch Peña MD sent at 7/11/2025  3:00 PM EDT -----  Please call the patient and let him know that CT scan did not show any blood clot in the celiac trunk. thanks  ----- Message -----  From: Interface, Radiology Results In  Sent: 7/11/2025  10:20 AM EDT  To: Butch Peña MD

## 2025-07-11 NOTE — PROGRESS NOTES
Pt to clinic for Neulasta injection. Pt offers no complaints. Tolerated injection in L arm without complications. AVS declined. Pt aware of next appointment on 7/21/25 at 1400.

## 2025-07-11 NOTE — TELEPHONE ENCOUNTER
Attempted calling patient and left message making him aware process was started to get the injection for his neulasta for today since onpro was removed. Made him aware to expect a call from infusion dept  Called and spoke with Sylvie GAYLE in infusion and made her ware of situation. They will place pt on the schedule and call him.

## 2025-07-11 NOTE — TELEPHONE ENCOUNTER
Received a phone call from patient.  Patient stated that he is scheduled for CT scan today at 9:00 AM.  Patient has Neulasta OnPro on abdomen and inquiring if it will need to be removed for scan.  Patient stated that the medicine will administer from 2-3 PM today.  Instructed patient to call office if OnPro needed to be removed for CT scan.  Patient verbalized understanding.

## 2025-07-11 NOTE — TELEPHONE ENCOUNTER
LVM for Abhishek that he is scheduled today at 2pm to receive neulasta injection, as onpro device was removed before CT (prior to delivery of drug from device)

## 2025-07-11 NOTE — TELEPHONE ENCOUNTER
MO  Pt had onpro on but went for CT scan today and the onpro was removed.   Can he please be authed for Neulasta to be given today and placed on the schedule for when he was due to receive.

## 2025-07-11 NOTE — TELEPHONE ENCOUNTER
Patient returned phone call and reports they had to take his neulasta patch off and is inquiring if he should head back to the infusion center later, for an injection or new patch. I confirmed with him we would inquire and return his call. He has no additional questions or concerns at this time.

## 2025-07-11 NOTE — TELEPHONE ENCOUNTER
Called and spoke with patient and provided communication from Dr Peña to make him aware  that CT scan did not show any blood clot in the celiac trunk. Pt verbalizes understanding

## 2025-07-14 DIAGNOSIS — E11.65 TYPE 2 DIABETES MELLITUS WITH HYPERGLYCEMIA, WITHOUT LONG-TERM CURRENT USE OF INSULIN (HCC): ICD-10-CM

## 2025-07-14 RX ORDER — FLASH GLUCOSE SENSOR
1 KIT MISCELLANEOUS
Qty: 6 EACH | Refills: 1 | Status: SHIPPED | OUTPATIENT
Start: 2025-07-14

## 2025-07-16 DIAGNOSIS — C25.9 ADENOCARCINOMA OF PANCREAS (HCC): Primary | ICD-10-CM

## 2025-07-16 RX ORDER — ATROPINE SULFATE 1 MG/ML
0.25 INJECTION, SOLUTION INTRAVENOUS ONCE AS NEEDED
Status: CANCELLED | OUTPATIENT
Start: 2025-07-22

## 2025-07-16 RX ORDER — DEXTROSE MONOHYDRATE 50 MG/ML
20 INJECTION, SOLUTION INTRAVENOUS ONCE
Status: CANCELLED | OUTPATIENT
Start: 2025-07-22

## 2025-07-16 RX ORDER — SODIUM CHLORIDE 9 MG/ML
20 INJECTION, SOLUTION INTRAVENOUS ONCE AS NEEDED
Status: CANCELLED | OUTPATIENT
Start: 2025-07-22

## 2025-07-16 RX ORDER — DIPHENHYDRAMINE HYDROCHLORIDE 50 MG/ML
25 INJECTION, SOLUTION INTRAMUSCULAR; INTRAVENOUS ONCE
Status: CANCELLED
Start: 2025-07-22 | End: 2025-07-22

## 2025-07-16 RX ORDER — ATROPINE SULFATE 1 MG/ML
0.25 INJECTION, SOLUTION INTRAVENOUS ONCE
Status: CANCELLED | OUTPATIENT
Start: 2025-07-22

## 2025-07-17 DIAGNOSIS — C25.9 ADENOCARCINOMA OF PANCREAS (HCC): Primary | ICD-10-CM

## 2025-07-21 ENCOUNTER — HOSPITAL ENCOUNTER (OUTPATIENT)
Dept: INFUSION CENTER | Facility: CLINIC | Age: 70
Discharge: HOME | End: 2025-07-21
Payer: COMMERCIAL

## 2025-07-21 DIAGNOSIS — Z95.828 PORT-A-CATH IN PLACE: Primary | ICD-10-CM

## 2025-07-21 DIAGNOSIS — C25.9 ADENOCARCINOMA OF PANCREAS (HCC): ICD-10-CM

## 2025-07-21 LAB
ALBUMIN SERPL BCG-MCNC: 4.6 G/DL (ref 3.5–5)
ALP SERPL-CCNC: 167 U/L (ref 34–104)
ALT SERPL W P-5'-P-CCNC: 34 U/L (ref 7–52)
ANION GAP SERPL CALCULATED.3IONS-SCNC: 8 MMOL/L (ref 4–13)
AST SERPL W P-5'-P-CCNC: 25 U/L (ref 13–39)
BASOPHILS # BLD MANUAL: 0 THOUSAND/UL (ref 0–0.1)
BASOPHILS NFR MAR MANUAL: 0 % (ref 0–1)
BILIRUB SERPL-MCNC: 0.55 MG/DL (ref 0.2–1)
BUN SERPL-MCNC: 18 MG/DL (ref 5–25)
CALCIUM SERPL-MCNC: 9.7 MG/DL (ref 8.4–10.2)
CHLORIDE SERPL-SCNC: 105 MMOL/L (ref 96–108)
CO2 SERPL-SCNC: 29 MMOL/L (ref 21–32)
CREAT SERPL-MCNC: 0.99 MG/DL (ref 0.6–1.3)
EOSINOPHIL # BLD MANUAL: 0 THOUSAND/UL (ref 0–0.4)
EOSINOPHIL NFR BLD MANUAL: 0 % (ref 0–6)
ERYTHROCYTE [DISTWIDTH] IN BLOOD BY AUTOMATED COUNT: 14.5 % (ref 11.6–15.1)
GFR SERPL CREATININE-BSD FRML MDRD: 78 ML/MIN/1.73SQ M
GLUCOSE SERPL-MCNC: 131 MG/DL (ref 65–140)
HCT VFR BLD AUTO: 38 % (ref 36.5–49.3)
HGB BLD-MCNC: 12.8 G/DL (ref 12–17)
LYMPHOCYTES # BLD AUTO: 1.62 THOUSAND/UL (ref 0.6–4.47)
LYMPHOCYTES # BLD AUTO: 11 % (ref 14–44)
MCH RBC QN AUTO: 31.8 PG (ref 26.8–34.3)
MCHC RBC AUTO-ENTMCNC: 33.7 G/DL (ref 31.4–37.4)
MCV RBC AUTO: 95 FL (ref 82–98)
MONOCYTES # BLD AUTO: 0.88 THOUSAND/UL (ref 0–1.22)
MONOCYTES NFR BLD: 6 % (ref 4–12)
NEUTROPHILS # BLD MANUAL: 12.2 THOUSAND/UL (ref 1.85–7.62)
NEUTS BAND NFR BLD MANUAL: 1 % (ref 0–8)
NEUTS SEG NFR BLD AUTO: 82 % (ref 43–75)
PLATELET # BLD AUTO: 138 THOUSANDS/UL (ref 149–390)
PLATELET BLD QL SMEAR: ABNORMAL
PMV BLD AUTO: 10.4 FL (ref 8.9–12.7)
POLYCHROMASIA BLD QL SMEAR: PRESENT
POTASSIUM SERPL-SCNC: 4 MMOL/L (ref 3.5–5.3)
PROT SERPL-MCNC: 7.1 G/DL (ref 6.4–8.4)
RBC # BLD AUTO: 4.02 MILLION/UL (ref 3.88–5.62)
RBC MORPH BLD: PRESENT
SODIUM SERPL-SCNC: 142 MMOL/L (ref 135–147)
TOXIC GRANULES BLD QL SMEAR: PRESENT
WBC # BLD AUTO: 14.7 THOUSAND/UL (ref 4.31–10.16)

## 2025-07-21 PROCEDURE — 85007 BL SMEAR W/DIFF WBC COUNT: CPT | Performed by: INTERNAL MEDICINE

## 2025-07-21 PROCEDURE — 80053 COMPREHEN METABOLIC PANEL: CPT | Performed by: INTERNAL MEDICINE

## 2025-07-21 PROCEDURE — 85027 COMPLETE CBC AUTOMATED: CPT | Performed by: INTERNAL MEDICINE

## 2025-07-21 NOTE — PROGRESS NOTES
Patient arrives to infusion center for port maintenance with labs. Patient offers no complaints. Port accessed, labs collected, flush performed. Port de-accessed, AVS offered.     Next appointment: 7/22/25 @ 1200

## 2025-07-22 ENCOUNTER — HOSPITAL ENCOUNTER (OUTPATIENT)
Dept: INFUSION CENTER | Facility: CLINIC | Age: 70
Discharge: HOME | End: 2025-07-22
Attending: INTERNAL MEDICINE
Payer: COMMERCIAL

## 2025-07-22 VITALS
BODY MASS INDEX: 25.7 KG/M2 | SYSTOLIC BLOOD PRESSURE: 155 MMHG | HEART RATE: 58 BPM | HEIGHT: 71 IN | TEMPERATURE: 97.2 F | RESPIRATION RATE: 18 BRPM | DIASTOLIC BLOOD PRESSURE: 78 MMHG | WEIGHT: 183.6 LBS

## 2025-07-22 DIAGNOSIS — C25.9 ADENOCARCINOMA OF PANCREAS (HCC): Primary | ICD-10-CM

## 2025-07-22 PROCEDURE — 96367 TX/PROPH/DG ADDL SEQ IV INF: CPT

## 2025-07-22 PROCEDURE — 96413 CHEMO IV INFUSION 1 HR: CPT

## 2025-07-22 PROCEDURE — G0498 CHEMO EXTEND IV INFUS W/PUMP: HCPCS

## 2025-07-22 PROCEDURE — 96375 TX/PRO/DX INJ NEW DRUG ADDON: CPT

## 2025-07-22 RX ORDER — SODIUM CHLORIDE 9 MG/ML
20 INJECTION, SOLUTION INTRAVENOUS ONCE AS NEEDED
Status: DISCONTINUED | OUTPATIENT
Start: 2025-07-22 | End: 2025-07-25 | Stop reason: HOSPADM

## 2025-07-22 RX ORDER — DEXTROSE MONOHYDRATE 50 MG/ML
20 INJECTION, SOLUTION INTRAVENOUS ONCE
Status: COMPLETED | OUTPATIENT
Start: 2025-07-22 | End: 2025-07-22

## 2025-07-22 RX ORDER — ATROPINE SULFATE 1 MG/ML
0.25 INJECTION, SOLUTION INTRAVENOUS ONCE
Status: COMPLETED | OUTPATIENT
Start: 2025-07-22 | End: 2025-07-22

## 2025-07-22 RX ORDER — ATROPINE SULFATE 1 MG/ML
0.25 INJECTION, SOLUTION INTRAVENOUS ONCE AS NEEDED
Status: DISCONTINUED | OUTPATIENT
Start: 2025-07-22 | End: 2025-07-25 | Stop reason: HOSPADM

## 2025-07-22 RX ORDER — DIPHENHYDRAMINE HYDROCHLORIDE 50 MG/ML
25 INJECTION, SOLUTION INTRAMUSCULAR; INTRAVENOUS ONCE
Status: COMPLETED | OUTPATIENT
Start: 2025-07-22 | End: 2025-07-22

## 2025-07-22 RX ADMIN — DEXAMETHASONE SODIUM PHOSPHATE: 10 INJECTION, SOLUTION INTRAMUSCULAR; INTRAVENOUS at 12:18

## 2025-07-22 RX ADMIN — SODIUM CHLORIDE 20 ML/HR: 0.9 INJECTION, SOLUTION INTRAVENOUS at 12:18

## 2025-07-22 RX ADMIN — DIPHENHYDRAMINE HYDROCHLORIDE 25 MG: 50 INJECTION INTRAMUSCULAR; INTRAVENOUS at 13:20

## 2025-07-22 RX ADMIN — FOSAPREPITANT 150 MG: 150 INJECTION, POWDER, LYOPHILIZED, FOR SOLUTION INTRAVENOUS at 12:41

## 2025-07-22 RX ADMIN — DEXTROSE 20 ML/HR: 5 SOLUTION INTRAVENOUS at 13:28

## 2025-07-22 RX ADMIN — ATROPINE SULFATE 0.25 MG: 1 INJECTION, SOLUTION INTRAVENOUS at 13:23

## 2025-07-22 RX ADMIN — IRINOTECAN HYDROCHLORIDE 241 MG: 20 INJECTION, SOLUTION INTRAVENOUS at 13:31

## 2025-07-22 NOTE — PROGRESS NOTES
Pt to clinic for Irinotecan + 5FU pump. Pt reports headaches and intermittent diarrhea controlled by PO meds today. Tolerated infusion without complications.   Chemo ball connected to pt port with all clamps open to run. Aware of next appointment on 7/24/25 at 1330. AVS declined.

## 2025-07-24 ENCOUNTER — HOSPITAL ENCOUNTER (OUTPATIENT)
Dept: INFUSION CENTER | Facility: CLINIC | Age: 70
Discharge: HOME | End: 2025-07-24
Attending: INTERNAL MEDICINE
Payer: COMMERCIAL

## 2025-07-24 DIAGNOSIS — C25.9 ADENOCARCINOMA OF PANCREAS (HCC): ICD-10-CM

## 2025-07-24 DIAGNOSIS — D70.1 CHEMOTHERAPY INDUCED NEUTROPENIA (HCC): Primary | ICD-10-CM

## 2025-07-24 DIAGNOSIS — T45.1X5A CHEMOTHERAPY INDUCED NEUTROPENIA (HCC): Primary | ICD-10-CM

## 2025-07-24 PROCEDURE — 96372 THER/PROPH/DIAG INJ SC/IM: CPT

## 2025-07-24 RX ADMIN — PEGFILGRASTIM 6 MG: KIT SUBCUTANEOUS at 13:57

## 2025-07-27 RX ORDER — ATROPINE SULFATE 1 MG/ML
0.25 INJECTION, SOLUTION INTRAVENOUS ONCE AS NEEDED
Status: CANCELLED | OUTPATIENT
Start: 2025-08-05

## 2025-07-27 RX ORDER — DIPHENHYDRAMINE HYDROCHLORIDE 50 MG/ML
25 INJECTION, SOLUTION INTRAMUSCULAR; INTRAVENOUS ONCE
Status: CANCELLED
Start: 2025-08-05 | End: 2025-08-05

## 2025-07-27 RX ORDER — ATROPINE SULFATE 1 MG/ML
0.25 INJECTION, SOLUTION INTRAVENOUS ONCE
Status: CANCELLED | OUTPATIENT
Start: 2025-08-05

## 2025-07-27 RX ORDER — DEXTROSE MONOHYDRATE 50 MG/ML
20 INJECTION, SOLUTION INTRAVENOUS ONCE
Status: CANCELLED | OUTPATIENT
Start: 2025-08-05

## 2025-07-27 RX ORDER — SODIUM CHLORIDE 9 MG/ML
20 INJECTION, SOLUTION INTRAVENOUS ONCE AS NEEDED
Status: CANCELLED | OUTPATIENT
Start: 2025-08-05

## 2025-07-29 DIAGNOSIS — C25.9 ADENOCARCINOMA OF PANCREAS (HCC): Primary | ICD-10-CM

## 2025-08-04 ENCOUNTER — APPOINTMENT (OUTPATIENT)
Dept: LAB | Facility: HOSPITAL | Age: 70
End: 2025-08-04
Payer: COMMERCIAL

## 2025-08-04 ENCOUNTER — HOSPITAL ENCOUNTER (OUTPATIENT)
Dept: INFUSION CENTER | Facility: CLINIC | Age: 70
Discharge: HOME | End: 2025-08-04

## 2025-08-04 DIAGNOSIS — C25.9 ADENOCARCINOMA OF PANCREAS (HCC): ICD-10-CM

## 2025-08-04 DIAGNOSIS — E11.65 TYPE 2 DIABETES MELLITUS WITH HYPERGLYCEMIA, WITHOUT LONG-TERM CURRENT USE OF INSULIN (HCC): ICD-10-CM

## 2025-08-04 LAB
ALBUMIN SERPL BCG-MCNC: 4.4 G/DL (ref 3.5–5)
ALP SERPL-CCNC: 174 U/L (ref 34–104)
ALT SERPL W P-5'-P-CCNC: 39 U/L (ref 7–52)
ANION GAP SERPL CALCULATED.3IONS-SCNC: 7 MMOL/L (ref 4–13)
AST SERPL W P-5'-P-CCNC: 31 U/L (ref 13–39)
BASOPHILS # BLD AUTO: 0.04 THOUSANDS/ÂΜL (ref 0–0.1)
BASOPHILS NFR BLD AUTO: 1 % (ref 0–1)
BILIRUB SERPL-MCNC: 0.56 MG/DL (ref 0.2–1)
BUN SERPL-MCNC: 18 MG/DL (ref 5–25)
CALCIUM SERPL-MCNC: 9.1 MG/DL (ref 8.4–10.2)
CHLORIDE SERPL-SCNC: 101 MMOL/L (ref 96–108)
CO2 SERPL-SCNC: 29 MMOL/L (ref 21–32)
CREAT SERPL-MCNC: 0.8 MG/DL (ref 0.6–1.3)
EOSINOPHIL # BLD AUTO: 0.17 THOUSAND/ÂΜL (ref 0–0.61)
EOSINOPHIL NFR BLD AUTO: 2 % (ref 0–6)
ERYTHROCYTE [DISTWIDTH] IN BLOOD BY AUTOMATED COUNT: 14.5 % (ref 11.6–15.1)
GFR SERPL CREATININE-BSD FRML MDRD: 92 ML/MIN/1.73SQ M
GLUCOSE P FAST SERPL-MCNC: 301 MG/DL (ref 65–99)
HCT VFR BLD AUTO: 37.7 % (ref 36.5–49.3)
HGB BLD-MCNC: 12.4 G/DL (ref 12–17)
IMM GRANULOCYTES # BLD AUTO: 0.04 THOUSAND/UL (ref 0–0.2)
IMM GRANULOCYTES NFR BLD AUTO: 1 % (ref 0–2)
LYMPHOCYTES # BLD AUTO: 0.86 THOUSANDS/ÂΜL (ref 0.6–4.47)
LYMPHOCYTES NFR BLD AUTO: 12 % (ref 14–44)
MCH RBC QN AUTO: 31.6 PG (ref 26.8–34.3)
MCHC RBC AUTO-ENTMCNC: 32.9 G/DL (ref 31.4–37.4)
MCV RBC AUTO: 96 FL (ref 82–98)
MONOCYTES # BLD AUTO: 0.79 THOUSAND/ÂΜL (ref 0.17–1.22)
MONOCYTES NFR BLD AUTO: 11 % (ref 4–12)
NEUTROPHILS # BLD AUTO: 5.45 THOUSANDS/ÂΜL (ref 1.85–7.62)
NEUTS SEG NFR BLD AUTO: 73 % (ref 43–75)
NRBC BLD AUTO-RTO: 0 /100 WBCS
PLATELET # BLD AUTO: 120 THOUSANDS/UL (ref 149–390)
PMV BLD AUTO: 10.3 FL (ref 8.9–12.7)
POTASSIUM SERPL-SCNC: 4.1 MMOL/L (ref 3.5–5.3)
PROT SERPL-MCNC: 6.6 G/DL (ref 6.4–8.4)
RBC # BLD AUTO: 3.93 MILLION/UL (ref 3.88–5.62)
SODIUM SERPL-SCNC: 137 MMOL/L (ref 135–147)
WBC # BLD AUTO: 7.35 THOUSAND/UL (ref 4.31–10.16)

## 2025-08-04 PROCEDURE — 36415 COLL VENOUS BLD VENIPUNCTURE: CPT

## 2025-08-04 PROCEDURE — 85025 COMPLETE CBC W/AUTO DIFF WBC: CPT

## 2025-08-04 PROCEDURE — 80053 COMPREHEN METABOLIC PANEL: CPT

## 2025-08-05 ENCOUNTER — NUTRITION (OUTPATIENT)
Dept: NUTRITION | Facility: CLINIC | Age: 70
End: 2025-08-05

## 2025-08-05 ENCOUNTER — HOSPITAL ENCOUNTER (OUTPATIENT)
Dept: INFUSION CENTER | Facility: CLINIC | Age: 70
Discharge: HOME | End: 2025-08-05
Attending: INTERNAL MEDICINE
Payer: COMMERCIAL

## 2025-08-05 VITALS
SYSTOLIC BLOOD PRESSURE: 154 MMHG | WEIGHT: 183.2 LBS | BODY MASS INDEX: 25.65 KG/M2 | TEMPERATURE: 97.7 F | HEIGHT: 71 IN | HEART RATE: 54 BPM | RESPIRATION RATE: 18 BRPM | DIASTOLIC BLOOD PRESSURE: 80 MMHG

## 2025-08-05 DIAGNOSIS — Z71.3 NUTRITIONAL COUNSELING: Primary | ICD-10-CM

## 2025-08-05 DIAGNOSIS — C25.9 ADENOCARCINOMA OF PANCREAS (HCC): Primary | ICD-10-CM

## 2025-08-05 PROCEDURE — 96367 TX/PROPH/DG ADDL SEQ IV INF: CPT

## 2025-08-05 PROCEDURE — 96375 TX/PRO/DX INJ NEW DRUG ADDON: CPT

## 2025-08-05 PROCEDURE — G0498 CHEMO EXTEND IV INFUS W/PUMP: HCPCS

## 2025-08-05 PROCEDURE — 96413 CHEMO IV INFUSION 1 HR: CPT

## 2025-08-05 RX ORDER — ATROPINE SULFATE 1 MG/ML
0.25 INJECTION, SOLUTION INTRAVENOUS ONCE
Status: COMPLETED | OUTPATIENT
Start: 2025-08-05 | End: 2025-08-05

## 2025-08-05 RX ORDER — ATROPINE SULFATE 1 MG/ML
0.25 INJECTION, SOLUTION INTRAVENOUS ONCE AS NEEDED
Status: DISCONTINUED | OUTPATIENT
Start: 2025-08-05 | End: 2025-08-08 | Stop reason: HOSPADM

## 2025-08-05 RX ORDER — METFORMIN HYDROCHLORIDE 500 MG/1
1000 TABLET, EXTENDED RELEASE ORAL
Qty: 180 TABLET | Refills: 1 | Status: SHIPPED | OUTPATIENT
Start: 2025-08-05

## 2025-08-05 RX ORDER — SODIUM CHLORIDE 9 MG/ML
20 INJECTION, SOLUTION INTRAVENOUS ONCE AS NEEDED
Status: DISCONTINUED | OUTPATIENT
Start: 2025-08-05 | End: 2025-08-08 | Stop reason: HOSPADM

## 2025-08-05 RX ORDER — DEXTROSE MONOHYDRATE 50 MG/ML
20 INJECTION, SOLUTION INTRAVENOUS ONCE
Status: DISCONTINUED | OUTPATIENT
Start: 2025-08-05 | End: 2025-08-08 | Stop reason: HOSPADM

## 2025-08-05 RX ORDER — DIPHENHYDRAMINE HYDROCHLORIDE 50 MG/ML
25 INJECTION, SOLUTION INTRAMUSCULAR; INTRAVENOUS ONCE
Status: COMPLETED | OUTPATIENT
Start: 2025-08-05 | End: 2025-08-05

## 2025-08-05 RX ADMIN — FOSAPREPITANT 150 MG: 150 INJECTION, POWDER, LYOPHILIZED, FOR SOLUTION INTRAVENOUS at 09:57

## 2025-08-05 RX ADMIN — DEXAMETHASONE SODIUM PHOSPHATE: 10 INJECTION, SOLUTION INTRAMUSCULAR; INTRAVENOUS at 09:27

## 2025-08-05 RX ADMIN — DIPHENHYDRAMINE HYDROCHLORIDE 25 MG: 50 INJECTION INTRAMUSCULAR; INTRAVENOUS at 09:55

## 2025-08-05 RX ADMIN — SODIUM CHLORIDE 20 ML/HR: 9 INJECTION, SOLUTION INTRAVENOUS at 09:25

## 2025-08-05 RX ADMIN — ATROPINE SULFATE 0.25 MG: 1 INJECTION, SOLUTION INTRAVENOUS at 10:34

## 2025-08-05 RX ADMIN — IRINOTECAN HYDROCHLORIDE 241 MG: 20 INJECTION INTRAVENOUS at 10:40

## 2025-08-06 DIAGNOSIS — C25.9 ADENOCARCINOMA OF PANCREAS (HCC): Primary | ICD-10-CM

## 2025-08-07 ENCOUNTER — HOSPITAL ENCOUNTER (OUTPATIENT)
Dept: INFUSION CENTER | Facility: CLINIC | Age: 70
Discharge: HOME | End: 2025-08-07
Attending: INTERNAL MEDICINE
Payer: COMMERCIAL

## 2025-08-11 ENCOUNTER — TELEPHONE (OUTPATIENT)
Age: 70
End: 2025-08-11

## 2025-08-11 ENCOUNTER — TELEPHONE (OUTPATIENT)
Dept: HEMATOLOGY ONCOLOGY | Facility: CLINIC | Age: 70
End: 2025-08-11

## 2025-08-18 ENCOUNTER — HOSPITAL ENCOUNTER (OUTPATIENT)
Dept: INFUSION CENTER | Facility: CLINIC | Age: 70
Discharge: HOME | End: 2025-08-18
Payer: COMMERCIAL

## 2025-08-18 DIAGNOSIS — C25.9 ADENOCARCINOMA OF PANCREAS (HCC): ICD-10-CM

## 2025-08-18 DIAGNOSIS — Z95.828 PORT-A-CATH IN PLACE: Primary | ICD-10-CM

## 2025-08-18 LAB
ALBUMIN SERPL BCG-MCNC: 4.5 G/DL (ref 3.5–5)
ALP SERPL-CCNC: 174 U/L (ref 34–104)
ALT SERPL W P-5'-P-CCNC: 33 U/L (ref 7–52)
ANION GAP SERPL CALCULATED.3IONS-SCNC: 7 MMOL/L (ref 4–13)
AST SERPL W P-5'-P-CCNC: 21 U/L (ref 13–39)
BASOPHILS # BLD AUTO: 0.04 THOUSANDS/ÂΜL (ref 0–0.1)
BASOPHILS NFR BLD AUTO: 0 % (ref 0–1)
BILIRUB SERPL-MCNC: 0.61 MG/DL (ref 0.2–1)
BUN SERPL-MCNC: 17 MG/DL (ref 5–25)
CALCIUM SERPL-MCNC: 9.6 MG/DL (ref 8.4–10.2)
CHLORIDE SERPL-SCNC: 103 MMOL/L (ref 96–108)
CO2 SERPL-SCNC: 31 MMOL/L (ref 21–32)
CREAT SERPL-MCNC: 1.08 MG/DL (ref 0.6–1.3)
EOSINOPHIL # BLD AUTO: 0.14 THOUSAND/ÂΜL (ref 0–0.61)
EOSINOPHIL NFR BLD AUTO: 1 % (ref 0–6)
ERYTHROCYTE [DISTWIDTH] IN BLOOD BY AUTOMATED COUNT: 14.1 % (ref 11.6–15.1)
GFR SERPL CREATININE-BSD FRML MDRD: 70 ML/MIN/1.73SQ M
GLUCOSE SERPL-MCNC: 241 MG/DL (ref 65–140)
HCT VFR BLD AUTO: 37.3 % (ref 36.5–49.3)
HGB BLD-MCNC: 13.1 G/DL (ref 12–17)
IMM GRANULOCYTES # BLD AUTO: 0.08 THOUSAND/UL (ref 0–0.2)
IMM GRANULOCYTES NFR BLD AUTO: 1 % (ref 0–2)
LYMPHOCYTES # BLD AUTO: 0.92 THOUSANDS/ÂΜL (ref 0.6–4.47)
LYMPHOCYTES NFR BLD AUTO: 8 % (ref 14–44)
MCH RBC QN AUTO: 33 PG (ref 26.8–34.3)
MCHC RBC AUTO-ENTMCNC: 35.1 G/DL (ref 31.4–37.4)
MCV RBC AUTO: 94 FL (ref 82–98)
MONOCYTES # BLD AUTO: 0.84 THOUSAND/ÂΜL (ref 0.17–1.22)
MONOCYTES NFR BLD AUTO: 7 % (ref 4–12)
NEUTROPHILS # BLD AUTO: 10.25 THOUSANDS/ÂΜL (ref 1.85–7.62)
NEUTS SEG NFR BLD AUTO: 83 % (ref 43–75)
NRBC BLD AUTO-RTO: 0 /100 WBCS
PLATELET # BLD AUTO: 123 THOUSANDS/UL (ref 149–390)
PMV BLD AUTO: 9.9 FL (ref 8.9–12.7)
POTASSIUM SERPL-SCNC: 3.8 MMOL/L (ref 3.5–5.3)
PROT SERPL-MCNC: 7 G/DL (ref 6.4–8.4)
RBC # BLD AUTO: 3.97 MILLION/UL (ref 3.88–5.62)
SODIUM SERPL-SCNC: 141 MMOL/L (ref 135–147)
WBC # BLD AUTO: 12.27 THOUSAND/UL (ref 4.31–10.16)

## 2025-08-18 PROCEDURE — 80053 COMPREHEN METABOLIC PANEL: CPT | Performed by: INTERNAL MEDICINE

## 2025-08-18 PROCEDURE — 85025 COMPLETE CBC W/AUTO DIFF WBC: CPT | Performed by: INTERNAL MEDICINE

## 2025-08-19 ENCOUNTER — TELEPHONE (OUTPATIENT)
Dept: HEMATOLOGY ONCOLOGY | Facility: CLINIC | Age: 70
End: 2025-08-19

## 2025-08-19 ENCOUNTER — HOSPITAL ENCOUNTER (OUTPATIENT)
Dept: INFUSION CENTER | Facility: CLINIC | Age: 70
Discharge: HOME | End: 2025-08-19
Attending: INTERNAL MEDICINE
Payer: COMMERCIAL

## 2025-08-19 VITALS
HEART RATE: 63 BPM | TEMPERATURE: 97.8 F | BODY MASS INDEX: 25.45 KG/M2 | DIASTOLIC BLOOD PRESSURE: 88 MMHG | SYSTOLIC BLOOD PRESSURE: 142 MMHG | RESPIRATION RATE: 18 BRPM | HEIGHT: 71 IN | WEIGHT: 181.8 LBS

## 2025-08-19 DIAGNOSIS — C25.9 ADENOCARCINOMA OF PANCREAS (HCC): Primary | ICD-10-CM

## 2025-08-19 RX ORDER — ATROPINE SULFATE 1 MG/ML
0.25 INJECTION, SOLUTION INTRAVENOUS ONCE AS NEEDED
Status: DISCONTINUED | OUTPATIENT
Start: 2025-08-19 | End: 2025-08-22 | Stop reason: HOSPADM

## 2025-08-19 RX ORDER — DEXTROSE MONOHYDRATE 50 MG/ML
20 INJECTION, SOLUTION INTRAVENOUS ONCE
Status: COMPLETED | OUTPATIENT
Start: 2025-08-19 | End: 2025-08-19

## 2025-08-19 RX ORDER — SODIUM CHLORIDE 9 MG/ML
20 INJECTION, SOLUTION INTRAVENOUS ONCE AS NEEDED
Status: DISCONTINUED | OUTPATIENT
Start: 2025-08-19 | End: 2025-08-22 | Stop reason: HOSPADM

## 2025-08-19 RX ORDER — ATROPINE SULFATE 1 MG/ML
0.25 INJECTION, SOLUTION INTRAVENOUS ONCE
Status: COMPLETED | OUTPATIENT
Start: 2025-08-19 | End: 2025-08-19

## 2025-08-19 RX ORDER — DIPHENHYDRAMINE HYDROCHLORIDE 50 MG/ML
25 INJECTION, SOLUTION INTRAMUSCULAR; INTRAVENOUS ONCE
Status: COMPLETED | OUTPATIENT
Start: 2025-08-19 | End: 2025-08-19

## 2025-08-19 RX ADMIN — SODIUM CHLORIDE 20 ML/HR: 0.9 INJECTION, SOLUTION INTRAVENOUS at 08:19

## 2025-08-19 RX ADMIN — DEXAMETHASONE SODIUM PHOSPHATE: 10 INJECTION, SOLUTION INTRAMUSCULAR; INTRAVENOUS at 08:59

## 2025-08-19 RX ADMIN — DEXTROSE 20 ML/HR: 5 SOLUTION INTRAVENOUS at 10:05

## 2025-08-19 RX ADMIN — DIPHENHYDRAMINE HYDROCHLORIDE 25 MG: 50 INJECTION INTRAMUSCULAR; INTRAVENOUS at 10:00

## 2025-08-19 RX ADMIN — FOSAPREPITANT 150 MG: 150 INJECTION, POWDER, LYOPHILIZED, FOR SOLUTION INTRAVENOUS at 09:25

## 2025-08-19 RX ADMIN — IRINOTECAN HYDROCHLORIDE 244 MG: 20 INJECTION, SOLUTION INTRAVENOUS at 10:08

## 2025-08-19 RX ADMIN — ATROPINE SULFATE 0.25 MG: 1 INJECTION, SOLUTION INTRAVENOUS at 10:01

## (undated) DEVICE — CATH ULTRASOUND ACUNAV ICE 8FR 90CM GE VIVID-I

## (undated) DEVICE — CATH EP  INQUIRY 6F 2-5-2MM  MED CRV STERABLE  DECAPOLAR 110CM

## (undated) DEVICE — STRL BETHLACCESS CATHETER PACK: Brand: CARDINAL HEALTH

## (undated) DEVICE — PENCIL ELECTROSURG E-Z CLEAN -0035H

## (undated) DEVICE — INTRO SHEATH 8FR 24CM ARROW-FLEX

## (undated) DEVICE — SUT MONOCRYL 4-0 PS-2 27 IN Y426H

## (undated) DEVICE — SUT SILK 3-0 18 IN A184H

## (undated) DEVICE — CABLE CATHETER ACHIEVE MAPPING

## (undated) DEVICE — PINNACLE INTRODUCER SHEATH: Brand: PINNACLE

## (undated) DEVICE — Device: Brand: PROTRACK PIGTAIL WIRE

## (undated) DEVICE — BETHLEHEM MAJOR GENERAL PACK: Brand: CARDINAL HEALTH

## (undated) DEVICE — CATH COAXIAL UMBILICAL

## (undated) DEVICE — CABLE CRYOCATH ELECTRICAL UMBILICAL

## (undated) DEVICE — INTENDED FOR TISSUE SEPARATION, AND OTHER PROCEDURES THAT REQUIRE A SHARP SURGICAL BLADE TO PUNCTURE OR CUT.: Brand: BARD-PARKER SAFETY BLADES SIZE 11, STERILE

## (undated) DEVICE — CATH EP 5FR QUAD SUPREME CRD

## (undated) DEVICE — SUT PROLENE 3-0 SH 36 IN 8522H

## (undated) DEVICE — BULB SYRINGE,IRRIGATION WITH PROTECTIVE CAP: Brand: DOVER

## (undated) DEVICE — REF PATCH ENSITE X

## (undated) DEVICE — INSUFFLATION TUBING PRIMFLO

## (undated) DEVICE — INSUFFLATION NEEDLE TO ESTABLISH PNEUMOPERITONEUM.: Brand: INSUFFLATION NEEDLE

## (undated) DEVICE — 2, DISPOSABLE SUCTION/IRRIGATOR WITHOUT DISPOSABLE TIP: Brand: STRYKEFLOW

## (undated) DEVICE — CATH EP 7FR DI-DIR 10-POLE DEFL CS 2-8-2 FJ

## (undated) DEVICE — STANDARD SURGICAL GOWN, L: Brand: CONVERTORS

## (undated) DEVICE — SUT VICRYL 3-0 SH 27 IN J416H

## (undated) DEVICE — DECANTER: Brand: UNBRANDED

## (undated) DEVICE — EXOFIN PRECISION PEN HIGH VISCOSITY TOPICAL SKIN ADHESIVE: Brand: EXOFIN PRECISION PEN, 1G

## (undated) DEVICE — DGW .035 FC J3MM 150CM TEF: Brand: EMERALD

## (undated) DEVICE — CATH ABLATION FLEXCATH ADVANCE OD12 FR STEERABLE

## (undated) DEVICE — GUIDE SHEATH SLO 8.5 FR

## (undated) DEVICE — ELECTRODE BLADE MOD E-Z CLEAN 2.5IN 6.4CM -0012M

## (undated) DEVICE — TUBING SUCTION 5MM X 12 FT

## (undated) DEVICE — TROCAR: Brand: KII FIOS FIRST ENTRY

## (undated) DEVICE — GLOVE SRG BIOGEL 6.5

## (undated) DEVICE — NEEDLE TRANSSEPTAL BRK-1 71CM

## (undated) DEVICE — CATH ABLATION CRYOCATH ARCTIC FRONT ADV PRO 28MM

## (undated) DEVICE — SYRINGE 10ML LL

## (undated) DEVICE — CATH EP ACHIEVE 20 MM MAPPING LOOP

## (undated) DEVICE — CATH EP 5FR QUAD SUPREME JSN

## (undated) DEVICE — CHLORAPREP HI-LITE 10.5ML ORANGE

## (undated) DEVICE — INTENDED FOR TISSUE SEPARATION, AND OTHER PROCEDURES THAT REQUIRE A SHARP SURGICAL BLADE TO PUNCTURE OR CUT.: Brand: BARD-PARKER SAFETY BLADES SIZE 15, STERILE

## (undated) DEVICE — VISUALIZATION SYSTEM: Brand: CLEARIFY

## (undated) DEVICE — MEDI-VAC YANK SUCT HNDL W/TPRD BULBOUS TIP: Brand: CARDINAL HEALTH

## (undated) DEVICE — 3M™ STERI-STRIP™ REINFORCED ADHESIVE SKIN CLOSURES, R1547, 1/2 IN X 4 IN (12 MM X 100 MM), 6 STRIPS/ENVELOPE: Brand: 3M™ STERI-STRIP™

## (undated) DEVICE — SUT MONOCRYL 4-0 PS-2 18 IN Y496G